# Patient Record
Sex: FEMALE | Race: WHITE | NOT HISPANIC OR LATINO | Employment: OTHER | URBAN - METROPOLITAN AREA
[De-identification: names, ages, dates, MRNs, and addresses within clinical notes are randomized per-mention and may not be internally consistent; named-entity substitution may affect disease eponyms.]

---

## 2017-12-07 ENCOUNTER — OFFICE VISIT (OUTPATIENT)
Dept: INTERNAL MEDICINE | Facility: CLINIC | Age: 72
End: 2017-12-07
Payer: COMMERCIAL

## 2017-12-07 VITALS
BODY MASS INDEX: 30.56 KG/M2 | SYSTOLIC BLOOD PRESSURE: 150 MMHG | TEMPERATURE: 98 F | RESPIRATION RATE: 20 BRPM | HEART RATE: 64 BPM | OXYGEN SATURATION: 98 % | DIASTOLIC BLOOD PRESSURE: 78 MMHG | HEIGHT: 64 IN | WEIGHT: 179 LBS

## 2017-12-07 DIAGNOSIS — E11.9 TYPE 2 DIABETES MELLITUS WITHOUT COMPLICATION, WITHOUT LONG-TERM CURRENT USE OF INSULIN: ICD-10-CM

## 2017-12-07 DIAGNOSIS — Z12.11 SCREENING FOR COLON CANCER: ICD-10-CM

## 2017-12-07 DIAGNOSIS — K85.00 IDIOPATHIC ACUTE PANCREATITIS WITHOUT INFECTION OR NECROSIS: ICD-10-CM

## 2017-12-07 DIAGNOSIS — I10 HYPERTENSION, UNSPECIFIED TYPE: ICD-10-CM

## 2017-12-07 DIAGNOSIS — D50.8 OTHER IRON DEFICIENCY ANEMIA: ICD-10-CM

## 2017-12-07 DIAGNOSIS — Z12.39 SCREENING FOR BREAST CANCER: ICD-10-CM

## 2017-12-07 DIAGNOSIS — L03.031 PARONYCHIA OF SECOND TOE OF RIGHT FOOT: Primary | ICD-10-CM

## 2017-12-07 DIAGNOSIS — Z13.820 OSTEOPOROSIS SCREENING: ICD-10-CM

## 2017-12-07 DIAGNOSIS — E83.42 HYPOMAGNESEMIA: ICD-10-CM

## 2017-12-07 PROCEDURE — 10060 I&D ABSCESS SIMPLE/SINGLE: CPT | Mod: ,,, | Performed by: INTERNAL MEDICINE

## 2017-12-07 PROCEDURE — 99205 OFFICE O/P NEW HI 60 MIN: CPT | Mod: 25,,, | Performed by: INTERNAL MEDICINE

## 2017-12-07 RX ORDER — DOXYCYCLINE 100 MG/1
100 CAPSULE ORAL EVERY 12 HOURS
Qty: 10 CAPSULE | Refills: 1 | Status: SHIPPED | OUTPATIENT
Start: 2017-12-07 | End: 2018-01-22

## 2017-12-07 RX ORDER — METFORMIN HYDROCHLORIDE 500 MG/1
500 TABLET ORAL
Qty: 90 TABLET | Refills: 0 | Status: SHIPPED | OUTPATIENT
Start: 2017-12-07 | End: 2017-12-11 | Stop reason: SDUPTHER

## 2017-12-07 RX ORDER — METFORMIN HYDROCHLORIDE 500 MG/1
500 TABLET ORAL 2 TIMES DAILY WITH MEALS
Qty: 90 TABLET | Refills: 0 | Status: SHIPPED | OUTPATIENT
Start: 2017-12-07 | End: 2017-12-07 | Stop reason: SDUPTHER

## 2017-12-07 NOTE — PROGRESS NOTES
Subjective:       Patient ID: Cynthia N Cushing is a 72 y.o. female.    Chief Complaint: Diabetes; Medication Refill; and Wound Infection (infected toe)    STUBBED THE RIGHT SECOND TOE LAST MONTH AND WENT TO URGENT CARE WHO FOUND A SPUR AND WAS TREATED BUT NOW IT SEEMS TO BE INFECTED AGAIN    RECENT ELEVATION OF HER BLOOD SUGAR AND SHE IS JUST NOW SEEING ME FOR TREAMENT    STATES SHE JUST GOT OUT OF THE HOSPITAL FOR ELEVATED PANCREATIC AND LIVER AND RENAL PROBLEMS-NO PRIOR PANCREATITIS-SHE HAD BEEN SICK WITH THE FLU AND SHE HAD HAD THE ANTIBIOTIC FOR THE TOE-NO HX RENAL DISEASE.3 YEARS AGO PATIENT HAD SHINGLES AND SHE HEARD HER LFS WERE ELEVATED-NO HX LIVER DISEASE-NO HX HEPATITIS-NO ALCOHOL-TAKES BC FOR HER KNEE      Diabetes   She presents for her initial diabetic visit. She has type 2 diabetes mellitus. Her disease course has been worsening. There are no hypoglycemic associated symptoms. Pertinent negatives for hypoglycemia include no confusion, dizziness or headaches. Associated symptoms include fatigue. Pertinent negatives for diabetes include no blurred vision, no chest pain, no foot ulcerations, no polydipsia, no polyuria, no visual change and no weakness. Symptoms are worsening. Risk factors for coronary artery disease include diabetes mellitus, dyslipidemia, hypertension, obesity, post-menopausal and sedentary lifestyle. Current diabetic treatments: WHEN FIRST SUGAR ELEVATED THE MD PUT HER ON METFORMIN AND SHE RAN OUT OF IT AND SHE WAS TOLD TO FIND AN MD TO TREAT IT-READY MED PUT HER BACK ON THE METFORMIN. She has not had a previous visit with a dietitian. She rarely participates in exercise. Home blood sugar record trend: DOESNT CHECK. An ACE inhibitor/angiotensin II receptor blocker is not being taken. She does not see a podiatrist.Eye exam is not current.   Medication Refill   Associated symptoms include abdominal pain and fatigue. Pertinent negatives include no arthralgias, chest pain, chills,  congestion, coughing, diaphoresis, fever, headaches, myalgias, nausea, rash, sore throat, visual change, vomiting or weakness.   Nail Problem   This is a chronic problem. The current episode started more than 1 month ago. Associated symptoms include abdominal pain and fatigue. Pertinent negatives include no arthralgias, chest pain, chills, congestion, coughing, diaphoresis, fever, headaches, myalgias, nausea, rash, sore throat, visual change, vomiting or weakness.       Past Medical History:   Diagnosis Date    Allergy     Cataract     Hernia, hiatal     Hypertension     Kidney stone     Seasonal allergies      Social History     Social History    Marital status:      Spouse name: N/A    Number of children: N/A    Years of education: N/A     Occupational History    Not on file.     Social History Main Topics    Smoking status: Never Smoker    Smokeless tobacco: Never Used    Alcohol use No    Drug use: No    Sexual activity: No     Other Topics Concern    Not on file     Social History Narrative    No narrative on file     Past Surgical History:   Procedure Laterality Date    BREAST SURGERY      CERVICAL DISC SURGERY      EYE SURGERY      HYSTERECTOMY       Family History   Problem Relation Age of Onset    Stroke Mother     Cancer Mother     Cancer Father        Review of Systems   Constitutional: Positive for fatigue. Negative for appetite change, chills, diaphoresis and fever.        CHRONIC FATIGUE WITH SEVERE ANEMIA WHEN YOUNGER   HENT: Negative.  Negative for congestion, ear pain, hearing loss, sore throat and trouble swallowing.    Eyes: Negative for blurred vision, photophobia, pain and visual disturbance.   Respiratory: Negative.  Negative for cough, chest tightness and shortness of breath.    Cardiovascular: Positive for leg swelling. Negative for chest pain and palpitations.        HER FEET SWELL AND SHE IS ON DIURETIC WHICH IS NOT WORKING   Gastrointestinal: Positive for  abdominal pain. Negative for blood in stool, constipation, diarrhea, nausea and vomiting.        HAD GENERALIZED ABDOMINAL PAIN WITH NAUSEA WHEN SHE WENT TO THE HOSPITAL-SHE WAS CONFUSED-AND WAS FOUND TO BE DEHYDRATED   Endocrine: Negative for cold intolerance, heat intolerance, polydipsia and polyuria.   Genitourinary: Negative.  Negative for difficulty urinating, flank pain, pelvic pain and vaginal pain.   Musculoskeletal: Negative.  Negative for arthralgias and myalgias.   Skin: Negative.  Negative for rash.   Allergic/Immunologic: Negative.  Negative for immunocompromised state.   Neurological: Positive for light-headedness. Negative for dizziness, weakness and headaches.   Hematological: Negative for adenopathy. Does not bruise/bleed easily.        HISTORY OF ANEMIA IN THE PAST BUT NONE NOW   Psychiatric/Behavioral: Negative.  Negative for confusion, self-injury and suicidal ideas.       Objective:      Physical Exam   Constitutional: She is oriented to person, place, and time. She appears well-developed and well-nourished. She is cooperative. No distress.   1/6 SYSTOLIC BASILAR MURMUR   HENT:   Head: Normocephalic and atraumatic.   Right Ear: Tympanic membrane normal.   Left Ear: Tympanic membrane normal.   Nose: Nose normal.   Mouth/Throat: Uvula is midline, oropharynx is clear and moist and mucous membranes are normal.   Eyes: Conjunctivae, EOM and lids are normal. Pupils are equal, round, and reactive to light. Right pupil is round and reactive. Left pupil is round and reactive.   Neck: Trachea normal and normal range of motion. Neck supple. No JVD present. No thyromegaly present.   MIDLINE SCAR TO RIGHT AND LEFT FROM CERVICAL SURGERY   Cardiovascular: Normal rate, regular rhythm and intact distal pulses.  Exam reveals no gallop and no friction rub.    Murmur heard.  Pulmonary/Chest: Effort normal and breath sounds normal. No tachypnea. No respiratory distress. She has no wheezes. She has no rales. She  exhibits no tenderness.   Abdominal: Soft. Bowel sounds are normal. She exhibits no distension and no mass. There is no tenderness. There is no rebound and no guarding.   Genitourinary: Rectal exam shows guaiac negative stool.   Musculoskeletal: Normal range of motion. She exhibits edema and tenderness.   1+ EDEMA OF LOWER LEGS AND FEET RIGHT GREATER THAN LEFT-PARONYCHIA WITH ERYTHEMA OF THE RIGHT SECOND TOENAIL AND DISTAL DIGIT   Lymphadenopathy:     She has no cervical adenopathy.   Neurological: She is alert and oriented to person, place, and time. She has normal strength.   Skin: Skin is warm and dry. No rash noted. No pallor.        Psychiatric: She has a normal mood and affect. Her speech is normal.   Nursing note and vitals reviewed.      Assessment:       1. Paronychia of second toe of right foot    2. Type 2 diabetes mellitus without complication, without long-term current use of insulin    3. Hypertension, unspecified type    4. Screening for breast cancer    5. Osteoporosis screening         Plan:         LAST H/H WAS 9.3/28.6-FBS  -GFR WAS 68CC//MAGNESIUM WAS LOW//PFS WERE SLIGHTLY ELEVATED  Paronychia of second toe of right foot  -     INCISION AND DRAINAGE  -     doxycycline (VIBRAMYCIN) 100 MG Cap; Take 1 capsule (100 mg total) by mouth every 12 (twelve) hours.  Dispense: 10 capsule; Refill: 1    Type 2 diabetes mellitus without complication, without long-term current use of insulin    Hypertension, unspecified type    Screening for breast cancer  -     Mammo Digital Diagnostic Right without C    Osteoporosis screening  -     DXA Bone Density Spine And Hip

## 2017-12-07 NOTE — PATIENT INSTRUCTIONS
Iron-Deficiency Anemia (Adult)  Red blood cells carry oxygen to the tissues of your body. Anemia is a condition in which you have too few red blood cells. You need iron to make red cells. Anemia makes you feel tired and run down. When anemia becomes severe, your skin becomes pale. You may feel short of breath after physical activity. Other symptoms include:  · Headaches  · Dizziness  · Leg cramps with physical activity  · Drowsiness  · Restless legs  Your anemia is caused by not having enough iron in your body. This may be because of:  · Loss of blood. This can be caused by heavy menstrual periods. It can also be caused by bleeding from the stomach or intestines.  · Poor diet. You may not be eating enough foods that contain iron.  · Inability to absorb iron from the foods you eat  · Pregnancy  If your blood count is low enough, your healthcare provider may prescribe an iron supplement. It usually takes about 2 to 3 months of treatment with iron supplements to correct anemia. Severe cases of anemia need a blood transfusion to quickly ease symptoms and deliver more oxygen to the cells.  Home care  Follow these guidelines when caring for yourself at home:  · Eat foods high in iron. This will boost the amount of iron stored in your body. It is a natural way to build up the number of blood cells. Good sources of iron include beef, liver, spinach and other dark green leafy vegetables, whole grains, beans, and nuts.  · Do not overexert yourself.  · Talk with your healthcare provider before traveling by air or traveling to high altitudes.  Follow-up care  Follow up with your healthcare provider in 2 months, or as advised. This is to have another red blood cell count to be sure your anemia has been fixed.  When to seek medical advice  Call your healthcare provider right away if any of these occur:  · Shortness of breath or chest pain  · Dizziness or fainting  · Vomiting blood or passing red or black-colored stool   Date  Last Reviewed: 2/25/2016  © 1457-8200 [employer]. [address] [place] [address], [place] [address]. All rights reserved. This information is not intended as a substitute for professional medical care. Always follow your healthcare professional's instructions.        Understanding Carbohydrates, Fats, and Protein  Food is a source of fuel and nourishment for your body. Its also a source of pleasure. Having diabetes doesnt mean you have to eat special foods or give up desserts. Instead, your dietitian can show you how to plan meals to suit your body. To start, learn how different foods affect blood sugar.  Carbohydrates  Carbohydrates are the main source of fuel for the body. Carbohydrates raise blood sugar. Many people think carbohydrates are only found in pasta or bread. But carbohydrates are actually in many kinds of foods:  · Sugars occur naturally in foods such as fruit, milk, honey, and molasses. Sugars can also be added to many foods, from cereals and yogurt to candy and desserts. Sugars raise blood sugar.  · Starches are found in bread, cereals, pasta, and dried beans. Theyre also found in corn, peas, potatoes, yam, acorn squash, and butternut squash. Starches also raise blood sugar.   · Fiber is found in foods such as vegetables, fruits, beans, and whole grains. Unlike other carbs, fiber isnt digested or absorbed. So it doesnt raise blood sugar. In fact, fiber can help keep blood sugar from rising too fast. It also helps keep blood cholesterol at a healthy level.  Did you know?  Even though carbohydrates raise blood sugar, its best to have some in every meal. They are an important part of a healthy diet.   Fat  Fat is an energy source that can be stored until needed. Fat does not raise blood sugar. However, it can raise blood cholesterol, increasing the risk of heart disease. Fat is also high in calories, which can cause weight gain. Not all types of fat are the same.  More  Healthy:  · Monounsaturated fats are mostly found in vegetable oils, such as olive, canola, and peanut oils. They are also found in avocados and some nuts. Monounsaturated fats are healthy for your heart. Thats because they lower LDL (unhealthy) cholesterol.  · Polyunsaturated fats are mostly found in vegetable oils, such as corn, safflower, and soybean oils. They are also found in some seeds, nuts, and fish. Polyunsaturated fats lower LDL (unhealthy) cholesterol. So, choosing them instead of saturated fats is healthy for your heart. Certain unsaturated fats can help lower triglycerides.   Less Healthy:  · Saturated fats are found in animal products, such as meat, poultry, whole milk, lard, and butter. Saturated fats raise LDL cholesterol and are not healthy for your heart.  · Hydrogenated oils and trans fats are formed when vegetable oils are processed into solid fats. They are found in many processed foods. Hydrogenated oils and trans fats raise LDL cholesterol and lower HDL (healthy) cholesterol. They are not healthy for your heart.  Protein  Protein helps the body build and repair muscle and other tissue. Protein has little or no effect on blood sugar. However, many foods that contain protein also contain saturated fat. By choosing low-fat protein sources, you can get the benefits of protein without the extra fat:  · Plant protein is found in dry beans and peas, nuts, and soy products, such as tofu and soymilk. These sources tend to be cholesterol-free and low in saturated fat.  · Animal protein is found in fish, poultry, meat, cheese, milk, and eggs. These contain cholesterol and can be high in saturated fat. Aim for lean, lower-fat choices.  Date Last Reviewed: 3/1/2016  © 8937-0845 [employer]. [address] [place] [id number]. All rights reserved. This information is not intended as a substitute for professional medical care. Always follow your healthcare professional's  instructions.        Diet: Diabetes  Food is an important tool that you can use to control diabetes and stay healthy. Eating well-balanced meals in the correct amounts will help you control your blood glucose levels and prevent low blood sugar reactions. It will also help you reduce the health risks of diabetes. There is no one specific diet that is right for everyone with diabetes. But there are general guidelines to follow. A registered dietitian (RD) will create a tailored diet approach thats just right for you. He or she will also help you plan healthy meals and snacks. If you have any questions, call your dietitian for advice.     Guidelines for success  Talk with your healthcare provider before starting a diabetes diet or weight loss program. If you haven't talked with a dietitian yet, ask your provider for a referral. The following guidelines can help you succeed:  · Select foods from the 6 food groups below. Your dietitian will help you find food choices within each group. He or she will also show you serving sizes and how many servings you can have at each meal.  ¨ Grains, beans, and starchy vegetables  ¨ Vegetables  ¨ Fruit  ¨ Milk or yogurt  ¨ Meat, poultry, fish, or tofu  ¨ Healthy fats  · Check your blood sugar levels as directed by your provider. Take any medicine as prescribed by your provider.  · Learn to read food labels and pick the right portion sizes.  · Eat only the amount of food in your meal plan. Eat about the same amount of food at regular times each day. Dont skip meals. Eat meals 4 to 5 hours apart, with snacks in between.  · Limit alcohol. It raises blood sugar levels. Drink water or calorie-free diet drinks that use safe sweeteners.  · Eat less fat to help lower your risk of heart disease. Use nonfat or low-fat dairy products and lean meats. Avoid fried foods. Use cooking oils that are unsaturated, such as olive, canola, or peanut oil.  · Talk with your dietitian about safe sugar  substitutes.  · Avoid added salt. It can contribute to high blood pressure, which can cause heart disease. People with diabetes already have a risk of high blood pressure and heart disease.  · Stay at a healthy weight. If you need to lose weight, cut down on your portion sizes. But dont skip meals. Exercise is an important part of any weight management program. Talk with your provider about an exercise program thats right for you.  · For more information about the best diet plan for you, talk with a registered dietitian (RD). To find an RD in your area, contact:  ¨ Academy of Nutrition and Dietetics www.eatright.org  ¨ The American Diabetes Association [phone] www.diabetes.org  Date Last Reviewed: 8/1/2016 © 2000-2017 [employer]. [address] [place] [address], [place] [address]. All rights reserved. This information is not intended as a substitute for professional medical care. Always follow your healthcare professional's instructions.        Your Diabetes Foot Care Program    Every day you depend on your feet to keep you moving. But when you have diabetes, your feet need special care. Even a small foot problem can become very serious. So dont take your feet for granted. By working with your diabetes healthcare team, you can learn how to protect your feet and keep them healthy.  Evaluating your feet  An evaluation helps your healthcare provider check the condition of your feet. The evaluation includes a review of your diabetes history and overall health. It may also include a foot exam, X-rays, or other tests. These can help show problems beneath the skin that you cant see or feel.  Medical history  You will be asked about your overall health and any history of foot problems. Youll also discuss your diabetes history, such as whether your blood sugar level has changed over time. It also includes questions about sensations of pain, tingling, pins and needles, or numbness. Your healthcare provider  will also want to know if you have high blood pressure and heart disease, or if you smoke. Be sure to mention any medicines (including over-the-counter), supplements, or herbal remedies you take.  Foot exam  A foot exam checks the condition of different parts of your foot. First, your skin and nails are examined for any signs of infection. Blood flow is checked by feeling for the pulses in each foot. You may also have tests to study the nerves in the foot. These include using a small filament (wire) to see how sensitive your feet are. In certain cases, you will be asked to walk a short distance to check for bone, joint, and muscle problems.  Diagnostic tests  If needed, your healthcare provider will suggest certain tests to learn more about your feet. These include:  · Doppler tests to measure blood flow in the feet and lower leg.  · X-rays, which can show bone or joint problems.  · Other imaging tests, such as an MRI (magnetic resonance imaging), bone scan, and CT (computed tomography) scan. These can help show bone infections.  · Other tests, such as vascular tests, which study the blood flow in your feet and legs. You may also have nerve studies to learn how sensitive your feet are.  Creating a foot care program  Based on the evaluation, your healthcare provider will create a foot care program for you. Your program may be as simple as starting a daily self-care routine and changing the types of shoes your wear. It may also involve treating minor foot problems, such as a corn or blister. In some cases, surgery will be needed to treat an infection or mechanical problems, such as hammer toes.  Preventing problems  When you have diabetes, its easier to prevent problems than to treat them later on. So see your healthcare team for regular checkups and foot care. Your healthcare team can also help you learn more about caring for your feet at home. For example, you may be told to avoid walking barefoot. Or you may be  told that special footwear is needed to protect your feet.  Have regular checkups  Foot problems can develop quickly. So be sure to follow your healthcare teams schedule for regular checkups. During office visits, take off your shoes and socks as soon as you get in the exam room. Ask your healthcare provider to examine your feet for problems. This will make it easier to find and treat small skin irritations before they get worse. Regular checkups can also help keep track of the blood flow and feeling in your feet. If you have neuropathy (lack of feeling in your feet), you will need to have checkups more often.  Learn about self-care  The more you know about diabetes and your feet, the easier it will be to prevent problems. Members of your healthcare team can teach you how to inspect your feet and teach you to look for warning signs. They can also give you other foot care tips. During office visits, be sure to ask any questions you have.  Date Last Reviewed: 7/1/2016 © 2000-2017 "[employer]. [address] [place] [address]. All rights reserved. This information is not intended as a substitute for professional medical care. Always follow your healthcare professional's instructions.        Diabetes (General Information)  Diabetes is a long-term health problem. It means your body does not make enough insulin. Or it may mean that your body cannot use the insulin it makes. Insulin is a hormone in your body. It lets blood sugar (glucose) reach the cells in your body. All of your cells need glucose for fuel.  When you have diabetes, the glucose in your blood builds up because it cannot get into the cells. This buildup is called high blood sugar (hyperglycemia).  Your blood sugar level depends on several things. It depends on what kind of food you eat and how much of it you eat. It also depends on how much exercise you get, and how much insulin you have in your body. Eating too much of the wrong  kinds of food or not taking diabetes medicine on time can cause high blood sugar. Infections can cause high blood sugar even if you are taking medicines correctly.  These things can also cause low blood sugar:  · Missing meals  · Not eating enough food  · Taking too much diabetes medicine  Diabetes can cause serious problems over time if you do not get treated. These problems include heart disease, stroke, kidney failure, and blindness. They also include nerve pain or loss of feeling in your legs and feet, and gangrene of the feet. By keeping your blood sugar under control you can prevent or delay these problems.  Normal blood sugar levels are 80 to 100 before a meal and less than 180 in the 1 to 2 hours after a meal.  Home care  Follow these guidelines when caring for yourself at home:  · Follow the diet your healthcare provider gives you. Take insulin or other diabetes medicine exactly as told to.  · Watch your blood sugar as you are told to. Keep a log of your results. This will help your provider change your medicines to keep your blood sugar under control.  · Try to reach your ideal weight. You may be able to cut back on or not have to take diabetes medicine if you eat the right foods and get exercise.  · Do not smoke. Smoking worsens the effects of diabetes on your circulation. You are much more likely to have a heart attack if you have diabetes and you smoke.  · Take good care of your feet. If you have lost feeling in your feet, you may not see an injury or infection. Check your feet and between your toes at least once a week.  · Wear a medical alert bracelet or necklace, or carry a card in your wallet that says you have diabetes. This will help healthcare providers give you the right care if you get very ill and cannot tell them that you have diabetes.  Sick day plan  If you get a cold, the flu, or a bacterial or viral infection, take these steps:  · Look at your diabetes sick plan and call your healthcare  provider as you were told to. You may need to call your provider right away if:  ¨ Your blood sugar is above 240 while taking your diabetes medicine  ¨ Your urine ketone levels are above normal or high  ¨ You have been vomiting more than 6 hours  ¨ You have trouble breathing or your breath ha s a fruity smell  ¨ You have a high fever  ¨ You have a fever for several days and you are not getting better  ¨ You get light-headed and are sleepier than usual  · Keep taking your diabetes pills (oral medicine) even if you have been vomiting and are feeling sick. Call your provider right away because you may need insulin to lower your blood sugar until you recover from your illness.  · Keep taking your insulin even if you have been vomiting and are feeling sick. Call your provider right away to ask if you need to change your insulin dose. This will depend on your blood sugar results.  · Check your blood sugar every 2 to 4 hours, or at least 4 times a day.  · Check your ketones often. If you are vomiting and having diarrhea, watch them more often.  · Do not skip meals. Try to eat small meals on a regular schedule. Do this even if you do not feel like eating.  · Drink water or other liquids that do not have caffeine or calories. This will keep you from getting dehydrated. If you are nauseated or vomiting, takes small sips every 5 minutes. To prevent dehydration try to drink a cup (8 ounces) of fluids every hour while you are awake.  General care  Always bring a source of fast-acting sugar with you in case you have symptoms of low blood sugar (below 70). At the first sign of low blood sugar, eat or drink 15 to 20 grams of fast-acting sugar to raise your blood sugar. Examples are:  · 3 to 4 glucose tablets. You can buy these at most drugstores.  · 4 ounces (1/2 cup) of regular (not diet) soft drinks  · 4 ounces (1/2 cup) of any fruit juice  · 8 ounces (1 cup) of milk  · 5 to 6 pieces of hard candy  · 1 tablespoon of honey  Check  your blood sugar 15 minutes after treating yourself. If it is still below 70, take 15 to 20 more grams of fast-acting sugar. Test again in 15 minutes. If it returns to normal (70 or above), eat a snack or meal to keep your blood sugar in a safe range. If it stays low, call your doctor or go to an emergency room.  Follow-up care  Follow-up with your healthcare provider, or as advised. For more information about diabetes, visit the American Diabetes Association website at www.diabetes.org or call [phone].  When to seek medical advice  Call your healthcare provider right away if you have any of these symptoms of high blood sugar:  · Frequent urination  · Dizziness  · Drowsiness  · Thirst  · Headache  · Nausea or vomiting  · Abdominal pain  · Eyesight changes  · Fast breathing  · Confusion or loss of consciousness  Also call your provider right away if you have any of these signs of low blood sugar:  · Fatigue  · Headache  · Shakes  · Excess sweating  · Hunger  · Feeling anxious or restless  · Eyesight changes  · Drowsiness  · Weakness  · Confusion or loss of consciousness  Call 911  Call for emergency help right away if any of these occur:  · Chest pain or shortness of breath  · Dizziness or fainting  · Weakness of an arm or leg or one side of the face  · Trouble speaking or seeing   Date Last Reviewed: 6/1/2016  © 6522-3744 [employer]. [address] [place] [address], [place] [address]. All rights reserved. This information is not intended as a substitute for professional medical care. Always follow your healthcare professional's instructions.

## 2017-12-07 NOTE — PROCEDURES
Incision & Drainage  Date/Time: 12/7/2017 3:30 PM  Performed by: TIM GEORGE  Authorized by: TIM GEORGE     Consent Done?:  Yes (Verbal)    Type:  Abscess  Body area:  Lower extremity  Location details:  Right second toe  Scalpel size:  10  Incision type:  Single straight  Complexity:  Simple  Drainage:  Pus  Drainage amount:  Scant  Wound treatment:  Incision  Packing material:  None  Patient tolerance:  Patient tolerated the procedure well with no immediate complications

## 2017-12-08 ENCOUNTER — TELEPHONE (OUTPATIENT)
Dept: INTERNAL MEDICINE | Facility: CLINIC | Age: 72
End: 2017-12-08

## 2017-12-08 DIAGNOSIS — J01.40 SUBACUTE PANSINUSITIS: ICD-10-CM

## 2017-12-08 NOTE — TELEPHONE ENCOUNTER
----- Message from Annamarie Mcgill sent at 12/7/2017  4:41 PM CST -----  Contact: self  Doxycycline gives the patient an upset stomach.  Can clarithromycin 500mg be sent instead?

## 2017-12-11 ENCOUNTER — TELEPHONE (OUTPATIENT)
Dept: INTERNAL MEDICINE | Facility: CLINIC | Age: 72
End: 2017-12-11

## 2017-12-11 PROBLEM — J01.40 SUBACUTE PANSINUSITIS: Status: ACTIVE | Noted: 2017-12-11

## 2017-12-11 RX ORDER — AZITHROMYCIN 500 MG/1
500 TABLET, FILM COATED ORAL DAILY
Qty: 10 TABLET | Refills: 0 | Status: SHIPPED | OUTPATIENT
Start: 2017-12-11 | End: 2017-12-21

## 2017-12-11 RX ORDER — AZITHROMYCIN 500 MG/1
500 TABLET, FILM COATED ORAL DAILY
Qty: 10 TABLET | Refills: 0 | Status: SHIPPED | OUTPATIENT
Start: 2017-12-11 | End: 2017-12-11 | Stop reason: SDUPTHER

## 2017-12-11 RX ORDER — METFORMIN HYDROCHLORIDE 500 MG/1
500 TABLET ORAL
Qty: 90 TABLET | Refills: 0 | Status: SHIPPED | OUTPATIENT
Start: 2017-12-11 | End: 2018-01-22 | Stop reason: SDUPTHER

## 2018-01-22 ENCOUNTER — OFFICE VISIT (OUTPATIENT)
Dept: INTERNAL MEDICINE | Facility: CLINIC | Age: 73
End: 2018-01-22
Payer: COMMERCIAL

## 2018-01-22 VITALS
WEIGHT: 177 LBS | BODY MASS INDEX: 30.22 KG/M2 | HEIGHT: 64 IN | SYSTOLIC BLOOD PRESSURE: 130 MMHG | HEART RATE: 68 BPM | TEMPERATURE: 97 F | OXYGEN SATURATION: 98 % | RESPIRATION RATE: 20 BRPM | DIASTOLIC BLOOD PRESSURE: 72 MMHG

## 2018-01-22 DIAGNOSIS — M89.9 DISEASE OF BONE: Primary | ICD-10-CM

## 2018-01-22 DIAGNOSIS — E11.9 TYPE 2 DIABETES MELLITUS WITHOUT COMPLICATION, WITHOUT LONG-TERM CURRENT USE OF INSULIN: ICD-10-CM

## 2018-01-22 DIAGNOSIS — E11.9 TYPE 2 DIABETES MELLITUS WITHOUT COMPLICATION, WITHOUT LONG-TERM CURRENT USE OF INSULIN: Primary | ICD-10-CM

## 2018-01-22 DIAGNOSIS — L03.031 PARONYCHIA OF SECOND TOE OF RIGHT FOOT: ICD-10-CM

## 2018-01-22 PROCEDURE — 99214 OFFICE O/P EST MOD 30 MIN: CPT | Mod: ,,, | Performed by: INTERNAL MEDICINE

## 2018-01-22 RX ORDER — METFORMIN HYDROCHLORIDE 500 MG/1
500 TABLET ORAL
Qty: 90 TABLET | Refills: 0 | Status: SHIPPED | OUTPATIENT
Start: 2018-01-22 | End: 2018-07-19 | Stop reason: SDUPTHER

## 2018-01-22 RX ORDER — LANCETS
1 EACH MISCELLANEOUS
Qty: 60 EACH | Refills: 2 | Status: SHIPPED | OUTPATIENT
Start: 2018-01-22 | End: 2019-06-04

## 2018-01-22 RX ORDER — DEXTROSE 4 G
TABLET,CHEWABLE ORAL
Qty: 1 EACH | Refills: 0 | Status: SHIPPED | OUTPATIENT
Start: 2018-01-22 | End: 2019-08-12 | Stop reason: SDUPTHER

## 2018-01-22 NOTE — PATIENT INSTRUCTIONS
Demonstration how to use the glucose monitoring device-check fasting before breakfast and before supper    Share acucheck diary with me weekly so I can adjust rx    Drop your insurance accepted diabetes meds by

## 2018-01-22 NOTE — PROGRESS NOTES
SUBJECTIVE:    Patient ID: Cynthia N Cushing is a 72 y.o. female.    Chief Complaint: Follow-up (4 weeks,patient recently was diagnosed with diabetes)    HPI     IN FOR FOLLOW-UP--SHE IS NOT CHECKING HER GLUCOSES AS OF YET-SHE IS JUST RECENTLY ON METFORMIN THAT WAS GIVEN PRIOR TO LAST VISIT AND OF WHICH SHE RAN OUT AND WAS TO GET MORE FROM HER PCP, WHICH WAS PRESCRIBED AT HER FIRST OV HERE...    FOLLOW-UP ELEVATED PANCREATIC AND LIVER AND RENAL STUDIES LAST HOSPITALIZATION--NO MORE PAIN-ONLY GAS-NO BOWEL ISSUES-SAYS SHE DOES NOT REMEMBER THE BEGINNING OF ALL HER SX-SAYS HER DAUGHTER MUST HAVE BEEN PICKING UP ON SOMETHING...    PARONYCHIA-HAS RESOLVED-    NO SX AT ALL NOW RE THE HOSPITAL STAY-BACK AT SCHOOL WORKING     Past Medical History:   Diagnosis Date    Allergy     Cataract     Hernia, hiatal     Hypertension     Kidney stone     Seasonal allergies      Social History     Social History    Marital status:      Spouse name: N/A    Number of children: N/A    Years of education: N/A     Occupational History    Not on file.     Social History Main Topics    Smoking status: Never Smoker    Smokeless tobacco: Never Used    Alcohol use No    Drug use: No    Sexual activity: No     Other Topics Concern    Not on file     Social History Narrative    No narrative on file     Past Surgical History:   Procedure Laterality Date    BREAST SURGERY      CERVICAL DISC SURGERY      EYE SURGERY      HYSTERECTOMY       Family History   Problem Relation Age of Onset    Stroke Mother     Cancer Mother     Cancer Father        Review of Systems   Constitutional: Negative for appetite change, chills, diaphoresis, fatigue, fever and unexpected weight change.        10 POUNDS LIGHTER THAN PRIOR TO HOSPITALIZATION   HENT: Positive for postnasal drip (CHRONIC). Negative for congestion, ear pain, hearing loss, nosebleeds, sinus pain, sinus pressure, sneezing, sore throat, tinnitus, trouble swallowing and  voice change.    Eyes: Negative for photophobia, pain, itching and visual disturbance.        POST CATARACT SURGERY-LAST CHECK TWO MONTHS AGO BY DR JIMENEZ   Respiratory: Negative for apnea, cough, chest tightness, shortness of breath, wheezing and stridor.    Cardiovascular: Negative for chest pain, palpitations and leg swelling.   Gastrointestinal: Negative for abdominal distention, abdominal pain, blood in stool, constipation, diarrhea, nausea and vomiting.        GAS FROM TIME TO TIME   Endocrine: Negative for cold intolerance, heat intolerance, polydipsia and polyuria.   Genitourinary: Negative for difficulty urinating, dyspareunia, dysuria, flank pain, frequency, hematuria, menstrual problem, pelvic pain, urgency, vaginal discharge and vaginal pain.   Musculoskeletal: Negative for arthralgias, back pain, joint swelling, myalgias, neck pain and neck stiffness.        RIGHT KNEE OA AND SHE SEES DR PAULINO FOR SAME   Skin: Negative for pallor.   Allergic/Immunologic: Negative for environmental allergies and food allergies.   Neurological: Negative for dizziness, tremors, speech difficulty, weakness, light-headedness and numbness.   Hematological: Does not bruise/bleed easily.   Psychiatric/Behavioral: Negative for agitation, confusion, decreased concentration, sleep disturbance and suicidal ideas. The patient is not nervous/anxious.           Objective:      Physical Exam   Constitutional: She is oriented to person, place, and time. She appears well-developed and well-nourished. She is cooperative. No distress.   HENT:   Head: Normocephalic and atraumatic.   Right Ear: Tympanic membrane normal.   Left Ear: Tympanic membrane normal.   Nose: Nose normal.   Mouth/Throat: Uvula is midline, oropharynx is clear and moist and mucous membranes are normal.   Eyes: Conjunctivae, EOM and lids are normal. Pupils are equal, round, and reactive to light. Right pupil is round and reactive. Left pupil is round and reactive.    Neck: Trachea normal and normal range of motion. Neck supple. No JVD present. No thyromegaly present.   Cardiovascular: Normal rate, regular rhythm and intact distal pulses.    Murmur (1/6 AORTIC MURMUR-EVAOLUATED BY DR RIVERA) heard.  Pulmonary/Chest: Effort normal and breath sounds normal. No tachypnea. No respiratory distress.   Abdominal: Soft. Bowel sounds are normal. There is no tenderness.   Musculoskeletal: Normal range of motion.   Lymphadenopathy:     She has no cervical adenopathy.   Neurological: She is alert and oriented to person, place, and time. She has normal strength.   Skin: Skin is warm and dry. No rash noted.   Psychiatric: She has a normal mood and affect. Her speech is normal.   Nursing note and vitals reviewed.      Protective Sensation (aw/ 10 gram monofilament):  Right: Intact  Left: Intact    Visual Inspection:  Normal -  Bilateral    Pedal Pulses:   Right: Present  Left: Present    Posterior tibialis:   Right:Present  Left: Present      Assessment:       1. Disease of bone    2. Paronychia of second toe of right foot    3. Type 2 diabetes mellitus without complication, without long-term current use of insulin         Plan:           Disease of bone    Paronychia of second toe of right foot    Type 2 diabetes mellitus without complication, without long-term current use of insulin  -     metFORMIN (GLUCOPHAGE) 500 MG tablet; Take 1 tablet (500 mg total) by mouth daily 2 hours after breakfast.  Dispense: 90 tablet; Refill: 0  -     BLOOD GLUCOSE MONITOR FOR HOME USE  -     Hemoglobin A1c; Future; Expected date: 04/22/2018  -     Basic metabolic panel; Future; Expected date: 01/22/2018  -     Microalbumin/creatinine urine ratio; Future; Expected date: 04/22/2018    Other orders  -     blood sugar diagnostic Strp; 1 strip by Misc.(Non-Drug; Combo Route) route 2 (two) times daily before meals.  Dispense: 60 strip; Refill: 5  -     lancets (ACCU-CHEK SOFTCLIX LANCETS) Misc; 1 lancet by  Misc.(Non-Drug; Combo Route) route 2 (two) times daily before meals.  Dispense: 60 each; Refill: 2

## 2018-03-12 PROBLEM — J01.40 SUBACUTE PANSINUSITIS: Status: RESOLVED | Noted: 2017-12-11 | Resolved: 2018-03-12

## 2018-07-19 ENCOUNTER — OFFICE VISIT (OUTPATIENT)
Dept: INTERNAL MEDICINE | Facility: CLINIC | Age: 73
End: 2018-07-19
Payer: COMMERCIAL

## 2018-07-19 VITALS
SYSTOLIC BLOOD PRESSURE: 140 MMHG | DIASTOLIC BLOOD PRESSURE: 76 MMHG | HEART RATE: 52 BPM | RESPIRATION RATE: 16 BRPM | WEIGHT: 185 LBS | HEIGHT: 64 IN | BODY MASS INDEX: 31.58 KG/M2 | TEMPERATURE: 98 F | OXYGEN SATURATION: 98 %

## 2018-07-19 DIAGNOSIS — Z23 NEED FOR VACCINATION WITH 13-POLYVALENT PNEUMOCOCCAL CONJUGATE VACCINE: ICD-10-CM

## 2018-07-19 DIAGNOSIS — F32.9 REACTIVE DEPRESSION: ICD-10-CM

## 2018-07-19 DIAGNOSIS — E11.9 TYPE 2 DIABETES MELLITUS WITHOUT COMPLICATION, WITHOUT LONG-TERM CURRENT USE OF INSULIN: Primary | ICD-10-CM

## 2018-07-19 DIAGNOSIS — Z12.72 SCREENING FOR VAGINAL CANCER: ICD-10-CM

## 2018-07-19 DIAGNOSIS — Z12.39 BREAST CANCER SCREENING: ICD-10-CM

## 2018-07-19 LAB — HBA1C MFR BLD: 7.3 %

## 2018-07-19 PROCEDURE — 3077F SYST BP >= 140 MM HG: CPT | Mod: ,,, | Performed by: INTERNAL MEDICINE

## 2018-07-19 PROCEDURE — 99213 OFFICE O/P EST LOW 20 MIN: CPT | Mod: ,,, | Performed by: INTERNAL MEDICINE

## 2018-07-19 PROCEDURE — 3078F DIAST BP <80 MM HG: CPT | Mod: ,,, | Performed by: INTERNAL MEDICINE

## 2018-07-19 PROCEDURE — 83036 HEMOGLOBIN GLYCOSYLATED A1C: CPT | Mod: QW,,, | Performed by: INTERNAL MEDICINE

## 2018-07-19 RX ORDER — ESCITALOPRAM OXALATE 10 MG/1
10 TABLET ORAL DAILY
COMMUNITY
End: 2019-06-04 | Stop reason: CLARIF

## 2018-07-19 RX ORDER — METFORMIN HYDROCHLORIDE 500 MG/1
500 TABLET ORAL
Qty: 60 TABLET | Refills: 5 | Status: SHIPPED | OUTPATIENT
Start: 2018-07-19 | End: 2018-10-17

## 2018-07-19 RX ORDER — METFORMIN HYDROCHLORIDE 500 MG/1
500 TABLET ORAL
Qty: 30 TABLET | Refills: 5 | Status: SHIPPED | OUTPATIENT
Start: 2018-07-19 | End: 2018-07-19 | Stop reason: SDUPTHER

## 2018-07-19 NOTE — PROGRESS NOTES
"  SUBJECTIVE:    Patient ID: Cynthia N Cushing is a 73 y.o. female.    Chief Complaint: Diabetes and Follow-up    HPI    In for recheck-Lost her  earlier this year from COPD and pneumonia-and dealing with that situational depression    Diabetes-couches at home have been around [id number] get over 200-she ran out of metformin a while ago-AC is 7.1(depression a factor in not refilling Rx...)Some memory issues but she and her daughters have same-she feels depression intermittently and Cardiology gave her something to take as needed for same.    BP-no recent checks-saw Cardiology two days and he recommended frequent checks--she has hx of cardiac irregularity-    Diet-discussed with her re low carb's    Some memory issues but she and her daughters have same-she feels depression intermittently and Cardiology gave her something to take as needed for same.    Past Medical History:   Diagnosis Date    Allergy     Cataract     Diabetes mellitus, type 2     Hernia, hiatal     Hypertension     Kidney stone     Seasonal allergies      Social History     Social History    Marital status:      Spouse name: N/A    Number of children: N/A    Years of education: N/A     Occupational History    Not on file.     Social History Main Topics    Smoking status: Never Smoker    Smokeless tobacco: Never Used    Alcohol use No    Drug use: No    Sexual activity: No     Other Topics Concern    Not on file     Social History Narrative    No narrative on file     Past Surgical History:   Procedure Laterality Date    BREAST SURGERY      CERVICAL DISC SURGERY      EYE SURGERY      HYSTERECTOMY       Family History   Problem Relation Age of Onset    Stroke Mother     Cancer Mother     Cancer Father      Vitals:    07/19/18 0923   BP: (!) 140/76   Pulse: (!) 52   Resp: 16   Temp: 98.1 °F (36.7 °C)   SpO2: 98%   Weight: 83.9 kg (185 lb)   Height: 5' 4" (1.626 m)       Review of Systems   Constitutional: " "Negative for appetite change, chills, diaphoresis, fatigue, fever and unexpected weight change.   HENT: Negative for congestion, ear pain, hearing loss, nosebleeds, postnasal drip, sinus pain, sinus pressure, sneezing, sore throat, tinnitus, trouble swallowing and voice change.    Eyes: Negative for photophobia, pain, itching and visual disturbance.        Last eye exam December-Dr Woods   Respiratory: Negative for apnea, cough, chest tightness, shortness of breath, wheezing and stridor.    Cardiovascular: Negative for chest pain, palpitations and leg swelling.   Gastrointestinal: Negative for abdominal distention, abdominal pain, blood in stool, constipation, diarrhea, nausea and vomiting.        December -pancreatitis-presented with odd behavior and in ER diagnosed pancreatitis   Endocrine: Negative for cold intolerance, heat intolerance, polydipsia and polyuria.   Genitourinary: Negative for difficulty urinating, dyspareunia, dysuria, flank pain, frequency, hematuria, menstrual problem, pelvic pain, urgency, vaginal discharge and vaginal pain.   Musculoskeletal: Positive for arthralgias (knee pain (right)-receiving injections-"bone on bone"). Negative for back pain, joint swelling, myalgias, neck pain and neck stiffness.   Skin: Negative for pallor.   Allergic/Immunologic: Negative for environmental allergies and food allergies.   Neurological: Negative for dizziness, tremors, speech difficulty, weakness, light-headedness and numbness.   Hematological: Does not bruise/bleed easily.   Psychiatric/Behavioral: Negative for agitation, confusion, decreased concentration, sleep disturbance and suicidal ideas. The patient is not nervous/anxious.         HPI          Objective:      Physical Exam   Constitutional: She is oriented to person, place, and time. She appears well-developed and well-nourished. She is cooperative. No distress.   overweight   HENT:   Head: Normocephalic and atraumatic.   Right Ear: Tympanic " membrane normal.   Left Ear: Tympanic membrane normal.   Mouth/Throat: Uvula is midline and mucous membranes are normal.   Eyes: Conjunctivae, EOM and lids are normal. Pupils are equal, round, and reactive to light. Right pupil is round and reactive. Left pupil is round and reactive.   Neck: Trachea normal and normal range of motion. Neck supple. No JVD present. No thyromegaly present.   Cardiovascular: Normal rate, regular rhythm, normal heart sounds and intact distal pulses.    Pulmonary/Chest: Effort normal and breath sounds normal. No tachypnea. No respiratory distress.   Abdominal: Soft. Bowel sounds are normal. There is no tenderness.   Musculoskeletal: Normal range of motion. She exhibits edema (trace edema of lower legs).   Some arthritic change in finger of let hand   Lymphadenopathy:     She has no cervical adenopathy.   Neurological: She is alert and oriented to person, place, and time. She has normal strength.   Skin: Skin is warm and dry. No rash noted.   Psychiatric: She has a normal mood and affect. Her speech is normal.   Nursing note and vitals reviewed.        Protective Sensation (aw/ 10 gram monofilament):  Right: Intact  Left: Intact    Visual Inspection:  Normal -  Bilateral    Pedal Pulses:   Right: Present  Left: Present    Posterior tibialis:   Right:Present  Left: Present    Assessment:       1. Type 2 diabetes mellitus without complication, without long-term current use of insulin    2. BMI 31.0-31.9,adult    3. Reactive depression    4. Need for vaccination with 13-polyvalent pneumococcal conjugate vaccine    5. Screening for vaginal cancer    6. Breast cancer screening         Plan:           Type 2 diabetes mellitus without complication, without long-term current use of insulin  -     metformin (GLUCOPHAGE) 500 MG tablet; Take 1 tablet (500 mg total) by mouth daily 2 hours after breakfast.  Dispense: 30 tablet; Refill: 5  -     Better dietary precautions    BMI 31.0-31.9,adult        -      Dietary recommendations made    Reactive depression        -     Continue  cardiology recommendations    Other orders  -     Hemoglobin AC, PO CT  -     Mammo Digital Screening Bi lat with To mos; Future; Expected date: 07/19/2018  -     pneum oc 13-juventino conj-dip cr,PF, 0.5 mL Syr; Inject 0.5 mL's into the muscle once. for 1 dose  Dispense: 0.5 mL; Refill: 0  -     Ambulatory referral to Obstetrics / Gynecology

## 2018-09-24 ENCOUNTER — TELEPHONE (OUTPATIENT)
Dept: FAMILY MEDICINE | Facility: CLINIC | Age: 73
End: 2018-09-24

## 2018-09-24 NOTE — TELEPHONE ENCOUNTER
The following medication needs a prior authorization:     Medication Name: one touch verio test strips    Dosage: 100's    Frequency: twice a day    Directions for use: one strip twice a day before meals    Diagnosis: DM    Is the request for a reauthorization? no    Is the patient currently stable on therapy? no    Please list all therapeutic alternatives previously used with start/end dates and outcome:    Machine only takes these strips

## 2019-02-20 DIAGNOSIS — E11.9 TYPE 2 DIABETES MELLITUS WITHOUT COMPLICATION, WITHOUT LONG-TERM CURRENT USE OF INSULIN: ICD-10-CM

## 2019-02-20 RX ORDER — METFORMIN HYDROCHLORIDE 500 MG/1
TABLET ORAL
Qty: 90 TABLET | Refills: 3 | Status: SHIPPED | OUTPATIENT
Start: 2019-02-20 | End: 2019-10-22

## 2019-02-27 ENCOUNTER — TELEPHONE (OUTPATIENT)
Dept: FAMILY MEDICINE | Facility: CLINIC | Age: 74
End: 2019-02-27

## 2019-06-04 ENCOUNTER — OFFICE VISIT (OUTPATIENT)
Dept: FAMILY MEDICINE | Facility: CLINIC | Age: 74
End: 2019-06-04
Payer: MEDICARE

## 2019-06-04 VITALS
RESPIRATION RATE: 12 BRPM | OXYGEN SATURATION: 98 % | DIASTOLIC BLOOD PRESSURE: 74 MMHG | TEMPERATURE: 98 F | HEART RATE: 42 BPM | WEIGHT: 166 LBS | HEIGHT: 64 IN | BODY MASS INDEX: 28.34 KG/M2 | SYSTOLIC BLOOD PRESSURE: 136 MMHG

## 2019-06-04 DIAGNOSIS — Z12.39 BREAST SCREENING: ICD-10-CM

## 2019-06-04 DIAGNOSIS — Z00.00 GENERAL MEDICAL EXAM: ICD-10-CM

## 2019-06-04 DIAGNOSIS — E83.52 HYPERCALCEMIA: ICD-10-CM

## 2019-06-04 DIAGNOSIS — B37.31 YEAST VAGINITIS: ICD-10-CM

## 2019-06-04 DIAGNOSIS — E78.2 MIXED HYPERLIPIDEMIA: ICD-10-CM

## 2019-06-04 DIAGNOSIS — Z23 NEED FOR SHINGLES VACCINE: ICD-10-CM

## 2019-06-04 DIAGNOSIS — Z23 NEED FOR HEPATITIS VACCINATION: ICD-10-CM

## 2019-06-04 LAB — HBA1C MFR BLD: 14.1 %

## 2019-06-04 PROCEDURE — 3046F HEMOGLOBIN A1C LEVEL >9.0%: CPT | Mod: ,,, | Performed by: INTERNAL MEDICINE

## 2019-06-04 PROCEDURE — 1101F PT FALLS ASSESS-DOCD LE1/YR: CPT | Mod: ,,, | Performed by: INTERNAL MEDICINE

## 2019-06-04 PROCEDURE — 3046F PR MOST RECENT HEMOGLOBIN A1C LEVEL > 9.0%: ICD-10-PCS | Mod: ,,, | Performed by: INTERNAL MEDICINE

## 2019-06-04 PROCEDURE — 3075F PR MOST RECENT SYSTOLIC BLOOD PRESS GE 130-139MM HG: ICD-10-PCS | Mod: ,,, | Performed by: INTERNAL MEDICINE

## 2019-06-04 PROCEDURE — 1101F PR PT FALLS ASSESS DOC 0-1 FALLS W/OUT INJ PAST YR: ICD-10-PCS | Mod: ,,, | Performed by: INTERNAL MEDICINE

## 2019-06-04 PROCEDURE — 99215 PR OFFICE/OUTPT VISIT, EST, LEVL V, 40-54 MIN: ICD-10-PCS | Mod: ,,, | Performed by: INTERNAL MEDICINE

## 2019-06-04 PROCEDURE — 3078F DIAST BP <80 MM HG: CPT | Mod: ,,, | Performed by: INTERNAL MEDICINE

## 2019-06-04 PROCEDURE — 83036 HEMOGLOBIN GLYCOSYLATED A1C: CPT | Mod: QW,,, | Performed by: INTERNAL MEDICINE

## 2019-06-04 PROCEDURE — 99215 OFFICE O/P EST HI 40 MIN: CPT | Mod: ,,, | Performed by: INTERNAL MEDICINE

## 2019-06-04 PROCEDURE — 3075F SYST BP GE 130 - 139MM HG: CPT | Mod: ,,, | Performed by: INTERNAL MEDICINE

## 2019-06-04 PROCEDURE — 3078F PR MOST RECENT DIASTOLIC BLOOD PRESSURE < 80 MM HG: ICD-10-PCS | Mod: ,,, | Performed by: INTERNAL MEDICINE

## 2019-06-04 PROCEDURE — 83036 POCT HEMOGLOBIN A1C: ICD-10-PCS | Mod: QW,,, | Performed by: INTERNAL MEDICINE

## 2019-06-04 RX ORDER — BLOOD SUGAR DIAGNOSTIC
1 STRIP MISCELLANEOUS DAILY
Qty: 100 EACH | Refills: 3 | Status: SHIPPED | OUTPATIENT
Start: 2019-06-04 | End: 2019-10-21

## 2019-06-04 RX ORDER — ICOSAPENT ETHYL 1000 MG/1
2 CAPSULE ORAL 2 TIMES DAILY
Qty: 360 CAPSULE | Refills: 1 | Status: SHIPPED | OUTPATIENT
Start: 2019-06-04 | End: 2020-02-13

## 2019-06-04 RX ORDER — INSULIN PUMP SYRINGE, 3 ML
EACH MISCELLANEOUS
Qty: 360 EACH | Refills: 1 | Status: SHIPPED | OUTPATIENT
Start: 2019-06-04 | End: 2019-10-21

## 2019-06-04 RX ORDER — INSULIN GLARGINE 100 [IU]/ML
25 INJECTION, SOLUTION SUBCUTANEOUS NIGHTLY
Qty: 1 BOX | Refills: 0 | Status: SHIPPED | OUTPATIENT
Start: 2019-06-04 | End: 2019-08-02 | Stop reason: SDUPTHER

## 2019-06-04 RX ORDER — LANCETS
EACH MISCELLANEOUS
Qty: 360 EACH | Refills: 1 | Status: SHIPPED | OUTPATIENT
Start: 2019-06-04 | End: 2019-10-21

## 2019-06-04 RX ORDER — LANCETS 33 GAUGE
EACH MISCELLANEOUS
Qty: 200 EACH | Refills: 3 | Status: SHIPPED | OUTPATIENT
Start: 2019-06-04 | End: 2019-10-21

## 2019-06-04 RX ORDER — IBUPROFEN 800 MG/1
1 TABLET ORAL 3 TIMES DAILY
Refills: 5 | COMMUNITY
Start: 2019-04-18 | End: 2019-06-04

## 2019-06-04 RX ORDER — CLOTRIMAZOLE 1 %
CREAM (GRAM) TOPICAL 2 TIMES DAILY
Qty: 60 G | Refills: 0 | Status: SHIPPED | OUTPATIENT
Start: 2019-06-04 | End: 2019-10-22

## 2019-06-04 NOTE — PATIENT INSTRUCTIONS
Diabetes: Meal Planning    You can help keep your blood sugar level in your target range by eating healthy foods. Your healthcare team can help you create a low-fat, nutritious meal plan. Take an active role in your diabetes management by following your meal plan and working with your healthcare team.  Make your meal plan  A meal plan gives guidelines for the types and amounts of food you should eat. The goal is to balance food and insulin (or other diabetes medications) so your blood sugars will be in your target range. Your dietitian will help you make a flexible meal plan that includes many foods that you like.  Watch serving sizes  Your meal plan will group foods by servings. To learn how much a serving is, start by measuring food portions at each meal. Soon youll know what a serving looks like on your plate. Ask your healthcare provider about how to balance servings of different foods.  Eat from all the food groups  The basis of a healthy meal plan is variety (eating lots of different foods). Choose lean meats, fresh fruits and vegetables, whole grains, and low-fat or nonfat dairy products. Eating a wide variety of foods provides the nutrients your body needs. It can also keep you from getting bored with your meal plan.  Learn about carbohydrates, fats, and protein  · Carbohydrates are starches, sugars, and fiber. They are found in many foods, including fruit, bread, pasta, milk, and sweets. Of all the foods you eat, carbohydrates have the most effect on your blood sugar. Your dietitian may teach you about carb counting, a way to figure out the number of carbohydrates in a meal.  · Fats have the most calories. They also have the most effect on your weight and your risk of heart disease. When you have diabetes, its important to control your weight and protect your heart. Foods that are high in fat include whole milk, cheese, snack foods, and desserts.  · Protein is important for building and repairing  muscles and bones. Choose low-fat protein sources, such as fish, egg whites, and skinless chicken.  Reduce liquid sugars  Extra calories from sodas, sports drinks, and fruit drinks make it hard to keep blood sugar in range. Cut as many liquid sugars from your meal plan as you can.  This includes most fruit juices, which are often high in natural or added sugar. Instead, drink plenty of water and other sugar-free beverages.  Eat less fat  If you need to lose weight, try to reduce the amount of fat in your diet. This can also help lower your cholesterol level to keep blood vessels healthier. Cut fat by using only small amounts of liquid oil for cooking. Read food labels carefully to avoid foods with unhealthy trans fats.  Timing your meals  When it comes to blood sugar control, when you eat is as important as what you eat. You may need to eat several small meals spaced evenly throughout the day to stay in your target range. So dont skip breakfast or wait until late in the day to get most of your calories. Doing so can cause your blood sugar to rise too high or fall too low.   Date Last Reviewed: 3/1/2016  © 5044-8889 [employer]. [address] [place] [address]. All rights reserved. This information is not intended as a substitute for professional medical care. Always follow your healthcare professional's instructions.        Understanding Carbohydrates, Fats, and Protein  Food is a source of fuel and nourishment for your body. Its also a source of pleasure. Having diabetes doesnt mean you have to eat special foods or give up desserts. Instead, your dietitian can show you how to plan meals to suit your body. To start, learn how different foods affect blood sugar.  Carbohydrates  Carbohydrates are the main source of fuel for the body. Carbohydrates raise blood sugar. Many people think carbohydrates are only found in pasta or bread. But carbohydrates are actually in many kinds of  foods:  · Sugars occur naturally in foods such as fruit, milk, honey, and molasses. Sugars can also be added to many foods, from cereals and yogurt to candy and desserts. Sugars raise blood sugar.  · Starches are found in bread, cereals, pasta, and dried beans. Theyre also found in corn, peas, potatoes, yam, acorn squash, and butternut squash. Starches also raise blood sugar.   · Fiber is found in foods such as vegetables, fruits, beans, and whole grains. Unlike other carbs, fiber isnt digested or absorbed. So it doesnt raise blood sugar. In fact, fiber can help keep blood sugar from rising too fast. It also helps keep blood cholesterol at a healthy level.  Did you know?  Even though carbohydrates raise blood sugar, its best to have some in every meal. They are an important part of a healthy diet.   Fat  Fat is an energy source that can be stored until needed. Fat does not raise blood sugar. However, it can raise blood cholesterol, increasing the risk of heart disease. Fat is also high in calories, which can cause weight gain. Not all types of fat are the same.  More Healthy:  · Monounsaturated fats are mostly found in vegetable oils, such as olive, canola, and peanut oils. They are also found in avocados and some nuts. Monounsaturated fats are healthy for your heart. Thats because they lower LDL (unhealthy) cholesterol.  · Polyunsaturated fats are mostly found in vegetable oils, such as corn, safflower, and soybean oils. They are also found in some seeds, nuts, and fish. Polyunsaturated fats lower LDL (unhealthy) cholesterol. So, choosing them instead of saturated fats is healthy for your heart. Certain unsaturated fats can help lower triglycerides.   Less Healthy:  · Saturated fats are found in animal products, such as meat, poultry, whole milk, lard, and butter. Saturated fats raise LDL cholesterol and are not healthy for your heart.  · Hydrogenated oils and trans fats are formed when vegetable oils are  processed into solid fats. They are found in many processed foods. Hydrogenated oils and trans fats raise LDL cholesterol and lower HDL (healthy) cholesterol. They are not healthy for your heart.  Protein  Protein helps the body build and repair muscle and other tissue. Protein has little or no effect on blood sugar. However, many foods that contain protein also contain saturated fat. By choosing low-fat protein sources, you can get the benefits of protein without the extra fat:  · Plant protein is found in dry beans and peas, nuts, and soy products, such as tofu and soymilk. These sources tend to be cholesterol-free and low in saturated fat.  · Animal protein is found in fish, poultry, meat, cheese, milk, and eggs. These contain cholesterol and can be high in saturated fat. Aim for lean, lower-fat choices.  Date Last Reviewed: 3/1/2016  © 7371-7706 [employer]. [address] [place] [address]. All rights reserved. This information is not intended as a substitute for professional medical care. Always follow your healthcare professional's instructions.        Diet: Diabetes  Food is an important tool that you can use to control diabetes and stay healthy. Eating well-balanced meals in the correct amounts will help you control your blood glucose levels and prevent low blood sugar reactions. It will also help you reduce the health risks of diabetes. There is no one specific diet that is right for everyone with diabetes. But there are general guidelines to follow. A registered dietitian (RD) will create a tailored diet approach thats just right for you. He or she will also help you plan healthy meals and snacks. If you have any questions, call your dietitian for advice.     Guidelines for success  Talk with your healthcare provider before starting a diabetes diet or weight loss program. If you haven't talked with a dietitian yet, ask your provider for a referral. The following guidelines can help  you succeed:  · Select foods from the 6 food groups below. Your dietitian will help you find food choices within each group. He or she will also show you serving sizes and how many servings you can have at each meal.  ¨ Grains, beans, and starchy vegetables  ¨ Vegetables  ¨ Fruit  ¨ Milk or yogurt  ¨ Meat, poultry, fish, or tofu  ¨ Healthy fats  · Check your blood sugar levels as directed by your provider. Take any medicine as prescribed by your provider.  · Learn to read food labels and pick the right portion sizes.  · Eat only the amount of food in your meal plan. Eat about the same amount of food at regular times each day. Dont skip meals. Eat meals 4 to 5 hours apart, with snacks in between.  · Limit alcohol. It raises blood sugar levels. Drink water or calorie-free diet drinks that use safe sweeteners.  · Eat less fat to help lower your risk of heart disease. Use nonfat or low-fat dairy products and lean meats. Avoid fried foods. Use cooking oils that are unsaturated, such as olive, canola, or peanut oil.  · Talk with your dietitian about safe sugar substitutes.  · Avoid added salt. It can contribute to high blood pressure, which can cause heart disease. People with diabetes already have a risk of high blood pressure and heart disease.  · Stay at a healthy weight. If you need to lose weight, cut down on your portion sizes. But dont skip meals. Exercise is an important part of any weight management program. Talk with your provider about an exercise program thats right for you.  · For more information about the best diet plan for you, talk with a registered dietitian (RD). To find an RD in your area, contact:  ¨ Academy of Nutrition and Dietetics www.eatright.org  ¨ The American Diabetes Association [phone] www.diabetes.org  Date Last Reviewed: 8/1/2016  © 9990-0968 [employer]. [address] [place] [address], [place] [address]. All rights reserved. This information is not intended as a substitute  for professional medical care. Always follow your healthcare professional's instructions.        Understanding Carbohydrates    A car needs the right type of fuel to run. And you need the right kind of food to function. To keep your energy level up, your body needs food that has carbohydrates. But carbohydrates raise blood sugar levels higher and faster than other kinds of food. Your dietitian will work with you to figure out the amount of carbohydrates you need.  Starches  Starches are found in grains, some vegetables, and beans. Grain products include bread, pasta, cereal, and tortillas. Starchy vegetables include potatoes, peas, corn, lima beans, yams, and squash. Kidney beans, gray beans, black beans, garbanzo beans, and lentils also contain starches.  Sugars  Sugars are found naturally in many foods. Or sugar can be added. Foods that contain natural sugar include fruits and fruit juices, dairy products, honey, and molasses. Added sugars are found in most desserts, processed foods, candy, regular soda, and fruit drinks. These are very helpful for treating low blood sugar, or hypoglycemia. They provide sugar quickly. Try to keep at least 15 to 20 grams of these simple sugars with you at all times. Eat this if you begin to have low blood sugar symptoms.  Fiber  Fiber comes from plant foods. Most fiber isnt digested by the body. Instead of raising blood sugar levels like other carbohydrates, it actually stops blood sugar from rising too fast. Fiber is found in fruits, vegetables, whole grains, beans, peas, and many nuts.  Carb counting  Keep track of the amount of carbohydrates you eat. This can help you keep the right balance of physical activity and medicine. The amount of carbohydrates needed will vary for each person. It depends on many things such as your health, the medicines you take, and how active you are. Your healthcare team will help you figure out the right amount of carbohydrates for you. You may  start with around 45 to 60 grams of carbohydrate per meal, depending on your situation. Carb counting is a system that helps you keep track of the carbohydrates you eat at each meal.  Carbohydrates come from a variety of foods. These include grains, starchy vegetables, fruit, milk, beans, and snack foods. You can either count carbohydrate grams or carbohydrate servings. When you count carbohydrate servings, 1 carbohydrate serving = 15 grams of carbohydrates.  Here are some examples of foods containing about 15 grams of carbohydrates (1 serving of carbohydrates):  · 1/2 cup of canned or frozen fruit  · A small piece of fresh fruit (4 ounces)  · 1 slice of bread  · 1/2 cup of oatmeal  · 1/3 cup of rice  · 4 to 6 crackers  · 1/2 English muffin  · 1/2 cup of black beans  · 1/4 of a large baked potato (3 ounces)  · 2/3 cup of plain fat-free yogurt  · 1 cup of soup  · 1/2 cup of casserole  · 6 chicken nuggets  · 2-inch-square brownie or cake without frosting  · 2 small cookies  · 1/2 cup of ice cream or sherbet  Carb counting is easier when food labels are available. Look at the label to see how many grams of total carbohydrates the food contains. Then you can figure out how much you should eat.  Two very important lines to look at on the label are the serving size and the total carbohydrate amount. Here are some tips for using food labels to count your carbohydrate intake:  · Check the serving size. The information on the label is based on that serving size. If you eat more than the listed serving size, you may have to double or triple the other information on the label.   · Check the total grams of carbohydrates. Total carbohydrate from the label includes sugar, starch, and fiber. Be sure to use the total carbohydrate number and not sugar alone.  · Know how many grams of carbohydrates you can have.  Be familiar with the matching portion sizes.  · Compare labels. Compare the labels of different products, looking at  serving sizes and total carbohydrates to find the products that work best for you.   · Don't forget protein and fat. With all the focus on carb counting, it might be easy to forget protein and fat in your meals. Don't forget to include sources of protein and healthy fat to balance your meals.  Its also important to be consistent with the amount and time you eat when taking a fixed dose of diabetes medicine. Work with your healthcare provider or dietitian if you need additional help. He or she can help you keep track of your carbohydrate intake. He or she can also help you figure out how many grams of carbohydrates you should have.  Date Last Reviewed: 7/1/2016 © 2000-2017 [employer]. [address] [place] [address], [place] [address]. All rights reserved. This information is not intended as a substitute for professional medical care. Always follow your healthcare professional's instructions.        Hypoglycemia (Low Blood Sugar)     Fast-acting sugar includes a cup of nonfat milk.     Too little sugar (glucose) in your blood is called hypoglycemia or low blood sugar. Low blood sugar usually means anything lower than 70 mg/dL. Talk with your healthcare provider about your target range and what level is too low for you. Diabetes itself doesnt cause low blood sugar. But some of the treatments for diabetes, such as pills or insulin, may raise your risk for it. Low blood sugar may cause you to pass out or have a seizure. So always treat low blood sugar right away, but don't overeat.  Special note: Always carry a source of fast-acting sugar and a snack in case of hypoglycemia.   What you may notice  If you have low blood sugar, you may have one or more of these symptoms:  · Shakiness or dizziness  · Cold, clammy skin or sweating  · Feelings of hunger  · Headache  · Nervousness  · A hard, fast heartbeat  · Weakness  · Confusion or irritability  · Blurred vision  · Having nightmares or waking up confused or  sweating  · Numbness or tingling in the lips or tongue  What you should do  Here are tips to follow if you have hypoglycemia:   · First check your blood sugar. If it is too low (out of your target range), eat or drink 15 to 20 grams of fast-acting sugar. This may be 3 to 4 glucose tablets, 4 ounces (half a cup) of fruit juice or regular (nondiet) soda, 8 ounces (1 cup) of fat-free milk, or 1 tablespoon of honey. Dont take more than this, or your blood sugar may go too high.  · Wait 15 minutes. Then recheck your blood sugar if you can.  · If your blood sugar is still too low, repeat the steps above and check your blood sugar again. If your blood sugar still has not returned to your target range, contact your healthcare provider or seek emergency care.  · Once your blood sugar returns to target range, eat a snack or meal.  Preventing low blood sugar  Things you can do include the following:   · If your condition needs a strict treatment plan, eat your meals and snacks at the same times each day. Dont skip meals!  · If your treatment plan lets you change when you eat and what you eat, learn how to change the time and dose of your rapid-acting insulin to match this.   · Ask your healthcare provider if it is safe for you to drink alcohol. Never drink on an empty stomach.  · Take your medicine at the prescribed times.  · Always carry a source of fast-acting sugar and a snack when youre away from home.  Other things to do  Additional tips include the following:  · Carry a medical ID card, a compact USB drive, or wear a medical alert bracelet or necklace. It should say that you have diabetes. It should also say what to do if you pass out or have a seizure.  · Make sure your family, friends, and coworkers know the signs of low blood sugar. Tell them what to do if your blood sugar falls very low and you cant treat yourself.  · Keep a glucagon emergency kit handy. Be sure your family, friends, and coworkers know how and  when to use it. Check it regularly and replace the glucagon before it expires.  · Talk with your health care team about other things you can do to prevent low blood sugar.     If you have unexplained hypoglycemia or hypoglycemia several times, call your healthcare provider.   Date Last Reviewed: 5/1/2016  © 0724-7356 [employer]. [address] [place] [address], [place] [address]. All rights reserved. This information is not intended as a substitute for professional medical care. Always follow your healthcare professional's instructions.        Hypoglycemia (Low Blood Sugar)     Fast-acting sugar includes a cup of nonfat milk.     Too little sugar (glucose) in your blood is called hypoglycemia or low blood sugar. Low blood sugar usually means anything lower than 70 mg/dL. Talk with your healthcare provider about your target range and what level is too low for you. Diabetes itself doesnt cause low blood sugar. But some of the treatments for diabetes, such as pills or insulin, may raise your risk for it. Low blood sugar may cause you to pass out or have a seizure. So always treat low blood sugar right away, but don't overeat.  Special note: Always carry a source of fast-acting sugar and a snack in case of hypoglycemia.   What you may notice  If you have low blood sugar, you may have one or more of these symptoms:  · Shakiness or dizziness  · Cold, clammy skin or sweating  · Feelings of hunger  · Headache  · Nervousness  · A hard, fast heartbeat  · Weakness  · Confusion or irritability  · Blurred vision  · Having nightmares or waking up confused or sweating  · Numbness or tingling in the lips or tongue  What you should do  Here are tips to follow if you have hypoglycemia:   · First check your blood sugar. If it is too low (out of your target range), eat or drink 15 to 20 grams of fast-acting sugar. This may be 3 to 4 glucose tablets, 4 ounces (half a cup) of fruit juice or regular (nondiet) soda, 8 ounces (1 cup)  of fat-free milk, or 1 tablespoon of honey. Dont take more than this, or your blood sugar may go too high.  · Wait 15 minutes. Then recheck your blood sugar if you can.  · If your blood sugar is still too low, repeat the steps above and check your blood sugar again. If your blood sugar still has not returned to your target range, contact your healthcare provider or seek emergency care.  · Once your blood sugar returns to target range, eat a snack or meal.  Preventing low blood sugar  Things you can do include the following:   · If your condition needs a strict treatment plan, eat your meals and snacks at the same times each day. Dont skip meals!  · If your treatment plan lets you change when you eat and what you eat, learn how to change the time and dose of your rapid-acting insulin to match this.   · Ask your healthcare provider if it is safe for you to drink alcohol. Never drink on an empty stomach.  · Take your medicine at the prescribed times.  · Always carry a source of fast-acting sugar and a snack when youre away from home.  Other things to do  Additional tips include the following:  · Carry a medical ID card, a compact USB drive, or wear a medical alert bracelet or necklace. It should say that you have diabetes. It should also say what to do if you pass out or have a seizure.  · Make sure your family, friends, and coworkers know the signs of low blood sugar. Tell them what to do if your blood sugar falls very low and you cant treat yourself.  · Keep a glucagon emergency kit handy. Be sure your family, friends, and coworkers know how and when to use it. Check it regularly and replace the glucagon before it expires.  · Talk with your health care team about other things you can do to prevent low blood sugar.     If you have unexplained hypoglycemia or hypoglycemia several times, call your healthcare provider.   Date Last Reviewed: 5/1/2016  © 7412-9947 [employer]. [address] [facility],  [place] [address]. All rights reserved. This information is not intended as a substitute for professional medical care. Always follow your healthcare professional's instructions.        Diabetic Ketoacidosis  Diabetic ketoacidosis (DKA) is a serious problem that can happen in people with diabetes. DKA should be treated as a medical emergency. This is because it can lead to coma or death. If you have the symptoms of DKA, get medical help right away.  DKA happens more often in people with type 1 diabetes. But it can happen in people with type 2 diabetes. It can also happen in women with diabetes during pregnancy. This is often known as gestational diabetes.  DKA happens when insulin levels are too low. Without enough insulin, sugar (glucose) cant get to the cells of your body. The glucose stays in the blood. This causes high blood glucose (hyperglycemia). Without glucose, your body breaks down stored fat for energy. When this happens, acids called ketones are released into the blood. This is known as ketosis. High levels of ketones (ketoacidosis) can be harmful to you. Hyperglycemia and ketoacidosis can also cause serious problems in the blood and your body, such as:  · Low levels of potassium (hypokalemia)  · Swelling inside the brain (cerebral edema)  · Fluid in the lungs (pulmonary edema)  · Damage to kidneys or other organs  What causes diabetic ketoacidosis?  In people with diabetes, DKA is most often caused by too little insulin in the body. It is also caused by:  · Poor management of diabetes  · Infections like a urinary tract infection or pneumonia  · Serious health problems, such as a heart attack  · Reactions to certain prescribed medicines and illicit drugs  Symptoms of diabetic ketoacidosis  DKA most often happens slowly over time. But it can worsen in a few hours if you are vomiting. The first symptoms are:  · Thirst and dry mouth  · Urinating a lot  · Belly pain  · Nausea or vomiting  · Breath that  smells fruity (from the ketones)  Over time, these symptoms may happen:  · Dry or flushed skin  · Nausea and vomiting  · Loss of appetite  · Weight loss  · Belly pain  · Trouble breathing  · Trouble thinking or confusion  · Feeling very tired or weak. This can lead to coma.  How is diabetic ketoacidosis diagnosed?  Your healthcare provider will ask about your medical history. He or she will give you a physical exam. You may also have these tests:  · Blood tests to check your glucose levels  · Blood tests to check your electrolytes, such as potassium and sodium  · Urine test to check for ketones  These tests are done to check for DKA, and monitor it over time.  How is diabetic ketoacidosis treated?  DKA needs treatment right away in the hospital. Treatment includes:  · Insulin. This is the main type of treatment. Insulin allows the cells to use the glucose in the blood. This lowers the levels of both blood glucose and ketones.  · Fluids and electrolytes. These are given through a vein (IV). Fluids are replaced and abnormal electrolyte levels are corrected.  · Other medicines. These may be given to treat an illness that caused DKA. For example, antibiotics may be given to treat a urinary tract infection that caused DKA.  Preventing diabetic ketoacidosis  To help prevent DKA, make sure you:  · Take all of your medicines for diabetes exactly as prescribed. This includes insulin.  · Check your blood glucose levels exactly as instructed.  · Be especially careful when you are sick with an illness or an infection. Take extra care to follow diabetes care instructions. Check your blood glucose more often.  · Do not exercise when your blood sugar is high and you have ketones in your urine.   · Check your urine ketone levels if told to do so. This is done with a urine test strip. Ask your healthcare provider how often to check your urine.     When to call your healthcare provider  Call your healthcare provider right away if  you:  · Have symptoms of DKA  · Have very high blood glucose levels  · Are getting sick with another illness   Date Last Reviewed: 7/1/2016 © 2000-2017 [employer]. [address] [place] [address], [place] [address]. All rights reserved. This information is not intended as a substitute for professional medical care. Always follow your healthcare professional's instructions.        Understanding Type 2 Diabetes  When your body is working normally, the food you eat is digested and used as fuel. This fuel supplies energy to the bodys cells. When you have diabetes, the fuel cant enter the cells. Without treatment, diabetes can cause serious long-term health problems.     Your body breaks down the food you eat into glucose.   How the body gets energy  The digestive system breaks down food, resulting in a sugar called glucose. Some of this glucose is stored in the liver. But most of it enters the bloodstream and travels to the cells to be used as fuel. Glucose needs the help of a hormone called insulin to enter the cells. Insulin is made in the pancreas. It is released into the bloodstream in response to the presence of glucose in the blood. Think of insulin as a key. When insulin reaches a cell, it attaches to the cell wall. This signals the cell to create an opening that allows glucose to enter the cell.      When you have type 2 diabetes  Early in type 2 diabetes, your cells dont respond properly to insulin. Because of this, less glucose than normal moves into cells. This is called insulin resistance. In response, the pancreas makes more insulin. But eventually, the pancreas cant produce enough insulin to overcome insulin resistance. As less and less glucose enters cells, it builds up to a harmful level in the bloodstream. This is known as high blood sugar or hyperglycemia. The result is type 2 diabetes. The cells become starved for energy, which can leave you feeling tired and rundown.  Why high blood sugar  "is a problem  If high blood sugar is not controlled, blood vessels throughout the body become damaged. Prolonged high blood sugar affects organs, blood vessels, and nerves. As a result, the risks of damage to the heart, kidneys, eyes, and limbs increase. Diabetes also makes other problems, such as high blood pressure and high cholesterol, more dangerous. Over time, people with uncontrolled high blood sugar have an increase in risk of dying of, or being disabled by, heart attack or stroke.  Date Last Reviewed: 7/1/2016 © 2000-2017 [employer]. [address] [place] [address]. All rights reserved. This information is not intended as a substitute for professional medical care. Always follow your healthcare professional's instructions.        Diabetes with High Blood Sugar  You have been treated for high blood sugar (hyperglycemia). This may be because of an infection or other illness, eating too many sweets or starches, or not taking enough insulin.  Home care  Monitor and write down your blood sugar level at least twice a day. Do this before breakfast and before dinner. If you take insulin, record your routine insulin dose as well. Also record any additional doses required based on your sliding scale. Do this for the next 3 to 5 days.  High blood sugar may cause symptoms that you can learn to recognize, such as:  · Frequent urination  · Thirst  · Dizziness  · Headache  · Shortness of breath  · Breath that smells fruity  · Nausea or vomiting  · Abdominal pain  · Drowsiness or loss of consciousness  If you have high-blood-sugar symptoms, use a blood or urine test to find out what your blood sugar level is. If it is above your usual range, use the "sliding scale" regular insulin dose prescribed by your healthcare provider. If you were not given a range for your insulin dose, contact your healthcare provider for more advice.  Follow-up care  Follow up with your healthcare provider, or as advised. " "You may need to meet with your healthcare provider in the next week. You will likely review your blood sugar records together. You may also talk to your provider about adjusting your dose of insulin or other medicine for blood sugar.  When to seek medical advice  Call your healthcare provider right away if these occur:  · High blood sugar symptoms (Symptoms are described above.)  · Blood sugar over 300 mg/dl If you cant reach your healthcare provider, go to a hospital emergency room or urgent care center  Date Last Reviewed: 6/1/2016  © 9604-0367 [email or web address]. [address] [place] [address]. All rights reserved. This information is not intended as a substitute for professional medical care. Always follow your healthcare professional's instructions.        Diabetes with High Blood Sugar  You have been treated for high blood sugar (hyperglycemia). This may be because of an infection or other illness, eating too many sweets or starches, or not taking enough insulin.  Home care  Monitor and write down your blood sugar level at least twice a day. Do this before breakfast and before dinner. If you take insulin, record your routine insulin dose as well. Also record any additional doses required based on your sliding scale. Do this for the next 3 to 5 days.  High blood sugar may cause symptoms that you can learn to recognize, such as:  · Frequent urination  · Thirst  · Dizziness  · Headache  · Shortness of breath  · Breath that smells fruity  · Nausea or vomiting  · Abdominal pain  · Drowsiness or loss of consciousness  If you have high-blood-sugar symptoms, use a blood or urine test to find out what your blood sugar level is. If it is above your usual range, use the "sliding scale" regular insulin dose prescribed by your healthcare provider. If you were not given a range for your insulin dose, contact your healthcare provider for more advice.  Follow-up care  Follow up with your healthcare " provider, or as advised. You may need to meet with your healthcare provider in the next week. You will likely review your blood sugar records together. You may also talk to your provider about adjusting your dose of insulin or other medicine for blood sugar.  When to seek medical advice  Call your healthcare provider right away if these occur:  · High blood sugar symptoms (Symptoms are described above.)  · Blood sugar over 300 mg/dl If you cant reach your healthcare provider, go to a hospital emergency room or urgent care center  Date Last Reviewed: 6/1/2016  © 3520-5048 [employer]. [address] [place] [address]. All rights reserved. This information is not intended as a substitute for professional medical care. Always follow your healthcare professional's instructions.

## 2019-06-04 NOTE — PROGRESS NOTES
"  SUBJECTIVE:    Patient ID: Cynthia N Cushing is a 74 y.o. female.    Chief Complaint: Diabetes and Follow-up    HPI     Patient comes in for recheck (last visit in 2018) and brings with her some labs (f from cardiology) revealing a blood sugar of 433 ()-The patient says the NP rechecked the sugar which was still elevated) (I never received the labs)-H/H was 10.7/33.1--BUN 23, Creat 1.22 GFR 43-Calcium 10.8 Cholesterol 180 HDL 35 LDL could not be determined because the triglycerides were 562-TSH 3.68-pt states she has been feeling "normal"-she denies problems with her vision-she has had frequency of urination-she had a recent yeast infection-she also has a toenail that wont heal-paronychia-no delayed healing-she is not having any neuropathic signs-thirsty-not craving sweets-A1C 14.1-50 minutes was spent with this patient discussing the importance of checking acucheck-the patient had not contacted us that she had stopped her Accu-Cheks because her insurance company would no longer pay for the test strips!!! She had not been seen since 2018-her   in February and she just did not follow through..she has a daughter who is a nurse who accompanies her to this visit-discussion re Diabetes, which is not a new diagnosis for her, is accomplished, as well as referral to nutritionist, plus more Diabees education material presented to her-I believe I am going to have to treat her as a new diabeticbecause of her lack of desire to have followed herself closely...    Pts   last year after she saw me-insurance company wont let her check her sugars like she should-now three times daily    States her eye exam was last December and again this year in December-    No visits with results within 6 Month(s) from this visit.   Latest known visit with results is:   Office Visit on 2018   Component Date Value Ref Range Status    Hemoglobin A1C 2018 7.3   Final       Past Medical History: "   Diagnosis Date    Allergy     Cataract     Diabetes mellitus, type 2     Hernia, hiatal     Hypertension     Kidney stone     Seasonal allergies      Past Surgical History:   Procedure Laterality Date    BREAST SURGERY      CERVICAL DISC SURGERY      EYE SURGERY      cataracts bilateral    HYSTERECTOMY       Family History   Problem Relation Age of Onset    Stroke Mother     Cancer Mother     Cancer Father        Marital Status:   Alcohol History:  reports that she does not drink alcohol.  Tobacco History:  reports that she has never smoked. She has never used smokeless tobacco.  Drug History:  reports that she does not use drugs.    Review of patient's allergies indicates:   Allergen Reactions    Biaxin [clarithromycin]     Ciprofloxacin Nausea And Vomiting    Naldecon [chlorphen-phenyltolox-pe-ppa]     Omnicef [cefdinir]     Quinine Nausea And Vomiting       Current Outpatient Medications:     bisoprolol (ZEBETA) 5 MG tablet, Take 5 mg by mouth once daily., Disp: , Rfl:     blood sugar diagnostic Strp, 1 strip by Misc.(Non-Drug; Combo Route) route 2 (two) times daily before meals. (Patient taking differently: 1 strip by Misc.(Non-Drug; Combo Route) route 2 (two) times daily before meals. onetouch verio strips), Disp: 60 strip, Rfl: 5    blood-glucose meter (ONETOUCH VERIO FLEX) Misc, To check fbs bid, Disp: 1 each, Rfl: 0    cetirizine (ZYRTEC) 10 MG tablet, Take 10 mg by mouth once daily., Disp: , Rfl:     famotidine (PEPCID) 20 MG tablet, Take 20 mg by mouth 2 (two) times daily., Disp: , Rfl:     guanfacine (TENEX) 1 MG Tab, Take 1 mg by mouth every evening., Disp: , Rfl:     metFORMIN (GLUCOPHAGE) 500 MG tablet, TAKE 1 TABLET(500 MG) BY MOUTH DAILY 2 HOURS AFTER BREAKFAST, Disp: 90 tablet, Rfl: 3    olmesartan (BENICAR) 40 MG tablet, Take 40 mg by mouth once daily., Disp: , Rfl:     ONETOUCH DELICA LANCETS 33 gauge Misc, USE TWICE DAILY AS DIRECTED, Disp: 100 each, Rfl: 0     PREMARIN 0.45 mg tablet, Take 0.45 mg by mouth once daily. , Disp: , Rfl:     rosuvastatin (CRESTOR) 10 MG tablet, Take 10 mg by mouth once daily., Disp: , Rfl:     spironolactone (ALDACTONE) 50 MG tablet, Take 50 mg by mouth 2 (two) times daily., Disp: , Rfl:     Review of Systems   Constitutional: Negative for appetite change, chills, diaphoresis, fatigue, fever and unexpected weight change.   HENT: Negative for congestion, ear pain, hearing loss, nosebleeds, postnasal drip, sinus pressure, sinus pain, sneezing, sore throat, tinnitus, trouble swallowing and voice change.    Eyes: Negative for photophobia, pain, itching and visual disturbance.   Respiratory: Negative for apnea, cough, chest tightness, shortness of breath, wheezing and stridor.    Cardiovascular: Negative for chest pain, palpitations and leg swelling.   Gastrointestinal: Negative for abdominal distention, abdominal pain, blood in stool, constipation, diarrhea, nausea and vomiting.   Endocrine: Negative for cold intolerance, heat intolerance, polydipsia and polyuria.   Genitourinary: Negative for difficulty urinating, dyspareunia, dysuria, flank pain, frequency, hematuria, menstrual problem, pelvic pain, urgency, vaginal discharge (yeast-) and vaginal pain.   Musculoskeletal: Negative for arthralgias, back pain, joint swelling, myalgias, neck pain and neck stiffness.   Skin: Negative for pallor.   Allergic/Immunologic: Negative for environmental allergies and food allergies.   Neurological: Negative for dizziness, tremors, speech difficulty, weakness, light-headedness and numbness.   Hematological: Does not bruise/bleed easily.   Psychiatric/Behavioral: Positive for dysphoric mood (situational depression surrounding her husbands a=death ast year). Negative for agitation, confusion, decreased concentration, sleep disturbance and suicidal ideas. The patient is not nervous/anxious.           Objective:      Vitals:    06/04/19 1743   BP: 136/74  "  Pulse: (!) 42   Resp: 12   Temp: 98.1 °F (36.7 °C)   SpO2: 98%   Weight: 75.3 kg (166 lb)   Height: 5' 4" (1.626 m)     Physical Exam   Constitutional: She is oriented to person, place, and time. She appears well-developed and well-nourished. She is cooperative. No distress.   HENT:   Head: Normocephalic and atraumatic.   Right Ear: Tympanic membrane normal.   Left Ear: Tympanic membrane normal.   Mouth/Throat: Uvula is midline and mucous membranes are normal.   Eyes: Pupils are equal, round, and reactive to light. Conjunctivae and EOM are normal. Right eye exhibits no discharge. Left eye exhibits no discharge. No scleral icterus. Right pupil is round and reactive. Left pupil is round and reactive.   Neck: Trachea normal and normal range of motion. Neck supple. No JVD present. Carotid bruit is not present. No thyromegaly present.   Cardiovascular: Normal rate, regular rhythm and intact distal pulses. Exam reveals no gallop and no friction rub.   No murmur heard.  Pulmonary/Chest: Effort normal and breath sounds normal. No respiratory distress. She has no wheezes. She has no rales.   Abdominal: Soft. Bowel sounds are normal. She exhibits no distension and no mass. There is no tenderness. There is no guarding. No hernia.   Musculoskeletal: Normal range of motion. She exhibits no edema.   Neurological: She is alert and oriented to person, place, and time. She has normal strength.   Skin: Skin is warm and dry. Capillary refill takes less than 2 seconds. No lesion and no rash noted. No cyanosis. Nails show no clubbing.   Psychiatric: Her speech is normal and behavior is normal.   Easily tearful when discussing 's death last year-has several excuses why she has not been checking glucoses but overall she has not done it and did not go out of her way to see she had what she needed to do so-just has not emphasized her diabetes   Nursing note and vitals reviewed.    Protective Sensation (w/ 10 gram " monofilament):  Right: Intact  Left: Intact    Visual Inspection:  Normal -  Bilateral    Pedal Pulses:   Right: Present  Left: Present    Posterior tibialis:   Right:Present  Left: Present      Assessment:       1. Uncontrolled type 2 diabetes mellitus without complication, without long-term current use of insulin    2. Breast screening    3. General medical exam         Plan:       Uncontrolled type 2 diabetes mellitus without complication, without long-term current use of insulin        -     Start lantus insulin 25 units every hs-she is to call with her sugars so I can adjust insulin crissy    Breast screening        -     mammogram    General medical exam      No follow-ups on file.        6/4/2019 Teri Adams M.D.

## 2019-06-06 RX ORDER — LANCETS
EACH MISCELLANEOUS
Qty: 200 EACH | Refills: 3 | Status: SHIPPED | OUTPATIENT
Start: 2019-06-06 | End: 2019-10-21

## 2019-06-06 RX ORDER — INSULIN PUMP SYRINGE, 3 ML
EACH MISCELLANEOUS
Qty: 1 EACH | Refills: 0 | Status: SHIPPED | OUTPATIENT
Start: 2019-06-06 | End: 2019-10-21

## 2019-06-06 NOTE — TELEPHONE ENCOUNTER
Spoke with Pt the One touch Verio test strips are not covered from her insurance. Tian requested a PA. Pt would like the Verio Test strips but is willing to get Insurance Preferred with [employer] Mail order to avoid needed to do multiple PA's in the future. Unable to locate the Verio in database to order just the strips so went with insurance preferred.

## 2019-06-11 ENCOUNTER — TELEPHONE (OUTPATIENT)
Dept: FAMILY MEDICINE | Facility: CLINIC | Age: 74
End: 2019-06-11

## 2019-06-12 ENCOUNTER — TELEPHONE (OUTPATIENT)
Dept: FAMILY MEDICINE | Facility: CLINIC | Age: 74
End: 2019-06-12

## 2019-06-12 NOTE — TELEPHONE ENCOUNTER
Jewel, this pts Blood sugar was at a very high level last week. Dr joyce wants to make sure the insurance is going to cover this asap. thanks    The following medication needs a prior authorization:     Medication Name:  blood sugar diagnostic Strp: To check BG 4 times daily, to use with insurance preferred meter    Dosage: To check BG 4 times daily, to use with insurance preferred meter    Frequency: 4 times daily    Directions for use: blood sugar diagnostic Strp    Diagnosis: Uncontrolled type 2 diabetes mellitus without complication, without long-term current use of insulin  - Primary     Is the request for a reauthorization? PA    Is the patient currently stable on therapy? New order    Please list all therapeutic alternatives previously used with start/end dates and outcome:

## 2019-06-13 ENCOUNTER — TELEPHONE (OUTPATIENT)
Dept: FAMILY MEDICINE | Facility: CLINIC | Age: 74
End: 2019-06-13

## 2019-06-13 NOTE — TELEPHONE ENCOUNTER
I want to know what her glucoses are every Monday and Thursday-the averages of the last three days-get that for me today I will see her in a month

## 2019-06-13 NOTE — TELEPHONE ENCOUNTER
Pt notified.    Blood sugars last 3 days were:    Today - am 139              Noon 141              Dinner  ?    Wed - am 152          Noon 173         Dinner  ?    Tues - am 273           Noon  191        Dinner  311    Pt instructed to call in with BS #'s Mon & Thurs's    Will do an avgerage of what she calls in.

## 2019-06-13 NOTE — TELEPHONE ENCOUNTER
Pt notified. She wanted to know did you want her to come back in for a visit. I told her to keep a blood sugar log and drop it off or call it into us weekly.  If you want her to come in for ov I can schedule.

## 2019-06-27 ENCOUNTER — TELEPHONE (OUTPATIENT)
Dept: FAMILY MEDICINE | Facility: CLINIC | Age: 74
End: 2019-06-27

## 2019-06-27 NOTE — TELEPHONE ENCOUNTER
Attempted to notify pt: based on her blood sugar log 6/4-6/26/19 she needs to increase insulin to 28 units, no answer, LVM to call back.

## 2019-07-02 ENCOUNTER — TELEPHONE (OUTPATIENT)
Dept: FAMILY MEDICINE | Facility: CLINIC | Age: 74
End: 2019-07-02

## 2019-07-02 NOTE — TELEPHONE ENCOUNTER
Pt called back to report she was called last week to increase insulin to 30 units, not 28 units so that is what she has been doing.     6/29 Sat 135 am, noon 121, pm 164  6/29 Sun 144 am, noon 127, pm 127  7/01 Mon 159 am, noon 177, pm 127  Today 122    So pt wanted you aware what sugars have been on 30 units so far, she will send in or call in BS log next Mon 7/8 to include BS from Mon 7/01- Sun 7/7.

## 2019-07-19 ENCOUNTER — TELEPHONE (OUTPATIENT)
Dept: FAMILY MEDICINE | Facility: CLINIC | Age: 74
End: 2019-07-19

## 2019-07-23 NOTE — TELEPHONE ENCOUNTER
Attempted to notify pt. No answer, LVM to call back.   Past History


Past Medical History:  High Cholesterol, Hypertension


Past Surgical History:  No Surgical History


Smoking:  Non-smoker


Alcohol Use:  Occasionally


Drug Use:  None





Adult General


Chief Complaint


Chief Complaint


fall, face pain





HPI


HPI





Patient is a 57 year old male who presents with injuries from a fall.  Pt had a 

mechanical trip and fall while at work.  Occurred just prior to arrival, no LOC

, takes no blood thinners.  Starting to develop occipital headache, denies neck 

or back pain.  abrasion to the left hand, laceration left face just distal to 

the nose.





Review of Systems


Review of Systems





Constitutional: Denies fever or chills []


Eyes: Denies change in visual acuity, redness, or eye pain []


HENT: Denies nasal congestion or sore throat []


Respiratory: Denies cough or shortness of breath []


Cardiovascular: denies chest pain


GI: Denies abdominal pain, nausea, vomiting, bloody stools or diarrhea []


: Denies dysuria or hematuria []


Musculoskeletal: Denies back pain or joint pain []


Integument: Denies rash


Neurologic: reports headache, denies focal weakness or sensory changes []





Current Medications


Current Medications








 Current Medications








 Medications


  (Trade)  Dose


 Ordered  Sig/Ricky  Start Time


 Stop Time Status Last Admin


Dose Admin


 


 Acetaminophen


  (Tylenol)  1,000 mg  1X  ONCE  4/7/17 11:25


 4/7/17 11:26 DC 4/7/17 11:09


1,000 MG


 


 Diphtheria/


 Tetanus/Acell


 Pertussis


  (Boostrix)  0.5 ml  ONCE ONCE  4/7/17 11:25


 4/7/17 11:26 DC 4/7/17 11:09


0.5 ML


 


 Lidocaine/


 Epinephrine


  (Xylocaine


 1%-Epi 1:100,000)  20 ml  1X  ONCE  4/7/17 11:25


 4/7/17 11:26 DC  


 














Allergies


Allergies





 Allergies








Coded Allergies Type Severity Reaction Last Updated Verified


 


  No Known Drug Allergies    12/19/13 No











Physical Exam


Physical Exam





Constitutional: Well developed, well nourished, no acute distress, non-toxic 

appearance. []


HENT: Normocephalic, 2 cm laceration to just above the lip/distal to the nose, 

moderate hemostasis, no mastoid tenderness, chin abrasion, no trismus, 

bilateral external ears normal, oropharynx moist, no oral exudates, nose 

normal. []


Eyes: PERRLA, EOMI, conjunctiva normal, no discharge. [] 


Neck: Normal range of motion, no tenderness, supple, no stridor. [] 


Cardiovascular:Heart rate regular rhythm, systolic heart murmur []


Lungs & Thorax:  Bilateral breath sounds clear to auscultation , no wheeze or 

crackles


Abdomen: soft, no tenderness, no masses


Skin: Warm, dry, no erythema, no rash. [] 


Back: No midline stepoff or tenderness, no CVA tenderness. [] 


Extremities: No tenderness, no cyanosis, no clubbing, ROM intact, no edema. [] 


Neurologic: Alert and oriented X 3, normal motor function, normal sensory 

function, no focal deficits noted. []


Psychologic: Affect normal, judgement normal, mood normal. []





Current Patient Data


Vital Signs








 Vital Signs








  Date Time  Temp Pulse Resp B/P Pulse Ox O2 Delivery O2 Flow Rate FiO2


 


4/7/17 10:25 98.1 108 18  100 Room Air  














EKG


EKG


[]





Radiology/Procedures


Radiology/Procedures


CT head and face:





CT of the head without contrast, 4/7/2017:





History: Fall, head injury the ventricles are within normal limits in size.


There is no shift of the midline structures. There is no evidence of acute


intracranial hemorrhage or mass effect. The bone windows show no evidence of a


fracture.





IMPRESSION: No acute intracranial abnormality is detected.











CT of the facial bones without contrast, 4/7/2012:





Noncontrast scans were obtained with multiplanar reconstructions produced.





No acute fracture is identified. There is mucosal thickening in the


maxillary sinuses, more so on the right, presumably on an inflammatory basis.


The orbital contents are unremarkable. The patient is edentulous.





IMPRESSION: No acute bony abnormality is detected.











Indication: face laceration





Procedure: The patient was placed in the appropriate position and anesthesia 

around the lidocaine with 1% epi . The area was then [facility]. The 

laceration was 3 simple interrrupted sutures using 6-0 prolene. 


Total repaired wound length: 2. cm. 





Other Items: none





The patient tolerated the procedure well.





Complications: [COMPLICATIONS].





Course & Med Decision Making


Course & Med Decision Making


Pertinent Labs and Imaging studies reviewed. (See chart for details)





tylenol po and tdap given.  CT head and face ordered





Final Impression


Final Impression


facial laceration


closed head injury[]


Problems:  





Dragon Disclaimer


Dragon Disclaimer


This electronic medical record was generated, in whole or in part, using a 

voice recognition dictation system.








SCOT GARCIA MD Apr 7, 2017 11:03

## 2019-07-25 RX ORDER — REPAGLINIDE 2 MG/1
2 TABLET ORAL ONCE
Qty: 90 TABLET | Refills: 1 | Status: SHIPPED | OUTPATIENT
Start: 2019-07-25 | End: 2019-08-08 | Stop reason: SDUPTHER

## 2019-07-25 NOTE — TELEPHONE ENCOUNTER
Increase am insulin 2 units and start prandin 2 mg before supper-rs will be sent to the pharmacy-this will help to reduce the sugar after that dinner meal

## 2019-07-30 ENCOUNTER — TELEPHONE (OUTPATIENT)
Dept: FAMILY MEDICINE | Facility: CLINIC | Age: 74
End: 2019-07-30

## 2019-07-30 NOTE — TELEPHONE ENCOUNTER
Spoke with patient and [facility] have blood drawn a few days before her appointment on 08/22/19 with Dr. Adams.  Patient gave verbal understanding of orders

## 2019-07-30 NOTE — TELEPHONE ENCOUNTER
Pt. Is scheduled for DM f/up.  Please see if she needs updated labs and let her know either way.  She has her labs done at [facility].

## 2019-08-02 DIAGNOSIS — Z12.39 BREAST SCREENING: ICD-10-CM

## 2019-08-02 RX ORDER — INSULIN GLARGINE 100 [IU]/ML
25 INJECTION, SOLUTION SUBCUTANEOUS NIGHTLY
Qty: 1 BOX | Refills: 0 | Status: SHIPPED | OUTPATIENT
Start: 2019-08-02 | End: 2019-08-04

## 2019-08-04 ENCOUNTER — TELEPHONE (OUTPATIENT)
Dept: FAMILY MEDICINE | Facility: CLINIC | Age: 74
End: 2019-08-04

## 2019-08-04 RX ORDER — INSULIN GLARGINE 100 [IU]/ML
28 INJECTION, SOLUTION SUBCUTANEOUS DAILY
Qty: 1 BOX | Refills: 5 | Status: SHIPPED | OUTPATIENT
Start: 2019-08-04 | End: 2019-08-04 | Stop reason: SDUPTHER

## 2019-08-04 RX ORDER — INSULIN GLARGINE 100 [IU]/ML
28 INJECTION, SOLUTION SUBCUTANEOUS DAILY
Qty: 1 BOX | Refills: 5 | Status: SHIPPED | OUTPATIENT
Start: 2019-08-04 | End: 2019-09-19 | Stop reason: SDUPTHER

## 2019-08-04 NOTE — TELEPHONE ENCOUNTER
Adela-I do not know if your message to the pt got relayed because of your entry that there was no answer when the instructiosu were to increase the inuslin to 28 u-thus, the new rx is fr 28 u but in no way do I want her to reduce her insulin if she has worked up to more-get the dose of insulin we have tapered up to

## 2019-08-05 NOTE — TELEPHONE ENCOUNTER
Spoke with pt. She states she was at 30 units when she was instructed to increase insulin by 2 units on 7/23. She has been doing 32 units since. Pt states she will be sending BS log to us tomorrow or Wed.

## 2019-08-08 RX ORDER — REPAGLINIDE 2 MG/1
2 TABLET ORAL
Qty: 90 TABLET | Refills: 1 | Status: SHIPPED | OUTPATIENT
Start: 2019-08-08 | End: 2019-10-22

## 2019-08-10 ENCOUNTER — TELEPHONE (OUTPATIENT)
Dept: FAMILY MEDICINE | Facility: CLINIC | Age: 74
End: 2019-08-10

## 2019-08-12 DIAGNOSIS — E11.9 TYPE 2 DIABETES MELLITUS WITHOUT COMPLICATION, WITHOUT LONG-TERM CURRENT USE OF INSULIN: ICD-10-CM

## 2019-08-27 ENCOUNTER — TELEPHONE (OUTPATIENT)
Dept: FAMILY MEDICINE | Facility: CLINIC | Age: 74
End: 2019-08-27

## 2019-08-27 NOTE — TELEPHONE ENCOUNTER
Pt notified to increase her insulin 2 more units.    Pt states that will have her go up from 32 units to 34 units.  Pt has questions about the Prandin but will bring paperwork from pharmacy and her medications to ov next Thurs 9/05 and discuss with Dr becker

## 2019-09-04 ENCOUNTER — TELEPHONE (OUTPATIENT)
Dept: FAMILY MEDICINE | Facility: CLINIC | Age: 74
End: 2019-09-04

## 2019-09-04 NOTE — TELEPHONE ENCOUNTER
NO changes-continue as is-may check acuchecks now three times a week-get fasting, and two hours after b'fast on mon, the wed get fasting and two hours after lunch then fri get fasting and two hours after dinner and send to me every two weeks on a monday

## 2019-09-13 ENCOUNTER — LAB VISIT (OUTPATIENT)
Dept: LAB | Facility: HOSPITAL | Age: 74
End: 2019-09-13
Attending: INTERNAL MEDICINE
Payer: MEDICARE

## 2019-09-13 DIAGNOSIS — Z00.00 ROUTINE GENERAL MEDICAL EXAMINATION AT A HEALTH CARE FACILITY: ICD-10-CM

## 2019-09-13 LAB
ALBUMIN/CREAT UR: 187 UG/MG (ref 0–30)
BACTERIA #/AREA URNS HPF: NEGATIVE /HPF
BASOPHILS # BLD AUTO: 0.08 K/UL (ref 0–0.2)
BASOPHILS NFR BLD: 0.9 % (ref 0–1.9)
BILIRUB UR QL STRIP: NEGATIVE
CAOX CRY URNS QL MICRO: ABNORMAL
CHOLEST SERPL-MCNC: 128 MG/DL (ref 120–199)
CHOLEST/HDLC SERPL: 4.3 {RATIO} (ref 2–5)
CLARITY UR: CLEAR
COLOR UR: YELLOW
CREAT UR-MCNC: 253 MG/DL (ref 15–325)
DIFFERENTIAL METHOD: ABNORMAL
EOSINOPHIL # BLD AUTO: 0.4 K/UL (ref 0–0.5)
EOSINOPHIL NFR BLD: 3.8 % (ref 0–8)
ERYTHROCYTE [DISTWIDTH] IN BLOOD BY AUTOMATED COUNT: 13.7 % (ref 11.5–14.5)
GLUCOSE UR QL STRIP: NEGATIVE
HCT VFR BLD AUTO: 32.7 % (ref 37–48.5)
HDLC SERPL-MCNC: 30 MG/DL (ref 40–75)
HDLC SERPL: 23.4 % (ref 20–50)
HGB BLD-MCNC: 10.2 G/DL (ref 12–16)
HGB UR QL STRIP: NEGATIVE
HYALINE CASTS #/AREA URNS LPF: 20 /LPF
IMM GRANULOCYTES # BLD AUTO: 0.07 K/UL (ref 0–0.04)
IMM GRANULOCYTES NFR BLD AUTO: 0.7 % (ref 0–0.5)
KETONES UR QL STRIP: ABNORMAL
LDLC SERPL CALC-MCNC: 61.2 MG/DL (ref 63–159)
LEUKOCYTE ESTERASE UR QL STRIP: ABNORMAL
LYMPHOCYTES # BLD AUTO: 2.3 K/UL (ref 1–4.8)
LYMPHOCYTES NFR BLD: 24.8 % (ref 18–48)
MCH RBC QN AUTO: 25.7 PG (ref 27–31)
MCHC RBC AUTO-ENTMCNC: 31.2 G/DL (ref 32–36)
MCV RBC AUTO: 82 FL (ref 82–98)
MICROALBUMIN UR DL<=1MG/L-MCNC: 473.1 UG/ML
MICROSCOPIC COMMENT: ABNORMAL
MONOCYTES # BLD AUTO: 0.9 K/UL (ref 0.3–1)
MONOCYTES NFR BLD: 9.6 % (ref 4–15)
NEUTROPHILS # BLD AUTO: 5.7 K/UL (ref 1.8–7.7)
NEUTROPHILS NFR BLD: 60.2 % (ref 38–73)
NITRITE UR QL STRIP: NEGATIVE
NONHDLC SERPL-MCNC: 98 MG/DL
NRBC BLD-RTO: 0 /100 WBC
PH UR STRIP: 6 [PH] (ref 5–8)
PLATELET # BLD AUTO: 264 K/UL (ref 150–350)
PMV BLD AUTO: 12.1 FL (ref 9.2–12.9)
PROT UR QL STRIP: ABNORMAL
RBC # BLD AUTO: 3.97 M/UL (ref 4–5.4)
RBC #/AREA URNS HPF: 2 /HPF (ref 0–4)
SP GR UR STRIP: 1.02 (ref 1–1.03)
SQUAMOUS #/AREA URNS HPF: 7 /HPF
TRIGL SERPL-MCNC: 184 MG/DL (ref 30–150)
URN SPEC COLLECT METH UR: ABNORMAL
UROBILINOGEN UR STRIP-ACNC: NEGATIVE EU/DL
WBC # BLD AUTO: 9.39 K/UL (ref 3.9–12.7)
WBC #/AREA URNS HPF: 26 /HPF (ref 0–5)

## 2019-09-13 PROCEDURE — 36415 COLL VENOUS BLD VENIPUNCTURE: CPT

## 2019-09-13 PROCEDURE — 82043 UR ALBUMIN QUANTITATIVE: CPT

## 2019-09-13 PROCEDURE — 85025 COMPLETE CBC W/AUTO DIFF WBC: CPT

## 2019-09-13 PROCEDURE — 80061 LIPID PANEL: CPT

## 2019-09-13 PROCEDURE — 81001 URINALYSIS AUTO W/SCOPE: CPT

## 2019-09-17 ENCOUNTER — TELEPHONE (OUTPATIENT)
Dept: FAMILY MEDICINE | Facility: CLINIC | Age: 74
End: 2019-09-17

## 2019-09-17 DIAGNOSIS — E78.1 HIGH BLOOD TRIGLYCERIDES: Primary | ICD-10-CM

## 2019-09-17 DIAGNOSIS — R82.998 URINE WBC INCREASED: ICD-10-CM

## 2019-09-17 DIAGNOSIS — D64.9 ANEMIA, UNSPECIFIED TYPE: ICD-10-CM

## 2019-09-17 DIAGNOSIS — Z12.39 BREAST SCREENING: ICD-10-CM

## 2019-09-17 RX ORDER — ROSUVASTATIN CALCIUM 20 MG/1
20 TABLET, COATED ORAL DAILY
Qty: 90 TABLET | Refills: 3 | Status: SHIPPED | OUTPATIENT
Start: 2019-09-17 | End: 2020-06-25

## 2019-09-17 NOTE — TELEPHONE ENCOUNTER
Urine culture pended for NOW  Cbc, lipid panel & LFS pended below for 3 months at [facility] per your request.    May need to open encounter to see order.

## 2019-09-17 NOTE — TELEPHONE ENCOUNTER
----- Message from Teri Adams MD sent at 9/14/2019  5:07 PM CDT -----  Please call the patient regarding her abnormal result.SHE IS SPILING PROTEIN IN THE URINE WHICH IS EXPECTED IN dIABETES SO ON THE NEXT CLINIC CHECK WHICH IS NEEDED WE WILL ADJUST RX-also the urine has wbc indicative of possible UTI so she needs a culture-the lipids are elevated so we can double crestor to 20 mg and refill her rx with the 20 mg-also her CBC shows mild anemia so we need to check CBC in three months-check status of colon check

## 2019-09-21 RX ORDER — INSULIN GLARGINE 100 [IU]/ML
INJECTION, SOLUTION SUBCUTANEOUS
Qty: 1 BOX | Refills: 5 | Status: SHIPPED | OUTPATIENT
Start: 2019-09-21 | End: 2020-05-30

## 2019-09-30 RX ORDER — PEN NEEDLE, DIABETIC 31 GX5/16"
NEEDLE, DISPOSABLE MISCELLANEOUS
Refills: 3 | COMMUNITY
Start: 2019-09-02 | End: 2020-06-03

## 2019-10-05 RX ORDER — INSULIN GLARGINE 100 [IU]/ML
INJECTION, SOLUTION SUBCUTANEOUS
Qty: 15 ML | Refills: 0 | Status: SHIPPED | OUTPATIENT
Start: 2019-10-05 | End: 2019-10-22 | Stop reason: SDUPTHER

## 2019-10-14 ENCOUNTER — LAB VISIT (OUTPATIENT)
Dept: LAB | Facility: HOSPITAL | Age: 74
End: 2019-10-14
Attending: INTERNAL MEDICINE
Payer: MEDICARE

## 2019-10-14 LAB
ESTIMATED AVG GLUCOSE: 160 MG/DL (ref 68–131)
HBA1C MFR BLD HPLC: 7.2 % (ref 4.5–6.2)

## 2019-10-14 PROCEDURE — 36415 COLL VENOUS BLD VENIPUNCTURE: CPT

## 2019-10-14 PROCEDURE — 83036 HEMOGLOBIN GLYCOSYLATED A1C: CPT

## 2019-10-15 DIAGNOSIS — E11.9 TYPE 2 DIABETES MELLITUS WITHOUT COMPLICATION, WITHOUT LONG-TERM CURRENT USE OF INSULIN: ICD-10-CM

## 2019-10-17 DIAGNOSIS — E11.9 TYPE 2 DIABETES MELLITUS WITHOUT COMPLICATION, WITHOUT LONG-TERM CURRENT USE OF INSULIN: ICD-10-CM

## 2019-10-17 NOTE — TELEPHONE ENCOUNTER
----- Message from Teri Adams MD sent at 10/15/2019  4:41 PM CDT -----  Please call the patient regarding her abnormal result.A1C is much improved and we will continue to work on it as we are

## 2019-10-20 PROBLEM — D64.9 ANEMIA, UNSPECIFIED: Status: ACTIVE | Noted: 2019-10-20

## 2019-10-20 RX ORDER — LANCETS
EACH MISCELLANEOUS
Qty: 400 EACH | Refills: 3 | Status: SHIPPED | OUTPATIENT
Start: 2019-10-20 | End: 2020-08-24

## 2019-10-21 RX ORDER — PEN NEEDLE, DIABETIC 31 GX5/16"
NEEDLE, DISPOSABLE MISCELLANEOUS
Refills: 0 | COMMUNITY
Start: 2019-09-19 | End: 2019-10-22

## 2019-10-22 ENCOUNTER — OFFICE VISIT (OUTPATIENT)
Dept: FAMILY MEDICINE | Facility: CLINIC | Age: 74
End: 2019-10-22
Payer: MEDICARE

## 2019-10-22 VITALS
TEMPERATURE: 98 F | WEIGHT: 166 LBS | HEART RATE: 46 BPM | RESPIRATION RATE: 16 BRPM | SYSTOLIC BLOOD PRESSURE: 170 MMHG | DIASTOLIC BLOOD PRESSURE: 80 MMHG | HEIGHT: 64 IN | BODY MASS INDEX: 28.34 KG/M2 | OXYGEN SATURATION: 98 %

## 2019-10-22 DIAGNOSIS — Z23 NEED FOR SHINGLES VACCINE: ICD-10-CM

## 2019-10-22 DIAGNOSIS — E11.9 TYPE 2 DIABETES MELLITUS WITHOUT COMPLICATION, WITHOUT LONG-TERM CURRENT USE OF INSULIN: ICD-10-CM

## 2019-10-22 DIAGNOSIS — D50.8 OTHER IRON DEFICIENCY ANEMIA: ICD-10-CM

## 2019-10-22 DIAGNOSIS — I10 ESSENTIAL HYPERTENSION, BENIGN: ICD-10-CM

## 2019-10-22 DIAGNOSIS — Z23 FLU VACCINE NEED: ICD-10-CM

## 2019-10-22 PROCEDURE — 1101F PT FALLS ASSESS-DOCD LE1/YR: CPT | Mod: S$GLB,,, | Performed by: INTERNAL MEDICINE

## 2019-10-22 PROCEDURE — 99214 OFFICE O/P EST MOD 30 MIN: CPT | Mod: S$GLB,,, | Performed by: INTERNAL MEDICINE

## 2019-10-22 PROCEDURE — 3077F SYST BP >= 140 MM HG: CPT | Mod: S$GLB,,, | Performed by: INTERNAL MEDICINE

## 2019-10-22 PROCEDURE — 3079F PR MOST RECENT DIASTOLIC BLOOD PRESSURE 80-89 MM HG: ICD-10-PCS | Mod: S$GLB,,, | Performed by: INTERNAL MEDICINE

## 2019-10-22 PROCEDURE — 3077F PR MOST RECENT SYSTOLIC BLOOD PRESSURE >= 140 MM HG: ICD-10-PCS | Mod: S$GLB,,, | Performed by: INTERNAL MEDICINE

## 2019-10-22 PROCEDURE — 3079F DIAST BP 80-89 MM HG: CPT | Mod: S$GLB,,, | Performed by: INTERNAL MEDICINE

## 2019-10-22 PROCEDURE — 99214 PR OFFICE/OUTPT VISIT, EST, LEVL IV, 30-39 MIN: ICD-10-PCS | Mod: S$GLB,,, | Performed by: INTERNAL MEDICINE

## 2019-10-22 PROCEDURE — 1101F PR PT FALLS ASSESS DOC 0-1 FALLS W/OUT INJ PAST YR: ICD-10-PCS | Mod: S$GLB,,, | Performed by: INTERNAL MEDICINE

## 2019-10-22 RX ORDER — METFORMIN HYDROCHLORIDE 500 MG/1
500 TABLET ORAL
COMMUNITY
End: 2020-02-03

## 2019-10-22 RX ORDER — GUANFACINE 2 MG/1
2 TABLET ORAL NIGHTLY
Qty: 90 TABLET | Refills: 1 | Status: SHIPPED | OUTPATIENT
Start: 2019-10-22 | End: 2020-02-13

## 2019-10-22 NOTE — PROGRESS NOTES
SUBJECTIVE:    Patient ID: Cynthia Cushing is a 74 y.o. female.    Chief Complaint: Diabetes; Results; and Follow-up    HPI     Patient comes in for follow-up of diabetes mellitus.  She has done very well bringing her A1c down but it still remains at 7.2 with an average glucose of 160.  She is minimally anemic with a hematocrit of 33.  Her cholesterol is 128 the triglycerides are 184.  Urine is positive for microalbumin.  Patient is on ARB and statin, and Vascepa.    She is doing well-she feels very much better than prior visit with uncontrolled BP-she is more energetic, clearer of thought, notices less urination per trip to bathroom but not decrease in frequency. Vision is improved.    Lab Visit on 10/14/2019   Component Date Value Ref Range Status    Hemoglobin A1C 10/14/2019 7.2* 4.5 - 6.2 % Final    Estimated Avg Glucose 10/14/2019 160* 68 - 131 mg/dL Final   Lab Visit on 09/13/2019   Component Date Value Ref Range Status    WBC 09/13/2019 9.39  3.90 - 12.70 K/uL Final    RBC 09/13/2019 3.97* 4.00 - 5.40 M/uL Final    Hemoglobin 09/13/2019 10.2* 12.0 - 16.0 g/dL Final    Hematocrit 09/13/2019 32.7* 37.0 - 48.5 % Final    Mean Corpuscular Volume 09/13/2019 82  82 - 98 fL Final    Mean Corpuscular Hemoglobin 09/13/2019 25.7* 27.0 - 31.0 pg Final    Mean Corpuscular Hemoglobin Conc 09/13/2019 31.2* 32.0 - 36.0 g/dL Final    RDW 09/13/2019 13.7  11.5 - 14.5 % Final    Platelets 09/13/2019 264  150 - 350 K/uL Final    MPV 09/13/2019 12.1  9.2 - 12.9 fL Final    Immature Granulocytes 09/13/2019 0.7* 0.0 - 0.5 % Final    Gran # (ANC) 09/13/2019 5.7  1.8 - 7.7 K/uL Final    Immature Grans (Abs) 09/13/2019 0.07* 0.00 - 0.04 K/uL Final    Lymph # 09/13/2019 2.3  1.0 - 4.8 K/uL Final    Mono # 09/13/2019 0.9  0.3 - 1.0 K/uL Final    Eos # 09/13/2019 0.4  0.0 - 0.5 K/uL Final    Baso # 09/13/2019 0.08  0.00 - 0.20 K/uL Final    nRBC 09/13/2019 0  0 /100 WBC Final    Gran% 09/13/2019 60.2  38.0 - 73.0  % Final    Lymph% 09/13/2019 24.8  18.0 - 48.0 % Final    Mono% 09/13/2019 9.6  4.0 - 15.0 % Final    Eosinophil% 09/13/2019 3.8  0.0 - 8.0 % Final    Basophil% 09/13/2019 0.9  0.0 - 1.9 % Final    Differential Method 09/13/2019 Automated   Final    Cholesterol 09/13/2019 128  120 - 199 mg/dL Final    Triglycerides 09/13/2019 184* 30 - 150 mg/dL Final    HDL 09/13/2019 30* 40 - 75 mg/dL Final    LDL Cholesterol 09/13/2019 61.2* 63.0 - 159.0 mg/dL Final    Hdl/Cholesterol Ratio 09/13/2019 23.4  20.0 - 50.0 % Final    Total Cholesterol/HDL Ratio 09/13/2019 4.3  2.0 - 5.0 Final    Non-HDL Cholesterol 09/13/2019 98  mg/dL Final    Microalbum.,U,Random 09/13/2019 473.1  ug/mL Final    Creatinine, Random Ur 09/13/2019 253.0  15.0 - 325.0 mg/dL Final    Microalb Creat Ratio 09/13/2019 187.0* 0.0 - 30.0 ug/mg Final    Specimen UA 09/13/2019 Urine, Clean Catch   Final    Color, UA 09/13/2019 Yellow  Yellow, Straw, Kezia Final    Appearance, UA 09/13/2019 Clear  Clear Final    pH, UA 09/13/2019 6.0  5.0 - 8.0 Final    Specific Gravity, UA 09/13/2019 1.020  1.005 - 1.030 Final    Protein, UA 09/13/2019 1+* Negative Final    Glucose, UA 09/13/2019 Negative  Negative Final    Ketones, UA 09/13/2019 Trace* Negative Final    Bilirubin (UA) 09/13/2019 Negative  Negative Final    Occult Blood UA 09/13/2019 Negative  Negative Final    Nitrite, UA 09/13/2019 Negative  Negative Final    Urobilinogen, UA 09/13/2019 Negative  Negative EU/dL Final    Leukocytes, UA 09/13/2019 2+* Negative Final    RBC, UA 09/13/2019 2  0 - 4 /hpf Final    WBC, UA 09/13/2019 26* 0 - 5 /hpf Final    Bacteria 09/13/2019 Negative  None-Occ /hpf Final    Squam Epithel, UA 09/13/2019 7  /hpf Final    Hyaline Casts,  09/13/2019 20* 0-1/lpf /lpf Final    Ca Oxalate Verna,  09/13/2019 Moderate  None-Moderate Final    Microscopic Comment 09/13/2019 SEE COMMENT   Final   Office Visit on 06/04/2019   Component Date Value Ref  "Range Status    Hemoglobin A1C 06/04/2019 14.1  % Final       Past Medical History:   Diagnosis Date    Allergy     Cataract     Diabetes mellitus, type 2     Hernia, hiatal     Hypertension     Kidney stone     Seasonal allergies      Past Surgical History:   Procedure Laterality Date    BREAST SURGERY      CERVICAL DISC SURGERY      EYE SURGERY      cataracts bilateral    HYSTERECTOMY       Family History   Problem Relation Age of Onset    Stroke Mother     Cancer Mother     Cancer Father        Marital Status:   Alcohol History:  reports that she does not drink alcohol.  Tobacco History:  reports that she has never smoked. She has never used smokeless tobacco.  Drug History:  reports that she does not use drugs.    Review of patient's allergies indicates:   Allergen Reactions    Biaxin [clarithromycin]     Ciprofloxacin Nausea And Vomiting    Naldecon [chlorphen-phenyltolox-pe-ppa]     Omnicef [cefdinir]     Quinine Nausea And Vomiting       Current Outpatient Medications:     BD ULTRA-FINE ANNA PEN NEEDLE 32 gauge x 5/32" Ángela U UTD D, Disp: , Rfl: 3    bisoprolol (ZEBETA) 5 MG tablet, Take 5 mg by mouth once daily., Disp: , Rfl:     blood sugar diagnostic Strp, To check BG 4 times daily, to use with accuchek seven plus. E11.65, Disp: 360 strip, Rfl: 3    cetirizine (ZYRTEC) 10 MG tablet, Take 10 mg by mouth once daily., Disp: , Rfl:     famotidine (PEPCID) 20 MG tablet, Take 20 mg by mouth 2 (two) times daily., Disp: , Rfl:     guanFACINE (TENEX) 2 MG tablet, Take 1 tablet (2 mg total) by mouth every evening., Disp: 90 tablet, Rfl: 1    icosapent ethyl (VASCEPA) 1 gram Cap, Take 2 g by mouth 2 (two) times daily., Disp: 360 capsule, Rfl: 1    insulin (LANTUS SOLOSTAR U-100 INSULIN) glargine 100 units/mL (3mL) SubQ pen, Inject 34 units once daily., Disp: 1 Box, Rfl: 5    lancets (ACCU-CHEK SOFTCLIX LANCETS) Misc, Use to check bs 4 times daily. Quantity of 363-400, whatever ins " will cover. E11.65, Disp: 400 each, Rfl: 3    metFORMIN (GLUCOPHAGE) 500 MG tablet, Take 500 mg by mouth daily 2 hours after breakfast., Disp: , Rfl:     olmesartan (BENICAR) 40 MG tablet, Take 40 mg by mouth once daily., Disp: , Rfl:     rosuvastatin (CRESTOR) 20 MG tablet, Take 1 tablet (20 mg total) by mouth once daily., Disp: 90 tablet, Rfl: 3    spironolactone (ALDACTONE) 50 MG tablet, Take 50 mg by mouth 2 (two) times daily., Disp: , Rfl:     adjuvant AS01B, PF,vial 1 of 2 (SHINGRIX ADJUVANT COMPONENT-PF) Susp, Inject 0.5 mLs into the muscle once. for 1 dose, Disp: 0.5 mL, Rfl: 0    Review of Systems   Constitutional: Negative for chills, fatigue, fever and unexpected weight change.   HENT: Negative for congestion, ear pain, hearing loss, sinus pain and sore throat.    Eyes: Negative for pain and visual disturbance.   Respiratory: Negative for cough, shortness of breath and wheezing.    Cardiovascular: Negative for chest pain, palpitations and leg swelling.   Gastrointestinal: Negative for abdominal pain, blood in stool, constipation (recently-using OTC agent that works welll), diarrhea, nausea and vomiting.   Endocrine: Negative for cold intolerance and heat intolerance.   Genitourinary: Negative for difficulty urinating, dysuria, frequency (lessamount of urination when she urinates), pelvic pain and urgency.   Musculoskeletal: Positive for arthralgias (knee--sees Ortho). Negative for back pain, joint swelling and neck pain.   Skin: Negative for pallor and rash.   Neurological: Negative for dizziness (she thinks related to ears-prior episode with otitis), tremors, weakness, numbness and headaches.   Hematological: Does not bruise/bleed easily.   Psychiatric/Behavioral: Negative for agitation, sleep disturbance and suicidal ideas.          Objective:      Vitals:    10/22/19 1616 10/22/19 1648   BP: (!) 166/80 (!) 170/80   Pulse: (!) 46    Resp: 16    Temp: 98 °F (36.7 °C)    SpO2: 98%    Weight: 75.3 kg  "(166 lb)    Height: 5' 4" (1.626 m)      Physical Exam   Constitutional: She is oriented to person, place, and time. She appears well-developed and well-nourished. She is cooperative.   HENT:   Right Ear: Tympanic membrane normal.   Left Ear: Tympanic membrane normal.   Eyes: Conjunctivae, EOM and lids are normal. Right pupil is round and reactive. Left pupil is round and reactive.   Neck: Trachea normal and normal range of motion. Neck supple. No JVD present. Carotid bruit is not present. No thyromegaly present.   Cardiovascular: Normal rate, regular rhythm, S1 normal, S2 normal, normal heart sounds and intact distal pulses. Exam reveals no gallop and no friction rub.   No murmur heard.  Pulmonary/Chest: Breath sounds normal. No respiratory distress. She has no wheezes. She has no rales.   Abdominal: Soft. Bowel sounds are normal. She exhibits no mass. There is no tenderness. There is no rigidity and no guarding.   Musculoskeletal: She exhibits no edema.   Neurological: She is alert and oriented to person, place, and time.   Skin: Skin is warm and dry. Capillary refill takes less than 2 seconds. No lesion and no rash noted. Nails show no clubbing.   Psychiatric: She has a normal mood and affect. Her behavior is normal. Judgment and thought content normal.   Nursing note and vitals reviewed.        Assessment:       1. Uncontrolled type 2 diabetes mellitus without complication, without long-term current use of insulin    2. Type 2 diabetes mellitus without complication, without long-term current use of insulin    3. Essential hypertension, benign    4. Other iron deficiency anemia    5. Need for shingles vaccine         Plan:       Uncontrolled type 2 diabetes mellitus without complication, without long-term current use of insulin  -     Hemoglobin A1c; Future; Expected date: 01/20/2020  -     Ambulatory referral to Ophthalmology    Type 2 diabetes mellitus without complication, without long-term current use of " insulin  -     Hemoglobin A1c; Future; Expected date: 01/20/2020  -     Ambulatory referral to Ophthalmology    Essential hypertension, benign  -     Ambulatory referral to Ophthalmology  -     CBC auto differential; Future; Expected date: 01/22/2020  -     guanFACINE (TENEX) 2 MG tablet; Take 1 tablet (2 mg total) by mouth every evening.  Dispense: 90 tablet; Refill: 1    Other iron deficiency anemia  -     Ferritin; Future; Expected date: 10/22/2019  -     Iron and TIBC; Future; Expected date: 10/22/2019    Need for shingles vaccine  -     adjuvant AS01B, PF,vial 1 of 2 (SHINGRIX ADJUVANT COMPONENT-PF) Susp; Inject 0.5 mLs into the muscle once. for 1 dose  Dispense: 0.5 mL; Refill: 0        Follow up in about 3 months (around 1/22/2020) for DM-BP.        10/22/2019 Teri Adams M.D.

## 2019-11-06 ENCOUNTER — LAB VISIT (OUTPATIENT)
Dept: LAB | Facility: HOSPITAL | Age: 74
End: 2019-11-06
Attending: INTERNAL MEDICINE
Payer: MEDICARE

## 2019-11-06 ENCOUNTER — TELEPHONE (OUTPATIENT)
Dept: FAMILY MEDICINE | Facility: CLINIC | Age: 74
End: 2019-11-06

## 2019-11-06 DIAGNOSIS — E78.1 HIGH BLOOD TRIGLYCERIDES: ICD-10-CM

## 2019-11-06 DIAGNOSIS — D50.8 OTHER IRON DEFICIENCY ANEMIA: ICD-10-CM

## 2019-11-06 LAB
ALBUMIN SERPL BCP-MCNC: 4.3 G/DL (ref 3.5–5.2)
ALP SERPL-CCNC: 49 U/L (ref 55–135)
ALT SERPL W/O P-5'-P-CCNC: 39 U/L (ref 10–44)
AST SERPL-CCNC: 30 U/L (ref 10–40)
BILIRUB DIRECT SERPL-MCNC: <0.1 MG/DL (ref 0.1–0.3)
BILIRUB SERPL-MCNC: 0.6 MG/DL (ref 0.1–1)
CHOLEST SERPL-MCNC: 116 MG/DL (ref 120–199)
CHOLEST/HDLC SERPL: 3.3 {RATIO} (ref 2–5)
FERRITIN SERPL-MCNC: 33 NG/ML (ref 20–300)
HDLC SERPL-MCNC: 35 MG/DL (ref 40–75)
HDLC SERPL: 30.2 % (ref 20–50)
IRON SERPL-MCNC: 45 UG/DL (ref 30–160)
LDLC SERPL CALC-MCNC: 54.6 MG/DL (ref 63–159)
NONHDLC SERPL-MCNC: 81 MG/DL
PROT SERPL-MCNC: 7 G/DL (ref 6–8.4)
SATURATED IRON: 11 % (ref 20–50)
TOTAL IRON BINDING CAPACITY: 426 UG/DL (ref 250–450)
TRANSFERRIN SERPL-MCNC: 304 MG/DL (ref 200–375)
TRIGL SERPL-MCNC: 132 MG/DL (ref 30–150)

## 2019-11-06 PROCEDURE — 36415 COLL VENOUS BLD VENIPUNCTURE: CPT

## 2019-11-06 PROCEDURE — 83540 ASSAY OF IRON: CPT

## 2019-11-06 PROCEDURE — 80061 LIPID PANEL: CPT

## 2019-11-06 PROCEDURE — 80076 HEPATIC FUNCTION PANEL: CPT

## 2019-11-06 PROCEDURE — 82728 ASSAY OF FERRITIN: CPT

## 2019-11-06 NOTE — TELEPHONE ENCOUNTER
----- Message from Teri Adams MD sent at 11/6/2019  3:11 PM CST -----  Please call the patient regarding her abnormal result.iron stores are good-lipids are good

## 2019-12-27 ENCOUNTER — LAB VISIT (OUTPATIENT)
Dept: LAB | Facility: HOSPITAL | Age: 74
End: 2019-12-27
Attending: INTERNAL MEDICINE
Payer: MEDICARE

## 2019-12-27 DIAGNOSIS — E11.9 TYPE 2 DIABETES MELLITUS WITHOUT COMPLICATION, WITHOUT LONG-TERM CURRENT USE OF INSULIN: ICD-10-CM

## 2019-12-27 DIAGNOSIS — I10 ESSENTIAL HYPERTENSION, BENIGN: ICD-10-CM

## 2019-12-27 LAB
BASOPHILS # BLD AUTO: 0.11 K/UL (ref 0–0.2)
BASOPHILS NFR BLD: 1.1 % (ref 0–1.9)
DIFFERENTIAL METHOD: ABNORMAL
EOSINOPHIL # BLD AUTO: 0.6 K/UL (ref 0–0.5)
EOSINOPHIL NFR BLD: 6.3 % (ref 0–8)
ERYTHROCYTE [DISTWIDTH] IN BLOOD BY AUTOMATED COUNT: 14.4 % (ref 11.5–14.5)
ESTIMATED AVG GLUCOSE: 154 MG/DL (ref 68–131)
HBA1C MFR BLD HPLC: 7 % (ref 4.5–6.2)
HCT VFR BLD AUTO: 34.7 % (ref 37–48.5)
HGB BLD-MCNC: 11 G/DL (ref 12–16)
IMM GRANULOCYTES # BLD AUTO: 0.04 K/UL (ref 0–0.04)
IMM GRANULOCYTES NFR BLD AUTO: 0.4 % (ref 0–0.5)
LYMPHOCYTES # BLD AUTO: 2.8 K/UL (ref 1–4.8)
LYMPHOCYTES NFR BLD: 27.9 % (ref 18–48)
MCH RBC QN AUTO: 26.2 PG (ref 27–31)
MCHC RBC AUTO-ENTMCNC: 31.7 G/DL (ref 32–36)
MCV RBC AUTO: 83 FL (ref 82–98)
MONOCYTES # BLD AUTO: 1 K/UL (ref 0.3–1)
MONOCYTES NFR BLD: 10.5 % (ref 4–15)
NEUTROPHILS # BLD AUTO: 5.4 K/UL (ref 1.8–7.7)
NEUTROPHILS NFR BLD: 53.8 % (ref 38–73)
NRBC BLD-RTO: 0 /100 WBC
PLATELET # BLD AUTO: 210 K/UL (ref 150–350)
PMV BLD AUTO: 11.5 FL (ref 9.2–12.9)
RBC # BLD AUTO: 4.2 M/UL (ref 4–5.4)
WBC # BLD AUTO: 9.95 K/UL (ref 3.9–12.7)

## 2019-12-27 PROCEDURE — 85025 COMPLETE CBC W/AUTO DIFF WBC: CPT

## 2019-12-27 PROCEDURE — 36415 COLL VENOUS BLD VENIPUNCTURE: CPT

## 2019-12-27 PROCEDURE — 83036 HEMOGLOBIN GLYCOSYLATED A1C: CPT

## 2019-12-31 RX ORDER — REPAGLINIDE 2 MG/1
TABLET ORAL
Qty: 90 TABLET | Refills: 1 | Status: SHIPPED | OUTPATIENT
Start: 2019-12-31 | End: 2020-02-13

## 2019-12-31 NOTE — TELEPHONE ENCOUNTER
----- Message from Teri Adams MD sent at 12/28/2019  1:54 PM CST -----  Please call the patient regarding her abnormal result.Improving glucose control butr still needs to improve-increase basal insulin 2 units-Blood count slightly improved

## 2020-02-03 DIAGNOSIS — E11.9 TYPE 2 DIABETES MELLITUS WITHOUT COMPLICATION, WITHOUT LONG-TERM CURRENT USE OF INSULIN: ICD-10-CM

## 2020-02-03 RX ORDER — METFORMIN HYDROCHLORIDE 500 MG/1
TABLET ORAL
Qty: 90 TABLET | Refills: 2 | Status: SHIPPED | OUTPATIENT
Start: 2020-02-03 | End: 2020-09-22

## 2020-02-13 ENCOUNTER — OFFICE VISIT (OUTPATIENT)
Dept: FAMILY MEDICINE | Facility: CLINIC | Age: 75
End: 2020-02-13
Payer: MEDICARE

## 2020-02-13 VITALS
SYSTOLIC BLOOD PRESSURE: 132 MMHG | HEART RATE: 44 BPM | TEMPERATURE: 98 F | RESPIRATION RATE: 16 BRPM | WEIGHT: 173 LBS | DIASTOLIC BLOOD PRESSURE: 68 MMHG | OXYGEN SATURATION: 98 % | HEIGHT: 64 IN | BODY MASS INDEX: 29.53 KG/M2

## 2020-02-13 DIAGNOSIS — E11.21 CONTROLLED TYPE 2 DIABETES MELLITUS WITH DIABETIC NEPHROPATHY, WITH LONG-TERM CURRENT USE OF INSULIN: Primary | ICD-10-CM

## 2020-02-13 DIAGNOSIS — Z79.4 CONTROLLED TYPE 2 DIABETES MELLITUS WITH DIABETIC NEPHROPATHY, WITH LONG-TERM CURRENT USE OF INSULIN: Primary | ICD-10-CM

## 2020-02-13 DIAGNOSIS — I10 ESSENTIAL HYPERTENSION, BENIGN: ICD-10-CM

## 2020-02-13 DIAGNOSIS — D50.9 IRON DEFICIENCY ANEMIA, UNSPECIFIED IRON DEFICIENCY ANEMIA TYPE: ICD-10-CM

## 2020-02-13 PROCEDURE — 1101F PT FALLS ASSESS-DOCD LE1/YR: CPT | Mod: S$GLB,,, | Performed by: INTERNAL MEDICINE

## 2020-02-13 PROCEDURE — 1126F AMNT PAIN NOTED NONE PRSNT: CPT | Mod: S$GLB,,, | Performed by: INTERNAL MEDICINE

## 2020-02-13 PROCEDURE — 1159F MED LIST DOCD IN RCRD: CPT | Mod: S$GLB,,, | Performed by: INTERNAL MEDICINE

## 2020-02-13 PROCEDURE — 3078F PR MOST RECENT DIASTOLIC BLOOD PRESSURE < 80 MM HG: ICD-10-PCS | Mod: S$GLB,,, | Performed by: INTERNAL MEDICINE

## 2020-02-13 PROCEDURE — 1159F PR MEDICATION LIST DOCUMENTED IN MEDICAL RECORD: ICD-10-PCS | Mod: S$GLB,,, | Performed by: INTERNAL MEDICINE

## 2020-02-13 PROCEDURE — 1126F PR PAIN SEVERITY QUANTIFIED, NO PAIN PRESENT: ICD-10-PCS | Mod: S$GLB,,, | Performed by: INTERNAL MEDICINE

## 2020-02-13 PROCEDURE — 99214 PR OFFICE/OUTPT VISIT, EST, LEVL IV, 30-39 MIN: ICD-10-PCS | Mod: S$GLB,,, | Performed by: INTERNAL MEDICINE

## 2020-02-13 PROCEDURE — 3051F HG A1C>EQUAL 7.0%<8.0%: CPT | Mod: S$GLB,,, | Performed by: INTERNAL MEDICINE

## 2020-02-13 PROCEDURE — 99214 OFFICE O/P EST MOD 30 MIN: CPT | Mod: S$GLB,,, | Performed by: INTERNAL MEDICINE

## 2020-02-13 PROCEDURE — 1101F PR PT FALLS ASSESS DOC 0-1 FALLS W/OUT INJ PAST YR: ICD-10-PCS | Mod: S$GLB,,, | Performed by: INTERNAL MEDICINE

## 2020-02-13 PROCEDURE — 3051F PR MOST RECENT HEMOGLOBIN A1C LEVEL 7.0 - < 8.0%: ICD-10-PCS | Mod: S$GLB,,, | Performed by: INTERNAL MEDICINE

## 2020-02-13 PROCEDURE — 3078F DIAST BP <80 MM HG: CPT | Mod: S$GLB,,, | Performed by: INTERNAL MEDICINE

## 2020-02-13 PROCEDURE — 3075F SYST BP GE 130 - 139MM HG: CPT | Mod: S$GLB,,, | Performed by: INTERNAL MEDICINE

## 2020-02-13 PROCEDURE — 3075F PR MOST RECENT SYSTOLIC BLOOD PRESS GE 130-139MM HG: ICD-10-PCS | Mod: S$GLB,,, | Performed by: INTERNAL MEDICINE

## 2020-02-13 RX ORDER — GUANFACINE 2 MG/1
2 TABLET ORAL DAILY
COMMUNITY
Start: 2019-12-17 | End: 2021-04-21

## 2020-02-13 NOTE — PROGRESS NOTES
SUBJECTIVE:    Patient ID: Cynthia Cushing is a 74 y.o. female.    Chief Complaint: Hypertension; Diabetes; and Follow-up    HPI     Patient comes in for follow-up of diabetes.  Her last A1c was 7.0  2 months ago.  Hemoglobin was 11.0 hematocrit 34.7 ( decreased iron) platelet count was [id number].  Last cholesterol 116-    Pt has some questions-blood counts, iron levels,DM type,..    Pt has done beautifully with her lifestyle changes, dietary adherence, and overall compliance-    Lab Visit on 12/27/2019   Component Date Value Ref Range Status    Hemoglobin A1C 12/27/2019 7.0* 4.5 - 6.2 % Final    Estimated Avg Glucose 12/27/2019 154* 68 - 131 mg/dL Final    WBC 12/27/2019 9.95  3.90 - 12.70 K/uL Final    RBC 12/27/2019 4.20  4.00 - 5.40 M/uL Final    Hemoglobin 12/27/2019 11.0* 12.0 - 16.0 g/dL Final    Hematocrit 12/27/2019 34.7* 37.0 - 48.5 % Final    Mean Corpuscular Volume 12/27/2019 83  82 - 98 fL Final    Mean Corpuscular Hemoglobin 12/27/2019 26.2* 27.0 - 31.0 pg Final    Mean Corpuscular Hemoglobin Conc 12/27/2019 31.7* 32.0 - 36.0 g/dL Final    RDW 12/27/2019 14.4  11.5 - 14.5 % Final    Platelets 12/27/2019 210  150 - 350 K/uL Final    MPV 12/27/2019 11.5  9.2 - 12.9 fL Final    Immature Granulocytes 12/27/2019 0.4  0.0 - 0.5 % Final    Gran # (ANC) 12/27/2019 5.4  1.8 - 7.7 K/uL Final    Immature Grans (Abs) 12/27/2019 0.04  0.00 - 0.04 K/uL Final    Lymph # 12/27/2019 2.8  1.0 - 4.8 K/uL Final    Mono # 12/27/2019 1.0  0.3 - 1.0 K/uL Final    Eos # 12/27/2019 0.6* 0.0 - 0.5 K/uL Final    Baso # 12/27/2019 0.11  0.00 - 0.20 K/uL Final    nRBC 12/27/2019 0  0 /100 WBC Final    Gran% 12/27/2019 53.8  38.0 - 73.0 % Final    Lymph% 12/27/2019 27.9  18.0 - 48.0 % Final    Mono% 12/27/2019 10.5  4.0 - 15.0 % Final    Eosinophil% 12/27/2019 6.3  0.0 - 8.0 % Final    Basophil% 12/27/2019 1.1  0.0 - 1.9 % Final    Differential Method 12/27/2019 Automated   Final   Lab Visit on 11/06/2019    Component Date Value Ref Range Status    Cholesterol 11/06/2019 116* 120 - 199 mg/dL Final    Triglycerides 11/06/2019 132  30 - 150 mg/dL Final    HDL 11/06/2019 35* 40 - 75 mg/dL Final    LDL Cholesterol 11/06/2019 54.6* 63.0 - 159.0 mg/dL Final    Hdl/Cholesterol Ratio 11/06/2019 30.2  20.0 - 50.0 % Final    Total Cholesterol/HDL Ratio 11/06/2019 3.3  2.0 - 5.0 Final    Non-HDL Cholesterol 11/06/2019 81  mg/dL Final    Total Protein 11/06/2019 7.0  6.0 - 8.4 g/dL Final    Albumin 11/06/2019 4.3  3.5 - 5.2 g/dL Final    Total Bilirubin 11/06/2019 0.6  0.1 - 1.0 mg/dL Final    Bilirubin, Direct 11/06/2019 <0.1* 0.1 - 0.3 mg/dL Final    AST 11/06/2019 30  10 - 40 U/L Final    ALT 11/06/2019 39  10 - 44 U/L Final    Alkaline Phosphatase 11/06/2019 49* 55 - 135 U/L Final    Ferritin 11/06/2019 33  20.0 - 300.0 ng/mL Final    Iron 11/06/2019 45  30 - 160 ug/dL Final    Transferrin 11/06/2019 304  200 - 375 mg/dL Final    TIBC 11/06/2019 426  250 - 450 ug/dL Final    Saturated Iron 11/06/2019 11* 20 - 50 % Final   Lab Visit on 10/14/2019   Component Date Value Ref Range Status    Hemoglobin A1C 10/14/2019 7.2* 4.5 - 6.2 % Final    Estimated Avg Glucose 10/14/2019 160* 68 - 131 mg/dL Final   Lab Visit on 09/13/2019   Component Date Value Ref Range Status    WBC 09/13/2019 9.39  3.90 - 12.70 K/uL Final    RBC 09/13/2019 3.97* 4.00 - 5.40 M/uL Final    Hemoglobin 09/13/2019 10.2* 12.0 - 16.0 g/dL Final    Hematocrit 09/13/2019 32.7* 37.0 - 48.5 % Final    Mean Corpuscular Volume 09/13/2019 82  82 - 98 fL Final    Mean Corpuscular Hemoglobin 09/13/2019 25.7* 27.0 - 31.0 pg Final    Mean Corpuscular Hemoglobin Conc 09/13/2019 31.2* 32.0 - 36.0 g/dL Final    RDW 09/13/2019 13.7  11.5 - 14.5 % Final    Platelets 09/13/2019 264  150 - 350 K/uL Final    MPV 09/13/2019 12.1  9.2 - 12.9 fL Final    Immature Granulocytes 09/13/2019 0.7* 0.0 - 0.5 % Final    Gran # (ANC) 09/13/2019 5.7  1.8 - 7.7  K/uL Final    Immature Grans (Abs) 09/13/2019 0.07* 0.00 - 0.04 K/uL Final    Lymph # 09/13/2019 2.3  1.0 - 4.8 K/uL Final    Mono # 09/13/2019 0.9  0.3 - 1.0 K/uL Final    Eos # 09/13/2019 0.4  0.0 - 0.5 K/uL Final    Baso # 09/13/2019 0.08  0.00 - 0.20 K/uL Final    nRBC 09/13/2019 0  0 /100 WBC Final    Gran% 09/13/2019 60.2  38.0 - 73.0 % Final    Lymph% 09/13/2019 24.8  18.0 - 48.0 % Final    Mono% 09/13/2019 9.6  4.0 - 15.0 % Final    Eosinophil% 09/13/2019 3.8  0.0 - 8.0 % Final    Basophil% 09/13/2019 0.9  0.0 - 1.9 % Final    Differential Method 09/13/2019 Automated   Final    Cholesterol 09/13/2019 128  120 - 199 mg/dL Final    Triglycerides 09/13/2019 184* 30 - 150 mg/dL Final    HDL 09/13/2019 30* 40 - 75 mg/dL Final    LDL Cholesterol 09/13/2019 61.2* 63.0 - 159.0 mg/dL Final    Hdl/Cholesterol Ratio 09/13/2019 23.4  20.0 - 50.0 % Final    Total Cholesterol/HDL Ratio 09/13/2019 4.3  2.0 - 5.0 Final    Non-HDL Cholesterol 09/13/2019 98  mg/dL Final    Microalbum.,U,Random 09/13/2019 473.1  ug/mL Final    Creatinine, Random Ur 09/13/2019 253.0  15.0 - 325.0 mg/dL Final    Microalb Creat Ratio 09/13/2019 187.0* 0.0 - 30.0 ug/mg Final    Specimen UA 09/13/2019 Urine, Clean Catch   Final    Color, UA 09/13/2019 Yellow  Yellow, Straw, Kezia Final    Appearance, UA 09/13/2019 Clear  Clear Final    pH, UA 09/13/2019 6.0  5.0 - 8.0 Final    Specific Gravity, UA 09/13/2019 1.020  1.005 - 1.030 Final    Protein, UA 09/13/2019 1+* Negative Final    Glucose, UA 09/13/2019 Negative  Negative Final    Ketones, UA 09/13/2019 Trace* Negative Final    Bilirubin (UA) 09/13/2019 Negative  Negative Final    Occult Blood UA 09/13/2019 Negative  Negative Final    Nitrite, UA 09/13/2019 Negative  Negative Final    Urobilinogen, UA 09/13/2019 Negative  Negative EU/dL Final    Leukocytes, UA 09/13/2019 2+* Negative Final    RBC, UA 09/13/2019 2  0 - 4 /hpf Final    WBC, UA 09/13/2019 26* 0 -  "5 /hpf Final    Bacteria 09/13/2019 Negative  None-Occ /hpf Final    Squam Epithel, UA 09/13/2019 7  /hpf Final    Hyaline Casts, UA 09/13/2019 20* 0-1/lpf /lpf Final    Ca Oxalate Verna,  09/13/2019 Moderate  None-Moderate Final    Microscopic Comment 09/13/2019 SEE COMMENT   Final       Past Medical History:   Diagnosis Date    Allergy     Cataract     Diabetes mellitus, type 2     Hernia, hiatal     Hypertension     Kidney stone     Seasonal allergies      Past Surgical History:   Procedure Laterality Date    BREAST SURGERY      CERVICAL DISC SURGERY      EYE SURGERY      cataracts bilateral    HYSTERECTOMY       Family History   Problem Relation Age of Onset    Stroke Mother     Cancer Mother     Cancer Father        Marital Status:   Alcohol History:  reports that she does not drink alcohol.  Tobacco History:  reports that she has never smoked. She has never used smokeless tobacco.  Drug History:  reports that she does not use drugs.    Review of patient's allergies indicates:   Allergen Reactions    Biaxin [clarithromycin]     Prandin [repaglinide] Nausea And Vomiting    Ciprofloxacin Nausea And Vomiting    Naldecon [chlorphen-phenyltolox-pe-ppa]     Omnicef [cefdinir]     Quinine Nausea And Vomiting       Current Outpatient Medications:     BD ULTRA-FINE ANNA PEN NEEDLE 32 gauge x 5/32" Ndalisia, U UTD D, Disp: , Rfl: 3    bisoprolol (ZEBETA) 5 MG tablet, Take 5 mg by mouth once daily., Disp: , Rfl:     blood sugar diagnostic Strp, To check BG 4 times daily, to use with accuchek seven plus. E11.65, Disp: 360 strip, Rfl: 3    cetirizine (ZYRTEC) 10 MG tablet, Take 10 mg by mouth once daily., Disp: , Rfl:     famotidine (PEPCID) 20 MG tablet, Take 20 mg by mouth 2 (two) times daily., Disp: , Rfl:     guanFACINE (TENEX) 2 MG tablet, Take 2 mg by mouth once daily. , Disp: , Rfl:     insulin (LANTUS SOLOSTAR U-100 INSULIN) glargine 100 units/mL (3mL) SubQ pen, Inject 34 units " "once daily. (Patient taking differently: Inject 36 units at night), Disp: 1 Box, Rfl: 5    lancets (ACCU-CHEK SOFTCLIX LANCETS) Misc, Use to check bs 4 times daily. Quantity of 363-400, whatever ins will cover. E11.65, Disp: 400 each, Rfl: 3    metFORMIN (GLUCOPHAGE) 500 MG tablet, TAKE 1 TABLET(500 MG) BY MOUTH DAILY 2 HOURS AFTER BREAKFAST, Disp: 90 tablet, Rfl: 2    olmesartan (BENICAR) 40 MG tablet, Take 40 mg by mouth once daily., Disp: , Rfl:     rosuvastatin (CRESTOR) 20 MG tablet, Take 1 tablet (20 mg total) by mouth once daily., Disp: 90 tablet, Rfl: 3    spironolactone (ALDACTONE) 50 MG tablet, Take 50 mg by mouth 2 (two) times daily., Disp: , Rfl:     Review of Systems   Constitutional: Negative for chills, fatigue, fever and unexpected weight change.   HENT: Negative for congestion, ear pain, hearing loss, sinus pain and sore throat.    Eyes: Negative for pain and visual disturbance.   Respiratory: Negative for cough, shortness of breath and wheezing.    Cardiovascular: Negative for chest pain, palpitations and leg swelling.   Gastrointestinal: Positive for constipation. Negative for abdominal pain, blood in stool, diarrhea, nausea and vomiting.   Endocrine: Negative for cold intolerance and heat intolerance.   Genitourinary: Negative for difficulty urinating, dysuria, frequency, pelvic pain and urgency.   Musculoskeletal: Positive for arthralgias (knees-sees ortho prn). Negative for back pain, joint swelling and neck pain.   Skin: Negative for pallor and rash.   Neurological: Negative for dizziness, tremors, weakness, numbness and headaches.   Hematological: Does not bruise/bleed easily.   Psychiatric/Behavioral: Positive for sleep disturbance. Negative for agitation and suicidal ideas.          Objective:      Vitals:    02/13/20 1737   BP: 132/68   Pulse: (!) 44   Resp: 16   Temp: 97.9 °F (36.6 °C)   SpO2: 98%   Weight: 78.5 kg (173 lb)   Height: 5' 4" (1.626 m)     Physical Exam "   Constitutional: She is oriented to person, place, and time. She appears well-developed and well-nourished. She is cooperative.   HENT:   Right Ear: Tympanic membrane normal.   Left Ear: Tympanic membrane normal.   Eyes: Conjunctivae, EOM and lids are normal. Right pupil is round and reactive. Left pupil is round and reactive.   Neck: Trachea normal and normal range of motion. Neck supple. No JVD present. Carotid bruit is not present. No thyromegaly present.   Cardiovascular: Normal rate, regular rhythm, S1 normal, S2 normal, normal heart sounds and intact distal pulses. Exam reveals no gallop and no friction rub.   No murmur (1/6 systolic murmur loudest at base radiates to right clavicle) heard.  Pulmonary/Chest: Breath sounds normal. No respiratory distress. She has no wheezes. She has no rales.   Abdominal: Soft. Bowel sounds are normal. She exhibits no mass. There is no tenderness. There is no rigidity and no guarding.   Musculoskeletal: She exhibits no edema.   Neurological: She is alert and oriented to person, place, and time.   Skin: Skin is warm and dry. Capillary refill takes less than 2 seconds. No lesion and no rash noted. Nails show no clubbing.   Psychiatric: She has a normal mood and affect. Her behavior is normal. Judgment and thought content normal.   Nursing note and vitals reviewed.      Protective Sensation (w/ 10 gram monofilament):  Right: Intact  Left: Intact    Visual Inspection:  Normal -  Bilateral    Pedal Pulses:   Right: Present  Left: Present    Posterior tibialis:   Right:Present  Left: Present    Assessment:       1. Controlled type 2 diabetes mellitus with diabetic nephropathy, with long-term current use of insulin    2. Essential hypertension, benign    3. Iron deficiency anemia, unspecified iron deficiency anemia type         Plan:       Controlled type 2 diabetes mellitus with diabetic nephropathy, with long-term current use of insulin  -     Hemoglobin A1c; Future; Expected date:  06/27/2020  -     Microalbumin/creatinine urine ratio; Future; Expected date: 06/27/2020    Essential hypertension, benign  -     CBC auto differential; Future; Expected date: 06/27/2020    Iron deficiency anemia, unspecified iron deficiency anemia type      No follow-ups on file.        2/15/2020 Teri Adams M.D.

## 2020-02-15 PROBLEM — D50.9 IRON DEFICIENCY ANEMIA: Status: ACTIVE | Noted: 2019-10-20

## 2020-02-15 PROBLEM — E11.21 CONTROLLED TYPE 2 DIABETES MELLITUS WITH DIABETIC NEPHROPATHY, WITH LONG-TERM CURRENT USE OF INSULIN: Status: ACTIVE | Noted: 2020-02-15

## 2020-02-15 PROBLEM — Z79.4 CONTROLLED TYPE 2 DIABETES MELLITUS WITH DIABETIC NEPHROPATHY, WITH LONG-TERM CURRENT USE OF INSULIN: Status: ACTIVE | Noted: 2020-02-15

## 2020-02-15 PROBLEM — I10 ESSENTIAL HYPERTENSION, BENIGN: Status: ACTIVE | Noted: 2020-02-15

## 2020-05-30 RX ORDER — INSULIN GLARGINE 100 [IU]/ML
INJECTION, SOLUTION SUBCUTANEOUS
Qty: 15 ML | Refills: 5 | Status: SHIPPED | OUTPATIENT
Start: 2020-05-30 | End: 2020-09-22

## 2020-06-14 NOTE — PROGRESS NOTES
SUBJECTIVE:    Patient ID: Cynthia Cushing is a 75 y.o. female.    Chief Complaint: Diabetes and Follow-up    HPI      Cynthia Cushing is an 75 y.o. female who presents for follow up of diabetes and hypertension    Diabetes. Current symptoms include: none. Patient denies foot ulcerations, hyperglycemia, hypoglycemia , increased appetite, nausea, paresthesia of the feet, polydipsia, polyuria, visual disturbances, vomiting and weight loss. Evaluation to date has included: fasting blood sugar, fasting lipid panel, hemoglobin A1C and microalbuminuria. Home sugars: BGs range between 140 and 200. Current treatments: Continued insulin which has been effective, Continued metformin which has been effective, Continued statin which has been effective and Continued ACE inhibitor/ARB which has been effective. Last dilated eye exam due and scheduled.    Hypertension. Home blood pressure readings: about 130/80  . Salt intake and diet: salt not added to cooking and salt shaker not on table. Usual weight: 173. Associated signs and symptoms: none. Patient denies: blurred vision, chest pain, dyspnea, headache, neck aches, orthopnea, palpitations, paroxysmal nocturnal dyspnea, peripheral edema, pulsating in the ears and tiredness/fatigue. Use of agents associated with hypertension: none. Medication compliance: taking as prescribed.    Labs-see below- A1C 8.0  H/H slightly low 33.8 and she has proteinuria    Lab Visit on 06/15/2020   Component Date Value Ref Range Status    Hemoglobin A1C 06/15/2020 8.0* 4.5 - 6.2 % Final    Estimated Avg Glucose 06/15/2020 183* 68 - 131 mg/dL Final    WBC 06/15/2020 9.04  3.90 - 12.70 K/uL Final    RBC 06/15/2020 4.07  4.00 - 5.40 M/uL Final    Hemoglobin 06/15/2020 10.8* 12.0 - 16.0 g/dL Final    Hematocrit 06/15/2020 33.9* 37.0 - 48.5 % Final    Mean Corpuscular Volume 06/15/2020 83  82 - 98 fL Final    Mean Corpuscular Hemoglobin 06/15/2020 26.5* 27.0 - 31.0 pg Final    Mean Corpuscular  Hemoglobin Conc 06/15/2020 31.9* 32.0 - 36.0 g/dL Final    RDW 06/15/2020 13.7  11.5 - 14.5 % Final    Platelets 06/15/2020 211  150 - 350 K/uL Final    MPV 06/15/2020 12.2  9.2 - 12.9 fL Final    Immature Granulocytes 06/15/2020 0.4  0.0 - 0.5 % Final    Gran # (ANC) 06/15/2020 4.9  1.8 - 7.7 K/uL Final    Immature Grans (Abs) 06/15/2020 0.04  0.00 - 0.04 K/uL Final    Lymph # 06/15/2020 2.6  1.0 - 4.8 K/uL Final    Mono # 06/15/2020 1.0  0.3 - 1.0 K/uL Final    Eos # 06/15/2020 0.4  0.0 - 0.5 K/uL Final    Baso # 06/15/2020 0.10  0.00 - 0.20 K/uL Final    nRBC 06/15/2020 0  0 /100 WBC Final    Gran% 06/15/2020 54.2  38.0 - 73.0 % Final    Lymph% 06/15/2020 28.3  18.0 - 48.0 % Final    Mono% 06/15/2020 11.2  4.0 - 15.0 % Final    Eosinophil% 06/15/2020 4.8  0.0 - 8.0 % Final    Basophil% 06/15/2020 1.1  0.0 - 1.9 % Final    Differential Method 06/15/2020 Automated   Final    Microalbum.,U,Random 06/15/2020 852.5  ug/mL Final    Creatinine, Random Ur 06/15/2020 117.0  15.0 - 325.0 mg/dL Final    Microalb Creat Ratio 06/15/2020 728.6* 0.0 - 30.0 ug/mg Final   Lab Visit on 12/27/2019   Component Date Value Ref Range Status    Hemoglobin A1C 12/27/2019 7.0* 4.5 - 6.2 % Final    Estimated Avg Glucose 12/27/2019 154* 68 - 131 mg/dL Final    WBC 12/27/2019 9.95  3.90 - 12.70 K/uL Final    RBC 12/27/2019 4.20  4.00 - 5.40 M/uL Final    Hemoglobin 12/27/2019 11.0* 12.0 - 16.0 g/dL Final    Hematocrit 12/27/2019 34.7* 37.0 - 48.5 % Final    Mean Corpuscular Volume 12/27/2019 83  82 - 98 fL Final    Mean Corpuscular Hemoglobin 12/27/2019 26.2* 27.0 - 31.0 pg Final    Mean Corpuscular Hemoglobin Conc 12/27/2019 31.7* 32.0 - 36.0 g/dL Final    RDW 12/27/2019 14.4  11.5 - 14.5 % Final    Platelets 12/27/2019 210  150 - 350 K/uL Final    MPV 12/27/2019 11.5  9.2 - 12.9 fL Final    Immature Granulocytes 12/27/2019 0.4  0.0 - 0.5 % Final    Gran # (ANC) 12/27/2019 5.4  1.8 - 7.7 K/uL Final     "Immature Grans (Abs) 12/27/2019 0.04  0.00 - 0.04 K/uL Final    Lymph # 12/27/2019 2.8  1.0 - 4.8 K/uL Final    Mono # 12/27/2019 1.0  0.3 - 1.0 K/uL Final    Eos # 12/27/2019 0.6* 0.0 - 0.5 K/uL Final    Baso # 12/27/2019 0.11  0.00 - 0.20 K/uL Final    nRBC 12/27/2019 0  0 /100 WBC Final    Gran% 12/27/2019 53.8  38.0 - 73.0 % Final    Lymph% 12/27/2019 27.9  18.0 - 48.0 % Final    Mono% 12/27/2019 10.5  4.0 - 15.0 % Final    Eosinophil% 12/27/2019 6.3  0.0 - 8.0 % Final    Basophil% 12/27/2019 1.1  0.0 - 1.9 % Final    Differential Method 12/27/2019 Automated   Final       Past Medical History:   Diagnosis Date    Allergy     Cataract     Diabetes mellitus, type 2     Hernia, hiatal     Hypertension     Kidney stone     Seasonal allergies      Past Surgical History:   Procedure Laterality Date    BREAST SURGERY      CERVICAL DISC SURGERY      EYE SURGERY      cataracts bilateral    HYSTERECTOMY       Family History   Problem Relation Age of Onset    Stroke Mother     Cancer Mother     Cancer Father        Marital Status:   Alcohol History:  reports no history of alcohol use.  Tobacco History:  reports that she has never smoked. She has never used smokeless tobacco.  Drug History:  reports no history of drug use.    Review of patient's allergies indicates:   Allergen Reactions    Biaxin [clarithromycin]     Prandin [repaglinide] Nausea And Vomiting    Ciprofloxacin Nausea And Vomiting    Naldecon [chlorphen-phenyltolox-pe-ppa]     Omnicef [cefdinir]     Quinine Nausea And Vomiting       Current Outpatient Medications:     BD ULTRA-FINE ANNA PEN NEEDLE 32 gauge x 5/32" Ndle, USE AS DIRECTED DAILY, Disp: 100 each, Rfl: 5    bisoprolol (ZEBETA) 5 MG tablet, Take 5 mg by mouth once daily., Disp: , Rfl:     blood sugar diagnostic Strp, To check BG 4 times daily, to use with accuchek seven plus. E11.65, Disp: 360 strip, Rfl: 3    cetirizine (ZYRTEC) 10 MG tablet, Take 10 mg by " "mouth once daily., Disp: , Rfl:     famotidine (PEPCID) 20 MG tablet, Take 20 mg by mouth 2 (two) times daily., Disp: , Rfl:     guanFACINE (TENEX) 2 MG tablet, Take 2 mg by mouth once daily. , Disp: , Rfl:     insulin (LANTUS SOLOSTAR U-100 INSULIN) glargine 100 units/mL (3mL) SubQ pen, Inject 36 units at night, Disp: 15 mL, Rfl: 5    lancets (ACCU-CHEK SOFTCLIX LANCETS) Misc, Use to check bs 4 times daily. Quantity of 363-400, whatever ins will cover. E11.65, Disp: 400 each, Rfl: 3    metFORMIN (GLUCOPHAGE) 500 MG tablet, TAKE 1 TABLET(500 MG) BY MOUTH DAILY 2 HOURS AFTER BREAKFAST, Disp: 90 tablet, Rfl: 2    olmesartan (BENICAR) 40 MG tablet, Take 40 mg by mouth once daily., Disp: , Rfl:     rosuvastatin (CRESTOR) 20 MG tablet, Take 1 tablet (20 mg total) by mouth once daily., Disp: 90 tablet, Rfl: 3    spironolactone (ALDACTONE) 50 MG tablet, Take 50 mg by mouth 2 (two) times daily., Disp: , Rfl:     Review of Systems   Constitutional: Negative for chills, fatigue, fever and unexpected weight change.   HENT: Negative for congestion, ear pain, hearing loss, sinus pain and sore throat.    Eyes: Negative for pain and visual disturbance.   Respiratory: Negative for cough, shortness of breath and wheezing.    Cardiovascular: Negative for chest pain, palpitations and leg swelling.   Gastrointestinal: Negative for abdominal pain, blood in stool, constipation, diarrhea, nausea and vomiting.   Endocrine: Negative for cold intolerance and heat intolerance.   Genitourinary: Negative for difficulty urinating, dysuria, frequency, pelvic pain and urgency.        Leaky bladder   Musculoskeletal: Positive for arthralgias (right knee-"bone on bone"). Negative for back pain, joint swelling and neck pain.   Skin: Negative for pallor and rash.   Neurological: Negative for dizziness, tremors, weakness, numbness and headaches.   Hematological: Does not bruise/bleed easily.   Psychiatric/Behavioral: Negative for agitation, " "sleep disturbance and suicidal ideas.          Objective:      Vitals:    06/15/20 1500   BP: 134/80   Pulse: (!) 46   Resp: 16   Temp: 98 °F (36.7 °C)   SpO2: 98%   Weight: 78.9 kg (173 lb 14.4 oz)   Height: 5' 3" (1.6 m)     Physical Exam  Vitals signs and nursing note reviewed.   Constitutional:       Appearance: She is well-developed.   Eyes:      General: Lids are normal.      Conjunctiva/sclera: Conjunctivae normal.      Pupils:      Right eye: Pupil is round and reactive.      Left eye: Pupil is round and reactive.   Neck:      Musculoskeletal: Normal range of motion and neck supple.      Thyroid: No thyromegaly.      Vascular: No carotid bruit or JVD.      Trachea: Trachea normal.   Cardiovascular:      Rate and Rhythm: Normal rate and regular rhythm.      Heart sounds: Normal heart sounds, S1 normal and S2 normal. Murmur: 1/2 of 6 basilar murmur. No friction rub. No gallop.    Pulmonary:      Effort: No respiratory distress.      Breath sounds: Normal breath sounds. No wheezing or rales.   Abdominal:      General: Bowel sounds are normal.      Palpations: Abdomen is soft. Abdomen is not rigid. There is no mass.      Tenderness: There is no abdominal tenderness. There is no guarding.   Skin:     General: Skin is warm and dry.      Capillary Refill: Capillary refill takes less than 2 seconds.      Findings: No lesion or rash.      Nails: There is no clubbing.     Neurological:      Mental Status: She is alert and oriented to person, place, and time.   Psychiatric:         Behavior: Behavior normal. Behavior is cooperative.         Thought Content: Thought content normal.         Judgment: Judgment normal.       Protective Sensation (w/ 10 gram monofilament):  Right: Intact  Left: Intact    Visual Inspection:  Normal -  Bilateral    Pedal Pulses:   Right: Present  Left: Present    Posterior tibialis:   Right:Present  Left: Present      Assessment:       1. Essential hypertension, benign    2. Controlled type 2 " diabetes mellitus with diabetic nephropathy, with long-term current use of insulin    3. BMI 30.0-30.9,adult         Plan:       Essential hypertension, benign  -     Basic metabolic panel; Future; Expected date: 09/15/2020    Controlled type 2 diabetes mellitus with diabetic nephropathy, with long-term current use of insulin  -     Hemoglobin A1C; Future; Expected date: 09/15/2020  -     Basic metabolic panel; Future; Expected date: 09/15/2020        -     Increase insulin to 39 units FROM 36 UNITS-she should call diary of glucoses to me so that I may adjust insulin as we go     BMI 30.0-30.9,adult      No follow-ups on file.        6/15/2020 Teri Adams M.D.

## 2020-06-15 ENCOUNTER — LAB VISIT (OUTPATIENT)
Dept: LAB | Facility: HOSPITAL | Age: 75
End: 2020-06-15
Attending: INTERNAL MEDICINE
Payer: MEDICARE

## 2020-06-15 ENCOUNTER — OFFICE VISIT (OUTPATIENT)
Dept: FAMILY MEDICINE | Facility: CLINIC | Age: 75
End: 2020-06-15
Payer: MEDICARE

## 2020-06-15 VITALS
TEMPERATURE: 98 F | WEIGHT: 173.88 LBS | DIASTOLIC BLOOD PRESSURE: 80 MMHG | HEIGHT: 63 IN | BODY MASS INDEX: 30.81 KG/M2 | OXYGEN SATURATION: 98 % | SYSTOLIC BLOOD PRESSURE: 134 MMHG | RESPIRATION RATE: 16 BRPM | HEART RATE: 46 BPM

## 2020-06-15 DIAGNOSIS — Z79.4 CONTROLLED TYPE 2 DIABETES MELLITUS WITH DIABETIC NEPHROPATHY, WITH LONG-TERM CURRENT USE OF INSULIN: ICD-10-CM

## 2020-06-15 DIAGNOSIS — I10 ESSENTIAL HYPERTENSION, BENIGN: ICD-10-CM

## 2020-06-15 DIAGNOSIS — I10 ESSENTIAL HYPERTENSION, BENIGN: Primary | ICD-10-CM

## 2020-06-15 DIAGNOSIS — E11.21 CONTROLLED TYPE 2 DIABETES MELLITUS WITH DIABETIC NEPHROPATHY, WITH LONG-TERM CURRENT USE OF INSULIN: ICD-10-CM

## 2020-06-15 LAB
ALBUMIN/CREAT UR: 728.6 UG/MG (ref 0–30)
BASOPHILS # BLD AUTO: 0.1 K/UL (ref 0–0.2)
BASOPHILS NFR BLD: 1.1 % (ref 0–1.9)
CREAT UR-MCNC: 117 MG/DL (ref 15–325)
DIFFERENTIAL METHOD: ABNORMAL
EOSINOPHIL # BLD AUTO: 0.4 K/UL (ref 0–0.5)
EOSINOPHIL NFR BLD: 4.8 % (ref 0–8)
ERYTHROCYTE [DISTWIDTH] IN BLOOD BY AUTOMATED COUNT: 13.7 % (ref 11.5–14.5)
ESTIMATED AVG GLUCOSE: 183 MG/DL (ref 68–131)
HBA1C MFR BLD HPLC: 8 % (ref 4.5–6.2)
HCT VFR BLD AUTO: 33.9 % (ref 37–48.5)
HGB BLD-MCNC: 10.8 G/DL (ref 12–16)
IMM GRANULOCYTES # BLD AUTO: 0.04 K/UL (ref 0–0.04)
IMM GRANULOCYTES NFR BLD AUTO: 0.4 % (ref 0–0.5)
LYMPHOCYTES # BLD AUTO: 2.6 K/UL (ref 1–4.8)
LYMPHOCYTES NFR BLD: 28.3 % (ref 18–48)
MCH RBC QN AUTO: 26.5 PG (ref 27–31)
MCHC RBC AUTO-ENTMCNC: 31.9 G/DL (ref 32–36)
MCV RBC AUTO: 83 FL (ref 82–98)
MICROALBUMIN UR DL<=1MG/L-MCNC: 852.5 UG/ML
MONOCYTES # BLD AUTO: 1 K/UL (ref 0.3–1)
MONOCYTES NFR BLD: 11.2 % (ref 4–15)
NEUTROPHILS # BLD AUTO: 4.9 K/UL (ref 1.8–7.7)
NEUTROPHILS NFR BLD: 54.2 % (ref 38–73)
NRBC BLD-RTO: 0 /100 WBC
PLATELET # BLD AUTO: 211 K/UL (ref 150–350)
PMV BLD AUTO: 12.2 FL (ref 9.2–12.9)
RBC # BLD AUTO: 4.07 M/UL (ref 4–5.4)
WBC # BLD AUTO: 9.04 K/UL (ref 3.9–12.7)

## 2020-06-15 PROCEDURE — 1159F MED LIST DOCD IN RCRD: CPT | Mod: S$GLB,,, | Performed by: INTERNAL MEDICINE

## 2020-06-15 PROCEDURE — 3052F HG A1C>EQUAL 8.0%<EQUAL 9.0%: CPT | Mod: S$GLB,,, | Performed by: INTERNAL MEDICINE

## 2020-06-15 PROCEDURE — 3075F PR MOST RECENT SYSTOLIC BLOOD PRESS GE 130-139MM HG: ICD-10-PCS | Mod: S$GLB,,, | Performed by: INTERNAL MEDICINE

## 2020-06-15 PROCEDURE — 1126F AMNT PAIN NOTED NONE PRSNT: CPT | Mod: S$GLB,,, | Performed by: INTERNAL MEDICINE

## 2020-06-15 PROCEDURE — 99214 OFFICE O/P EST MOD 30 MIN: CPT | Mod: S$GLB,,, | Performed by: INTERNAL MEDICINE

## 2020-06-15 PROCEDURE — 3075F SYST BP GE 130 - 139MM HG: CPT | Mod: S$GLB,,, | Performed by: INTERNAL MEDICINE

## 2020-06-15 PROCEDURE — 1159F PR MEDICATION LIST DOCUMENTED IN MEDICAL RECORD: ICD-10-PCS | Mod: S$GLB,,, | Performed by: INTERNAL MEDICINE

## 2020-06-15 PROCEDURE — 1101F PR PT FALLS ASSESS DOC 0-1 FALLS W/OUT INJ PAST YR: ICD-10-PCS | Mod: S$GLB,,, | Performed by: INTERNAL MEDICINE

## 2020-06-15 PROCEDURE — 36415 COLL VENOUS BLD VENIPUNCTURE: CPT

## 2020-06-15 PROCEDURE — 85025 COMPLETE CBC W/AUTO DIFF WBC: CPT

## 2020-06-15 PROCEDURE — 3079F DIAST BP 80-89 MM HG: CPT | Mod: S$GLB,,, | Performed by: INTERNAL MEDICINE

## 2020-06-15 PROCEDURE — 1101F PT FALLS ASSESS-DOCD LE1/YR: CPT | Mod: S$GLB,,, | Performed by: INTERNAL MEDICINE

## 2020-06-15 PROCEDURE — 99214 PR OFFICE/OUTPT VISIT, EST, LEVL IV, 30-39 MIN: ICD-10-PCS | Mod: S$GLB,,, | Performed by: INTERNAL MEDICINE

## 2020-06-15 PROCEDURE — 1126F PR PAIN SEVERITY QUANTIFIED, NO PAIN PRESENT: ICD-10-PCS | Mod: S$GLB,,, | Performed by: INTERNAL MEDICINE

## 2020-06-15 PROCEDURE — 3079F PR MOST RECENT DIASTOLIC BLOOD PRESSURE 80-89 MM HG: ICD-10-PCS | Mod: S$GLB,,, | Performed by: INTERNAL MEDICINE

## 2020-06-15 PROCEDURE — 82043 UR ALBUMIN QUANTITATIVE: CPT

## 2020-06-15 PROCEDURE — 83036 HEMOGLOBIN GLYCOSYLATED A1C: CPT

## 2020-06-15 PROCEDURE — 3052F PR MOST RECENT HEMOGLOBIN A1C LEVEL 8.0 - < 9.0%: ICD-10-PCS | Mod: S$GLB,,, | Performed by: INTERNAL MEDICINE

## 2020-06-17 DIAGNOSIS — Z12.31 ENCOUNTER FOR SCREENING MAMMOGRAM FOR MALIGNANT NEOPLASM OF BREAST: Primary | ICD-10-CM

## 2020-07-01 ENCOUNTER — HOSPITAL ENCOUNTER (OUTPATIENT)
Dept: RADIOLOGY | Facility: HOSPITAL | Age: 75
Discharge: HOME | End: 2020-07-01
Attending: OBSTETRICS & GYNECOLOGY
Payer: MEDICARE

## 2020-07-01 VITALS — BODY MASS INDEX: 30.82 KG/M2 | HEIGHT: 63 IN | WEIGHT: 173.94 LBS

## 2020-07-01 DIAGNOSIS — Z12.31 ENCOUNTER FOR SCREENING MAMMOGRAM FOR MALIGNANT NEOPLASM OF BREAST: ICD-10-CM

## 2020-07-01 PROCEDURE — 77067 SCR MAMMO BI INCL CAD: CPT | Mod: TC,PO

## 2020-09-20 NOTE — PROGRESS NOTES
SUBJECTIVE:    Patient ID: Cynthia N Cushing is a 75 y.o. female.    Chief Complaint: No chief complaint on file.    HPI     Follow-p Diabetes-  Subjective:Patient denies {dm sx:12884:::0}. Evaluation to date has included: {dm labs:4072468871:::0}. Home sugars: {dm home sugars:27513:::0}. Current treatments: {dm interventions:66513:::0}. Last dilated eye exam ***.           Cynthia N Cushing is an 75 y.o. female who presents for follow up of diabetes. Current symptoms include: {dm sx:36001}. Patient denies {dm sx:35248}. Evaluation to date has included: {dm labs:2700663250}. Home sugars: {dm home sugars:94434}. Current treatments: {dm interventions:33032}. Last dilated eye exam ***.    Review of Systems  {ros; complete:51655}      Objective:      {exam; complete:87827}    Laboratory:  No results found for: A1C      Assessment:      {dm dx list:25549}      Plan:      {dm tx plan:23648}     Lab Visit on 06/15/2020   Component Date Value Ref Range Status    Hemoglobin A1C 06/15/2020 8.0* 4.5 - 6.2 % Final    Estimated Avg Glucose 06/15/2020 183* 68 - 131 mg/dL Final    WBC 06/15/2020 9.04  3.90 - 12.70 K/uL Final    RBC 06/15/2020 4.07  4.00 - 5.40 M/uL Final    Hemoglobin 06/15/2020 10.8* 12.0 - 16.0 g/dL Final    Hematocrit 06/15/2020 33.9* 37.0 - 48.5 % Final    Mean Corpuscular Volume 06/15/2020 83  82 - 98 fL Final    Mean Corpuscular Hemoglobin 06/15/2020 26.5* 27.0 - 31.0 pg Final    Mean Corpuscular Hemoglobin Conc 06/15/2020 31.9* 32.0 - 36.0 g/dL Final    RDW 06/15/2020 13.7  11.5 - 14.5 % Final    Platelets 06/15/2020 211  150 - 350 K/uL Final    MPV 06/15/2020 12.2  9.2 - 12.9 fL Final    Immature Granulocytes 06/15/2020 0.4  0.0 - 0.5 % Final    Gran # (ANC) 06/15/2020 4.9  1.8 - 7.7 K/uL Final    Immature Grans (Abs) 06/15/2020 0.04  0.00 - 0.04 K/uL Final    Lymph # 06/15/2020 2.6  1.0 - 4.8 K/uL Final    Mono # 06/15/2020 1.0  0.3 - 1.0 K/uL Final    Eos # 06/15/2020 0.4  0.0 - 0.5  "K/uL Final    Baso # 06/15/2020 0.10  0.00 - 0.20 K/uL Final    nRBC 06/15/2020 0  0 /100 WBC Final    Gran% 06/15/2020 54.2  38.0 - 73.0 % Final    Lymph% 06/15/2020 28.3  18.0 - 48.0 % Final    Mono% 06/15/2020 11.2  4.0 - 15.0 % Final    Eosinophil% 06/15/2020 4.8  0.0 - 8.0 % Final    Basophil% 06/15/2020 1.1  0.0 - 1.9 % Final    Differential Method 06/15/2020 Automated   Final    Microalbum.,U,Random 06/15/2020 852.5  ug/mL Final    Creatinine, Random Ur 06/15/2020 117.0  15.0 - 325.0 mg/dL Final    Microalb Creat Ratio 06/15/2020 728.6* 0.0 - 30.0 ug/mg Final       Past Medical History:   Diagnosis Date    Allergy     Cataract     Diabetes mellitus, type 2     Hernia, hiatal     Hypertension     Kidney stone     Seasonal allergies      Past Surgical History:   Procedure Laterality Date    BREAST BIOPSY      BREAST CYST ASPIRATION      BREAST SURGERY      CERVICAL DISC SURGERY      EYE SURGERY      cataracts bilateral    HYSTERECTOMY       Family History   Problem Relation Age of Onset    Stroke Mother     Cancer Mother     Breast cancer Mother     Cancer Father     Breast cancer Maternal Aunt        Marital Status:   Alcohol History:  reports no history of alcohol use.  Tobacco History:  reports that she has never smoked. She has never used smokeless tobacco.  Drug History:  reports no history of drug use.    Review of patient's allergies indicates:   Allergen Reactions    Biaxin [clarithromycin]     Prandin [repaglinide] Nausea And Vomiting    Ciprofloxacin Nausea And Vomiting    Naldecon [chlorphen-phenyltolox-pe-ppa]     Omnicef [cefdinir]     Quinine Nausea And Vomiting       Current Outpatient Medications:     BD ULTRA-FINE ANNA PEN NEEDLE 32 gauge x 5/32" Ndle, USE AS DIRECTED DAILY, Disp: 100 each, Rfl: 5    bisoprolol (ZEBETA) 5 MG tablet, Take 5 mg by mouth once daily., Disp: , Rfl:     blood sugar diagnostic Strp, To check BG 4 times daily, to use with " accuchek seven plus. E11.65, Disp: 360 strip, Rfl: 3    cetirizine (ZYRTEC) 10 MG tablet, Take 10 mg by mouth once daily., Disp: , Rfl:     famotidine (PEPCID) 20 MG tablet, Take 20 mg by mouth 2 (two) times daily., Disp: , Rfl:     guanFACINE (TENEX) 2 MG tablet, Take 2 mg by mouth once daily. , Disp: , Rfl:     insulin (LANTUS SOLOSTAR U-100 INSULIN) glargine 100 units/mL (3mL) SubQ pen, Inject 36 units at night, Disp: 15 mL, Rfl: 5    lancets (ACCU-CHEK SOFTCLIX LANCETS) Misc, TEST BLOOD SUGAR FOUR TIMES DAILY, Disp: 400 each, Rfl: 3    metFORMIN (GLUCOPHAGE) 500 MG tablet, TAKE 1 TABLET(500 MG) BY MOUTH DAILY 2 HOURS AFTER BREAKFAST, Disp: 90 tablet, Rfl: 2    olmesartan (BENICAR) 40 MG tablet, Take 40 mg by mouth once daily., Disp: , Rfl:     rosuvastatin (CRESTOR) 20 MG tablet, TAKE 1 TABLET EVERY DAY, Disp: 90 tablet, Rfl: 3    spironolactone (ALDACTONE) 50 MG tablet, Take 50 mg by mouth 2 (two) times daily., Disp: , Rfl:     Review of Systems       Objective:      There were no vitals filed for this visit.  Physical Exam      Assessment:       No diagnosis found.     Plan:       There are no diagnoses linked to this encounter.  No follow-ups on file.        9/20/2020 Teri Adams M.D.

## 2020-09-20 NOTE — PROGRESS NOTES
"  SUBJECTIVE:    Patient ID: Cynthia N Cushing is a 75 y.o. female.    Chief Complaint: Diabetes and Follow-up    HPI     Diabetes-Patient denies hyperglycemia, vomiting and weight loss. Evaluation to date has included: fasting blood sugar, fasting lipid panel, hemoglobin A1C and microalbuminuria. Home sugarshihgest 300 after sugar but mostly good Current treatments: Increased dose of insulin which has been effective, Continued metformin which has been effective, Continued statin which has been effective and Continued ACE inhibitor/ARB which has been effective. Last dilated eye exam after Christmas and she is going again in December.    Labs- A1cd 8.0 (up from 7.0)  Hct 34  + microalbuminuria    BP usually 130 systolic-here it is higher-she does not use salt-she is not a drinker-repeat BP more acceptable and she claims it is controlled at home    Last 3 sets of Vitals    Vitals - 1 value per visit 6/15/2020 7/1/2020 9/22/2020   SYSTOLIC 134 - 152   DIASTOLIC 80 - 66   PULSE 46 - 52   TEMPERATURE 98 - 97.3   RESPIRATIONS 16 - 16   SPO2 98 - 98   Weight (lb) 173.9 173.94 174   Weight (kg) 78.881 78.9 78.926   HEIGHT 5' 3" 5' 3" 5' 3"   BODY MASS INDEX 30.81 30.81 30.82   VISIT REPORT - - -   Pain Score  0 - 0       Lab Visit on 09/22/2020   Component Date Value Ref Range Status    Hemoglobin A1C 09/22/2020 8.0* 4.5 - 6.2 % Final    Estimated Avg Glucose 09/22/2020 183* 68 - 131 mg/dL Final    Sodium 09/22/2020 140  136 - 145 mmol/L Final    Potassium 09/22/2020 3.9  3.5 - 5.1 mmol/L Final    Chloride 09/22/2020 105  95 - 110 mmol/L Final    CO2 09/22/2020 24  23 - 29 mmol/L Final    Glucose 09/22/2020 193* 70 - 110 mg/dL Final    BUN, Bld 09/22/2020 26* 8 - 23 mg/dL Final    Creatinine 09/22/2020 1.2  0.5 - 1.4 mg/dL Final    Calcium 09/22/2020 10.1  8.7 - 10.5 mg/dL Final    Anion Gap 09/22/2020 11  8 - 16 mmol/L Final    eGFR if African American 09/22/2020 51.1* >60 mL/min/1.73 m^2 Final    eGFR if non "  09/22/2020 44.3* >60 mL/min/1.73 m^2 Final   Lab Visit on 06/15/2020   Component Date Value Ref Range Status    Hemoglobin A1C 06/15/2020 8.0* 4.5 - 6.2 % Final    Estimated Avg Glucose 06/15/2020 183* 68 - 131 mg/dL Final    WBC 06/15/2020 9.04  3.90 - 12.70 K/uL Final    RBC 06/15/2020 4.07  4.00 - 5.40 M/uL Final    Hemoglobin 06/15/2020 10.8* 12.0 - 16.0 g/dL Final    Hematocrit 06/15/2020 33.9* 37.0 - 48.5 % Final    Mean Corpuscular Volume 06/15/2020 83  82 - 98 fL Final    Mean Corpuscular Hemoglobin 06/15/2020 26.5* 27.0 - 31.0 pg Final    Mean Corpuscular Hemoglobin Conc 06/15/2020 31.9* 32.0 - 36.0 g/dL Final    RDW 06/15/2020 13.7  11.5 - 14.5 % Final    Platelets 06/15/2020 211  150 - 350 K/uL Final    MPV 06/15/2020 12.2  9.2 - 12.9 fL Final    Immature Granulocytes 06/15/2020 0.4  0.0 - 0.5 % Final    Gran # (ANC) 06/15/2020 4.9  1.8 - 7.7 K/uL Final    Immature Grans (Abs) 06/15/2020 0.04  0.00 - 0.04 K/uL Final    Lymph # 06/15/2020 2.6  1.0 - 4.8 K/uL Final    Mono # 06/15/2020 1.0  0.3 - 1.0 K/uL Final    Eos # 06/15/2020 0.4  0.0 - 0.5 K/uL Final    Baso # 06/15/2020 0.10  0.00 - 0.20 K/uL Final    nRBC 06/15/2020 0  0 /100 WBC Final    Gran% 06/15/2020 54.2  38.0 - 73.0 % Final    Lymph% 06/15/2020 28.3  18.0 - 48.0 % Final    Mono% 06/15/2020 11.2  4.0 - 15.0 % Final    Eosinophil% 06/15/2020 4.8  0.0 - 8.0 % Final    Basophil% 06/15/2020 1.1  0.0 - 1.9 % Final    Differential Method 06/15/2020 Automated   Final    Microalbum.,U,Random 06/15/2020 852.5  ug/mL Final    Creatinine, Random Ur 06/15/2020 117.0  15.0 - 325.0 mg/dL Final    Microalb Creat Ratio 06/15/2020 728.6* 0.0 - 30.0 ug/mg Final       Past Medical History:   Diagnosis Date    Allergy     Cataract     Hernia, hiatal     Hypertension     Kidney stone     Seasonal allergies      Past Surgical History:   Procedure Laterality Date    BREAST BIOPSY      BREAST CYST ASPIRATION       "BREAST SURGERY      CERVICAL DISC SURGERY      EYE SURGERY      cataracts bilateral    HYSTERECTOMY       Family History   Problem Relation Age of Onset    Stroke Mother     Cancer Mother     Breast cancer Mother     Cancer Father     Breast cancer Maternal Aunt        Marital Status:   Alcohol History:  reports no history of alcohol use.  Tobacco History:  reports that she has never smoked. She has never used smokeless tobacco.  Drug History:  reports no history of drug use.    Review of patient's allergies indicates:   Allergen Reactions    Biaxin [clarithromycin]     Prandin [repaglinide] Nausea And Vomiting    Amlodipine     Chlorphen-phenyltolox-pe-ppa     Ciprofloxacin Nausea And Vomiting    Omnicef [cefdinir]     Propoxyphene     Quinine Nausea And Vomiting       Current Outpatient Medications:     aspirin 81 MG Chew, Take 81 mg by mouth once daily., Disp: , Rfl:     BD ULTRA-FINE ANNA PEN NEEDLE 32 gauge x 5/32" Ndle, USE AS DIRECTED DAILY, Disp: 100 each, Rfl: 5    bisoprolol (ZEBETA) 5 MG tablet, Take 5 mg by mouth once daily., Disp: , Rfl:     blood sugar diagnostic Strp, To check BG 4 times daily, to use with accuchek seven plus. E11.65, Disp: 360 strip, Rfl: 3    cetirizine (ZYRTEC) 10 MG tablet, Take 10 mg by mouth once daily., Disp: , Rfl:     famotidine (PEPCID) 20 MG tablet, Take 20 mg by mouth 2 (two) times daily., Disp: , Rfl:     guanFACINE (TENEX) 2 MG tablet, Take 2 mg by mouth once daily. , Disp: , Rfl:     insulin (LANTUS SOLOSTAR U-100 INSULIN) glargine 100 units/mL (3mL) SubQ pen, Inject 42 units at night, Disp: 15 mL, Rfl: 5    lancets (ACCU-CHEK SOFTCLIX LANCETS) Misc, TEST BLOOD SUGAR FOUR TIMES DAILY, Disp: 400 each, Rfl: 3    metFORMIN (GLUCOPHAGE) 500 MG tablet, Take 1 tablet (500 mg total) by mouth 2 (two) times daily with meals., Disp: 180 tablet, Rfl: 1    olmesartan (BENICAR) 40 MG tablet, Take 40 mg by mouth once daily., Disp: , Rfl:     " rosuvastatin (CRESTOR) 20 MG tablet, TAKE 1 TABLET EVERY DAY, Disp: 90 tablet, Rfl: 3    spironolactone (ALDACTONE) 50 MG tablet, Take 50 mg by mouth 2 (two) times daily., Disp: , Rfl:     lubiprostone (AMITIZA) 24 MCG Cap, Take 1 capsule (24 mcg total) by mouth 2 (two) times daily with meals., Disp: 60 capsule, Rfl: 2    Review of Systems   Constitutional: Negative for appetite change, chills, diaphoresis, fatigue, fever and unexpected weight change.   HENT: Negative for congestion, ear pain, hearing loss, nosebleeds, postnasal drip, sinus pressure, sinus pain, sneezing, sore throat, tinnitus, trouble swallowing and voice change.    Eyes: Negative for photophobia, pain, itching and visual disturbance.   Respiratory: Negative for apnea, cough, chest tightness, shortness of breath, wheezing and stridor.    Cardiovascular: Negative for chest pain, palpitations and leg swelling.   Gastrointestinal: Positive for constipation (taking OTC but it is not effective). Negative for abdominal distention, abdominal pain, blood in stool, diarrhea, nausea and vomiting.   Endocrine: Negative for cold intolerance, heat intolerance, polydipsia and polyuria.   Genitourinary: Negative for difficulty urinating, dyspareunia, dysuria, flank pain, frequency, hematuria, menstrual problem, pelvic pain, urgency, vaginal discharge and vaginal pain.   Musculoskeletal: Positive for arthralgias (knees bother her-right more than left). Negative for back pain, joint swelling, myalgias, neck pain and neck stiffness.   Skin: Negative for pallor.   Allergic/Immunologic: Negative for environmental allergies and food allergies.   Neurological: Negative for dizziness, tremors, speech difficulty, weakness, light-headedness, numbness and headaches.   Hematological: Does not bruise/bleed easily.   Psychiatric/Behavioral: Positive for sleep disturbance (getting to slee.). Negative for agitation, confusion, decreased concentration and suicidal ideas. The  "patient is not nervous/anxious.           Objective:      Vitals:    09/22/20 1513 09/22/20 1545   BP: (!) 152/66 (!) (P) 140/68   Pulse: (!) 52    Resp: 16    Temp: 97.3 °F (36.3 °C)    SpO2: 98%    Weight: 78.9 kg (174 lb)    Height: 5' 3" (1.6 m)      Physical Exam  Constitutional:       Appearance: Normal appearance.      Comments: Increased BMI   HENT:      Head: Normocephalic and atraumatic.   Eyes:      General: No scleral icterus.        Right eye: No discharge.         Left eye: No discharge.      Extraocular Movements: Extraocular movements intact.      Conjunctiva/sclera: Conjunctivae normal.      Pupils: Pupils are equal, round, and reactive to light.   Neck:      Musculoskeletal: Normal range of motion and neck supple.   Cardiovascular:      Rate and Rhythm: Normal rate and regular rhythm.      Pulses: Normal pulses.      Heart sounds: Normal heart sounds.   Pulmonary:      Effort: Pulmonary effort is normal.      Breath sounds: Normal breath sounds.   Abdominal:      General: Bowel sounds are normal.      Palpations: Abdomen is soft.   Musculoskeletal: Normal range of motion.   Skin:     General: Skin is warm and dry.   Neurological:      General: No focal deficit present.      Mental Status: She is alert and oriented to person, place, and time.   Psychiatric:         Mood and Affect: Mood normal.         Behavior: Behavior normal.         Thought Content: Thought content normal.         Judgment: Judgment normal.         Protective Sensation (w/ 10 gram monofilament):  Right: Intact  Left: Intact    Visual Inspection:  Normal -  Bilateral    Pedal Pulses:   Right: Present  Left: Present    Posterior tibialis:   Right:Present  Left: Present    Assessment:       1. Uncontrolled type 2 diabetes mellitus with diabetic nephropathy, with long-term current use of insulin    2. Uncontrolled type 2 diabetes mellitus with hyperglycemia    3. Type 2 diabetes mellitus without complication, without long-term " current use of insulin    4. Slow transit constipation    5. BMI 30.0-30.9,adult         Plan:       Controlled type 2 diabetes mellitus with diabetic nephropathy, with long-term current use of insulin        -     Increase insulin to 39 units    Uncontrolled type 2 diabetes mellitus with hyperglycemia    Type 2 diabetes mellitus without complication, without long-term current use of insulin    Slow transit constipation        -     amitiza 24 bid    BMI 30.0-30.9,adult      No follow-ups on file.        10/6/2020 Teri Adams M.D.

## 2020-09-22 ENCOUNTER — LAB VISIT (OUTPATIENT)
Dept: LAB | Facility: HOSPITAL | Age: 75
End: 2020-09-22
Attending: INTERNAL MEDICINE
Payer: MEDICARE

## 2020-09-22 ENCOUNTER — OFFICE VISIT (OUTPATIENT)
Dept: FAMILY MEDICINE | Facility: CLINIC | Age: 75
End: 2020-09-22
Payer: MEDICARE

## 2020-09-22 VITALS
DIASTOLIC BLOOD PRESSURE: 66 MMHG | HEART RATE: 52 BPM | SYSTOLIC BLOOD PRESSURE: 152 MMHG | TEMPERATURE: 97 F | BODY MASS INDEX: 30.83 KG/M2 | RESPIRATION RATE: 16 BRPM | HEIGHT: 63 IN | OXYGEN SATURATION: 98 % | WEIGHT: 174 LBS

## 2020-09-22 DIAGNOSIS — E11.9 TYPE 2 DIABETES MELLITUS WITHOUT COMPLICATION, WITHOUT LONG-TERM CURRENT USE OF INSULIN: ICD-10-CM

## 2020-09-22 DIAGNOSIS — K59.01 SLOW TRANSIT CONSTIPATION: ICD-10-CM

## 2020-09-22 DIAGNOSIS — E11.65 UNCONTROLLED TYPE 2 DIABETES MELLITUS WITH HYPERGLYCEMIA: ICD-10-CM

## 2020-09-22 DIAGNOSIS — Z79.4 CONTROLLED TYPE 2 DIABETES MELLITUS WITH DIABETIC NEPHROPATHY, WITH LONG-TERM CURRENT USE OF INSULIN: ICD-10-CM

## 2020-09-22 DIAGNOSIS — I10 ESSENTIAL HYPERTENSION, BENIGN: ICD-10-CM

## 2020-09-22 DIAGNOSIS — E11.21 CONTROLLED TYPE 2 DIABETES MELLITUS WITH DIABETIC NEPHROPATHY, WITH LONG-TERM CURRENT USE OF INSULIN: ICD-10-CM

## 2020-09-22 LAB
ANION GAP SERPL CALC-SCNC: 11 MMOL/L (ref 8–16)
BUN SERPL-MCNC: 26 MG/DL (ref 8–23)
CALCIUM SERPL-MCNC: 10.1 MG/DL (ref 8.7–10.5)
CHLORIDE SERPL-SCNC: 105 MMOL/L (ref 95–110)
CO2 SERPL-SCNC: 24 MMOL/L (ref 23–29)
CREAT SERPL-MCNC: 1.2 MG/DL (ref 0.5–1.4)
EST. GFR  (AFRICAN AMERICAN): 51.1 ML/MIN/1.73 M^2
EST. GFR  (NON AFRICAN AMERICAN): 44.3 ML/MIN/1.73 M^2
ESTIMATED AVG GLUCOSE: 183 MG/DL (ref 68–131)
GLUCOSE SERPL-MCNC: 193 MG/DL (ref 70–110)
HBA1C MFR BLD HPLC: 8 % (ref 4.5–6.2)
POTASSIUM SERPL-SCNC: 3.9 MMOL/L (ref 3.5–5.1)
SODIUM SERPL-SCNC: 140 MMOL/L (ref 136–145)

## 2020-09-22 PROCEDURE — 3077F PR MOST RECENT SYSTOLIC BLOOD PRESSURE >= 140 MM HG: ICD-10-PCS | Mod: S$GLB,,, | Performed by: INTERNAL MEDICINE

## 2020-09-22 PROCEDURE — 3078F PR MOST RECENT DIASTOLIC BLOOD PRESSURE < 80 MM HG: ICD-10-PCS | Mod: S$GLB,,, | Performed by: INTERNAL MEDICINE

## 2020-09-22 PROCEDURE — 80048 BASIC METABOLIC PNL TOTAL CA: CPT

## 2020-09-22 PROCEDURE — 1101F PR PT FALLS ASSESS DOC 0-1 FALLS W/OUT INJ PAST YR: ICD-10-PCS | Mod: S$GLB,,, | Performed by: INTERNAL MEDICINE

## 2020-09-22 PROCEDURE — 36415 COLL VENOUS BLD VENIPUNCTURE: CPT

## 2020-09-22 PROCEDURE — 1126F PR PAIN SEVERITY QUANTIFIED, NO PAIN PRESENT: ICD-10-PCS | Mod: S$GLB,,, | Performed by: INTERNAL MEDICINE

## 2020-09-22 PROCEDURE — 1159F PR MEDICATION LIST DOCUMENTED IN MEDICAL RECORD: ICD-10-PCS | Mod: S$GLB,,, | Performed by: INTERNAL MEDICINE

## 2020-09-22 PROCEDURE — 99214 OFFICE O/P EST MOD 30 MIN: CPT | Mod: S$GLB,,, | Performed by: INTERNAL MEDICINE

## 2020-09-22 PROCEDURE — 1126F AMNT PAIN NOTED NONE PRSNT: CPT | Mod: S$GLB,,, | Performed by: INTERNAL MEDICINE

## 2020-09-22 PROCEDURE — 1159F MED LIST DOCD IN RCRD: CPT | Mod: S$GLB,,, | Performed by: INTERNAL MEDICINE

## 2020-09-22 PROCEDURE — 99214 PR OFFICE/OUTPT VISIT, EST, LEVL IV, 30-39 MIN: ICD-10-PCS | Mod: S$GLB,,, | Performed by: INTERNAL MEDICINE

## 2020-09-22 PROCEDURE — 83036 HEMOGLOBIN GLYCOSYLATED A1C: CPT

## 2020-09-22 PROCEDURE — 1101F PT FALLS ASSESS-DOCD LE1/YR: CPT | Mod: S$GLB,,, | Performed by: INTERNAL MEDICINE

## 2020-09-22 PROCEDURE — 3078F DIAST BP <80 MM HG: CPT | Mod: S$GLB,,, | Performed by: INTERNAL MEDICINE

## 2020-09-22 PROCEDURE — 3077F SYST BP >= 140 MM HG: CPT | Mod: S$GLB,,, | Performed by: INTERNAL MEDICINE

## 2020-09-22 PROCEDURE — 3052F HG A1C>EQUAL 8.0%<EQUAL 9.0%: CPT | Mod: S$GLB,,, | Performed by: INTERNAL MEDICINE

## 2020-09-22 PROCEDURE — 3052F PR MOST RECENT HEMOGLOBIN A1C LEVEL 8.0 - < 9.0%: ICD-10-PCS | Mod: S$GLB,,, | Performed by: INTERNAL MEDICINE

## 2020-09-22 RX ORDER — METFORMIN HYDROCHLORIDE 500 MG/1
500 TABLET ORAL 2 TIMES DAILY WITH MEALS
Qty: 180 TABLET | Refills: 1 | Status: SHIPPED | OUTPATIENT
Start: 2020-09-22 | End: 2021-05-13

## 2020-09-22 RX ORDER — NAPROXEN SODIUM 220 MG/1
81 TABLET, FILM COATED ORAL DAILY
COMMUNITY

## 2020-09-22 RX ORDER — INSULIN GLARGINE 100 [IU]/ML
INJECTION, SOLUTION SUBCUTANEOUS
Qty: 15 ML | Refills: 5 | Status: SHIPPED | OUTPATIENT
Start: 2020-09-22 | End: 2021-04-19

## 2020-09-22 RX ORDER — LUBIPROSTONE 24 UG/1
24 CAPSULE ORAL 2 TIMES DAILY WITH MEALS
Qty: 60 CAPSULE | Refills: 2 | Status: SHIPPED | OUTPATIENT
Start: 2020-09-22 | End: 2022-03-31

## 2020-09-25 ENCOUNTER — TELEPHONE (OUTPATIENT)
Dept: FAMILY MEDICINE | Facility: CLINIC | Age: 75
End: 2020-09-25

## 2020-09-25 DIAGNOSIS — K59.01 SLOW TRANSIT CONSTIPATION: ICD-10-CM

## 2020-09-25 NOTE — TELEPHONE ENCOUNTER
The following medication needs a prior authorization:     Medication Name: lubiprostone    Dosage: 24 mcg    Frequency: bid    Directions for use: take 1 capsule by mouth 2 times daily with meals    Diagnosis: Slow transit constipation [K59.01]    Is the request for a reauthorization? no    Is the patient currently stable on therapy? New therapy    Please list all therapeutic alternatives previously used with start/end dates and outcome:

## 2020-09-28 ENCOUNTER — TELEPHONE (OUTPATIENT)
Dept: FAMILY MEDICINE | Facility: CLINIC | Age: 75
End: 2020-09-28

## 2020-10-06 ENCOUNTER — OFFICE VISIT (OUTPATIENT)
Dept: FAMILY MEDICINE | Facility: CLINIC | Age: 75
End: 2020-10-06
Payer: MEDICARE

## 2020-10-06 VITALS
HEIGHT: 63 IN | TEMPERATURE: 98 F | RESPIRATION RATE: 16 BRPM | OXYGEN SATURATION: 98 % | BODY MASS INDEX: 31.18 KG/M2 | WEIGHT: 176 LBS | SYSTOLIC BLOOD PRESSURE: 138 MMHG | DIASTOLIC BLOOD PRESSURE: 84 MMHG | HEART RATE: 50 BPM

## 2020-10-06 DIAGNOSIS — Z23 FLU VACCINE NEED: ICD-10-CM

## 2020-10-06 DIAGNOSIS — I10 ESSENTIAL HYPERTENSION, BENIGN: ICD-10-CM

## 2020-10-06 DIAGNOSIS — E11.65 UNCONTROLLED TYPE 2 DIABETES MELLITUS WITH HYPERGLYCEMIA: Primary | ICD-10-CM

## 2020-10-06 DIAGNOSIS — R00.1 SINUS BRADYCARDIA: ICD-10-CM

## 2020-10-06 DIAGNOSIS — Z23 NEED FOR SHINGLES VACCINE: ICD-10-CM

## 2020-10-06 PROCEDURE — 90662 FLU VACCINE - QUADRIVALENT - HIGH DOSE (65+) PRESERVATIVE FREE IM: ICD-10-PCS | Mod: S$GLB,,, | Performed by: INTERNAL MEDICINE

## 2020-10-06 PROCEDURE — 1101F PR PT FALLS ASSESS DOC 0-1 FALLS W/OUT INJ PAST YR: ICD-10-PCS | Mod: S$GLB,,, | Performed by: INTERNAL MEDICINE

## 2020-10-06 PROCEDURE — 1159F PR MEDICATION LIST DOCUMENTED IN MEDICAL RECORD: ICD-10-PCS | Mod: S$GLB,,, | Performed by: INTERNAL MEDICINE

## 2020-10-06 PROCEDURE — 90662 IIV NO PRSV INCREASED AG IM: CPT | Mod: S$GLB,,, | Performed by: INTERNAL MEDICINE

## 2020-10-06 PROCEDURE — 99213 OFFICE O/P EST LOW 20 MIN: CPT | Mod: 25,S$GLB,, | Performed by: INTERNAL MEDICINE

## 2020-10-06 PROCEDURE — 3052F PR MOST RECENT HEMOGLOBIN A1C LEVEL 8.0 - < 9.0%: ICD-10-PCS | Mod: S$GLB,,, | Performed by: INTERNAL MEDICINE

## 2020-10-06 PROCEDURE — 1126F PR PAIN SEVERITY QUANTIFIED, NO PAIN PRESENT: ICD-10-PCS | Mod: S$GLB,,, | Performed by: INTERNAL MEDICINE

## 2020-10-06 PROCEDURE — 3079F DIAST BP 80-89 MM HG: CPT | Mod: S$GLB,,, | Performed by: INTERNAL MEDICINE

## 2020-10-06 PROCEDURE — G0008 FLU VACCINE - QUADRIVALENT - HIGH DOSE (65+) PRESERVATIVE FREE IM: ICD-10-PCS | Mod: S$GLB,,, | Performed by: INTERNAL MEDICINE

## 2020-10-06 PROCEDURE — 3075F PR MOST RECENT SYSTOLIC BLOOD PRESS GE 130-139MM HG: ICD-10-PCS | Mod: S$GLB,,, | Performed by: INTERNAL MEDICINE

## 2020-10-06 PROCEDURE — 99213 PR OFFICE/OUTPT VISIT, EST, LEVL III, 20-29 MIN: ICD-10-PCS | Mod: 25,S$GLB,, | Performed by: INTERNAL MEDICINE

## 2020-10-06 PROCEDURE — 3075F SYST BP GE 130 - 139MM HG: CPT | Mod: S$GLB,,, | Performed by: INTERNAL MEDICINE

## 2020-10-06 PROCEDURE — 3052F HG A1C>EQUAL 8.0%<EQUAL 9.0%: CPT | Mod: S$GLB,,, | Performed by: INTERNAL MEDICINE

## 2020-10-06 PROCEDURE — 1159F MED LIST DOCD IN RCRD: CPT | Mod: S$GLB,,, | Performed by: INTERNAL MEDICINE

## 2020-10-06 PROCEDURE — G0008 ADMIN INFLUENZA VIRUS VAC: HCPCS | Mod: S$GLB,,, | Performed by: INTERNAL MEDICINE

## 2020-10-06 PROCEDURE — 3079F PR MOST RECENT DIASTOLIC BLOOD PRESSURE 80-89 MM HG: ICD-10-PCS | Mod: S$GLB,,, | Performed by: INTERNAL MEDICINE

## 2020-10-06 PROCEDURE — 1101F PT FALLS ASSESS-DOCD LE1/YR: CPT | Mod: S$GLB,,, | Performed by: INTERNAL MEDICINE

## 2020-10-06 PROCEDURE — 1126F AMNT PAIN NOTED NONE PRSNT: CPT | Mod: S$GLB,,, | Performed by: INTERNAL MEDICINE

## 2020-10-06 RX ORDER — ADJUVANT AS01B/PF, VIAL 1 OF 2
1 VIAL (ML) INTRAMUSCULAR ONCE
Qty: 0.5 ML | Refills: 0 | Status: SHIPPED | OUTPATIENT
Start: 2020-10-06 | End: 2020-10-06

## 2020-10-06 NOTE — PROGRESS NOTES
"  SUBJECTIVE:    Patient ID: Cynthia N Cushing is a 75 y.o. female.    Chief Complaint: Hypertension, Diabetes, and Follow-up    HPI     In for follow-up on BP and DM    BP has been lower-highest 154/?    DM- A1c 8.0  BUN 26 Cr 1.2 44cc-she has proteinuria of significant degree-we have to increase insulin-increase it 3 units and send me average of am sugars for me to regulate insulin doses    Last 3 sets of Vitals    Vitals - 1 value per visit 7/1/2020 9/22/2020 10/6/2020   SYSTOLIC - 152 138   DIASTOLIC - 66 84   PULSE - 52 50   TEMPERATURE - 97.3 97.5   RESPIRATIONS - 16 16   SPO2 - 98 98   Weight (lb) 173.94 174 176   Weight (kg) 78.9 78.926 79.833   HEIGHT 5' 3" 5' 3" 5' 3"   BODY MASS INDEX 30.81 30.82 31.18   VISIT REPORT - - -   Pain Score  - 0 0       Lab Visit on 09/22/2020   Component Date Value Ref Range Status    Hemoglobin A1C 09/22/2020 8.0* 4.5 - 6.2 % Final    Estimated Avg Glucose 09/22/2020 183* 68 - 131 mg/dL Final    Sodium 09/22/2020 140  136 - 145 mmol/L Final    Potassium 09/22/2020 3.9  3.5 - 5.1 mmol/L Final    Chloride 09/22/2020 105  95 - 110 mmol/L Final    CO2 09/22/2020 24  23 - 29 mmol/L Final    Glucose 09/22/2020 193* 70 - 110 mg/dL Final    BUN, Bld 09/22/2020 26* 8 - 23 mg/dL Final    Creatinine 09/22/2020 1.2  0.5 - 1.4 mg/dL Final    Calcium 09/22/2020 10.1  8.7 - 10.5 mg/dL Final    Anion Gap 09/22/2020 11  8 - 16 mmol/L Final    eGFR if African American 09/22/2020 51.1* >60 mL/min/1.73 m^2 Final    eGFR if non African American 09/22/2020 44.3* >60 mL/min/1.73 m^2 Final   Lab Visit on 06/15/2020   Component Date Value Ref Range Status    Hemoglobin A1C 06/15/2020 8.0* 4.5 - 6.2 % Final    Estimated Avg Glucose 06/15/2020 183* 68 - 131 mg/dL Final    WBC 06/15/2020 9.04  3.90 - 12.70 K/uL Final    RBC 06/15/2020 4.07  4.00 - 5.40 M/uL Final    Hemoglobin 06/15/2020 10.8* 12.0 - 16.0 g/dL Final    Hematocrit 06/15/2020 33.9* 37.0 - 48.5 % Final    Mean " Corpuscular Volume 06/15/2020 83  82 - 98 fL Final    Mean Corpuscular Hemoglobin 06/15/2020 26.5* 27.0 - 31.0 pg Final    Mean Corpuscular Hemoglobin Conc 06/15/2020 31.9* 32.0 - 36.0 g/dL Final    RDW 06/15/2020 13.7  11.5 - 14.5 % Final    Platelets 06/15/2020 211  150 - 350 K/uL Final    MPV 06/15/2020 12.2  9.2 - 12.9 fL Final    Immature Granulocytes 06/15/2020 0.4  0.0 - 0.5 % Final    Gran # (ANC) 06/15/2020 4.9  1.8 - 7.7 K/uL Final    Immature Grans (Abs) 06/15/2020 0.04  0.00 - 0.04 K/uL Final    Lymph # 06/15/2020 2.6  1.0 - 4.8 K/uL Final    Mono # 06/15/2020 1.0  0.3 - 1.0 K/uL Final    Eos # 06/15/2020 0.4  0.0 - 0.5 K/uL Final    Baso # 06/15/2020 0.10  0.00 - 0.20 K/uL Final    nRBC 06/15/2020 0  0 /100 WBC Final    Gran% 06/15/2020 54.2  38.0 - 73.0 % Final    Lymph% 06/15/2020 28.3  18.0 - 48.0 % Final    Mono% 06/15/2020 11.2  4.0 - 15.0 % Final    Eosinophil% 06/15/2020 4.8  0.0 - 8.0 % Final    Basophil% 06/15/2020 1.1  0.0 - 1.9 % Final    Differential Method 06/15/2020 Automated   Final    Microalbum.,U,Random 06/15/2020 852.5  ug/mL Final    Creatinine, Random Ur 06/15/2020 117.0  15.0 - 325.0 mg/dL Final    Microalb Creat Ratio 06/15/2020 728.6* 0.0 - 30.0 ug/mg Final       Past Medical History:   Diagnosis Date    Allergy     Cataract     Hernia, hiatal     Hypertension     Kidney stone     Seasonal allergies      Past Surgical History:   Procedure Laterality Date    BREAST BIOPSY      BREAST CYST ASPIRATION      BREAST SURGERY      CERVICAL DISC SURGERY      EYE SURGERY      cataracts bilateral    HYSTERECTOMY       Family History   Problem Relation Age of Onset    Stroke Mother     Cancer Mother     Breast cancer Mother     Cancer Father     Breast cancer Maternal Aunt        Marital Status:   Alcohol History:  reports no history of alcohol use.  Tobacco History:  reports that she has never smoked. She has never used smokeless tobacco.  Drug  "History:  reports no history of drug use.    Review of patient's allergies indicates:   Allergen Reactions    Biaxin [clarithromycin]     Prandin [repaglinide] Nausea And Vomiting    Amlodipine     Chlorphen-phenyltolox-pe-ppa     Ciprofloxacin Nausea And Vomiting    Omnicef [cefdinir]     Propoxyphene     Quinine Nausea And Vomiting       Current Outpatient Medications:     aspirin 81 MG Chew, Take 81 mg by mouth once daily., Disp: , Rfl:     BD ULTRA-FINE ANNA PEN NEEDLE 32 gauge x 5/32" Ndle, USE AS DIRECTED DAILY, Disp: 100 each, Rfl: 5    bisoprolol (ZEBETA) 5 MG tablet, Take 5 mg by mouth once daily., Disp: , Rfl:     blood sugar diagnostic Strp, To check BG 4 times daily, to use with accuchek seven plus. E11.65, Disp: 360 strip, Rfl: 3    cetirizine (ZYRTEC) 10 MG tablet, Take 10 mg by mouth once daily., Disp: , Rfl:     famotidine (PEPCID) 20 MG tablet, Take 20 mg by mouth 2 (two) times daily., Disp: , Rfl:     guanFACINE (TENEX) 2 MG tablet, Take 2 mg by mouth once daily. , Disp: , Rfl:     insulin (LANTUS SOLOSTAR U-100 INSULIN) glargine 100 units/mL (3mL) SubQ pen, Inject 42 units at night, Disp: 15 mL, Rfl: 5    lancets (ACCU-CHEK SOFTCLIX LANCETS) Misc, TEST BLOOD SUGAR FOUR TIMES DAILY, Disp: 400 each, Rfl: 3    lubiprostone (AMITIZA) 24 MCG Cap, Take 1 capsule (24 mcg total) by mouth 2 (two) times daily with meals., Disp: 60 capsule, Rfl: 2    metFORMIN (GLUCOPHAGE) 500 MG tablet, Take 1 tablet (500 mg total) by mouth 2 (two) times daily with meals., Disp: 180 tablet, Rfl: 1    olmesartan (BENICAR) 40 MG tablet, Take 40 mg by mouth once daily., Disp: , Rfl:     rosuvastatin (CRESTOR) 20 MG tablet, TAKE 1 TABLET EVERY DAY, Disp: 90 tablet, Rfl: 3    spironolactone (ALDACTONE) 50 MG tablet, Take 50 mg by mouth 2 (two) times daily., Disp: , Rfl:     adjuvant AS01B, PF,vial 1 of 2 (SHINGRIX ADJUVANT COMPONENT-PF) Susp, Inject 0.5 mLs into the muscle once. for 1 dose, Disp: 0.5 mL, " "Rfl: 0    Review of Systems       Constitutional: Negative for appetite change, chills, diaphoresis, fatigue, fever and unexpected weight change.   HENT: Negative for congestion, ear pain, hearing loss, nosebleeds, postnasal drip, sinus pressure, sinus pain, sneezing, sore throat, tinnitus, trouble swallowing and voice change.    Eyes: Negative for photophobia, pain, itching and visual disturbance.   Respiratory: Negative for apnea, cough, chest tightness, shortness of breath, wheezing and stridor.    Cardiovascular: Negative for chest pain, palpitations and leg swelling.   Gastrointestinal: Positive for constipation (taking OTC but it is not effective). Negative for abdominal distention, abdominal pain, blood in stool, diarrhea, nausea and vomiting.   Endocrine: Negative for cold intolerance, heat intolerance, polydipsia and polyuria.   Genitourinary: Negative for difficulty urinating, dyspareunia, dysuria, flank pain, frequency, hematuria, menstrual problem, pelvic pain, urgency, vaginal discharge and vaginal pain.   Musculoskeletal: Positive for arthralgias (knees bother her-right more than left). Negative for back pain, joint swelling, myalgias, neck pain and neck stiffness.   Skin: Negative for pallor.   Allergic/Immunologic: Negative for environmental allergies and food allergies.   Neurological: Negative for dizziness, tremors, speech difficulty, weakness, light-headedness, numbness and headaches.   Hematological: Does not bruise/bleed easily.   Psychiatric/Behavioral: Positive for sleep disturbance (getting to slee.). Negative for agitation, confusion, decreased concentration and suicidal ideas. The patient is not nervous/anxious.      Objective:      Vitals:    10/06/20 1305   BP: 138/84   Pulse: (!) 50   Resp: 16   Temp: 97.5 °F (36.4 °C)   SpO2: 98%   Weight: 79.8 kg (176 lb)   Height: 5' 3" (1.6 m)     Physical Exam  Vitals signs and nursing note reviewed.   Constitutional:       Comments: Increased BMI "   HENT:      Head: Normocephalic and atraumatic.   Eyes:      General:         Right eye: No discharge.         Left eye: No discharge.      Extraocular Movements: Extraocular movements intact.      Conjunctiva/sclera: Conjunctivae normal.      Pupils: Pupils are equal, round, and reactive to light.   Neck:      Musculoskeletal: Normal range of motion and neck supple. No neck rigidity or muscular tenderness.      Vascular: No carotid bruit.   Cardiovascular:      Rate and Rhythm: Bradycardia present.      Pulses: Normal pulses.      Heart sounds: Normal heart sounds.   Pulmonary:      Effort: Pulmonary effort is normal.      Breath sounds: Normal breath sounds.   Abdominal:      General: Bowel sounds are normal.      Palpations: Abdomen is soft.      Tenderness: There is no abdominal tenderness.   Lymphadenopathy:      Cervical: No cervical adenopathy.   Skin:     General: Skin is warm and dry.      Capillary Refill: Capillary refill takes less than 2 seconds.   Neurological:      General: No focal deficit present.      Mental Status: She is alert and oriented to person, place, and time.   Psychiatric:         Mood and Affect: Mood normal.         Behavior: Behavior normal.         Thought Content: Thought content normal.         Judgment: Judgment normal.           Assessment:       1. Uncontrolled type 2 diabetes mellitus with hyperglycemia    2. Uncontrolled type 2 diabetes mellitus with diabetic nephropathy, with long-term current use of insulin    3. Essential hypertension, benign    4. Sinus bradycardia    5. Flu vaccine need    6. BMI 31.0-31.9,adult    7. Need for shingles vaccine         Plan:       Uncontrolled type 2 diabetes mellitus with hyperglycemia        -     Increase insulin to 45 units    Uncontrolled type 2 diabetes mellitus with diabetic nephropathy, with long-term current use of insulin    Essential hypertension, benign    Flu vaccine need  -     Influenza - Quadrivalent - High Dose (65+) (PF)  (IM)    BMI 31.0-31.9,adult      No follow-ups on file.        10/6/2020 Teri Adams M.D.

## 2020-10-06 NOTE — PROGRESS NOTES
Established Patient - Audio Only Telehealth Visit     The patient location is: ***  The chief complaint leading to consultation is: ***  Visit type: Virtual visit with audio only (telephone)  Total time spent with patient: ***       The reason for the audio only service rather than synchronous audio and video virtual visit was related to technical difficulties or patient preference/necessity.     Each patient to whom I provide medical services by telemedicine is:  (1) informed of the relationship between the physician and patient and the respective role of any other health care provider with respect to management of the patient; and (2) notified that they may decline to receive medical services by telemedicine and may withdraw from such care at any time. Patient verbally consented to receive this service via voice-only telephone call.       HPI: Follow-up BP and Diabetes    HTN    DM-insulin increased to 39 units last office visit    Constipation-Amitiza added last office visit    SUBJECTIVE:    Patient ID: Cynthia N Cushing is a 75 y.o. female.    Chief Complaint: No chief complaint on file.    HPI    Lab Visit on 09/22/2020   Component Date Value Ref Range Status    Hemoglobin A1C 09/22/2020 8.0* 4.5 - 6.2 % Final    Estimated Avg Glucose 09/22/2020 183* 68 - 131 mg/dL Final    Sodium 09/22/2020 140  136 - 145 mmol/L Final    Potassium 09/22/2020 3.9  3.5 - 5.1 mmol/L Final    Chloride 09/22/2020 105  95 - 110 mmol/L Final    CO2 09/22/2020 24  23 - 29 mmol/L Final    Glucose 09/22/2020 193* 70 - 110 mg/dL Final    BUN, Bld 09/22/2020 26* 8 - 23 mg/dL Final    Creatinine 09/22/2020 1.2  0.5 - 1.4 mg/dL Final    Calcium 09/22/2020 10.1  8.7 - 10.5 mg/dL Final    Anion Gap 09/22/2020 11  8 - 16 mmol/L Final    eGFR if African American 09/22/2020 51.1* >60 mL/min/1.73 m^2 Final    eGFR if non African American 09/22/2020 44.3* >60 mL/min/1.73 m^2 Final   Lab Visit on 06/15/2020   Component Date Value Ref  Range Status    Hemoglobin A1C 06/15/2020 8.0* 4.5 - 6.2 % Final    Estimated Avg Glucose 06/15/2020 183* 68 - 131 mg/dL Final    WBC 06/15/2020 9.04  3.90 - 12.70 K/uL Final    RBC 06/15/2020 4.07  4.00 - 5.40 M/uL Final    Hemoglobin 06/15/2020 10.8* 12.0 - 16.0 g/dL Final    Hematocrit 06/15/2020 33.9* 37.0 - 48.5 % Final    Mean Corpuscular Volume 06/15/2020 83  82 - 98 fL Final    Mean Corpuscular Hemoglobin 06/15/2020 26.5* 27.0 - 31.0 pg Final    Mean Corpuscular Hemoglobin Conc 06/15/2020 31.9* 32.0 - 36.0 g/dL Final    RDW 06/15/2020 13.7  11.5 - 14.5 % Final    Platelets 06/15/2020 211  150 - 350 K/uL Final    MPV 06/15/2020 12.2  9.2 - 12.9 fL Final    Immature Granulocytes 06/15/2020 0.4  0.0 - 0.5 % Final    Gran # (ANC) 06/15/2020 4.9  1.8 - 7.7 K/uL Final    Immature Grans (Abs) 06/15/2020 0.04  0.00 - 0.04 K/uL Final    Lymph # 06/15/2020 2.6  1.0 - 4.8 K/uL Final    Mono # 06/15/2020 1.0  0.3 - 1.0 K/uL Final    Eos # 06/15/2020 0.4  0.0 - 0.5 K/uL Final    Baso # 06/15/2020 0.10  0.00 - 0.20 K/uL Final    nRBC 06/15/2020 0  0 /100 WBC Final    Gran% 06/15/2020 54.2  38.0 - 73.0 % Final    Lymph% 06/15/2020 28.3  18.0 - 48.0 % Final    Mono% 06/15/2020 11.2  4.0 - 15.0 % Final    Eosinophil% 06/15/2020 4.8  0.0 - 8.0 % Final    Basophil% 06/15/2020 1.1  0.0 - 1.9 % Final    Differential Method 06/15/2020 Automated   Final    Microalbum.,U,Random 06/15/2020 852.5  ug/mL Final    Creatinine, Random Ur 06/15/2020 117.0  15.0 - 325.0 mg/dL Final    Microalb Creat Ratio 06/15/2020 728.6* 0.0 - 30.0 ug/mg Final       Past Medical History:   Diagnosis Date    Allergy     Cataract     Hernia, hiatal     Hypertension     Kidney stone     Seasonal allergies      Past Surgical History:   Procedure Laterality Date    BREAST BIOPSY      BREAST CYST ASPIRATION      BREAST SURGERY      CERVICAL DISC SURGERY      EYE SURGERY      cataracts bilateral    HYSTERECTOMY    "    Family History   Problem Relation Age of Onset    Stroke Mother     Cancer Mother     Breast cancer Mother     Cancer Father     Breast cancer Maternal Aunt        Marital Status:   Alcohol History:  reports no history of alcohol use.  Tobacco History:  reports that she has never smoked. She has never used smokeless tobacco.  Drug History:  reports no history of drug use.    Review of patient's allergies indicates:   Allergen Reactions    Biaxin [clarithromycin]     Prandin [repaglinide] Nausea And Vomiting    Amlodipine     Chlorphen-phenyltolox-pe-ppa     Ciprofloxacin Nausea And Vomiting    Omnicef [cefdinir]     Propoxyphene     Quinine Nausea And Vomiting       Current Outpatient Medications:     aspirin 81 MG Chew, Take 81 mg by mouth once daily., Disp: , Rfl:     BD ULTRA-FINE ANNA PEN NEEDLE 32 gauge x 5/32" Ndle, USE AS DIRECTED DAILY, Disp: 100 each, Rfl: 5    bisoprolol (ZEBETA) 5 MG tablet, Take 5 mg by mouth once daily., Disp: , Rfl:     blood sugar diagnostic Strp, To check BG 4 times daily, to use with accuchek seven plus. E11.65, Disp: 360 strip, Rfl: 3    cetirizine (ZYRTEC) 10 MG tablet, Take 10 mg by mouth once daily., Disp: , Rfl:     famotidine (PEPCID) 20 MG tablet, Take 20 mg by mouth 2 (two) times daily., Disp: , Rfl:     guanFACINE (TENEX) 2 MG tablet, Take 2 mg by mouth once daily. , Disp: , Rfl:     insulin (LANTUS SOLOSTAR U-100 INSULIN) glargine 100 units/mL (3mL) SubQ pen, Inject 42 units at night, Disp: 15 mL, Rfl: 5    lancets (ACCU-CHEK SOFTCLIX LANCETS) Misc, TEST BLOOD SUGAR FOUR TIMES DAILY, Disp: 400 each, Rfl: 3    lubiprostone (AMITIZA) 24 MCG Cap, Take 1 capsule (24 mcg total) by mouth 2 (two) times daily with meals., Disp: 60 capsule, Rfl: 2    metFORMIN (GLUCOPHAGE) 500 MG tablet, Take 1 tablet (500 mg total) by mouth 2 (two) times daily with meals., Disp: 180 tablet, Rfl: 1    olmesartan (BENICAR) 40 MG tablet, Take 40 mg by mouth once " daily., Disp: , Rfl:     rosuvastatin (CRESTOR) 20 MG tablet, TAKE 1 TABLET EVERY DAY, Disp: 90 tablet, Rfl: 3    spironolactone (ALDACTONE) 50 MG tablet, Take 50 mg by mouth 2 (two) times daily., Disp: , Rfl:     Review of Systems       Objective:      There were no vitals filed for this visit.  Physical Exam      Assessment:       No diagnosis found.     Plan:       There are no diagnoses linked to this encounter.  No follow-ups on file.        10/6/2020 Teri Adams M.D.       Assessment and plan:  ***                        This service was not originating from a related E/M service provided within the previous 7 days nor will  to an E/M service or procedure within the next 24 hours or my soonest available appointment.  Prevailing standard of care was able to be met in this audio-only visit.

## 2020-10-13 ENCOUNTER — OFFICE VISIT (OUTPATIENT)
Dept: CARDIOLOGY | Facility: CLINIC | Age: 75
End: 2020-10-13
Payer: MEDICARE

## 2020-10-13 VITALS
DIASTOLIC BLOOD PRESSURE: 70 MMHG | SYSTOLIC BLOOD PRESSURE: 116 MMHG | HEIGHT: 63 IN | HEART RATE: 58 BPM | OXYGEN SATURATION: 98 % | BODY MASS INDEX: 31.54 KG/M2 | WEIGHT: 178 LBS

## 2020-10-13 DIAGNOSIS — Z79.4 CONTROLLED TYPE 2 DIABETES MELLITUS WITH DIABETIC NEPHROPATHY, WITH LONG-TERM CURRENT USE OF INSULIN: ICD-10-CM

## 2020-10-13 DIAGNOSIS — E11.21 CONTROLLED TYPE 2 DIABETES MELLITUS WITH DIABETIC NEPHROPATHY, WITH LONG-TERM CURRENT USE OF INSULIN: ICD-10-CM

## 2020-10-13 DIAGNOSIS — E78.00 PURE HYPERCHOLESTEROLEMIA: Chronic | ICD-10-CM

## 2020-10-13 DIAGNOSIS — E78.00 HYPERCHOLESTEROLEMIA: ICD-10-CM

## 2020-10-13 DIAGNOSIS — I49.5 SSS (SICK SINUS SYNDROME): Chronic | ICD-10-CM

## 2020-10-13 DIAGNOSIS — I10 ESSENTIAL HYPERTENSION, BENIGN: ICD-10-CM

## 2020-10-13 DIAGNOSIS — D50.0 IRON DEFICIENCY ANEMIA DUE TO CHRONIC BLOOD LOSS: Primary | ICD-10-CM

## 2020-10-13 PROCEDURE — 3078F PR MOST RECENT DIASTOLIC BLOOD PRESSURE < 80 MM HG: ICD-10-PCS | Mod: CPTII,S$GLB,, | Performed by: INTERNAL MEDICINE

## 2020-10-13 PROCEDURE — 3052F HG A1C>EQUAL 8.0%<EQUAL 9.0%: CPT | Mod: CPTII,S$GLB,, | Performed by: INTERNAL MEDICINE

## 2020-10-13 PROCEDURE — 99213 OFFICE O/P EST LOW 20 MIN: CPT | Mod: S$GLB,,, | Performed by: INTERNAL MEDICINE

## 2020-10-13 PROCEDURE — 1159F MED LIST DOCD IN RCRD: CPT | Mod: S$GLB,,, | Performed by: INTERNAL MEDICINE

## 2020-10-13 PROCEDURE — 3052F PR MOST RECENT HEMOGLOBIN A1C LEVEL 8.0 - < 9.0%: ICD-10-PCS | Mod: CPTII,S$GLB,, | Performed by: INTERNAL MEDICINE

## 2020-10-13 PROCEDURE — 3074F SYST BP LT 130 MM HG: CPT | Mod: CPTII,S$GLB,, | Performed by: INTERNAL MEDICINE

## 2020-10-13 PROCEDURE — 1101F PT FALLS ASSESS-DOCD LE1/YR: CPT | Mod: CPTII,S$GLB,, | Performed by: INTERNAL MEDICINE

## 2020-10-13 PROCEDURE — 1126F PR PAIN SEVERITY QUANTIFIED, NO PAIN PRESENT: ICD-10-PCS | Mod: S$GLB,,, | Performed by: INTERNAL MEDICINE

## 2020-10-13 PROCEDURE — 3074F PR MOST RECENT SYSTOLIC BLOOD PRESSURE < 130 MM HG: ICD-10-PCS | Mod: CPTII,S$GLB,, | Performed by: INTERNAL MEDICINE

## 2020-10-13 PROCEDURE — 1126F AMNT PAIN NOTED NONE PRSNT: CPT | Mod: S$GLB,,, | Performed by: INTERNAL MEDICINE

## 2020-10-13 PROCEDURE — 99213 PR OFFICE/OUTPT VISIT, EST, LEVL III, 20-29 MIN: ICD-10-PCS | Mod: S$GLB,,, | Performed by: INTERNAL MEDICINE

## 2020-10-13 PROCEDURE — 1101F PR PT FALLS ASSESS DOC 0-1 FALLS W/OUT INJ PAST YR: ICD-10-PCS | Mod: CPTII,S$GLB,, | Performed by: INTERNAL MEDICINE

## 2020-10-13 PROCEDURE — 1159F PR MEDICATION LIST DOCUMENTED IN MEDICAL RECORD: ICD-10-PCS | Mod: S$GLB,,, | Performed by: INTERNAL MEDICINE

## 2020-10-13 PROCEDURE — 3078F DIAST BP <80 MM HG: CPT | Mod: CPTII,S$GLB,, | Performed by: INTERNAL MEDICINE

## 2020-10-13 NOTE — PROGRESS NOTES
"     Patient ID:  Cynthia N Cushing is a 75 y.o. female who presents for follow-up of Hyperlipidemia and Hypertension      She denies any chest pains.  She has occasional dyspnea on exertion when she goes up the stairs.  She has not been too mobile because of significant right knee pain.      Past Medical History:   Diagnosis Date    Allergy     Breast mass     Cataract     Diabetes mellitus     GERD (gastroesophageal reflux disease)     Hernia, hiatal     Hyperlipidemia     Hypertension     Kidney stone     Osteoarthritis     Renal insufficiency     Seasonal allergies     Sleep apnea     SSS (sick sinus syndrome)     SVT (supraventricular tachycardia)         Past Surgical History:   Procedure Laterality Date    BREAST BIOPSY      BREAST CYST ASPIRATION      BREAST SURGERY      CERVICAL DISC SURGERY      EYE SURGERY      cataracts bilateral    HYSTERECTOMY            Current Outpatient Medications   Medication Instructions    aspirin 81 mg, Oral, Daily    BD ULTRA-FINE ANNA PEN NEEDLE 32 gauge x 5/32" Ndle USE AS DIRECTED DAILY    bisoprolol (ZEBETA) 5 mg, Oral, Daily    blood sugar diagnostic Strp To check BG 4 times daily, to use with accuchek seven plus. E11.65    cetirizine (ZYRTEC) 10 mg, Oral, Daily    famotidine (PEPCID) 20 mg, Oral, 2 times daily    guanFACINE (TENEX) 2 mg, Oral, Daily    insulin (LANTUS SOLOSTAR U-100 INSULIN) glargine 100 units/mL (3mL) SubQ pen Inject 42 units at night    lancets (ACCU-CHEK SOFTCLIX LANCETS) Misc TEST BLOOD SUGAR FOUR TIMES DAILY    lubiprostone (AMITIZA) 24 mcg, Oral, 2 times daily with meals    metFORMIN (GLUCOPHAGE) 500 mg, Oral, 2 times daily with meals    olmesartan (BENICAR) 40 mg, Oral, Daily    rosuvastatin (CRESTOR) 20 MG tablet TAKE 1 TABLET EVERY DAY    spironolactone (ALDACTONE) 50 mg, Oral, 2 times daily        Review of patient's allergies indicates:   Allergen Reactions    Biaxin [clarithromycin]     Prandin " "[repaglinide] Nausea And Vomiting    Amlodipine     Chlorphen-phenyltolox-pe-ppa     Ciprofloxacin Nausea And Vomiting    Omnicef [cefdinir]     Propoxyphene     Quinine Nausea And Vomiting        Review of Systems   Constitution: Negative for chills and fever.   HENT: Negative for congestion and sore throat.    Eyes: Negative for blurred vision and double vision.   Cardiovascular: Positive for dyspnea on exertion. Negative for chest pain and leg swelling.   Respiratory: Positive for shortness of breath. Negative for cough.    Hematologic/Lymphatic: Negative for bleeding problem. Does not bruise/bleed easily.   Skin: Negative for itching and rash.   Musculoskeletal: Positive for joint swelling. Negative for back pain.   Gastrointestinal: Positive for heartburn. Negative for abdominal pain.   Genitourinary: Negative for frequency and hesitancy.   Neurological: Negative.  Negative for dizziness and weakness.   Psychiatric/Behavioral: Negative.  Negative for altered mental status.        Objective:     Vitals:    10/13/20 1312   BP: 116/70   BP Location: Left arm   Patient Position: Sitting   BP Method: Medium (Manual)   Pulse: (!) 58   SpO2: 98%   Weight: 80.7 kg (178 lb)   Height: 5' 3" (1.6 m)       Physical Exam   Constitutional: She is oriented to person, place, and time.   Overweight female in not acute distress   HENT:   Head: Normocephalic and atraumatic.   Eyes: Conjunctivae and EOM are normal.   Cardiovascular: Normal rate and regular rhythm.   Murmur ( grade 2/6 systolic murmur at the base) heard.  Pulmonary/Chest: Effort normal and breath sounds normal.   Abdominal: Soft. Bowel sounds are normal.   Musculoskeletal: Normal range of motion.   Neurological: She is alert and oriented to person, place, and time.   Skin: Skin is warm and dry.   Psychiatric: She has a normal mood and affect. Her behavior is normal. Judgment and thought content normal.   Nursing note and vitals reviewed.    CMP  Sodium   Date " Value Ref Range Status   09/22/2020 140 136 - 145 mmol/L Final     Potassium   Date Value Ref Range Status   09/22/2020 3.9 3.5 - 5.1 mmol/L Final     Chloride   Date Value Ref Range Status   09/22/2020 105 95 - 110 mmol/L Final     CO2   Date Value Ref Range Status   09/22/2020 24 23 - 29 mmol/L Final     Glucose   Date Value Ref Range Status   09/22/2020 193 (H) 70 - 110 mg/dL Final     BUN, Bld   Date Value Ref Range Status   09/22/2020 26 (H) 8 - 23 mg/dL Final     Creatinine   Date Value Ref Range Status   09/22/2020 1.2 0.5 - 1.4 mg/dL Final     Calcium   Date Value Ref Range Status   09/22/2020 10.1 8.7 - 10.5 mg/dL Final     Total Protein   Date Value Ref Range Status   11/06/2019 7.0 6.0 - 8.4 g/dL Final     Albumin   Date Value Ref Range Status   11/06/2019 4.3 3.5 - 5.2 g/dL Final     Total Bilirubin   Date Value Ref Range Status   11/06/2019 0.6 0.1 - 1.0 mg/dL Final     Comment:     For infants and newborns, interpretation of results should be based  on gestational age, weight and in agreement with clinical  observations.  Premature Infant recommended reference ranges:  Up to 24 hours.............<8.0 mg/dL  Up to 48 hours............<12.0 mg/dL  3-5 days..................<15.0 mg/dL  6-29 days.................<15.0 mg/dL       Alkaline Phosphatase   Date Value Ref Range Status   11/06/2019 49 (L) 55 - 135 U/L Final     AST   Date Value Ref Range Status   11/06/2019 30 10 - 40 U/L Final     ALT   Date Value Ref Range Status   11/06/2019 39 10 - 44 U/L Final     Anion Gap   Date Value Ref Range Status   09/22/2020 11 8 - 16 mmol/L Final     eGFR if    Date Value Ref Range Status   09/22/2020 51.1 (A) >60 mL/min/1.73 m^2 Final     eGFR if non    Date Value Ref Range Status   09/22/2020 44.3 (A) >60 mL/min/1.73 m^2 Final     Comment:     Calculation used to obtain the estimated glomerular filtration  rate (eGFR) is the CKD-EPI equation.         BMP  Lab Results   Component  Value Date     09/22/2020    K 3.9 09/22/2020     09/22/2020    CO2 24 09/22/2020    BUN 26 (H) 09/22/2020    CREATININE 1.2 09/22/2020    CALCIUM 10.1 09/22/2020    ANIONGAP 11 09/22/2020    ESTGFRAFRICA 51.1 (A) 09/22/2020    EGFRNONAA 44.3 (A) 09/22/2020      BNP  @LABRCNTIP(BNP,BNPTRIAGEBLO)@   Lab Results   Component Value Date    CHOL 116 (L) 11/06/2019    CHOL 128 09/13/2019     Lab Results   Component Value Date    HDL 35 (L) 11/06/2019    HDL 30 (L) 09/13/2019     Lab Results   Component Value Date    LDLCALC 54.6 (L) 11/06/2019    LDLCALC 61.2 (L) 09/13/2019     Lab Results   Component Value Date    TRIG 132 11/06/2019    TRIG 184 (H) 09/13/2019     Lab Results   Component Value Date    CHOLHDL 30.2 11/06/2019    CHOLHDL 23.4 09/13/2019      No results found for: TSH, Q7ZRDBF, X3WLJVE, THYROIDAB, FREET4  Lab Results   Component Value Date    HGBA1C 8.0 (H) 09/22/2020     Lab Results   Component Value Date    WBC 9.04 06/15/2020    HGB 10.8 (L) 06/15/2020    HCT 33.9 (L) 06/15/2020    MCV 83 06/15/2020     06/15/2020         No results found for this or any previous visit.   No results found for this or any previous visit.       Assessment:       Essential hypertension, benign  Controlled on current medical therapy.    Hyperlipidemia  Her cholesterol is well controlled on current therapy.    SSS (sick sinus syndrome)  She has some degree of sick sinus syndrome although she is asymptomatic therefore continue the current therapy.       Plan:       Continue the current medical therapy.  Return to the office in 6 months with a lipid CMP CBC if her primary care physician Dr. Adams does not order sent

## 2020-10-14 NOTE — ASSESSMENT & PLAN NOTE
She has some degree of sick sinus syndrome although she is asymptomatic therefore continue the current therapy.

## 2020-11-17 DIAGNOSIS — K59.01 SLOW TRANSIT CONSTIPATION: Primary | ICD-10-CM

## 2020-11-17 NOTE — TELEPHONE ENCOUNTER
Amitza has been denied for this patient. Insurance will cover Linzess as an alternative. I pended Linzess in case you wanted to switch to that.

## 2021-01-11 ENCOUNTER — OFFICE VISIT (OUTPATIENT)
Dept: FAMILY MEDICINE | Facility: CLINIC | Age: 76
End: 2021-01-11
Payer: MEDICARE

## 2021-01-11 VITALS
HEIGHT: 63 IN | SYSTOLIC BLOOD PRESSURE: 136 MMHG | WEIGHT: 173 LBS | TEMPERATURE: 97 F | OXYGEN SATURATION: 99 % | RESPIRATION RATE: 16 BRPM | HEART RATE: 64 BPM | BODY MASS INDEX: 30.65 KG/M2 | DIASTOLIC BLOOD PRESSURE: 82 MMHG

## 2021-01-11 DIAGNOSIS — Z78.0 ENCOUNTER FOR OSTEOPOROSIS SCREENING IN ASYMPTOMATIC POSTMENOPAUSAL PATIENT: Primary | ICD-10-CM

## 2021-01-11 DIAGNOSIS — R05.9 COUGH: ICD-10-CM

## 2021-01-11 DIAGNOSIS — R50.9 FEVER: ICD-10-CM

## 2021-01-11 DIAGNOSIS — Z13.820 ENCOUNTER FOR OSTEOPOROSIS SCREENING IN ASYMPTOMATIC POSTMENOPAUSAL PATIENT: Primary | ICD-10-CM

## 2021-01-11 DIAGNOSIS — R10.9 FLANK PAIN: ICD-10-CM

## 2021-01-11 DIAGNOSIS — M79.10 MUSCLE PAIN: ICD-10-CM

## 2021-01-11 DIAGNOSIS — Z78.0 MENOPAUSE: ICD-10-CM

## 2021-01-11 LAB
BILIRUB UR QL STRIP: NEGATIVE
GLUCOSE UR QL STRIP: NEGATIVE
KETONES UR QL STRIP: NEGATIVE
LEUKOCYTE ESTERASE UR QL STRIP: NEGATIVE
PH, POC UA: 5.5 (ref 5–8.5)
POC BLOOD, URINE: NEGATIVE
POC NITRATES, URINE: NEGATIVE
PROT UR QL STRIP: POSITIVE
SP GR UR STRIP: 1.02 (ref 1–1.03)
UROBILINOGEN UR STRIP-ACNC: NORMAL (ref 0.2–8)

## 2021-01-11 PROCEDURE — 3288F PR FALLS RISK ASSESSMENT DOCUMENTED: ICD-10-PCS | Mod: S$GLB,,, | Performed by: INTERNAL MEDICINE

## 2021-01-11 PROCEDURE — 3079F PR MOST RECENT DIASTOLIC BLOOD PRESSURE 80-89 MM HG: ICD-10-PCS | Mod: S$GLB,,, | Performed by: INTERNAL MEDICINE

## 2021-01-11 PROCEDURE — 1159F PR MEDICATION LIST DOCUMENTED IN MEDICAL RECORD: ICD-10-PCS | Mod: S$GLB,,, | Performed by: INTERNAL MEDICINE

## 2021-01-11 PROCEDURE — 3079F DIAST BP 80-89 MM HG: CPT | Mod: S$GLB,,, | Performed by: INTERNAL MEDICINE

## 2021-01-11 PROCEDURE — 1101F PR PT FALLS ASSESS DOC 0-1 FALLS W/OUT INJ PAST YR: ICD-10-PCS | Mod: S$GLB,,, | Performed by: INTERNAL MEDICINE

## 2021-01-11 PROCEDURE — 1126F AMNT PAIN NOTED NONE PRSNT: CPT | Mod: S$GLB,,, | Performed by: INTERNAL MEDICINE

## 2021-01-11 PROCEDURE — 1126F PR PAIN SEVERITY QUANTIFIED, NO PAIN PRESENT: ICD-10-PCS | Mod: S$GLB,,, | Performed by: INTERNAL MEDICINE

## 2021-01-11 PROCEDURE — 81003 URINALYSIS AUTO W/O SCOPE: CPT | Mod: QW,S$GLB,, | Performed by: INTERNAL MEDICINE

## 2021-01-11 PROCEDURE — 3075F PR MOST RECENT SYSTOLIC BLOOD PRESS GE 130-139MM HG: ICD-10-PCS | Mod: S$GLB,,, | Performed by: INTERNAL MEDICINE

## 2021-01-11 PROCEDURE — 1159F MED LIST DOCD IN RCRD: CPT | Mod: S$GLB,,, | Performed by: INTERNAL MEDICINE

## 2021-01-11 PROCEDURE — 3288F FALL RISK ASSESSMENT DOCD: CPT | Mod: S$GLB,,, | Performed by: INTERNAL MEDICINE

## 2021-01-11 PROCEDURE — 1101F PT FALLS ASSESS-DOCD LE1/YR: CPT | Mod: S$GLB,,, | Performed by: INTERNAL MEDICINE

## 2021-01-11 PROCEDURE — 3075F SYST BP GE 130 - 139MM HG: CPT | Mod: S$GLB,,, | Performed by: INTERNAL MEDICINE

## 2021-01-11 PROCEDURE — 99214 PR OFFICE/OUTPT VISIT, EST, LEVL IV, 30-39 MIN: ICD-10-PCS | Mod: 25,S$GLB,, | Performed by: INTERNAL MEDICINE

## 2021-01-11 PROCEDURE — 99214 OFFICE O/P EST MOD 30 MIN: CPT | Mod: 25,S$GLB,, | Performed by: INTERNAL MEDICINE

## 2021-01-11 PROCEDURE — 81003 POCT URINALYSIS, DIPSTICK, AUTOMATED, W/O SCOPE: ICD-10-PCS | Mod: QW,S$GLB,, | Performed by: INTERNAL MEDICINE

## 2021-01-11 RX ORDER — DOXYCYCLINE HYCLATE 100 MG
100 TABLET ORAL 2 TIMES DAILY
Qty: 20 TABLET | Refills: 0 | Status: SHIPPED | OUTPATIENT
Start: 2021-01-11 | End: 2021-01-21

## 2021-01-11 RX ORDER — ONDANSETRON HYDROCHLORIDE 8 MG/1
8 TABLET, FILM COATED ORAL EVERY 8 HOURS PRN
Qty: 15 TABLET | Refills: 0 | Status: SHIPPED | OUTPATIENT
Start: 2021-01-11 | End: 2021-04-21

## 2021-03-30 ENCOUNTER — IMMUNIZATION (OUTPATIENT)
Dept: PRIMARY CARE CLINIC | Facility: CLINIC | Age: 76
End: 2021-03-30
Payer: MEDICARE

## 2021-03-30 DIAGNOSIS — Z23 NEED FOR VACCINATION: Primary | ICD-10-CM

## 2021-03-30 PROCEDURE — 91300 COVID-19, MRNA, LNP-S, PF, 30 MCG/0.3 ML DOSE VACCINE: ICD-10-PCS | Mod: S$GLB,,, | Performed by: FAMILY MEDICINE

## 2021-03-30 PROCEDURE — 0001A COVID-19, MRNA, LNP-S, PF, 30 MCG/0.3 ML DOSE VACCINE: CPT | Mod: CV19,S$GLB,, | Performed by: FAMILY MEDICINE

## 2021-03-30 PROCEDURE — 0001A COVID-19, MRNA, LNP-S, PF, 30 MCG/0.3 ML DOSE VACCINE: ICD-10-PCS | Mod: CV19,S$GLB,, | Performed by: FAMILY MEDICINE

## 2021-03-30 PROCEDURE — 91300 COVID-19, MRNA, LNP-S, PF, 30 MCG/0.3 ML DOSE VACCINE: CPT | Mod: S$GLB,,, | Performed by: FAMILY MEDICINE

## 2021-04-19 ENCOUNTER — LAB VISIT (OUTPATIENT)
Dept: LAB | Facility: HOSPITAL | Age: 76
End: 2021-04-19
Attending: INTERNAL MEDICINE
Payer: MEDICARE

## 2021-04-19 DIAGNOSIS — Z79.4 CONTROLLED TYPE 2 DIABETES MELLITUS WITH DIABETIC NEPHROPATHY, WITH LONG-TERM CURRENT USE OF INSULIN: ICD-10-CM

## 2021-04-19 DIAGNOSIS — I10 ESSENTIAL HYPERTENSION, BENIGN: ICD-10-CM

## 2021-04-19 DIAGNOSIS — E11.21 CONTROLLED TYPE 2 DIABETES MELLITUS WITH DIABETIC NEPHROPATHY, WITH LONG-TERM CURRENT USE OF INSULIN: ICD-10-CM

## 2021-04-19 DIAGNOSIS — E78.00 HYPERCHOLESTEROLEMIA: ICD-10-CM

## 2021-04-19 DIAGNOSIS — D50.0 IRON DEFICIENCY ANEMIA DUE TO CHRONIC BLOOD LOSS: ICD-10-CM

## 2021-04-19 LAB
ALBUMIN SERPL BCP-MCNC: 3.8 G/DL (ref 3.5–5.2)
ALBUMIN/CREAT UR: 983.5 UG/MG (ref 0–30)
ALP SERPL-CCNC: 55 U/L (ref 55–135)
ALT SERPL W/O P-5'-P-CCNC: 36 U/L (ref 10–44)
ANION GAP SERPL CALC-SCNC: 12 MMOL/L (ref 8–16)
AST SERPL-CCNC: 29 U/L (ref 10–40)
BILIRUB SERPL-MCNC: 0.7 MG/DL (ref 0.1–1)
BUN SERPL-MCNC: 24 MG/DL (ref 8–23)
CALCIUM SERPL-MCNC: 11 MG/DL (ref 8.7–10.5)
CHLORIDE SERPL-SCNC: 104 MMOL/L (ref 95–110)
CHOLEST SERPL-MCNC: 152 MG/DL (ref 120–199)
CHOLEST/HDLC SERPL: 4.2 {RATIO} (ref 2–5)
CO2 SERPL-SCNC: 25 MMOL/L (ref 23–29)
CREAT SERPL-MCNC: 1.3 MG/DL (ref 0.5–1.4)
CREAT UR-MCNC: 255 MG/DL (ref 15–325)
ERYTHROCYTE [DISTWIDTH] IN BLOOD BY AUTOMATED COUNT: 14.5 % (ref 11.5–14.5)
EST. GFR  (AFRICAN AMERICAN): 46 ML/MIN/1.73 M^2
EST. GFR  (NON AFRICAN AMERICAN): 39.9 ML/MIN/1.73 M^2
ESTIMATED AVG GLUCOSE: 209 MG/DL (ref 68–131)
GLUCOSE SERPL-MCNC: 133 MG/DL (ref 70–110)
HBA1C MFR BLD: 8.9 % (ref 4.5–6.2)
HCT VFR BLD AUTO: 34.2 % (ref 37–48.5)
HDLC SERPL-MCNC: 36 MG/DL (ref 40–75)
HDLC SERPL: 23.7 % (ref 20–50)
HGB BLD-MCNC: 10.8 G/DL (ref 12–16)
LDLC SERPL CALC-MCNC: 41.6 MG/DL (ref 63–159)
MCH RBC QN AUTO: 26.6 PG (ref 27–31)
MCHC RBC AUTO-ENTMCNC: 31.6 G/DL (ref 32–36)
MCV RBC AUTO: 84 FL (ref 82–98)
MICROALBUMIN UR DL<=1MG/L-MCNC: 2508 UG/ML
NONHDLC SERPL-MCNC: 116 MG/DL
PLATELET # BLD AUTO: 231 K/UL (ref 150–450)
PMV BLD AUTO: 12.5 FL (ref 9.2–12.9)
POTASSIUM SERPL-SCNC: 3.7 MMOL/L (ref 3.5–5.1)
PROT SERPL-MCNC: 6.8 G/DL (ref 6–8.4)
RBC # BLD AUTO: 4.06 M/UL (ref 4–5.4)
SODIUM SERPL-SCNC: 141 MMOL/L (ref 136–145)
TRIGL SERPL-MCNC: 372 MG/DL (ref 30–150)
TSH SERPL DL<=0.005 MIU/L-ACNC: 3.45 UIU/ML (ref 0.34–5.6)
WBC # BLD AUTO: 10.34 K/UL (ref 3.9–12.7)

## 2021-04-19 PROCEDURE — 85027 COMPLETE CBC AUTOMATED: CPT | Performed by: INTERNAL MEDICINE

## 2021-04-19 PROCEDURE — 80053 COMPREHEN METABOLIC PANEL: CPT | Performed by: INTERNAL MEDICINE

## 2021-04-19 PROCEDURE — 84443 ASSAY THYROID STIM HORMONE: CPT | Performed by: INTERNAL MEDICINE

## 2021-04-19 PROCEDURE — 82043 UR ALBUMIN QUANTITATIVE: CPT | Performed by: INTERNAL MEDICINE

## 2021-04-19 PROCEDURE — 80061 LIPID PANEL: CPT | Performed by: INTERNAL MEDICINE

## 2021-04-19 PROCEDURE — 83036 HEMOGLOBIN GLYCOSYLATED A1C: CPT | Performed by: INTERNAL MEDICINE

## 2021-04-19 PROCEDURE — 82570 ASSAY OF URINE CREATININE: CPT | Performed by: INTERNAL MEDICINE

## 2021-04-19 PROCEDURE — 36415 COLL VENOUS BLD VENIPUNCTURE: CPT | Performed by: INTERNAL MEDICINE

## 2021-04-20 ENCOUNTER — IMMUNIZATION (OUTPATIENT)
Dept: PRIMARY CARE CLINIC | Facility: CLINIC | Age: 76
End: 2021-04-20
Payer: MEDICARE

## 2021-04-20 DIAGNOSIS — Z23 NEED FOR VACCINATION: Primary | ICD-10-CM

## 2021-04-20 PROCEDURE — 91300 COVID-19, MRNA, LNP-S, PF, 30 MCG/0.3 ML DOSE VACCINE: CPT | Mod: S$GLB,,, | Performed by: NURSE PRACTITIONER

## 2021-04-20 PROCEDURE — 0002A COVID-19, MRNA, LNP-S, PF, 30 MCG/0.3 ML DOSE VACCINE: ICD-10-PCS | Mod: CV19,S$GLB,, | Performed by: NURSE PRACTITIONER

## 2021-04-20 PROCEDURE — 91300 COVID-19, MRNA, LNP-S, PF, 30 MCG/0.3 ML DOSE VACCINE: ICD-10-PCS | Mod: S$GLB,,, | Performed by: NURSE PRACTITIONER

## 2021-04-20 PROCEDURE — 0002A COVID-19, MRNA, LNP-S, PF, 30 MCG/0.3 ML DOSE VACCINE: CPT | Mod: CV19,S$GLB,, | Performed by: NURSE PRACTITIONER

## 2021-04-21 ENCOUNTER — CLINICAL SUPPORT (OUTPATIENT)
Dept: FAMILY MEDICINE | Facility: CLINIC | Age: 76
End: 2021-04-21
Attending: INTERNAL MEDICINE
Payer: MEDICARE

## 2021-04-21 ENCOUNTER — TELEPHONE (OUTPATIENT)
Dept: FAMILY MEDICINE | Facility: CLINIC | Age: 76
End: 2021-04-21

## 2021-04-21 ENCOUNTER — OFFICE VISIT (OUTPATIENT)
Dept: FAMILY MEDICINE | Facility: CLINIC | Age: 76
End: 2021-04-21
Payer: MEDICARE

## 2021-04-21 VITALS
WEIGHT: 179 LBS | RESPIRATION RATE: 16 BRPM | HEART RATE: 48 BPM | DIASTOLIC BLOOD PRESSURE: 72 MMHG | SYSTOLIC BLOOD PRESSURE: 188 MMHG | TEMPERATURE: 98 F | HEIGHT: 63 IN | BODY MASS INDEX: 31.71 KG/M2 | OXYGEN SATURATION: 98 %

## 2021-04-21 DIAGNOSIS — E78.1 PURE HYPERTRIGLYCERIDEMIA: ICD-10-CM

## 2021-04-21 DIAGNOSIS — I10 UNCONTROLLED HYPERTENSION: ICD-10-CM

## 2021-04-21 DIAGNOSIS — E11.65 UNCONTROLLED TYPE 2 DIABETES MELLITUS WITH HYPERGLYCEMIA: ICD-10-CM

## 2021-04-21 DIAGNOSIS — E83.52 HYPERCALCEMIA: ICD-10-CM

## 2021-04-21 PROCEDURE — 3078F PR MOST RECENT DIASTOLIC BLOOD PRESSURE < 80 MM HG: ICD-10-PCS | Mod: S$GLB,,, | Performed by: INTERNAL MEDICINE

## 2021-04-21 PROCEDURE — 1101F PT FALLS ASSESS-DOCD LE1/YR: CPT | Mod: S$GLB,,, | Performed by: INTERNAL MEDICINE

## 2021-04-21 PROCEDURE — 1101F PR PT FALLS ASSESS DOC 0-1 FALLS W/OUT INJ PAST YR: ICD-10-PCS | Mod: S$GLB,,, | Performed by: INTERNAL MEDICINE

## 2021-04-21 PROCEDURE — 1159F MED LIST DOCD IN RCRD: CPT | Mod: S$GLB,,, | Performed by: INTERNAL MEDICINE

## 2021-04-21 PROCEDURE — 3077F SYST BP >= 140 MM HG: CPT | Mod: S$GLB,,, | Performed by: INTERNAL MEDICINE

## 2021-04-21 PROCEDURE — 3288F FALL RISK ASSESSMENT DOCD: CPT | Mod: S$GLB,,, | Performed by: INTERNAL MEDICINE

## 2021-04-21 PROCEDURE — 1126F PR PAIN SEVERITY QUANTIFIED, NO PAIN PRESENT: ICD-10-PCS | Mod: S$GLB,,, | Performed by: INTERNAL MEDICINE

## 2021-04-21 PROCEDURE — 99214 OFFICE O/P EST MOD 30 MIN: CPT | Mod: 25,S$GLB,, | Performed by: INTERNAL MEDICINE

## 2021-04-21 PROCEDURE — 92228 IMG RTA DETC/MNTR DS PHY/QHP: CPT | Mod: 26,,, | Performed by: OPHTHALMOLOGY

## 2021-04-21 PROCEDURE — 92228 DIABETIC EYE SCREENING PHOTO: ICD-10-PCS | Mod: 26,,, | Performed by: OPHTHALMOLOGY

## 2021-04-21 PROCEDURE — 3077F PR MOST RECENT SYSTOLIC BLOOD PRESSURE >= 140 MM HG: ICD-10-PCS | Mod: S$GLB,,, | Performed by: INTERNAL MEDICINE

## 2021-04-21 PROCEDURE — 99214 PR OFFICE/OUTPT VISIT, EST, LEVL IV, 30-39 MIN: ICD-10-PCS | Mod: 25,S$GLB,, | Performed by: INTERNAL MEDICINE

## 2021-04-21 PROCEDURE — 1159F PR MEDICATION LIST DOCUMENTED IN MEDICAL RECORD: ICD-10-PCS | Mod: S$GLB,,, | Performed by: INTERNAL MEDICINE

## 2021-04-21 PROCEDURE — 92228 IMG RTA DETC/MNTR DS PHY/QHP: CPT | Mod: TC,59,S$GLB, | Performed by: INTERNAL MEDICINE

## 2021-04-21 PROCEDURE — 3052F HG A1C>EQUAL 8.0%<EQUAL 9.0%: CPT | Mod: S$GLB,,, | Performed by: INTERNAL MEDICINE

## 2021-04-21 PROCEDURE — 3052F PR MOST RECENT HEMOGLOBIN A1C LEVEL 8.0 - < 9.0%: ICD-10-PCS | Mod: S$GLB,,, | Performed by: INTERNAL MEDICINE

## 2021-04-21 PROCEDURE — 1126F AMNT PAIN NOTED NONE PRSNT: CPT | Mod: S$GLB,,, | Performed by: INTERNAL MEDICINE

## 2021-04-21 PROCEDURE — 92228 PR REMOTE IMAGE RETINA, MONITOR/MANAGE ACTIVE DISEASE: ICD-10-PCS | Mod: TC,59,S$GLB, | Performed by: INTERNAL MEDICINE

## 2021-04-21 PROCEDURE — 3288F PR FALLS RISK ASSESSMENT DOCUMENTED: ICD-10-PCS | Mod: S$GLB,,, | Performed by: INTERNAL MEDICINE

## 2021-04-21 PROCEDURE — 3078F DIAST BP <80 MM HG: CPT | Mod: S$GLB,,, | Performed by: INTERNAL MEDICINE

## 2021-04-21 RX ORDER — AMLODIPINE BESYLATE 10 MG/1
10 TABLET ORAL DAILY
Qty: 30 TABLET | Refills: 11 | Status: ON HOLD | OUTPATIENT
Start: 2021-04-21 | End: 2024-03-12 | Stop reason: HOSPADM

## 2021-04-21 RX ORDER — CLOTRIMAZOLE 1 %
1 CREAM (GRAM) TOPICAL 2 TIMES DAILY
Qty: 60 G | Refills: 1 | Status: SHIPPED | OUTPATIENT
Start: 2021-04-21 | End: 2022-05-13

## 2021-04-21 RX ORDER — CLOTRIMAZOLE 1 %
1 CREAM (GRAM) TOPICAL 2 TIMES DAILY
COMMUNITY
End: 2021-04-21 | Stop reason: SDUPTHER

## 2021-04-21 RX ORDER — ICOSAPENT ETHYL 1000 MG/1
2 CAPSULE ORAL 2 TIMES DAILY
Qty: 360 CAPSULE | Refills: 1 | Status: SHIPPED | OUTPATIENT
Start: 2021-04-21 | End: 2023-10-10

## 2021-04-21 RX ORDER — OLMESARTAN MEDOXOMIL 40 MG/1
40 TABLET ORAL DAILY
COMMUNITY
End: 2022-03-14

## 2021-04-28 ENCOUNTER — OFFICE VISIT (OUTPATIENT)
Dept: FAMILY MEDICINE | Facility: CLINIC | Age: 76
End: 2021-04-28
Payer: MEDICARE

## 2021-04-28 VITALS
HEART RATE: 56 BPM | RESPIRATION RATE: 16 BRPM | DIASTOLIC BLOOD PRESSURE: 66 MMHG | WEIGHT: 179 LBS | OXYGEN SATURATION: 99 % | TEMPERATURE: 98 F | HEIGHT: 63 IN | BODY MASS INDEX: 31.71 KG/M2 | SYSTOLIC BLOOD PRESSURE: 162 MMHG

## 2021-04-28 DIAGNOSIS — E83.52 HYPERCALCEMIA: ICD-10-CM

## 2021-04-28 DIAGNOSIS — I10 POORLY-CONTROLLED HYPERTENSION: ICD-10-CM

## 2021-04-28 PROBLEM — Z79.4 CONTROLLED TYPE 2 DIABETES MELLITUS WITH DIABETIC NEPHROPATHY, WITH LONG-TERM CURRENT USE OF INSULIN: Status: RESOLVED | Noted: 2020-02-15 | Resolved: 2021-04-28

## 2021-04-28 PROBLEM — E11.21 CONTROLLED TYPE 2 DIABETES MELLITUS WITH DIABETIC NEPHROPATHY, WITH LONG-TERM CURRENT USE OF INSULIN: Status: RESOLVED | Noted: 2020-02-15 | Resolved: 2021-04-28

## 2021-04-28 PROCEDURE — 3052F HG A1C>EQUAL 8.0%<EQUAL 9.0%: CPT | Mod: S$GLB,,, | Performed by: INTERNAL MEDICINE

## 2021-04-28 PROCEDURE — 1159F MED LIST DOCD IN RCRD: CPT | Mod: S$GLB,,, | Performed by: INTERNAL MEDICINE

## 2021-04-28 PROCEDURE — 3078F DIAST BP <80 MM HG: CPT | Mod: S$GLB,,, | Performed by: INTERNAL MEDICINE

## 2021-04-28 PROCEDURE — 3052F PR MOST RECENT HEMOGLOBIN A1C LEVEL 8.0 - < 9.0%: ICD-10-PCS | Mod: S$GLB,,, | Performed by: INTERNAL MEDICINE

## 2021-04-28 PROCEDURE — 1126F AMNT PAIN NOTED NONE PRSNT: CPT | Mod: S$GLB,,, | Performed by: INTERNAL MEDICINE

## 2021-04-28 PROCEDURE — 1159F PR MEDICATION LIST DOCUMENTED IN MEDICAL RECORD: ICD-10-PCS | Mod: S$GLB,,, | Performed by: INTERNAL MEDICINE

## 2021-04-28 PROCEDURE — 99214 OFFICE O/P EST MOD 30 MIN: CPT | Mod: S$GLB,,, | Performed by: INTERNAL MEDICINE

## 2021-04-28 PROCEDURE — 3077F PR MOST RECENT SYSTOLIC BLOOD PRESSURE >= 140 MM HG: ICD-10-PCS | Mod: S$GLB,,, | Performed by: INTERNAL MEDICINE

## 2021-04-28 PROCEDURE — 3077F SYST BP >= 140 MM HG: CPT | Mod: S$GLB,,, | Performed by: INTERNAL MEDICINE

## 2021-04-28 PROCEDURE — 1126F PR PAIN SEVERITY QUANTIFIED, NO PAIN PRESENT: ICD-10-PCS | Mod: S$GLB,,, | Performed by: INTERNAL MEDICINE

## 2021-04-28 PROCEDURE — 99214 PR OFFICE/OUTPT VISIT, EST, LEVL IV, 30-39 MIN: ICD-10-PCS | Mod: S$GLB,,, | Performed by: INTERNAL MEDICINE

## 2021-04-28 PROCEDURE — 3078F PR MOST RECENT DIASTOLIC BLOOD PRESSURE < 80 MM HG: ICD-10-PCS | Mod: S$GLB,,, | Performed by: INTERNAL MEDICINE

## 2021-04-28 RX ORDER — GUANFACINE 2 MG/1
2 TABLET ORAL NIGHTLY
Qty: 30 TABLET | Refills: 2 | Status: SHIPPED | OUTPATIENT
Start: 2021-04-28 | End: 2021-06-27

## 2021-05-04 ENCOUNTER — TELEPHONE (OUTPATIENT)
Dept: OPTOMETRY | Facility: CLINIC | Age: 76
End: 2021-05-04

## 2021-05-13 ENCOUNTER — OFFICE VISIT (OUTPATIENT)
Dept: FAMILY MEDICINE | Facility: CLINIC | Age: 76
End: 2021-05-13
Payer: MEDICARE

## 2021-05-13 VITALS
WEIGHT: 179 LBS | TEMPERATURE: 98 F | RESPIRATION RATE: 16 BRPM | OXYGEN SATURATION: 99 % | DIASTOLIC BLOOD PRESSURE: 72 MMHG | SYSTOLIC BLOOD PRESSURE: 180 MMHG | HEART RATE: 56 BPM | HEIGHT: 63 IN | BODY MASS INDEX: 31.71 KG/M2

## 2021-05-13 DIAGNOSIS — E11.65 UNCONTROLLED TYPE 2 DIABETES MELLITUS WITH HYPERGLYCEMIA: ICD-10-CM

## 2021-05-13 DIAGNOSIS — E83.52 HYPERCALCEMIA: Primary | ICD-10-CM

## 2021-05-13 DIAGNOSIS — I10 UNCONTROLLED HYPERTENSION: ICD-10-CM

## 2021-05-13 PROCEDURE — 3052F PR MOST RECENT HEMOGLOBIN A1C LEVEL 8.0 - < 9.0%: ICD-10-PCS | Mod: S$GLB,,, | Performed by: INTERNAL MEDICINE

## 2021-05-13 PROCEDURE — 1126F AMNT PAIN NOTED NONE PRSNT: CPT | Mod: S$GLB,,, | Performed by: INTERNAL MEDICINE

## 2021-05-13 PROCEDURE — 1159F MED LIST DOCD IN RCRD: CPT | Mod: S$GLB,,, | Performed by: INTERNAL MEDICINE

## 2021-05-13 PROCEDURE — 99214 PR OFFICE/OUTPT VISIT, EST, LEVL IV, 30-39 MIN: ICD-10-PCS | Mod: S$GLB,,, | Performed by: INTERNAL MEDICINE

## 2021-05-13 PROCEDURE — 99214 OFFICE O/P EST MOD 30 MIN: CPT | Mod: S$GLB,,, | Performed by: INTERNAL MEDICINE

## 2021-05-13 PROCEDURE — 1126F PR PAIN SEVERITY QUANTIFIED, NO PAIN PRESENT: ICD-10-PCS | Mod: S$GLB,,, | Performed by: INTERNAL MEDICINE

## 2021-05-13 PROCEDURE — 1159F PR MEDICATION LIST DOCUMENTED IN MEDICAL RECORD: ICD-10-PCS | Mod: S$GLB,,, | Performed by: INTERNAL MEDICINE

## 2021-05-13 PROCEDURE — 3052F HG A1C>EQUAL 8.0%<EQUAL 9.0%: CPT | Mod: S$GLB,,, | Performed by: INTERNAL MEDICINE

## 2021-05-13 RX ORDER — METFORMIN HYDROCHLORIDE 500 MG/1
TABLET ORAL
Qty: 270 TABLET | Refills: 1 | Status: SHIPPED | OUTPATIENT
Start: 2021-05-13 | End: 2021-07-08

## 2021-06-15 ENCOUNTER — TELEPHONE (OUTPATIENT)
Dept: CARDIOLOGY | Facility: CLINIC | Age: 76
End: 2021-06-15

## 2021-06-21 RX ORDER — PEN NEEDLE, DIABETIC 30 GX3/16"
1 NEEDLE, DISPOSABLE MISCELLANEOUS NIGHTLY
Qty: 100 EACH | Refills: 5 | Status: SHIPPED | OUTPATIENT
Start: 2021-06-21 | End: 2022-08-17

## 2021-07-12 ENCOUNTER — LAB VISIT (OUTPATIENT)
Dept: LAB | Facility: HOSPITAL | Age: 76
End: 2021-07-12
Attending: INTERNAL MEDICINE
Payer: MEDICARE

## 2021-07-12 DIAGNOSIS — E83.52 HYPERCALCEMIA: ICD-10-CM

## 2021-07-12 LAB
CALCIUM SERPL-MCNC: 10.1 MG/DL (ref 8.7–10.5)
PHOSPHATE SERPL-MCNC: 3.7 MG/DL (ref 2.7–4.5)
PTH-INTACT SERPL-MCNC: 24.4 PG/ML (ref 9–77)

## 2021-07-12 PROCEDURE — 83970 ASSAY OF PARATHORMONE: CPT | Performed by: INTERNAL MEDICINE

## 2021-07-12 PROCEDURE — 84100 ASSAY OF PHOSPHORUS: CPT | Performed by: INTERNAL MEDICINE

## 2021-07-12 PROCEDURE — 82310 ASSAY OF CALCIUM: CPT | Performed by: INTERNAL MEDICINE

## 2021-07-12 PROCEDURE — 36415 COLL VENOUS BLD VENIPUNCTURE: CPT | Performed by: INTERNAL MEDICINE

## 2021-07-13 ENCOUNTER — TELEPHONE (OUTPATIENT)
Dept: CARDIOLOGY | Facility: CLINIC | Age: 76
End: 2021-07-13

## 2021-07-13 ENCOUNTER — OFFICE VISIT (OUTPATIENT)
Dept: CARDIOLOGY | Facility: CLINIC | Age: 76
End: 2021-07-13
Payer: MEDICARE

## 2021-07-13 ENCOUNTER — PATIENT MESSAGE (OUTPATIENT)
Dept: CARDIOLOGY | Facility: CLINIC | Age: 76
End: 2021-07-13

## 2021-07-13 VITALS
SYSTOLIC BLOOD PRESSURE: 126 MMHG | HEART RATE: 56 BPM | BODY MASS INDEX: 31.18 KG/M2 | WEIGHT: 176 LBS | DIASTOLIC BLOOD PRESSURE: 64 MMHG | OXYGEN SATURATION: 98 % | RESPIRATION RATE: 16 BRPM | HEIGHT: 63 IN

## 2021-07-13 DIAGNOSIS — I10 ESSENTIAL HYPERTENSION, BENIGN: ICD-10-CM

## 2021-07-13 DIAGNOSIS — E78.2 MIXED HYPERLIPIDEMIA: Chronic | ICD-10-CM

## 2021-07-13 DIAGNOSIS — I49.5 SSS (SICK SINUS SYNDROME): Chronic | ICD-10-CM

## 2021-07-13 PROCEDURE — 3074F SYST BP LT 130 MM HG: CPT | Mod: CPTII,S$GLB,, | Performed by: INTERNAL MEDICINE

## 2021-07-13 PROCEDURE — 3074F PR MOST RECENT SYSTOLIC BLOOD PRESSURE < 130 MM HG: ICD-10-PCS | Mod: CPTII,S$GLB,, | Performed by: INTERNAL MEDICINE

## 2021-07-13 PROCEDURE — 3078F DIAST BP <80 MM HG: CPT | Mod: CPTII,S$GLB,, | Performed by: INTERNAL MEDICINE

## 2021-07-13 PROCEDURE — 3078F PR MOST RECENT DIASTOLIC BLOOD PRESSURE < 80 MM HG: ICD-10-PCS | Mod: CPTII,S$GLB,, | Performed by: INTERNAL MEDICINE

## 2021-07-13 PROCEDURE — 1126F PR PAIN SEVERITY QUANTIFIED, NO PAIN PRESENT: ICD-10-PCS | Mod: S$GLB,,, | Performed by: INTERNAL MEDICINE

## 2021-07-13 PROCEDURE — 1159F PR MEDICATION LIST DOCUMENTED IN MEDICAL RECORD: ICD-10-PCS | Mod: S$GLB,,, | Performed by: INTERNAL MEDICINE

## 2021-07-13 PROCEDURE — 1159F MED LIST DOCD IN RCRD: CPT | Mod: S$GLB,,, | Performed by: INTERNAL MEDICINE

## 2021-07-13 PROCEDURE — 3288F PR FALLS RISK ASSESSMENT DOCUMENTED: ICD-10-PCS | Mod: CPTII,S$GLB,, | Performed by: INTERNAL MEDICINE

## 2021-07-13 PROCEDURE — 1126F AMNT PAIN NOTED NONE PRSNT: CPT | Mod: S$GLB,,, | Performed by: INTERNAL MEDICINE

## 2021-07-13 PROCEDURE — 1101F PR PT FALLS ASSESS DOC 0-1 FALLS W/OUT INJ PAST YR: ICD-10-PCS | Mod: CPTII,S$GLB,, | Performed by: INTERNAL MEDICINE

## 2021-07-13 PROCEDURE — 99213 OFFICE O/P EST LOW 20 MIN: CPT | Mod: S$GLB,,, | Performed by: INTERNAL MEDICINE

## 2021-07-13 PROCEDURE — 3288F FALL RISK ASSESSMENT DOCD: CPT | Mod: CPTII,S$GLB,, | Performed by: INTERNAL MEDICINE

## 2021-07-13 PROCEDURE — 99213 PR OFFICE/OUTPT VISIT, EST, LEVL III, 20-29 MIN: ICD-10-PCS | Mod: S$GLB,,, | Performed by: INTERNAL MEDICINE

## 2021-07-13 PROCEDURE — 1101F PT FALLS ASSESS-DOCD LE1/YR: CPT | Mod: CPTII,S$GLB,, | Performed by: INTERNAL MEDICINE

## 2021-07-13 RX ORDER — HYDRALAZINE HYDROCHLORIDE 10 MG/1
10 TABLET, FILM COATED ORAL 3 TIMES DAILY
Qty: 90 TABLET | Refills: 11 | Status: SHIPPED | OUTPATIENT
Start: 2021-07-13 | End: 2022-07-18 | Stop reason: SDUPTHER

## 2021-07-29 ENCOUNTER — HOSPITAL ENCOUNTER (OUTPATIENT)
Dept: RADIOLOGY | Facility: HOSPITAL | Age: 76
Discharge: HOME | End: 2021-07-29
Attending: OBSTETRICS & GYNECOLOGY
Payer: MEDICARE

## 2021-07-29 DIAGNOSIS — Z12.31 VISIT FOR SCREENING MAMMOGRAM: ICD-10-CM

## 2021-07-29 PROCEDURE — 77067 SCR MAMMO BI INCL CAD: CPT | Mod: TC,PO

## 2021-08-02 PROBLEM — E78.6 LOW HDL (UNDER 40): Status: ACTIVE | Noted: 2021-08-02

## 2021-08-02 PROBLEM — E11.65 UNCONTROLLED TYPE 2 DIABETES MELLITUS WITH HYPERGLYCEMIA: Status: ACTIVE | Noted: 2021-08-02

## 2021-09-14 ENCOUNTER — OFFICE VISIT (OUTPATIENT)
Dept: CARDIOLOGY | Facility: CLINIC | Age: 76
End: 2021-09-14
Payer: MEDICARE

## 2021-09-14 VITALS
SYSTOLIC BLOOD PRESSURE: 130 MMHG | WEIGHT: 180 LBS | HEIGHT: 63 IN | OXYGEN SATURATION: 97 % | DIASTOLIC BLOOD PRESSURE: 78 MMHG | HEART RATE: 52 BPM | BODY MASS INDEX: 31.89 KG/M2

## 2021-09-14 DIAGNOSIS — I10 ESSENTIAL HYPERTENSION, BENIGN: ICD-10-CM

## 2021-09-14 DIAGNOSIS — I47.10 SVT (SUPRAVENTRICULAR TACHYCARDIA): ICD-10-CM

## 2021-09-14 DIAGNOSIS — E78.2 MIXED HYPERLIPIDEMIA: ICD-10-CM

## 2021-09-14 PROCEDURE — 1101F PR PT FALLS ASSESS DOC 0-1 FALLS W/OUT INJ PAST YR: ICD-10-PCS | Mod: CPTII,S$GLB,, | Performed by: INTERNAL MEDICINE

## 2021-09-14 PROCEDURE — 1160F PR REVIEW ALL MEDS BY PRESCRIBER/CLIN PHARMACIST DOCUMENTED: ICD-10-PCS | Mod: CPTII,S$GLB,, | Performed by: INTERNAL MEDICINE

## 2021-09-14 PROCEDURE — 3075F PR MOST RECENT SYSTOLIC BLOOD PRESS GE 130-139MM HG: ICD-10-PCS | Mod: CPTII,S$GLB,, | Performed by: INTERNAL MEDICINE

## 2021-09-14 PROCEDURE — 3288F FALL RISK ASSESSMENT DOCD: CPT | Mod: CPTII,S$GLB,, | Performed by: INTERNAL MEDICINE

## 2021-09-14 PROCEDURE — 1101F PT FALLS ASSESS-DOCD LE1/YR: CPT | Mod: CPTII,S$GLB,, | Performed by: INTERNAL MEDICINE

## 2021-09-14 PROCEDURE — 99214 PR OFFICE/OUTPT VISIT, EST, LEVL IV, 30-39 MIN: ICD-10-PCS | Mod: S$GLB,,, | Performed by: INTERNAL MEDICINE

## 2021-09-14 PROCEDURE — 3078F PR MOST RECENT DIASTOLIC BLOOD PRESSURE < 80 MM HG: ICD-10-PCS | Mod: CPTII,S$GLB,, | Performed by: INTERNAL MEDICINE

## 2021-09-14 PROCEDURE — 3075F SYST BP GE 130 - 139MM HG: CPT | Mod: CPTII,S$GLB,, | Performed by: INTERNAL MEDICINE

## 2021-09-14 PROCEDURE — 1159F PR MEDICATION LIST DOCUMENTED IN MEDICAL RECORD: ICD-10-PCS | Mod: CPTII,S$GLB,, | Performed by: INTERNAL MEDICINE

## 2021-09-14 PROCEDURE — 3288F PR FALLS RISK ASSESSMENT DOCUMENTED: ICD-10-PCS | Mod: CPTII,S$GLB,, | Performed by: INTERNAL MEDICINE

## 2021-09-14 PROCEDURE — 1159F MED LIST DOCD IN RCRD: CPT | Mod: CPTII,S$GLB,, | Performed by: INTERNAL MEDICINE

## 2021-09-14 PROCEDURE — 99214 OFFICE O/P EST MOD 30 MIN: CPT | Mod: S$GLB,,, | Performed by: INTERNAL MEDICINE

## 2021-09-14 PROCEDURE — 3078F DIAST BP <80 MM HG: CPT | Mod: CPTII,S$GLB,, | Performed by: INTERNAL MEDICINE

## 2021-09-14 PROCEDURE — 1160F RVW MEDS BY RX/DR IN RCRD: CPT | Mod: CPTII,S$GLB,, | Performed by: INTERNAL MEDICINE

## 2021-10-19 ENCOUNTER — TELEPHONE (OUTPATIENT)
Dept: FAMILY MEDICINE | Facility: CLINIC | Age: 76
End: 2021-10-19
Payer: MEDICARE

## 2021-10-20 ENCOUNTER — PATIENT MESSAGE (OUTPATIENT)
Dept: FAMILY MEDICINE | Facility: CLINIC | Age: 76
End: 2021-10-20

## 2021-10-20 ENCOUNTER — OFFICE VISIT (OUTPATIENT)
Dept: FAMILY MEDICINE | Facility: CLINIC | Age: 76
End: 2021-10-20
Payer: MEDICARE

## 2021-10-20 VITALS
WEIGHT: 176 LBS | HEIGHT: 63 IN | BODY MASS INDEX: 31.18 KG/M2 | RESPIRATION RATE: 14 BRPM | TEMPERATURE: 98 F | OXYGEN SATURATION: 98 % | SYSTOLIC BLOOD PRESSURE: 146 MMHG | DIASTOLIC BLOOD PRESSURE: 64 MMHG | HEART RATE: 52 BPM

## 2021-10-20 DIAGNOSIS — E78.1 HIGH BLOOD TRIGLYCERIDES: ICD-10-CM

## 2021-10-20 DIAGNOSIS — E11.65 UNCONTROLLED TYPE 2 DIABETES MELLITUS WITH HYPERGLYCEMIA: Primary | ICD-10-CM

## 2021-10-20 DIAGNOSIS — I10 ESSENTIAL HYPERTENSION, BENIGN: ICD-10-CM

## 2021-10-20 DIAGNOSIS — Z23 NEEDS FLU SHOT: ICD-10-CM

## 2021-10-20 DIAGNOSIS — I10 POORLY-CONTROLLED HYPERTENSION: ICD-10-CM

## 2021-10-20 LAB
ALBUMIN CREATININE RATIO: 300 MG/G
CLARITY UR: CLEAR
COLOR: ABNORMAL
CREATININE, RANDOM URINE: 200
HBA1C MFR BLD: 6.9 %
POC MICROALBUMIN URINE: 150

## 2021-10-20 PROCEDURE — 99214 OFFICE O/P EST MOD 30 MIN: CPT | Mod: 25,S$GLB,, | Performed by: INTERNAL MEDICINE

## 2021-10-20 PROCEDURE — 90662 FLU VACCINE - QUADRIVALENT - HIGH DOSE (65+) PRESERVATIVE FREE IM: ICD-10-PCS | Mod: S$GLB,,, | Performed by: INTERNAL MEDICINE

## 2021-10-20 PROCEDURE — 1126F AMNT PAIN NOTED NONE PRSNT: CPT | Mod: S$GLB,,, | Performed by: INTERNAL MEDICINE

## 2021-10-20 PROCEDURE — 82044 UR ALBUMIN SEMIQUANTITATIVE: CPT | Mod: QW,S$GLB,, | Performed by: INTERNAL MEDICINE

## 2021-10-20 PROCEDURE — 1160F PR REVIEW ALL MEDS BY PRESCRIBER/CLIN PHARMACIST DOCUMENTED: ICD-10-PCS | Mod: S$GLB,,, | Performed by: INTERNAL MEDICINE

## 2021-10-20 PROCEDURE — 3077F PR MOST RECENT SYSTOLIC BLOOD PRESSURE >= 140 MM HG: ICD-10-PCS | Mod: S$GLB,,, | Performed by: INTERNAL MEDICINE

## 2021-10-20 PROCEDURE — 1126F PR PAIN SEVERITY QUANTIFIED, NO PAIN PRESENT: ICD-10-PCS | Mod: S$GLB,,, | Performed by: INTERNAL MEDICINE

## 2021-10-20 PROCEDURE — 1159F PR MEDICATION LIST DOCUMENTED IN MEDICAL RECORD: ICD-10-PCS | Mod: S$GLB,,, | Performed by: INTERNAL MEDICINE

## 2021-10-20 PROCEDURE — 3077F SYST BP >= 140 MM HG: CPT | Mod: S$GLB,,, | Performed by: INTERNAL MEDICINE

## 2021-10-20 PROCEDURE — 1159F MED LIST DOCD IN RCRD: CPT | Mod: S$GLB,,, | Performed by: INTERNAL MEDICINE

## 2021-10-20 PROCEDURE — 99214 PR OFFICE/OUTPT VISIT, EST, LEVL IV, 30-39 MIN: ICD-10-PCS | Mod: 25,S$GLB,, | Performed by: INTERNAL MEDICINE

## 2021-10-20 PROCEDURE — G0008 ADMIN INFLUENZA VIRUS VAC: HCPCS | Mod: S$GLB,,, | Performed by: INTERNAL MEDICINE

## 2021-10-20 PROCEDURE — 90662 IIV NO PRSV INCREASED AG IM: CPT | Mod: S$GLB,,, | Performed by: INTERNAL MEDICINE

## 2021-10-20 PROCEDURE — 83036 HEMOGLOBIN GLYCOSYLATED A1C: CPT | Mod: QW,S$GLB,, | Performed by: INTERNAL MEDICINE

## 2021-10-20 PROCEDURE — 1160F RVW MEDS BY RX/DR IN RCRD: CPT | Mod: S$GLB,,, | Performed by: INTERNAL MEDICINE

## 2021-10-20 PROCEDURE — 3078F PR MOST RECENT DIASTOLIC BLOOD PRESSURE < 80 MM HG: ICD-10-PCS | Mod: S$GLB,,, | Performed by: INTERNAL MEDICINE

## 2021-10-20 PROCEDURE — G0008 FLU VACCINE - QUADRIVALENT - HIGH DOSE (65+) PRESERVATIVE FREE IM: ICD-10-PCS | Mod: S$GLB,,, | Performed by: INTERNAL MEDICINE

## 2021-10-20 PROCEDURE — 3078F DIAST BP <80 MM HG: CPT | Mod: S$GLB,,, | Performed by: INTERNAL MEDICINE

## 2021-10-20 PROCEDURE — 82044 POCT MICROALBUMIN: ICD-10-PCS | Mod: QW,S$GLB,, | Performed by: INTERNAL MEDICINE

## 2021-10-20 PROCEDURE — 83036 POCT HEMOGLOBIN A1C: ICD-10-PCS | Mod: QW,S$GLB,, | Performed by: INTERNAL MEDICINE

## 2021-10-20 RX ORDER — GUANFACINE 2 MG/1
2 TABLET ORAL NIGHTLY
Qty: 90 TABLET | Refills: 0 | Status: SHIPPED | OUTPATIENT
Start: 2021-10-20 | End: 2022-02-06

## 2021-10-20 RX ORDER — ROSUVASTATIN CALCIUM 20 MG/1
20 TABLET, COATED ORAL DAILY
Qty: 90 TABLET | Refills: 3 | Status: SHIPPED | OUTPATIENT
Start: 2021-10-20 | End: 2022-06-30 | Stop reason: SDUPTHER

## 2021-10-20 RX ORDER — EMPAGLIFLOZIN 25 MG/1
25 TABLET, FILM COATED ORAL DAILY
Qty: 90 TABLET | Refills: 1 | Status: SHIPPED | OUTPATIENT
Start: 2021-10-20 | End: 2022-04-20

## 2021-10-25 ENCOUNTER — IMMUNIZATION (OUTPATIENT)
Dept: PRIMARY CARE CLINIC | Facility: CLINIC | Age: 76
End: 2021-10-25
Payer: MEDICARE

## 2021-10-25 DIAGNOSIS — Z23 NEED FOR VACCINATION: Primary | ICD-10-CM

## 2021-10-25 PROCEDURE — 91300 COVID-19, MRNA, LNP-S, PF, 30 MCG/0.3 ML DOSE VACCINE: CPT | Mod: S$GLB,,, | Performed by: FAMILY MEDICINE

## 2021-10-25 PROCEDURE — 91300 COVID-19, MRNA, LNP-S, PF, 30 MCG/0.3 ML DOSE VACCINE: ICD-10-PCS | Mod: S$GLB,,, | Performed by: FAMILY MEDICINE

## 2021-10-25 PROCEDURE — 0003A COVID-19, MRNA, LNP-S, PF, 30 MCG/0.3 ML DOSE VACCINE: ICD-10-PCS | Mod: S$GLB,,, | Performed by: FAMILY MEDICINE

## 2021-10-25 PROCEDURE — 0003A COVID-19, MRNA, LNP-S, PF, 30 MCG/0.3 ML DOSE VACCINE: CPT | Mod: S$GLB,,, | Performed by: FAMILY MEDICINE

## 2021-11-15 DIAGNOSIS — E11.65 UNCONTROLLED TYPE 2 DIABETES MELLITUS WITH HYPERGLYCEMIA: ICD-10-CM

## 2022-02-07 ENCOUNTER — LAB VISIT (OUTPATIENT)
Dept: LAB | Facility: HOSPITAL | Age: 77
End: 2022-02-07
Attending: INTERNAL MEDICINE
Payer: MEDICARE

## 2022-02-07 DIAGNOSIS — E11.65 UNCONTROLLED TYPE 2 DIABETES MELLITUS WITH HYPERGLYCEMIA: ICD-10-CM

## 2022-02-07 DIAGNOSIS — E83.52 HYPERCALCEMIA: ICD-10-CM

## 2022-02-07 DIAGNOSIS — I47.10 SVT (SUPRAVENTRICULAR TACHYCARDIA): ICD-10-CM

## 2022-02-07 DIAGNOSIS — E78.2 MIXED HYPERLIPIDEMIA: ICD-10-CM

## 2022-02-07 DIAGNOSIS — I10 ESSENTIAL HYPERTENSION, BENIGN: ICD-10-CM

## 2022-02-07 LAB
ALBUMIN SERPL BCP-MCNC: 3.9 G/DL (ref 3.5–5.2)
ALBUMIN/CREAT UR: 683.8 UG/MG (ref 0–30)
ALP SERPL-CCNC: 64 U/L (ref 55–135)
ALT SERPL W/O P-5'-P-CCNC: 35 U/L (ref 10–44)
ANION GAP SERPL CALC-SCNC: 12 MMOL/L (ref 8–16)
AST SERPL-CCNC: 29 U/L (ref 10–40)
BILIRUB SERPL-MCNC: 0.7 MG/DL (ref 0.1–1)
BUN SERPL-MCNC: 20 MG/DL (ref 8–23)
CALCIUM SERPL-MCNC: 9.8 MG/DL (ref 8.7–10.5)
CALCIUM SERPL-MCNC: 9.8 MG/DL (ref 8.7–10.5)
CHLORIDE SERPL-SCNC: 105 MMOL/L (ref 95–110)
CHOLEST SERPL-MCNC: 114 MG/DL (ref 120–199)
CHOLEST/HDLC SERPL: 3.4 {RATIO} (ref 2–5)
CO2 SERPL-SCNC: 24 MMOL/L (ref 23–29)
CREAT SERPL-MCNC: 1.7 MG/DL (ref 0.5–1.4)
CREAT UR-MCNC: 132 MG/DL (ref 15–325)
ERYTHROCYTE [DISTWIDTH] IN BLOOD BY AUTOMATED COUNT: 15.1 % (ref 11.5–14.5)
EST. GFR  (AFRICAN AMERICAN): 33.3 ML/MIN/1.73 M^2
EST. GFR  (NON AFRICAN AMERICAN): 28.9 ML/MIN/1.73 M^2
ESTIMATED AVG GLUCOSE: 163 MG/DL (ref 68–131)
ESTIMATED AVG GLUCOSE: 163 MG/DL (ref 68–131)
GLUCOSE SERPL-MCNC: 82 MG/DL (ref 70–110)
HBA1C MFR BLD: 7.3 % (ref 4.5–6.2)
HBA1C MFR BLD: 7.3 % (ref 4.5–6.2)
HCT VFR BLD AUTO: 34.3 % (ref 37–48.5)
HDLC SERPL-MCNC: 34 MG/DL (ref 40–75)
HDLC SERPL: 29.8 % (ref 20–50)
HGB BLD-MCNC: 10.4 G/DL (ref 12–16)
LDLC SERPL CALC-MCNC: 39.2 MG/DL (ref 63–159)
MCH RBC QN AUTO: 25.8 PG (ref 27–31)
MCHC RBC AUTO-ENTMCNC: 30.3 G/DL (ref 32–36)
MCV RBC AUTO: 85 FL (ref 82–98)
MICROALBUMIN UR DL<=1MG/L-MCNC: 902.6 UG/ML
NONHDLC SERPL-MCNC: 80 MG/DL
PLATELET # BLD AUTO: 239 K/UL (ref 150–450)
PMV BLD AUTO: 12.1 FL (ref 9.2–12.9)
POTASSIUM SERPL-SCNC: 3.7 MMOL/L (ref 3.5–5.1)
PROT SERPL-MCNC: 6.9 G/DL (ref 6–8.4)
PTH-INTACT SERPL-MCNC: 24 PG/ML (ref 9–77)
RBC # BLD AUTO: 4.03 M/UL (ref 4–5.4)
SODIUM SERPL-SCNC: 141 MMOL/L (ref 136–145)
TRIGL SERPL-MCNC: 204 MG/DL (ref 30–150)
TSH SERPL DL<=0.005 MIU/L-ACNC: 3.24 UIU/ML (ref 0.34–5.6)
WBC # BLD AUTO: 8.5 K/UL (ref 3.9–12.7)

## 2022-02-07 PROCEDURE — 80061 LIPID PANEL: CPT | Performed by: INTERNAL MEDICINE

## 2022-02-07 PROCEDURE — 82570 ASSAY OF URINE CREATININE: CPT | Performed by: INTERNAL MEDICINE

## 2022-02-07 PROCEDURE — 80053 COMPREHEN METABOLIC PANEL: CPT | Performed by: INTERNAL MEDICINE

## 2022-02-07 PROCEDURE — 84443 ASSAY THYROID STIM HORMONE: CPT | Performed by: INTERNAL MEDICINE

## 2022-02-07 PROCEDURE — 36415 COLL VENOUS BLD VENIPUNCTURE: CPT | Performed by: INTERNAL MEDICINE

## 2022-02-07 PROCEDURE — 85027 COMPLETE CBC AUTOMATED: CPT | Performed by: INTERNAL MEDICINE

## 2022-02-07 PROCEDURE — 83970 ASSAY OF PARATHORMONE: CPT | Performed by: INTERNAL MEDICINE

## 2022-02-07 PROCEDURE — 83036 HEMOGLOBIN GLYCOSYLATED A1C: CPT | Performed by: INTERNAL MEDICINE

## 2022-03-15 ENCOUNTER — OFFICE VISIT (OUTPATIENT)
Dept: CARDIOLOGY | Facility: CLINIC | Age: 77
End: 2022-03-15
Payer: MEDICARE

## 2022-03-15 VITALS
DIASTOLIC BLOOD PRESSURE: 80 MMHG | HEIGHT: 63 IN | SYSTOLIC BLOOD PRESSURE: 130 MMHG | OXYGEN SATURATION: 95 % | WEIGHT: 174 LBS | BODY MASS INDEX: 30.83 KG/M2 | HEART RATE: 49 BPM

## 2022-03-15 DIAGNOSIS — E78.1 PURE HYPERTRIGLYCERIDEMIA: ICD-10-CM

## 2022-03-15 DIAGNOSIS — I47.10 SVT (SUPRAVENTRICULAR TACHYCARDIA): ICD-10-CM

## 2022-03-15 DIAGNOSIS — I49.5 SSS (SICK SINUS SYNDROME): Chronic | ICD-10-CM

## 2022-03-15 DIAGNOSIS — I10 ESSENTIAL HYPERTENSION, BENIGN: ICD-10-CM

## 2022-03-15 DIAGNOSIS — R79.89 ELEVATED SERUM CREATININE: Primary | ICD-10-CM

## 2022-03-15 PROCEDURE — 1159F PR MEDICATION LIST DOCUMENTED IN MEDICAL RECORD: ICD-10-PCS | Mod: CPTII,S$GLB,, | Performed by: INTERNAL MEDICINE

## 2022-03-15 PROCEDURE — 99214 PR OFFICE/OUTPT VISIT, EST, LEVL IV, 30-39 MIN: ICD-10-PCS | Mod: S$GLB,,, | Performed by: INTERNAL MEDICINE

## 2022-03-15 PROCEDURE — 1101F PT FALLS ASSESS-DOCD LE1/YR: CPT | Mod: CPTII,S$GLB,, | Performed by: INTERNAL MEDICINE

## 2022-03-15 PROCEDURE — 3079F PR MOST RECENT DIASTOLIC BLOOD PRESSURE 80-89 MM HG: ICD-10-PCS | Mod: CPTII,S$GLB,, | Performed by: INTERNAL MEDICINE

## 2022-03-15 PROCEDURE — 1101F PR PT FALLS ASSESS DOC 0-1 FALLS W/OUT INJ PAST YR: ICD-10-PCS | Mod: CPTII,S$GLB,, | Performed by: INTERNAL MEDICINE

## 2022-03-15 PROCEDURE — 3075F PR MOST RECENT SYSTOLIC BLOOD PRESS GE 130-139MM HG: ICD-10-PCS | Mod: CPTII,S$GLB,, | Performed by: INTERNAL MEDICINE

## 2022-03-15 PROCEDURE — 1159F MED LIST DOCD IN RCRD: CPT | Mod: CPTII,S$GLB,, | Performed by: INTERNAL MEDICINE

## 2022-03-15 PROCEDURE — 3075F SYST BP GE 130 - 139MM HG: CPT | Mod: CPTII,S$GLB,, | Performed by: INTERNAL MEDICINE

## 2022-03-15 PROCEDURE — 1126F AMNT PAIN NOTED NONE PRSNT: CPT | Mod: CPTII,S$GLB,, | Performed by: INTERNAL MEDICINE

## 2022-03-15 PROCEDURE — 3288F FALL RISK ASSESSMENT DOCD: CPT | Mod: CPTII,S$GLB,, | Performed by: INTERNAL MEDICINE

## 2022-03-15 PROCEDURE — 99214 OFFICE O/P EST MOD 30 MIN: CPT | Mod: S$GLB,,, | Performed by: INTERNAL MEDICINE

## 2022-03-15 PROCEDURE — 1126F PR PAIN SEVERITY QUANTIFIED, NO PAIN PRESENT: ICD-10-PCS | Mod: CPTII,S$GLB,, | Performed by: INTERNAL MEDICINE

## 2022-03-15 PROCEDURE — 3079F DIAST BP 80-89 MM HG: CPT | Mod: CPTII,S$GLB,, | Performed by: INTERNAL MEDICINE

## 2022-03-15 PROCEDURE — 3288F PR FALLS RISK ASSESSMENT DOCUMENTED: ICD-10-PCS | Mod: CPTII,S$GLB,, | Performed by: INTERNAL MEDICINE

## 2022-03-15 PROCEDURE — 1160F RVW MEDS BY RX/DR IN RCRD: CPT | Mod: CPTII,S$GLB,, | Performed by: INTERNAL MEDICINE

## 2022-03-15 PROCEDURE — 1160F PR REVIEW ALL MEDS BY PRESCRIBER/CLIN PHARMACIST DOCUMENTED: ICD-10-PCS | Mod: CPTII,S$GLB,, | Performed by: INTERNAL MEDICINE

## 2022-03-15 NOTE — PROGRESS NOTES
Subjective:    Patient ID:  Cynthia N Cushing is a 76 y.o. female patient here for evaluation Hypertension and SSS      History of Present Illness:   Patient is here today for a follow up. She is has been doing ok. No chest pain, palpitations, SOB, syncope, falls, neck/arm/jaw pain. She is diabetic and when her sugar starts to drop from not eating she does feel dizzy.       Review of patient's allergies indicates:   Allergen Reactions    Biaxin [clarithromycin]     Prandin [repaglinide] Nausea And Vomiting    Amlodipine     Chlorphen-phenyltolox-pe-ppa     Ciprofloxacin Nausea And Vomiting    Omnicef [cefdinir]     Propoxyphene     Quinine Nausea And Vomiting       Past Medical History:   Diagnosis Date    Allergy     Breast mass     Cataract     Diabetes mellitus     GERD (gastroesophageal reflux disease)     Hernia, hiatal     Hyperlipidemia     Hypertension     Kidney stone     Osteoarthritis     Renal insufficiency     Seasonal allergies     Sleep apnea     SSS (sick sinus syndrome)     SVT (supraventricular tachycardia)      Past Surgical History:   Procedure Laterality Date    BREAST BIOPSY      BREAST CYST ASPIRATION      BREAST SURGERY      CERVICAL DISC SURGERY      EYE SURGERY      cataracts bilateral    HYSTERECTOMY       Social History     Tobacco Use    Smoking status: Never Smoker    Smokeless tobacco: Never Used   Substance Use Topics    Alcohol use: No    Drug use: No        Review of Systems:    As noted in HPI            Objective        Vitals:    03/15/22 1228   BP: 130/80   Pulse: (!) 49       LIPIDS - LAST 2   Lab Results   Component Value Date    CHOL 114 (L) 02/07/2022    CHOL 152 04/19/2021    HDL 34 (L) 02/07/2022    HDL 36 (L) 04/19/2021    LDLCALC 39.2 (L) 02/07/2022    LDLCALC 41.6 (L) 04/19/2021    TRIG 204 (H) 02/07/2022    TRIG 372 (H) 04/19/2021    CHOLHDL 29.8 02/07/2022    CHOLHDL 23.7 04/19/2021       CBC - LAST 2  Lab Results   Component Value  Date    WBC 8.50 02/07/2022    WBC 10.34 04/19/2021    RBC 4.03 02/07/2022    RBC 4.06 04/19/2021    HGB 10.4 (L) 02/07/2022    HGB 10.8 (L) 04/19/2021    HCT 34.3 (L) 02/07/2022    HCT 34.2 (L) 04/19/2021    MCV 85 02/07/2022    MCV 84 04/19/2021    MCH 25.8 (L) 02/07/2022    MCH 26.6 (L) 04/19/2021    MCHC 30.3 (L) 02/07/2022    MCHC 31.6 (L) 04/19/2021    RDW 15.1 (H) 02/07/2022    RDW 14.5 04/19/2021     02/07/2022     04/19/2021    MPV 12.1 02/07/2022    MPV 12.5 04/19/2021    GRAN 4.9 06/15/2020    GRAN 54.2 06/15/2020    LYMPH 2.6 06/15/2020    LYMPH 28.3 06/15/2020    MONO 1.0 06/15/2020    MONO 11.2 06/15/2020    BASO 0.10 06/15/2020    BASO 0.11 12/27/2019    NRBC 0 06/15/2020    NRBC 0 12/27/2019       CHEMISTRY & LIVER FUNCTION - LAST 2  Lab Results   Component Value Date     02/07/2022     04/19/2021    K 3.7 02/07/2022    K 3.7 04/19/2021     02/07/2022     04/19/2021    CO2 24 02/07/2022    CO2 25 04/19/2021    ANIONGAP 12 02/07/2022    ANIONGAP 12 04/19/2021    BUN 20 02/07/2022    BUN 24 (H) 04/19/2021    CREATININE 1.7 (H) 02/07/2022    CREATININE 1.3 04/19/2021    GLU 82 02/07/2022     (H) 04/19/2021    CALCIUM 9.8 02/07/2022    CALCIUM 9.8 02/07/2022    ALBUMIN 3.9 02/07/2022    ALBUMIN 3.8 04/19/2021    PROT 6.9 02/07/2022    PROT 6.8 04/19/2021    ALKPHOS 64 02/07/2022    ALKPHOS 55 04/19/2021    ALT 35 02/07/2022    ALT 36 04/19/2021    AST 29 02/07/2022    AST 29 04/19/2021    BILITOT 0.7 02/07/2022    BILITOT 0.7 04/19/2021        CARDIAC PROFILE - LAST 2  No results found for: BNP, CPK, CPKMB, LDH, TROPONINI     COAGULATION - LAST 2  No results found for: LABPT, INR, APTT    ENDOCRINE & PSA - LAST 2  Lab Results   Component Value Date    HGBA1C 7.3 (H) 02/07/2022    HGBA1C 7.3 (H) 02/07/2022    TSH 3.240 02/07/2022    TSH 3.450 04/19/2021        ECHOCARDIOGRAM RESULTS  No results found for this or any previous visit.      CURRENT/PREVIOUS VISIT  "EKG  No results found for this or any previous visit.      PHYSICAL EXAM  CONSTITUTIONAL: Well built, well nourished in no apparent distress  NECK: no carotid bruit, no JVD  LUNGS: CTA  CHEST WALL: no tenderness  HEART: regular rate and rhythm, S1, S2 normal, no murmur, click, rub or gallop   ABDOMEN: soft, non-tender; bowel sounds normal; no masses,  no organomegaly  EXTREMITIES: mild pitting edema BL   NEURO: AAO X 3    I HAVE REVIEWED :    The vital signs, nurses notes, and all the pertinent radiology and labs.        Current Outpatient Medications   Medication Instructions    amLODIPine (NORVASC) 10 mg, Oral, Daily    aspirin 81 mg, Oral, Daily    bisoprolol (ZEBETA) 5 MG tablet TAKE 1/2 TAB EVERY DAY    blood sugar diagnostic Strp To check BG 4 times daily, to use with accuchek seven plus. E11.65    cetirizine (ZYRTEC) 10 mg, Oral, Daily    clotrimazole (LOTRIMIN) 1 % cream 1 application, Topical (Top), 2 times daily    famotidine (PEPCID) 20 MG tablet TAKE 1 TABLET TWICE DAILY    guanFACINE (TENEX) 2 mg, Oral, Nightly    hydrALAZINE (APRESOLINE) 10 mg, Oral, 3 times daily    icosapent ethyL (VASCEPA) 2 g, Oral, 2 times daily    insulin (LANTUS SOLOSTAR U-100 INSULIN) glargine 100 units/mL (3mL) SubQ pen INJECT 51 UNITS UNDER THE SKIN AT NIGHT    JARDIANCE 25 mg, Oral, Daily    lancets (ACCU-CHEK SOFTCLIX LANCETS) Misc TEST BLOOD SUGAR FOUR TIMES DAILY    LINZESS 145 mcg Cap capsule TAKE 1 CAPSULE (145 MCG TOTAL) BY MOUTH BEFORE BREAKFAST.    lubiprostone (AMITIZA) 24 mcg, Oral, 2 times daily with meals    metFORMIN (GLUCOPHAGE) 500 MG tablet TAKE 2 TABLETS IN THE MORNING  AND TAKE 1 TABLET AT NIGHT    olmesartan (BENICAR) 40 MG tablet TAKE 1 TABLET EVERY DAY    pen needle, diabetic (BD ULTRA-FINE ANNA PEN NEEDLE) 32 gauge x 5/32" Ndle 1 each, Subcutaneous, Nightly    rosuvastatin (CRESTOR) 20 mg, Oral, Daily    spironolactone (ALDACTONE) 50 MG tablet TAKE 1 TABLET TWICE DAILY    "       Assessment & Plan     SSS (sick sinus syndrome)  Asymptomatic     Essential hypertension, benign  BP today 130/80   Cont amlodipine, hydralazine, olmesartan  Low Na diet     Hold aldactone for 7 days and recheck kidney function   May warrant referral to nephrology     Pure hypertriglyceridemia  LDL 39   Cont crestor 20 mg   Continue the same     SVT (supraventricular tachycardia)  Stable on bisoprolol          Follow up in about 6 months (around 9/15/2022).

## 2022-03-15 NOTE — ASSESSMENT & PLAN NOTE
BP today 130/80   Cont amlodipine, hydralazine, olmesartan  Low Na diet     Hold aldactone for 7 days and recheck kidney function   May warrant referral to nephrology

## 2022-03-22 ENCOUNTER — LAB VISIT (OUTPATIENT)
Dept: LAB | Facility: HOSPITAL | Age: 77
End: 2022-03-22
Attending: INTERNAL MEDICINE
Payer: MEDICARE

## 2022-03-22 DIAGNOSIS — R79.89 ELEVATED SERUM CREATININE: ICD-10-CM

## 2022-03-22 LAB
ALBUMIN SERPL BCP-MCNC: 4 G/DL (ref 3.5–5.2)
ALP SERPL-CCNC: 65 U/L (ref 55–135)
ALT SERPL W/O P-5'-P-CCNC: 36 U/L (ref 10–44)
ANION GAP SERPL CALC-SCNC: 6 MMOL/L (ref 8–16)
AST SERPL-CCNC: 28 U/L (ref 10–40)
BILIRUB SERPL-MCNC: 0.7 MG/DL (ref 0.1–1)
BUN SERPL-MCNC: 23 MG/DL (ref 8–23)
CALCIUM SERPL-MCNC: 9.4 MG/DL (ref 8.7–10.5)
CHLORIDE SERPL-SCNC: 108 MMOL/L (ref 95–110)
CO2 SERPL-SCNC: 24 MMOL/L (ref 23–29)
CREAT SERPL-MCNC: 1.6 MG/DL (ref 0.5–1.4)
EST. GFR  (AFRICAN AMERICAN): 35.8 ML/MIN/1.73 M^2
EST. GFR  (NON AFRICAN AMERICAN): 31.1 ML/MIN/1.73 M^2
GLUCOSE SERPL-MCNC: 210 MG/DL (ref 70–110)
POTASSIUM SERPL-SCNC: 4 MMOL/L (ref 3.5–5.1)
PROT SERPL-MCNC: 7.2 G/DL (ref 6–8.4)
SODIUM SERPL-SCNC: 138 MMOL/L (ref 136–145)

## 2022-03-22 PROCEDURE — 80053 COMPREHEN METABOLIC PANEL: CPT

## 2022-03-22 PROCEDURE — 36415 COLL VENOUS BLD VENIPUNCTURE: CPT

## 2022-03-31 ENCOUNTER — OFFICE VISIT (OUTPATIENT)
Dept: FAMILY MEDICINE | Facility: CLINIC | Age: 77
End: 2022-03-31
Payer: MEDICARE

## 2022-03-31 VITALS
DIASTOLIC BLOOD PRESSURE: 70 MMHG | WEIGHT: 173 LBS | RESPIRATION RATE: 14 BRPM | HEIGHT: 63 IN | TEMPERATURE: 98 F | BODY MASS INDEX: 30.65 KG/M2 | HEART RATE: 84 BPM | OXYGEN SATURATION: 97 % | SYSTOLIC BLOOD PRESSURE: 138 MMHG

## 2022-03-31 DIAGNOSIS — I10 ESSENTIAL HYPERTENSION, BENIGN: ICD-10-CM

## 2022-03-31 DIAGNOSIS — E11.65 UNCONTROLLED TYPE 2 DIABETES MELLITUS WITH HYPERGLYCEMIA: Primary | ICD-10-CM

## 2022-03-31 DIAGNOSIS — N18.32 STAGE 3B CHRONIC KIDNEY DISEASE: ICD-10-CM

## 2022-03-31 PROCEDURE — 99214 OFFICE O/P EST MOD 30 MIN: CPT | Mod: S$GLB,,, | Performed by: INTERNAL MEDICINE

## 2022-03-31 PROCEDURE — 99214 PR OFFICE/OUTPT VISIT, EST, LEVL IV, 30-39 MIN: ICD-10-PCS | Mod: S$GLB,,, | Performed by: INTERNAL MEDICINE

## 2022-03-31 PROCEDURE — 1160F RVW MEDS BY RX/DR IN RCRD: CPT | Mod: S$GLB,,, | Performed by: INTERNAL MEDICINE

## 2022-03-31 PROCEDURE — 3051F HG A1C>EQUAL 7.0%<8.0%: CPT | Mod: S$GLB,,, | Performed by: INTERNAL MEDICINE

## 2022-03-31 PROCEDURE — 3075F SYST BP GE 130 - 139MM HG: CPT | Mod: S$GLB,,, | Performed by: INTERNAL MEDICINE

## 2022-03-31 PROCEDURE — 1159F MED LIST DOCD IN RCRD: CPT | Mod: S$GLB,,, | Performed by: INTERNAL MEDICINE

## 2022-03-31 PROCEDURE — 3078F DIAST BP <80 MM HG: CPT | Mod: S$GLB,,, | Performed by: INTERNAL MEDICINE

## 2022-03-31 PROCEDURE — 3051F PR MOST RECENT HEMOGLOBIN A1C LEVEL 7.0 - < 8.0%: ICD-10-PCS | Mod: S$GLB,,, | Performed by: INTERNAL MEDICINE

## 2022-03-31 PROCEDURE — 1126F AMNT PAIN NOTED NONE PRSNT: CPT | Mod: S$GLB,,, | Performed by: INTERNAL MEDICINE

## 2022-03-31 PROCEDURE — 3078F PR MOST RECENT DIASTOLIC BLOOD PRESSURE < 80 MM HG: ICD-10-PCS | Mod: S$GLB,,, | Performed by: INTERNAL MEDICINE

## 2022-03-31 PROCEDURE — 1126F PR PAIN SEVERITY QUANTIFIED, NO PAIN PRESENT: ICD-10-PCS | Mod: S$GLB,,, | Performed by: INTERNAL MEDICINE

## 2022-03-31 PROCEDURE — 3075F PR MOST RECENT SYSTOLIC BLOOD PRESS GE 130-139MM HG: ICD-10-PCS | Mod: S$GLB,,, | Performed by: INTERNAL MEDICINE

## 2022-03-31 PROCEDURE — 1160F PR REVIEW ALL MEDS BY PRESCRIBER/CLIN PHARMACIST DOCUMENTED: ICD-10-PCS | Mod: S$GLB,,, | Performed by: INTERNAL MEDICINE

## 2022-03-31 PROCEDURE — 1159F PR MEDICATION LIST DOCUMENTED IN MEDICAL RECORD: ICD-10-PCS | Mod: S$GLB,,, | Performed by: INTERNAL MEDICINE

## 2022-03-31 NOTE — PROGRESS NOTES
Protective Sensation (w/ 10 gram monofilament):  Right: Intact  Left: Intact    Visual Inspection:  Normal -  Bilateral    Pedal Pulses:   Right: Present  Left: Present    Posterior tibialis:   Right:Present  Left: Present    SUBJECTIVE:    Patient ID: Cynthia N Cushing is a 77 y.o. female.    Chief Complaint: Diabetes and Follow-up    HPI       Cynthia N Cushing is an 77 y.o. female who presents for follow up of diabetes. Current symptoms include: none. Patient denies foot ulcerations, hyperglycemia, hypoglycemia , increased appetite, nausea, paresthesia of the feet, polydipsia, polyuria, visual disturbances, vomiting and weight loss. Evaluation to date has included: fasting blood sugar, fasting lipid panel, hemoglobin A1C and microalbuminuria. Home sugars: BGs range about 145-150 and higher at bedtime. Current treatments: Continued insulin which has been somewhat effective, Continued metformin which has been somewhat effective, Continued statin which has been effective, Continued ACE inhibitor/ARB which has been effective and Continued jardiance which has been somewhat effective.     Laboratory studies include sodium 138 potassium 4.0 glucose 210 BUN 23 creatinine 1.6 GFR 31 cc A1c 7.3-pt is afraid of BS drops during the night and keeps her sugar higher than acceptable-at least this is her excuse-in the am her glucose is 70-80 and at such times she cant even hold a glass of water    Patient saw cardiology recently and goes back in 6 months-    Lab Visit on 03/22/2022   Component Date Value Ref Range Status    Sodium 03/22/2022 138  136 - 145 mmol/L Final    Potassium 03/22/2022 4.0  3.5 - 5.1 mmol/L Final    Chloride 03/22/2022 108  95 - 110 mmol/L Final    CO2 03/22/2022 24  23 - 29 mmol/L Final    Glucose 03/22/2022 210 (A) 70 - 110 mg/dL Final    BUN 03/22/2022 23  8 - 23 mg/dL Final    Creatinine 03/22/2022 1.6 (A) 0.5 - 1.4 mg/dL Final    Calcium 03/22/2022 9.4  8.7 - 10.5 mg/dL Final    Total  Protein 03/22/2022 7.2  6.0 - 8.4 g/dL Final    Albumin 03/22/2022 4.0  3.5 - 5.2 g/dL Final    Total Bilirubin 03/22/2022 0.7  0.1 - 1.0 mg/dL Final    Alkaline Phosphatase 03/22/2022 65  55 - 135 U/L Final    AST 03/22/2022 28  10 - 40 U/L Final    ALT 03/22/2022 36  10 - 44 U/L Final    Anion Gap 03/22/2022 6 (A) 8 - 16 mmol/L Final    eGFR if  03/22/2022 35.8 (A) >60 mL/min/1.73 m^2 Final    eGFR if non  03/22/2022 31.1 (A) >60 mL/min/1.73 m^2 Final   Lab Visit on 02/07/2022   Component Date Value Ref Range Status    Hemoglobin A1C 02/07/2022 7.3 (A) 4.5 - 6.2 % Final    Estimated Avg Glucose 02/07/2022 163 (A) 68 - 131 mg/dL Final    Calcium 02/07/2022 9.8  8.7 - 10.5 mg/dL Final    PTH, Intact 02/07/2022 24.0  9.0 - 77.0 pg/mL Final    WBC 02/07/2022 8.50  3.90 - 12.70 K/uL Final    RBC 02/07/2022 4.03  4.00 - 5.40 M/uL Final    Hemoglobin 02/07/2022 10.4 (A) 12.0 - 16.0 g/dL Final    Hematocrit 02/07/2022 34.3 (A) 37.0 - 48.5 % Final    MCV 02/07/2022 85  82 - 98 fL Final    MCH 02/07/2022 25.8 (A) 27.0 - 31.0 pg Final    MCHC 02/07/2022 30.3 (A) 32.0 - 36.0 g/dL Final    RDW 02/07/2022 15.1 (A) 11.5 - 14.5 % Final    Platelets 02/07/2022 239  150 - 450 K/uL Final    MPV 02/07/2022 12.1  9.2 - 12.9 fL Final    Sodium 02/07/2022 141  136 - 145 mmol/L Final    Potassium 02/07/2022 3.7  3.5 - 5.1 mmol/L Final    Chloride 02/07/2022 105  95 - 110 mmol/L Final    CO2 02/07/2022 24  23 - 29 mmol/L Final    Glucose 02/07/2022 82  70 - 110 mg/dL Final    BUN 02/07/2022 20  8 - 23 mg/dL Final    Creatinine 02/07/2022 1.7 (A) 0.5 - 1.4 mg/dL Final    Calcium 02/07/2022 9.8  8.7 - 10.5 mg/dL Final    Total Protein 02/07/2022 6.9  6.0 - 8.4 g/dL Final    Albumin 02/07/2022 3.9  3.5 - 5.2 g/dL Final    Total Bilirubin 02/07/2022 0.7  0.1 - 1.0 mg/dL Final    Alkaline Phosphatase 02/07/2022 64  55 - 135 U/L Final    AST 02/07/2022 29  10 - 40 U/L Final     ALT 02/07/2022 35  10 - 44 U/L Final    Anion Gap 02/07/2022 12  8 - 16 mmol/L Final    eGFR if  02/07/2022 33.3 (A) >60 mL/min/1.73 m^2 Final    eGFR if non African American 02/07/2022 28.9 (A) >60 mL/min/1.73 m^2 Final    Cholesterol 02/07/2022 114 (A) 120 - 199 mg/dL Final    Triglycerides 02/07/2022 204 (A) 30 - 150 mg/dL Final    HDL 02/07/2022 34 (A) 40 - 75 mg/dL Final    LDL Cholesterol 02/07/2022 39.2 (A) 63.0 - 159.0 mg/dL Final    HDL/Cholesterol Ratio 02/07/2022 29.8  20.0 - 50.0 % Final    Total Cholesterol/HDL Ratio 02/07/2022 3.4  2.0 - 5.0 Final    Non-HDL Cholesterol 02/07/2022 80  mg/dL Final    TSH 02/07/2022 3.240  0.340 - 5.600 uIU/mL Final    Hemoglobin A1C 02/07/2022 7.3 (A) 4.5 - 6.2 % Final    Estimated Avg Glucose 02/07/2022 163 (A) 68 - 131 mg/dL Final    Microalbumin, Urine 02/07/2022 902.6 (A) <19.9 ug/mL Final    Creatinine, Urine 02/07/2022 132.0  15.0 - 325.0 mg/dL Final    Microalb/Creat Ratio 02/07/2022 683.8 (A) 0.0 - 30.0 ug/mg Final   Office Visit on 10/20/2021   Component Date Value Ref Range Status    Hemoglobin A1C 10/20/2021 6.9  % Final    Microalbumin, POC 10/20/2021 150   Final    Creatinine, Random Urine 10/20/2021 200   Final    Albumin Creatinine Ratio 10/20/2021 300  MG/G Final    Color 10/20/2021 light yellow   Final    Clarity, UA 10/20/2021 Clear  Clear Final   Lab Visit on 07/12/2021   Component Date Value Ref Range Status    Calcium 07/12/2021 10.1  8.7 - 10.5 mg/dL Final    Phosphorus 07/12/2021 3.7  2.7 - 4.5 mg/dL Final    PTH, Intact 07/12/2021 24.4  9.0 - 77.0 pg/mL Final   Lab Visit on 04/19/2021   Component Date Value Ref Range Status    Hemoglobin A1C 04/19/2021 8.9 (A) 4.5 - 6.2 % Final    Estimated Avg Glucose 04/19/2021 209 (A) 68 - 131 mg/dL Final    Microalbumin, Urine 04/19/2021 2508.0 (A) <19.9 ug/mL Final    Creatinine, Urine 04/19/2021 255.0  15.0 - 325.0 mg/dL Final    Microalb/Creat Ratio 04/19/2021  983.5 (A) 0.0 - 30.0 ug/mg Final    Sodium 04/19/2021 141  136 - 145 mmol/L Final    Potassium 04/19/2021 3.7  3.5 - 5.1 mmol/L Final    Chloride 04/19/2021 104  95 - 110 mmol/L Final    CO2 04/19/2021 25  23 - 29 mmol/L Final    Glucose 04/19/2021 133 (A) 70 - 110 mg/dL Final    BUN 04/19/2021 24 (A) 8 - 23 mg/dL Final    Creatinine 04/19/2021 1.3  0.5 - 1.4 mg/dL Final    Calcium 04/19/2021 11.0 (A) 8.7 - 10.5 mg/dL Final    Total Protein 04/19/2021 6.8  6.0 - 8.4 g/dL Final    Albumin 04/19/2021 3.8  3.5 - 5.2 g/dL Final    Total Bilirubin 04/19/2021 0.7  0.1 - 1.0 mg/dL Final    Alkaline Phosphatase 04/19/2021 55  55 - 135 U/L Final    AST 04/19/2021 29  10 - 40 U/L Final    ALT 04/19/2021 36  10 - 44 U/L Final    Anion Gap 04/19/2021 12  8 - 16 mmol/L Final    eGFR if  04/19/2021 46.0 (A) >60 mL/min/1.73 m^2 Final    eGFR if non African American 04/19/2021 39.9 (A) >60 mL/min/1.73 m^2 Final    Cholesterol 04/19/2021 152  120 - 199 mg/dL Final    Triglycerides 04/19/2021 372 (A) 30 - 150 mg/dL Final    HDL 04/19/2021 36 (A) 40 - 75 mg/dL Final    LDL Cholesterol 04/19/2021 41.6 (A) 63.0 - 159.0 mg/dL Final    HDL/Cholesterol Ratio 04/19/2021 23.7  20.0 - 50.0 % Final    Total Cholesterol/HDL Ratio 04/19/2021 4.2  2.0 - 5.0 Final    Non-HDL Cholesterol 04/19/2021 116  mg/dL Final    WBC 04/19/2021 10.34  3.90 - 12.70 K/uL Final    RBC 04/19/2021 4.06  4.00 - 5.40 M/uL Final    Hemoglobin 04/19/2021 10.8 (A) 12.0 - 16.0 g/dL Final    Hematocrit 04/19/2021 34.2 (A) 37.0 - 48.5 % Final    MCV 04/19/2021 84  82 - 98 fL Final    MCH 04/19/2021 26.6 (A) 27.0 - 31.0 pg Final    MCHC 04/19/2021 31.6 (A) 32.0 - 36.0 g/dL Final    RDW 04/19/2021 14.5  11.5 - 14.5 % Final    Platelets 04/19/2021 231  150 - 450 K/uL Final    MPV 04/19/2021 12.5  9.2 - 12.9 fL Final    TSH 04/19/2021 3.450  0.340 - 5.600 uIU/mL Final       Past Medical History:   Diagnosis Date    Allergy      Breast mass     Cataract     Diabetes mellitus     GERD (gastroesophageal reflux disease)     Hernia, hiatal     Hyperlipidemia     Hypertension     Kidney stone     Osteoarthritis     Renal insufficiency     Seasonal allergies     Sleep apnea     SSS (sick sinus syndrome)     SVT (supraventricular tachycardia)      Past Surgical History:   Procedure Laterality Date    BREAST BIOPSY      BREAST CYST ASPIRATION      BREAST SURGERY      CERVICAL DISC SURGERY      EYE SURGERY      cataracts bilateral    HYSTERECTOMY       Family History   Problem Relation Age of Onset    Stroke Mother     Cancer Mother     Breast cancer Mother     Cancer Father     Breast cancer Maternal Aunt     Breast cancer Maternal Grandmother        Marital Status:   Alcohol History:  reports no history of alcohol use.  Tobacco History:  reports that she has never smoked. She has never used smokeless tobacco.  Drug History:  reports no history of drug use.    Review of patient's allergies indicates:   Allergen Reactions    Biaxin [clarithromycin]     Prandin [repaglinide] Nausea And Vomiting    Amlodipine     Chlorphen-phenyltolox-pe-ppa     Ciprofloxacin Nausea And Vomiting    Omnicef [cefdinir]     Propoxyphene     Quinine Nausea And Vomiting       Current Outpatient Medications:     amLODIPine (NORVASC) 10 MG tablet, Take 1 tablet (10 mg total) by mouth once daily., Disp: 30 tablet, Rfl: 11    aspirin 81 MG Chew, Take 81 mg by mouth once daily., Disp: , Rfl:     bisoprolol (ZEBETA) 5 MG tablet, TAKE 1/2 TAB EVERY DAY, Disp: 45 tablet, Rfl: 2    blood sugar diagnostic Strp, To check BG 4 times daily, to use with accuchek seven plus. E11.65, Disp: 360 strip, Rfl: 3    cetirizine (ZYRTEC) 10 MG tablet, Take 10 mg by mouth once daily., Disp: , Rfl:     clotrimazole (LOTRIMIN) 1 % cream, Apply 1 application topically 2 (two) times daily., Disp: 60 g, Rfl: 1    empagliflozin (JARDIANCE) 25 mg tablet,  "Take 1 tablet (25 mg total) by mouth once daily., Disp: 90 tablet, Rfl: 1    famotidine (PEPCID) 20 MG tablet, TAKE 1 TABLET TWICE DAILY, Disp: 180 tablet, Rfl: 2    guanFACINE (TENEX) 2 MG tablet, TAKE 1 TABLET (2 MG TOTAL) BY MOUTH EVERY EVENING., Disp: 90 tablet, Rfl: 0    hydrALAZINE (APRESOLINE) 10 MG tablet, Take 1 tablet (10 mg total) by mouth 3 (three) times daily., Disp: 90 tablet, Rfl: 11    icosapent ethyL (VASCEPA) 1 gram Cap, Take 2 capsules (2 g total) by mouth 2 (two) times daily., Disp: 360 capsule, Rfl: 1    insulin (LANTUS SOLOSTAR U-100 INSULIN) glargine 100 units/mL (3mL) SubQ pen, INJECT 51 UNITS UNDER THE SKIN AT NIGHT, Disp: 15 mL, Rfl: 5    lancets (ACCU-CHEK SOFTCLIX LANCETS) Misc, TEST BLOOD SUGAR FOUR TIMES DAILY, Disp: 400 each, Rfl: 3    metFORMIN (GLUCOPHAGE) 500 MG tablet, TAKE 2 TABLETS IN THE MORNING  AND TAKE 1 TABLET AT NIGHT, Disp: 270 tablet, Rfl: 0    olmesartan (BENICAR) 40 MG tablet, TAKE 1 TABLET EVERY DAY, Disp: 90 tablet, Rfl: 3    pen needle, diabetic (BD ULTRA-FINE ANNA PEN NEEDLE) 32 gauge x 5/32" Ndle, Inject 1 each into the skin every evening., Disp: 100 each, Rfl: 5    rosuvastatin (CRESTOR) 20 MG tablet, Take 1 tablet (20 mg total) by mouth once daily., Disp: 90 tablet, Rfl: 3    spironolactone (ALDACTONE) 50 MG tablet, TAKE 1 TABLET TWICE DAILY (Patient not taking: Reported on 3/31/2022), Disp: 180 tablet, Rfl: 2    Review of Systems   Constitutional: Negative for appetite change, chills, diaphoresis, fatigue, fever and unexpected weight change.   HENT: Negative for congestion, ear pain, hearing loss, nosebleeds, postnasal drip, sinus pressure, sinus pain, sneezing, sore throat, tinnitus, trouble swallowing and voice change.    Eyes: Negative for photophobia, pain, itching and visual disturbance.   Respiratory: Negative for apnea, cough, chest tightness, shortness of breath, wheezing and stridor.    Cardiovascular: Negative for chest pain, palpitations " "and leg swelling.   Gastrointestinal: Negative for abdominal distention, abdominal pain, blood in stool, constipation, diarrhea, nausea and vomiting.   Endocrine: Negative for cold intolerance, heat intolerance, polydipsia and polyuria.        HPI   Genitourinary: Negative for difficulty urinating, dyspareunia, dysuria, flank pain, frequency, hematuria, menstrual problem, pelvic pain, urgency, vaginal discharge and vaginal pain.   Musculoskeletal: Negative for arthralgias, back pain, joint swelling, myalgias, neck pain and neck stiffness.   Skin: Negative for pallor.   Allergic/Immunologic: Negative for environmental allergies and food allergies.   Neurological: Negative for dizziness, tremors, speech difficulty, weakness, light-headedness and numbness (feet).   Hematological: Does not bruise/bleed easily.   Psychiatric/Behavioral: Negative for agitation, confusion, decreased concentration, sleep disturbance and suicidal ideas. The patient is not nervous/anxious.           Objective:      Vitals:    03/31/22 1514   BP: 138/70   Pulse: 84   Resp: 14   Temp: 98.2 °F (36.8 °C)   SpO2: 97%   Weight: 78.5 kg (173 lb)   Height: 5' 3" (1.6 m)     Physical Exam  Constitutional:       Appearance: Normal appearance. She is obese. She is not ill-appearing.   HENT:      Head: Normocephalic and atraumatic.   Eyes:      Extraocular Movements: Extraocular movements intact.      Pupils: Pupils are equal, round, and reactive to light.   Neck:      Vascular: No carotid bruit.   Cardiovascular:      Rate and Rhythm: Normal rate.      Heart sounds: Murmur (aortic basilar murmur) heard.     No friction rub. No gallop.   Pulmonary:      Effort: Pulmonary effort is normal.      Breath sounds: Normal breath sounds.   Abdominal:      General: Bowel sounds are normal.      Palpations: Abdomen is soft.   Musculoskeletal:      Right lower leg: No edema.      Left lower leg: No edema.   Lymphadenopathy:      Cervical: No cervical adenopathy. "   Skin:     Findings: No lesion or rash.   Neurological:      General: No focal deficit present.      Mental Status: She is alert and oriented to person, place, and time.   Psychiatric:         Mood and Affect: Mood normal.         Behavior: Behavior normal.         Thought Content: Thought content normal.         Judgment: Judgment normal.           Assessment:       1. Uncontrolled type 2 diabetes mellitus with hyperglycemia    2. Uncontrolled type 2 diabetes mellitus with diabetic nephropathy, with long-term current use of insulin    3. Stage 3b chronic kidney disease    4. Essential hypertension, benign         Plan:       Uncontrolled type 2 diabetes mellitus with hyperglycemia  -     Hemoglobin A1C; Future; Expected date: 05/07/2022  -     Microalbumin/Creatinine Ratio, Urine; Future; Expected date: 08/07/2022  -     Ambulatory referral/consult to Nutrition Services; Future; Expected date: 04/07/2022    Uncontrolled type 2 diabetes mellitus with diabetic nephropathy, with long-term current use of insulin    Stage 3b chronic kidney disease        -     stable    Essential hypertension, benign        -     controlled      No follow-ups on file.        3/31/2022 Teri Adams M.D.

## 2022-04-04 ENCOUNTER — TELEPHONE (OUTPATIENT)
Dept: CARDIOLOGY | Facility: CLINIC | Age: 77
End: 2022-04-04
Payer: MEDICARE

## 2022-04-04 NOTE — TELEPHONE ENCOUNTER
----- Message from Teri Adams MD sent at 3/31/2022  3:50 PM CDT -----  Regarding: heart murmur  I understand one of your staff saw my patient on her last office visit-I am concerned that there is no mention of a heart murmur in her notes, and I do not see where a recent ECHO was ordered-could you please review her chart and murmur and advise as to whether we should check an updated ECHO.    Thank you,    HUSEYIN Adams

## 2022-04-05 ENCOUNTER — TELEPHONE (OUTPATIENT)
Dept: FAMILY MEDICINE | Facility: CLINIC | Age: 77
End: 2022-04-05
Payer: MEDICARE

## 2022-04-05 DIAGNOSIS — I35.1 AORTIC EJECTION MURMUR: Primary | ICD-10-CM

## 2022-04-05 NOTE — TELEPHONE ENCOUNTER
----- Message from Teri Adams MD sent at 4/5/2022  7:07 AM CDT -----  Inform pt I am going to proceed with getting an echo-we discussed it in the office visit and I heard from Dr RICHARDS's office

## 2022-04-07 ENCOUNTER — IMMUNIZATION (OUTPATIENT)
Dept: PRIMARY CARE CLINIC | Facility: CLINIC | Age: 77
End: 2022-04-07
Payer: MEDICARE

## 2022-04-07 DIAGNOSIS — Z23 NEED FOR VACCINATION: Primary | ICD-10-CM

## 2022-04-07 PROCEDURE — 91305 COVID-19, MRNA, LNP-S, PF, 30 MCG/0.3 ML DOSE VACCINE (PFIZER): ICD-10-PCS | Mod: S$GLB,,, | Performed by: FAMILY MEDICINE

## 2022-04-07 PROCEDURE — 0054A COVID-19, MRNA, LNP-S, PF, 30 MCG/0.3 ML DOSE VACCINE (PFIZER): CPT | Mod: S$GLB,,, | Performed by: FAMILY MEDICINE

## 2022-04-07 PROCEDURE — 0054A COVID-19, MRNA, LNP-S, PF, 30 MCG/0.3 ML DOSE VACCINE (PFIZER): ICD-10-PCS | Mod: S$GLB,,, | Performed by: FAMILY MEDICINE

## 2022-04-07 PROCEDURE — 91305 COVID-19, MRNA, LNP-S, PF, 30 MCG/0.3 ML DOSE VACCINE (PFIZER): CPT | Mod: S$GLB,,, | Performed by: FAMILY MEDICINE

## 2022-05-13 ENCOUNTER — OFFICE VISIT (OUTPATIENT)
Dept: FAMILY MEDICINE | Facility: CLINIC | Age: 77
End: 2022-05-13
Payer: MEDICARE

## 2022-05-13 ENCOUNTER — HOSPITAL ENCOUNTER (OUTPATIENT)
Dept: RADIOLOGY | Facility: HOSPITAL | Age: 77
Discharge: HOME | End: 2022-05-13
Attending: NURSE PRACTITIONER
Payer: MEDICARE

## 2022-05-13 ENCOUNTER — TELEPHONE (OUTPATIENT)
Dept: FAMILY MEDICINE | Facility: CLINIC | Age: 77
End: 2022-05-13

## 2022-05-13 VITALS
HEART RATE: 56 BPM | OXYGEN SATURATION: 99 % | SYSTOLIC BLOOD PRESSURE: 136 MMHG | TEMPERATURE: 98 F | WEIGHT: 175.63 LBS | RESPIRATION RATE: 18 BRPM | HEIGHT: 63 IN | DIASTOLIC BLOOD PRESSURE: 80 MMHG | BODY MASS INDEX: 31.12 KG/M2

## 2022-05-13 DIAGNOSIS — J01.20 ACUTE NON-RECURRENT ETHMOIDAL SINUSITIS: Primary | ICD-10-CM

## 2022-05-13 DIAGNOSIS — J40 BRONCHITIS: ICD-10-CM

## 2022-05-13 DIAGNOSIS — J18.9 PNEUMONIA DUE TO INFECTIOUS ORGANISM, UNSPECIFIED LATERALITY, UNSPECIFIED PART OF LUNG: Primary | ICD-10-CM

## 2022-05-13 DIAGNOSIS — R09.89 SYMPTOMS OF UPPER RESPIRATORY INFECTION (URI): ICD-10-CM

## 2022-05-13 LAB
CTP QC/QA: YES
CTP QC/QA: YES
POC MOLECULAR INFLUENZA A AGN: NEGATIVE
POC MOLECULAR INFLUENZA B AGN: NEGATIVE
SARS-COV-2 RDRP RESP QL NAA+PROBE: NEGATIVE

## 2022-05-13 PROCEDURE — 1159F PR MEDICATION LIST DOCUMENTED IN MEDICAL RECORD: ICD-10-PCS | Mod: CPTII,S$GLB,, | Performed by: NURSE PRACTITIONER

## 2022-05-13 PROCEDURE — 87502 INFLUENZA DNA AMP PROBE: CPT | Mod: QW,S$GLB,, | Performed by: NURSE PRACTITIONER

## 2022-05-13 PROCEDURE — 1101F PT FALLS ASSESS-DOCD LE1/YR: CPT | Mod: CPTII,S$GLB,, | Performed by: NURSE PRACTITIONER

## 2022-05-13 PROCEDURE — 99213 PR OFFICE/OUTPT VISIT, EST, LEVL III, 20-29 MIN: ICD-10-PCS | Mod: S$GLB,,, | Performed by: NURSE PRACTITIONER

## 2022-05-13 PROCEDURE — U0002 COVID-19 LAB TEST NON-CDC: HCPCS | Mod: QW,S$GLB,, | Performed by: NURSE PRACTITIONER

## 2022-05-13 PROCEDURE — 87502 POCT INFLUENZA A/B MOLECULAR: ICD-10-PCS | Mod: QW,S$GLB,, | Performed by: NURSE PRACTITIONER

## 2022-05-13 PROCEDURE — 1101F PR PT FALLS ASSESS DOC 0-1 FALLS W/OUT INJ PAST YR: ICD-10-PCS | Mod: CPTII,S$GLB,, | Performed by: NURSE PRACTITIONER

## 2022-05-13 PROCEDURE — 1159F MED LIST DOCD IN RCRD: CPT | Mod: CPTII,S$GLB,, | Performed by: NURSE PRACTITIONER

## 2022-05-13 PROCEDURE — 1160F RVW MEDS BY RX/DR IN RCRD: CPT | Mod: CPTII,S$GLB,, | Performed by: NURSE PRACTITIONER

## 2022-05-13 PROCEDURE — 1160F PR REVIEW ALL MEDS BY PRESCRIBER/CLIN PHARMACIST DOCUMENTED: ICD-10-PCS | Mod: CPTII,S$GLB,, | Performed by: NURSE PRACTITIONER

## 2022-05-13 PROCEDURE — 3288F FALL RISK ASSESSMENT DOCD: CPT | Mod: CPTII,S$GLB,, | Performed by: NURSE PRACTITIONER

## 2022-05-13 PROCEDURE — U0002: ICD-10-PCS | Mod: QW,S$GLB,, | Performed by: NURSE PRACTITIONER

## 2022-05-13 PROCEDURE — 3288F PR FALLS RISK ASSESSMENT DOCUMENTED: ICD-10-PCS | Mod: CPTII,S$GLB,, | Performed by: NURSE PRACTITIONER

## 2022-05-13 PROCEDURE — 99213 OFFICE O/P EST LOW 20 MIN: CPT | Mod: S$GLB,,, | Performed by: NURSE PRACTITIONER

## 2022-05-13 PROCEDURE — 71046 X-RAY EXAM CHEST 2 VIEWS: CPT | Mod: TC,PO

## 2022-05-13 RX ORDER — ALBUTEROL SULFATE 90 UG/1
1-2 AEROSOL, METERED RESPIRATORY (INHALATION) EVERY 6 HOURS PRN
Qty: 18 G | Refills: 0 | Status: SHIPPED | OUTPATIENT
Start: 2022-05-13 | End: 2022-06-30 | Stop reason: ALTCHOICE

## 2022-05-13 RX ORDER — BENZONATATE 100 MG/1
100 CAPSULE ORAL 3 TIMES DAILY PRN
Qty: 30 CAPSULE | Refills: 0 | Status: SHIPPED | OUTPATIENT
Start: 2022-05-13 | End: 2022-05-23

## 2022-05-13 RX ORDER — GUAIFENESIN AND DEXTROMETHORPHAN HYDROBROMIDE 1200; 60 MG/1; MG/1
1 TABLET, EXTENDED RELEASE ORAL 2 TIMES DAILY PRN
Qty: 20 TABLET | Refills: 0 | Status: SHIPPED | OUTPATIENT
Start: 2022-05-13 | End: 2022-05-23

## 2022-05-13 RX ORDER — FLUTICASONE PROPIONATE 50 MCG
2 SPRAY, SUSPENSION (ML) NASAL DAILY
Qty: 18.2 ML | Refills: 0 | Status: SHIPPED | OUTPATIENT
Start: 2022-05-13 | End: 2022-06-30 | Stop reason: ALTCHOICE

## 2022-05-13 RX ORDER — LEVOFLOXACIN 750 MG/1
750 TABLET ORAL DAILY
Qty: 5 TABLET | Refills: 0 | Status: SHIPPED | OUTPATIENT
Start: 2022-05-13 | End: 2022-05-18

## 2022-05-13 NOTE — TELEPHONE ENCOUNTER
LVM requesting call back    ----- Message from ABY Shah sent at 5/13/2022 11:15 AM CDT -----  Please call patient. CXR showing possible very mild pneumonia and/or bronchitis. Will send in 5-day course of Levaquin to treat and continue with medications as discussed during visit.

## 2022-05-13 NOTE — PROGRESS NOTES
SUBJECTIVE:      Patient ID: Cynthia N Cushing is a 77 y.o. female.    Chief Complaint: Cough (X 2 days), Sore Throat, and Wheezing    Pt of Dr. Adams presents for URI symptoms x 2 days. Associated symptoms of chest tightness, wheezing, dry cough, congestion, postnasal drip, sneezing, sore throat, and sinus pressure. Denies fevers, myalgias, or SOB. She has been using Nyquil without relief. Denies CP, SOB, wheezing, fevers, nausea, vomiting, diarrhea, constipation, numbness, weakness, dizziness, palpitations, or any other concerns at this time.    URI   This is a new problem. The current episode started in the past 7 days (2 days ago). The problem has been gradually worsening. There has been no fever. Associated symptoms include congestion, coughing (dry), headaches, sinus pain, sneezing, a sore throat and wheezing. Pertinent negatives include no abdominal pain, chest pain, diarrhea, dysuria, ear pain, joint pain, joint swelling, nausea, neck pain, plugged ear sensation, rash, rhinorrhea, swollen glands or vomiting. She has tried decongestant (nyQuil) for the symptoms. The treatment provided no relief.       Past Surgical History:   Procedure Laterality Date    BREAST BIOPSY      BREAST CYST ASPIRATION      BREAST SURGERY      CERVICAL DISC SURGERY      EYE SURGERY      cataracts bilateral    HYSTERECTOMY       Family History   Problem Relation Age of Onset    Stroke Mother     Cancer Mother     Breast cancer Mother     Cancer Father     Breast cancer Maternal Aunt     Breast cancer Maternal Grandmother       Social History     Socioeconomic History    Marital status:    Tobacco Use    Smoking status: Never Smoker    Smokeless tobacco: Never Used   Substance and Sexual Activity    Alcohol use: No    Drug use: No    Sexual activity: Never     Current Outpatient Medications   Medication Sig Dispense Refill    amLODIPine (NORVASC) 10 MG tablet Take 1 tablet (10 mg total) by mouth once  "daily. 30 tablet 11    bisoprolol (ZEBETA) 5 MG tablet TAKE 1/2 TAB EVERY DAY 45 tablet 2    blood sugar diagnostic Strp To check BG 4 times daily, to use with accuchek seven plus. E11.65 360 strip 3    cetirizine (ZYRTEC) 10 MG tablet Take 10 mg by mouth once daily.      famotidine (PEPCID) 20 MG tablet TAKE 1 TABLET TWICE DAILY 180 tablet 2    guanFACINE (TENEX) 2 MG tablet TAKE 1 TABLET EVERY EVENING 90 tablet 0    hydrALAZINE (APRESOLINE) 10 MG tablet Take 1 tablet (10 mg total) by mouth 3 (three) times daily. 90 tablet 11    icosapent ethyL (VASCEPA) 1 gram Cap Take 2 capsules (2 g total) by mouth 2 (two) times daily. 360 capsule 1    insulin (LANTUS SOLOSTAR U-100 INSULIN) glargine 100 units/mL (3mL) SubQ pen INJECT 51 UNITS UNDER THE SKIN AT NIGHT 15 mL 5    lancets (ACCU-CHEK SOFTCLIX LANCETS) Misc TEST BLOOD SUGAR FOUR TIMES DAILY 400 each 3    metFORMIN (GLUCOPHAGE) 500 MG tablet TAKE 2 TABLETS IN THE MORNING  AND TAKE 1 TABLET AT NIGHT 270 tablet 0    olmesartan (BENICAR) 40 MG tablet TAKE 1 TABLET EVERY DAY 90 tablet 3    pen needle, diabetic (BD ULTRA-FINE ANNA PEN NEEDLE) 32 gauge x 5/32" Ndle Inject 1 each into the skin every evening. 100 each 5    rosuvastatin (CRESTOR) 20 MG tablet Take 1 tablet (20 mg total) by mouth once daily. 90 tablet 3    spironolactone (ALDACTONE) 50 MG tablet TAKE 1 TABLET TWICE DAILY 180 tablet 2    albuterol (PROVENTIL HFA) 90 mcg/actuation inhaler Inhale 1-2 puffs into the lungs every 6 (six) hours as needed for Wheezing or Shortness of Breath. Rescue 18 g 0    aspirin 81 MG Chew Take 81 mg by mouth once daily.      benzonatate (TESSALON) 100 MG capsule Take 1 capsule (100 mg total) by mouth 3 (three) times daily as needed for Cough. 30 capsule 0    dextromethorphan-guaiFENesin (MUCINEX DM) 60-1,200 mg per 12 hr tablet Take 1 tablet by mouth 2 (two) times daily as needed (cough, congestion). 20 tablet 0    fluticasone propionate (FLONASE) 50 " mcg/actuation nasal spray 2 sprays (100 mcg total) by Each Nostril route once daily. 18.2 mL 0    JARDIANCE 25 mg tablet TAKE 1 TABLET (25 MG TOTAL) BY MOUTH ONCE DAILY. (Patient not taking: Reported on 5/13/2022) 90 tablet 1     No current facility-administered medications for this visit.     Review of patient's allergies indicates:   Allergen Reactions    Biaxin [clarithromycin]     Prandin [repaglinide] Nausea And Vomiting    Amlodipine     Chlorphen-phenyltolox-pe-ppa     Ciprofloxacin Nausea And Vomiting    Omnicef [cefdinir]     Propoxyphene     Quinine Nausea And Vomiting      Past Medical History:   Diagnosis Date    Allergy     Breast mass     Cataract     Diabetes mellitus     GERD (gastroesophageal reflux disease)     Hernia, hiatal     Hyperlipidemia     Hypertension     Kidney stone     Osteoarthritis     Renal insufficiency     Seasonal allergies     Sleep apnea     SSS (sick sinus syndrome)     SVT (supraventricular tachycardia)      Past Surgical History:   Procedure Laterality Date    BREAST BIOPSY      BREAST CYST ASPIRATION      BREAST SURGERY      CERVICAL DISC SURGERY      EYE SURGERY      cataracts bilateral    HYSTERECTOMY         Review of Systems   Constitutional: Positive for fatigue. Negative for activity change, appetite change, chills, fever and unexpected weight change.   HENT: Positive for congestion, postnasal drip, sinus pain, sneezing and sore throat. Negative for ear pain, rhinorrhea and sinus pressure.    Eyes: Negative for pain, discharge and visual disturbance.   Respiratory: Positive for cough (dry), chest tightness and wheezing. Negative for shortness of breath.    Cardiovascular: Negative for chest pain, palpitations and leg swelling.   Gastrointestinal: Negative for abdominal distention, abdominal pain, blood in stool, constipation, diarrhea, nausea and vomiting.   Genitourinary: Negative for difficulty urinating, dysuria, flank pain, frequency,  "hematuria, pelvic pain, urgency and vaginal discharge.   Musculoskeletal: Negative for back pain, joint pain, joint swelling, myalgias and neck pain.   Skin: Negative for color change, rash and wound.   Neurological: Positive for headaches. Negative for dizziness, seizures, syncope, weakness, light-headedness and numbness.   Hematological: Negative for adenopathy.   Psychiatric/Behavioral: Negative for agitation, confusion, dysphoric mood, hallucinations, sleep disturbance and suicidal ideas. The patient is not nervous/anxious.       OBJECTIVE:      Vitals:    05/13/22 0918   BP: 136/80   BP Location: Right arm   Patient Position: Sitting   BP Method: Large (Manual)   Pulse: (!) 56   Resp: 18   Temp: 98.3 °F (36.8 °C)   TempSrc: Oral   SpO2: 99%   Weight: 79.7 kg (175 lb 9.6 oz)   Height: 5' 3" (1.6 m)     Physical Exam  Vitals reviewed.   Constitutional:       General: She is not in acute distress.     Appearance: Normal appearance. She is well-developed. She is obese. She is not diaphoretic.   HENT:      Head: Normocephalic and atraumatic.      Right Ear: Hearing, ear canal and external ear normal. A middle ear effusion (clear fluid) is present. Tympanic membrane is not perforated, erythematous, retracted or bulging.      Left Ear: Hearing, ear canal and external ear normal. A middle ear effusion (clear fluid) is present. Tympanic membrane is not perforated, erythematous, retracted or bulging.      Nose: Mucosal edema and congestion present. No rhinorrhea.      Right Turbinates: Swollen.      Left Turbinates: Swollen.      Right Sinus: No maxillary sinus tenderness or frontal sinus tenderness.      Left Sinus: No maxillary sinus tenderness or frontal sinus tenderness.      Comments: Ethmoidal tenderness     Mouth/Throat:      Lips: Pink.      Mouth: Mucous membranes are moist.      Pharynx: Uvula midline. Oropharyngeal exudate present. No pharyngeal swelling or posterior oropharyngeal erythema.   Eyes:      " General: Lids are normal. No scleral icterus.        Right eye: No discharge.         Left eye: No discharge.      Extraocular Movements: Extraocular movements intact.      Conjunctiva/sclera: Conjunctivae normal.      Pupils: Pupils are equal, round, and reactive to light.   Neck:      Thyroid: No thyroid mass or thyromegaly.      Vascular: No carotid bruit.      Trachea: Trachea and phonation normal. No tracheal deviation.   Cardiovascular:      Rate and Rhythm: Normal rate and regular rhythm.      Pulses: Normal pulses.      Heart sounds: Normal heart sounds. No murmur heard.    No friction rub. No gallop.   Pulmonary:      Effort: Pulmonary effort is normal. No respiratory distress.      Breath sounds: No stridor. Examination of the left-middle field reveals wheezing. Examination of the left-lower field reveals decreased breath sounds. Decreased breath sounds and wheezing present. No rhonchi or rales.   Chest:   Breasts:      Right: No supraclavicular adenopathy.      Left: No supraclavicular adenopathy.       Abdominal:      General: Bowel sounds are normal.      Palpations: Abdomen is soft.      Tenderness: There is no abdominal tenderness.   Musculoskeletal:         General: Normal range of motion.      Cervical back: Full passive range of motion without pain, normal range of motion and neck supple.      Right lower leg: No edema.      Left lower leg: No edema.   Lymphadenopathy:      Cervical: No cervical adenopathy.      Upper Body:      Right upper body: No supraclavicular adenopathy.      Left upper body: No supraclavicular adenopathy.   Skin:     General: Skin is warm and dry.      Capillary Refill: Capillary refill takes less than 2 seconds.      Findings: No rash.   Neurological:      General: No focal deficit present.      Mental Status: She is alert and oriented to person, place, and time.      Coordination: Coordination is intact.      Gait: Gait is intact.   Psychiatric:         Attention and  Perception: Attention and perception normal.         Mood and Affect: Mood and affect normal.         Speech: Speech normal.         Behavior: Behavior normal. Behavior is cooperative.         Thought Content: Thought content normal. Thought content does not include suicidal plan.         Cognition and Memory: Cognition and memory normal.         Judgment: Judgment normal.        Assessment:       1. Acute non-recurrent ethmoidal sinusitis    2. Bronchitis    3. Symptoms of upper respiratory infection (URI)        Plan:       Acute non-recurrent ethmoidal sinusitis  Negative flu and COVID. Symptoms are most likely due to allergies or viral infection. Advised to take OTC medications such as tylenol and motrin for fever, Mucinex DM or similar for cough, antihistamine for nasal symptoms. Get plenty of rest and fluids to maintain hydration. Gargle with warm salt water if sore throat is present. Albuterol inhaler given for likely start of bronchitis. Xray ordered for mild wheeze and decreased breath sounds on the L side. If symptoms worsen or do not resolve within 7-10 days, follow up with clinic.  -     fluticasone propionate (FLONASE) 50 mcg/actuation nasal spray; 2 sprays (100 mcg total) by Each Nostril route once daily.  Dispense: 18.2 mL; Refill: 0  -     dextromethorphan-guaiFENesin (MUCINEX DM) 60-1,200 mg per 12 hr tablet; Take 1 tablet by mouth 2 (two) times daily as needed (cough, congestion).  Dispense: 20 tablet; Refill: 0  -     benzonatate (TESSALON) 100 MG capsule; Take 1 capsule (100 mg total) by mouth 3 (three) times daily as needed for Cough.  Dispense: 30 capsule; Refill: 0    Bronchitis  -     X-Ray Chest PA And Lateral; Future  -     albuterol (PROVENTIL HFA) 90 mcg/actuation inhaler; Inhale 1-2 puffs into the lungs every 6 (six) hours as needed for Wheezing or Shortness of Breath. Rescue  Dispense: 18 g; Refill: 0    Symptoms of upper respiratory infection (URI)  -     POCT Influenza A/B  Molecular  -     POCT COVID-19 Rapid Screening        Follow up if symptoms worsen or fail to improve.      5/13/2022 WALT East, FNP

## 2022-05-13 NOTE — TELEPHONE ENCOUNTER
Spoke with pt's daughter regarding results, daughter will notify pt of CXR results and prescription.

## 2022-05-13 NOTE — PROGRESS NOTES
Please call patient. CXR showing possible very mild pneumonia and/or bronchitis. Will send in 5-day course of Levaquin to treat and continue with medications as discussed during visit.

## 2022-05-16 ENCOUNTER — TELEPHONE (OUTPATIENT)
Dept: FAMILY MEDICINE | Facility: CLINIC | Age: 77
End: 2022-05-16

## 2022-05-16 RX ORDER — AMOXICILLIN 500 MG/1
500 TABLET, FILM COATED ORAL EVERY 8 HOURS
Qty: 30 TABLET | Refills: 0 | Status: SHIPPED | OUTPATIENT
Start: 2022-05-16 | End: 2022-05-26

## 2022-05-16 NOTE — TELEPHONE ENCOUNTER
Patient called.   Ms Gutierrez prescribed Levaquin last week.   Medication is causing nausea and upset stomach.   She is still not feeling any better.   Requesting a different antibiotic.

## 2022-05-27 ENCOUNTER — TELEPHONE (OUTPATIENT)
Dept: CARDIOLOGY | Facility: CLINIC | Age: 77
End: 2022-05-27
Payer: MEDICARE

## 2022-06-06 DIAGNOSIS — B37.9 ANTIBIOTIC-INDUCED YEAST INFECTION: Primary | ICD-10-CM

## 2022-06-06 DIAGNOSIS — T36.95XA ANTIBIOTIC-INDUCED YEAST INFECTION: Primary | ICD-10-CM

## 2022-06-06 RX ORDER — FLUCONAZOLE 150 MG/1
150 TABLET ORAL DAILY
Qty: 1 TABLET | Refills: 0 | Status: SHIPPED | OUTPATIENT
Start: 2022-06-06 | End: 2022-06-07

## 2022-06-30 ENCOUNTER — OFFICE VISIT (OUTPATIENT)
Dept: FAMILY MEDICINE | Facility: CLINIC | Age: 77
End: 2022-06-30
Payer: MEDICARE

## 2022-06-30 VITALS
HEIGHT: 63 IN | BODY MASS INDEX: 30.48 KG/M2 | RESPIRATION RATE: 14 BRPM | TEMPERATURE: 98 F | WEIGHT: 172 LBS | OXYGEN SATURATION: 98 % | DIASTOLIC BLOOD PRESSURE: 64 MMHG | SYSTOLIC BLOOD PRESSURE: 138 MMHG | HEART RATE: 56 BPM

## 2022-06-30 DIAGNOSIS — I10 WELL-CONTROLLED HYPERTENSION: ICD-10-CM

## 2022-06-30 DIAGNOSIS — Z78.0 MENOPAUSE: ICD-10-CM

## 2022-06-30 DIAGNOSIS — E11.65 UNCONTROLLED TYPE 2 DIABETES MELLITUS WITH HYPERGLYCEMIA: ICD-10-CM

## 2022-06-30 DIAGNOSIS — Z13.820 OSTEOPOROSIS SCREENING: ICD-10-CM

## 2022-06-30 DIAGNOSIS — E78.1 HIGH BLOOD TRIGLYCERIDES: ICD-10-CM

## 2022-06-30 DIAGNOSIS — Z86.010 HX OF COLONIC POLYPS: ICD-10-CM

## 2022-06-30 PROCEDURE — 1126F AMNT PAIN NOTED NONE PRSNT: CPT | Mod: CPTII,S$GLB,, | Performed by: INTERNAL MEDICINE

## 2022-06-30 PROCEDURE — 1126F PR PAIN SEVERITY QUANTIFIED, NO PAIN PRESENT: ICD-10-PCS | Mod: CPTII,S$GLB,, | Performed by: INTERNAL MEDICINE

## 2022-06-30 PROCEDURE — 3051F HG A1C>EQUAL 7.0%<8.0%: CPT | Mod: CPTII,S$GLB,, | Performed by: INTERNAL MEDICINE

## 2022-06-30 PROCEDURE — 1160F RVW MEDS BY RX/DR IN RCRD: CPT | Mod: CPTII,S$GLB,, | Performed by: INTERNAL MEDICINE

## 2022-06-30 PROCEDURE — 3078F PR MOST RECENT DIASTOLIC BLOOD PRESSURE < 80 MM HG: ICD-10-PCS | Mod: CPTII,S$GLB,, | Performed by: INTERNAL MEDICINE

## 2022-06-30 PROCEDURE — 3051F PR MOST RECENT HEMOGLOBIN A1C LEVEL 7.0 - < 8.0%: ICD-10-PCS | Mod: CPTII,S$GLB,, | Performed by: INTERNAL MEDICINE

## 2022-06-30 PROCEDURE — 1101F PT FALLS ASSESS-DOCD LE1/YR: CPT | Mod: CPTII,S$GLB,, | Performed by: INTERNAL MEDICINE

## 2022-06-30 PROCEDURE — 99214 PR OFFICE/OUTPT VISIT, EST, LEVL IV, 30-39 MIN: ICD-10-PCS | Mod: S$GLB,,, | Performed by: INTERNAL MEDICINE

## 2022-06-30 PROCEDURE — 1160F PR REVIEW ALL MEDS BY PRESCRIBER/CLIN PHARMACIST DOCUMENTED: ICD-10-PCS | Mod: CPTII,S$GLB,, | Performed by: INTERNAL MEDICINE

## 2022-06-30 PROCEDURE — 1159F MED LIST DOCD IN RCRD: CPT | Mod: CPTII,S$GLB,, | Performed by: INTERNAL MEDICINE

## 2022-06-30 PROCEDURE — 3075F PR MOST RECENT SYSTOLIC BLOOD PRESS GE 130-139MM HG: ICD-10-PCS | Mod: CPTII,S$GLB,, | Performed by: INTERNAL MEDICINE

## 2022-06-30 PROCEDURE — 3288F FALL RISK ASSESSMENT DOCD: CPT | Mod: CPTII,S$GLB,, | Performed by: INTERNAL MEDICINE

## 2022-06-30 PROCEDURE — 1101F PR PT FALLS ASSESS DOC 0-1 FALLS W/OUT INJ PAST YR: ICD-10-PCS | Mod: CPTII,S$GLB,, | Performed by: INTERNAL MEDICINE

## 2022-06-30 PROCEDURE — 3078F DIAST BP <80 MM HG: CPT | Mod: CPTII,S$GLB,, | Performed by: INTERNAL MEDICINE

## 2022-06-30 PROCEDURE — 3288F PR FALLS RISK ASSESSMENT DOCUMENTED: ICD-10-PCS | Mod: CPTII,S$GLB,, | Performed by: INTERNAL MEDICINE

## 2022-06-30 PROCEDURE — 3075F SYST BP GE 130 - 139MM HG: CPT | Mod: CPTII,S$GLB,, | Performed by: INTERNAL MEDICINE

## 2022-06-30 PROCEDURE — 1159F PR MEDICATION LIST DOCUMENTED IN MEDICAL RECORD: ICD-10-PCS | Mod: CPTII,S$GLB,, | Performed by: INTERNAL MEDICINE

## 2022-06-30 PROCEDURE — 99214 OFFICE O/P EST MOD 30 MIN: CPT | Mod: S$GLB,,, | Performed by: INTERNAL MEDICINE

## 2022-06-30 RX ORDER — INSULIN GLARGINE 100 [IU]/ML
INJECTION, SOLUTION SUBCUTANEOUS
Qty: 15 ML | Refills: 5 | Status: SHIPPED | OUTPATIENT
Start: 2022-06-30 | End: 2022-07-18 | Stop reason: SDUPTHER

## 2022-06-30 RX ORDER — GUANFACINE 2 MG/1
2 TABLET ORAL NIGHTLY
Qty: 90 TABLET | Refills: 0 | Status: SHIPPED | OUTPATIENT
Start: 2022-06-30 | End: 2022-09-13

## 2022-06-30 RX ORDER — ROSUVASTATIN CALCIUM 20 MG/1
20 TABLET, COATED ORAL DAILY
Qty: 90 TABLET | Refills: 3 | Status: SHIPPED | OUTPATIENT
Start: 2022-06-30 | End: 2023-07-18

## 2022-06-30 RX ORDER — METFORMIN HYDROCHLORIDE 500 MG/1
TABLET ORAL
Qty: 270 TABLET | Refills: 0 | Status: SHIPPED | OUTPATIENT
Start: 2022-06-30 | End: 2022-09-25

## 2022-06-30 NOTE — PROGRESS NOTES
SUBJECTIVE:    Patient ID: Cynthia N Cushing is a 77 y.o. female.    Chief Complaint: Medication Refill, Follow-up, and Pneumonia (may)    HPI     Patient returns for pneumonia check-no residua    Diabetes-glucoses at night running 145-150-she eats to get them to 200-She does not complain of any neurological sx in feet-the bones in her feet hurt if she walks a distance-it does not interfere with her activities-she definitely needs a disability parking placard-her diet is not in compliance with diabetes  Last 3 sets of Vitals    Vitals - 1 value per visit 5/13/2022 6/30/2022 6/30/2022   SYSTOLIC 136 - 138   DIASTOLIC 80 - 64   Pulse 56 - 56   Temp 98.3 - 98.3   Resp 18 - 14   SPO2 99 - 98   Weight (lb) 175.6 - 172   Weight (kg) 79.652 - 78.019   Height 63 - 63   BMI (Calculated) 31.1 - 30.5   VISIT REPORT - - -   Pain Score  - 0 -       Office Visit on 05/13/2022   Component Date Value Ref Range Status    POC Molecular Influenza A Ag 05/13/2022 Negative  Negative, Not Reported Final    POC Molecular Influenza B Ag 05/13/2022 Negative  Negative, Not Reported Final     Acceptable 05/13/2022 Yes   Final    POC Rapid COVID 05/13/2022 Negative  Negative Final     Acceptable 05/13/2022 Yes   Final   Hospital Outpatient Visit on 04/11/2022   Component Date Value Ref Range Status    AORTIC VALVE CUSP SEPERATION 04/11/2022 1.30  cm Final    AV mean gradient 04/11/2022 15  mmHg Final    Ao VTI 04/11/2022 71.20  cm Final    Ao peak abimael 04/11/2022 2.68  m/s Final    AV peak gradient 04/11/2022 29  mmHg Final    Ao root annulus 04/11/2022 2.80  cm Final    LA size 04/11/2022 5.10  cm Final    LVOT diameter 04/11/2022 2.00  cm Final    LVOT peak VTI 04/11/2022 33.90  cm Final    LVOT peak abimael 04/11/2022 1.18  m/s Final    BSA 04/11/2022 1.87  m2 Final    AV valve area 04/11/2022 1.50  cm2 Final    AV Velocity Ratio 04/11/2022 0.44   Final    AV index (prosthetic) 04/11/2022 0.48    Final    LVOT area 04/11/2022 3.1  cm2 Final    LVOT stroke volume 04/11/2022 106.45  cm3 Final    EF 04/11/2022 55  % Final   Lab Visit on 03/22/2022   Component Date Value Ref Range Status    Sodium 03/22/2022 138  136 - 145 mmol/L Final    Potassium 03/22/2022 4.0  3.5 - 5.1 mmol/L Final    Chloride 03/22/2022 108  95 - 110 mmol/L Final    CO2 03/22/2022 24  23 - 29 mmol/L Final    Glucose 03/22/2022 210 (A) 70 - 110 mg/dL Final    BUN 03/22/2022 23  8 - 23 mg/dL Final    Creatinine 03/22/2022 1.6 (A) 0.5 - 1.4 mg/dL Final    Calcium 03/22/2022 9.4  8.7 - 10.5 mg/dL Final    Total Protein 03/22/2022 7.2  6.0 - 8.4 g/dL Final    Albumin 03/22/2022 4.0  3.5 - 5.2 g/dL Final    Total Bilirubin 03/22/2022 0.7  0.1 - 1.0 mg/dL Final    Alkaline Phosphatase 03/22/2022 65  55 - 135 U/L Final    AST 03/22/2022 28  10 - 40 U/L Final    ALT 03/22/2022 36  10 - 44 U/L Final    Anion Gap 03/22/2022 6 (A) 8 - 16 mmol/L Final    eGFR if  03/22/2022 35.8 (A) >60 mL/min/1.73 m^2 Final    eGFR if non  03/22/2022 31.1 (A) >60 mL/min/1.73 m^2 Final   Lab Visit on 02/07/2022   Component Date Value Ref Range Status    Hemoglobin A1C 02/07/2022 7.3 (A) 4.5 - 6.2 % Final    Estimated Avg Glucose 02/07/2022 163 (A) 68 - 131 mg/dL Final    Calcium 02/07/2022 9.8  8.7 - 10.5 mg/dL Final    PTH, Intact 02/07/2022 24.0  9.0 - 77.0 pg/mL Final    WBC 02/07/2022 8.50  3.90 - 12.70 K/uL Final    RBC 02/07/2022 4.03  4.00 - 5.40 M/uL Final    Hemoglobin 02/07/2022 10.4 (A) 12.0 - 16.0 g/dL Final    Hematocrit 02/07/2022 34.3 (A) 37.0 - 48.5 % Final    MCV 02/07/2022 85  82 - 98 fL Final    MCH 02/07/2022 25.8 (A) 27.0 - 31.0 pg Final    MCHC 02/07/2022 30.3 (A) 32.0 - 36.0 g/dL Final    RDW 02/07/2022 15.1 (A) 11.5 - 14.5 % Final    Platelets 02/07/2022 239  150 - 450 K/uL Final    MPV 02/07/2022 12.1  9.2 - 12.9 fL Final    Sodium 02/07/2022 141  136 - 145 mmol/L Final     Potassium 02/07/2022 3.7  3.5 - 5.1 mmol/L Final    Chloride 02/07/2022 105  95 - 110 mmol/L Final    CO2 02/07/2022 24  23 - 29 mmol/L Final    Glucose 02/07/2022 82  70 - 110 mg/dL Final    BUN 02/07/2022 20  8 - 23 mg/dL Final    Creatinine 02/07/2022 1.7 (A) 0.5 - 1.4 mg/dL Final    Calcium 02/07/2022 9.8  8.7 - 10.5 mg/dL Final    Total Protein 02/07/2022 6.9  6.0 - 8.4 g/dL Final    Albumin 02/07/2022 3.9  3.5 - 5.2 g/dL Final    Total Bilirubin 02/07/2022 0.7  0.1 - 1.0 mg/dL Final    Alkaline Phosphatase 02/07/2022 64  55 - 135 U/L Final    AST 02/07/2022 29  10 - 40 U/L Final    ALT 02/07/2022 35  10 - 44 U/L Final    Anion Gap 02/07/2022 12  8 - 16 mmol/L Final    eGFR if  02/07/2022 33.3 (A) >60 mL/min/1.73 m^2 Final    eGFR if non African American 02/07/2022 28.9 (A) >60 mL/min/1.73 m^2 Final    Cholesterol 02/07/2022 114 (A) 120 - 199 mg/dL Final    Triglycerides 02/07/2022 204 (A) 30 - 150 mg/dL Final    HDL 02/07/2022 34 (A) 40 - 75 mg/dL Final    LDL Cholesterol 02/07/2022 39.2 (A) 63.0 - 159.0 mg/dL Final    HDL/Cholesterol Ratio 02/07/2022 29.8  20.0 - 50.0 % Final    Total Cholesterol/HDL Ratio 02/07/2022 3.4  2.0 - 5.0 Final    Non-HDL Cholesterol 02/07/2022 80  mg/dL Final    TSH 02/07/2022 3.240  0.340 - 5.600 uIU/mL Final    Hemoglobin A1C 02/07/2022 7.3 (A) 4.5 - 6.2 % Final    Estimated Avg Glucose 02/07/2022 163 (A) 68 - 131 mg/dL Final    Microalbumin, Urine 02/07/2022 902.6 (A) <19.9 ug/mL Final    Creatinine, Urine 02/07/2022 132.0  15.0 - 325.0 mg/dL Final    Microalb/Creat Ratio 02/07/2022 683.8 (A) 0.0 - 30.0 ug/mg Final   Office Visit on 10/20/2021   Component Date Value Ref Range Status    Hemoglobin A1C 10/20/2021 6.9  % Final    Microalbumin, POC 10/20/2021 150   Final    Creatinine, Random Urine 10/20/2021 200   Final    Albumin Creatinine Ratio 10/20/2021 300  MG/G Final    Color 10/20/2021 light yellow   Final    Clarity, UA  10/20/2021 Clear  Clear Final   Lab Visit on 07/12/2021   Component Date Value Ref Range Status    Calcium 07/12/2021 10.1  8.7 - 10.5 mg/dL Final    Phosphorus 07/12/2021 3.7  2.7 - 4.5 mg/dL Final    PTH, Intact 07/12/2021 24.4  9.0 - 77.0 pg/mL Final       Past Medical History:   Diagnosis Date    Allergy     Breast mass     Cataract     Diabetes mellitus     GERD (gastroesophageal reflux disease)     Hernia, hiatal     Hyperlipidemia     Hypertension     Kidney stone     Osteoarthritis     Renal insufficiency     Seasonal allergies     Sleep apnea     SSS (sick sinus syndrome)     SVT (supraventricular tachycardia)      Past Surgical History:   Procedure Laterality Date    BREAST BIOPSY      BREAST CYST ASPIRATION      BREAST SURGERY      CERVICAL DISC SURGERY      EYE SURGERY      cataracts bilateral    HYSTERECTOMY       Family History   Problem Relation Age of Onset    Stroke Mother     Cancer Mother     Breast cancer Mother     Cancer Father     Breast cancer Maternal Aunt     Breast cancer Maternal Grandmother        Marital Status:   Alcohol History:  reports no history of alcohol use.  Tobacco History:  reports that she has never smoked. She has never used smokeless tobacco.  Drug History:  reports no history of drug use.    Review of patient's allergies indicates:   Allergen Reactions    Biaxin [clarithromycin]     Prandin [repaglinide] Nausea And Vomiting    Amlodipine     Chlorphen-phenyltolox-pe-ppa     Ciprofloxacin Nausea And Vomiting    Omnicef [cefdinir]     Propoxyphene     Quinine Nausea And Vomiting       Current Outpatient Medications:     amLODIPine (NORVASC) 10 MG tablet, Take 1 tablet (10 mg total) by mouth once daily., Disp: 30 tablet, Rfl: 11    aspirin 81 MG Chew, Take 81 mg by mouth once daily., Disp: , Rfl:     bisoprolol (ZEBETA) 5 MG tablet, TAKE 1/2 TAB EVERY DAY, Disp: 45 tablet, Rfl: 2    blood sugar diagnostic Strp, To check BG 4  "times daily, to use with accuchek seven plus. E11.65, Disp: 360 strip, Rfl: 3    famotidine (PEPCID) 20 MG tablet, TAKE 1 TABLET TWICE DAILY, Disp: 180 tablet, Rfl: 2    hydrALAZINE (APRESOLINE) 10 MG tablet, Take 1 tablet (10 mg total) by mouth 3 (three) times daily., Disp: 90 tablet, Rfl: 11    icosapent ethyL (VASCEPA) 1 gram Cap, Take 2 capsules (2 g total) by mouth 2 (two) times daily., Disp: 360 capsule, Rfl: 1    lancets (ACCU-CHEK SOFTCLIX LANCETS) Misc, TEST BLOOD SUGAR FOUR TIMES DAILY, Disp: 400 each, Rfl: 3    olmesartan (BENICAR) 40 MG tablet, TAKE 1 TABLET EVERY DAY, Disp: 90 tablet, Rfl: 3    pen needle, diabetic (BD ULTRA-FINE ANNA PEN NEEDLE) 32 gauge x 5/32" Ndle, Inject 1 each into the skin every evening., Disp: 100 each, Rfl: 5    spironolactone (ALDACTONE) 50 MG tablet, TAKE 1 TABLET TWICE DAILY, Disp: 180 tablet, Rfl: 2    guanFACINE (TENEX) 2 MG tablet, Take 1 tablet (2 mg total) by mouth every evening., Disp: 90 tablet, Rfl: 0    insulin (LANTUS SOLOSTAR U-100 INSULIN) glargine 100 units/mL SubQ pen, INJECT 51 UNITS UNDER THE SKIN AT NIGHT, Disp: 15 mL, Rfl: 5    metFORMIN (GLUCOPHAGE) 500 MG tablet, 2 tablets in the am and 1 tablet at night, Disp: 270 tablet, Rfl: 0    rosuvastatin (CRESTOR) 20 MG tablet, Take 1 tablet (20 mg total) by mouth once daily., Disp: 90 tablet, Rfl: 3    Review of Systems   Constitutional: Negative for activity change, appetite change, chills, fatigue, fever and unexpected weight change.   HENT: Negative for congestion, ear pain, hearing loss, postnasal drip, sinus pain, sneezing, sore throat, tinnitus and trouble swallowing.    Eyes: Negative for pain, discharge and visual disturbance.   Respiratory: Positive for shortness of breath (only walking a distance). Negative for cough, choking and wheezing.    Cardiovascular: Negative for chest pain, palpitations and leg swelling.   Gastrointestinal: Negative for abdominal distention, abdominal pain, blood in " stool, constipation, diarrhea, nausea and vomiting.   Endocrine: Negative for cold intolerance and heat intolerance.   Genitourinary: Negative for difficulty urinating, dysuria, frequency, pelvic pain and urgency.        Intermittent leaking   Musculoskeletal: Positive for arthralgias (knee and feet). Negative for back pain, joint swelling and neck pain.   Skin: Negative for pallor and rash.   Allergic/Immunologic: Negative for environmental allergies and food allergies.   Neurological: Negative for dizziness, tremors, weakness, numbness and headaches.   Hematological: Does not bruise/bleed easily.   Psychiatric/Behavioral: Negative for agitation, confusion, dysphoric mood, sleep disturbance and suicidal ideas. The patient is not nervous/anxious.           Objective:      Last 3 sets of Vitals    Vitals - 1 value per visit 5/13/2022 6/30/2022 6/30/2022   SYSTOLIC 136 - 138   DIASTOLIC 80 - 64   Pulse 56 - 56   Temp 98.3 - 98.3   Resp 18 - 14   SPO2 99 - 98   Weight (lb) 175.6 - 172   Weight (kg) 79.652 - 78.019   Height 63 - 63   BMI (Calculated) 31.1 - 30.5   VISIT REPORT - - -   Pain Score  - 0 -       Physical Exam  Constitutional:       General: She is not in acute distress.     Appearance: She is obese. She is not ill-appearing.   HENT:      Head: Normocephalic and atraumatic.   Eyes:      Extraocular Movements: Extraocular movements intact.      Pupils: Pupils are equal, round, and reactive to light.   Neck:      Vascular: No carotid bruit.   Cardiovascular:      Rate and Rhythm: Bradycardia present.      Pulses: Normal pulses.      Heart sounds: Murmur (basilar murmur) heard.   Pulmonary:      Effort: Pulmonary effort is normal.      Breath sounds: Normal breath sounds.   Abdominal:      General: Bowel sounds are normal.      Palpations: Abdomen is soft.   Musculoskeletal:      Right lower leg: No edema.      Left lower leg: No edema.   Lymphadenopathy:      Cervical: No cervical adenopathy.   Skin:      General: Skin is warm and dry.      Capillary Refill: Capillary refill takes less than 2 seconds.   Neurological:      General: No focal deficit present.      Mental Status: She is alert and oriented to person, place, and time.   Psychiatric:         Mood and Affect: Mood normal.         Thought Content: Thought content normal.         Judgment: Judgment normal.           Assessment:       1. Uncontrolled type 2 diabetes mellitus with diabetic nephropathy, with long-term current use of insulin    2. Well-controlled hypertension    3. High blood triglycerides    4. Uncontrolled type 2 diabetes mellitus with hyperglycemia    5. Hx of colonic polyps    6. Menopause    7. Osteoporosis screening         Plan:       Uncontrolled type 2 diabetes mellitus with diabetic nephropathy, with long-term current use of insulin  -     insulin (LANTUS SOLOSTAR U-100 INSULIN) glargine 100 units/mL SubQ pen; INJECT 51 UNITS UNDER THE SKIN AT NIGHT  Dispense: 15 mL; Refill: 5  -     metFORMIN (GLUCOPHAGE) 500 MG tablet; 2 tablets in the am and 1 tablet at night  Dispense: 270 tablet; Refill: 0    Well-controlled hypertension  -     guanFACINE (TENEX) 2 MG tablet; Take 1 tablet (2 mg total) by mouth every evening.  Dispense: 90 tablet; Refill: 0    High blood triglycerides  -     rosuvastatin (CRESTOR) 20 MG tablet; Take 1 tablet (20 mg total) by mouth once daily.  Dispense: 90 tablet; Refill: 3    Uncontrolled type 2 diabetes mellitus with hyperglycemia  -     metFORMIN (GLUCOPHAGE) 500 MG tablet; 2 tablets in the am and 1 tablet at night  Dispense: 270 tablet; Refill: 0  -     Basic Metabolic Panel; Future; Expected date: 06/30/2022  -     Hemoglobin A1C; Future; Expected date: 06/30/2022    Hx of colonic polyps  -     Ambulatory referral/consult to Gastroenterology; Future; Expected date: 07/07/2022    Menopause  -     DXA Bone Density Spine And Hip; Future; Expected date: 06/30/2022    Osteoporosis screening  -     DXA Bone Density  Spine And Hip; Future; Expected date: 06/30/2022      No follow-ups on file.        6/30/2022 Teri Adams M.D.

## 2022-07-07 DIAGNOSIS — Z12.31 ENCOUNTER FOR SCREENING MAMMOGRAM FOR MALIGNANT NEOPLASM OF BREAST: Primary | ICD-10-CM

## 2022-07-15 ENCOUNTER — LAB VISIT (OUTPATIENT)
Dept: LAB | Facility: HOSPITAL | Age: 77
End: 2022-07-15
Attending: INTERNAL MEDICINE
Payer: MEDICARE

## 2022-07-15 DIAGNOSIS — E11.65 UNCONTROLLED TYPE 2 DIABETES MELLITUS WITH HYPERGLYCEMIA: ICD-10-CM

## 2022-07-15 LAB
ANION GAP SERPL CALC-SCNC: 9 MMOL/L (ref 8–16)
BUN SERPL-MCNC: 27 MG/DL (ref 8–23)
CALCIUM SERPL-MCNC: 9.9 MG/DL (ref 8.7–10.5)
CHLORIDE SERPL-SCNC: 107 MMOL/L (ref 95–110)
CO2 SERPL-SCNC: 23 MMOL/L (ref 23–29)
CREAT SERPL-MCNC: 1.5 MG/DL (ref 0.5–1.4)
EST. GFR  (AFRICAN AMERICAN): 38.5 ML/MIN/1.73 M^2
EST. GFR  (NON AFRICAN AMERICAN): 33.4 ML/MIN/1.73 M^2
ESTIMATED AVG GLUCOSE: 171 MG/DL (ref 68–131)
GLUCOSE SERPL-MCNC: 123 MG/DL (ref 70–110)
HBA1C MFR BLD: 7.6 % (ref 4.5–6.2)
POTASSIUM SERPL-SCNC: 4.4 MMOL/L (ref 3.5–5.1)
SODIUM SERPL-SCNC: 139 MMOL/L (ref 136–145)

## 2022-07-15 PROCEDURE — 36415 COLL VENOUS BLD VENIPUNCTURE: CPT | Performed by: INTERNAL MEDICINE

## 2022-07-15 PROCEDURE — 80048 BASIC METABOLIC PNL TOTAL CA: CPT | Performed by: INTERNAL MEDICINE

## 2022-07-15 PROCEDURE — 83036 HEMOGLOBIN GLYCOSYLATED A1C: CPT | Performed by: INTERNAL MEDICINE

## 2022-07-18 RX ORDER — INSULIN GLARGINE 100 [IU]/ML
INJECTION, SOLUTION SUBCUTANEOUS
Qty: 15 ML | Refills: 5 | Status: SHIPPED | OUTPATIENT
Start: 2022-07-18 | End: 2023-01-11

## 2022-07-18 NOTE — PROGRESS NOTES
Increase insulin 2 units-glucose is not controlled at all-needs to send fasting glucoses to us weekly

## 2022-07-27 ENCOUNTER — TELEPHONE (OUTPATIENT)
Dept: CARDIOLOGY | Facility: CLINIC | Age: 77
End: 2022-07-27
Payer: MEDICARE

## 2022-07-27 NOTE — TELEPHONE ENCOUNTER
----- Message from Dung Martinez sent at 7/27/2022 12:54 PM CDT -----  Regarding: appointment  Contact: DaughterRachel  Patient daughter want to speak with a nurse regarding scheduling appointment for patient, please call back at [phone] (home)

## 2022-09-20 ENCOUNTER — IMMUNIZATION (OUTPATIENT)
Dept: PRIMARY CARE CLINIC | Facility: CLINIC | Age: 77
End: 2022-09-20
Payer: MEDICARE

## 2022-09-20 DIAGNOSIS — Z23 NEED FOR VACCINATION: Primary | ICD-10-CM

## 2022-09-20 PROCEDURE — 0124A COVID-19, MRNA, LNP-S, BIVALENT BOOSTER, PF, 30 MCG/0.3 ML DOSE: CPT | Mod: S$GLB,,, | Performed by: FAMILY MEDICINE

## 2022-09-20 PROCEDURE — 0124A COVID-19, MRNA, LNP-S, BIVALENT BOOSTER, PF, 30 MCG/0.3 ML DOSE: ICD-10-PCS | Mod: S$GLB,,, | Performed by: FAMILY MEDICINE

## 2022-09-20 PROCEDURE — 91312 COVID-19, MRNA, LNP-S, BIVALENT BOOSTER, PF, 30 MCG/0.3 ML DOSE: CPT | Mod: S$GLB,,, | Performed by: FAMILY MEDICINE

## 2022-09-20 PROCEDURE — 91312 COVID-19, MRNA, LNP-S, BIVALENT BOOSTER, PF, 30 MCG/0.3 ML DOSE: ICD-10-PCS | Mod: S$GLB,,, | Performed by: FAMILY MEDICINE

## 2022-10-11 ENCOUNTER — OFFICE VISIT (OUTPATIENT)
Dept: CARDIOLOGY | Facility: CLINIC | Age: 77
End: 2022-10-11
Payer: MEDICARE

## 2022-10-11 VITALS
HEIGHT: 63 IN | OXYGEN SATURATION: 98 % | WEIGHT: 172 LBS | BODY MASS INDEX: 30.48 KG/M2 | DIASTOLIC BLOOD PRESSURE: 80 MMHG | SYSTOLIC BLOOD PRESSURE: 122 MMHG | HEART RATE: 50 BPM

## 2022-10-11 DIAGNOSIS — I49.5 SSS (SICK SINUS SYNDROME): Chronic | ICD-10-CM

## 2022-10-11 DIAGNOSIS — I10 ESSENTIAL HYPERTENSION, BENIGN: ICD-10-CM

## 2022-10-11 DIAGNOSIS — N18.32 STAGE 3B CHRONIC KIDNEY DISEASE: ICD-10-CM

## 2022-10-11 PROCEDURE — 1159F PR MEDICATION LIST DOCUMENTED IN MEDICAL RECORD: ICD-10-PCS | Mod: CPTII,S$GLB,, | Performed by: INTERNAL MEDICINE

## 2022-10-11 PROCEDURE — 1159F MED LIST DOCD IN RCRD: CPT | Mod: CPTII,S$GLB,, | Performed by: INTERNAL MEDICINE

## 2022-10-11 PROCEDURE — 1126F AMNT PAIN NOTED NONE PRSNT: CPT | Mod: CPTII,S$GLB,, | Performed by: INTERNAL MEDICINE

## 2022-10-11 PROCEDURE — 3074F PR MOST RECENT SYSTOLIC BLOOD PRESSURE < 130 MM HG: ICD-10-PCS | Mod: CPTII,S$GLB,, | Performed by: INTERNAL MEDICINE

## 2022-10-11 PROCEDURE — 99214 OFFICE O/P EST MOD 30 MIN: CPT | Mod: S$GLB,,, | Performed by: INTERNAL MEDICINE

## 2022-10-11 PROCEDURE — 3079F DIAST BP 80-89 MM HG: CPT | Mod: CPTII,S$GLB,, | Performed by: INTERNAL MEDICINE

## 2022-10-11 PROCEDURE — 1160F RVW MEDS BY RX/DR IN RCRD: CPT | Mod: CPTII,S$GLB,, | Performed by: INTERNAL MEDICINE

## 2022-10-11 PROCEDURE — 3288F FALL RISK ASSESSMENT DOCD: CPT | Mod: CPTII,S$GLB,, | Performed by: INTERNAL MEDICINE

## 2022-10-11 PROCEDURE — 1126F PR PAIN SEVERITY QUANTIFIED, NO PAIN PRESENT: ICD-10-PCS | Mod: CPTII,S$GLB,, | Performed by: INTERNAL MEDICINE

## 2022-10-11 PROCEDURE — 99214 PR OFFICE/OUTPT VISIT, EST, LEVL IV, 30-39 MIN: ICD-10-PCS | Mod: S$GLB,,, | Performed by: INTERNAL MEDICINE

## 2022-10-11 PROCEDURE — 3074F SYST BP LT 130 MM HG: CPT | Mod: CPTII,S$GLB,, | Performed by: INTERNAL MEDICINE

## 2022-10-11 PROCEDURE — 1160F PR REVIEW ALL MEDS BY PRESCRIBER/CLIN PHARMACIST DOCUMENTED: ICD-10-PCS | Mod: CPTII,S$GLB,, | Performed by: INTERNAL MEDICINE

## 2022-10-11 PROCEDURE — 3288F PR FALLS RISK ASSESSMENT DOCUMENTED: ICD-10-PCS | Mod: CPTII,S$GLB,, | Performed by: INTERNAL MEDICINE

## 2022-10-11 PROCEDURE — 1101F PT FALLS ASSESS-DOCD LE1/YR: CPT | Mod: CPTII,S$GLB,, | Performed by: INTERNAL MEDICINE

## 2022-10-11 PROCEDURE — 1101F PR PT FALLS ASSESS DOC 0-1 FALLS W/OUT INJ PAST YR: ICD-10-PCS | Mod: CPTII,S$GLB,, | Performed by: INTERNAL MEDICINE

## 2022-10-11 PROCEDURE — 3079F PR MOST RECENT DIASTOLIC BLOOD PRESSURE 80-89 MM HG: ICD-10-PCS | Mod: CPTII,S$GLB,, | Performed by: INTERNAL MEDICINE

## 2022-10-11 NOTE — PROGRESS NOTES
"     Patient ID:  Cynthia N Cushing is a 77 y.o. female who presents for follow-up of Hypertension, SSS, and Hyperlipidemia      She has been doing okay she denies any chest pains any shortness of breath.  She has been drinking a lot of a Sprite and orange juice at bedtime.  She does not consume much water.      Past Medical History:   Diagnosis Date    Allergy     Breast mass     Cataract     Diabetes mellitus     GERD (gastroesophageal reflux disease)     Hernia, hiatal     Hyperlipidemia     Hypertension     Kidney stone     Osteoarthritis     Renal insufficiency     Seasonal allergies     Sleep apnea     SSS (sick sinus syndrome)     SVT (supraventricular tachycardia)         Past Surgical History:   Procedure Laterality Date    BREAST BIOPSY      BREAST CYST ASPIRATION      BREAST SURGERY      CERVICAL DISC SURGERY      EYE SURGERY      cataracts bilateral    HYSTERECTOMY            Current Outpatient Medications   Medication Instructions    amLODIPine (NORVASC) 10 mg, Oral, Daily    aspirin 81 mg, Oral, Daily    BD ANNA 2ND GEN PEN NEEDLE 32 gauge x 5/32" Ndle INJECT 1 NEEDLE INTO THE SKIN EVERY EVENING    bisoprolol (ZEBETA) 5 MG tablet TAKE 1/2 TABLET EVERY DAY    blood sugar diagnostic (ACCU-CHEK AMADOR PLUS TEST STRP) Strp TEST BLOOD SUGAR FOUR TIMES DAILY    famotidine (PEPCID) 20 MG tablet TAKE 1 TABLET TWICE DAILY    guanFACINE (TENEX) 2 MG tablet TAKE 1 TABLET EVERY EVENING    hydrALAZINE (APRESOLINE) 10 MG tablet TAKE 1 TABLET(10 MG) BY MOUTH THREE TIMES DAILY    icosapent ethyL (VASCEPA) 2 g, Oral, 2 times daily    insulin (LANTUS SOLOSTAR U-100 INSULIN) glargine 100 units/mL SubQ pen INJECT 51 UNITS UNDER THE SKIN AT NIGHT    lancets (ACCU-CHEK SOFTCLIX LANCETS) Misc TEST BLOOD SUGAR FOUR TIMES DAILY    metFORMIN (GLUCOPHAGE) 500 MG tablet TAKE 2 TABLETS IN THE MORNING AND TAKE 1 TABLET AT NIGHT    olmesartan (BENICAR) 40 MG tablet TAKE 1 TABLET EVERY DAY    rosuvastatin (CRESTOR) 20 mg, Oral, Daily " "   spironolactone (ALDACTONE) 50 MG tablet TAKE 1 TABLET TWICE DAILY        Review of patient's allergies indicates:   Allergen Reactions    Biaxin [clarithromycin]     Prandin [repaglinide] Nausea And Vomiting    Amlodipine     Chlorphen-phenyltolox-pe-ppa     Ciprofloxacin Nausea And Vomiting    Omnicef [cefdinir]     Propoxyphene     Quinine Nausea And Vomiting        Review of Systems   Cardiovascular:  Negative for chest pain, dyspnea on exertion, leg swelling, near-syncope and palpitations.   Respiratory:  Negative for cough and shortness of breath.    Hematologic/Lymphatic: Negative for bleeding problem.   Neurological:  Negative for dizziness and weakness.      Objective:     Vitals:    10/11/22 1046   BP: 122/80   BP Location: Left arm   Patient Position: Sitting   BP Method: Medium (Manual)   Pulse: (!) 50   SpO2: 98%   Weight: 78 kg (172 lb)   Height: 5' 3" (1.6 m)       Physical Exam  Vitals and nursing note reviewed.   Constitutional:       Appearance: She is well-developed.   HENT:      Head: Normocephalic and atraumatic.   Eyes:      Conjunctiva/sclera: Conjunctivae normal.   Cardiovascular:      Rate and Rhythm: Normal rate and regular rhythm.      Heart sounds: Murmur (Grade 4/6 systolic murmur at the baseGrade 3/6 systolic murmur at the base) heard.   Pulmonary:      Effort: Pulmonary effort is normal.      Breath sounds: Normal breath sounds.   Abdominal:      General: Bowel sounds are normal.      Palpations: Abdomen is soft.   Musculoskeletal:         General: Normal range of motion.   Skin:     General: Skin is warm and dry.   Neurological:      Mental Status: She is alert and oriented to person, place, and time.   Psychiatric:         Behavior: Behavior normal.         Thought Content: Thought content normal.         Judgment: Judgment normal.     CMP  Sodium   Date Value Ref Range Status   07/15/2022 139 136 - 145 mmol/L Final     Potassium   Date Value Ref Range Status   07/15/2022 4.4 3.5 - " 5.1 mmol/L Final     Chloride   Date Value Ref Range Status   07/15/2022 107 95 - 110 mmol/L Final     CO2   Date Value Ref Range Status   07/15/2022 23 23 - 29 mmol/L Final     Glucose   Date Value Ref Range Status   07/15/2022 123 (H) 70 - 110 mg/dL Final     BUN   Date Value Ref Range Status   07/15/2022 27 (H) 8 - 23 mg/dL Final     Creatinine   Date Value Ref Range Status   07/15/2022 1.5 (H) 0.5 - 1.4 mg/dL Final     Calcium   Date Value Ref Range Status   07/15/2022 9.9 8.7 - 10.5 mg/dL Final     Total Protein   Date Value Ref Range Status   03/22/2022 7.2 6.0 - 8.4 g/dL Final     Albumin   Date Value Ref Range Status   03/22/2022 4.0 3.5 - 5.2 g/dL Final     Total Bilirubin   Date Value Ref Range Status   03/22/2022 0.7 0.1 - 1.0 mg/dL Final     Comment:     For infants and newborns, interpretation of results should be based  on gestational age, weight and in agreement with clinical  observations.    Premature Infant recommended reference ranges:  Up to 24 hours.............<8.0 mg/dL  Up to 48 hours............<12.0 mg/dL  3-5 days..................<15.0 mg/dL  6-29 days.................<15.0 mg/dL       Alkaline Phosphatase   Date Value Ref Range Status   03/22/2022 65 55 - 135 U/L Final     AST   Date Value Ref Range Status   03/22/2022 28 10 - 40 U/L Final     ALT   Date Value Ref Range Status   03/22/2022 36 10 - 44 U/L Final     Anion Gap   Date Value Ref Range Status   07/15/2022 9 8 - 16 mmol/L Final     eGFR if    Date Value Ref Range Status   07/15/2022 38.5 (A) >60 mL/min/1.73 m^2 Final     eGFR if non    Date Value Ref Range Status   07/15/2022 33.4 (A) >60 mL/min/1.73 m^2 Final     Comment:     Calculation used to obtain the estimated glomerular filtration  rate (eGFR) is the CKD-EPI equation.         BMP  Lab Results   Component Value Date     07/15/2022    K 4.4 07/15/2022     07/15/2022    CO2 23 07/15/2022    BUN 27 (H) 07/15/2022    CREATININE 1.5  (H) 07/15/2022    CALCIUM 9.9 07/15/2022    ANIONGAP 9 07/15/2022    ESTGFRAFRICA 38.5 (A) 07/15/2022    EGFRNONAA 33.4 (A) 07/15/2022      BNP  @LABRCNTIP(BNP,BNPTRIAGEBLO)@   Lab Results   Component Value Date    CHOL 114 (L) 02/07/2022    CHOL 152 04/19/2021    CHOL 116 (L) 11/06/2019     Lab Results   Component Value Date    HDL 34 (L) 02/07/2022    HDL 36 (L) 04/19/2021    HDL 35 (L) 11/06/2019     Lab Results   Component Value Date    LDLCALC 39.2 (L) 02/07/2022    LDLCALC 41.6 (L) 04/19/2021    LDLCALC 54.6 (L) 11/06/2019     Lab Results   Component Value Date    TRIG 204 (H) 02/07/2022    TRIG 372 (H) 04/19/2021    TRIG 132 11/06/2019     Lab Results   Component Value Date    CHOLHDL 29.8 02/07/2022    CHOLHDL 23.7 04/19/2021    CHOLHDL 30.2 11/06/2019      Lab Results   Component Value Date    TSH 3.240 02/07/2022     Lab Results   Component Value Date    HGBA1C 7.6 (H) 07/15/2022     Lab Results   Component Value Date    WBC 8.50 02/07/2022    HGB 10.4 (L) 02/07/2022    HCT 34.3 (L) 02/07/2022    MCV 85 02/07/2022     02/07/2022         Results for orders placed during the hospital encounter of 04/11/22    Echo Saline Bubble? No    Interpretation Summary  · The left ventricle is normal in size with concentric remodeling and normal systolic function.  · The estimated ejection fraction is 55%.  · Normal left ventricular diastolic function.  · Moderate left atrial enlargement.  · There is mild aortic valve stenosis.  · Aortic valve area is 1.50 cm2; peak velocity is 2.68 m/s; mean gradient is 15 mmHg.     No results found for this or any previous visit.         Assessment:       Essential hypertension, benign  Controlled on bisoprolol, guanfacine, hydralazine, olmesartan, amlodipine, and diuretics    SSS (sick sinus syndrome)  Asymptomatic    Hyperlipidemia  Controlled on rosuvastatin 20 mg daily.  Triglycerides improved    Stage 3b chronic kidney disease  Mild deterioration in the GFR.  She is to  consume more water    Uncontrolled type 2 diabetes mellitus with diabetic nephropathy, with long-term current use of insulin  Not well controlled diet has been encouraged       Plan:       Decrease the intake of Sprite an orange juice because of her uncontrolled diabetes.  She is to drink more water.  She will be seen in the office in approximately 6 months.

## 2022-10-26 ENCOUNTER — OFFICE VISIT (OUTPATIENT)
Dept: FAMILY MEDICINE | Facility: CLINIC | Age: 77
End: 2022-10-26
Payer: MEDICARE

## 2022-10-26 VITALS
OXYGEN SATURATION: 99 % | SYSTOLIC BLOOD PRESSURE: 120 MMHG | TEMPERATURE: 98 F | BODY MASS INDEX: 30.48 KG/M2 | RESPIRATION RATE: 14 BRPM | DIASTOLIC BLOOD PRESSURE: 68 MMHG | WEIGHT: 172 LBS | HEART RATE: 48 BPM | HEIGHT: 63 IN

## 2022-10-26 DIAGNOSIS — Z23 FLU VACCINE NEED: ICD-10-CM

## 2022-10-26 DIAGNOSIS — I10 WELL-CONTROLLED HYPERTENSION: ICD-10-CM

## 2022-10-26 DIAGNOSIS — I35.1 AORTIC EJECTION MURMUR: ICD-10-CM

## 2022-10-26 DIAGNOSIS — N18.32 STAGE 3B CHRONIC KIDNEY DISEASE: ICD-10-CM

## 2022-10-26 DIAGNOSIS — E11.65 UNCONTROLLED TYPE 2 DIABETES MELLITUS WITH HYPERGLYCEMIA: Primary | ICD-10-CM

## 2022-10-26 DIAGNOSIS — Z23 NEED FOR PNEUMOCOCCAL VACCINATION: ICD-10-CM

## 2022-10-26 DIAGNOSIS — Z12.11 SPECIAL SCREENING FOR MALIGNANT NEOPLASMS, COLON: ICD-10-CM

## 2022-10-26 PROCEDURE — 90677 PNEUMOCOCCAL CONJUGATE VACCINE 20-VALENT: ICD-10-PCS | Mod: S$GLB,,, | Performed by: INTERNAL MEDICINE

## 2022-10-26 PROCEDURE — 3288F FALL RISK ASSESSMENT DOCD: CPT | Mod: CPTII,S$GLB,, | Performed by: INTERNAL MEDICINE

## 2022-10-26 PROCEDURE — G0008 FLU VACCINE - QUADRIVALENT - HIGH DOSE (65+) PRESERVATIVE FREE IM: ICD-10-PCS | Mod: S$GLB,,, | Performed by: INTERNAL MEDICINE

## 2022-10-26 PROCEDURE — 90662 FLU VACCINE - QUADRIVALENT - HIGH DOSE (65+) PRESERVATIVE FREE IM: ICD-10-PCS | Mod: S$GLB,,, | Performed by: INTERNAL MEDICINE

## 2022-10-26 PROCEDURE — 99213 PR OFFICE/OUTPT VISIT, EST, LEVL III, 20-29 MIN: ICD-10-PCS | Mod: 25,S$GLB,, | Performed by: INTERNAL MEDICINE

## 2022-10-26 PROCEDURE — 1101F PT FALLS ASSESS-DOCD LE1/YR: CPT | Mod: CPTII,S$GLB,, | Performed by: INTERNAL MEDICINE

## 2022-10-26 PROCEDURE — 1126F AMNT PAIN NOTED NONE PRSNT: CPT | Mod: CPTII,S$GLB,, | Performed by: INTERNAL MEDICINE

## 2022-10-26 PROCEDURE — 1159F PR MEDICATION LIST DOCUMENTED IN MEDICAL RECORD: ICD-10-PCS | Mod: CPTII,S$GLB,, | Performed by: INTERNAL MEDICINE

## 2022-10-26 PROCEDURE — 99213 OFFICE O/P EST LOW 20 MIN: CPT | Mod: 25,S$GLB,, | Performed by: INTERNAL MEDICINE

## 2022-10-26 PROCEDURE — 1126F PR PAIN SEVERITY QUANTIFIED, NO PAIN PRESENT: ICD-10-PCS | Mod: CPTII,S$GLB,, | Performed by: INTERNAL MEDICINE

## 2022-10-26 PROCEDURE — 1159F MED LIST DOCD IN RCRD: CPT | Mod: CPTII,S$GLB,, | Performed by: INTERNAL MEDICINE

## 2022-10-26 PROCEDURE — 3074F PR MOST RECENT SYSTOLIC BLOOD PRESSURE < 130 MM HG: ICD-10-PCS | Mod: CPTII,S$GLB,, | Performed by: INTERNAL MEDICINE

## 2022-10-26 PROCEDURE — 1160F RVW MEDS BY RX/DR IN RCRD: CPT | Mod: CPTII,S$GLB,, | Performed by: INTERNAL MEDICINE

## 2022-10-26 PROCEDURE — 90677 PCV20 VACCINE IM: CPT | Mod: S$GLB,,, | Performed by: INTERNAL MEDICINE

## 2022-10-26 PROCEDURE — 3074F SYST BP LT 130 MM HG: CPT | Mod: CPTII,S$GLB,, | Performed by: INTERNAL MEDICINE

## 2022-10-26 PROCEDURE — G0009 PNEUMOCOCCAL CONJUGATE VACCINE 20-VALENT: ICD-10-PCS | Mod: S$GLB,,, | Performed by: INTERNAL MEDICINE

## 2022-10-26 PROCEDURE — 90662 IIV NO PRSV INCREASED AG IM: CPT | Mod: S$GLB,,, | Performed by: INTERNAL MEDICINE

## 2022-10-26 PROCEDURE — G0009 ADMIN PNEUMOCOCCAL VACCINE: HCPCS | Mod: S$GLB,,, | Performed by: INTERNAL MEDICINE

## 2022-10-26 PROCEDURE — 1160F PR REVIEW ALL MEDS BY PRESCRIBER/CLIN PHARMACIST DOCUMENTED: ICD-10-PCS | Mod: CPTII,S$GLB,, | Performed by: INTERNAL MEDICINE

## 2022-10-26 PROCEDURE — 1101F PR PT FALLS ASSESS DOC 0-1 FALLS W/OUT INJ PAST YR: ICD-10-PCS | Mod: CPTII,S$GLB,, | Performed by: INTERNAL MEDICINE

## 2022-10-26 PROCEDURE — G0008 ADMIN INFLUENZA VIRUS VAC: HCPCS | Mod: S$GLB,,, | Performed by: INTERNAL MEDICINE

## 2022-10-26 PROCEDURE — 3078F DIAST BP <80 MM HG: CPT | Mod: CPTII,S$GLB,, | Performed by: INTERNAL MEDICINE

## 2022-10-26 PROCEDURE — 3288F PR FALLS RISK ASSESSMENT DOCUMENTED: ICD-10-PCS | Mod: CPTII,S$GLB,, | Performed by: INTERNAL MEDICINE

## 2022-10-26 PROCEDURE — 3078F PR MOST RECENT DIASTOLIC BLOOD PRESSURE < 80 MM HG: ICD-10-PCS | Mod: CPTII,S$GLB,, | Performed by: INTERNAL MEDICINE

## 2022-10-26 NOTE — PROGRESS NOTES
SUBJECTIVE:    Patient ID: Cynthia N Cushing is a 77 y.o. female.    Chief Complaint: Medication Refill, Hypertension, and Follow-up    Medication Refill  Pertinent negatives include no abdominal pain, chest pain, chills, congestion, coughing, fatigue, fever, headaches, joint swelling, nausea, neck pain, numbness, rash, sore throat, vomiting or weakness.   Hypertension  Pertinent negatives include no chest pain, headaches, neck pain, palpitations or shortness of breath.   Follow-up  Pertinent negatives include no abdominal pain, chest pain, chills, congestion, coughing, fatigue, fever, headaches, joint swelling, nausea, neck pain, numbness, rash, sore throat, vomiting or weakness.     Patient states sometimes in the am her glucoses are in 70's and she feels low-before bed she checks it and sometimes its high-July A1C was 7.6 BUN 27 Cr 1.5    BP  good but pulse rate is low, prob due to medication    Na 139 K 4.4 Glu 123     Lab Visit on 07/15/2022   Component Date Value Ref Range Status    Sodium 07/15/2022 139  136 - 145 mmol/L Final    Potassium 07/15/2022 4.4  3.5 - 5.1 mmol/L Final    Chloride 07/15/2022 107  95 - 110 mmol/L Final    CO2 07/15/2022 23  23 - 29 mmol/L Final    Glucose 07/15/2022 123 (H)  70 - 110 mg/dL Final    BUN 07/15/2022 27 (H)  8 - 23 mg/dL Final    Creatinine 07/15/2022 1.5 (H)  0.5 - 1.4 mg/dL Final    Calcium 07/15/2022 9.9  8.7 - 10.5 mg/dL Final    Anion Gap 07/15/2022 9  8 - 16 mmol/L Final    eGFR if  07/15/2022 38.5 (A)  >60 mL/min/1.73 m^2 Final    eGFR if non  07/15/2022 33.4 (A)  >60 mL/min/1.73 m^2 Final    Hemoglobin A1C 07/15/2022 7.6 (H)  4.5 - 6.2 % Final    Estimated Avg Glucose 07/15/2022 171 (H)  68 - 131 mg/dL Final   Office Visit on 05/13/2022   Component Date Value Ref Range Status    POC Molecular Influenza A Ag 05/13/2022 Negative  Negative, Not Reported Final    POC Molecular Influenza B Ag 05/13/2022 Negative  Negative, Not Reported  I reviewed the patient's symptoms with Nephrologists Dr Fuentes and Dr Alexis as well as Elle Brasher.  Recommend Post void residual check.  Also Elle would like to have a flat Xray.  The patient confirmed [place] [facility] [place]  I have tasked the schedulers to make appointments and confirm with the patient.       Final     Acceptable 05/13/2022 Yes   Final    POC Rapid COVID 05/13/2022 Negative  Negative Final     Acceptable 05/13/2022 Yes   Final   Hospital Outpatient Visit on 04/11/2022   Component Date Value Ref Range Status    AORTIC VALVE CUSP SEPERATION 04/11/2022 1.30  cm Final    AV mean gradient 04/11/2022 15  mmHg Final    Ao VTI 04/11/2022 71.20  cm Final    Ao peak abimael 04/11/2022 2.68  m/s Final    AV peak gradient 04/11/2022 29  mmHg Final    Ao root annulus 04/11/2022 2.80  cm Final    LA size 04/11/2022 5.10  cm Final    LVOT diameter 04/11/2022 2.00  cm Final    LVOT peak VTI 04/11/2022 33.90  cm Final    LVOT peak abimael 04/11/2022 1.18  m/s Final    BSA 04/11/2022 1.87  m2 Final    AV valve area 04/11/2022 1.50  cm2 Final    AV Velocity Ratio 04/11/2022 0.44   Final    AV index (prosthetic) 04/11/2022 0.48   Final    LVOT area 04/11/2022 3.1  cm2 Final    LVOT stroke volume 04/11/2022 106.45  cm3 Final    EF 04/11/2022 55  % Final   Lab Visit on 03/22/2022   Component Date Value Ref Range Status    Sodium 03/22/2022 138  136 - 145 mmol/L Final    Potassium 03/22/2022 4.0  3.5 - 5.1 mmol/L Final    Chloride 03/22/2022 108  95 - 110 mmol/L Final    CO2 03/22/2022 24  23 - 29 mmol/L Final    Glucose 03/22/2022 210 (H)  70 - 110 mg/dL Final    BUN 03/22/2022 23  8 - 23 mg/dL Final    Creatinine 03/22/2022 1.6 (H)  0.5 - 1.4 mg/dL Final    Calcium 03/22/2022 9.4  8.7 - 10.5 mg/dL Final    Total Protein 03/22/2022 7.2  6.0 - 8.4 g/dL Final    Albumin 03/22/2022 4.0  3.5 - 5.2 g/dL Final    Total Bilirubin 03/22/2022 0.7  0.1 - 1.0 mg/dL Final    Alkaline Phosphatase 03/22/2022 65  55 - 135 U/L Final    AST 03/22/2022 28  10 - 40 U/L Final    ALT 03/22/2022 36  10 - 44 U/L Final    Anion Gap 03/22/2022 6 (L)  8 - 16 mmol/L Final    eGFR if African American 03/22/2022 35.8 (A)  >60 mL/min/1.73 m^2 Final    eGFR if non  03/22/2022 31.1 (A)  >60 mL/min/1.73 m^2 Final   Lab  Visit on 02/07/2022   Component Date Value Ref Range Status    Hemoglobin A1C 02/07/2022 7.3 (H)  4.5 - 6.2 % Final    Estimated Avg Glucose 02/07/2022 163 (H)  68 - 131 mg/dL Final    Calcium 02/07/2022 9.8  8.7 - 10.5 mg/dL Final    PTH, Intact 02/07/2022 24.0  9.0 - 77.0 pg/mL Final    WBC 02/07/2022 8.50  3.90 - 12.70 K/uL Final    RBC 02/07/2022 4.03  4.00 - 5.40 M/uL Final    Hemoglobin 02/07/2022 10.4 (L)  12.0 - 16.0 g/dL Final    Hematocrit 02/07/2022 34.3 (L)  37.0 - 48.5 % Final    MCV 02/07/2022 85  82 - 98 fL Final    MCH 02/07/2022 25.8 (L)  27.0 - 31.0 pg Final    MCHC 02/07/2022 30.3 (L)  32.0 - 36.0 g/dL Final    RDW 02/07/2022 15.1 (H)  11.5 - 14.5 % Final    Platelets 02/07/2022 239  150 - 450 K/uL Final    MPV 02/07/2022 12.1  9.2 - 12.9 fL Final    Sodium 02/07/2022 141  136 - 145 mmol/L Final    Potassium 02/07/2022 3.7  3.5 - 5.1 mmol/L Final    Chloride 02/07/2022 105  95 - 110 mmol/L Final    CO2 02/07/2022 24  23 - 29 mmol/L Final    Glucose 02/07/2022 82  70 - 110 mg/dL Final    BUN 02/07/2022 20  8 - 23 mg/dL Final    Creatinine 02/07/2022 1.7 (H)  0.5 - 1.4 mg/dL Final    Calcium 02/07/2022 9.8  8.7 - 10.5 mg/dL Final    Total Protein 02/07/2022 6.9  6.0 - 8.4 g/dL Final    Albumin 02/07/2022 3.9  3.5 - 5.2 g/dL Final    Total Bilirubin 02/07/2022 0.7  0.1 - 1.0 mg/dL Final    Alkaline Phosphatase 02/07/2022 64  55 - 135 U/L Final    AST 02/07/2022 29  10 - 40 U/L Final    ALT 02/07/2022 35  10 - 44 U/L Final    Anion Gap 02/07/2022 12  8 - 16 mmol/L Final    eGFR if  02/07/2022 33.3 (A)  >60 mL/min/1.73 m^2 Final    eGFR if non African American 02/07/2022 28.9 (A)  >60 mL/min/1.73 m^2 Final    Cholesterol 02/07/2022 114 (L)  120 - 199 mg/dL Final    Triglycerides 02/07/2022 204 (H)  30 - 150 mg/dL Final    HDL 02/07/2022 34 (L)  40 - 75 mg/dL Final    LDL Cholesterol 02/07/2022 39.2 (L)  63.0 - 159.0 mg/dL Final    HDL/Cholesterol Ratio 02/07/2022 29.8  20.0 - 50.0 %  Final    Total Cholesterol/HDL Ratio 02/07/2022 3.4  2.0 - 5.0 Final    Non-HDL Cholesterol 02/07/2022 80  mg/dL Final    TSH 02/07/2022 3.240  0.340 - 5.600 uIU/mL Final    Hemoglobin A1C 02/07/2022 7.3 (H)  4.5 - 6.2 % Final    Estimated Avg Glucose 02/07/2022 163 (H)  68 - 131 mg/dL Final    Microalbumin, Urine 02/07/2022 902.6 (H)  <19.9 ug/mL Final    Creatinine, Urine 02/07/2022 132.0  15.0 - 325.0 mg/dL Final    Microalb/Creat Ratio 02/07/2022 683.8 (H)  0.0 - 30.0 ug/mg Final       Past Medical History:   Diagnosis Date    Allergy     Breast mass     Cataract     Diabetes mellitus     GERD (gastroesophageal reflux disease)     Hernia, hiatal     Hyperlipidemia     Hypertension     Kidney stone     Osteoarthritis     Renal insufficiency     Seasonal allergies     Sleep apnea     SSS (sick sinus syndrome)     SVT (supraventricular tachycardia)      Past Surgical History:   Procedure Laterality Date    BREAST BIOPSY      BREAST CYST ASPIRATION      BREAST SURGERY      CERVICAL DISC SURGERY      EYE SURGERY      cataracts bilateral    HYSTERECTOMY       Family History   Problem Relation Age of Onset    Stroke Mother     Cancer Mother     Breast cancer Mother     Cancer Father     Breast cancer Maternal Aunt     Breast cancer Maternal Grandmother        Marital Status:   Alcohol History:  reports no history of alcohol use.  Tobacco History:  reports that she has never smoked. She has never used smokeless tobacco.  Drug History:  reports no history of drug use.    Review of patient's allergies indicates:   Allergen Reactions    Biaxin [clarithromycin]     Prandin [repaglinide] Nausea And Vomiting    Amlodipine     Chlorphen-phenyltolox-pe-ppa     Ciprofloxacin Nausea And Vomiting    Omnicef [cefdinir]     Propoxyphene     Quinine Nausea And Vomiting       Current Outpatient Medications:     amLODIPine (NORVASC) 10 MG tablet, Take 1 tablet (10 mg total) by mouth once daily., Disp: 30 tablet, Rfl: 11     "aspirin 81 MG Chew, Take 81 mg by mouth once daily., Disp: , Rfl:     BD ANNA 2ND GEN PEN NEEDLE 32 gauge x 5/32" Ndle, INJECT 1 NEEDLE INTO THE SKIN EVERY EVENING, Disp: 100 each, Rfl: 5    bisoprolol (ZEBETA) 5 MG tablet, TAKE 1/2 TABLET EVERY DAY, Disp: 45 tablet, Rfl: 2    blood sugar diagnostic (ACCU-CHEK AMADOR PLUS TEST STRP) Strp, TEST BLOOD SUGAR FOUR TIMES DAILY, Disp: 400 strip, Rfl: 4    famotidine (PEPCID) 20 MG tablet, TAKE 1 TABLET TWICE DAILY, Disp: 180 tablet, Rfl: 2    guanFACINE (TENEX) 2 MG tablet, TAKE 1 TABLET EVERY EVENING, Disp: 90 tablet, Rfl: 0    hydrALAZINE (APRESOLINE) 10 MG tablet, TAKE 1 TABLET(10 MG) BY MOUTH THREE TIMES DAILY, Disp: 90 tablet, Rfl: 11    icosapent ethyL (VASCEPA) 1 gram Cap, Take 2 capsules (2 g total) by mouth 2 (two) times daily., Disp: 360 capsule, Rfl: 1    insulin (LANTUS SOLOSTAR U-100 INSULIN) glargine 100 units/mL SubQ pen, INJECT 51 UNITS UNDER THE SKIN AT NIGHT (Patient taking differently: INJECT 53 UNITS UNDER THE SKIN AT NIGHT), Disp: 15 mL, Rfl: 5    lancets (ACCU-CHEK SOFTCLIX LANCETS) Misc, TEST BLOOD SUGAR FOUR TIMES DAILY, Disp: 400 each, Rfl: 3    metFORMIN (GLUCOPHAGE) 500 MG tablet, TAKE 2 TABLETS IN THE MORNING AND TAKE 1 TABLET AT NIGHT, Disp: 270 tablet, Rfl: 0    olmesartan (BENICAR) 40 MG tablet, TAKE 1 TABLET EVERY DAY, Disp: 90 tablet, Rfl: 3    rosuvastatin (CRESTOR) 20 MG tablet, Take 1 tablet (20 mg total) by mouth once daily., Disp: 90 tablet, Rfl: 3    spironolactone (ALDACTONE) 50 MG tablet, TAKE 1 TABLET TWICE DAILY, Disp: 180 tablet, Rfl: 2    Review of Systems   Constitutional:  Negative for activity change, appetite change, chills, fatigue, fever and unexpected weight change.   HENT:  Negative for congestion, ear pain, hearing loss, postnasal drip, sinus pain, sneezing, sore throat, tinnitus and trouble swallowing.    Eyes:  Negative for pain, discharge and visual disturbance.   Respiratory:  Negative for cough, choking, shortness " of breath and wheezing.    Cardiovascular:  Negative for chest pain, palpitations and leg swelling.   Gastrointestinal:  Negative for abdominal distention, abdominal pain, blood in stool, constipation, diarrhea, nausea and vomiting.   Endocrine: Negative for cold intolerance and heat intolerance.   Genitourinary:  Negative for difficulty urinating, dysuria, frequency, pelvic pain and urgency.   Musculoskeletal:  Negative for back pain, joint swelling and neck pain.   Skin:  Negative for pallor and rash.   Allergic/Immunologic: Negative for environmental allergies and food allergies.   Neurological:  Negative for dizziness, tremors, weakness, numbness and headaches.   Hematological:  Does not bruise/bleed easily.   Psychiatric/Behavioral:  Negative for agitation, confusion, dysphoric mood, sleep disturbance and suicidal ideas. The patient is not nervous/anxious.         Objective:      Vitals    Vitals - 1 value per visit 6/30/2022 10/11/2022 10/11/2022 10/26/2022 10/26/2022   SYSTOLIC 138 - 122 - 120   DIASTOLIC 64 - 80 - 68   Pulse 56 - 50 - 48   Temp 98.3 - - - 97.9   Resp 14 - - - 14   SPO2 98 - 98 - 99   Weight (lb) 172 - 172 - 172   Weight (kg) 78.019 - 78.019 - 78.019   Height 63 - 63 - 63   BMI (Calculated) 30.5 - 30.5 - 30.5   VISIT REPORT - - - - -   Pain Score  - 0 - 0 -       Physical Exam  Vitals and nursing note reviewed.   Constitutional:       General: She is not in acute distress.     Appearance: She is obese. She is not ill-appearing.   HENT:      Head: Normocephalic and atraumatic.   Eyes:      Extraocular Movements: Extraocular movements intact.      Conjunctiva/sclera: Conjunctivae normal.      Pupils: Pupils are equal, round, and reactive to light.   Cardiovascular:      Rate and Rhythm: Normal rate and regular rhythm.      Pulses: Normal pulses.      Heart sounds: Murmur (basilar) heard.   Pulmonary:      Effort: Pulmonary effort is normal.      Breath sounds: Normal breath sounds.   Abdominal:       General: Bowel sounds are normal.      Palpations: Abdomen is soft.   Musculoskeletal:      Right lower leg: No edema.      Left lower leg: No edema.   Skin:     General: Skin is warm and dry.   Neurological:      General: No focal deficit present.      Mental Status: She is alert and oriented to person, place, and time.   Psychiatric:         Mood and Affect: Mood normal.         Thought Content: Thought content normal.         Assessment:       1. Uncontrolled type 2 diabetes mellitus with hyperglycemia    2. Well-controlled hypertension    3. Aortic ejection murmur    4. Stage 3b chronic kidney disease    5. Flu vaccine need    6. Need for pneumococcal vaccination    7. Special screening for malignant neoplasms, colon           Plan:       Uncontrolled type 2 diabetes mellitus with hyperglycemia        -     patient knows why her studies are up and she will return to her previous dietary control andf ollow up 3 months    Flu vaccine need  -     Influenza - Quadrivalent - High Dose (65+) (PF) (IM)    Need for pneumococcal vaccination      Follow up in about 3 months (around 1/26/2023) for FOLLOW UP LABS.        11/1/2022 Teri Adams M.D.

## 2023-04-11 ENCOUNTER — OFFICE VISIT (OUTPATIENT)
Dept: CARDIOLOGY | Facility: CLINIC | Age: 78
End: 2023-04-11
Payer: MEDICARE

## 2023-04-11 VITALS
WEIGHT: 172 LBS | BODY MASS INDEX: 30.48 KG/M2 | HEIGHT: 63 IN | SYSTOLIC BLOOD PRESSURE: 130 MMHG | HEART RATE: 57 BPM | OXYGEN SATURATION: 98 % | DIASTOLIC BLOOD PRESSURE: 78 MMHG

## 2023-04-11 DIAGNOSIS — I49.5 SSS (SICK SINUS SYNDROME): Primary | ICD-10-CM

## 2023-04-11 DIAGNOSIS — I35.0 AORTIC VALVE STENOSIS, ETIOLOGY OF CARDIAC VALVE DISEASE UNSPECIFIED: ICD-10-CM

## 2023-04-11 DIAGNOSIS — N18.32 STAGE 3B CHRONIC KIDNEY DISEASE: ICD-10-CM

## 2023-04-11 DIAGNOSIS — I47.10 SVT (SUPRAVENTRICULAR TACHYCARDIA): ICD-10-CM

## 2023-04-11 DIAGNOSIS — E78.2 MIXED HYPERLIPIDEMIA: ICD-10-CM

## 2023-04-11 DIAGNOSIS — I10 ESSENTIAL HYPERTENSION, BENIGN: ICD-10-CM

## 2023-04-11 PROCEDURE — 3288F FALL RISK ASSESSMENT DOCD: CPT | Mod: CPTII,S$GLB,, | Performed by: INTERNAL MEDICINE

## 2023-04-11 PROCEDURE — 1101F PR PT FALLS ASSESS DOC 0-1 FALLS W/OUT INJ PAST YR: ICD-10-PCS | Mod: CPTII,S$GLB,, | Performed by: INTERNAL MEDICINE

## 2023-04-11 PROCEDURE — 99213 PR OFFICE/OUTPT VISIT, EST, LEVL III, 20-29 MIN: ICD-10-PCS | Mod: S$GLB,,, | Performed by: INTERNAL MEDICINE

## 2023-04-11 PROCEDURE — 1159F MED LIST DOCD IN RCRD: CPT | Mod: CPTII,S$GLB,, | Performed by: INTERNAL MEDICINE

## 2023-04-11 PROCEDURE — 1126F PR PAIN SEVERITY QUANTIFIED, NO PAIN PRESENT: ICD-10-PCS | Mod: CPTII,S$GLB,, | Performed by: INTERNAL MEDICINE

## 2023-04-11 PROCEDURE — 1159F PR MEDICATION LIST DOCUMENTED IN MEDICAL RECORD: ICD-10-PCS | Mod: CPTII,S$GLB,, | Performed by: INTERNAL MEDICINE

## 2023-04-11 PROCEDURE — 3075F PR MOST RECENT SYSTOLIC BLOOD PRESS GE 130-139MM HG: ICD-10-PCS | Mod: CPTII,S$GLB,, | Performed by: INTERNAL MEDICINE

## 2023-04-11 PROCEDURE — 3078F DIAST BP <80 MM HG: CPT | Mod: CPTII,S$GLB,, | Performed by: INTERNAL MEDICINE

## 2023-04-11 PROCEDURE — 3078F PR MOST RECENT DIASTOLIC BLOOD PRESSURE < 80 MM HG: ICD-10-PCS | Mod: CPTII,S$GLB,, | Performed by: INTERNAL MEDICINE

## 2023-04-11 PROCEDURE — 3075F SYST BP GE 130 - 139MM HG: CPT | Mod: CPTII,S$GLB,, | Performed by: INTERNAL MEDICINE

## 2023-04-11 PROCEDURE — 1160F PR REVIEW ALL MEDS BY PRESCRIBER/CLIN PHARMACIST DOCUMENTED: ICD-10-PCS | Mod: CPTII,S$GLB,, | Performed by: INTERNAL MEDICINE

## 2023-04-11 PROCEDURE — 99213 OFFICE O/P EST LOW 20 MIN: CPT | Mod: S$GLB,,, | Performed by: INTERNAL MEDICINE

## 2023-04-11 PROCEDURE — 1101F PT FALLS ASSESS-DOCD LE1/YR: CPT | Mod: CPTII,S$GLB,, | Performed by: INTERNAL MEDICINE

## 2023-04-11 PROCEDURE — 1126F AMNT PAIN NOTED NONE PRSNT: CPT | Mod: CPTII,S$GLB,, | Performed by: INTERNAL MEDICINE

## 2023-04-11 PROCEDURE — 3288F PR FALLS RISK ASSESSMENT DOCUMENTED: ICD-10-PCS | Mod: CPTII,S$GLB,, | Performed by: INTERNAL MEDICINE

## 2023-04-11 PROCEDURE — 1160F RVW MEDS BY RX/DR IN RCRD: CPT | Mod: CPTII,S$GLB,, | Performed by: INTERNAL MEDICINE

## 2023-04-11 NOTE — ASSESSMENT & PLAN NOTE
She has mild degree of aortic stenosis with peak velocity of 2.68 will repeat the echocardiogram to evaluate the valve

## 2023-04-11 NOTE — ASSESSMENT & PLAN NOTE
Controlled on 10 mg of amlodipine, 2 mg of Tenex, and mg of hydralazine, 40 mg of Benicar, 50 mg of spironolactone

## 2023-04-11 NOTE — PROGRESS NOTES
"     Patient ID:  Cynthia N Cushing is a 78 y.o. female who presents for follow-up of Hypertension and Hyperlipidemia      She has been doing well denies any dizziness weakness.  She denies any palpitations.  She is complaining of right knee pain.  She has not been too active.  She has occasional dyspnea on exertion.      Past Medical History:   Diagnosis Date    Allergy     Breast mass     Cataract     Diabetes mellitus     GERD (gastroesophageal reflux disease)     Hernia, hiatal     Hyperlipidemia     Hypertension     Kidney stone     Osteoarthritis     Renal insufficiency     Seasonal allergies     Sleep apnea     SSS (sick sinus syndrome)     SVT (supraventricular tachycardia)         Past Surgical History:   Procedure Laterality Date    BREAST BIOPSY      BREAST CYST ASPIRATION      BREAST SURGERY      CERVICAL DISC SURGERY      EYE SURGERY      cataracts bilateral    HYSTERECTOMY            Current Outpatient Medications   Medication Instructions    amLODIPine (NORVASC) 10 mg, Oral, Daily    aspirin 81 mg, Oral, Daily    BD ANNA 2ND GEN PEN NEEDLE 32 gauge x 5/32" Ndle INJECT 1 NEEDLE INTO THE SKIN EVERY EVENING    bisoprolol (ZEBETA) 5 MG tablet TAKE 1/2 TABLET EVERY DAY    blood sugar diagnostic (ACCU-CHEK AMADOR PLUS TEST STRP) Strp TEST BLOOD SUGAR FOUR TIMES DAILY    famotidine (PEPCID) 20 MG tablet TAKE 1 TABLET TWICE DAILY    guanFACINE (TENEX) 2 MG tablet TAKE 1 TABLET EVERY EVENING    hydrALAZINE (APRESOLINE) 10 MG tablet TAKE 1 TABLET(10 MG) BY MOUTH THREE TIMES DAILY    icosapent ethyL (VASCEPA) 2 g, Oral, 2 times daily    lancets (ACCU-CHEK SOFTCLIX LANCETS) Misc TEST BLOOD SUGAR FOUR TIMES DAILY    LANTUS SOLOSTAR U-100 INSULIN glargine 100 units/mL SubQ pen ADMINISTER 51 UNITS UNDER THE SKIN AT NIGHT    metFORMIN (GLUCOPHAGE) 500 MG tablet TAKE 2 TABLETS IN THE MORNING AND TAKE 1 TABLET AT NIGHT    olmesartan (BENICAR) 40 MG tablet TAKE 1 TABLET EVERY DAY    rosuvastatin (CRESTOR) 20 mg, Oral, " "Daily    spironolactone (ALDACTONE) 50 MG tablet TAKE 1 TABLET TWICE DAILY        Review of patient's allergies indicates:   Allergen Reactions    Biaxin [clarithromycin]     Prandin [repaglinide] Nausea And Vomiting    Amlodipine     Chlorphen-phenyltolox-pe-ppa     Ciprofloxacin Nausea And Vomiting    Omnicef [cefdinir]     Propoxyphene     Quinine Nausea And Vomiting        Review of Systems   Cardiovascular:  Positive for dyspnea on exertion. Negative for chest pain and palpitations.   Respiratory:  Negative for cough and shortness of breath.    Musculoskeletal:  Positive for joint pain.      Objective:     Vitals:    04/11/23 1258   BP: 130/78   BP Location: Left arm   Patient Position: Sitting   BP Method: Medium (Manual)   Pulse: (!) 57   SpO2: 98%   Weight: 78 kg (172 lb)   Height: 5' 3" (1.6 m)       Physical Exam  Vitals and nursing note reviewed.   Constitutional:       Appearance: She is well-developed.   HENT:      Head: Normocephalic and atraumatic.   Eyes:      Conjunctiva/sclera: Conjunctivae normal.   Cardiovascular:      Rate and Rhythm: Normal rate and regular rhythm.      Heart sounds: Murmur (Grade 3/6 systolic murmur at the base) heard.   Pulmonary:      Effort: Pulmonary effort is normal.      Breath sounds: Normal breath sounds.   Abdominal:      General: Bowel sounds are normal.      Palpations: Abdomen is soft.   Musculoskeletal:         General: Normal range of motion.   Skin:     General: Skin is warm and dry.   Neurological:      Mental Status: She is alert and oriented to person, place, and time.   Psychiatric:         Behavior: Behavior normal.         Thought Content: Thought content normal.         Judgment: Judgment normal.     CMP  Sodium   Date Value Ref Range Status   07/15/2022 139 136 - 145 mmol/L Final     Potassium   Date Value Ref Range Status   07/15/2022 4.4 3.5 - 5.1 mmol/L Final     Chloride   Date Value Ref Range Status   07/15/2022 107 95 - 110 mmol/L Final     CO2 "   Date Value Ref Range Status   07/15/2022 23 23 - 29 mmol/L Final     Glucose   Date Value Ref Range Status   07/15/2022 123 (H) 70 - 110 mg/dL Final     BUN   Date Value Ref Range Status   07/15/2022 27 (H) 8 - 23 mg/dL Final     Creatinine   Date Value Ref Range Status   07/15/2022 1.5 (H) 0.5 - 1.4 mg/dL Final     Calcium   Date Value Ref Range Status   07/15/2022 9.9 8.7 - 10.5 mg/dL Final     Total Protein   Date Value Ref Range Status   03/22/2022 7.2 6.0 - 8.4 g/dL Final     Albumin   Date Value Ref Range Status   03/22/2022 4.0 3.5 - 5.2 g/dL Final     Total Bilirubin   Date Value Ref Range Status   03/22/2022 0.7 0.1 - 1.0 mg/dL Final     Comment:     For infants and newborns, interpretation of results should be based  on gestational age, weight and in agreement with clinical  observations.    Premature Infant recommended reference ranges:  Up to 24 hours.............<8.0 mg/dL  Up to 48 hours............<12.0 mg/dL  3-5 days..................<15.0 mg/dL  6-29 days.................<15.0 mg/dL       Alkaline Phosphatase   Date Value Ref Range Status   03/22/2022 65 55 - 135 U/L Final     AST   Date Value Ref Range Status   03/22/2022 28 10 - 40 U/L Final     ALT   Date Value Ref Range Status   03/22/2022 36 10 - 44 U/L Final     Anion Gap   Date Value Ref Range Status   07/15/2022 9 8 - 16 mmol/L Final     eGFR if    Date Value Ref Range Status   07/15/2022 38.5 (A) >60 mL/min/1.73 m^2 Final     eGFR if non    Date Value Ref Range Status   07/15/2022 33.4 (A) >60 mL/min/1.73 m^2 Final     Comment:     Calculation used to obtain the estimated glomerular filtration  rate (eGFR) is the CKD-EPI equation.         BMP  Lab Results   Component Value Date     07/15/2022    K 4.4 07/15/2022     07/15/2022    CO2 23 07/15/2022    BUN 27 (H) 07/15/2022    CREATININE 1.5 (H) 07/15/2022    CALCIUM 9.9 07/15/2022    ANIONGAP 9 07/15/2022    ESTGFRAFRICA 38.5 (A) 07/15/2022     EGFRNONAA 33.4 (A) 07/15/2022      BNP  @LABRCNTIP(BNP,BNPTRIAGEBLO)@   Lab Results   Component Value Date    CHOL 114 (L) 02/07/2022    CHOL 152 04/19/2021    CHOL 116 (L) 11/06/2019     Lab Results   Component Value Date    HDL 34 (L) 02/07/2022    HDL 36 (L) 04/19/2021    HDL 35 (L) 11/06/2019     Lab Results   Component Value Date    LDLCALC 39.2 (L) 02/07/2022    LDLCALC 41.6 (L) 04/19/2021    LDLCALC 54.6 (L) 11/06/2019     Lab Results   Component Value Date    TRIG 204 (H) 02/07/2022    TRIG 372 (H) 04/19/2021    TRIG 132 11/06/2019     Lab Results   Component Value Date    CHOLHDL 29.8 02/07/2022    CHOLHDL 23.7 04/19/2021    CHOLHDL 30.2 11/06/2019      Lab Results   Component Value Date    TSH 3.240 02/07/2022     Lab Results   Component Value Date    HGBA1C 7.6 (H) 07/15/2022     Lab Results   Component Value Date    WBC 8.50 02/07/2022    HGB 10.4 (L) 02/07/2022    HCT 34.3 (L) 02/07/2022    MCV 85 02/07/2022     02/07/2022         Results for orders placed during the hospital encounter of 04/11/22    Echo Saline Bubble? No    Interpretation Summary  · The left ventricle is normal in size with concentric remodeling and normal systolic function.  · The estimated ejection fraction is 55%.  · Normal left ventricular diastolic function.  · Moderate left atrial enlargement.  · There is mild aortic valve stenosis.  · Aortic valve area is 1.50 cm2; peak velocity is 2.68 m/s; mean gradient is 15 mmHg.     No results found for this or any previous visit.         Assessment:       Essential hypertension, benign  Controlled on 10 mg of amlodipine, 2 mg of Tenex, and mg of hydralazine, 40 mg of Benicar, 50 mg of spironolactone    Hyperlipidemia  Controlled on 20 mg of rosuvastatin    SSS (sick sinus syndrome)  Stable asymptomatic    Uncontrolled type 2 diabetes mellitus with diabetic nephropathy, with long-term current use of insulin  Aware    Aortic stenosis  She has mild degree of aortic stenosis with peak  velocity of 2.68 will repeat the echocardiogram to evaluate the valve       Plan:           Obtain an echocardiogram to assess the aortic valve.  She will be seen in the office in 6 months.  Blood work will be obtained from her primary care physician

## 2023-05-11 RX ORDER — LANCETS
EACH MISCELLANEOUS
Qty: 400 EACH | Refills: 3 | Status: SHIPPED | OUTPATIENT
Start: 2023-05-11

## 2023-07-03 ENCOUNTER — HOSPITAL ENCOUNTER (OUTPATIENT)
Dept: RADIOLOGY | Facility: HOSPITAL | Age: 78
Discharge: HOME | End: 2023-07-03
Attending: OBSTETRICS & GYNECOLOGY
Payer: MEDICARE

## 2023-07-03 VITALS — HEIGHT: 63 IN | BODY MASS INDEX: 30.46 KG/M2 | WEIGHT: 171.94 LBS

## 2023-07-03 DIAGNOSIS — Z12.31 ENCOUNTER FOR SCREENING MAMMOGRAM FOR MALIGNANT NEOPLASM OF BREAST: ICD-10-CM

## 2023-07-03 PROCEDURE — 77067 SCR MAMMO BI INCL CAD: CPT | Mod: TC,PO

## 2023-07-17 DIAGNOSIS — E78.1 HIGH BLOOD TRIGLYCERIDES: ICD-10-CM

## 2023-07-18 RX ORDER — ROSUVASTATIN CALCIUM 20 MG/1
TABLET, COATED ORAL
Qty: 90 TABLET | Refills: 3 | Status: SHIPPED | OUTPATIENT
Start: 2023-07-18 | End: 2023-09-05

## 2023-07-19 RX ORDER — SPIRONOLACTONE 50 MG/1
TABLET, FILM COATED ORAL
Qty: 180 TABLET | Refills: 2 | Status: SHIPPED | OUTPATIENT
Start: 2023-07-19 | End: 2023-09-05

## 2023-07-19 RX ORDER — FAMOTIDINE 20 MG/1
TABLET, FILM COATED ORAL
Qty: 180 TABLET | Refills: 2 | Status: SHIPPED | OUTPATIENT
Start: 2023-07-19

## 2023-07-19 RX ORDER — BISOPROLOL FUMARATE 5 MG/1
TABLET, FILM COATED ORAL
Qty: 45 TABLET | Refills: 2 | Status: SHIPPED | OUTPATIENT
Start: 2023-07-19 | End: 2024-02-02

## 2023-08-07 DIAGNOSIS — I10 WELL-CONTROLLED HYPERTENSION: ICD-10-CM

## 2023-08-07 DIAGNOSIS — E11.65 UNCONTROLLED TYPE 2 DIABETES MELLITUS WITH HYPERGLYCEMIA: ICD-10-CM

## 2023-08-08 RX ORDER — METFORMIN HYDROCHLORIDE 500 MG/1
TABLET ORAL
Qty: 270 TABLET | Refills: 0 | Status: SHIPPED | OUTPATIENT
Start: 2023-08-08 | End: 2023-09-05

## 2023-08-08 RX ORDER — GUANFACINE 2 MG/1
TABLET ORAL
Qty: 90 TABLET | Refills: 0 | Status: SHIPPED | OUTPATIENT
Start: 2023-08-08 | End: 2024-01-10

## 2023-08-16 ENCOUNTER — OFFICE VISIT (OUTPATIENT)
Dept: FAMILY MEDICINE | Facility: CLINIC | Age: 78
End: 2023-08-16
Payer: MEDICARE

## 2023-08-16 VITALS
DIASTOLIC BLOOD PRESSURE: 82 MMHG | HEIGHT: 63 IN | SYSTOLIC BLOOD PRESSURE: 136 MMHG | WEIGHT: 164 LBS | OXYGEN SATURATION: 99 % | TEMPERATURE: 98 F | BODY MASS INDEX: 29.06 KG/M2 | HEART RATE: 54 BPM | RESPIRATION RATE: 14 BRPM

## 2023-08-16 DIAGNOSIS — Z78.0 ENCOUNTER FOR OSTEOPOROSIS SCREENING IN ASYMPTOMATIC POSTMENOPAUSAL PATIENT: ICD-10-CM

## 2023-08-16 DIAGNOSIS — Z78.0 MENOPAUSE: ICD-10-CM

## 2023-08-16 DIAGNOSIS — E78.1 PURE HYPERTRIGLYCERIDEMIA: ICD-10-CM

## 2023-08-16 DIAGNOSIS — E11.65 UNCONTROLLED TYPE 2 DIABETES MELLITUS WITH HYPERGLYCEMIA: Primary | ICD-10-CM

## 2023-08-16 DIAGNOSIS — I10 ESSENTIAL HYPERTENSION, BENIGN: ICD-10-CM

## 2023-08-16 DIAGNOSIS — Z13.820 ENCOUNTER FOR OSTEOPOROSIS SCREENING IN ASYMPTOMATIC POSTMENOPAUSAL PATIENT: ICD-10-CM

## 2023-08-16 DIAGNOSIS — N18.32 STAGE 3B CHRONIC KIDNEY DISEASE: ICD-10-CM

## 2023-08-16 PROCEDURE — 1126F PR PAIN SEVERITY QUANTIFIED, NO PAIN PRESENT: ICD-10-PCS | Mod: CPTII,S$GLB,, | Performed by: INTERNAL MEDICINE

## 2023-08-16 PROCEDURE — 1126F AMNT PAIN NOTED NONE PRSNT: CPT | Mod: CPTII,S$GLB,, | Performed by: INTERNAL MEDICINE

## 2023-08-16 PROCEDURE — 99214 PR OFFICE/OUTPT VISIT, EST, LEVL IV, 30-39 MIN: ICD-10-PCS | Mod: S$GLB,,, | Performed by: INTERNAL MEDICINE

## 2023-08-16 PROCEDURE — 1159F PR MEDICATION LIST DOCUMENTED IN MEDICAL RECORD: ICD-10-PCS | Mod: CPTII,S$GLB,, | Performed by: INTERNAL MEDICINE

## 2023-08-16 PROCEDURE — 3075F SYST BP GE 130 - 139MM HG: CPT | Mod: CPTII,S$GLB,, | Performed by: INTERNAL MEDICINE

## 2023-08-16 PROCEDURE — 3079F PR MOST RECENT DIASTOLIC BLOOD PRESSURE 80-89 MM HG: ICD-10-PCS | Mod: CPTII,S$GLB,, | Performed by: INTERNAL MEDICINE

## 2023-08-16 PROCEDURE — 3288F PR FALLS RISK ASSESSMENT DOCUMENTED: ICD-10-PCS | Mod: CPTII,S$GLB,, | Performed by: INTERNAL MEDICINE

## 2023-08-16 PROCEDURE — 3288F FALL RISK ASSESSMENT DOCD: CPT | Mod: CPTII,S$GLB,, | Performed by: INTERNAL MEDICINE

## 2023-08-16 PROCEDURE — 1101F PT FALLS ASSESS-DOCD LE1/YR: CPT | Mod: CPTII,S$GLB,, | Performed by: INTERNAL MEDICINE

## 2023-08-16 PROCEDURE — 3079F DIAST BP 80-89 MM HG: CPT | Mod: CPTII,S$GLB,, | Performed by: INTERNAL MEDICINE

## 2023-08-16 PROCEDURE — 1101F PR PT FALLS ASSESS DOC 0-1 FALLS W/OUT INJ PAST YR: ICD-10-PCS | Mod: CPTII,S$GLB,, | Performed by: INTERNAL MEDICINE

## 2023-08-16 PROCEDURE — 1159F MED LIST DOCD IN RCRD: CPT | Mod: CPTII,S$GLB,, | Performed by: INTERNAL MEDICINE

## 2023-08-16 PROCEDURE — 99214 OFFICE O/P EST MOD 30 MIN: CPT | Mod: S$GLB,,, | Performed by: INTERNAL MEDICINE

## 2023-08-16 PROCEDURE — 3075F PR MOST RECENT SYSTOLIC BLOOD PRESS GE 130-139MM HG: ICD-10-PCS | Mod: CPTII,S$GLB,, | Performed by: INTERNAL MEDICINE

## 2023-08-16 NOTE — PROGRESS NOTES
SUBJECTIVE:    Patient ID: Cynthia N Cushing is a 78 y.o. female.    Chief Complaint: Medication Refill, Hypertension, and Follow-up    HPI    Hypertension-Home blood pressure readings: usual about what it is here/. Salt intake and diet: salt not added to cooking. Usual weight: stable. Associated signs and symptoms: none. Patient denies: blurred vision, chest pain, dyspnea, headache, neck aches, orthopnea, palpitations, paroxysmal nocturnal dyspnea, peripheral edema, pulsating in the ears, and tiredness/fatigue. Use of agents associated with hypertension: none. Medication compliance: taking as prescribed.    Diabetes- Current symptoms include: none. Patient denies foot ulcerations, hyperglycemia, hypoglycemia , increased appetite, nausea, paresthesia of the feet, polydipsia, polyuria, visual disturbances, vomiting, and weight loss. Evaluation to date has included: fasting blood sugar, fasting lipid panel, hemoglobin A1C, and microalbuminuria. Home sugars: 140-150. Current treatments: see list. Last dilated eye exam up to date next exam in September.feels better with glucoses 140-150    Glucose 123 BUN 27 Cr 1.5 A1C 7.6 last year-we emphasized the need for labs every 3 -6 months    No visits with results within 1 Year(s) from this visit.   Latest known visit with results is:   Lab Visit on 07/15/2022   Component Date Value Ref Range Status    Sodium 07/15/2022 139  136 - 145 mmol/L Final    Potassium 07/15/2022 4.4  3.5 - 5.1 mmol/L Final    Chloride 07/15/2022 107  95 - 110 mmol/L Final    CO2 07/15/2022 23  23 - 29 mmol/L Final    Glucose 07/15/2022 123 (H)  70 - 110 mg/dL Final    BUN 07/15/2022 27 (H)  8 - 23 mg/dL Final    Creatinine 07/15/2022 1.5 (H)  0.5 - 1.4 mg/dL Final    Calcium 07/15/2022 9.9  8.7 - 10.5 mg/dL Final    Anion Gap 07/15/2022 9  8 - 16 mmol/L Final    eGFR if  07/15/2022 38.5 (A)  >60 mL/min/1.73 m^2 Final    eGFR if non  07/15/2022 33.4 (A)  >60  "mL/min/1.73 m^2 Final    Hemoglobin A1C 07/15/2022 7.6 (H)  4.5 - 6.2 % Final    Estimated Avg Glucose 07/15/2022 171 (H)  68 - 131 mg/dL Final       Past Medical History:   Diagnosis Date    Allergy     Breast mass     Cataract     Diabetes mellitus     GERD (gastroesophageal reflux disease)     Hernia, hiatal     Hyperlipidemia     Hypertension     Kidney stone     Osteoarthritis     Renal insufficiency     Seasonal allergies     Sleep apnea     SSS (sick sinus syndrome)     SVT (supraventricular tachycardia)      Past Surgical History:   Procedure Laterality Date    BREAST BIOPSY      BREAST CYST ASPIRATION      BREAST SURGERY      CERVICAL DISC SURGERY      EYE SURGERY      cataracts bilateral    HYSTERECTOMY       Family History   Problem Relation Age of Onset    Stroke Mother     Cancer Mother     Breast cancer Mother     Cancer Father     Breast cancer Maternal Aunt        Marital Status:   Alcohol History:  reports no history of alcohol use.  Tobacco History:  reports that she has never smoked. She has never used smokeless tobacco.  Drug History:  reports no history of drug use.    Review of patient's allergies indicates:   Allergen Reactions    Biaxin [clarithromycin]     Prandin [repaglinide] Nausea And Vomiting    Amlodipine     Chlorphen-phenyltolox-pe-ppa     Ciprofloxacin Nausea And Vomiting    Omnicef [cefdinir]     Propoxyphene     Quinine Nausea And Vomiting       Current Outpatient Medications:     amLODIPine (NORVASC) 10 MG tablet, Take 1 tablet (10 mg total) by mouth once daily., Disp: 30 tablet, Rfl: 11    aspirin 81 MG Chew, Take 81 mg by mouth once daily., Disp: , Rfl:     BD ANNA 2ND GEN PEN NEEDLE 32 gauge x 5/32" Ndle, INJECT 1 NEEDLE INTO THE SKIN EVERY EVENING, Disp: 100 each, Rfl: 5    bisoprolol (ZEBETA) 5 MG tablet, TAKE 1/2 TABLET EVERY DAY, Disp: 45 tablet, Rfl: 2    blood sugar diagnostic (ACCU-CHEK AMADOR PLUS TEST STRP) Strp, TEST BLOOD SUGAR FOUR TIMES DAILY, Disp: 400 " strip, Rfl: 4    famotidine (PEPCID) 20 MG tablet, TAKE 1 TABLET TWICE DAILY, Disp: 180 tablet, Rfl: 2    guanFACINE (TENEX) 2 MG tablet, TAKE 1 TABLET EVERY EVENING, Disp: 90 tablet, Rfl: 0    hydrALAZINE (APRESOLINE) 10 MG tablet, TAKE 1 TABLET(10 MG) BY MOUTH THREE TIMES DAILY, Disp: 90 tablet, Rfl: 11    icosapent ethyL (VASCEPA) 1 gram Cap, Take 2 capsules (2 g total) by mouth 2 (two) times daily., Disp: 360 capsule, Rfl: 1    lancets (ACCU-CHEK SOFTCLIX LANCETS) Misc, TEST BLOOD SUGAR FOUR TIMES DAILY, Disp: 400 each, Rfl: 3    LANTUS SOLOSTAR U-100 INSULIN glargine 100 units/mL SubQ pen, ADMINISTER 51 UNITS UNDER THE SKIN AT NIGHT (Patient taking differently: ADMINISTER 53 UNITS UNDER THE SKIN AT NIGHT), Disp: 15 mL, Rfl: 5    metFORMIN (GLUCOPHAGE) 500 MG tablet, TAKE 2 TABLETS IN THE MORNING AND TAKE 1 TABLET AT NIGHT, Disp: 270 tablet, Rfl: 0    olmesartan (BENICAR) 40 MG tablet, TAKE 1 TABLET EVERY DAY, Disp: 90 tablet, Rfl: 3    rosuvastatin (CRESTOR) 20 MG tablet, TAKE 1 TABLET ONE TIME DAILY, Disp: 90 tablet, Rfl: 3    spironolactone (ALDACTONE) 50 MG tablet, TAKE 1 TABLET TWICE DAILY, Disp: 180 tablet, Rfl: 2    Review of Systems   Constitutional:  Negative for appetite change, chills, diaphoresis, fatigue, fever and unexpected weight change.   HENT:  Negative for congestion, ear pain, hearing loss, nosebleeds, postnasal drip, sinus pressure, sinus pain, sneezing, sore throat, tinnitus, trouble swallowing and voice change.    Eyes:  Negative for photophobia, pain, itching and visual disturbance.   Respiratory:  Negative for apnea, cough, chest tightness, shortness of breath, wheezing and stridor.    Cardiovascular:  Negative for chest pain, palpitations and leg swelling.   Gastrointestinal:  Negative for abdominal distention, abdominal pain, blood in stool, constipation, diarrhea, nausea and vomiting.   Endocrine: Negative for cold intolerance, heat intolerance, polydipsia and polyuria.    Genitourinary:  Negative for decreased urine volume, difficulty urinating, dyspareunia, dysuria, flank pain, frequency, hematuria, menstrual problem, pelvic pain, urgency, vaginal discharge and vaginal pain.   Musculoskeletal:  Negative for arthralgias, back pain, joint swelling, myalgias, neck pain and neck stiffness.   Skin:  Negative for pallor.   Allergic/Immunologic: Negative for environmental allergies and food allergies.   Neurological:  Negative for dizziness, tremors, speech difficulty, weakness, light-headedness and numbness.   Hematological:  Does not bruise/bleed easily.   Psychiatric/Behavioral:  Negative for agitation, confusion, decreased concentration, sleep disturbance and suicidal ideas. The patient is not nervous/anxious.           Objective:      Vitals - 1 value per visit 2/14/2023 4/11/2023 4/11/2023 7/3/2023 8/16/2023 8/16/2023   SYSTOLIC - - 130 - - 136   DIASTOLIC - - 78 - - 82   Pulse - - 57 - - 54   Temp - - - - - 98.2   Resp - - - - - 14   SPO2 - - 98 - - 99   Weight (lb) 172 - 172 171.96 - 164   Weight (kg) 78.019 - 78.019 78 - 74.39   Height 63 - 63 63 - 63   BMI (Calculated) 30.5 - 30.5 30.5 - 29.1   VISIT REPORT [id number]  [id number]; 17NONCRENCREPNotFromCR  [id number]; 17NONCRENCREPNotFromCR  [id number]; - 17NONCRENCREPNotFromCR  [id number]; 17NONCRENCREPNotFromCR  [id number];   Pain Score  - 0 - - 0 -   Some recent data might be hidden   (    Physical Exam  Vitals and nursing note reviewed.   Constitutional:       General: She is not in acute distress.     Appearance: She is not ill-appearing.   HENT:      Head: Normocephalic and atraumatic.      Mouth/Throat:      Mouth: Mucous membranes are moist.   Eyes:      Extraocular Movements: Extraocular movements intact.      Pupils: Pupils are equal, round, and reactive to light.   Cardiovascular:      Rate and Rhythm: Normal rate and regular rhythm.      Pulses: Normal pulses.      Heart sounds: Normal heart  sounds. No murmur heard.  Pulmonary:      Effort: Pulmonary effort is normal.      Breath sounds: Normal breath sounds.   Abdominal:      General: Abdomen is flat.      Palpations: Abdomen is soft.   Musculoskeletal:         General: No tenderness.      Cervical back: Normal range of motion and neck supple. No rigidity or tenderness.   Skin:     General: Skin is warm.      Capillary Refill: Capillary refill takes less than 2 seconds.      Coloration: Skin is not jaundiced.   Neurological:      General: No focal deficit present.      Mental Status: She is alert and oriented to person, place, and time.   Psychiatric:         Mood and Affect: Mood normal.         Thought Content: Thought content normal.           Assessment:       1. Uncontrolled type 2 diabetes mellitus with hyperglycemia    2. Stage 3b chronic kidney disease    3. Essential hypertension, benign    4. Pure hypertriglyceridemia    5. Menopause    6. Encounter for osteoporosis screening in asymptomatic postmenopausal patient         Plan:       Uncontrolled type 2 diabetes mellitus with hyperglycemia  -     Hemoglobin A1C; Standing    Stage 3b chronic kidney disease  -     Comprehensive Metabolic Panel; Future; Expected date: 08/16/2023  -     Microalbumin/Creatinine Ratio, Urine; Future; Expected date: 08/16/2023    Essential hypertension, benign  -     CBC Auto Differential; Future; Expected date: 08/16/2023  -     Comprehensive Metabolic Panel; Future; Expected date: 08/16/2023    Pure hypertriglyceridemia  -     Lipid Panel; Future; Expected date: 08/16/2023    Menopause  -     DXA Bone Density Axial Skeleton 1 or more sites; Future    Encounter for osteoporosis screening in asymptomatic postmenopausal patient  -     DXA Bone Density Axial Skeleton 1 or more sites; Future      Follow up in about 3 months (around 11/16/2023) for FOLLOW UP LABS, FOLLOW-UP STATUS, FOLLOW UP MEDICATIONS.        8/16/2023 Teri Adams M.D.

## 2023-08-17 ENCOUNTER — HOSPITAL ENCOUNTER (OUTPATIENT)
Dept: RADIOLOGY | Facility: HOSPITAL | Age: 78
Discharge: HOME | End: 2023-08-17
Attending: INTERNAL MEDICINE
Payer: MEDICARE

## 2023-08-17 DIAGNOSIS — Z13.820 ENCOUNTER FOR OSTEOPOROSIS SCREENING IN ASYMPTOMATIC POSTMENOPAUSAL PATIENT: ICD-10-CM

## 2023-08-17 DIAGNOSIS — E11.65 UNCONTROLLED TYPE 2 DIABETES MELLITUS WITH HYPERGLYCEMIA: Primary | ICD-10-CM

## 2023-08-17 DIAGNOSIS — Z78.0 MENOPAUSE: ICD-10-CM

## 2023-08-17 DIAGNOSIS — Z78.0 ENCOUNTER FOR OSTEOPOROSIS SCREENING IN ASYMPTOMATIC POSTMENOPAUSAL PATIENT: ICD-10-CM

## 2023-08-17 PROCEDURE — 77080 DXA BONE DENSITY AXIAL: CPT | Mod: TC,PO

## 2023-08-28 ENCOUNTER — LAB VISIT (OUTPATIENT)
Dept: LAB | Facility: HOSPITAL | Age: 78
End: 2023-08-28
Attending: INTERNAL MEDICINE
Payer: MEDICARE

## 2023-08-28 DIAGNOSIS — E11.65 UNCONTROLLED TYPE 2 DIABETES MELLITUS WITH HYPERGLYCEMIA: ICD-10-CM

## 2023-08-28 LAB
ANION GAP SERPL CALC-SCNC: 10 MMOL/L (ref 8–16)
BUN SERPL-MCNC: 19 MG/DL (ref 8–23)
CALCIUM SERPL-MCNC: 9.5 MG/DL (ref 8.7–10.5)
CHLORIDE SERPL-SCNC: 110 MMOL/L (ref 95–110)
CO2 SERPL-SCNC: 23 MMOL/L (ref 23–29)
CREAT SERPL-MCNC: 1.5 MG/DL (ref 0.5–1.4)
EST. GFR  (NO RACE VARIABLE): 35.4 ML/MIN/1.73 M^2
ESTIMATED AVG GLUCOSE: 169 MG/DL (ref 68–131)
GLUCOSE SERPL-MCNC: 108 MG/DL (ref 70–110)
HBA1C MFR BLD: 7.5 % (ref 4.5–6.2)
POTASSIUM SERPL-SCNC: 3.6 MMOL/L (ref 3.5–5.1)
SODIUM SERPL-SCNC: 143 MMOL/L (ref 136–145)

## 2023-08-28 PROCEDURE — 83036 HEMOGLOBIN GLYCOSYLATED A1C: CPT | Performed by: INTERNAL MEDICINE

## 2023-08-28 PROCEDURE — 80048 BASIC METABOLIC PNL TOTAL CA: CPT | Performed by: INTERNAL MEDICINE

## 2023-08-28 PROCEDURE — 36415 COLL VENOUS BLD VENIPUNCTURE: CPT | Performed by: INTERNAL MEDICINE

## 2023-08-28 NOTE — PROGRESS NOTES
I need to see a diary of acucheks-Mondays fasting and two hoursafter breakfast and wed fasting and two hours after lunch and Fridays fasting and two hours after supper-send every Monday from the week before

## 2023-09-04 NOTE — PROGRESS NOTES
SUBJECTIVE:    Patient ID: Cynthia N Cushing is a 78 y.o. female.    Chief Complaint: Diabetes and Hypertension    HPI    Patient in for follow-up diabetes and hypertension    Diabetes-Current symptoms include: hyperglycemia and hypoglycemia . Patient denies foot ulcerations, increased appetite, nausea, paresthesia of the feet, polydipsia, polyuria, visual disturbances, vomiting, and weight loss. Evaluation to date has included: fasting blood sugar, fasting lipid panel, hemoglobin A1C, and microalbuminuria. Home sugars: BGs are high at bedtime. Current treatments: see list. Last dilated eye exam up to date. She states she becomes trembly at glucose levels of 80  She is now off metformin    A1C 7.5 Na 143 K 3.6 glu 108    Hypertension- Home blood pressure readings: not doing. Salt intake and diet: salt not added to cooking and salt shaker on table. Usual weight: weight gain due to fluid. Associated signs and symptoms: peripheral edema. Patient denies: chest pain, dyspnea, headache, neck aches, orthopnea, paroxysmal nocturnal dyspnea, peripheral edema, pulsating in the ears, and tiredness/fatigue. Use of agents associated with hypertension: none. Medication compliance: taking as prescribed.  She is now off spironolactone and crestor    BUN 19 Cr 1.5      Lab Visit on 08/28/2023   Component Date Value Ref Range Status    Hemoglobin A1C 08/28/2023 7.5 (H)  4.5 - 6.2 % Final    Estimated Avg Glucose 08/28/2023 169 (H)  68 - 131 mg/dL Final    Sodium 08/28/2023 143  136 - 145 mmol/L Final    Potassium 08/28/2023 3.6  3.5 - 5.1 mmol/L Final    Chloride 08/28/2023 110  95 - 110 mmol/L Final    CO2 08/28/2023 23  23 - 29 mmol/L Final    Glucose 08/28/2023 108  70 - 110 mg/dL Final    BUN 08/28/2023 19  8 - 23 mg/dL Final    Creatinine 08/28/2023 1.5 (H)  0.5 - 1.4 mg/dL Final    Calcium 08/28/2023 9.5  8.7 - 10.5 mg/dL Final    Anion Gap 08/28/2023 10  8 - 16 mmol/L Final    eGFR 08/28/2023 35.4 (A)  >60 mL/min/1.73 m^2  Final   Lab Visit on 08/17/2023   Component Date Value Ref Range Status    TSH 08/17/2023 3.890  0.340 - 5.600 uIU/mL Final    WBC 08/17/2023 11.56  3.90 - 12.70 K/uL Final    RBC 08/17/2023 3.57 (L)  4.00 - 5.40 M/uL Final    Hemoglobin 08/17/2023 9.5 (L)  12.0 - 16.0 g/dL Final    Hematocrit 08/17/2023 29.7 (L)  37.0 - 48.5 % Final    MCV 08/17/2023 83  82 - 98 fL Final    MCH 08/17/2023 26.6 (L)  27.0 - 31.0 pg Final    MCHC 08/17/2023 32.0  32.0 - 36.0 g/dL Final    RDW 08/17/2023 14.9 (H)  11.5 - 14.5 % Final    Platelets 08/17/2023 238  150 - 450 K/uL Final    MPV 08/17/2023 11.3  9.2 - 12.9 fL Final    Immature Granulocytes 08/17/2023 0.5  0.0 - 0.5 % Final    Gran # (ANC) 08/17/2023 6.3  1.8 - 7.7 K/uL Final    Immature Grans (Abs) 08/17/2023 0.06 (H)  0.00 - 0.04 K/uL Final    Lymph # 08/17/2023 3.2  1.0 - 4.8 K/uL Final    Mono # 08/17/2023 1.4 (H)  0.3 - 1.0 K/uL Final    Eos # 08/17/2023 0.5  0.0 - 0.5 K/uL Final    Baso # 08/17/2023 0.13  0.00 - 0.20 K/uL Final    nRBC 08/17/2023 0  0 /100 WBC Final    Gran % 08/17/2023 54.6  38.0 - 73.0 % Final    Lymph % 08/17/2023 27.6  18.0 - 48.0 % Final    Mono % 08/17/2023 11.7  4.0 - 15.0 % Final    Eosinophil % 08/17/2023 4.5  0.0 - 8.0 % Final    Basophil % 08/17/2023 1.1  0.0 - 1.9 % Final    Differential Method 08/17/2023 Automated   Final    Sodium 08/17/2023 139  136 - 145 mmol/L Final    Potassium 08/17/2023 4.2  3.5 - 5.1 mmol/L Final    Chloride 08/17/2023 107  95 - 110 mmol/L Final    CO2 08/17/2023 21 (L)  23 - 29 mmol/L Final    Glucose 08/17/2023 131 (H)  70 - 110 mg/dL Final    BUN 08/17/2023 26 (H)  8 - 23 mg/dL Final    Creatinine 08/17/2023 2.1 (H)  0.5 - 1.4 mg/dL Final    Calcium 08/17/2023 9.4  8.7 - 10.5 mg/dL Final    Total Protein 08/17/2023 7.1  6.0 - 8.4 g/dL Final    Albumin 08/17/2023 4.1  3.5 - 5.2 g/dL Final    Total Bilirubin 08/17/2023 0.7  0.1 - 1.0 mg/dL Final    Alkaline Phosphatase 08/17/2023 57  55 - 135 U/L Final    AST  08/17/2023 33  10 - 40 U/L Final    ALT 08/17/2023 36  10 - 44 U/L Final    eGFR 08/17/2023 23.7 (A)  >60 mL/min/1.73 m^2 Final    Anion Gap 08/17/2023 11  8 - 16 mmol/L Final    Cholesterol 08/17/2023 110 (L)  120 - 199 mg/dL Final    Triglycerides 08/17/2023 339 (H)  30 - 150 mg/dL Final    HDL 08/17/2023 33 (L)  40 - 75 mg/dL Final    LDL Cholesterol 08/17/2023 9.2 (L)  63.0 - 159.0 mg/dL Final    HDL/Cholesterol Ratio 08/17/2023 30.0  20.0 - 50.0 % Final    Total Cholesterol/HDL Ratio 08/17/2023 3.3  2.0 - 5.0 Final    Non-HDL Cholesterol 08/17/2023 77  mg/dL Final    Hemoglobin A1C 08/17/2023 6.7 (H)  4.5 - 6.2 % Final    Estimated Avg Glucose 08/17/2023 146 (H)  68 - 131 mg/dL Final    Microalbumin, Urine 08/17/2023 1038.1 (H)  <19.9 ug/mL Final    Creatinine, Urine 08/17/2023 160.0  15.0 - 325.0 mg/dL Final    Microalb/Creat Ratio 08/17/2023 648.8 (H)  0.0 - 30.0 ug/mg Final       Past Medical History:   Diagnosis Date    Allergy     Breast mass     Cataract     Diabetes mellitus     GERD (gastroesophageal reflux disease)     Hernia, hiatal     Hyperlipidemia     Hypertension     Kidney stone     Osteoarthritis     Renal insufficiency     Seasonal allergies     Sleep apnea     SSS (sick sinus syndrome)     SVT (supraventricular tachycardia)      Past Surgical History:   Procedure Laterality Date    BREAST BIOPSY      BREAST CYST ASPIRATION      BREAST SURGERY      CERVICAL DISC SURGERY      EYE SURGERY      cataracts bilateral    HYSTERECTOMY       Family History   Problem Relation Age of Onset    Stroke Mother     Cancer Mother     Breast cancer Mother     Cancer Father     Breast cancer Maternal Aunt        Marital Status:   Alcohol History:  reports no history of alcohol use.  Tobacco History:  reports that she has never smoked. She has never used smokeless tobacco.  Drug History:  reports no history of drug use.    Review of patient's allergies indicates:   Allergen Reactions    Biaxin  "[clarithromycin]     Prandin [repaglinide] Nausea And Vomiting    Amlodipine     Chlorphen-phenyltolox-pe-ppa     Ciprofloxacin Nausea And Vomiting    Omnicef [cefdinir]     Propoxyphene     Quinine Nausea And Vomiting       Current Outpatient Medications:     amLODIPine (NORVASC) 10 MG tablet, Take 1 tablet (10 mg total) by mouth once daily., Disp: 30 tablet, Rfl: 11    aspirin 81 MG Chew, Take 81 mg by mouth once daily., Disp: , Rfl:     BD ANNA 2ND GEN PEN NEEDLE 32 gauge x 5/32" Ndle, INJECT 1 NEEDLE INTO THE SKIN EVERY EVENING, Disp: 100 each, Rfl: 5    bisoprolol (ZEBETA) 5 MG tablet, TAKE 1/2 TABLET EVERY DAY, Disp: 45 tablet, Rfl: 2    blood sugar diagnostic (ACCU-CHEK AMADOR PLUS TEST STRP) Strp, TEST BLOOD SUGAR FOUR TIMES DAILY, Disp: 400 strip, Rfl: 4    famotidine (PEPCID) 20 MG tablet, TAKE 1 TABLET TWICE DAILY, Disp: 180 tablet, Rfl: 2    guanFACINE (TENEX) 2 MG tablet, TAKE 1 TABLET EVERY EVENING, Disp: 90 tablet, Rfl: 0    hydrALAZINE (APRESOLINE) 10 MG tablet, TAKE 1 TABLET(10 MG) BY MOUTH THREE TIMES DAILY, Disp: 90 tablet, Rfl: 11    icosapent ethyL (VASCEPA) 1 gram Cap, Take 2 capsules (2 g total) by mouth 2 (two) times daily., Disp: 360 capsule, Rfl: 1    lancets (ACCU-CHEK SOFTCLIX LANCETS) Misc, TEST BLOOD SUGAR FOUR TIMES DAILY, Disp: 400 each, Rfl: 3    LANTUS SOLOSTAR U-100 INSULIN glargine 100 units/mL SubQ pen, ADMINISTER 51 UNITS UNDER THE SKIN AT NIGHT (Patient taking differently: ADMINISTER 53 UNITS UNDER THE SKIN AT NIGHT), Disp: 15 mL, Rfl: 5    olmesartan (BENICAR) 40 MG tablet, TAKE 1 TABLET EVERY DAY, Disp: 90 tablet, Rfl: 3    furosemide (LASIX) 20 MG tablet, Take 1 tablet (20 mg total) by mouth once daily., Disp: 30 tablet, Rfl: 0    Review of Systems   Constitutional:  Positive for unexpected weight change (fluid). Negative for appetite change, chills, diaphoresis, fatigue and fever.   HENT:  Negative for congestion, ear pain, hearing loss, nosebleeds, postnasal drip, sinus " "pressure, sinus pain, sneezing, sore throat, tinnitus, trouble swallowing and voice change.    Eyes:  Negative for photophobia, pain, itching and visual disturbance.   Respiratory:  Negative for apnea, cough, chest tightness, shortness of breath, wheezing and stridor.    Cardiovascular:  Positive for leg swelling. Negative for chest pain and palpitations.   Gastrointestinal:  Negative for abdominal distention, abdominal pain, blood in stool, constipation, diarrhea, nausea and vomiting.   Endocrine: Negative for cold intolerance, heat intolerance, polydipsia and polyuria.   Genitourinary:  Negative for difficulty urinating, dyspareunia, dysuria, flank pain, frequency, hematuria, menstrual problem, pelvic pain, urgency, vaginal discharge and vaginal pain.   Musculoskeletal:  Negative for arthralgias, back pain, joint swelling, myalgias, neck pain and neck stiffness.   Skin:  Negative for pallor.   Allergic/Immunologic: Negative for environmental allergies and food allergies.   Neurological:  Negative for dizziness, tremors, speech difficulty, weakness, light-headedness and numbness.   Hematological:  Does not bruise/bleed easily.   Psychiatric/Behavioral:  Negative for agitation, confusion, decreased concentration, sleep disturbance and suicidal ideas. The patient is not nervous/anxious.           Objective:          2/9/2023    12:52 PM 2/14/2023     1:31 PM 4/11/2023    12:58 PM 7/3/2023    11:06 AM 8/16/2023     5:19 PM 9/5/2023     2:08 PM   Vitals - 1 value per visit   SYSTOLIC   130  136 168   DIASTOLIC   78  82 94   Pulse   57  54 76   Temp     98.2 °F (36.8 °C) 98.4 °F (36.9 °C)   Resp     14 16   SPO2   98 %  99 % 98 %   Weight (lb) 172 172 172 171.96 164 173.8   Weight (kg) 78.019 78.019 78.019 78 74.39 78.835   Height 5' 3" (1.6 m) 5' 3" (1.6 m) 5' 3" (1.6 m) 5' 3" (1.6 m) 5' 3" (1.6 m) 5' 3" (1.6 m)   BMI (Calculated) 30.5 30.5 30.5 30.5 29.1 30.8   Pain Score Three Two Zero  Zero    (    Physical " Exam  Vitals and nursing note reviewed.   Constitutional:       General: She is not in acute distress.     Appearance: Normal appearance. She is well-developed. She is obese. She is not ill-appearing or diaphoretic.   HENT:      Head: Normocephalic and atraumatic.      Right Ear: Tympanic membrane, ear canal and external ear normal. There is no impacted cerumen.      Left Ear: Tympanic membrane, ear canal and external ear normal. There is no impacted cerumen.      Nose: Nose normal.      Mouth/Throat:      Mouth: Mucous membranes are moist.      Pharynx: Oropharynx is clear. Uvula midline.   Eyes:      General: No scleral icterus.        Right eye: No discharge.         Left eye: No discharge.      Extraocular Movements: Extraocular movements intact.      Conjunctiva/sclera: Conjunctivae normal.      Pupils: Pupils are equal, round, and reactive to light.      Right eye: Pupil is round and reactive.      Left eye: Pupil is round and reactive.   Neck:      Thyroid: No thyromegaly.      Vascular: No carotid bruit or JVD.      Trachea: Trachea normal.   Cardiovascular:      Rate and Rhythm: Normal rate and regular rhythm.      Pulses: Normal pulses.      Heart sounds: Murmur (aortic ejection murmur) heard.      No friction rub. No gallop.      Comments: Frequent APC  Pulmonary:      Effort: Pulmonary effort is normal. No respiratory distress.      Breath sounds: Normal breath sounds. No wheezing or rales.   Abdominal:      General: Bowel sounds are normal. There is no distension.      Palpations: Abdomen is soft. There is no mass.      Tenderness: There is no abdominal tenderness. There is no right CVA tenderness, left CVA tenderness, guarding or rebound.      Hernia: No hernia is present.      Comments: ascites   Musculoskeletal:         General: No swelling, tenderness, deformity or signs of injury. Normal range of motion.      Cervical back: Normal range of motion and neck supple.      Right lower leg: Edema (1+)  present.      Left lower leg: Edema (1+) present.   Skin:     General: Skin is warm and dry.      Capillary Refill: Capillary refill takes less than 2 seconds.      Coloration: Skin is not pale.      Findings: No bruising, erythema, lesion or rash.      Nails: There is no clubbing.   Neurological:      General: No focal deficit present.      Mental Status: She is alert and oriented to person, place, and time.      Cranial Nerves: No cranial nerve deficit.      Sensory: No sensory deficit.      Motor: No weakness.      Coordination: Coordination normal.      Gait: Gait normal.      Deep Tendon Reflexes: Reflexes normal.   Psychiatric:         Mood and Affect: Mood normal.         Speech: Speech normal.         Behavior: Behavior normal. Behavior is cooperative.         Thought Content: Thought content normal.         Judgment: Judgment normal.           Assessment:       1. Poorly-controlled hypertension    2. Uncontrolled type 2 diabetes mellitus with hyperglycemia    3. Bilateral leg edema    4. Stage 3b chronic kidney disease    5. Other ascites         Plan:       Poorly-controlled hypertension        -     keep batteries in the cuff     Uncontrolled type 2 diabetes mellitus with hyperglycemia        -     we will address this when she brings diary of glucose reading    Bilateral leg edema        -     start lasix 20 mg every day    Stage 3b chronic kidney disease        -      Follow renal function    Other ascites        -     check response to lasix      Follow up in about 1 week (around 9/12/2023) for FOLLOW UP MEDICATIONS, FOLLOW-UP STATUS.        9/5/2023 Teri Adams M.D.

## 2023-09-05 ENCOUNTER — OFFICE VISIT (OUTPATIENT)
Dept: FAMILY MEDICINE | Facility: CLINIC | Age: 78
End: 2023-09-05
Payer: MEDICARE

## 2023-09-05 VITALS
WEIGHT: 173.81 LBS | HEART RATE: 76 BPM | RESPIRATION RATE: 16 BRPM | TEMPERATURE: 98 F | HEIGHT: 63 IN | DIASTOLIC BLOOD PRESSURE: 82 MMHG | BODY MASS INDEX: 30.8 KG/M2 | OXYGEN SATURATION: 98 % | SYSTOLIC BLOOD PRESSURE: 150 MMHG

## 2023-09-05 DIAGNOSIS — N18.32 STAGE 3B CHRONIC KIDNEY DISEASE: ICD-10-CM

## 2023-09-05 DIAGNOSIS — I10 POORLY-CONTROLLED HYPERTENSION: Primary | ICD-10-CM

## 2023-09-05 DIAGNOSIS — E11.65 UNCONTROLLED TYPE 2 DIABETES MELLITUS WITH HYPERGLYCEMIA: ICD-10-CM

## 2023-09-05 DIAGNOSIS — R60.0 BILATERAL LEG EDEMA: Primary | ICD-10-CM

## 2023-09-05 DIAGNOSIS — R60.0 BILATERAL LEG EDEMA: ICD-10-CM

## 2023-09-05 DIAGNOSIS — R18.8 OTHER ASCITES: ICD-10-CM

## 2023-09-05 PROCEDURE — 99214 OFFICE O/P EST MOD 30 MIN: CPT | Mod: S$GLB,,, | Performed by: INTERNAL MEDICINE

## 2023-09-05 PROCEDURE — 1159F PR MEDICATION LIST DOCUMENTED IN MEDICAL RECORD: ICD-10-PCS | Mod: CPTII,S$GLB,, | Performed by: INTERNAL MEDICINE

## 2023-09-05 PROCEDURE — 99214 PR OFFICE/OUTPT VISIT, EST, LEVL IV, 30-39 MIN: ICD-10-PCS | Mod: S$GLB,,, | Performed by: INTERNAL MEDICINE

## 2023-09-05 PROCEDURE — 3077F PR MOST RECENT SYSTOLIC BLOOD PRESSURE >= 140 MM HG: ICD-10-PCS | Mod: CPTII,S$GLB,, | Performed by: INTERNAL MEDICINE

## 2023-09-05 PROCEDURE — 1160F PR REVIEW ALL MEDS BY PRESCRIBER/CLIN PHARMACIST DOCUMENTED: ICD-10-PCS | Mod: CPTII,S$GLB,, | Performed by: INTERNAL MEDICINE

## 2023-09-05 PROCEDURE — 1160F RVW MEDS BY RX/DR IN RCRD: CPT | Mod: CPTII,S$GLB,, | Performed by: INTERNAL MEDICINE

## 2023-09-05 PROCEDURE — 3077F SYST BP >= 140 MM HG: CPT | Mod: CPTII,S$GLB,, | Performed by: INTERNAL MEDICINE

## 2023-09-05 PROCEDURE — 1159F MED LIST DOCD IN RCRD: CPT | Mod: CPTII,S$GLB,, | Performed by: INTERNAL MEDICINE

## 2023-09-05 PROCEDURE — 3080F DIAST BP >= 90 MM HG: CPT | Mod: CPTII,S$GLB,, | Performed by: INTERNAL MEDICINE

## 2023-09-05 PROCEDURE — 3080F PR MOST RECENT DIASTOLIC BLOOD PRESSURE >= 90 MM HG: ICD-10-PCS | Mod: CPTII,S$GLB,, | Performed by: INTERNAL MEDICINE

## 2023-09-05 RX ORDER — FUROSEMIDE 20 MG/1
20 TABLET ORAL DAILY
Qty: 30 TABLET | Refills: 0 | Status: SHIPPED | OUTPATIENT
Start: 2023-09-05 | End: 2023-10-09 | Stop reason: SDUPTHER

## 2023-09-13 DIAGNOSIS — E11.9 DIABETES MELLITUS WITHOUT COMPLICATION: ICD-10-CM

## 2023-09-13 RX ORDER — PEN NEEDLE, DIABETIC 30 GX3/16"
NEEDLE, DISPOSABLE MISCELLANEOUS
Qty: 100 EACH | Refills: 5 | Status: SHIPPED | OUTPATIENT
Start: 2023-09-13

## 2023-09-20 LAB
LEFT EYE DM RETINOPATHY: NEGATIVE
RIGHT EYE DM RETINOPATHY: NEGATIVE

## 2023-09-21 ENCOUNTER — PATIENT OUTREACH (OUTPATIENT)
Dept: ADMINISTRATIVE | Facility: HOSPITAL | Age: 78
End: 2023-09-21
Payer: MEDICARE

## 2023-09-21 RX ORDER — HYDRALAZINE HYDROCHLORIDE 10 MG/1
TABLET, FILM COATED ORAL
Qty: 90 TABLET | Refills: 11 | Status: SHIPPED | OUTPATIENT
Start: 2023-09-21 | End: 2023-10-10

## 2023-09-21 NOTE — PROGRESS NOTES
Population Health Chart Review & Patient Outreach Details:     Reason for Outreach Encounter:     [x]  Non-Compliant Report   []  Payor Report ([employer], PHN, BCBS, MSSP, MCIP, UHC, etc.)   []  Pre-Visit Chart Review     Updates Requested / Reviewed:     []  Care Everywhere    [x]     []  External Sources ([facility], [facility], [facility], etc.)   []  Care Team Updated    Patient Outreach Method:    []  Telephone Outreach Completed   [] Successful   [] Left Voicemail   [] Unable to Contact (wrong number, no voicemail)  []  [employer] Portal Outreach Sent  []  Letter Outreach Mailed  []  Fax Sent for External Records  [x]  External Records Upload    Health Maintenance Topics Addressed and Outreach Outcomes / Actions Taken:        []      Breast Cancer Screening []  Mammo Scheduled      []  External Records Requested     []  Added Reminder to Complete to Upcoming Primary Care Appt Notes     []  Patient Declined     []  Patient Will Call Back to Schedule     []  Patient Will Schedule with External Provider / Order Routed if Applicable             []       Cervical Cancer Screening []  Pap Scheduled      []  External Records Requested     []  Added Reminder to Complete to Upcoming Primary Care Appt Notes     []  Patient Declined     []  Patient Will Call Back to Schedule     []  Patient Will Schedule with External Provider               []          Colorectal Cancer Screening []  Colonoscopy Case Request or Referral Placed     []  External Records Requested     []  Added Reminder to Complete to Upcoming Primary Care Appt Notes     []  Patient Declined     []  Patient Will Call Back to Schedule     []  Patient Will Schedule with External Provider     []  Fit Kit Mailed (add the SmartPhrase under additional notes)     []  Reminded Patient to Complete Home Test             [x]      Diabetic Eye Exam []  Eye Camera Scheduled or Optometry Referral Placed     []  External Records Requested     []  Added Reminder to Complete to  Upcoming Primary Care Appt Notes     []  Patient Declined     []  Patient Will Call Back to Schedule     []  Patient Will Schedule with External Provider             []      Blood Pressure Control []  Primary Care Follow Up Visit Scheduled     []  Remote Blood Pressure Reading Captured     []  Added Reminder to Complete to Upcoming Primary Care Appt Notes     []  Patient Declined     []  Patient Will Call Back / Patient Will Send Portal Message with Reading     []  Patient Will Call Back to Schedule Provider Visit             []       HbA1c & Other Labs []  Lab Appt Scheduled for Due Labs     []  Primary Care Follow Up Visit Scheduled      []  Reminded Patient to Complete Home Test     []  Added Reminder to Complete to Upcoming Primary Care Appt Notes     []  Patient Declined     []  Patient Will Call Back to Schedule     []  Patient Will Schedule with External Provider / Order Routed if Applicable           []    Schedule Primary Care Appt []  Primary Care Appt Scheduled     []  Patient Declined     []  Patient Will Call Back to Schedule     []  Pt Established with External Provider & Updated Care Team             []      Medication Adherence []  Primary Care Appointment Scheduled     []  Added Reminder to Upcoming Primary Care Appt Notes     []  Patient Reminded to  Prescription     []  Patient Declined, Provider Notified if Needed     []  Sent Provider Message to Review and/or Add Exclusion to Problem List             []      Osteoporosis Screening []  DXA Appointment Scheduled     []  External Records Requested     []  Added Reminder to Complete to Upcoming Primary Care Appt Notes     []  Patient Declined     []  Patient Will Call Back to Schedule     []  Patient Will Schedule with External Provider / Order Routed if Applicable     Additional Care Coordinator Notes:         Further Action Needed If Patient Returns Outreach:

## 2023-10-06 ENCOUNTER — HOSPITAL ENCOUNTER (OUTPATIENT)
Dept: CARDIOLOGY | Facility: HOSPITAL | Age: 78
Discharge: HOME | End: 2023-10-06
Attending: INTERNAL MEDICINE
Payer: MEDICARE

## 2023-10-06 VITALS — BODY MASS INDEX: 30.65 KG/M2 | WEIGHT: 173 LBS | HEIGHT: 63 IN

## 2023-10-06 DIAGNOSIS — I35.0 AORTIC VALVE STENOSIS, ETIOLOGY OF CARDIAC VALVE DISEASE UNSPECIFIED: ICD-10-CM

## 2023-10-06 DIAGNOSIS — I49.5 SSS (SICK SINUS SYNDROME): ICD-10-CM

## 2023-10-06 DIAGNOSIS — I47.10 SVT (SUPRAVENTRICULAR TACHYCARDIA): ICD-10-CM

## 2023-10-06 DIAGNOSIS — I10 ESSENTIAL HYPERTENSION, BENIGN: ICD-10-CM

## 2023-10-06 DIAGNOSIS — N18.32 STAGE 3B CHRONIC KIDNEY DISEASE: ICD-10-CM

## 2023-10-06 DIAGNOSIS — E78.2 MIXED HYPERLIPIDEMIA: ICD-10-CM

## 2023-10-06 LAB
AORTIC ROOT ANNULUS: 2.8 CM
AORTIC VALVE CUSP SEPERATION: 1.2 CM
AV INDEX (PROSTH): 0.44
AV MEAN GRADIENT: 16 MMHG
AV PEAK GRADIENT: 29 MMHG
AV REGURGITATION PRESSURE HALF TIME: 908 MS
AV VALVE AREA BY VELOCITY RATIO: 1.34 CM²
AV VALVE AREA: 1.39 CM²
AV VELOCITY RATIO: 0.43
BSA FOR ECHO PROCEDURE: 1.87 M2
CV ECHO LV RWT: 0.52 CM
DOP CALC AO PEAK VEL: 2.67 M/S
DOP CALC AO VTI: 71.1 CM
DOP CALC LVOT AREA: 3.1 CM2
DOP CALC LVOT DIAMETER: 2 CM
DOP CALC LVOT PEAK VEL: 1.14 M/S
DOP CALC LVOT STROKE VOLUME: 98.91 CM3
DOP CALCLVOT PEAK VEL VTI: 31.5 CM
E WAVE DECELERATION TIME: 179 MSEC
E/A RATIO: 1.58
E/E' RATIO: 9.22 M/S
ECHO LV POSTERIOR WALL: 1.32 CM (ref 0.6–1.1)
EJECTION FRACTION: 53 %
FRACTIONAL SHORTENING: 22 % (ref 28–44)
INTERVENTRICULAR SEPTUM: 1.31 CM (ref 0.6–1.1)
IVRT: 84 MSEC
LEFT ATRIUM SIZE: 4.9 CM
LEFT INTERNAL DIMENSION IN SYSTOLE: 3.97 CM (ref 2.1–4)
LEFT VENTRICLE DIASTOLIC VOLUME INDEX: 67.03 ML/M2
LEFT VENTRICLE DIASTOLIC VOLUME: 122 ML
LEFT VENTRICLE MASS INDEX: 149 G/M2
LEFT VENTRICLE SYSTOLIC VOLUME INDEX: 37.8 ML/M2
LEFT VENTRICLE SYSTOLIC VOLUME: 68.8 ML
LEFT VENTRICULAR INTERNAL DIMENSION IN DIASTOLE: 5.06 CM (ref 3.5–6)
LEFT VENTRICULAR MASS: 271.17 G
LV LATERAL E/E' RATIO: 8.15 M/S
LV SEPTAL E/E' RATIO: 10.6 M/S
LVOT MG: 3 MMHG
LVOT MV: 0.78 CM/S
MV PEAK A VEL: 0.67 M/S
MV PEAK E VEL: 1.06 M/S
MV STENOSIS PRESSURE HALF TIME: 71 MS
MV VALVE AREA P 1/2 METHOD: 3.1 CM2
PISA AR MAX VEL: 2.82 M/S
PISA TR MAX VEL: 2.5 M/S
PV MV: 0.96 M/S
PV PEAK GRADIENT: 8 MMHG
PV PEAK VELOCITY: 1.43 M/S
RA PRESSURE ESTIMATED: 3 MMHG
RIGHT VENTRICULAR END-DIASTOLIC DIMENSION: 3.4 CM
RV TB RVSP: 6 MMHG
TDI LATERAL: 0.13 M/S
TDI SEPTAL: 0.1 M/S
TDI: 0.12 M/S
TR MAX PG: 25 MMHG
TV REST PULMONARY ARTERY PRESSURE: 28 MMHG
Z-SCORE OF LEFT VENTRICULAR DIMENSION IN END DIASTOLE: 0.04
Z-SCORE OF LEFT VENTRICULAR DIMENSION IN END SYSTOLE: 1.92

## 2023-10-06 PROCEDURE — 93306 TTE W/DOPPLER COMPLETE: CPT | Mod: 26,,, | Performed by: INTERNAL MEDICINE

## 2023-10-06 PROCEDURE — 93306 ECHO (CUPID ONLY): ICD-10-PCS | Mod: 26,,, | Performed by: INTERNAL MEDICINE

## 2023-10-06 PROCEDURE — 93306 TTE W/DOPPLER COMPLETE: CPT

## 2023-10-09 DIAGNOSIS — R60.0 BILATERAL LEG EDEMA: ICD-10-CM

## 2023-10-09 RX ORDER — FUROSEMIDE 20 MG/1
20 TABLET ORAL DAILY
Qty: 30 TABLET | Refills: 0 | Status: SHIPPED | OUTPATIENT
Start: 2023-10-09 | End: 2023-12-11 | Stop reason: SDUPTHER

## 2023-10-10 ENCOUNTER — OFFICE VISIT (OUTPATIENT)
Dept: CARDIOLOGY | Facility: CLINIC | Age: 78
End: 2023-10-10
Payer: MEDICARE

## 2023-10-10 VITALS
WEIGHT: 170 LBS | HEIGHT: 63 IN | DIASTOLIC BLOOD PRESSURE: 82 MMHG | BODY MASS INDEX: 30.12 KG/M2 | HEART RATE: 80 BPM | SYSTOLIC BLOOD PRESSURE: 190 MMHG

## 2023-10-10 DIAGNOSIS — I10 ESSENTIAL HYPERTENSION, BENIGN: ICD-10-CM

## 2023-10-10 DIAGNOSIS — I49.5 SSS (SICK SINUS SYNDROME): Chronic | ICD-10-CM

## 2023-10-10 PROCEDURE — 3079F DIAST BP 80-89 MM HG: CPT | Mod: CPTII,S$GLB,, | Performed by: INTERNAL MEDICINE

## 2023-10-10 PROCEDURE — 1101F PR PT FALLS ASSESS DOC 0-1 FALLS W/OUT INJ PAST YR: ICD-10-PCS | Mod: CPTII,S$GLB,, | Performed by: INTERNAL MEDICINE

## 2023-10-10 PROCEDURE — 3077F SYST BP >= 140 MM HG: CPT | Mod: CPTII,S$GLB,, | Performed by: INTERNAL MEDICINE

## 2023-10-10 PROCEDURE — 1126F PR PAIN SEVERITY QUANTIFIED, NO PAIN PRESENT: ICD-10-PCS | Mod: CPTII,S$GLB,, | Performed by: INTERNAL MEDICINE

## 2023-10-10 PROCEDURE — 3288F FALL RISK ASSESSMENT DOCD: CPT | Mod: CPTII,S$GLB,, | Performed by: INTERNAL MEDICINE

## 2023-10-10 PROCEDURE — 1126F AMNT PAIN NOTED NONE PRSNT: CPT | Mod: CPTII,S$GLB,, | Performed by: INTERNAL MEDICINE

## 2023-10-10 PROCEDURE — 1160F PR REVIEW ALL MEDS BY PRESCRIBER/CLIN PHARMACIST DOCUMENTED: ICD-10-PCS | Mod: CPTII,S$GLB,, | Performed by: INTERNAL MEDICINE

## 2023-10-10 PROCEDURE — 99213 PR OFFICE/OUTPT VISIT, EST, LEVL III, 20-29 MIN: ICD-10-PCS | Mod: S$GLB,,, | Performed by: INTERNAL MEDICINE

## 2023-10-10 PROCEDURE — 1101F PT FALLS ASSESS-DOCD LE1/YR: CPT | Mod: CPTII,S$GLB,, | Performed by: INTERNAL MEDICINE

## 2023-10-10 PROCEDURE — 1159F PR MEDICATION LIST DOCUMENTED IN MEDICAL RECORD: ICD-10-PCS | Mod: CPTII,S$GLB,, | Performed by: INTERNAL MEDICINE

## 2023-10-10 PROCEDURE — 3079F PR MOST RECENT DIASTOLIC BLOOD PRESSURE 80-89 MM HG: ICD-10-PCS | Mod: CPTII,S$GLB,, | Performed by: INTERNAL MEDICINE

## 2023-10-10 PROCEDURE — 3288F PR FALLS RISK ASSESSMENT DOCUMENTED: ICD-10-PCS | Mod: CPTII,S$GLB,, | Performed by: INTERNAL MEDICINE

## 2023-10-10 PROCEDURE — 1160F RVW MEDS BY RX/DR IN RCRD: CPT | Mod: CPTII,S$GLB,, | Performed by: INTERNAL MEDICINE

## 2023-10-10 PROCEDURE — 99213 OFFICE O/P EST LOW 20 MIN: CPT | Mod: S$GLB,,, | Performed by: INTERNAL MEDICINE

## 2023-10-10 PROCEDURE — 3077F PR MOST RECENT SYSTOLIC BLOOD PRESSURE >= 140 MM HG: ICD-10-PCS | Mod: CPTII,S$GLB,, | Performed by: INTERNAL MEDICINE

## 2023-10-10 PROCEDURE — 1159F MED LIST DOCD IN RCRD: CPT | Mod: CPTII,S$GLB,, | Performed by: INTERNAL MEDICINE

## 2023-10-10 RX ORDER — HYDRALAZINE HYDROCHLORIDE 25 MG/1
25 TABLET, FILM COATED ORAL 3 TIMES DAILY
Qty: 90 TABLET | Refills: 11 | Status: SHIPPED | OUTPATIENT
Start: 2023-10-10 | End: 2023-12-12

## 2023-10-10 NOTE — PROGRESS NOTES
"     Patient ID:  Cynthia N Cushing is a 78 y.o. female who presents for follow-up of Hyperlipidemia, Hypertension, and Results      She was on Lasix then he was discontinued for few days and she developed a lot of edema.  She is back on it.  She is complaining of dyspnea on exertion her blood pressure taking by me was 188/92.        Past Medical History:   Diagnosis Date    Allergy     Breast mass     Cataract     Diabetes mellitus     GERD (gastroesophageal reflux disease)     Hernia, hiatal     Hyperlipidemia     Hypertension     Kidney stone     Osteoarthritis     Renal insufficiency     Seasonal allergies     Sleep apnea     SSS (sick sinus syndrome)     SVT (supraventricular tachycardia)         Past Surgical History:   Procedure Laterality Date    BREAST BIOPSY      BREAST CYST ASPIRATION      BREAST SURGERY      CERVICAL DISC SURGERY      EYE SURGERY      cataracts bilateral    HYSTERECTOMY            Current Outpatient Medications   Medication Instructions    ACCU-CHEK AMADOR PLUS TEST STRP Strp TEST BLOOD SUGAR FOUR TIMES DAILY    amLODIPine (NORVASC) 10 mg, Oral, Daily    aspirin 81 mg, Oral, Daily    bisoprolol (ZEBETA) 5 MG tablet TAKE 1/2 TABLET EVERY DAY    famotidine (PEPCID) 20 MG tablet TAKE 1 TABLET TWICE DAILY    furosemide (LASIX) 20 mg, Oral, Daily    guanFACINE (TENEX) 2 MG tablet TAKE 1 TABLET EVERY EVENING    hydrALAZINE (APRESOLINE) 25 mg, Oral, 3 times daily    lancets (ACCU-CHEK SOFTCLIX LANCETS) Misc TEST BLOOD SUGAR FOUR TIMES DAILY    LANTUS SOLOSTAR U-100 INSULIN glargine 100 units/mL SubQ pen ADMINISTER 51 UNITS UNDER THE SKIN AT NIGHT    olmesartan (BENICAR) 40 MG tablet TAKE 1 TABLET EVERY DAY    pen needle, diabetic (BD ANNA 2ND GEN PEN NEEDLE) 32 gauge x 5/32" Ndle INJECT 1 NEEDLE INTO THE SKIN EVERY EVENING    rosuvastatin (CRESTOR) 20 mg, Oral, Nightly        Review of patient's allergies indicates:   Allergen Reactions    Biaxin [clarithromycin]     Prandin [repaglinide] Nausea " "And Vomiting    Amlodipine     Chlorphen-phenyltolox-pe-ppa     Ciprofloxacin Nausea And Vomiting    Omnicef [cefdinir]     Propoxyphene     Quinine Nausea And Vomiting        Review of Systems   Cardiovascular:  Positive for dyspnea on exertion. Negative for chest pain.   Respiratory:  Negative for cough, shortness of breath and wheezing.         Objective:     Vitals:    10/10/23 0924   BP: (!) 190/82   BP Location: Left arm   Patient Position: Sitting   Pulse: 80   Weight: 77.1 kg (170 lb)   Height: 5' 3" (1.6 m)       Physical Exam  Vitals and nursing note reviewed.   Constitutional:       Appearance: She is well-developed.   HENT:      Head: Normocephalic and atraumatic.   Eyes:      Conjunctiva/sclera: Conjunctivae normal.   Cardiovascular:      Rate and Rhythm: Normal rate and regular rhythm.      Heart sounds: Murmur (Grade 4/6 systolic murmur at the base) heard.   Pulmonary:      Effort: Pulmonary effort is normal.      Breath sounds: Normal breath sounds.   Abdominal:      General: Bowel sounds are normal.      Palpations: Abdomen is soft.   Musculoskeletal:         General: Normal range of motion.   Skin:     General: Skin is warm and dry.   Neurological:      Mental Status: She is alert and oriented to person, place, and time.   Psychiatric:         Behavior: Behavior normal.         Thought Content: Thought content normal.         Judgment: Judgment normal.       CMP  Sodium   Date Value Ref Range Status   08/28/2023 143 136 - 145 mmol/L Final     Potassium   Date Value Ref Range Status   08/28/2023 3.6 3.5 - 5.1 mmol/L Final     Chloride   Date Value Ref Range Status   08/28/2023 110 95 - 110 mmol/L Final     CO2   Date Value Ref Range Status   08/28/2023 23 23 - 29 mmol/L Final     Glucose   Date Value Ref Range Status   08/28/2023 108 70 - 110 mg/dL Final     BUN   Date Value Ref Range Status   08/28/2023 19 8 - 23 mg/dL Final     Creatinine   Date Value Ref Range Status   08/28/2023 1.5 (H) 0.5 - " 1.4 mg/dL Final     Calcium   Date Value Ref Range Status   08/28/2023 9.5 8.7 - 10.5 mg/dL Final     Total Protein   Date Value Ref Range Status   08/17/2023 7.1 6.0 - 8.4 g/dL Final     Albumin   Date Value Ref Range Status   08/17/2023 4.1 3.5 - 5.2 g/dL Final     Total Bilirubin   Date Value Ref Range Status   08/17/2023 0.7 0.1 - 1.0 mg/dL Final     Comment:     For infants and newborns, interpretation of results should be based  on gestational age, weight and in agreement with clinical  observations.    Premature Infant recommended reference ranges:  Up to 24 hours.............<8.0 mg/dL  Up to 48 hours............<12.0 mg/dL  3-5 days..................<15.0 mg/dL  6-29 days.................<15.0 mg/dL       Alkaline Phosphatase   Date Value Ref Range Status   08/17/2023 57 55 - 135 U/L Final     AST   Date Value Ref Range Status   08/17/2023 33 10 - 40 U/L Final     ALT   Date Value Ref Range Status   08/17/2023 36 10 - 44 U/L Final     Anion Gap   Date Value Ref Range Status   08/28/2023 10 8 - 16 mmol/L Final     eGFR if    Date Value Ref Range Status   07/15/2022 38.5 (A) >60 mL/min/1.73 m^2 Final     eGFR if non    Date Value Ref Range Status   07/15/2022 33.4 (A) >60 mL/min/1.73 m^2 Final     Comment:     Calculation used to obtain the estimated glomerular filtration  rate (eGFR) is the CKD-EPI equation.         BMP  Lab Results   Component Value Date     08/28/2023    K 3.6 08/28/2023     08/28/2023    CO2 23 08/28/2023    BUN 19 08/28/2023    CREATININE 1.5 (H) 08/28/2023    CALCIUM 9.5 08/28/2023    ANIONGAP 10 08/28/2023    ESTGFRAFRICA 38.5 (A) 07/15/2022    EGFRNONAA 33.4 (A) 07/15/2022      BNP  @LABRCNTIP(BNP,BNPTRIAGEBLO)@   Lab Results   Component Value Date    CHOL 110 (L) 08/17/2023    CHOL 114 (L) 02/07/2022    CHOL 152 04/19/2021     Lab Results   Component Value Date    HDL 33 (L) 08/17/2023    HDL 34 (L) 02/07/2022    HDL 36 (L) 04/19/2021      Lab Results   Component Value Date    LDLCALC 9.2 (L) 08/17/2023    LDLCALC 39.2 (L) 02/07/2022    LDLCALC 41.6 (L) 04/19/2021     Lab Results   Component Value Date    TRIG 339 (H) 08/17/2023    TRIG 204 (H) 02/07/2022    TRIG 372 (H) 04/19/2021     Lab Results   Component Value Date    CHOLHDL 30.0 08/17/2023    CHOLHDL 29.8 02/07/2022    CHOLHDL 23.7 04/19/2021      Lab Results   Component Value Date    TSH 3.890 08/17/2023     Lab Results   Component Value Date    HGBA1C 7.5 (H) 08/28/2023     Lab Results   Component Value Date    WBC 11.56 08/17/2023    HGB 9.5 (L) 08/17/2023    HCT 29.7 (L) 08/17/2023    MCV 83 08/17/2023     08/17/2023         Results for orders placed during the hospital encounter of 10/06/23    Echo Saline Bubble? No    Interpretation Summary    Left Ventricle: The left ventricle is normal in size. Mildly increased wall thickness. There is mild concentric hypertrophy. Normal wall motion. There is normal systolic function. Ejection fraction by visual approximation is 53%. There is normal diastolic function.    Left Atrium: Left atrium is mildly dilated.    Right Ventricle: Normal right ventricular cavity size. Wall thickness is normal. Right ventricle wall motion  is normal. Systolic function is normal.    Aortic Valve: The aortic valve is a trileaflet valve. There is moderate aortic valve sclerosis. Mildly calcified cusps. Mildly restricted motion. There is moderate stenosis. Aortic valve area by VTI is 1.39 cm². Aortic valve peak velocity is 2.67 m/s. Mean gradient is 16 mmHg. The dimensionless index is 0.44.    Mitral Valve: There is mild regurgitation with a centrally directed jet.    Tricuspid Valve: There is mild regurgitation.    IVC/SVC: Normal venous pressure at 3 mmHg.    The estimated pulmonary artery systolic pressure is 28 mmHg.     No results found for this or any previous visit.         Assessment:       Essential hypertension, benign  Not well controlled will  increase the doses of hydralazine to 25 mg 3 times a day.  She is on bisoprolol 5 mg, Benicar 40 mg, Tenex 2 mg q.h.s.    SSS (sick sinus syndrome)  She has some degree of tachy-jimi syndrome.       Plan:           Increase the hydralazine to 25 mg 3 times a day.  She will be seen in the office in 3 months

## 2023-10-22 NOTE — PROGRESS NOTES
SUBJECTIVE:    Patient ID: Cynthia N Cushing is a 78 y.o. female.    Chief Complaint: Medication Refill, Hypertension, and Follow-up    HPI  Patient here for follow-up for HTN and DM    Hypertension-Home blood pressure readings: usual 136/whatever/. Salt intake and diet: salt not added to cooking and salt shaker not on table. Usual weight: stable. Associated signs and symptoms: none. Patient denies: blurred vision, chest pain, dyspnea, headache, neck aches, orthopnea, palpitations, paroxysmal nocturnal dyspnea, peripheral edema, pulsating in the ears, and tiredness/fatigue. Use of agents associated with hypertension: none. Medication compliance: taking as prescribed.SInce she returned to the diuretic she has had very little swelling in the legs unless she stands for any length of time-    Diabetes-Current symptoms include: none. Patient denies foot ulcerations, hyperglycemia, hypoglycemia , increased appetite, nausea, paresthesia of the feet, polydipsia, polyuria, visual disturbances, vomiting, and weight loss. Evaluation to date has included: fasting blood sugar, fasting lipid panel, hemoglobin A1C, and microalbuminuria. Home sugars: at night the sugar runs about 150 and 200. Current treatments: see med list. She has had some low blood sugars during the night-with sx around 70-75-80  A!C 7.5 two months ago-urine was POSITIVE     Hospital Outpatient Visit on 10/06/2023   Component Date Value Ref Range Status    BSA 10/06/2023 1.87  m2 Final    LVOT stroke volume 10/06/2023 98.91  cm3 Final    LVIDd 10/06/2023 5.06  3.5 - 6.0 cm Final    LV Systolic Volume 10/06/2023 68.80  mL Final    LV Systolic Volume Index 10/06/2023 37.8  mL/m2 Final    LVIDs 10/06/2023 3.97  2.1 - 4.0 cm Final    LV Diastolic Volume 10/06/2023 122.00  mL Final    LV Diastolic Volume Index 10/06/2023 67.03  mL/m2 Final    IVS 10/06/2023 1.31 (A)  0.6 - 1.1 cm Final    LVOT diameter 10/06/2023 2.00  cm Final    LVOT area 10/06/2023 3.1  cm2  Final    FS 10/06/2023 22 (A)  28 - 44 % Final    Left Ventricle Relative Wall Thick* 10/06/2023 0.52  cm Final    Posterior Wall 10/06/2023 1.32 (A)  0.6 - 1.1 cm Final    LV mass 10/06/2023 271.17  g Final    LV Mass Index 10/06/2023 149  g/m2 Final    MV Peak E Garrison 10/06/2023 1.06  m/s Final    TDI LATERAL 10/06/2023 0.13  m/s Final    TDI SEPTAL 10/06/2023 0.10  m/s Final    E/E' ratio 10/06/2023 9.22  m/s Final    MV Peak A Garrison 10/06/2023 0.67  m/s Final    TR Max Garrison 10/06/2023 2.50  m/s Final    E/A ratio 10/06/2023 1.58   Final    IVRT 10/06/2023 84.00  msec Final    E wave deceleration time 10/06/2023 179.00  msec Final    LV SEPTAL E/E' RATIO 10/06/2023 10.60  m/s Final    LV LATERAL E/E' RATIO 10/06/2023 8.15  m/s Final    LVOT peak garrison 10/06/2023 1.14  m/s Final    Left Ventricular Outflow Tract Jodi* 10/06/2023 0.78  cm/s Final    Left Ventricular Outflow Tract Jodi* 10/06/2023 3.00  mmHg Final    LA size 10/06/2023 4.90  cm Final    RVDD 10/06/2023 3.40  cm Final    AV regurgitation pressure 1/2 time 10/06/2023 908  ms Final    AR Max Garrison 10/06/2023 2.82  m/s Final    AV mean gradient 10/06/2023 16  mmHg Final    AV peak gradient 10/06/2023 29  mmHg Final    Ao peak garrison 10/06/2023 2.67  m/s Final    Ao VTI 10/06/2023 71.10  cm Final    LVOT peak VTI 10/06/2023 31.50  cm Final    AV valve area 10/06/2023 1.39  cm² Final    AV Velocity Ratio 10/06/2023 0.43   Final    AV index (prosthetic) 10/06/2023 0.44   Final    ROSALINDA by Velocity Ratio 10/06/2023 1.34  cm² Final    MV stenosis pressure 1/2 time 10/06/2023 71.00  ms Final    MV valve area p 1/2 method 10/06/2023 3.10  cm2 Final    Triscuspid Valve Regurgitation Pea* 10/06/2023 25  mmHg Final    PV PEAK VELOCITY 10/06/2023 1.43  m/s Final    PV peak gradient 10/06/2023 8  mmHg Final    Pulmonary Valve Mean Velocity 10/06/2023 0.96  m/s Final    Ao root annulus 10/06/2023 2.80  cm Final    Mean e' 10/06/2023 0.12  m/s Final    ZLVIDS 10/06/2023 1.92   Final     ZLVIDD 10/06/2023 0.04   Final    AORTIC VALVE CUSP SEPERATION 10/06/2023 1.20  cm Final    TV resting pulmonary artery pressu* 10/06/2023 28  mmHg Final    RV TB RVSP 10/06/2023 6  mmHg Final    Est. RA pres 10/06/2023 3  mmHg Final    EF 10/06/2023 53  % Final   Patient Outreach on 09/21/2023   Component Date Value Ref Range Status    Left Eye DM Retinopathy 09/20/2023 Negative   Final    Right Eye DM Retinopathy 09/20/2023 Negative   Final   Lab Visit on 08/28/2023   Component Date Value Ref Range Status    Hemoglobin A1C 08/28/2023 7.5 (H)  4.5 - 6.2 % Final    Estimated Avg Glucose 08/28/2023 169 (H)  68 - 131 mg/dL Final    Sodium 08/28/2023 143  136 - 145 mmol/L Final    Potassium 08/28/2023 3.6  3.5 - 5.1 mmol/L Final    Chloride 08/28/2023 110  95 - 110 mmol/L Final    CO2 08/28/2023 23  23 - 29 mmol/L Final    Glucose 08/28/2023 108  70 - 110 mg/dL Final    BUN 08/28/2023 19  8 - 23 mg/dL Final    Creatinine 08/28/2023 1.5 (H)  0.5 - 1.4 mg/dL Final    Calcium 08/28/2023 9.5  8.7 - 10.5 mg/dL Final    Anion Gap 08/28/2023 10  8 - 16 mmol/L Final    eGFR 08/28/2023 35.4 (A)  >60 mL/min/1.73 m^2 Final   Lab Visit on 08/17/2023   Component Date Value Ref Range Status    TSH 08/17/2023 3.890  0.340 - 5.600 uIU/mL Final    WBC 08/17/2023 11.56  3.90 - 12.70 K/uL Final    RBC 08/17/2023 3.57 (L)  4.00 - 5.40 M/uL Final    Hemoglobin 08/17/2023 9.5 (L)  12.0 - 16.0 g/dL Final    Hematocrit 08/17/2023 29.7 (L)  37.0 - 48.5 % Final    MCV 08/17/2023 83  82 - 98 fL Final    MCH 08/17/2023 26.6 (L)  27.0 - 31.0 pg Final    MCHC 08/17/2023 32.0  32.0 - 36.0 g/dL Final    RDW 08/17/2023 14.9 (H)  11.5 - 14.5 % Final    Platelets 08/17/2023 238  150 - 450 K/uL Final    MPV 08/17/2023 11.3  9.2 - 12.9 fL Final    Immature Granulocytes 08/17/2023 0.5  0.0 - 0.5 % Final    Gran # (ANC) 08/17/2023 6.3  1.8 - 7.7 K/uL Final    Immature Grans (Abs) 08/17/2023 0.06 (H)  0.00 - 0.04 K/uL Final    Lymph # 08/17/2023 3.2  1.0  - 4.8 K/uL Final    Mono # 08/17/2023 1.4 (H)  0.3 - 1.0 K/uL Final    Eos # 08/17/2023 0.5  0.0 - 0.5 K/uL Final    Baso # 08/17/2023 0.13  0.00 - 0.20 K/uL Final    nRBC 08/17/2023 0  0 /100 WBC Final    Gran % 08/17/2023 54.6  38.0 - 73.0 % Final    Lymph % 08/17/2023 27.6  18.0 - 48.0 % Final    Mono % 08/17/2023 11.7  4.0 - 15.0 % Final    Eosinophil % 08/17/2023 4.5  0.0 - 8.0 % Final    Basophil % 08/17/2023 1.1  0.0 - 1.9 % Final    Differential Method 08/17/2023 Automated   Final    Sodium 08/17/2023 139  136 - 145 mmol/L Final    Potassium 08/17/2023 4.2  3.5 - 5.1 mmol/L Final    Chloride 08/17/2023 107  95 - 110 mmol/L Final    CO2 08/17/2023 21 (L)  23 - 29 mmol/L Final    Glucose 08/17/2023 131 (H)  70 - 110 mg/dL Final    BUN 08/17/2023 26 (H)  8 - 23 mg/dL Final    Creatinine 08/17/2023 2.1 (H)  0.5 - 1.4 mg/dL Final    Calcium 08/17/2023 9.4  8.7 - 10.5 mg/dL Final    Total Protein 08/17/2023 7.1  6.0 - 8.4 g/dL Final    Albumin 08/17/2023 4.1  3.5 - 5.2 g/dL Final    Total Bilirubin 08/17/2023 0.7  0.1 - 1.0 mg/dL Final    Alkaline Phosphatase 08/17/2023 57  55 - 135 U/L Final    AST 08/17/2023 33  10 - 40 U/L Final    ALT 08/17/2023 36  10 - 44 U/L Final    eGFR 08/17/2023 23.7 (A)  >60 mL/min/1.73 m^2 Final    Anion Gap 08/17/2023 11  8 - 16 mmol/L Final    Cholesterol 08/17/2023 110 (L)  120 - 199 mg/dL Final    Triglycerides 08/17/2023 339 (H)  30 - 150 mg/dL Final    HDL 08/17/2023 33 (L)  40 - 75 mg/dL Final    LDL Cholesterol 08/17/2023 9.2 (L)  63.0 - 159.0 mg/dL Final    HDL/Cholesterol Ratio 08/17/2023 30.0  20.0 - 50.0 % Final    Total Cholesterol/HDL Ratio 08/17/2023 3.3  2.0 - 5.0 Final    Non-HDL Cholesterol 08/17/2023 77  mg/dL Final    Hemoglobin A1C 08/17/2023 6.7 (H)  4.5 - 6.2 % Final    Estimated Avg Glucose 08/17/2023 146 (H)  68 - 131 mg/dL Final    Microalbumin, Urine 08/17/2023 1038.1 (H)  <19.9 ug/mL Final    Creatinine, Urine 08/17/2023 160.0  15.0 - 325.0 mg/dL Final     Microalb/Creat Ratio 08/17/2023 648.8 (H)  0.0 - 30.0 ug/mg Final       Past Medical History:   Diagnosis Date    Allergy     Breast mass     Cataract     Diabetes mellitus     GERD (gastroesophageal reflux disease)     Hernia, hiatal     Hyperlipidemia     Hypertension     Kidney stone     Osteoarthritis     Renal insufficiency     Seasonal allergies     Sleep apnea     SSS (sick sinus syndrome)     SVT (supraventricular tachycardia)      Past Surgical History:   Procedure Laterality Date    BREAST BIOPSY      BREAST CYST ASPIRATION      BREAST SURGERY      CERVICAL DISC SURGERY      EYE SURGERY      cataracts bilateral    HYSTERECTOMY       Family History   Problem Relation Age of Onset    Stroke Mother     Cancer Mother     Breast cancer Mother     Cancer Father     Breast cancer Maternal Aunt        Marital Status:   Alcohol History:  reports no history of alcohol use.  Tobacco History:  reports that she has never smoked. She has never used smokeless tobacco.  Drug History:  reports no history of drug use.    Review of patient's allergies indicates:   Allergen Reactions    Biaxin [clarithromycin]     Prandin [repaglinide] Nausea And Vomiting    Amlodipine     [id number]     Ciprofloxacin Nausea And Vomiting    Omnicef [cefdinir]     Propoxyphene     Quinine Nausea And Vomiting       Current Outpatient Medications:     amLODIPine (NORVASC) 10 MG tablet, Take 1 tablet (10 mg total) by mouth once daily., Disp: 30 tablet, Rfl: 11    aspirin 81 MG Chew, Take 81 mg by mouth once daily., Disp: , Rfl:     bisoprolol (ZEBETA) 5 MG tablet, TAKE 1/2 TABLET EVERY DAY, Disp: 45 tablet, Rfl: 2    blood sugar diagnostic (ACCU-CHEK AMADOR PLUS TEST STRP) Strp, TEST BLOOD SUGAR FOUR TIMES DAILY, Disp: 400 strip, Rfl: 4    famotidine (PEPCID) 20 MG tablet, TAKE 1 TABLET TWICE DAILY, Disp: 180 tablet, Rfl: 2    furosemide (LASIX) 20 MG tablet, Take 1 tablet (20 mg total) by mouth once daily., Disp: 30  "tablet, Rfl: 0    guanFACINE (TENEX) 2 MG tablet, TAKE 1 TABLET EVERY EVENING, Disp: 90 tablet, Rfl: 0    hydrALAZINE (APRESOLINE) 25 MG tablet, Take 1 tablet (25 mg total) by mouth 3 (three) times daily., Disp: 90 tablet, Rfl: 11    lancets (ACCU-CHEK SOFTCLIX LANCETS) Misc, TEST BLOOD SUGAR FOUR TIMES DAILY, Disp: 400 each, Rfl: 3    LANTUS SOLOSTAR U-100 INSULIN glargine 100 units/mL SubQ pen, ADMINISTER 51 UNITS UNDER THE SKIN AT NIGHT (Patient taking differently: ADMINISTER 55 UNITS UNDER THE SKIN AT NIGHT), Disp: 15 mL, Rfl: 5    olmesartan (BENICAR) 40 MG tablet, TAKE 1 TABLET EVERY DAY, Disp: 90 tablet, Rfl: 3    pen needle, diabetic (BD ANNA 2ND GEN PEN NEEDLE) 32 gauge x 5/32" Ndle, INJECT 1 NEEDLE INTO THE SKIN EVERY EVENING, Disp: 100 each, Rfl: 5    rosuvastatin (CRESTOR) 20 MG tablet, Take 20 mg by mouth every evening., Disp: , Rfl:     Review of Systems   Constitutional:  Negative for appetite change, chills, diaphoresis, fatigue, fever and unexpected weight change.   HENT:  Negative for congestion, ear pain, hearing loss, nosebleeds, postnasal drip, sinus pressure, sinus pain, sneezing, sore throat, tinnitus, trouble swallowing and voice change.    Eyes:  Negative for photophobia, pain, itching and visual disturbance.   Respiratory:  Negative for apnea, cough, chest tightness, shortness of breath, wheezing and stridor.    Cardiovascular:  Negative for chest pain, palpitations and leg swelling.   Gastrointestinal:  Negative for abdominal distention, abdominal pain, blood in stool, constipation, diarrhea, nausea and vomiting.   Endocrine: Negative for cold intolerance, heat intolerance, polydipsia and polyuria.   Genitourinary:  Negative for difficulty urinating, dyspareunia, dysuria, flank pain, frequency, hematuria, menstrual problem, pelvic pain, urgency, vaginal discharge and vaginal pain.   Musculoskeletal:  Positive for arthralgias (right knee). Negative for back pain, joint swelling, myalgias, " "neck pain and neck stiffness.   Skin:  Negative for pallor.   Allergic/Immunologic: Negative for environmental allergies and food allergies.   Neurological:  Negative for dizziness, tremors, speech difficulty, weakness, light-headedness and numbness.   Hematological:  Does not bruise/bleed easily.   Psychiatric/Behavioral:  Positive for sleep disturbance. Negative for agitation, confusion, decreased concentration and suicidal ideas. The patient is not nervous/anxious.           Objective:          7/3/2023    11:06 AM 8/16/2023     5:19 PM 9/5/2023     2:08 PM 10/6/2023    10:54 AM 10/10/2023     9:24 AM 10/23/2023     2:05 PM   Vitals - 1 value per visit   SYSTOLIC  136 168  190 152   DIASTOLIC  82 94  82 82   Pulse  54 76  80 52   Temp  98.2 °F (36.8 °C) 98.4 °F (36.9 °C)   98.9 °F (37.2 °C)   Resp  14 16   12   SPO2  99 % 98 %   98 %   Weight (lb) 171.96 164 173.8 173 170 166   Weight (kg) 78 74.39 78.835 78.472 77.111 75.297   Height 5' 3" (1.6 m) 5' 3" (1.6 m) 5' 3" (1.6 m) 5' 3" (1.6 m) 5' 3" (1.6 m) 5' 3" (1.6 m)   BMI (Calculated) 30.5 29.1 30.8 30.7 30.1 29.4   Pain Score  Zero   Zero Zero   (    Physical Exam  Vitals and nursing note reviewed.   Constitutional:       General: She is not in acute distress.     Appearance: She is obese. She is not ill-appearing.   HENT:      Head: Normocephalic and atraumatic.      Nose: Nose normal.      Mouth/Throat:      Mouth: Mucous membranes are moist.   Eyes:      Extraocular Movements: Extraocular movements intact.      Conjunctiva/sclera: Conjunctivae normal.      Pupils: Pupils are equal, round, and reactive to light.   Neck:      Vascular: No carotid bruit.   Cardiovascular:      Rate and Rhythm: Normal rate and regular rhythm.      Heart sounds: Murmur (3/6 aortic systolic murmur) heard.   Pulmonary:      Effort: Pulmonary effort is normal. No respiratory distress.      Breath sounds: No wheezing.      Comments: Decreased breath sounds  Abdominal:      General: " Bowel sounds are normal.      Palpations: Abdomen is soft.   Musculoskeletal:      Right lower leg: No edema.      Left lower leg: No edema.   Lymphadenopathy:      Cervical: No cervical adenopathy.   Skin:     General: Skin is warm and dry.   Neurological:      Mental Status: She is alert.      Motor: No weakness.      Gait: Gait normal.   Psychiatric:         Mood and Affect: Mood normal.         Behavior: Behavior normal.         Thought Content: Thought content normal.           Assessment:       1. Poorly-controlled hypertension    2. White coat syndrome with diagnosis of hypertension    3. Uncontrolled type 2 diabetes mellitus with hyperglycemia    4. Stage 3b chronic kidney disease    5. Other iron deficiency anemia    6. Aortic ejection murmur         Plan:       Poorly-controlled hypertension  -     CBC Auto Differential; Future; Expected date: 10/23/2023  -     Comprehensive Metabolic Panel; Future; Expected date: 10/23/2023    White Coat Hypertension  -     patient will keep diary of her BP at home and bring it to the office in two weeks    Uncontrolled type 2 diabetes mellitus with hyperglycemia  -     Hemoglobin A1C; Future; Expected date: 11/28/2023  -     Comprehensive Metabolic Panel; Future; Expected date: 10/23/2023  -     Lipid Panel; Future; Expected date: 04/23/2024  -     Microalbumin/Creatinine Ratio, Urine; Future; Expected date: 10/23/2023    Stage 3b chronic kidney disease  -     Comprehensive Metabolic Panel; Future; Expected date: 10/23/2023  -     Microalbumin/Creatinine Ratio, Urine; Future; Expected date: 10/23/2023    Other iron deficiency anemia        -     rechecking CBC with next labs     Aortic ejection murmur        -     Cardiology following-we reviewed her last ECHO this month    Other orders  -     Influenza - Quadrivalent - High Dose (65+) (PF) (IM)      Follow up in about 3 months (around 1/23/2024) for pt will bring me her BP diary in two weeks.        10/23/2023 Teri  JERROD Adams.

## 2023-10-23 ENCOUNTER — OFFICE VISIT (OUTPATIENT)
Dept: FAMILY MEDICINE | Facility: CLINIC | Age: 78
End: 2023-10-23
Payer: MEDICARE

## 2023-10-23 VITALS
HEIGHT: 63 IN | HEART RATE: 52 BPM | DIASTOLIC BLOOD PRESSURE: 96 MMHG | OXYGEN SATURATION: 98 % | BODY MASS INDEX: 29.41 KG/M2 | RESPIRATION RATE: 12 BRPM | TEMPERATURE: 99 F | SYSTOLIC BLOOD PRESSURE: 174 MMHG | WEIGHT: 166 LBS

## 2023-10-23 DIAGNOSIS — N18.32 STAGE 3B CHRONIC KIDNEY DISEASE: ICD-10-CM

## 2023-10-23 DIAGNOSIS — I10 WHITE COAT SYNDROME WITH DIAGNOSIS OF HYPERTENSION: ICD-10-CM

## 2023-10-23 DIAGNOSIS — E11.65 UNCONTROLLED TYPE 2 DIABETES MELLITUS WITH HYPERGLYCEMIA: ICD-10-CM

## 2023-10-23 DIAGNOSIS — I35.1 AORTIC EJECTION MURMUR: ICD-10-CM

## 2023-10-23 DIAGNOSIS — D50.8 OTHER IRON DEFICIENCY ANEMIA: ICD-10-CM

## 2023-10-23 DIAGNOSIS — I10 POORLY-CONTROLLED HYPERTENSION: Primary | ICD-10-CM

## 2023-10-23 PROCEDURE — 1126F AMNT PAIN NOTED NONE PRSNT: CPT | Mod: CPTII,S$GLB,, | Performed by: INTERNAL MEDICINE

## 2023-10-23 PROCEDURE — 99214 OFFICE O/P EST MOD 30 MIN: CPT | Mod: S$GLB,,, | Performed by: INTERNAL MEDICINE

## 2023-10-23 PROCEDURE — G0008 ADMIN INFLUENZA VIRUS VAC: HCPCS | Mod: S$GLB,,, | Performed by: INTERNAL MEDICINE

## 2023-10-23 PROCEDURE — 90662 FLU VACCINE - QUADRIVALENT - HIGH DOSE (65+) PRESERVATIVE FREE IM: ICD-10-PCS | Mod: S$GLB,,, | Performed by: INTERNAL MEDICINE

## 2023-10-23 PROCEDURE — 1126F PR PAIN SEVERITY QUANTIFIED, NO PAIN PRESENT: ICD-10-PCS | Mod: CPTII,S$GLB,, | Performed by: INTERNAL MEDICINE

## 2023-10-23 PROCEDURE — 3288F PR FALLS RISK ASSESSMENT DOCUMENTED: ICD-10-PCS | Mod: CPTII,S$GLB,, | Performed by: INTERNAL MEDICINE

## 2023-10-23 PROCEDURE — 3077F PR MOST RECENT SYSTOLIC BLOOD PRESSURE >= 140 MM HG: ICD-10-PCS | Mod: CPTII,S$GLB,, | Performed by: INTERNAL MEDICINE

## 2023-10-23 PROCEDURE — 1101F PT FALLS ASSESS-DOCD LE1/YR: CPT | Mod: CPTII,S$GLB,, | Performed by: INTERNAL MEDICINE

## 2023-10-23 PROCEDURE — 1159F MED LIST DOCD IN RCRD: CPT | Mod: CPTII,S$GLB,, | Performed by: INTERNAL MEDICINE

## 2023-10-23 PROCEDURE — 3080F DIAST BP >= 90 MM HG: CPT | Mod: CPTII,S$GLB,, | Performed by: INTERNAL MEDICINE

## 2023-10-23 PROCEDURE — 1159F PR MEDICATION LIST DOCUMENTED IN MEDICAL RECORD: ICD-10-PCS | Mod: CPTII,S$GLB,, | Performed by: INTERNAL MEDICINE

## 2023-10-23 PROCEDURE — 3288F FALL RISK ASSESSMENT DOCD: CPT | Mod: CPTII,S$GLB,, | Performed by: INTERNAL MEDICINE

## 2023-10-23 PROCEDURE — 90662 IIV NO PRSV INCREASED AG IM: CPT | Mod: S$GLB,,, | Performed by: INTERNAL MEDICINE

## 2023-10-23 PROCEDURE — 99214 PR OFFICE/OUTPT VISIT, EST, LEVL IV, 30-39 MIN: ICD-10-PCS | Mod: S$GLB,,, | Performed by: INTERNAL MEDICINE

## 2023-10-23 PROCEDURE — 3077F SYST BP >= 140 MM HG: CPT | Mod: CPTII,S$GLB,, | Performed by: INTERNAL MEDICINE

## 2023-10-23 PROCEDURE — G0008 FLU VACCINE - QUADRIVALENT - HIGH DOSE (65+) PRESERVATIVE FREE IM: ICD-10-PCS | Mod: S$GLB,,, | Performed by: INTERNAL MEDICINE

## 2023-10-23 PROCEDURE — 3080F PR MOST RECENT DIASTOLIC BLOOD PRESSURE >= 90 MM HG: ICD-10-PCS | Mod: CPTII,S$GLB,, | Performed by: INTERNAL MEDICINE

## 2023-10-23 PROCEDURE — 1101F PR PT FALLS ASSESS DOC 0-1 FALLS W/OUT INJ PAST YR: ICD-10-PCS | Mod: CPTII,S$GLB,, | Performed by: INTERNAL MEDICINE

## 2023-10-23 RX ORDER — SPIRONOLACTONE 50 MG/1
50 TABLET, FILM COATED ORAL 2 TIMES DAILY
COMMUNITY
Start: 2023-10-03 | End: 2023-10-23

## 2023-10-23 RX ORDER — ROSUVASTATIN CALCIUM 20 MG/1
20 TABLET, COATED ORAL NIGHTLY
COMMUNITY
Start: 2023-10-03 | End: 2024-02-02 | Stop reason: SDUPTHER

## 2023-10-24 RX ORDER — BLOOD SUGAR DIAGNOSTIC
STRIP MISCELLANEOUS
Qty: 400 STRIP | Refills: 10 | Status: SHIPPED | OUTPATIENT
Start: 2023-10-24

## 2023-10-28 NOTE — ASSESSMENT & PLAN NOTE
Not well controlled will increase the doses of hydralazine to 25 mg 3 times a day.  She is on bisoprolol 5 mg, Benicar 40 mg, Tenex 2 mg q.h.s.

## 2023-11-21 ENCOUNTER — PATIENT MESSAGE (OUTPATIENT)
Dept: FAMILY MEDICINE | Facility: CLINIC | Age: 78
End: 2023-11-21
Payer: MEDICARE

## 2023-11-29 RX ORDER — OLMESARTAN MEDOXOMIL 40 MG/1
TABLET ORAL
Qty: 90 TABLET | Refills: 3 | Status: ON HOLD | OUTPATIENT
Start: 2023-11-29 | End: 2024-03-12 | Stop reason: HOSPADM

## 2023-12-06 NOTE — TELEPHONE ENCOUNTER
----- Message from Irena Person sent at 7/30/2019 10:47 AM CDT -----  [facility] - NEEDS DM FU WITH [facility]   Pt admitted. Dx of fracture of right acetabulum.

## 2023-12-08 ENCOUNTER — PATIENT MESSAGE (OUTPATIENT)
Dept: ADMINISTRATIVE | Facility: HOSPITAL | Age: 78
End: 2023-12-08
Payer: MEDICARE

## 2023-12-11 DIAGNOSIS — R60.0 BILATERAL LEG EDEMA: ICD-10-CM

## 2023-12-11 RX ORDER — FUROSEMIDE 20 MG/1
20 TABLET ORAL DAILY
Qty: 30 TABLET | Refills: 0 | Status: SHIPPED | OUTPATIENT
Start: 2023-12-11 | End: 2024-01-17

## 2023-12-12 ENCOUNTER — OFFICE VISIT (OUTPATIENT)
Dept: CARDIOLOGY | Facility: CLINIC | Age: 78
End: 2023-12-12
Payer: MEDICARE

## 2023-12-12 VITALS
SYSTOLIC BLOOD PRESSURE: 196 MMHG | WEIGHT: 173 LBS | HEART RATE: 66 BPM | OXYGEN SATURATION: 99 % | DIASTOLIC BLOOD PRESSURE: 100 MMHG | BODY MASS INDEX: 30.65 KG/M2 | HEIGHT: 63 IN

## 2023-12-12 DIAGNOSIS — N18.32 STAGE 3B CHRONIC KIDNEY DISEASE: ICD-10-CM

## 2023-12-12 DIAGNOSIS — I10 ESSENTIAL HYPERTENSION, BENIGN: ICD-10-CM

## 2023-12-12 DIAGNOSIS — I35.0 AORTIC VALVE STENOSIS, ETIOLOGY OF CARDIAC VALVE DISEASE UNSPECIFIED: ICD-10-CM

## 2023-12-12 DIAGNOSIS — I49.5 SSS (SICK SINUS SYNDROME): Primary | ICD-10-CM

## 2023-12-12 PROCEDURE — 3077F PR MOST RECENT SYSTOLIC BLOOD PRESSURE >= 140 MM HG: ICD-10-PCS | Mod: CPTII,S$GLB,, | Performed by: INTERNAL MEDICINE

## 2023-12-12 PROCEDURE — 1160F RVW MEDS BY RX/DR IN RCRD: CPT | Mod: CPTII,S$GLB,, | Performed by: INTERNAL MEDICINE

## 2023-12-12 PROCEDURE — 3288F PR FALLS RISK ASSESSMENT DOCUMENTED: ICD-10-PCS | Mod: CPTII,S$GLB,, | Performed by: INTERNAL MEDICINE

## 2023-12-12 PROCEDURE — 93000 ELECTROCARDIOGRAM COMPLETE: CPT | Mod: S$GLB,,, | Performed by: INTERNAL MEDICINE

## 2023-12-12 PROCEDURE — 99214 PR OFFICE/OUTPT VISIT, EST, LEVL IV, 30-39 MIN: ICD-10-PCS | Mod: S$GLB,,, | Performed by: INTERNAL MEDICINE

## 2023-12-12 PROCEDURE — 3080F PR MOST RECENT DIASTOLIC BLOOD PRESSURE >= 90 MM HG: ICD-10-PCS | Mod: CPTII,S$GLB,, | Performed by: INTERNAL MEDICINE

## 2023-12-12 PROCEDURE — 99214 OFFICE O/P EST MOD 30 MIN: CPT | Mod: S$GLB,,, | Performed by: INTERNAL MEDICINE

## 2023-12-12 PROCEDURE — 93000 EKG 12-LEAD: ICD-10-PCS | Mod: S$GLB,,, | Performed by: INTERNAL MEDICINE

## 2023-12-12 PROCEDURE — 1159F MED LIST DOCD IN RCRD: CPT | Mod: CPTII,S$GLB,, | Performed by: INTERNAL MEDICINE

## 2023-12-12 PROCEDURE — 3080F DIAST BP >= 90 MM HG: CPT | Mod: CPTII,S$GLB,, | Performed by: INTERNAL MEDICINE

## 2023-12-12 PROCEDURE — 1101F PR PT FALLS ASSESS DOC 0-1 FALLS W/OUT INJ PAST YR: ICD-10-PCS | Mod: CPTII,S$GLB,, | Performed by: INTERNAL MEDICINE

## 2023-12-12 PROCEDURE — 1160F PR REVIEW ALL MEDS BY PRESCRIBER/CLIN PHARMACIST DOCUMENTED: ICD-10-PCS | Mod: CPTII,S$GLB,, | Performed by: INTERNAL MEDICINE

## 2023-12-12 PROCEDURE — 3077F SYST BP >= 140 MM HG: CPT | Mod: CPTII,S$GLB,, | Performed by: INTERNAL MEDICINE

## 2023-12-12 PROCEDURE — 1126F AMNT PAIN NOTED NONE PRSNT: CPT | Mod: CPTII,S$GLB,, | Performed by: INTERNAL MEDICINE

## 2023-12-12 PROCEDURE — 1101F PT FALLS ASSESS-DOCD LE1/YR: CPT | Mod: CPTII,S$GLB,, | Performed by: INTERNAL MEDICINE

## 2023-12-12 PROCEDURE — 1126F PR PAIN SEVERITY QUANTIFIED, NO PAIN PRESENT: ICD-10-PCS | Mod: CPTII,S$GLB,, | Performed by: INTERNAL MEDICINE

## 2023-12-12 PROCEDURE — 3288F FALL RISK ASSESSMENT DOCD: CPT | Mod: CPTII,S$GLB,, | Performed by: INTERNAL MEDICINE

## 2023-12-12 PROCEDURE — 1159F PR MEDICATION LIST DOCUMENTED IN MEDICAL RECORD: ICD-10-PCS | Mod: CPTII,S$GLB,, | Performed by: INTERNAL MEDICINE

## 2023-12-12 RX ORDER — HYDRALAZINE HYDROCHLORIDE 50 MG/1
50 TABLET, FILM COATED ORAL 3 TIMES DAILY
Qty: 90 TABLET | Refills: 11 | Status: SHIPPED | OUTPATIENT
Start: 2023-12-12 | End: 2024-12-11

## 2023-12-12 NOTE — PROGRESS NOTES
Patient ID:  Cynthia N Cushing is a 78 y.o. female who presents for follow-up of Hypertension and Hyperlipidemia      Essential hypertension, benign  Not well controlled will increase the doses of hydralazine to 25 mg 3 times a day.  She is on bisoprolol 5 mg, Benicar 40 mg, Tenex 2 mg q.h.s.     SSS (sick sinus syndrome)  She has some degree of tachy-jimi syndrome.  Her blood pressure remains elevated.  She is up most of the night.  She wakes up late in the afternoon.  Her sleeping pattern is out of whack.  It is questionable when she is taking her medications.  Her blood pressure remains elevated at home will increase the hydralazine to 50 mg p.o. q.8 hours.  She is to take the 1st dose whenever she wakes up a 2nd dose a proximally 6-8 hours after the 1st dose and then before she goes back to sleep.  She denies any chest pain she does have a mild headache today.          Past Medical History:   Diagnosis Date    Allergy     Breast mass     Cataract     Diabetes mellitus     GERD (gastroesophageal reflux disease)     Hernia, hiatal     Hyperlipidemia     Hypertension     Kidney stone     Osteoarthritis     Renal insufficiency     Seasonal allergies     Sleep apnea     SSS (sick sinus syndrome)     SVT (supraventricular tachycardia)         Past Surgical History:   Procedure Laterality Date    BREAST BIOPSY      BREAST CYST ASPIRATION      BREAST SURGERY      CERVICAL DISC SURGERY      EYE SURGERY      cataracts bilateral    HYSTERECTOMY            Current Outpatient Medications   Medication Instructions    ACCU-CHEK AMADOR PLUS TEST STRP Strp TEST BLOOD SUGAR FOUR TIMES DAILY    amLODIPine (NORVASC) 10 mg, Oral, Daily    aspirin 81 mg, Oral, Daily    bisoprolol (ZEBETA) 5 MG tablet TAKE 1/2 TABLET EVERY DAY    famotidine (PEPCID) 20 MG tablet TAKE 1 TABLET TWICE DAILY    furosemide (LASIX) 20 mg, Oral, Daily    guanFACINE (TENEX) 2 MG tablet TAKE 1 TABLET EVERY EVENING    hydrALAZINE (APRESOLINE) 50 mg, Oral,  Problem: Hemodialysis  Goal: Dialysis: Safe, effective, and comfortable hemodialysis treatment (Hemodialysis nurse only)  Outcome: Outcome Met, Continue evaluating goal progress toward completion  Patient tolerated treatment, met goals, ran 3 hours  removed 3 kgs. Goal: Dialysis: Free of complications related to initiation/termination of dialysis (Hemodialysis nurse only)  Outcome: Outcome Met, Continue evaluating goal progress toward completion  No issues with access. "3 times daily    lancets (ACCU-CHEK SOFTCLIX LANCETS) Misc TEST BLOOD SUGAR FOUR TIMES DAILY    LANTUS SOLOSTAR U-100 INSULIN glargine 100 units/mL SubQ pen ADMINISTER 51 UNITS UNDER THE SKIN AT NIGHT    olmesartan (BENICAR) 40 MG tablet TAKE 1 TABLET EVERY DAY    pen needle, diabetic (BD ANNA 2ND GEN PEN NEEDLE) 32 gauge x 5/32" Ndle INJECT 1 NEEDLE INTO THE SKIN EVERY EVENING    rosuvastatin (CRESTOR) 20 mg, Oral, Nightly        Review of patient's allergies indicates:   Allergen Reactions    Biaxin [clarithromycin]     Prandin [repaglinide] Nausea And Vomiting    Amlodipine     Chlorphen-phenyltolox-pe-ppa     Ciprofloxacin Nausea And Vomiting    Omnicef [cefdinir]     Propoxyphene     Quinine Nausea And Vomiting        Review of Systems   Cardiovascular:  Negative for chest pain, irregular heartbeat and palpitations.   Respiratory:  Negative for cough and shortness of breath.    Neurological:  Positive for headaches.        Objective:     Vitals:    12/12/23 0949   BP: (!) 196/100   BP Location: Left arm   Patient Position: Sitting   BP Method: Medium (Manual)   Pulse: 66   SpO2: 99%   Weight: 78.5 kg (173 lb)   Height: 5' 3" (1.6 m)       Physical Exam  Vitals and nursing note reviewed.   Constitutional:       Appearance: She is well-developed.   HENT:      Head: Normocephalic and atraumatic.   Eyes:      Conjunctiva/sclera: Conjunctivae normal.   Cardiovascular:      Rate and Rhythm: Normal rate and regular rhythm.      Heart sounds: Murmur (Grade 2/6 systolic at the base) heard.   Pulmonary:      Effort: Pulmonary effort is normal.      Breath sounds: Normal breath sounds.   Abdominal:      General: Bowel sounds are normal.      Palpations: Abdomen is soft.   Musculoskeletal:         General: Normal range of motion.   Skin:     General: Skin is warm and dry.   Neurological:      Mental Status: She is alert and oriented to person, place, and time.   Psychiatric:         Behavior: Behavior normal.         " Thought Content: Thought content normal.         Judgment: Judgment normal.       CMP  Sodium   Date Value Ref Range Status   08/28/2023 143 136 - 145 mmol/L Final     Potassium   Date Value Ref Range Status   08/28/2023 3.6 3.5 - 5.1 mmol/L Final     Chloride   Date Value Ref Range Status   08/28/2023 110 95 - 110 mmol/L Final     CO2   Date Value Ref Range Status   08/28/2023 23 23 - 29 mmol/L Final     Glucose   Date Value Ref Range Status   08/28/2023 108 70 - 110 mg/dL Final     BUN   Date Value Ref Range Status   08/28/2023 19 8 - 23 mg/dL Final     Creatinine   Date Value Ref Range Status   08/28/2023 1.5 (H) 0.5 - 1.4 mg/dL Final     Calcium   Date Value Ref Range Status   08/28/2023 9.5 8.7 - 10.5 mg/dL Final     Total Protein   Date Value Ref Range Status   08/17/2023 7.1 6.0 - 8.4 g/dL Final     Albumin   Date Value Ref Range Status   08/17/2023 4.1 3.5 - 5.2 g/dL Final     Total Bilirubin   Date Value Ref Range Status   08/17/2023 0.7 0.1 - 1.0 mg/dL Final     Comment:     For infants and newborns, interpretation of results should be based  on gestational age, weight and in agreement with clinical  observations.    Premature Infant recommended reference ranges:  Up to 24 hours.............<8.0 mg/dL  Up to 48 hours............<12.0 mg/dL  3-5 days..................<15.0 mg/dL  6-29 days.................<15.0 mg/dL       Alkaline Phosphatase   Date Value Ref Range Status   08/17/2023 57 55 - 135 U/L Final     AST   Date Value Ref Range Status   08/17/2023 33 10 - 40 U/L Final     ALT   Date Value Ref Range Status   08/17/2023 36 10 - 44 U/L Final     Anion Gap   Date Value Ref Range Status   08/28/2023 10 8 - 16 mmol/L Final     eGFR if    Date Value Ref Range Status   07/15/2022 38.5 (A) >60 mL/min/1.73 m^2 Final     eGFR if non    Date Value Ref Range Status   07/15/2022 33.4 (A) >60 mL/min/1.73 m^2 Final     Comment:     Calculation used to obtain the estimated  glomerular filtration  rate (eGFR) is the CKD-EPI equation.         BMP  Lab Results   Component Value Date     08/28/2023    K 3.6 08/28/2023     08/28/2023    CO2 23 08/28/2023    BUN 19 08/28/2023    CREATININE 1.5 (H) 08/28/2023    CALCIUM 9.5 08/28/2023    ANIONGAP 10 08/28/2023    ESTGFRAFRICA 38.5 (A) 07/15/2022    EGFRNONAA 33.4 (A) 07/15/2022      BNP  @LABRCNTIP(BNP,BNPTRIAGEBLO)@   Lab Results   Component Value Date    CHOL 110 (L) 08/17/2023    CHOL 114 (L) 02/07/2022    CHOL 152 04/19/2021     Lab Results   Component Value Date    HDL 33 (L) 08/17/2023    HDL 34 (L) 02/07/2022    HDL 36 (L) 04/19/2021     Lab Results   Component Value Date    LDLCALC 9.2 (L) 08/17/2023    LDLCALC 39.2 (L) 02/07/2022    LDLCALC 41.6 (L) 04/19/2021     Lab Results   Component Value Date    TRIG 339 (H) 08/17/2023    TRIG 204 (H) 02/07/2022    TRIG 372 (H) 04/19/2021     Lab Results   Component Value Date    CHOLHDL 30.0 08/17/2023    CHOLHDL 29.8 02/07/2022    CHOLHDL 23.7 04/19/2021      Lab Results   Component Value Date    TSH 3.890 08/17/2023     Lab Results   Component Value Date    HGBA1C 7.5 (H) 08/28/2023     Lab Results   Component Value Date    WBC 11.56 08/17/2023    HGB 9.5 (L) 08/17/2023    HCT 29.7 (L) 08/17/2023    MCV 83 08/17/2023     08/17/2023         Results for orders placed during the hospital encounter of 10/06/23    Echo Saline Bubble? No    Interpretation Summary    Left Ventricle: The left ventricle is normal in size. Mildly increased wall thickness. There is mild concentric hypertrophy. Normal wall motion. There is normal systolic function. Ejection fraction by visual approximation is 53%. There is normal diastolic function.    Left Atrium: Left atrium is mildly dilated.    Right Ventricle: Normal right ventricular cavity size. Wall thickness is normal. Right ventricle wall motion  is normal. Systolic function is normal.    Aortic Valve: The aortic valve is a trileaflet valve.  There is moderate aortic valve sclerosis. Mildly calcified cusps. Mildly restricted motion. There is moderate stenosis. Aortic valve area by VTI is 1.39 cm². Aortic valve peak velocity is 2.67 m/s. Mean gradient is 16 mmHg. The dimensionless index is 0.44.    Mitral Valve: There is mild regurgitation with a centrally directed jet.    Tricuspid Valve: There is mild regurgitation.    IVC/SVC: Normal venous pressure at 3 mmHg.    The estimated pulmonary artery systolic pressure is 28 mmHg.     No results found for this or any previous visit.         Assessment:       Essential hypertension, benign  The importance of her blood pressure has been discussed with her she is to take her medication and be compliant with it.  Will increase the doses of hydralazine to 50 mg in the morning 50 mg at bedtime and 50 mg approximately 6-8 hours after the 1st dose in the morning.    SSS (sick sinus syndrome)  Asymptomatic.  The uses of beta-blocker is limited    Stage 3b chronic kidney disease  Stable       Plan:       Increase hydralazine to 50 mg p.o. t.i.d..  She is to take the bisoprolol in the morning with hydralazine.  She is to take guanfacine 2 mg, Benicar 40 mg, amlodipine 10 mg, rosuvastatin 20 mg q.h.s..  She will be seen in the office in 1 month

## 2023-12-12 NOTE — ASSESSMENT & PLAN NOTE
The importance of her blood pressure has been discussed with her she is to take her medication and be compliant with it.  Will increase the doses of hydralazine to 50 mg in the morning 50 mg at bedtime and 50 mg approximately 6-8 hours after the 1st dose in the morning.

## 2024-01-08 DIAGNOSIS — I10 WELL-CONTROLLED HYPERTENSION: ICD-10-CM

## 2024-01-10 RX ORDER — GUANFACINE 2 MG/1
TABLET ORAL
Qty: 90 TABLET | Refills: 3 | Status: SHIPPED | OUTPATIENT
Start: 2024-01-10

## 2024-01-11 ENCOUNTER — TELEPHONE (OUTPATIENT)
Dept: CARDIOLOGY | Facility: CLINIC | Age: 79
End: 2024-01-11
Payer: MEDICARE

## 2024-01-11 NOTE — TELEPHONE ENCOUNTER
----- Message from Jane Acevedo sent at 1/11/2024 11:22 AM CST -----  Type:  Sooner Appointment Request    Caller is requesting a sooner appointment.  Caller declined first available appointment listed below.  Caller will not accept being placed on the waitlist and is requesting a message be sent to doctor.    Name of Caller:  pt daughter, Rachel  When is the first available appointment?  Today but she can't get her out she sick--and the next avail att is 2/14 and she need to be seen sooner--please call and advise  Symptoms:  1 month f/u  Would the patient rather a call back or a response via [facility]? call  Best Call Back Number: [phone]  Additional Information:  thank you

## 2024-01-13 DIAGNOSIS — R60.0 BILATERAL LEG EDEMA: ICD-10-CM

## 2024-01-15 DIAGNOSIS — E11.65 UNCONTROLLED TYPE 2 DIABETES MELLITUS WITH HYPERGLYCEMIA: ICD-10-CM

## 2024-01-16 RX ORDER — INSULIN GLARGINE 100 [IU]/ML
INJECTION, SOLUTION SUBCUTANEOUS
Qty: 15 ML | Refills: 5 | Status: SHIPPED | OUTPATIENT
Start: 2024-01-16

## 2024-01-17 RX ORDER — FUROSEMIDE 20 MG/1
20 TABLET ORAL
Qty: 30 TABLET | Refills: 0 | Status: SHIPPED | OUTPATIENT
Start: 2024-01-17 | End: 2024-05-01 | Stop reason: SDUPTHER

## 2024-02-02 ENCOUNTER — OFFICE VISIT (OUTPATIENT)
Dept: CARDIOLOGY | Facility: CLINIC | Age: 79
End: 2024-02-02
Payer: MEDICARE

## 2024-02-02 VITALS
WEIGHT: 172.5 LBS | BODY MASS INDEX: 30.56 KG/M2 | OXYGEN SATURATION: 97 % | HEART RATE: 74 BPM | DIASTOLIC BLOOD PRESSURE: 80 MMHG | HEIGHT: 63 IN | SYSTOLIC BLOOD PRESSURE: 144 MMHG

## 2024-02-02 DIAGNOSIS — E78.5 HYPERLIPIDEMIA, UNSPECIFIED HYPERLIPIDEMIA TYPE: ICD-10-CM

## 2024-02-02 DIAGNOSIS — E11.65 UNCONTROLLED TYPE 2 DIABETES MELLITUS WITH HYPERGLYCEMIA: ICD-10-CM

## 2024-02-02 DIAGNOSIS — I49.5 SSS (SICK SINUS SYNDROME): Chronic | ICD-10-CM

## 2024-02-02 DIAGNOSIS — I10 ESSENTIAL HYPERTENSION, BENIGN: Primary | ICD-10-CM

## 2024-02-02 PROCEDURE — 1126F AMNT PAIN NOTED NONE PRSNT: CPT | Mod: HCNC,CPTII,S$GLB, | Performed by: INTERNAL MEDICINE

## 2024-02-02 PROCEDURE — 3288F FALL RISK ASSESSMENT DOCD: CPT | Mod: HCNC,CPTII,S$GLB, | Performed by: INTERNAL MEDICINE

## 2024-02-02 PROCEDURE — 1160F RVW MEDS BY RX/DR IN RCRD: CPT | Mod: HCNC,CPTII,S$GLB, | Performed by: INTERNAL MEDICINE

## 2024-02-02 PROCEDURE — 99999 PR PBB SHADOW E&M-EST. PATIENT-LVL IV: CPT | Mod: PBBFAC,HCNC,, | Performed by: INTERNAL MEDICINE

## 2024-02-02 PROCEDURE — 3079F DIAST BP 80-89 MM HG: CPT | Mod: HCNC,CPTII,S$GLB, | Performed by: INTERNAL MEDICINE

## 2024-02-02 PROCEDURE — 3077F SYST BP >= 140 MM HG: CPT | Mod: HCNC,CPTII,S$GLB, | Performed by: INTERNAL MEDICINE

## 2024-02-02 PROCEDURE — 99214 OFFICE O/P EST MOD 30 MIN: CPT | Mod: HCNC,S$GLB,, | Performed by: INTERNAL MEDICINE

## 2024-02-02 PROCEDURE — 1159F MED LIST DOCD IN RCRD: CPT | Mod: HCNC,CPTII,S$GLB, | Performed by: INTERNAL MEDICINE

## 2024-02-02 PROCEDURE — 1101F PT FALLS ASSESS-DOCD LE1/YR: CPT | Mod: HCNC,CPTII,S$GLB, | Performed by: INTERNAL MEDICINE

## 2024-02-02 RX ORDER — LABETALOL 100 MG/1
100 TABLET, FILM COATED ORAL 2 TIMES DAILY
Qty: 60 TABLET | Refills: 11 | Status: ON HOLD | OUTPATIENT
Start: 2024-02-02 | End: 2024-03-12 | Stop reason: HOSPADM

## 2024-02-02 RX ORDER — ROSUVASTATIN CALCIUM 10 MG/1
10 TABLET, COATED ORAL NIGHTLY
Qty: 90 TABLET | Refills: 2 | Status: SHIPPED | OUTPATIENT
Start: 2024-02-02

## 2024-02-02 NOTE — PROGRESS NOTES
Patient ID:  Cynthia N Cushing is a 78 y.o. female who presents for follow-up of Hypertension and SSS      Essential hypertension, benign  The importance of her blood pressure has been discussed with her she is to take her medication and be compliant with it.  Will increase the doses of hydralazine to 50 mg in the morning 50 mg at bedtime and 50 mg approximately 6-8 hours after the 1st dose in the morning.     SSS (sick sinus syndrome)  Asymptomatic.  The uses of beta-blocker is limited     Stage 3b chronic kidney disease  Stable  Her blood pressure has been very labile.  Her sleeping pattern is quite unusual.  She usually is up until 2 or 3 in the morning.  During the last visit she was asked to take hydralazine 50 mg p.o. b.i.d..  Bisoprolol 2.5 mg q.a.m..  She is to take her guanfacine 2 mg Benicar 40 mg, amlodipine 10 mg and Crestor 10 mg Q HS.  She thinks she has been doing that.  She has a new blood pressure machine that is on her wrist she believes that is not too accurate.  Her pulse on the machine is usually in the 40s.  Her blood pressure at times he has not well controlled.  She is to bring all her medications as well as her blood pressure machine during the next visit.        Past Medical History:   Diagnosis Date    Allergy     Breast mass     Cataract     Diabetes mellitus     GERD (gastroesophageal reflux disease)     Hernia, hiatal     Hyperlipidemia     Hypertension     Kidney stone     Osteoarthritis     Renal insufficiency     Seasonal allergies     Sleep apnea     SSS (sick sinus syndrome)     SVT (supraventricular tachycardia)         Past Surgical History:   Procedure Laterality Date    BREAST BIOPSY      BREAST CYST ASPIRATION      BREAST SURGERY      CERVICAL DISC SURGERY      EYE SURGERY      cataracts bilateral    HYSTERECTOMY            Current Outpatient Medications   Medication Instructions    ACCU-CHEK AMADOR PLUS TEST STRP Strp TEST BLOOD SUGAR FOUR TIMES DAILY    amLODIPine  "(NORVASC) 10 mg, Oral, Daily    aspirin 81 mg, Oral, Daily    famotidine (PEPCID) 20 MG tablet TAKE 1 TABLET TWICE DAILY    furosemide (LASIX) 20 mg, Oral    guanFACINE (TENEX) 2 MG tablet TAKE 1 TABLET EVERY EVENING    hydrALAZINE (APRESOLINE) 50 mg, Oral, 3 times daily    labetaloL (NORMODYNE) 100 mg, Oral, 2 times daily    lancets (ACCU-CHEK SOFTCLIX LANCETS) Misc TEST BLOOD SUGAR FOUR TIMES DAILY    LANTUS SOLOSTAR U-100 INSULIN glargine 100 units/mL SubQ pen ADMINISTER 51 UNITS UNDER THE SKIN AT NIGHT    olmesartan (BENICAR) 40 MG tablet TAKE 1 TABLET EVERY DAY    pen needle, diabetic (BD ANNA 2ND GEN PEN NEEDLE) 32 gauge x 5/32" Ndle INJECT 1 NEEDLE INTO THE SKIN EVERY EVENING    rosuvastatin (CRESTOR) 10 mg, Oral, Nightly        Review of patient's allergies indicates:   Allergen Reactions    Biaxin [clarithromycin]     Prandin [repaglinide] Nausea And Vomiting    Amlodipine     Chlorphen-phenyltolox-pe-ppa     Ciprofloxacin Nausea And Vomiting    Omnicef [cefdinir]     Propoxyphene     Quinine Nausea And Vomiting        Review of Systems   Cardiovascular:  Positive for dyspnea on exertion. Negative for chest pain.   Respiratory:  Negative for cough and shortness of breath.         Objective:     Vitals:    02/02/24 1311   BP: (!) 144/80   BP Location: Left arm   Patient Position: Sitting   BP Method: Medium (Manual)   Pulse: 74   SpO2: 97%   Weight: 78.3 kg (172 lb 8.2 oz)   Height: 5' 3" (1.6 m)       Physical Exam  Vitals and nursing note reviewed.   Constitutional:       Appearance: She is well-developed.   HENT:      Head: Normocephalic and atraumatic.   Eyes:      Conjunctiva/sclera: Conjunctivae normal.   Cardiovascular:      Rate and Rhythm: Normal rate and regular rhythm.      Heart sounds: Normal heart sounds.   Pulmonary:      Effort: Pulmonary effort is normal.      Breath sounds: Normal breath sounds.   Abdominal:      General: Bowel sounds are normal.      Palpations: Abdomen is soft. "   Musculoskeletal:         General: Normal range of motion.   Skin:     General: Skin is warm and dry.   Neurological:      Mental Status: She is alert and oriented to person, place, and time.   Psychiatric:         Behavior: Behavior normal.         Thought Content: Thought content normal.         Judgment: Judgment normal.       CMP  Sodium   Date Value Ref Range Status   08/28/2023 143 136 - 145 mmol/L Final     Potassium   Date Value Ref Range Status   08/28/2023 3.6 3.5 - 5.1 mmol/L Final     Chloride   Date Value Ref Range Status   08/28/2023 110 95 - 110 mmol/L Final     CO2   Date Value Ref Range Status   08/28/2023 23 23 - 29 mmol/L Final     Glucose   Date Value Ref Range Status   08/28/2023 108 70 - 110 mg/dL Final     BUN   Date Value Ref Range Status   08/28/2023 19 8 - 23 mg/dL Final     Creatinine   Date Value Ref Range Status   08/28/2023 1.5 (H) 0.5 - 1.4 mg/dL Final     Calcium   Date Value Ref Range Status   08/28/2023 9.5 8.7 - 10.5 mg/dL Final     Total Protein   Date Value Ref Range Status   08/17/2023 7.1 6.0 - 8.4 g/dL Final     Albumin   Date Value Ref Range Status   08/17/2023 4.1 3.5 - 5.2 g/dL Final     Total Bilirubin   Date Value Ref Range Status   08/17/2023 0.7 0.1 - 1.0 mg/dL Final     Comment:     For infants and newborns, interpretation of results should be based  on gestational age, weight and in agreement with clinical  observations.    Premature Infant recommended reference ranges:  Up to 24 hours.............<8.0 mg/dL  Up to 48 hours............<12.0 mg/dL  3-5 days..................<15.0 mg/dL  6-29 days.................<15.0 mg/dL       Alkaline Phosphatase   Date Value Ref Range Status   08/17/2023 57 55 - 135 U/L Final     AST   Date Value Ref Range Status   08/17/2023 33 10 - 40 U/L Final     ALT   Date Value Ref Range Status   08/17/2023 36 10 - 44 U/L Final     Anion Gap   Date Value Ref Range Status   08/28/2023 10 8 - 16 mmol/L Final     eGFR if     Date Value Ref Range Status   07/15/2022 38.5 (A) >60 mL/min/1.73 m^2 Final     eGFR if non    Date Value Ref Range Status   07/15/2022 33.4 (A) >60 mL/min/1.73 m^2 Final     Comment:     Calculation used to obtain the estimated glomerular filtration  rate (eGFR) is the CKD-EPI equation.         BMP  Lab Results   Component Value Date     08/28/2023    K 3.6 08/28/2023     08/28/2023    CO2 23 08/28/2023    BUN 19 08/28/2023    CREATININE 1.5 (H) 08/28/2023    CALCIUM 9.5 08/28/2023    ANIONGAP 10 08/28/2023    ESTGFRAFRICA 38.5 (A) 07/15/2022    EGFRNONAA 33.4 (A) 07/15/2022      BNP  @LABRCNTIP(BNP,BNPTRIAGEBLO)@   Lab Results   Component Value Date    CHOL 110 (L) 08/17/2023    CHOL 114 (L) 02/07/2022    CHOL 152 04/19/2021     Lab Results   Component Value Date    HDL 33 (L) 08/17/2023    HDL 34 (L) 02/07/2022    HDL 36 (L) 04/19/2021     Lab Results   Component Value Date    LDLCALC 9.2 (L) 08/17/2023    LDLCALC 39.2 (L) 02/07/2022    LDLCALC 41.6 (L) 04/19/2021     Lab Results   Component Value Date    TRIG 339 (H) 08/17/2023    TRIG 204 (H) 02/07/2022    TRIG 372 (H) 04/19/2021     Lab Results   Component Value Date    CHOLHDL 30.0 08/17/2023    CHOLHDL 29.8 02/07/2022    CHOLHDL 23.7 04/19/2021      Lab Results   Component Value Date    TSH 3.890 08/17/2023     Lab Results   Component Value Date    HGBA1C 7.5 (H) 08/28/2023     Lab Results   Component Value Date    WBC 11.56 08/17/2023    HGB 9.5 (L) 08/17/2023    HCT 29.7 (L) 08/17/2023    MCV 83 08/17/2023     08/17/2023         Results for orders placed during the hospital encounter of 10/06/23    Echo Saline Bubble? No    Interpretation Summary    Left Ventricle: The left ventricle is normal in size. Mildly increased wall thickness. There is mild concentric hypertrophy. Normal wall motion. There is normal systolic function. Ejection fraction by visual approximation is 53%. There is normal diastolic function.    Left  Atrium: Left atrium is mildly dilated.    Right Ventricle: Normal right ventricular cavity size. Wall thickness is normal. Right ventricle wall motion  is normal. Systolic function is normal.    Aortic Valve: The aortic valve is a trileaflet valve. There is moderate aortic valve sclerosis. Mildly calcified cusps. Mildly restricted motion. There is moderate stenosis. Aortic valve area by VTI is 1.39 cm². Aortic valve peak velocity is 2.67 m/s. Mean gradient is 16 mmHg. The dimensionless index is 0.44.    Mitral Valve: There is mild regurgitation with a centrally directed jet.    Tricuspid Valve: There is mild regurgitation.    IVC/SVC: Normal venous pressure at 3 mmHg.    The estimated pulmonary artery systolic pressure is 28 mmHg.     No results found for this or any previous visit.         Assessment:       Essential hypertension, benign  Quite labile.  Unfortunately she is bradycardic and the doses of beta-blockers as well as guanfacine can not be pushed.  Will attempt to discontinue bisoprolol and try low doses of labetalol to see if her blood pressure is better controlled    Hyperlipidemia  She has been on 20 mg of rosuvastatin her LDL cholesterol is extremely low will decrease the dose to 10 mg daily.    SSS (sick sinus syndrome)  Some degree of sick sinus syndrome.  She will probably benefit from a event monitor.  Will consider during her next visit    Uncontrolled type 2 diabetes mellitus with hyperglycemia  Followed by her primary care physician Dr. Adams       Plan:       She is to take hydralazine 50 mg p.o. t.i.d..  Discontinue the bisoprolol  Try labetalol 100 mg p.o. b.i.d..  If her pulse is less than 40 she is to take half a tablet twice a day.  Continue guanfacine 2 mg, Benicar 40 mg, amlodipine 10 mg and rosuvastatin 10 mg q.h.s..  She will be seen in the office in 1 month  She is to bring her blood pressure machine as well as all her medications

## 2024-02-03 NOTE — ASSESSMENT & PLAN NOTE
She has been on 20 mg of rosuvastatin her LDL cholesterol is extremely low will decrease the dose to 10 mg daily.

## 2024-02-03 NOTE — ASSESSMENT & PLAN NOTE
Quite labile.  Unfortunately she is bradycardic and the doses of beta-blockers as well as guanfacine can not be pushed.  Will attempt to discontinue bisoprolol and try low doses of labetalol to see if her blood pressure is better controlled

## 2024-02-03 NOTE — ASSESSMENT & PLAN NOTE
Some degree of sick sinus syndrome.  She will probably benefit from a event monitor.  Will consider during her next visit

## 2024-02-27 DIAGNOSIS — Z00.00 ENCOUNTER FOR MEDICARE ANNUAL WELLNESS EXAM: ICD-10-CM

## 2024-03-03 ENCOUNTER — CLINICAL SUPPORT (OUTPATIENT)
Dept: CARDIOLOGY | Facility: HOSPITAL | Age: 79
DRG: 242 | End: 2024-03-03
Attending: INTERNAL MEDICINE
Payer: MEDICARE

## 2024-03-03 ENCOUNTER — HOSPITAL ENCOUNTER (INPATIENT)
Facility: HOSPITAL | Age: 79
LOS: 9 days | Discharge: HOME HEALTH | DRG: 242 | End: 2024-03-12
Attending: EMERGENCY MEDICINE | Admitting: INTERNAL MEDICINE
Payer: MEDICARE

## 2024-03-03 VITALS — DIASTOLIC BLOOD PRESSURE: 72 MMHG | SYSTOLIC BLOOD PRESSURE: 144 MMHG

## 2024-03-03 DIAGNOSIS — I48.91 AFIB: ICD-10-CM

## 2024-03-03 DIAGNOSIS — J96.01 ACUTE HYPOXEMIC RESPIRATORY FAILURE: ICD-10-CM

## 2024-03-03 DIAGNOSIS — I21.4 NSTEMI (NON-ST ELEVATED MYOCARDIAL INFARCTION): ICD-10-CM

## 2024-03-03 DIAGNOSIS — I16.1 HYPERTENSIVE EMERGENCY: ICD-10-CM

## 2024-03-03 DIAGNOSIS — R00.1 BRADYCARDIA: ICD-10-CM

## 2024-03-03 DIAGNOSIS — R06.00 DYSPNEA: ICD-10-CM

## 2024-03-03 DIAGNOSIS — J81.0 ACUTE PULMONARY EDEMA: Primary | ICD-10-CM

## 2024-03-03 DIAGNOSIS — R07.9 CHEST PAIN: ICD-10-CM

## 2024-03-03 DIAGNOSIS — J96.01 ACUTE RESPIRATORY FAILURE WITH HYPOXIA: ICD-10-CM

## 2024-03-03 DIAGNOSIS — Z78.9 INTUBATION OF AIRWAY PERFORMED WITHOUT DIFFICULTY: ICD-10-CM

## 2024-03-03 DIAGNOSIS — R00.2 PALPITATIONS: ICD-10-CM

## 2024-03-03 DIAGNOSIS — I49.9 ARRHYTHMIA: ICD-10-CM

## 2024-03-03 DIAGNOSIS — R06.02 SOB (SHORTNESS OF BREATH) ON EXERTION: ICD-10-CM

## 2024-03-03 PROBLEM — Z71.89 ACP (ADVANCE CARE PLANNING): Status: ACTIVE | Noted: 2024-03-03

## 2024-03-03 LAB
ALBUMIN SERPL BCP-MCNC: 3.6 G/DL (ref 3.5–5.2)
ALLENS TEST: ABNORMAL
ALP SERPL-CCNC: 69 U/L (ref 55–135)
ALT SERPL W/O P-5'-P-CCNC: 26 U/L (ref 10–44)
ANION GAP SERPL CALC-SCNC: 13 MMOL/L (ref 8–16)
APTT PPP: 22.5 SEC (ref 21–32)
APTT PPP: 33.6 SEC (ref 21–32)
AST SERPL-CCNC: 25 U/L (ref 10–40)
B-OH-BUTYR BLD STRIP-SCNC: 0 MMOL/L (ref 0–0.5)
BACTERIA #/AREA URNS HPF: ABNORMAL /HPF
BASOPHILS # BLD AUTO: 0.08 K/UL (ref 0–0.2)
BASOPHILS # BLD AUTO: 0.25 K/UL (ref 0–0.2)
BASOPHILS NFR BLD: 0.5 % (ref 0–1.9)
BASOPHILS NFR BLD: 1.7 % (ref 0–1.9)
BILIRUB SERPL-MCNC: 0.4 MG/DL (ref 0.1–1)
BILIRUB UR QL STRIP: NEGATIVE
BNP SERPL-MCNC: 757 PG/ML (ref 0–99)
BUN SERPL-MCNC: 22 MG/DL (ref 8–23)
CALCIUM SERPL-MCNC: 8.7 MG/DL (ref 8.7–10.5)
CHLORIDE SERPL-SCNC: 109 MMOL/L (ref 95–110)
CLARITY UR: ABNORMAL
CO2 SERPL-SCNC: 18 MMOL/L (ref 23–29)
COLOR UR: YELLOW
CREAT SERPL-MCNC: 2 MG/DL (ref 0.5–1.4)
DELSYS: ABNORMAL
DIFFERENTIAL METHOD BLD: ABNORMAL
DIFFERENTIAL METHOD BLD: ABNORMAL
EOSINOPHIL # BLD AUTO: 0.2 K/UL (ref 0–0.5)
EOSINOPHIL # BLD AUTO: 1 K/UL (ref 0–0.5)
EOSINOPHIL NFR BLD: 1 % (ref 0–8)
EOSINOPHIL NFR BLD: 6.8 % (ref 0–8)
ERYTHROCYTE [DISTWIDTH] IN BLOOD BY AUTOMATED COUNT: 17 % (ref 11.5–14.5)
ERYTHROCYTE [DISTWIDTH] IN BLOOD BY AUTOMATED COUNT: 17.5 % (ref 11.5–14.5)
ERYTHROCYTE [SEDIMENTATION RATE] IN BLOOD BY WESTERGREN METHOD: 18 MM/H
EST. GFR  (NO RACE VARIABLE): 25.1 ML/MIN/1.73 M^2
FIO2: 40
FIO2: 80
GLUCOSE SERPL-MCNC: 103 MG/DL (ref 70–110)
GLUCOSE SERPL-MCNC: 126 MG/DL (ref 70–110)
GLUCOSE SERPL-MCNC: 220 MG/DL (ref 70–110)
GLUCOSE SERPL-MCNC: 336 MG/DL (ref 70–110)
GLUCOSE UR QL STRIP: ABNORMAL
GRAN CASTS #/AREA URNS LPF: 7 /LPF
HCO3 UR-SCNC: 19 MMOL/L (ref 24–28)
HCO3 UR-SCNC: 19.6 MMOL/L (ref 24–28)
HCO3 UR-SCNC: 20.5 MMOL/L (ref 24–28)
HCT VFR BLD AUTO: 28.7 % (ref 37–48.5)
HCT VFR BLD AUTO: 36.8 % (ref 37–48.5)
HCT VFR BLD CALC: 27 %PCV (ref 36–54)
HGB BLD-MCNC: 11.1 G/DL (ref 12–16)
HGB BLD-MCNC: 8.2 G/DL (ref 12–16)
HGB UR QL STRIP: ABNORMAL
HYALINE CASTS #/AREA URNS LPF: 9 /LPF
IMM GRANULOCYTES # BLD AUTO: 0.08 K/UL (ref 0–0.04)
IMM GRANULOCYTES # BLD AUTO: 0.11 K/UL (ref 0–0.04)
IMM GRANULOCYTES NFR BLD AUTO: 0.5 % (ref 0–0.5)
IMM GRANULOCYTES NFR BLD AUTO: 0.7 % (ref 0–0.5)
INFLUENZA A, MOLECULAR: NEGATIVE
INFLUENZA B, MOLECULAR: NEGATIVE
INR PPP: 0.9 (ref 0.8–1.2)
INR PPP: 0.9 (ref 0.8–1.2)
KETONES UR QL STRIP: NEGATIVE
LACTATE SERPL-SCNC: 1.7 MMOL/L (ref 0.5–1.9)
LACTATE SERPL-SCNC: 2.7 MMOL/L (ref 0.5–1.9)
LACTATE SERPL-SCNC: 2.7 MMOL/L (ref 0.5–1.9)
LEUKOCYTE ESTERASE UR QL STRIP: NEGATIVE
LYMPHOCYTES # BLD AUTO: 1.3 K/UL (ref 1–4.8)
LYMPHOCYTES # BLD AUTO: 5.7 K/UL (ref 1–4.8)
LYMPHOCYTES NFR BLD: 38.4 % (ref 18–48)
LYMPHOCYTES NFR BLD: 8.3 % (ref 18–48)
MAGNESIUM SERPL-MCNC: 1.8 MG/DL (ref 1.6–2.6)
MAGNESIUM SERPL-MCNC: 2 MG/DL (ref 1.6–2.6)
MCH RBC QN AUTO: 25.8 PG (ref 27–31)
MCH RBC QN AUTO: 26 PG (ref 27–31)
MCHC RBC AUTO-ENTMCNC: 28.6 G/DL (ref 32–36)
MCHC RBC AUTO-ENTMCNC: 30.2 G/DL (ref 32–36)
MCV RBC AUTO: 85 FL (ref 82–98)
MCV RBC AUTO: 91 FL (ref 82–98)
MICROSCOPIC COMMENT: ABNORMAL
MIN VOL: 9.3
MIN VOL: 9.3
MODE: ABNORMAL
MONOCYTES # BLD AUTO: 1 K/UL (ref 0.3–1)
MONOCYTES # BLD AUTO: 1.3 K/UL (ref 0.3–1)
MONOCYTES NFR BLD: 6.6 % (ref 4–15)
MONOCYTES NFR BLD: 8.4 % (ref 4–15)
NEUTROPHILS # BLD AUTO: 12.6 K/UL (ref 1.8–7.7)
NEUTROPHILS # BLD AUTO: 6.5 K/UL (ref 1.8–7.7)
NEUTROPHILS NFR BLD: 44.2 % (ref 38–73)
NEUTROPHILS NFR BLD: 82.9 % (ref 38–73)
NITRITE UR QL STRIP: NEGATIVE
NRBC BLD-RTO: 0 /100 WBC
NRBC BLD-RTO: 0 /100 WBC
PCO2 BLDA: 33 MMHG (ref 35–45)
PCO2 BLDA: 39.2 MMHG (ref 35–45)
PCO2 BLDA: 45.9 MMHG (ref 35–45)
PEEP: 5
PH SMN: 7.26 [PH] (ref 7.35–7.45)
PH SMN: 7.31 [PH] (ref 7.35–7.45)
PH SMN: 7.37 [PH] (ref 7.35–7.45)
PH UR STRIP: 6 [PH] (ref 5–8)
PHOSPHATE SERPL-MCNC: 4.4 MG/DL (ref 2.7–4.5)
PIP: 39
PIP: 48
PLATELET # BLD AUTO: 262 K/UL (ref 150–450)
PLATELET # BLD AUTO: 416 K/UL (ref 150–450)
PLATELET BLD QL SMEAR: ABNORMAL
PMV BLD AUTO: 11.7 FL (ref 9.2–12.9)
PMV BLD AUTO: 11.9 FL (ref 9.2–12.9)
PO2 BLDA: 255 MMHG (ref 80–100)
PO2 BLDA: 261 MMHG (ref 80–100)
PO2 BLDA: 84 MMHG (ref 80–100)
POC BE: -6 MMOL/L
POC BE: -7 MMOL/L
POC BE: -7 MMOL/L
POC IONIZED CALCIUM: 1.2 MMOL/L (ref 1.06–1.42)
POC SATURATED O2: 100 % (ref 95–100)
POC SATURATED O2: 100 % (ref 95–100)
POC SATURATED O2: 96 % (ref 95–100)
POC TCO2: 20 MMOL/L (ref 23–27)
POC TCO2: 21 MMOL/L (ref 23–27)
POC TCO2: 22 MMOL/L (ref 23–27)
POTASSIUM BLD-SCNC: 3.7 MMOL/L (ref 3.5–5.1)
POTASSIUM SERPL-SCNC: 3.6 MMOL/L (ref 3.5–5.1)
PROCALCITONIN SERPL IA-MCNC: 0.1 NG/ML (ref 0–0.5)
PROT SERPL-MCNC: 6.8 G/DL (ref 6–8.4)
PROT UR QL STRIP: ABNORMAL
PROTHROMBIN TIME: 10.3 SEC (ref 9–12.5)
PROTHROMBIN TIME: 9.7 SEC (ref 9–12.5)
RBC # BLD AUTO: 3.15 M/UL (ref 4–5.4)
RBC # BLD AUTO: 4.31 M/UL (ref 4–5.4)
RBC #/AREA URNS HPF: 2 /HPF (ref 0–4)
SAMPLE: ABNORMAL
SARS-COV-2 RDRP RESP QL NAA+PROBE: NEGATIVE
SITE: ABNORMAL
SODIUM BLD-SCNC: 143 MMOL/L (ref 136–145)
SODIUM SERPL-SCNC: 140 MMOL/L (ref 136–145)
SP GR UR STRIP: 1.01 (ref 1–1.03)
SP02: 100
SP02: 99
SPECIMEN SOURCE: NORMAL
SQUAMOUS #/AREA URNS HPF: 2 /HPF
TROPONIN I SERPL HS-MCNC: 1216.8 PG/ML (ref 0–14.9)
TROPONIN I SERPL HS-MCNC: 1669.4 PG/ML (ref 0–14.9)
TROPONIN I SERPL HS-MCNC: 467.9 PG/ML (ref 0–14.9)
URN SPEC COLLECT METH UR: ABNORMAL
UROBILINOGEN UR STRIP-ACNC: NEGATIVE EU/DL
VT: 370
VT: 500
VT: 500
WAXY CASTS #/AREA URNS LPF: 1 /LPF
WBC # BLD AUTO: 14.8 K/UL (ref 3.9–12.7)
WBC # BLD AUTO: 15.2 K/UL (ref 3.9–12.7)
WBC #/AREA URNS HPF: 3 /HPF (ref 0–5)

## 2024-03-03 PROCEDURE — 82803 BLOOD GASES ANY COMBINATION: CPT

## 2024-03-03 PROCEDURE — 20000000 HC ICU ROOM

## 2024-03-03 PROCEDURE — 82010 KETONE BODYS QUAN: CPT | Performed by: EMERGENCY MEDICINE

## 2024-03-03 PROCEDURE — 0BH17EZ INSERTION OF ENDOTRACHEAL AIRWAY INTO TRACHEA, VIA NATURAL OR ARTIFICIAL OPENING: ICD-10-PCS | Performed by: EMERGENCY MEDICINE

## 2024-03-03 PROCEDURE — 93005 ELECTROCARDIOGRAM TRACING: CPT | Performed by: INTERNAL MEDICINE

## 2024-03-03 PROCEDURE — 96366 THER/PROPH/DIAG IV INF ADDON: CPT

## 2024-03-03 PROCEDURE — 83880 ASSAY OF NATRIURETIC PEPTIDE: CPT | Performed by: EMERGENCY MEDICINE

## 2024-03-03 PROCEDURE — 25000003 PHARM REV CODE 250: Performed by: EMERGENCY MEDICINE

## 2024-03-03 PROCEDURE — 36600 WITHDRAWAL OF ARTERIAL BLOOD: CPT

## 2024-03-03 PROCEDURE — 83605 ASSAY OF LACTIC ACID: CPT | Performed by: EMERGENCY MEDICINE

## 2024-03-03 PROCEDURE — 63600175 PHARM REV CODE 636 W HCPCS: Performed by: INTERNAL MEDICINE

## 2024-03-03 PROCEDURE — 84295 ASSAY OF SERUM SODIUM: CPT

## 2024-03-03 PROCEDURE — 83735 ASSAY OF MAGNESIUM: CPT | Performed by: EMERGENCY MEDICINE

## 2024-03-03 PROCEDURE — 94761 N-INVAS EAR/PLS OXIMETRY MLT: CPT | Mod: XB

## 2024-03-03 PROCEDURE — 96365 THER/PROPH/DIAG IV INF INIT: CPT

## 2024-03-03 PROCEDURE — 63600175 PHARM REV CODE 636 W HCPCS: Performed by: EMERGENCY MEDICINE

## 2024-03-03 PROCEDURE — 93010 ELECTROCARDIOGRAM REPORT: CPT | Mod: 76,,, | Performed by: INTERNAL MEDICINE

## 2024-03-03 PROCEDURE — 36415 COLL VENOUS BLD VENIPUNCTURE: CPT | Performed by: EMERGENCY MEDICINE

## 2024-03-03 PROCEDURE — 87502 INFLUENZA DNA AMP PROBE: CPT | Performed by: EMERGENCY MEDICINE

## 2024-03-03 PROCEDURE — 99223 1ST HOSP IP/OBS HIGH 75: CPT | Mod: ,,, | Performed by: INTERNAL MEDICINE

## 2024-03-03 PROCEDURE — 85014 HEMATOCRIT: CPT

## 2024-03-03 PROCEDURE — 94002 VENT MGMT INPAT INIT DAY: CPT

## 2024-03-03 PROCEDURE — 85730 THROMBOPLASTIN TIME PARTIAL: CPT | Mod: 91 | Performed by: INTERNAL MEDICINE

## 2024-03-03 PROCEDURE — 93306 TTE W/DOPPLER COMPLETE: CPT

## 2024-03-03 PROCEDURE — 99900031 HC PATIENT EDUCATION (STAT)

## 2024-03-03 PROCEDURE — 93306 TTE W/DOPPLER COMPLETE: CPT | Mod: 26,,, | Performed by: INTERNAL MEDICINE

## 2024-03-03 PROCEDURE — 82962 GLUCOSE BLOOD TEST: CPT

## 2024-03-03 PROCEDURE — 83735 ASSAY OF MAGNESIUM: CPT | Mod: 91 | Performed by: INTERNAL MEDICINE

## 2024-03-03 PROCEDURE — 51702 INSERT TEMP BLADDER CATH: CPT

## 2024-03-03 PROCEDURE — 99900035 HC TECH TIME PER 15 MIN (STAT)

## 2024-03-03 PROCEDURE — 84145 PROCALCITONIN (PCT): CPT | Performed by: EMERGENCY MEDICINE

## 2024-03-03 PROCEDURE — 84100 ASSAY OF PHOSPHORUS: CPT | Performed by: INTERNAL MEDICINE

## 2024-03-03 PROCEDURE — 84484 ASSAY OF TROPONIN QUANT: CPT | Mod: 91 | Performed by: INTERNAL MEDICINE

## 2024-03-03 PROCEDURE — 84484 ASSAY OF TROPONIN QUANT: CPT | Mod: 91 | Performed by: EMERGENCY MEDICINE

## 2024-03-03 PROCEDURE — 85610 PROTHROMBIN TIME: CPT | Mod: 91 | Performed by: EMERGENCY MEDICINE

## 2024-03-03 PROCEDURE — 31500 INSERT EMERGENCY AIRWAY: CPT

## 2024-03-03 PROCEDURE — 27100171 HC OXYGEN HIGH FLOW UP TO 24 HOURS

## 2024-03-03 PROCEDURE — 25000003 PHARM REV CODE 250: Performed by: INTERNAL MEDICINE

## 2024-03-03 PROCEDURE — 84484 ASSAY OF TROPONIN QUANT: CPT | Performed by: INTERNAL MEDICINE

## 2024-03-03 PROCEDURE — 81001 URINALYSIS AUTO W/SCOPE: CPT | Performed by: INTERNAL MEDICINE

## 2024-03-03 PROCEDURE — 85025 COMPLETE CBC W/AUTO DIFF WBC: CPT | Mod: 91 | Performed by: EMERGENCY MEDICINE

## 2024-03-03 PROCEDURE — 85730 THROMBOPLASTIN TIME PARTIAL: CPT | Performed by: EMERGENCY MEDICINE

## 2024-03-03 PROCEDURE — 83605 ASSAY OF LACTIC ACID: CPT | Mod: 91 | Performed by: INTERNAL MEDICINE

## 2024-03-03 PROCEDURE — 93010 ELECTROCARDIOGRAM REPORT: CPT | Mod: ,,, | Performed by: INTERNAL MEDICINE

## 2024-03-03 PROCEDURE — 36620 INSERTION CATHETER ARTERY: CPT

## 2024-03-03 PROCEDURE — 80053 COMPREHEN METABOLIC PANEL: CPT | Performed by: EMERGENCY MEDICINE

## 2024-03-03 PROCEDURE — U0002 COVID-19 LAB TEST NON-CDC: HCPCS | Performed by: EMERGENCY MEDICINE

## 2024-03-03 PROCEDURE — 82330 ASSAY OF CALCIUM: CPT

## 2024-03-03 PROCEDURE — 5A1945Z RESPIRATORY VENTILATION, 24-96 CONSECUTIVE HOURS: ICD-10-PCS | Performed by: EMERGENCY MEDICINE

## 2024-03-03 PROCEDURE — 99291 CRITICAL CARE FIRST HOUR: CPT

## 2024-03-03 PROCEDURE — 37799 UNLISTED PX VASCULAR SURGERY: CPT

## 2024-03-03 PROCEDURE — 99900026 HC AIRWAY MAINTENANCE (STAT)

## 2024-03-03 PROCEDURE — 84132 ASSAY OF SERUM POTASSIUM: CPT

## 2024-03-03 PROCEDURE — 87040 BLOOD CULTURE FOR BACTERIA: CPT | Mod: 59 | Performed by: EMERGENCY MEDICINE

## 2024-03-03 RX ORDER — IBUPROFEN 200 MG
24 TABLET ORAL
Status: DISCONTINUED | OUTPATIENT
Start: 2024-03-03 | End: 2024-03-12 | Stop reason: HOSPADM

## 2024-03-03 RX ORDER — ASPIRIN 300 MG/1
300 SUPPOSITORY RECTAL
Status: COMPLETED | OUTPATIENT
Start: 2024-03-03 | End: 2024-03-03

## 2024-03-03 RX ORDER — FENTANYL CITRATE 50 UG/ML
50 INJECTION, SOLUTION INTRAMUSCULAR; INTRAVENOUS
Status: DISPENSED | OUTPATIENT
Start: 2024-03-03 | End: 2024-03-03

## 2024-03-03 RX ORDER — METOPROLOL TARTRATE 25 MG/1
12.5 TABLET ORAL 2 TIMES DAILY
Status: DISCONTINUED | OUTPATIENT
Start: 2024-03-03 | End: 2024-03-04

## 2024-03-03 RX ORDER — ACETAMINOPHEN 325 MG/1
650 TABLET ORAL EVERY 4 HOURS PRN
Status: DISCONTINUED | OUTPATIENT
Start: 2024-03-03 | End: 2024-03-12 | Stop reason: HOSPADM

## 2024-03-03 RX ORDER — PROPOFOL 10 MG/ML
0-50 INJECTION, EMULSION INTRAVENOUS CONTINUOUS
Status: DISCONTINUED | OUTPATIENT
Start: 2024-03-03 | End: 2024-03-04

## 2024-03-03 RX ORDER — FUROSEMIDE 10 MG/ML
40 INJECTION INTRAMUSCULAR; INTRAVENOUS EVERY 12 HOURS
Status: DISCONTINUED | OUTPATIENT
Start: 2024-03-03 | End: 2024-03-04

## 2024-03-03 RX ORDER — METOPROLOL TARTRATE 1 MG/ML
5 INJECTION, SOLUTION INTRAVENOUS EVERY 12 HOURS
Status: DISCONTINUED | OUTPATIENT
Start: 2024-03-03 | End: 2024-03-03

## 2024-03-03 RX ORDER — FAMOTIDINE 10 MG/ML
20 INJECTION INTRAVENOUS DAILY
Status: DISCONTINUED | OUTPATIENT
Start: 2024-03-03 | End: 2024-03-04

## 2024-03-03 RX ORDER — NITROGLYCERIN 20 MG/100ML
0-400 INJECTION INTRAVENOUS CONTINUOUS
Status: DISCONTINUED | OUTPATIENT
Start: 2024-03-03 | End: 2024-03-04

## 2024-03-03 RX ORDER — MUPIROCIN 20 MG/G
OINTMENT TOPICAL 2 TIMES DAILY
Status: COMPLETED | OUTPATIENT
Start: 2024-03-03 | End: 2024-03-08

## 2024-03-03 RX ORDER — MAGNESIUM SULFATE HEPTAHYDRATE 40 MG/ML
2 INJECTION, SOLUTION INTRAVENOUS
Status: DISCONTINUED | OUTPATIENT
Start: 2024-03-03 | End: 2024-03-12 | Stop reason: HOSPADM

## 2024-03-03 RX ORDER — INSULIN ASPART 100 [IU]/ML
0-5 INJECTION, SOLUTION INTRAVENOUS; SUBCUTANEOUS
Status: DISCONTINUED | OUTPATIENT
Start: 2024-03-03 | End: 2024-03-12 | Stop reason: HOSPADM

## 2024-03-03 RX ORDER — HYDRALAZINE HYDROCHLORIDE 20 MG/ML
10 INJECTION INTRAMUSCULAR; INTRAVENOUS EVERY 8 HOURS PRN
Status: DISCONTINUED | OUTPATIENT
Start: 2024-03-03 | End: 2024-03-12 | Stop reason: HOSPADM

## 2024-03-03 RX ORDER — FUROSEMIDE 10 MG/ML
40 INJECTION INTRAMUSCULAR; INTRAVENOUS
Status: COMPLETED | OUTPATIENT
Start: 2024-03-03 | End: 2024-03-03

## 2024-03-03 RX ORDER — SODIUM CHLORIDE 0.9 % (FLUSH) 0.9 %
10 SYRINGE (ML) INJECTION
Status: DISCONTINUED | OUTPATIENT
Start: 2024-03-03 | End: 2024-03-12 | Stop reason: HOSPADM

## 2024-03-03 RX ORDER — METOPROLOL TARTRATE 25 MG/1
12.5 TABLET ORAL 2 TIMES DAILY
Status: DISCONTINUED | OUTPATIENT
Start: 2024-03-03 | End: 2024-03-03

## 2024-03-03 RX ORDER — FENTANYL CITRATE 50 UG/ML
50 INJECTION, SOLUTION INTRAMUSCULAR; INTRAVENOUS
Status: DISCONTINUED | OUTPATIENT
Start: 2024-03-03 | End: 2024-03-12 | Stop reason: HOSPADM

## 2024-03-03 RX ORDER — NAPROXEN SODIUM 220 MG/1
81 TABLET, FILM COATED ORAL DAILY
Status: DISCONTINUED | OUTPATIENT
Start: 2024-03-03 | End: 2024-03-12 | Stop reason: HOSPADM

## 2024-03-03 RX ORDER — FAMOTIDINE 20 MG/50ML
20 INJECTION, SOLUTION INTRAVENOUS DAILY
Status: DISCONTINUED | OUTPATIENT
Start: 2024-03-03 | End: 2024-03-03

## 2024-03-03 RX ORDER — GLUCAGON 1 MG
1 KIT INJECTION
Status: DISCONTINUED | OUTPATIENT
Start: 2024-03-03 | End: 2024-03-12 | Stop reason: HOSPADM

## 2024-03-03 RX ORDER — MAGNESIUM SULFATE HEPTAHYDRATE 40 MG/ML
4 INJECTION, SOLUTION INTRAVENOUS
Status: DISCONTINUED | OUTPATIENT
Start: 2024-03-03 | End: 2024-03-12 | Stop reason: HOSPADM

## 2024-03-03 RX ORDER — HEPARIN SODIUM,PORCINE/D5W 25000/250
0-40 INTRAVENOUS SOLUTION INTRAVENOUS CONTINUOUS
Status: DISCONTINUED | OUTPATIENT
Start: 2024-03-03 | End: 2024-03-05

## 2024-03-03 RX ORDER — IBUPROFEN 200 MG
16 TABLET ORAL
Status: DISCONTINUED | OUTPATIENT
Start: 2024-03-03 | End: 2024-03-12 | Stop reason: HOSPADM

## 2024-03-03 RX ADMIN — FENTANYL CITRATE 50 MCG: 50 INJECTION, SOLUTION INTRAMUSCULAR; INTRAVENOUS at 12:03

## 2024-03-03 RX ADMIN — FUROSEMIDE 40 MG: 10 INJECTION, SOLUTION INTRAMUSCULAR; INTRAVENOUS at 10:03

## 2024-03-03 RX ADMIN — HEPARIN SODIUM AND DEXTROSE 12 UNITS/KG/HR: 10000; 5 INJECTION INTRAVENOUS at 11:03

## 2024-03-03 RX ADMIN — ASPIRIN 300 MG: 300 SUPPOSITORY RECTAL at 09:03

## 2024-03-03 RX ADMIN — INSULIN DETEMIR 10 UNITS: 100 INJECTION, SOLUTION SUBCUTANEOUS at 01:03

## 2024-03-03 RX ADMIN — FAMOTIDINE 20 MG: 10 INJECTION, SOLUTION INTRAVENOUS at 01:03

## 2024-03-03 RX ADMIN — FENTANYL CITRATE 50 MCG: 50 INJECTION, SOLUTION INTRAMUSCULAR; INTRAVENOUS at 11:03

## 2024-03-03 RX ADMIN — MUPIROCIN 1 G: 20 OINTMENT TOPICAL at 09:03

## 2024-03-03 RX ADMIN — PROPOFOL 5 MCG/KG/MIN: 10 INJECTION, EMULSION INTRAVENOUS at 08:03

## 2024-03-03 RX ADMIN — FENTANYL CITRATE 50 MCG: 50 INJECTION, SOLUTION INTRAMUSCULAR; INTRAVENOUS at 04:03

## 2024-03-03 RX ADMIN — INSULIN DETEMIR 10 UNITS: 100 INJECTION, SOLUTION SUBCUTANEOUS at 09:03

## 2024-03-03 RX ADMIN — HEPARIN SODIUM AND DEXTROSE 14 UNITS/KG/HR: 10000; 5 INJECTION INTRAVENOUS at 05:03

## 2024-03-03 RX ADMIN — FUROSEMIDE 40 MG: 10 INJECTION, SOLUTION INTRAVENOUS at 09:03

## 2024-03-03 RX ADMIN — NITROGLYCERIN 150 MCG/MIN: 20 INJECTION INTRAVENOUS at 08:03

## 2024-03-03 RX ADMIN — MAGNESIUM SULFATE HEPTAHYDRATE 2 G: 40 INJECTION, SOLUTION INTRAVENOUS at 02:03

## 2024-03-03 RX ADMIN — PROPOFOL 40 MCG/KG/MIN: 10 INJECTION, EMULSION INTRAVENOUS at 02:03

## 2024-03-03 RX ADMIN — FENTANYL CITRATE 50 MCG: 50 INJECTION, SOLUTION INTRAMUSCULAR; INTRAVENOUS at 09:03

## 2024-03-03 RX ADMIN — INSULIN ASPART 3 UNITS: 100 INJECTION, SOLUTION INTRAVENOUS; SUBCUTANEOUS at 01:03

## 2024-03-03 RX ADMIN — POTASSIUM BICARBONATE 50 MEQ: 977.5 TABLET, EFFERVESCENT ORAL at 01:03

## 2024-03-03 RX ADMIN — PROPOFOL 40 MCG/KG/MIN: 10 INJECTION, EMULSION INTRAVENOUS at 05:03

## 2024-03-03 NOTE — ASSESSMENT & PLAN NOTE
-check lipid panel  -continue home meds  -lifestyle modifications  -follow up with the PCP and cardiology clinic

## 2024-03-03 NOTE — ASSESSMENT & PLAN NOTE
Creatine stable for now. BMP reviewed- noted Estimated Creatinine Clearance: 22.7 mL/min (A) (based on SCr of 2 mg/dL (H)). according to latest data. Based on current GFR, CKD stage is stage 3 - GFR 30-59.  Monitor UOP and serial BMP and adjust therapy as needed. Renally dose meds. Avoid nephrotoxic medications and procedures.    -trend renal function  -avoid NSAIDs and nephrotoxic agents  -creatinine on admission 2.0  -baseline creatinine 1.5 with GFR 33 on outpatient office visit in February 2024 with cardiology clinic

## 2024-03-03 NOTE — ASSESSMENT & PLAN NOTE
-patient follow up in outpatient cardiology clinic  -check echo, previous EF 53% on echo in October 2023

## 2024-03-03 NOTE — CONSULTS
[facility]  Department of Cardiology  Consult Note      PATIENT NAME: Cynthia N Cushing  MRN: [id number]  TODAY'S DATE: 03/03/2024  ADMIT DATE: 3/3/2024                          CONSULT REQUESTED BY: Zaki Longoria MD    SUBJECTIVE     PRINCIPAL PROBLEM: Acute hypoxemic respiratory failure      REASON FOR CONSULT:  NSTEMI      HPI:  Chief Complaint   Patient presents with    Respiratory Distress         HPI: Mrs. Cushing is a pleasant 78-year-old female who has a past medical history of hypertension, diabetes, hyperlipidemia, sleep apnea, SVT, SSS and follow up in cardiology clinic with Dr. Mendieta with recent office visit, last echo October 6, 2023 with EF of 53% with normal diastolic dysfunction and moderate AS who presents to the emergency room today with complaints of increased shortness of breath that started last night, got progressively worse.  The patient was started on CPAP by EMS.  Patient's vital signs in the ER showed blood pressure initially as high as 250/130, tachypnea, .  The patient in the ER was in respiratory distress, she was intubated to protect her airway.  Patient's laboratories and x-rays done.  Patient's case was discussed with the ER physician at the time of admission.  Laboratories showed troponin 467.9, COVID 19 screen negative, lactic acid 2.7, magnesium 2.0, calcitonin 0.098, sodium 140, potassium 3.6, chloride 109, CO2 18, BUN 22, creatinine 2.0, glucose 336, AST 25, anion gap normal at 13, PT INR 0.9, WBCs 14.8, hemoglobin 11, platelet count 416.  ABG show pH 7.25/pCO2 45.9/PO2 255/bicarbonate 20.5/oxygen saturation 100% this was done on ventilator.  Chest x-ray showed changes of pulmonary edema with ETT in place.  The patient was recommended to be admitted to the ICU for acute respiratory failure with hypoxia, hypertensive emergency, possible sepsis with lactic acid level 2.7.  Patient was given Lasix 40 mg IV in the ER, started on nitroglycerin drip.  Patient  started on CHF order set, sepsis order set, no fluid bolus given due to concerns of pulmonary edema and risk of fluid overload, IV antibiotics, blood cultures, cardiology consult, and Pulmonary critical Care consult.  Patient is a full code status.      Review of patient's allergies indicates:   Allergen Reactions    Biaxin [clarithromycin]     Prandin [repaglinide] Nausea And Vomiting    Amlodipine     Chlorphen-phenyltolox-pe-ppa     Ciprofloxacin Nausea And Vomiting    Omnicef [cefdinir]     Propoxyphene     Quinine Nausea And Vomiting       Past Medical History:   Diagnosis Date    Allergy     Breast mass     Cataract     Diabetes mellitus     GERD (gastroesophageal reflux disease)     Hernia, hiatal     Hyperlipidemia     Hypertension     Kidney stone     Osteoarthritis     Renal insufficiency     Seasonal allergies     Sleep apnea     SSS (sick sinus syndrome)     SVT (supraventricular tachycardia)      Past Surgical History:   Procedure Laterality Date    BREAST BIOPSY      BREAST CYST ASPIRATION      BREAST SURGERY      CERVICAL DISC SURGERY      EYE SURGERY      cataracts bilateral    HYSTERECTOMY       Social History     Tobacco Use    Smoking status: Never    Smokeless tobacco: Never   Substance Use Topics    Alcohol use: No    Drug use: No        REVIEW OF SYSTEMS  Intubated/sedated    OBJECTIVE     VITAL SIGNS (Most Recent)  Temp: 97.2 °F (36.2 °C) (03/03/24 1115)  Pulse: (!) 50 (03/03/24 1430)  Resp: (!) 21 (03/03/24 1430)  BP: 125/62 (03/03/24 1430)  SpO2: 100 % (03/03/24 1430)    VENTILATION STATUS  Resp: (!) 21 (03/03/24 1430)  SpO2: 100 % (03/03/24 1430)  Vent Mode: A/C  Oxygen Concentration (%):  [] 40  Resp Rate Total:  [18 br/min-58 br/min] 18 br/min  Vt Set:  [370 mL-500 mL] 370 mL  PEEP/CPAP:  [5 cmH20] 5 cmH20  Pressure Support:  [0 cmH20] 0 cmH20  Mean Airway Pressure:  [8.3 rwA27-34 cmH20] 8.3 cmH20        I & O (Last 24H):  Intake/Output Summary (Last 24 hours) at 3/3/2024 1506  Last  data filed at 3/3/2024 1314  Gross per 24 hour   Intake --   Output 350 ml   Net -350 ml       WEIGHTS  Wt Readings from Last 3 Encounters:   03/03/24 1115 75.9 kg (167 lb 5.3 oz)   03/03/24 0813 76.7 kg (169 lb 1.5 oz)   02/02/24 1311 78.3 kg (172 lb 8.2 oz)   12/12/23 0949 78.5 kg (173 lb)       PHYSICAL EXAM /65 NIBP, 146/63 per art line; NSR on telemetry    GENERAL: critically ill elderly female breathing per vent in no apparent distress.  HEENT: Normocephalic.    NECK: No JVD.   CARDIAC: Regular rate and rhythm. S1 is normal.S2 is normal.No gallops, clicks or murmurs noted at this time.  CHEST ANATOMY: normal.   LUNGS: +Crackles; .   ABDOMEN: Soft, non distended, hypoactive BS.    EXTREMITIES: No edema  CENTRAL NERVOUS SYSTEM: sedated/intubated  SKIN: No rash   Linares draining clear yellow urine  HOME MEDICATIONS:  No current facility-administered medications on file prior to encounter.     Current Outpatient Medications on File Prior to Encounter   Medication Sig Dispense Refill    famotidine (PEPCID) 20 MG tablet TAKE 1 TABLET TWICE DAILY (Patient taking differently: Take 20 mg by mouth 2 (two) times daily.) 180 tablet 2    furosemide (LASIX) 20 MG tablet TAKE 1 TABLET(20 MG) BY MOUTH EVERY DAY (Patient taking differently: Take 20 mg by mouth once daily.) 30 tablet 0    guanFACINE (TENEX) 2 MG tablet TAKE 1 TABLET EVERY EVENING (Patient taking differently: Take 2 mg by mouth nightly.) 90 tablet 3    hydrALAZINE (APRESOLINE) 50 MG tablet Take 1 tablet (50 mg total) by mouth 3 (three) times daily. 90 tablet 11    labetaloL (NORMODYNE) 100 MG tablet Take 1 tablet (100 mg total) by mouth 2 (two) times daily. 60 tablet 11    LANTUS SOLOSTAR U-100 INSULIN glargine 100 units/mL SubQ pen ADMINISTER 51 UNITS UNDER THE SKIN AT NIGHT (Patient taking differently: Inject 51 Units into the skin every evening. ADMINISTER 51 UNITS UNDER THE SKIN AT NIGHT) 15 mL 5    olmesartan (BENICAR) 40 MG tablet TAKE 1 TABLET EVERY  "DAY (Patient taking differently: Take 40 mg by mouth once daily.) 90 tablet 3    rosuvastatin (CRESTOR) 10 MG tablet Take 1 tablet (10 mg total) by mouth every evening. 90 tablet 2    ACCU-CHEK AMADOR PLUS TEST STRP Strp TEST BLOOD SUGAR FOUR TIMES DAILY 400 strip 10    amLODIPine (NORVASC) 10 MG tablet Take 1 tablet (10 mg total) by mouth once daily. 30 tablet 11    aspirin 81 MG Chew Take 81 mg by mouth once daily.      lancets (ACCU-CHEK SOFTCLIX LANCETS) Misc TEST BLOOD SUGAR FOUR TIMES DAILY 400 each 3    pen needle, diabetic (BD ANNA 2ND GEN PEN NEEDLE) 32 gauge x 5/32" Ndle INJECT 1 NEEDLE INTO THE SKIN EVERY EVENING 100 each 5       SCHEDULED MEDS:   aspirin  81 mg Oral Daily    famotidine (PF)  20 mg Intravenous Daily    furosemide (LASIX) injection  40 mg Intravenous Q12H    insulin detemir U-100  10 Units Subcutaneous BID    metoprolol  5 mg Intravenous Q12H    mupirocin   Nasal BID       CONTINUOUS INFUSIONS:   heparin (porcine) in D5W 12 Units/kg/hr (24 1125)    nitroGLYCERIN Stopped (24 1130)    propofoL 40 mcg/kg/min (24 1404)       PRN MEDS:acetaminophen, dextrose 50% in water (D50W), dextrose 50% in water (D50W), [] fentaNYL **FOLLOWED BY** fentaNYL, glucagon (human recombinant), glucose, glucose, heparin (PORCINE), heparin (PORCINE), hydrALAZINE, insulin aspart U-100, magnesium sulfate IVPB, magnesium sulfate IVPB, potassium bicarbonate, potassium bicarbonate, potassium bicarbonate, sodium chloride 0.9%    LABS AND DIAGNOSTICS     CBC LAST 3 DAYS  Recent Labs   Lab 24  0827 24  1013 24  1253   WBC 14.80* 15.20*  --    RBC 4.31 3.15*  --    HGB 11.1* 8.2*  --    HCT 36.8* 28.7* 27*   MCV 85 91  --    MCH 25.8* 26.0*  --    MCHC 30.2* 28.6*  --    RDW 17.0* 17.5*  --     262  --    MPV 11.7 11.9  --    GRAN 44.2  6.5 82.9*  12.6*  --    LYMPH 38.4  5.7* 8.3*  1.3  --    MONO 8.4  1.3* 6.6  1.0  --    BASO 0.25* 0.08  --    NRBC 0 0  --  "       COAGULATION LAST 3 DAYS  Recent Labs   Lab 03/03/24  0827 03/03/24  1013   INR 0.9 0.9   APTT  --  22.5       CHEMISTRY LAST 3 DAYS  Recent Labs   Lab 03/03/24  0827 03/03/24  0838 03/03/24  1038 03/03/24  1225 03/03/24  1253     --   --   --   --    K 3.6  --   --   --   --      --   --   --   --    CO2 18*  --   --   --   --    ANIONGAP 13  --   --   --   --    BUN 22  --   --   --   --    CREATININE 2.0*  --   --   --   --    *  --   --   --   --    CALCIUM 8.7  --   --   --   --    PH  --  7.257* 7.306*  --  7.369   MG 2.0  --   --  1.8  --    ALBUMIN 3.6  --   --   --   --    PROT 6.8  --   --   --   --    ALKPHOS 69  --   --   --   --    ALT 26  --   --   --   --    AST 25  --   --   --   --    BILITOT 0.4  --   --   --   --        CARDIAC PROFILE LAST 3 DAYS  Recent Labs   Lab 03/03/24  0827   *       ENDOCRINE LAST 3 DAYS  Recent Labs   Lab 03/03/24  0827   PROCAL 0.098       LAST ARTERIAL BLOOD GAS  ABG  Recent Labs   Lab 03/03/24  1253   PH 7.369   PO2 84   PCO2 33.0*   HCO3 19.0*   BE -6*       LAST 7 DAYS MICROBIOLOGY   Microbiology Results (last 7 days)       Procedure Component Value Units Date/Time    Blood culture [[id number]]     Order Status: Canceled Specimen: Blood     Blood culture [[id number]]     Order Status: Canceled Specimen: Blood     Blood culture x two cultures. Draw prior to antibiotics. [[id number]] Collected: 03/03/24 0903    Order Status: Sent Specimen: Blood Updated: 03/03/24 0943    Blood culture x two cultures. Draw prior to antibiotics. [[id number]] Collected: 03/03/24 0903    Order Status: Sent Specimen: Blood Updated: 03/03/24 0943            MOST RECENT IMAGING  X-Ray Chest AP Portable  HISTORY: Respiratory failure, intubation.    FINDINGS: Portable chest radiograph at 0806 hours compared to 05/13/2022 shows ET tube with distal tip at the level of T5-T6, within 2 cm of the michelle. Nasogastric tube has its distal portion overlying the proximal  gastric body.    The cardiac silhouette is within normal limits, with the central pulmonary vasculature prominent and indistinct. The lungs are symmetrically expanded, with diffuse reticulonodular densities and patchy groundglass opacities, and blunting of the costophrenic angles. There is no consolidation or pneumothorax.    IMPRESSION:  1. ET and NG tubes as described.  2. Central pulmonary vascular congestion, with diffuse interstitial pulmonary edema/hypervolemia, and small pleural effusions suspected.    Electronically signed by:  Saji Hahn MD  03/03/2024 08:59 AM CST Workstation: [id number]      ECHOCARDIOGRAM RESULTS (last 5)  Results for orders placed during the hospital encounter of 10/06/23    Echo Saline Bubble? No    Interpretation Summary    Left Ventricle: The left ventricle is normal in size. Mildly increased wall thickness. There is mild concentric hypertrophy. Normal wall motion. There is normal systolic function. Ejection fraction by visual approximation is 53%. There is normal diastolic function.    Left Atrium: Left atrium is mildly dilated.    Right Ventricle: Normal right ventricular cavity size. Wall thickness is normal. Right ventricle wall motion  is normal. Systolic function is normal.    Aortic Valve: The aortic valve is a trileaflet valve. There is moderate aortic valve sclerosis. Mildly calcified cusps. Mildly restricted motion. There is moderate stenosis. Aortic valve area by VTI is 1.39 cm². Aortic valve peak velocity is 2.67 m/s. Mean gradient is 16 mmHg. The dimensionless index is 0.44.    Mitral Valve: There is mild regurgitation with a centrally directed jet.    Tricuspid Valve: There is mild regurgitation.    IVC/SVC: Normal venous pressure at 3 mmHg.    The estimated pulmonary artery systolic pressure is 28 mmHg.      Results for orders placed during the hospital encounter of 04/11/22    Echo Saline Bubble? No    Interpretation Summary  · The left ventricle is normal in size  with concentric remodeling and normal systolic function.  · The estimated ejection fraction is 55%.  · Normal left ventricular diastolic function.  · Moderate left atrial enlargement.  · There is mild aortic valve stenosis.  · Aortic valve area is 1.50 cm2; peak velocity is 2.68 m/s; mean gradient is 15 mmHg.      CURRENT/PREVIOUS VISIT EKG  Results for orders placed or performed during the hospital encounter of 03/03/24   EKG 12-lead    Collection Time: 03/03/24 12:02 PM   Result Value Ref Range    QRS Duration 104 ms    OHS QTC Calculation 525 ms    Narrative    Test Reason : R00.1,    Vent. Rate : 061 BPM     Atrial Rate : 061 BPM     P-R Int : 162 ms          QRS Dur : 104 ms      QT Int : 522 ms       P-R-T Axes : -29 -15 210 degrees     QTc Int : 525 ms    Sinus rhythm with occasional Premature ventricular complexes  LVH with repolarization abnormality ( [place] product )  Prolonged QT  Abnormal ECG  When compared with ECG of 03-MAR-2024 12:02,  Previous ECG has undetermined rhythm, needs review    Referred By: [email or web address]   SELF           Confirmed By:            ASSESSMENT/PLAN:     Active Hospital Problems    Diagnosis    *Acute hypoxemic respiratory failure    ACP (advance care planning)     -I spoke with the patient's daughters Mrs. Duran and Mrs. Ramos.  They were updated on the patient's condition.  Their questions and concerns were addressed.  They request that the patient be a full code status.      Aortic stenosis    Hyperlipidemia    Stage 3b chronic kidney disease    Uncontrolled type 2 diabetes mellitus with hyperglycemia    SSS (sick sinus syndrome)    Essential hypertension, benign       ASSESSMENT & PLAN:   Acute hypoxemic respiratory failure  Acute heart failure  Hypertensive emergency  Aortic stenosis  NSTEMI  CKD  HTN   DM2    RECOMMENDATIONS:    Continue to trend troponin Q 6 hrs until trended down  Start aspirin 81 mg daily per OGT   Continue heparin drip  Continue diuresis with Lasix 40 mg  IV BID and obtain strict I/O  resume low dose BB metoprolol tartrate 12.5 mg BID per OGT  Obtain echocardiogram to evaluate LV and valvular function  Will plan ischemic work up once patient stabilized      Vannessa Soriano NP  [facility]  Department of Cardiology  Date of Service: 03/03/2024        I have personally examined the patient, I have reviewed the Nurse Practitioner's history and physical, assessment, and plan. I agree with the findings and plan.  Pt presented with acute pulmonary edema possibly triggered by HTN emergency. Intubated in ED. BP improved. Trop elevated, possibly demand ischemia. Plan is to continue diuresis and monitor Is & Os , renal function closely.  Trend troponins,  continue IV heparin for now.  Echocardiogram.  Ischemia workup once patient is clinically improved and extubated. Will follow  Discussed with the patient's family and Pulmonary critical Care.      Dr. Alejandra ALEXANDER  [facility]  Department of Cardiology  Date of Service: 03/03/2024

## 2024-03-03 NOTE — SUBJECTIVE & OBJECTIVE
Past Medical History:   Diagnosis Date    Allergy     Breast mass     Cataract     Diabetes mellitus     GERD (gastroesophageal reflux disease)     Hernia, hiatal     Hyperlipidemia     Hypertension     Kidney stone     Osteoarthritis     Renal insufficiency     Seasonal allergies     Sleep apnea     SSS (sick sinus syndrome)     SVT (supraventricular tachycardia)        Past Surgical History:   Procedure Laterality Date    BREAST BIOPSY      BREAST CYST ASPIRATION      BREAST SURGERY      CERVICAL DISC SURGERY      EYE SURGERY      cataracts bilateral    HYSTERECTOMY         Review of patient's allergies indicates:   Allergen Reactions    Biaxin [clarithromycin]     Prandin [repaglinide] Nausea And Vomiting    Amlodipine     Chlorphen-phenyltolox-pe-ppa     Ciprofloxacin Nausea And Vomiting    Omnicef [cefdinir]     Propoxyphene     Quinine Nausea And Vomiting       No current facility-administered medications on file prior to encounter.     Current Outpatient Medications on File Prior to Encounter   Medication Sig    ACCU-CHEK AMADOR PLUS TEST STRP Strp TEST BLOOD SUGAR FOUR TIMES DAILY    amLODIPine (NORVASC) 10 MG tablet Take 1 tablet (10 mg total) by mouth once daily.    aspirin 81 MG Chew Take 81 mg by mouth once daily.    famotidine (PEPCID) 20 MG tablet TAKE 1 TABLET TWICE DAILY (Patient taking differently: Take 20 mg by mouth 2 (two) times daily.)    furosemide (LASIX) 20 MG tablet TAKE 1 TABLET(20 MG) BY MOUTH EVERY DAY (Patient taking differently: Take 20 mg by mouth once daily.)    guanFACINE (TENEX) 2 MG tablet TAKE 1 TABLET EVERY EVENING (Patient taking differently: Take 2 mg by mouth nightly.)    hydrALAZINE (APRESOLINE) 50 MG tablet Take 1 tablet (50 mg total) by mouth 3 (three) times daily.    labetaloL (NORMODYNE) 100 MG tablet Take 1 tablet (100 mg total) by mouth 2 (two) times daily.    lancets (ACCU-CHEK SOFTCLIX LANCETS) Misc TEST BLOOD SUGAR FOUR TIMES DAILY    LANTUS SOLOSTAR U-100 INSULIN  "glargine 100 units/mL SubQ pen ADMINISTER 51 UNITS UNDER THE SKIN AT NIGHT (Patient taking differently: Inject 51 Units into the skin every evening. ADMINISTER 51 UNITS UNDER THE SKIN AT NIGHT)    olmesartan (BENICAR) 40 MG tablet TAKE 1 TABLET EVERY DAY (Patient taking differently: Take 40 mg by mouth once daily.)    pen needle, diabetic (BD ANNA 2ND GEN PEN NEEDLE) 32 gauge x 5/32" Ndle INJECT 1 NEEDLE INTO THE SKIN EVERY EVENING    rosuvastatin (CRESTOR) 10 MG tablet Take 1 tablet (10 mg total) by mouth every evening.     Family History       Problem Relation (Age of Onset)    Breast cancer Mother, Maternal Aunt    Cancer Mother, Father    Stroke Mother          Tobacco Use    Smoking status: Never    Smokeless tobacco: Never   Substance and Sexual Activity    Alcohol use: No    Drug use: No    Sexual activity: Never     Review of Systems   Reason unable to perform ROS: Patient is intubated and sedated, unable to obtain ROS.     Objective:     Vital Signs (Most Recent):  Pulse: 91 (03/03/24 0900)  BP: (!) 145/92 (03/03/24 0915)  SpO2: (!) 94 % (03/03/24 0900) Vital Signs (24h Range):  Pulse:  [] 91  SpO2:  [94 %-100 %] 94 %  BP: (145-254)/() 145/92     Weight: 76.7 kg (169 lb 1.5 oz)  Body mass index is 29.95 kg/m².     Physical Exam  Vitals reviewed.   Constitutional:       Comments: Patient is intubated and sedated   HENT:      Head: Normocephalic and atraumatic.      Right Ear: External ear normal.      Left Ear: External ear normal.      Nose: Nose normal.      Mouth/Throat:      Mouth: Mucous membranes are moist.      Pharynx: Oropharynx is clear.   Cardiovascular:      Rate and Rhythm: Regular rhythm. Tachycardia present.      Pulses: Normal pulses.      Heart sounds: Normal heart sounds.   Pulmonary:      Breath sounds: Rales present.   Abdominal:      General: Abdomen is flat. Bowel sounds are normal.      Palpations: Abdomen is soft.   Musculoskeletal:         General: Normal range of motion. " "     Cervical back: Normal range of motion and neck supple.   Skin:     General: Skin is warm.      Capillary Refill: Capillary refill takes less than 2 seconds.   Neurological:      General: No focal deficit present.      Cranial Nerves: No cranial nerve deficit.      Comments: Intubated and sedated                Significant Labs: All pertinent labs within the past 24 hours have been reviewed.  ABGs:   Recent Labs   Lab 03/03/24  0838   PH 7.257*   PCO2 45.9*   HCO3 20.5*   POCSATURATED 100   BE -7*   PO2 255*     Blood Culture: No results for input(s): "LABBLOO" in the last 48 hours.  BMP:   Recent Labs   Lab 03/03/24  0827   *      K 3.6      CO2 18*   BUN 22   CREATININE 2.0*   CALCIUM 8.7   MG 2.0     CBC:   Recent Labs   Lab 03/03/24  0827   WBC 14.80*   HGB 11.1*   HCT 36.8*        CMP:   Recent Labs   Lab 03/03/24  0827      K 3.6      CO2 18*   *   BUN 22   CREATININE 2.0*   CALCIUM 8.7   PROT 6.8   ALBUMIN 3.6   BILITOT 0.4   ALKPHOS 69   AST 25   ALT 26   ANIONGAP 13     Cardiac Markers: No results for input(s): "CKMB", "MYOGLOBIN", "BNP", "TROPISTAT" in the last 48 hours.  Coagulation:   Recent Labs   Lab 03/03/24  0827   INR 0.9     Lactic Acid:   Recent Labs   Lab 03/03/24  0827   LACTATE 2.7*     Troponin:   Recent Labs   Lab 03/03/24  0827   TROPONINIHS 467.9*         Significant Imaging: I have reviewed all pertinent imaging results/findings within the past 24 hours.  "

## 2024-03-03 NOTE — PLAN OF CARE
[facility]  Initial Discharge Assessment       Primary Care Provider: Teri Adams MD    Admission Diagnosis: Dyspnea [R06.00]  Dyspnea [R06.00]    Admission Date: 3/3/2024  Expected Discharge Date:     Transition of Care Barriers: None    Payor: HU[employer] MANAGED MEDICARE / Plan: HU[employer] MEDICARE HMO / Product Type: Capitation /     Extended Emergency Contact Information  Primary Emergency Contact: Cushing,Mary   [facility]  Home Phone: [phone]  Relation: Daughter  Secondary Emergency Contact: Cushing,Brenda  Mobile Phone: [phone]  Relation: Daughter    Discharge Plan A: Home  Discharge Plan B: Home      [facility] - [facility] [address] [facility]  [address] [address]  Phone: [phone] Fax: [phone]    [id number] - [facility] [address]  AT [facility]  [address] [facility] [address]  Phone: [phone] Fax: [phone]    CM completed discharge assessment with patient's two daughters at bedside. Pt AAOx4s. Pt lives alone. Demographics, PCP, and insurance verified. No home health or DME.  No dialysis. Pt completes ADLs without assistance. Pt to discharge home via family transport. Pt has no other needs to be addressed at this time.    Initial Assessment (most recent)       Adult Discharge Assessment - 03/03/24 1325          Discharge Assessment    Assessment Type Discharge Planning Assessment     Confirmed/corrected address, phone number and insurance Yes     Confirmed Demographics Correct on Facesheet     Source of Information family     If unable to respond/provide information was family/caregiver contacted? Yes     Contact Name/Number Mary Cushing (daughter) [phone]     When was your last doctors appointment? --   3-4 months ago    Communicated ASHELY with patient/caregiver Date not available/Unable to determine     Reason For Admission Acute hypoxemic respiratory failure      People in Home alone     Do you expect to return to your current living situation? Yes     Do you have help at home or someone to help you manage your care at home? Yes     Who are your caregiver(s) and their phone number(s)? Mary Cushing (daughter) [phone] and Brenda Cushing (daughter) [phone]     Prior to hospitilization cognitive status: Alert/Oriented     Current cognitive status: Unable to Assess     Walking or Climbing Stairs Difficulty no     Dressing/Bathing Difficulty no     Home Layout Able to live on 1st floor     Equipment Currently Used at Home none     Readmission within 30 days? No     Patient currently being followed by outpatient case management? No     Do you currently have service(s) that help you manage your care at home? No     Do you take prescription medications? Yes     Do you have prescription coverage? Yes     Coverage [employer] Medicare HMO     Do you have any problems affording any of your prescribed medications? No     Is the patient taking medications as prescribed? yes     Who is going to help you get home at discharge? Family     How do you get to doctors appointments? car, drives self;family or friend will provide     Are you on dialysis? No     Do you take coumadin? No     Discharge Plan A Home     Discharge Plan B Home     DME Needed Upon Discharge  other (see comments)   TBD    Discharge Plan discussed with: Adult children     Transition of Care Barriers None

## 2024-03-03 NOTE — ASSESSMENT & PLAN NOTE
-patient follow up in outpatient cardiology clinic  -echo October 2023 showed EF 53%, normal diastolic function, and moderate AS.  -cardiology consulted this admission  -check follow up echo to assess LV function and for  AS

## 2024-03-03 NOTE — ASSESSMENT & PLAN NOTE
Patient with Hypercapnic and Hypoxic Respiratory failure which is Acute.  she is not on home oxygen. Supplemental oxygen was provided and noted- Vent Mode: A/C  Oxygen Concentration (%):  [] 80  Resp Rate Total:  [18 br/min-58 br/min] 18 br/min  Vt Set:  [500 mL] 500 mL  PEEP/CPAP:  [5 cmH20] 5 cmH20  Pressure Support:  [0 cmH20] 0 cmH20  Mean Airway Pressure:  [13 epD09-90 cmH20] 25 cmH20    .   Signs/symptoms of respiratory failure include- tachypnea, increased work of breathing, respiratory distress, and use of accessory muscles. Contributing diagnoses includes - CHF and Pneumonia Labs and images were reviewed. Patient Has recent ABG, which has been reviewed. Will treat underlying causes and adjust management of respiratory failure as follows- patient is intubated and sedated      -patient admitted to the ICU for acute respiratory failure with hypoxia, possible due to CHF with pulmonary edema, hypertensive emergency, rule out pneumonia, rule out CAD with elevated troponin  -patient is intubated and sedated  -pulmonary critical care consult  -cardiology consultation due to elevated troponin with possible pulmonary edema changes on chest x-ray  -CHF order set  -sepsis order set  -patient on IV nitroglycerin drip  -patient was given Lasix 40 mg IV once in the ER.  Started Lasix 40 IV b.i.d., may need t.i.d.  -check blood cultures  -trend troponin, on admission 467  -patient was started on IV heparin protocol  -check echo  -started on IV Zosyn  -keep oxygen saturation greater than 92%

## 2024-03-03 NOTE — CONSULTS
Pulmonary/Critical Care Consult      PATIENT NAME: Cynthia N Cushing  MRN: [id number]  TODAY'S DATE: 2024  ADMIT DATE: 3/3/2024  AGE: 78 y.o. : 1945    CONSULT REQUESTED BY: Zaki Longoria MD    REASON FOR CONSULT:   Flash pulmonary edema on ventilator    HISTORY OF PRESENT ILLNESS   Cynthia N Cushing is a 78 y.o. female with a PMH of moderate aortic stenosis, HFpEF, SVT, sick sinus syndrome, HTN, DM2, CKD 3, and LAUREN on whom we have been consulted for acute hypoxic respiratory failure secondary to hypertensive emergency and flash pulmonary edema.    The patient called her daughter complaining of dyspnea this morning; no chest pain. Went to ED. She had an SBP up to 254. She was hypoxic and in severe respiratory distress, so she was intubated. Lasix was given. BP was controlled with nitroglycerin drip. Oxygenation improved rapidly.      SMOKING HISTORY  Never smoker.    EXPOSURE HISTORY   was smoker.    REVIEW OF SYSTEMS  As in HPI.    ALLERGIES  Review of patient's allergies indicates:   Allergen Reactions    Biaxin [clarithromycin]     Prandin [repaglinide] Nausea And Vomiting    Amlodipine     Chlorphen-phenyltolox-pe-ppa     Ciprofloxacin Nausea And Vomiting    Omnicef [cefdinir]     Propoxyphene     Quinine Nausea And Vomiting       INPATIENT SCHEDULED MEDICATIONS   aspirin  81 mg Oral Daily    famotidine  20 mg Intravenous Daily    furosemide (LASIX) injection  40 mg Intravenous Q12H    insulin detemir U-100  10 Units Subcutaneous BID    metoprolol  5 mg Intravenous Q12H    piperacillin-tazobactam (Zosyn) IV (PEDS and ADULTS) (extended infusion is not appropriate)  3.375 g Intravenous Q8H      heparin (porcine) in D5W 12 Units/kg/hr (24 1125)    nitroGLYCERIN Stopped (24 1130)    propofoL 40 mcg/kg/min (24 1154)       MEDICAL AND SURGICAL HISTORY  Past Medical History:   Diagnosis Date    Allergy     Breast mass     Cataract     Diabetes mellitus     GERD (gastroesophageal  "reflux disease)     Hernia, hiatal     Hyperlipidemia     Hypertension     Kidney stone     Osteoarthritis     Renal insufficiency     Seasonal allergies     Sleep apnea     SSS (sick sinus syndrome)     SVT (supraventricular tachycardia)      Past Surgical History:   Procedure Laterality Date    BREAST BIOPSY      BREAST CYST ASPIRATION      BREAST SURGERY      CERVICAL DISC SURGERY      EYE SURGERY      cataracts bilateral    HYSTERECTOMY         ALCOHOL, TOBACCO AND DRUG USE  Social History     Tobacco Use   Smoking Status Never   Smokeless Tobacco Never     Social History     Substance and Sexual Activity   Alcohol Use No     Social History     Substance and Sexual Activity   Drug Use No       FAMILY HISTORY  Family History   Problem Relation Age of Onset    Stroke Mother     Cancer Mother     Breast cancer Mother     Cancer Father     Breast cancer Maternal Aunt        VITAL SIGNS (MOST RECENT)  Temp: 97.2 °F (36.2 °C) (03/03/24 1115)  Pulse: 65 (03/03/24 1145)  Resp: (!) 22 (03/03/24 1145)  BP: (!) 95/51 (03/03/24 1115)  SpO2: 100 % (03/03/24 1145)    INTAKE AND OUTPUT (LAST 24 HOURS):No intake or output data in the 24 hours ending 03/03/24 1159    WEIGHT  Wt Readings from Last 1 Encounters:   03/03/24 75.9 kg (167 lb 5.3 oz)       PHYSICAL EXAM  GENERAL: A&O. NAD. Partially sedated on vent RASS +1  HEENT: Extraocular movements intact. Pharynx moist.  NECK: Supple. No JVD or hepatojugular reflux.  HEART: Normal rate and regular rhythm. No murmur or gallop auscultated.  LUNGS: Clear to auscultation bilaterally.  ABDOMEN: Soft, non-tender, non-distended, no masses palpated.  EXTREMITIES: Normal muscle tone and joint movement, no cyanosis or clubbing.   LYMPHATICS: No adenopathy palpated, no edema.  SKIN: Dry, intact, no lesions.   NEURO: No gross motor or cognitive deficit.  PSYCH: Appropriate affect    ACUTE PHASE REACTANT (LAST 24 HOURS)  No results for input(s): "FERRITIN", "CRP", "LDH", "DDIMER" in the last " 24 hours.    CBC LAST (LAST 24 HOURS)  Recent Labs   Lab 03/03/24  1013   WBC 15.20*   RBC 3.15*   HGB 8.2*   HCT 28.7*   MCV 91   MCH 26.0*   MCHC 28.6*   RDW 17.5*      MPV 11.9   GRAN 82.9*  12.6*   LYMPH 8.3*  1.3   MONO 6.6  1.0   BASO 0.08   NRBC 0       CHEMISTRY LAST (LAST 24 HOURS)  Recent Labs   Lab 03/03/24  0827 03/03/24  0838 03/03/24  1038     --   --    K 3.6  --   --      --   --    CO2 18*  --   --    ANIONGAP 13  --   --    BUN 22  --   --    CREATININE 2.0*  --   --    *  --   --    CALCIUM 8.7  --   --    PH  --    < > 7.306*   MG 2.0  --   --    ALBUMIN 3.6  --   --    PROT 6.8  --   --    ALKPHOS 69  --   --    ALT 26  --   --    AST 25  --   --    BILITOT 0.4  --   --     < > = values in this interval not displayed.       COAGULATION LAST (LAST 24 HOURS)  Recent Labs   Lab 03/03/24  1013   INR 0.9   APTT 22.5       CARDIAC PROFILE (LAST 24 HOURS)  Recent Labs   Lab 03/03/24  0827   *       LAST 7 DAYS MICROBIOLOGY   Microbiology Results (last 7 days)       Procedure Component Value Units Date/Time    Blood culture [[id number]]     Order Status: Sent Specimen: Blood     Blood culture [[id number]]     Order Status: Sent Specimen: Blood     Blood culture x two cultures. Draw prior to antibiotics. [[id number]] Collected: 03/03/24 0903    Order Status: Sent Specimen: Blood Updated: 03/03/24 0943    Blood culture x two cultures. Draw prior to antibiotics. [[id number]] Collected: 03/03/24 0903    Order Status: Sent Specimen: Blood Updated: 03/03/24 0943            MOST RECENT IMAGING  X-Ray Chest AP Portable  HISTORY: Respiratory failure, intubation.    FINDINGS: Portable chest radiograph at 0806 hours compared to 05/13/2022 shows ET tube with distal tip at the level of T5-T6, within 2 cm of the michelle. Nasogastric tube has its distal portion overlying the proximal gastric body.    The cardiac silhouette is within normal limits, with the central pulmonary  vasculature prominent and indistinct. The lungs are symmetrically expanded, with diffuse reticulonodular densities and patchy groundglass opacities, and blunting of the costophrenic angles. There is no consolidation or pneumothorax.    IMPRESSION:  1. ET and NG tubes as described.  2. Central pulmonary vascular congestion, with diffuse interstitial pulmonary edema/hypervolemia, and small pleural effusions suspected.    Electronically signed by:  Saji Hahn MD  03/03/2024 08:59 AM CST Workstation: [id number]      CURRENT VISIT EKG  Results for orders placed or performed during the hospital encounter of 03/03/24   EKG 12-lead   Result Value Ref Range    QRS Duration 104 ms    [facility] QTC Calculation 505 ms    Narrative    Test Reason : R06.00,    Vent. Rate : 120 BPM     Atrial Rate : 120 BPM     P-R Int : 174 ms          QRS Dur : 104 ms      QT Int : 358 ms       P-R-T Axes : 075 -25 149 degrees     QTc Int : 505 ms    Sinus tachycardia with Premature atrial complexes  LVH with repolarization abnormality  Abnormal ECG  When compared with ECG of 12-DEC-2023 10:01,  Premature atrial complexes are now Present  Vent. rate has increased BY  59 BPM    Referred By:             Confirmed By:        ECHOCARDIOGRAM RESULTS  Results for orders placed during the hospital encounter of 10/06/23    Echo Saline Bubble? No    Interpretation Summary    Left Ventricle: The left ventricle is normal in size. Mildly increased wall thickness. There is mild concentric hypertrophy. Normal wall motion. There is normal systolic function. Ejection fraction by visual approximation is 53%. There is normal diastolic function.    Left Atrium: Left atrium is mildly dilated.    Right Ventricle: Normal right ventricular cavity size. Wall thickness is normal. Right ventricle wall motion  is normal. Systolic function is normal.    Aortic Valve: The aortic valve is a trileaflet valve. There is moderate aortic valve sclerosis. Mildly calcified cusps.  Mildly restricted motion. There is moderate stenosis. Aortic valve area by VTI is 1.39 cm². Aortic valve peak velocity is 2.67 m/s. Mean gradient is 16 mmHg. The dimensionless index is 0.44.    Mitral Valve: There is mild regurgitation with a centrally directed jet.    Tricuspid Valve: There is mild regurgitation.    IVC/SVC: Normal venous pressure at 3 mmHg.    The estimated pulmonary artery systolic pressure is 28 mmHg.        RESPIRATORY SUPPORT  Vent Mode: A/C  Oxygen Concentration (%):  [] 50  Resp Rate Total:  [18 br/min-58 br/min] 18 br/min  Vt Set:  [500 mL] 500 mL  PEEP/CPAP:  [5 cmH20] 5 cmH20  Pressure Support:  [0 cmH20] 0 cmH20  Mean Airway Pressure:  [11 xsN42-78 cmH20] 11 cmH20           LAST ARTERIAL BLOOD GAS  ABG  Recent Labs   Lab 03/03/24  1038   PH 7.306*   PO2 261*   PCO2 39.2   HCO3 19.6*   BE -7*     CXR 3/3/24: New diffuse bilateral hazy airspace opacities with basilar predominance consistent with pulmonary edema.     IMPRESSION AND PLAN  Cynthia N Cushing is a 78 y.o. female with a PMH of moderate aortic stenosis, HFpEF, SVT, sick sinus syndrome, HTN, DM2, CKD 3, and LAUREN on whom we have been consulted for acute hypoxic respiratory failure secondary to hypertensive emergency and flash pulmonary edema.    #Hypertensive emergency  #Flash pulmonary edema  #NSTEMI  #Acute hypoxic respiratory failure requiring intubation  #Acutely decompensated valvular heart failure (AS)  #Lactic acidosis likely due to CHF  #BRIDGETTE on CKD 3, likely cardiorenal  Flu and COVID (-). No fever, leukocytosis, or productive cough. Procal (-). Infection unlikely.  - BP control  - diurese carefully  - s/p Lasix 40 mg IV at 10 am in ED  - likely resume Lasix tomorrow  - GDMT  - f/u TTE  - cardiology consulted  - trend trop, EKG  - heparin gtt  - LTVV  - ABGs qAm and PRN  - sedate to RASS 0 w/ propofol and fentanyl pushes  - SAT/SBT in AM  - Abx d/c'ed  - trend lactate  - repeat CXR in AM  - monitor BMP      Discussed  with daughters, RN, cardiology, hosptialist. I have reviewed the patient's most recent CBC and serum chemistry, as well as the most recent chest x-ray and/or chest CT. My interpretation of the chest imaging is as above. The patient is at high risk of morbidity from the following test or treatment: mechanical ventialtion, and I have ordered ABGs to monitor it. The patient is at high risk of morbidity or death and should be admitted to the ICU.    Bo Antonio MD  [facility] / [facility]  Department of Pulmonology  Date of Service: 03/03/2024  11:59 AM

## 2024-03-03 NOTE — PLAN OF CARE
Problem: Adult Inpatient Plan of Care  Goal: Plan of Care Review  Outcome: Ongoing, Progressing  Goal: Patient-Specific Goal (Individualized)  Outcome: Ongoing, Progressing  Goal: Absence of Hospital-Acquired Illness or Injury  Outcome: Ongoing, Progressing  Goal: Optimal Comfort and Wellbeing  Outcome: Ongoing, Progressing  Goal: Readiness for Transition of Care  Outcome: Ongoing, Progressing     Problem: Diabetes Comorbidity  Goal: Blood Glucose Level Within Targeted Range  Outcome: Ongoing, Progressing     Problem: Fall Injury Risk  Goal: Absence of Fall and Fall-Related Injury  Outcome: Ongoing, Progressing     Problem: Restraint, Nonbehavioral (Nonviolent)  Goal: Absence of Harm or Injury  Outcome: Ongoing, Progressing     Problem: Infection  Goal: Absence of Infection Signs and Symptoms  Outcome: Ongoing, Progressing

## 2024-03-03 NOTE — PLAN OF CARE
03/03/24 1138   Patient Assessment/Suction   Level of Consciousness (AVPU) unresponsive   Respiratory Effort Normal;Unlabored   Expansion/Accessory Muscles/Retractions no use of accessory muscles;no retractions   PRE-TX-O2   Device (Oxygen Therapy) ventilator   $ Is the patient on High Flow Oxygen? Yes   Oxygen Concentration (%) 50   SpO2 99 %   Pulse Oximetry Type Continuous   $ Pulse Oximetry - Multiple Charge Pulse Oximetry - Multiple        Airway - Non-Surgical 03/03/24 0830   Placement Date/Time: 03/03/24 0830   Inserted by: MD  Secured at: Lips   Secured at 21 cm   Measured At Lips   Vent Select   Conventional Vent Y   Charged w/in last 24h YES   Preset Conventional Ventilator Settings   Vent ID [id number]   Vent Type    Ventilation Type VC   Vent Mode A/C   Humidity HME   Set Rate 18 BPM   Vt Set 500 mL   PEEP/CPAP 5 cmH20   Peak Flow 60 L/min   Peak End Inspiratory Pressure 22 cmH20   I-Trigger Type  V-TRIG   Trigger Sensitivity Flow/I-Trigger 3 L/min   Patient Ventilator Parameters   Resp Rate Total 18 br/min   Peak Airway Pressure 30 cmH20   Mean Airway Pressure 11 cmH20   Plateau Pressure 0 cmH20   Exhaled Vt 536 mL   Total Ve 9.68 L/m   I:E Ratio Measured 1:2.70   Auto PEEP 0 cmH20   Tubing ID (mm) 7.5 mm   Tube Type ET   Conventional Ventilator Alarms   Alarms On Y   Resp Rate High Alarm 45 br/min   Press High Alarm 60 cmH2O   Apnea Rate 28   Apnea Volume (mL) 0 mL   Apnea Oxygen Concentration  100   Apnea Flow Rate (L/min) 60   T Apnea 20 sec(s)   Ready to Wean/Extubation Screen   FIO2<=50 (chart decimal) 0.5   MV<16L (chart vol.) 9.68   PEEP <=8 (chart #) 5

## 2024-03-03 NOTE — NURSING
Nurses Note -- 4 Eyes      3/3/2024   12:42 PM      Skin assessed during: Admit      [x] No Altered Skin Integrity Present    []Prevention Measures Documented      [] Yes- Altered Skin Integrity Present or Discovered   [] LDA Added if Not in Epic (Describe Wound)   [] New Altered Skin Integrity was Present on Admit and Documented in LDA   [] Wound Image Taken    Wound Care Consulted? No    Attending Nurse:  Irena Jefferson RN/Staff Member:   Anna Marie DAVIS

## 2024-03-03 NOTE — ED PROVIDER NOTES
Encounter Date: 3/3/2024       History     Chief Complaint   Patient presents with    Respiratory Distress     78-year-old female brought in by EMS for respiratory distress.  It is reported that patient had shortness of breath since yesterday.  It is reported that patient all the sudden had difficulty with breathing.  Patient unable to provide any history.  EMS put her on CPAP due to severe respiratory distress.  Patient in severe respiratory distress upon arrival.  Patient unable to speak.  Unable to open her eyes.  History limited by the patient's severe respiratory distress.      Review of patient's allergies indicates:   Allergen Reactions    Biaxin [clarithromycin]     Prandin [repaglinide] Nausea And Vomiting    Amlodipine     Chlorphen-phenyltolox-pe-ppa     Ciprofloxacin Nausea And Vomiting    Omnicef [cefdinir]     Propoxyphene     Quinine Nausea And Vomiting     Past Medical History:   Diagnosis Date    Allergy     Breast mass     Cataract     Diabetes mellitus     GERD (gastroesophageal reflux disease)     Hernia, hiatal     Hyperlipidemia     Hypertension     Kidney stone     Osteoarthritis     Renal insufficiency     Seasonal allergies     Sleep apnea     SSS (sick sinus syndrome)     SVT (supraventricular tachycardia)      Past Surgical History:   Procedure Laterality Date    BREAST BIOPSY      BREAST CYST ASPIRATION      BREAST SURGERY      CERVICAL DISC SURGERY      EYE SURGERY      cataracts bilateral    HYSTERECTOMY       Family History   Problem Relation Age of Onset    Stroke Mother     Cancer Mother     Breast cancer Mother     Cancer Father     Breast cancer Maternal Aunt      Social History     Tobacco Use    Smoking status: Never    Smokeless tobacco: Never   Substance Use Topics    Alcohol use: No    Drug use: No     Review of Systems   Unable to perform ROS: Severe respiratory distress       Physical Exam     Initial Vitals   BP Pulse Resp Temp SpO2   03/03/24 0820 03/03/24 0813 03/03/24  1145 03/03/24 1108 03/03/24 0813   (!) 254/136 103 (!) 22 97.2 °F (36.2 °C) 100 %      MAP       --                Physical Exam    Nursing note and vitals reviewed.  Constitutional: She appears well-developed and well-nourished. She is diaphoretic. She appears distressed.   HENT:   Head: Normocephalic and atraumatic.   Mouth/Throat: No oropharyngeal exudate.   Eyes: Conjunctivae and EOM are normal. Pupils are equal, round, and reactive to light.   Neck: Neck supple. No tracheal deviation present.   Cardiovascular:  Regular rhythm, normal heart sounds and intact distal pulses.           No murmur heard.  Tachycardic   Pulmonary/Chest: No stridor. She is in respiratory distress (tachypnea, accessory muscle usage, unable to speak). She has no wheezes. She has no rhonchi. She has rales.   Abdominal: Abdomen is soft. She exhibits no distension. There is no abdominal tenderness. There is no rebound.   Musculoskeletal:         General: No tenderness or edema. Normal range of motion.      Cervical back: Neck supple.     Neurological: She is alert. She has normal strength. No cranial nerve deficit or sensory deficit.   Skin: Skin is warm. Capillary refill takes less than 2 seconds. No rash noted. No erythema. No pallor.   Psychiatric: She has a normal mood and affect. Thought content normal.         ED Course   Intubation    Date/Time: 3/3/2024 1:10 PM  Location procedure was performed: [facility] EMERGENCY DEPARTMENT    Performed by: Iker Bartholomew DO  Authorized by: Zaki Longoria MD  Consent Done: Emergent Situation  Consent: Verbal consent obtained.  Indications: respiratory failure  Intubation method: video-assisted  Patient status: paralyzed (RSI)  Preoxygenation: BVM  Sedatives: etomidate  Paralytic: rocuronium  Laryngoscope size: Glide 3  Tube size: 7.5 mm  Tube type: cuffed  Number of attempts: 1  Ventilation between attempts: BVM  Cricoid pressure: no  Cords visualized: yes  Post-procedure assessment: ETCO2  monitor  Breath sounds: rales/crackles  Cuff inflated: yes  ETT to lip: 21 cm  Tube secured with: ETT ty  Chest x-ray interpreted by me.  Chest x-ray findings: endotracheal tube in appropriate position  Patient tolerance: Patient tolerated the procedure well with no immediate complications  Complications: No      Verbal consent not obtained, emergent situation  Labs Reviewed   CBC W/ AUTO DIFFERENTIAL - Abnormal; Notable for the following components:       Result Value    WBC 14.80 (*)     Hemoglobin 11.1 (*)     Hematocrit 36.8 (*)     MCH 25.8 (*)     MCHC 30.2 (*)     RDW 17.0 (*)     Immature Grans (Abs) 0.08 (*)     Lymph # 5.7 (*)     Mono # 1.3 (*)     Eos # 1.0 (*)     Baso # 0.25 (*)     All other components within normal limits   COMPREHENSIVE METABOLIC PANEL - Abnormal; Notable for the following components:    CO2 18 (*)     Glucose 336 (*)     Creatinine 2.0 (*)     eGFR 25.1 (*)     All other components within normal limits   LACTIC ACID, PLASMA - Abnormal; Notable for the following components:    Lactate (Lactic Acid) 2.7 (*)     All other components within normal limits   B-TYPE NATRIURETIC PEPTIDE - Abnormal; Notable for the following components:     (*)     All other components within normal limits   TROPONIN I HIGH SENSITIVITY - Abnormal; Notable for the following components:    Troponin I High Sensitivity 467.9 (*)     All other components within normal limits   CBC W/ AUTO DIFFERENTIAL - Abnormal; Notable for the following components:    WBC 15.20 (*)     RBC 3.15 (*)     Hemoglobin 8.2 (*)     Hematocrit 28.7 (*)     MCH 26.0 (*)     MCHC 28.6 (*)     RDW 17.5 (*)     Immature Granulocytes 0.7 (*)     Gran # (ANC) 12.6 (*)     Immature Grans (Abs) 0.11 (*)     Gran % 82.9 (*)     Lymph % 8.3 (*)     All other components within normal limits    Narrative:     Draw baseline aPTT prior to starting the heparin bolus or  infusion  (if patient is on warfarin prior to heparin therapy)    ISTAT PROCEDURE - Abnormal; Notable for the following components:    POC PH 7.257 (*)     POC PCO2 45.9 (*)     POC PO2 255 (*)     POC HCO3 20.5 (*)     POC BE -7 (*)     POC TCO2 22 (*)     All other components within normal limits   ISTAT PROCEDURE - Abnormal; Notable for the following components:    POC PH 7.306 (*)     POC PO2 261 (*)     POC HCO3 19.6 (*)     POC BE -7 (*)     POC TCO2 21 (*)     All other components within normal limits   CULTURE, BLOOD   CULTURE, BLOOD   INFLUENZA A AND B ANTIGEN    Narrative:     Specimen Source->Nasopharyngeal Swab   MAGNESIUM   PROCALCITONIN   PROTIME-INR   SARS-COV-2 RNA AMPLIFICATION, QUAL   BETA - HYDROXYBUTYRATE, SERUM    Narrative:     Draw baseline aPTT prior to starting the heparin bolus or  infusion  (if patient is on warfarin prior to heparin therapy)   APTT    Narrative:     Draw baseline aPTT prior to starting the heparin bolus or  infusion  (if patient is on warfarin prior to heparin therapy)   PROTIME-INR    Narrative:     Draw baseline aPTT prior to starting the heparin bolus or  infusion  (if patient is on warfarin prior to heparin therapy)        ECG Results              EKG 12-lead (In process)        Collection Time Result Time QRS Duration [facility] QTC Calculation    03/03/24 08:25:10 03/03/24 08:55:31 104 505                     In process by Interface, Lab In [facility] (03/03/24 08:55:38)                   Narrative:    Test Reason : R06.00,    Vent. Rate : 120 BPM     Atrial Rate : 120 BPM     P-R Int : 174 ms          QRS Dur : 104 ms      QT Int : 358 ms       P-R-T Axes : 075 -25 149 degrees     QTc Int : 505 ms    Sinus tachycardia with Premature atrial complexes  LVH with repolarization abnormality  Abnormal ECG  When compared with ECG of 12-DEC-2023 10:01,  Premature atrial complexes are now Present  Vent. rate has increased BY  59 BPM    Referred By:             Confirmed By:                                   Imaging Results              X-Ray  Chest AP Portable (Final result)  Result time 03/03/24 08:59:51      Final result by Saji Hahn MD (03/03/24 08:59:51)                   Narrative:    HISTORY: Respiratory failure, intubation.    FINDINGS: Portable chest radiograph at 0806 hours compared to 05/13/2022 shows ET tube with distal tip at the level of T5-T6, within 2 cm of the michelle. Nasogastric tube has its distal portion overlying the proximal gastric body.    The cardiac silhouette is within normal limits, with the central pulmonary vasculature prominent and indistinct. The lungs are symmetrically expanded, with diffuse reticulonodular densities and patchy groundglass opacities, and blunting of the costophrenic angles. There is no consolidation or pneumothorax.    IMPRESSION:  1. ET and NG tubes as described.  2. Central pulmonary vascular congestion, with diffuse interstitial pulmonary edema/hypervolemia, and small pleural effusions suspected.    Electronically signed by:  Saji aHhn MD  03/03/2024 08:59 AM CST Workstation: [id number]                                     Medications   propofol (DIPRIVAN) 10 mg/mL infusion (25 mcg/kg/min × 76.7 kg Intravenous Rate/Dose Change 3/3/24 1314)   nitroGLYCERIN in 5 % dextrose 50 mg/250 mL (200 mcg/mL) infusion (0 mcg/min Intravenous Stopped 3/3/24 1130)   heparin 25,000 units in dextrose 5% 250 mL (100 units/mL) infusion LOW INTENSITY nomogram - [facility] (12 Units/kg/hr × 62.1 kg (Adjusted) Intravenous New Bag 3/3/24 1125)   heparin 25,000 units in dextrose 5% (100 units/ml) IV bolus from bag LOW INTENSITY nomogram - [facility] (has no administration in time range)   heparin 25,000 units in dextrose 5% (100 units/ml) IV bolus from bag LOW INTENSITY nomogram - [facility] (has no administration in time range)   aspirin chewable tablet 81 mg (81 mg Oral Not Given 3/3/24 1200)   furosemide injection 40 mg (40 mg Intravenous Not Given 3/3/24 1200)   hydrALAZINE injection 10 mg (has no administration in time range)    metoprolol injection 5 mg (5 mg Intravenous Not Given 3/3/24 1200)   insulin detemir U-100 (Levemir) pen 10 Units (has no administration in time range)   sodium chloride 0.9% flush 10 mL (has no administration in time range)   glucose chewable tablet 16 g (has no administration in time range)   glucose chewable tablet 24 g (has no administration in time range)   dextrose 50% injection 12.5 g (has no administration in time range)   dextrose 50% injection 25 g (has no administration in time range)   glucagon (human recombinant) injection 1 mg (has no administration in time range)   acetaminophen tablet 650 mg (has no administration in time range)   insulin aspart U-100 pen 0-5 Units (has no administration in time range)   fentaNYL 50 mcg/mL injection 50 mcg ( Intravenous Canceled Entry 3/3/24 1252)     Followed by   fentaNYL 50 mcg/mL injection 50 mcg (50 mcg Intravenous Given 3/3/24 1258)   famotidine (PF) injection 20 mg (has no administration in time range)   furosemide injection 40 mg (40 mg Intravenous Given 3/3/24 1024)   aspirin suppository 300 mg (300 mg Rectal Given 3/3/24 0951)   heparin 25,000 units in dextrose 5% (100 units/ml) IV bolus from bag LOW INTENSITY nomogram - [facility] (3,720 Units Intravenous Bolus from Bag 3/3/24 1054)     Medical Decision Making  Differential includes but not limited to chest wall pain, acute coronary syndrome, PE, pericarditis, myocarditis, pericardial tamponade, aortic dissection, pneumonia, pneumothorax, Boerhaave syndrome, anemia, electrolyte abnormalities, herpes zoster, pancreatitis, anxiety, chest wall strain, costochondritis, flash pulmonary edema, NSTEMI,    Emergent evaluation of a 78-year-old female presents emergency department as mentioned above.  Patient presents emergency department with respiratory failure.  Severe respiratory distress.  Concern for imminent respiratory failure so decision was made to intubate the patient.  Patient was very fatigued sweaty he  extremely hypertensive.  Patient intubated in the ER.  Chest x-ray with pulmonary edema.  Overall impression is severe acute pulmonary edema.  Patient has been intubated without difficulty.  Patient placed on nitroglycerin drip for extremely elevated hypertensive emergency.  Patient has been given IV Lasix.  Patient will be admitted to the ICU.  Troponin is elevated.  I discussed with Cardiology.  Heparin drip started as well.  Patient overall with acute hypertensive emergency with flash pulmonary edema more than likely.    Amount and/or Complexity of Data Reviewed  External Data Reviewed: labs and notes.  Labs: ordered. Decision-making details documented in ED Course.  Radiology: ordered and independent interpretation performed. Decision-making details documented in ED Course.  ECG/medicine tests: ordered and independent interpretation performed. Decision-making details documented in ED Course.    Risk  OTC drugs.  Prescription drug management.  Decision regarding hospitalization.  Diagnosis or treatment significantly limited by social determinants of health.              Attending Attestation:             Attending ED Notes:   Attending Critical Care:   Critical Care Times:   Direct Patient Care (initial evaluation, reassessments, and time considering the case)................................................................10 minutes.   Additional History from reviewing old medical records or taking additional history from the family, EMS, PCP, etc.......................10 minutes.   Ordering, Reviewing, and Interpreting Diagnostic Studies...............................................................................................................10 minutes.   Documentation..................................................................................................................................................................................5 minutes.    ==============================================================  · Total Critical Care Time - exclusive of procedural time: 35 minutes.  ==============================================================  Critical care was necessary to treat or prevent imminent or life-threatening deterioration of the following conditions:  Respiratory failure, intubation.   Critical care was time spent personally by me on the following activities: obtaining history from patient or relative, examination of patient, review of x-rays / CT sent with the patient, ordering lab, x-rays, and/or EKG, development of treatment plan with patient or relative, ordering and performing treatments and interventions, interpretation of cardiac measurements and re-evaluation of patient's conition.   Critical Care Condition: potentially life-threatening         ED Course as of 03/03/24 1316   Sun Mar 03, 2024   0829 EKG 8:25 a.m. sinus tachycardia rate of 120, occasional premature atrial complexes.  Left ventricular hypertrophy.  No STEMI.  EKG interpreted independently by me. [JR]   0836 Patient had normal systolic and diastolic function on ECHO performed 1 year ago. [JR]   0926 I discussed the case with Dr. Longoria who will admit the patient. [JR]   0929 Will titrate down the nitroglycerin drip.  Pressure 120/80 [JR]   0942 I discussed the case with Dr. Roberts from Cardiology who agreed with present management agree with heparin drip.  He will see the patient in the hospital.  No need for emergent catheterization [JR]      ED Course User Index  [JR] Iker Bartholomew DO                           Clinical Impression:  Final diagnoses:  [R06.00] Dyspnea  [J81.0] Acute pulmonary edema (Primary)  [J96.01] Acute respiratory failure with hypoxia  [I16.1] Hypertensive emergency          ED Disposition Condition    Admit Stable                Iker Bartholomew DO  03/03/24 1316

## 2024-03-03 NOTE — Clinical Note
The lead thresholds were tested and was secured in place. A new lead was attached to the right atrium.

## 2024-03-03 NOTE — PHARMACY MED REC
"              .    Admission Medication History     The home medication history was taken by Elinor Gomez.    You may go to "Admission" then "Reconcile Home Medications" tabs to review and/or act upon these items.     The home medication list has been updated by the Pharmacy department.   Please read ALL comments highlighted in yellow.   Please address this information as you see fit.    Feel free to contact us if you have any questions or require assistance.        Medications listed below were obtained from: Patient/family and Analytic software- [employer]  No current facility-administered medications on file prior to encounter.     Current Outpatient Medications on File Prior to Encounter   Medication Sig Dispense Refill    famotidine (PEPCID) 20 MG tablet TAKE 1 TABLET TWICE DAILY (Patient taking differently: Take 20 mg by mouth 2 (two) times daily.) 180 tablet 2    furosemide (LASIX) 20 MG tablet TAKE 1 TABLET(20 MG) BY MOUTH EVERY DAY (Patient taking differently: Take 20 mg by mouth once daily.) 30 tablet 0    guanFACINE (TENEX) 2 MG tablet TAKE 1 TABLET EVERY EVENING (Patient taking differently: Take 2 mg by mouth nightly.) 90 tablet 3    hydrALAZINE (APRESOLINE) 50 MG tablet Take 1 tablet (50 mg total) by mouth 3 (three) times daily. 90 tablet 11    labetaloL (NORMODYNE) 100 MG tablet Take 1 tablet (100 mg total) by mouth 2 (two) times daily. 60 tablet 11    LANTUS SOLOSTAR U-100 INSULIN glargine 100 units/mL SubQ pen ADMINISTER 51 UNITS UNDER THE SKIN AT NIGHT (Patient taking differently: Inject 51 Units into the skin every evening. ADMINISTER 51 UNITS UNDER THE SKIN AT NIGHT) 15 mL 5    olmesartan (BENICAR) 40 MG tablet TAKE 1 TABLET EVERY DAY (Patient taking differently: Take 40 mg by mouth once daily.) 90 tablet 3    rosuvastatin (CRESTOR) 10 MG tablet Take 1 tablet (10 mg total) by mouth every evening. 90 tablet 2    ACCU-CHEK AMADOR PLUS TEST STRP Strp TEST BLOOD SUGAR FOUR TIMES DAILY 400 " "strip 10    amLODIPine (NORVASC) 10 MG tablet Take 1 tablet (10 mg total) by mouth once daily. 30 tablet 11    aspirin 81 MG Chew Take 81 mg by mouth once daily.      lancets (ACCU-CHEK SOFTCLIX LANCETS) Misc TEST BLOOD SUGAR FOUR TIMES DAILY 400 each 3    pen needle, diabetic (BD ANNA 2ND GEN PEN NEEDLE) 32 gauge x 5/32" Ndle INJECT 1 NEEDLE INTO THE SKIN EVERY EVENING 100 each 5         Elinor Gomez  EXT 1924        "

## 2024-03-03 NOTE — H&P
[facility] Medicine  History & Physical    Patient Name: Cynthia N Cushing  MRN: [id number]  Patient Class: IP- Inpatient  Admission Date: 3/3/2024  Attending Physician: Zaki Longoria MD   Primary Care Provider: Teri Adams MD         Patient information was obtained from relative(s), past medical records, and ER records.     Subjective:     Principal Problem:Acute hypoxemic respiratory failure    Chief Complaint:   Chief Complaint   Patient presents with    Respiratory Distress        HPI: Mrs. Cushing is a pleasant 78-year-old female who has a past medical history of hypertension, diabetes, hyperlipidemia, sleep apnea, SVT, SSS and follow up in cardiology clinic with Dr. Mendieta with recent office visit, last echo October 6, 2023 with EF of 53% with normal diastolic dysfunction and moderate AS who presents to the emergency room today with complaints of increased shortness of breath that started last night, got progressively worse.  The patient was started on CPAP by EMS.  Patient's vital signs in the ER showed blood pressure initially as high as 250/130, tachypnea, .  The patient in the ER was in respiratory distress, she was intubated to protect her airway.  Patient's laboratories and x-rays done.  Patient's case was discussed with the ER physician at the time of admission.  Laboratories showed troponin 467.9, COVID 19 screen negative, lactic acid 2.7, magnesium 2.0, calcitonin 0.098, sodium 140, potassium 3.6, chloride 109, CO2 18, BUN 22, creatinine 2.0, glucose 336, AST 25, anion gap normal at 13, PT INR 0.9, WBCs 14.8, hemoglobin 11, platelet count 416.  ABG show pH 7.25/pCO2 45.9/PO2 255/bicarbonate 20.5/oxygen saturation 100% this was done on ventilator.  Chest x-ray showed changes of pulmonary edema with ETT in place.  The patient was recommended to be admitted to the ICU for acute respiratory failure with hypoxia, hypertensive emergency, possible sepsis with lactic  acid level 2.7.  Patient was given Lasix 40 mg IV in the ER, started on nitroglycerin drip.  Patient started on CHF order set, sepsis order set, no fluid bolus given due to concerns of pulmonary edema and risk of fluid overload, IV antibiotics, blood cultures, cardiology consult, and Pulmonary critical Care consult.  Patient is a full code status.    No new subjective & objective note has been filed under this hospital service since the last note was generated.    Assessment/Plan:     * Acute hypoxemic respiratory failure  Patient with Hypercapnic and Hypoxic Respiratory failure which is Acute.  she is not on home oxygen. Supplemental oxygen was provided and noted- Vent Mode: A/C  Oxygen Concentration (%):  [] 80  Resp Rate Total:  [18 br/min-58 br/min] 18 br/min  Vt Set:  [500 mL] 500 mL  PEEP/CPAP:  [5 cmH20] 5 cmH20  Pressure Support:  [0 cmH20] 0 cmH20  Mean Airway Pressure:  [13 uyR00-00 cmH20] 25 cmH20    .   Signs/symptoms of respiratory failure include- tachypnea, increased work of breathing, respiratory distress, and use of accessory muscles. Contributing diagnoses includes - CHF and Pneumonia Labs and images were reviewed. Patient Has recent ABG, which has been reviewed. Will treat underlying causes and adjust management of respiratory failure as follows- patient is intubated and sedated      -patient admitted to the ICU for acute respiratory failure with hypoxia, possible due to CHF with pulmonary edema, hypertensive emergency, rule out pneumonia, rule out CAD with elevated troponin  -patient is intubated and sedated  -pulmonary critical care consult  -cardiology consultation due to elevated troponin with possible pulmonary edema changes on chest x-ray  -CHF order set  -sepsis order set  -patient on IV nitroglycerin drip  -patient was given Lasix 40 mg IV once in the ER.  Started Lasix 40 IV b.i.d., may need t.i.d.  -check blood cultures  -trend troponin, on admission 467  -patient was started on IV  "heparin protocol  -check echo  -started on IV Zosyn  -keep oxygen saturation greater than 92%    Aortic stenosis  -patient follow up in outpatient cardiology clinic  -echo October 2023 showed EF 53%, normal diastolic function, and moderate AS.  -cardiology consulted this admission  -check follow up echo to assess LV function and for  AS      Hyperlipidemia  -check lipid panel  -continue home meds  -lifestyle modifications  -follow up with the PCP and cardiology clinic      Stage 3b chronic kidney disease  Creatine stable for now. BMP reviewed- noted Estimated Creatinine Clearance: 22.7 mL/min (A) (based on SCr of 2 mg/dL (H)). according to latest data. Based on current GFR, CKD stage is stage 3 - GFR 30-59.  Monitor UOP and serial BMP and adjust therapy as needed. Renally dose meds. Avoid nephrotoxic medications and procedures.    -trend renal function  -avoid NSAIDs and nephrotoxic agents  -creatinine on admission 2.0  -baseline creatinine 1.5 with GFR 33 on outpatient office visit in February 2024 with cardiology clinic    Uncontrolled type 2 diabetes mellitus with hyperglycemia  Patient's FSGs are controlled on current medication regimen.  Last A1c reviewed-   Lab Results   Component Value Date    HGBA1C 7.5 (H) 08/28/2023     Most recent fingerstick glucose reviewed- No results for input(s): "POCTGLUCOSE" in the last 24 hours.  Current correctional scale  Low  Maintain anti-hyperglycemic dose as follows- start patient on Levemir 10 units SC b.i.d., adjust as needed to obtain optimal glucose control less than 180  Antihyperglycemics (From admission, onward)      None          Hold Oral hypoglycemics while patient is in the hospital.    SSS (sick sinus syndrome)  -patient follow up in outpatient cardiology clinic  -check echo, previous EF 53% on echo in October 2023      Essential hypertension, benign  Chronic, uncontrolled. Latest blood pressure and vitals reviewed-     Pulse:  []   BP: (145-254)/() "   SpO2:  [94 %-100 %] .   Home meds for hypertension were reviewed and noted below.   Hypertension Medications               amLODIPine (NORVASC) 10 MG tablet Take 1 tablet (10 mg total) by mouth once daily.    furosemide (LASIX) 20 MG tablet TAKE 1 TABLET(20 MG) BY MOUTH EVERY DAY    guanFACINE (TENEX) 2 MG tablet TAKE 1 TABLET EVERY EVENING    hydrALAZINE (APRESOLINE) 50 MG tablet Take 1 tablet (50 mg total) by mouth 3 (three) times daily.    labetaloL (NORMODYNE) 100 MG tablet Take 1 tablet (100 mg total) by mouth 2 (two) times daily.    olmesartan (BENICAR) 40 MG tablet TAKE 1 TABLET EVERY DAY            While in the hospital, will manage blood pressure as follows; Adjust home antihypertensive regimen as follows- patient was started on IV nitroglycerin drip, resume home meds when tolerating oral intake and as tolerated    Will utilize p.r.n. blood pressure medication only if patient's blood pressure greater than 180/110 and she develops symptoms such as worsening chest pain or shortness of breath.      VTE Risk Mitigation (From admission, onward)           Ordered     Place sequential compression device  Until discontinued         03/03/24 1146     IP VTE HIGH RISK PATIENT  Once         03/03/24 1146     heparin 25,000 units in dextrose 5% (100 units/ml) IV bolus from bag LOW INTENSITY nomogram - [facility]  As needed (PRN)        Question:  Heparin Infusion Adjustment (DO NOT MODIFY ANSWER)  Answer:  \\[email or web address]\epic\Images\Pharmacy\HeparinInfusions\heparin LOW INTENSITY nomogram for [facility] [id number]    03/03/24 0942     heparin 25,000 units in dextrose 5% (100 units/ml) IV bolus from bag LOW INTENSITY nomogram - [facility]  As needed (PRN)        Question:  Heparin Infusion Adjustment (DO NOT MODIFY ANSWER)  Answer:  \\[email or web address]\epic\Images\Pharmacy\HeparinInfusions\heparin LOW INTENSITY nomogram for [facility] [id number]    03/03/24 0942     heparin 25,000 units in dextrose 5% 250 mL (100 units/mL) infusion LOW INTENSITY  nomogram - [facility]  Continuous        Question:  Begin at (units/kg/hr)  Answer:  12 03/03/24 0942                                    Zaki Longoria MD  Department of Hospital Medicine  [facility]

## 2024-03-03 NOTE — ASSESSMENT & PLAN NOTE
"Patient's FSGs are controlled on current medication regimen.  Last A1c reviewed-   Lab Results   Component Value Date    HGBA1C 7.5 (H) 08/28/2023     Most recent fingerstick glucose reviewed- No results for input(s): "POCTGLUCOSE" in the last 24 hours.  Current correctional scale  Low  Maintain anti-hyperglycemic dose as follows- start patient on Levemir 10 units SC b.i.d., adjust as needed to obtain optimal glucose control less than 180  Antihyperglycemics (From admission, onward)      None          Hold Oral hypoglycemics while patient is in the hospital.  "

## 2024-03-03 NOTE — ASSESSMENT & PLAN NOTE
Chronic, uncontrolled. Latest blood pressure and vitals reviewed-     Pulse:  []   BP: (145-254)/()   SpO2:  [94 %-100 %] .   Home meds for hypertension were reviewed and noted below.   Hypertension Medications               amLODIPine (NORVASC) 10 MG tablet Take 1 tablet (10 mg total) by mouth once daily.    furosemide (LASIX) 20 MG tablet TAKE 1 TABLET(20 MG) BY MOUTH EVERY DAY    guanFACINE (TENEX) 2 MG tablet TAKE 1 TABLET EVERY EVENING    hydrALAZINE (APRESOLINE) 50 MG tablet Take 1 tablet (50 mg total) by mouth 3 (three) times daily.    labetaloL (NORMODYNE) 100 MG tablet Take 1 tablet (100 mg total) by mouth 2 (two) times daily.    olmesartan (BENICAR) 40 MG tablet TAKE 1 TABLET EVERY DAY            While in the hospital, will manage blood pressure as follows; Adjust home antihypertensive regimen as follows- patient was started on IV nitroglycerin drip, resume home meds when tolerating oral intake and as tolerated    Will utilize p.r.n. blood pressure medication only if patient's blood pressure greater than 180/110 and she develops symptoms such as worsening chest pain or shortness of breath.   PHYSICAL EXAM:    Vital Signs Last 24 Hrs  T(C): 36.8 (25 Mar 2023 19:33), Max: 37.2 (25 Mar 2023 11:15)  T(F): 98.2 (25 Mar 2023 19:33), Max: 99 (25 Mar 2023 11:15)  HR: 86 (25 Mar 2023 19:33) (81 - 100)  BP: 91/57 (25 Mar 2023 19:33) (91/57 - 108/54)  BP(mean): --  RR: 23 (25 Mar 2023 19:33) (23 - 26)  SpO2: 100% (25 Mar 2023 19:33) (97% - 100%)    Parameters below as of 25 Mar 2023 19:33  Patient On (Oxygen Delivery Method): nasal cannula  O2 Flow (L/min): 4      GENERAL: Pt lying in bed comfortably in NAD  HEENT:  Atraumatic, EOMI, PERRL, conjunctiva and sclera clear, MMM  NECK: Supple,  CHEST/LUNG: Diffuse rhonchi and rales  HEART: Regular rate and rhythm;  ABDOMEN: Bowel sounds present; Soft, Nontender, Nondistended, +PEG tube  EXTREMITIES:  2+ Peripheral Pulses, brisk capillary refill. pedal edema  NEUROLOGICAL:  Alert & Oriented X3 , generalized weakness, nonfocal exam  MSK: FROM x 4 extremities   SKIN: warm, dry

## 2024-03-03 NOTE — HPI
Mrs. Cushing is a pleasant 78-year-old female who has a past medical history of hypertension, diabetes, hyperlipidemia, sleep apnea, SVT, SSS and follow up in cardiology clinic with Dr. Mendieta with recent office visit, last echo October 6, 2023 with EF of 53% with normal diastolic dysfunction and moderate AS who presents to the emergency room today with complaints of increased shortness of breath that started last night, got progressively worse.  The patient was started on CPAP by EMS.  Patient's vital signs in the ER showed blood pressure initially as high as 250/130, tachypnea, .  The patient in the ER was in respiratory distress, she was intubated to protect her airway.  Patient's laboratories and x-rays done.  Patient's case was discussed with the ER physician at the time of admission.  Laboratories showed troponin 467.9, COVID 19 screen negative, lactic acid 2.7, magnesium 2.0, calcitonin 0.098, sodium 140, potassium 3.6, chloride 109, CO2 18, BUN 22, creatinine 2.0, glucose 336, AST 25, anion gap normal at 13, PT INR 0.9, WBCs 14.8, hemoglobin 11, platelet count 416.  ABG show pH 7.25/pCO2 45.9/PO2 255/bicarbonate 20.5/oxygen saturation 100% this was done on ventilator.  Chest x-ray showed changes of pulmonary edema with ETT in place.  The patient was recommended to be admitted to the ICU for acute respiratory failure with hypoxia, hypertensive emergency, possible sepsis with lactic acid level 2.7.  Patient was given Lasix 40 mg IV in the ER, started on nitroglycerin drip.  Patient started on CHF order set, sepsis order set, no fluid bolus given due to concerns of pulmonary edema and risk of fluid overload, IV antibiotics, blood cultures, cardiology consult, and Pulmonary critical Care consult.  Patient is a full code status.   Problem: Discharge Planning  Goal: *Discharge to safe environment  Outcome: Progressing Towards Goal  Goal: *Knowledge of medication management  Outcome: Progressing Towards Goal  Goal: *Knowledge of discharge instructions  Outcome: Progressing Towards Goal     Problem: Patient Education: Go to Patient Education Activity  Goal: Patient/Family Education  Outcome: Progressing Towards Goal     Problem: Pain  Goal: *Control of Pain  Outcome: Progressing Towards Goal     Problem: Patient Education: Go to Patient Education Activity  Goal: Patient/Family Education  Outcome: Progressing Towards Goal     Problem: Falls - Risk of  Goal: *Absence of Falls  Description: Document Yesika Fall Risk and appropriate interventions in the flowsheet.   Outcome: Progressing Towards Goal  Note: Fall Risk Interventions:            Medication Interventions: Teach patient to arise slowly                   Problem: Patient Education: Go to Patient Education Activity  Goal: Patient/Family Education  Outcome: Progressing Towards Goal     Problem: Hypertension  Goal: *Blood pressure within specified parameters  Outcome: Progressing Towards Goal  Goal: *Fluid volume balance  Outcome: Progressing Towards Goal  Goal: *Labs within defined limits  Outcome: Progressing Towards Goal     Problem: Patient Education: Go to Patient Education Activity  Goal: Patient/Family Education  Outcome: Progressing Towards Goal     Problem: Patient Education: Go to Patient Education Activity  Goal: Patient/Family Education  Outcome: Progressing Towards Goal     Problem: Vaginal Delivery: Day of Deliver-Laboring  Goal: Off Pathway (Use only if patient is Off Pathway)  Outcome: Progressing Towards Goal  Goal: Activity/Safety  Outcome: Progressing Towards Goal  Goal: Consults, if ordered  Outcome: Progressing Towards Goal  Goal: Diagnostic Test/Procedures  Outcome: Progressing Towards Goal  Goal: Nutrition/Diet  Outcome: Progressing Towards Goal  Goal: Discharge Planning  Outcome: Progressing Towards Goal  Goal: Medications  Outcome: Progressing Towards Goal  Goal: Respiratory  Outcome: Progressing Towards Goal  Goal: Treatments/Interventions/Procedures  Outcome: Progressing Towards Goal  Goal: *Vital signs within defined limits  Outcome: Progressing Towards Goal  Goal: *Labs within defined limits  Outcome: Progressing Towards Goal  Goal: *Hemodynamically stable  Outcome: Progressing Towards Goal  Goal: *Optimal pain control at patient's stated goal  Outcome: Progressing Towards Goal     Problem: Vaginal Delivery: Day of Delivery-Post delivery  Goal: Off Pathway (Use only if patient is Off Pathway)  Outcome: Progressing Towards Goal  Goal: Activity/Safety  Outcome: Progressing Towards Goal  Goal: Consults, if ordered  Outcome: Progressing Towards Goal  Goal: Nutrition/Diet  Outcome: Progressing Towards Goal  Goal: Discharge Planning  Outcome: Progressing Towards Goal  Goal: Medications  Outcome: Progressing Towards Goal  Goal: Treatments/Interventions/Procedures  Outcome: Progressing Towards Goal  Goal: *Vital signs within defined limits  Outcome: Progressing Towards Goal  Goal: *Labs within defined limits  Outcome: Progressing Towards Goal  Goal: *Hemodynamically stable  Outcome: Progressing Towards Goal  Goal: *Optimal pain control at patient's stated goal  Outcome: Progressing Towards Goal  Goal: *Participates in infant care  Outcome: Progressing Towards Goal  Goal: *Demonstrates progressive activity  Outcome: Progressing Towards Goal  Goal: *Tolerating diet  Outcome: Progressing Towards Goal     Problem: Vaginal Delivery: Postpartum Day 1  Goal: Off Pathway (Use only if patient is Off Pathway)  Outcome: Progressing Towards Goal  Goal: Activity/Safety  Outcome: Progressing Towards Goal  Goal: Consults, if ordered  Outcome: Progressing Towards Goal  Goal: Diagnostic Test/Procedures  Outcome: Progressing Towards Goal  Goal: Nutrition/Diet  Outcome: Progressing Towards Goal  Goal: Discharge Planning  Outcome: Progressing Towards Goal  Goal: Medications  Outcome: Progressing Towards Goal  Goal: Treatments/Interventions/Procedures  Outcome: Progressing Towards Goal  Goal: Psychosocial  Outcome: Progressing Towards Goal  Goal: *Vital signs within defined limits  Outcome: Progressing Towards Goal  Goal: *Labs within defined limits  Outcome: Progressing Towards Goal  Goal: *Hemodynamically stable  Outcome: Progressing Towards Goal  Goal: *Optimal pain control at patient's stated goal  Outcome: Progressing Towards Goal  Goal: *Participates in infant care  Outcome: Progressing Towards Goal  Goal: *Demonstrates progressive activity  Outcome: Progressing Towards Goal  Goal: *Performs self perineal care  Outcome: Progressing Towards Goal  Goal: *Appropriate parent-infant bonding  Outcome: Progressing Towards Goal  Goal: *Tolerating diet  Outcome: Progressing Towards Goal  Goal: *Performs self breast care  Outcome: Progressing Towards Goal     Problem: Vaginal Delivery: Postpartum 2  Goal: Off Pathway (Use only if patient is Off Pathway)  Outcome: Progressing Towards Goal  Goal: Activity/Safety  Outcome: Progressing Towards Goal  Goal: Consults, if ordered  Outcome: Progressing Towards Goal  Goal: Nutrition/Diet  Outcome: Progressing Towards Goal  Goal: Discharge Planning  Outcome: Progressing Towards Goal  Goal: Medications  Outcome: Progressing Towards Goal  Goal: Treatments/Interventions/Procedures  Outcome: Progressing Towards Goal  Goal: Psychosocial  Outcome: Progressing Towards Goal     Problem: Vaginal Delivery: Discharge Outcomes  Goal: *Verbalizes name, dosage, time, side effects, and number of days to continue medications  Outcome: Progressing Towards Goal  Goal: *Describes available resources and support systems  Outcome: Progressing Towards Goal  Goal: *No signs and symptoms of infection  Outcome: Progressing Towards Goal  Goal: *Birth certificate information completed  Outcome: Progressing Towards Goal  Goal: *Received and verbalizes understanding of discharge plan and instructions  Outcome: Progressing Towards Goal  Goal: *Vital signs within defined limits  Outcome: Progressing Towards Goal  Goal: *Labs within defined limits  Outcome: Progressing Towards Goal  Goal: *Hemodynamically stable  Outcome: Progressing Towards Goal  Goal: *Optimal pain control at patient's stated goal  Outcome: Progressing Towards Goal  Goal: *Participates in infant care  Outcome: Progressing Towards Goal  Goal: *Demonstrates progressive activity  Outcome: Progressing Towards Goal  Goal: *Appropriate parent-infant bonding  Outcome: Progressing Towards Goal  Goal: *Tolerating diet  Outcome: Progressing Towards Goal

## 2024-03-04 PROBLEM — I16.1 HYPERTENSIVE EMERGENCY: Status: ACTIVE | Noted: 2024-03-04

## 2024-03-04 LAB
ALBUMIN SERPL BCP-MCNC: 3.4 G/DL (ref 3.5–5.2)
ALLENS TEST: ABNORMAL
ALP SERPL-CCNC: 69 U/L (ref 55–135)
ALT SERPL W/O P-5'-P-CCNC: 23 U/L (ref 10–44)
ANION GAP SERPL CALC-SCNC: 11 MMOL/L (ref 8–16)
AORTIC ROOT ANNULUS: 3 CM
AORTIC VALVE CUSP SEPERATION: 1.1 CM
APTT PPP: 36.2 SEC (ref 21–32)
APTT PPP: 37.9 SEC (ref 21–32)
APTT PPP: 40.7 SEC (ref 21–32)
APTT PPP: 43.3 SEC (ref 21–32)
AST SERPL-CCNC: 28 U/L (ref 10–40)
AV INDEX (PROSTH): 0.31
AV MEAN GRADIENT: 13 MMHG
AV PEAK GRADIENT: 23 MMHG
AV VALVE AREA BY VELOCITY RATIO: 1.05 CM²
AV VALVE AREA: 0.99 CM²
AV VELOCITY RATIO: 0.33
BASOPHILS # BLD AUTO: 0.08 K/UL (ref 0–0.2)
BASOPHILS # BLD AUTO: 0.08 K/UL (ref 0–0.2)
BASOPHILS NFR BLD: 0.6 % (ref 0–1.9)
BASOPHILS NFR BLD: 0.6 % (ref 0–1.9)
BILIRUB SERPL-MCNC: 0.4 MG/DL (ref 0.1–1)
BSA FOR ECHO PROCEDURE: 1.84 M2
BUN SERPL-MCNC: 23 MG/DL (ref 8–23)
CALCIUM SERPL-MCNC: 9 MG/DL (ref 8.7–10.5)
CHLORIDE SERPL-SCNC: 107 MMOL/L (ref 95–110)
CO2 SERPL-SCNC: 24 MMOL/L (ref 23–29)
CREAT SERPL-MCNC: 1.8 MG/DL (ref 0.5–1.4)
CV ECHO LV RWT: 0.44 CM
DELSYS: ABNORMAL
DIFFERENTIAL METHOD BLD: ABNORMAL
DIFFERENTIAL METHOD BLD: ABNORMAL
DOP CALC AO PEAK VEL: 2.4 M/S
DOP CALC AO VTI: 59.2 CM
DOP CALC LVOT AREA: 3.1 CM2
DOP CALC LVOT DIAMETER: 2 CM
DOP CALC LVOT PEAK VEL: 0.8 M/S
DOP CALC LVOT STROKE VOLUME: 58.4 CM3
DOP CALC MV VTI: 30.5 CM
DOP CALCLVOT PEAK VEL VTI: 18.6 CM
E WAVE DECELERATION TIME: 243 MSEC
E/A RATIO: 1.35
E/E' RATIO: 14.6 M/S
ECHO LV POSTERIOR WALL: 1.17 CM (ref 0.6–1.1)
EOSINOPHIL # BLD AUTO: 0.3 K/UL (ref 0–0.5)
EOSINOPHIL # BLD AUTO: 0.3 K/UL (ref 0–0.5)
EOSINOPHIL NFR BLD: 2.1 % (ref 0–8)
EOSINOPHIL NFR BLD: 2.1 % (ref 0–8)
ERYTHROCYTE [DISTWIDTH] IN BLOOD BY AUTOMATED COUNT: 16.8 % (ref 11.5–14.5)
ERYTHROCYTE [DISTWIDTH] IN BLOOD BY AUTOMATED COUNT: 16.8 % (ref 11.5–14.5)
ERYTHROCYTE [SEDIMENTATION RATE] IN BLOOD BY WESTERGREN METHOD: 18 MM/H
EST. GFR  (NO RACE VARIABLE): 28.5 ML/MIN/1.73 M^2
FIO2: 30
FIO2: 30
FRACTIONAL SHORTENING: 25 % (ref 28–44)
GLUCOSE SERPL-MCNC: 106 MG/DL (ref 70–110)
GLUCOSE SERPL-MCNC: 108 MG/DL (ref 70–110)
GLUCOSE SERPL-MCNC: 120 MG/DL (ref 70–110)
GLUCOSE SERPL-MCNC: 122 MG/DL (ref 70–110)
GLUCOSE SERPL-MCNC: 122 MG/DL (ref 70–110)
GLUCOSE SERPL-MCNC: 125 MG/DL (ref 70–110)
GLUCOSE SERPL-MCNC: 153 MG/DL (ref 70–110)
HCO3 UR-SCNC: 24 MMOL/L (ref 24–28)
HCO3 UR-SCNC: 24.1 MMOL/L (ref 24–28)
HCO3 UR-SCNC: 25.7 MMOL/L (ref 24–28)
HCT VFR BLD AUTO: 31.6 % (ref 37–48.5)
HCT VFR BLD AUTO: 31.6 % (ref 37–48.5)
HCT VFR BLD CALC: 27 %PCV (ref 36–54)
HCT VFR BLD CALC: 30 %PCV (ref 36–54)
HCT VFR BLD CALC: 32 %PCV (ref 36–54)
HGB BLD-MCNC: 10 G/DL (ref 12–16)
HGB BLD-MCNC: 10 G/DL (ref 12–16)
IMM GRANULOCYTES # BLD AUTO: 0.06 K/UL (ref 0–0.04)
IMM GRANULOCYTES # BLD AUTO: 0.06 K/UL (ref 0–0.04)
IMM GRANULOCYTES NFR BLD AUTO: 0.5 % (ref 0–0.5)
IMM GRANULOCYTES NFR BLD AUTO: 0.5 % (ref 0–0.5)
INTERVENTRICULAR SEPTUM: 1.22 CM (ref 0.6–1.1)
IVC DIAMETER: 2.08 CM
LEFT ATRIUM SIZE: 4.3 CM
LEFT ATRIUM VOLUME INDEX MOD: 41.8 ML/M2
LEFT ATRIUM VOLUME MOD: 74.9 CM3
LEFT INTERNAL DIMENSION IN SYSTOLE: 3.94 CM (ref 2.1–4)
LEFT VENTRICLE DIASTOLIC VOLUME INDEX: 74.95 ML/M2
LEFT VENTRICLE DIASTOLIC VOLUME: 134.16 ML
LEFT VENTRICLE MASS INDEX: 142 G/M2
LEFT VENTRICLE SYSTOLIC VOLUME INDEX: 37.7 ML/M2
LEFT VENTRICLE SYSTOLIC VOLUME: 67.53 ML
LEFT VENTRICULAR INTERNAL DIMENSION IN DIASTOLE: 5.28 CM (ref 3.5–6)
LEFT VENTRICULAR MASS: 253.54 G
LV LATERAL E/E' RATIO: 12.17 M/S
LV SEPTAL E/E' RATIO: 18.25 M/S
LVOT MG: 2 MMHG
LVOT MV: 0.54 CM/S
LYMPHOCYTES # BLD AUTO: 2 K/UL (ref 1–4.8)
LYMPHOCYTES # BLD AUTO: 2 K/UL (ref 1–4.8)
LYMPHOCYTES NFR BLD: 15 % (ref 18–48)
LYMPHOCYTES NFR BLD: 15 % (ref 18–48)
MAGNESIUM SERPL-MCNC: 2.1 MG/DL (ref 1.6–2.6)
MCH RBC QN AUTO: 25.3 PG (ref 27–31)
MCH RBC QN AUTO: 25.3 PG (ref 27–31)
MCHC RBC AUTO-ENTMCNC: 31.6 G/DL (ref 32–36)
MCHC RBC AUTO-ENTMCNC: 31.6 G/DL (ref 32–36)
MCV RBC AUTO: 80 FL (ref 82–98)
MCV RBC AUTO: 80 FL (ref 82–98)
MODE: ABNORMAL
MODE: ABNORMAL
MONOCYTES # BLD AUTO: 1.2 K/UL (ref 0.3–1)
MONOCYTES # BLD AUTO: 1.2 K/UL (ref 0.3–1)
MONOCYTES NFR BLD: 9.2 % (ref 4–15)
MONOCYTES NFR BLD: 9.2 % (ref 4–15)
MV MEAN GRADIENT: 1 MMHG
MV PEAK A VEL: 0.54 M/S
MV PEAK E VEL: 0.73 M/S
MV PEAK GRADIENT: 3 MMHG
MV STENOSIS PRESSURE HALF TIME: 73 MS
MV VALVE AREA BY CONTINUITY EQUATION: 1.91 CM2
MV VALVE AREA P 1/2 METHOD: 3.01 CM2
NEUTROPHILS # BLD AUTO: 9.6 K/UL (ref 1.8–7.7)
NEUTROPHILS # BLD AUTO: 9.6 K/UL (ref 1.8–7.7)
NEUTROPHILS NFR BLD: 72.6 % (ref 38–73)
NEUTROPHILS NFR BLD: 72.6 % (ref 38–73)
NRBC BLD-RTO: 0 /100 WBC
NRBC BLD-RTO: 0 /100 WBC
OHS LV EJECTION FRACTION SIMPSONS BIPLANE MOD: 49 %
PCO2 BLDA: 32.5 MMHG (ref 35–45)
PCO2 BLDA: 33.6 MMHG (ref 35–45)
PCO2 BLDA: 35.2 MMHG (ref 35–45)
PEEP: 5
PEEP: 5
PH SMN: 7.46 [PH] (ref 7.35–7.45)
PH SMN: 7.47 [PH] (ref 7.35–7.45)
PH SMN: 7.47 [PH] (ref 7.35–7.45)
PHOSPHATE SERPL-MCNC: 3.9 MG/DL (ref 2.7–4.5)
PISA TR MAX VEL: 3.03 M/S
PLATELET # BLD AUTO: 297 K/UL (ref 150–450)
PLATELET # BLD AUTO: 297 K/UL (ref 150–450)
PMV BLD AUTO: 11.2 FL (ref 9.2–12.9)
PMV BLD AUTO: 11.2 FL (ref 9.2–12.9)
PO2 BLDA: 108 MMHG (ref 80–100)
PO2 BLDA: 80 MMHG (ref 80–100)
PO2 BLDA: 84 MMHG (ref 80–100)
POC BE: 0 MMOL/L
POC BE: 0 MMOL/L
POC BE: 2 MMOL/L
POC IONIZED CALCIUM: 1.19 MMOL/L (ref 1.06–1.42)
POC IONIZED CALCIUM: 1.2 MMOL/L (ref 1.06–1.42)
POC IONIZED CALCIUM: 1.22 MMOL/L (ref 1.06–1.42)
POC SATURATED O2: 97 % (ref 95–100)
POC SATURATED O2: 97 % (ref 95–100)
POC SATURATED O2: 99 % (ref 95–100)
POC TCO2: 25 MMOL/L (ref 23–27)
POC TCO2: 25 MMOL/L (ref 23–27)
POC TCO2: 27 MMOL/L (ref 23–27)
POTASSIUM BLD-SCNC: 3.7 MMOL/L (ref 3.5–5.1)
POTASSIUM BLD-SCNC: 3.8 MMOL/L (ref 3.5–5.1)
POTASSIUM BLD-SCNC: 3.8 MMOL/L (ref 3.5–5.1)
POTASSIUM SERPL-SCNC: 3.8 MMOL/L (ref 3.5–5.1)
PROT SERPL-MCNC: 6.3 G/DL (ref 6–8.4)
PS: 10
PV MV: 0.7 M/S
PV PEAK GRADIENT: 4 MMHG
PV PEAK VELOCITY: 1.02 M/S
RA PRESSURE ESTIMATED: 8 MMHG
RBC # BLD AUTO: 3.95 M/UL (ref 4–5.4)
RBC # BLD AUTO: 3.95 M/UL (ref 4–5.4)
RV TB RVSP: 11 MMHG
RV TISSUE DOPPLER FREE WALL SYSTOLIC VELOCITY 1 (APICAL 4 CHAMBER VIEW): 9.79 CM/S
SAMPLE: ABNORMAL
SITE: ABNORMAL
SODIUM BLD-SCNC: 141 MMOL/L (ref 136–145)
SODIUM BLD-SCNC: 142 MMOL/L (ref 136–145)
SODIUM BLD-SCNC: 142 MMOL/L (ref 136–145)
SODIUM SERPL-SCNC: 142 MMOL/L (ref 136–145)
TDI LATERAL: 0.06 M/S
TDI SEPTAL: 0.04 M/S
TDI: 0.05 M/S
TR MAX PG: 37 MMHG
TROPONIN I SERPL HS-MCNC: 1178.2 PG/ML (ref 0–14.9)
TROPONIN I SERPL HS-MCNC: 1271.1 PG/ML (ref 0–14.9)
TROPONIN I SERPL HS-MCNC: 1336.6 PG/ML (ref 0–14.9)
TROPONIN I SERPL HS-MCNC: 1336.6 PG/ML (ref 0–14.9)
TROPONIN I SERPL HS-MCNC: 1556 PG/ML (ref 0–14.9)
TROPONIN I SERPL HS-MCNC: 1556 PG/ML (ref 0–14.9)
TV REST PULMONARY ARTERY PRESSURE: 45 MMHG
VT: 370
WBC # BLD AUTO: 13.19 K/UL (ref 3.9–12.7)
WBC # BLD AUTO: 13.19 K/UL (ref 3.9–12.7)
Z-SCORE OF LEFT VENTRICULAR DIMENSION IN END DIASTOLE: 0.65
Z-SCORE OF LEFT VENTRICULAR DIMENSION IN END SYSTOLE: 2

## 2024-03-04 PROCEDURE — 94003 VENT MGMT INPAT SUBQ DAY: CPT

## 2024-03-04 PROCEDURE — 84484 ASSAY OF TROPONIN QUANT: CPT | Mod: 91 | Performed by: INTERNAL MEDICINE

## 2024-03-04 PROCEDURE — 25000003 PHARM REV CODE 250: Performed by: INTERNAL MEDICINE

## 2024-03-04 PROCEDURE — 37799 UNLISTED PX VASCULAR SURGERY: CPT

## 2024-03-04 PROCEDURE — 99900035 HC TECH TIME PER 15 MIN (STAT)

## 2024-03-04 PROCEDURE — 27000221 HC OXYGEN, UP TO 24 HOURS

## 2024-03-04 PROCEDURE — 82803 BLOOD GASES ANY COMBINATION: CPT

## 2024-03-04 PROCEDURE — 84295 ASSAY OF SERUM SODIUM: CPT

## 2024-03-04 PROCEDURE — 85025 COMPLETE CBC W/AUTO DIFF WBC: CPT | Performed by: EMERGENCY MEDICINE

## 2024-03-04 PROCEDURE — 99291 CRITICAL CARE FIRST HOUR: CPT | Mod: ,,, | Performed by: STUDENT IN AN ORGANIZED HEALTH CARE EDUCATION/TRAINING PROGRAM

## 2024-03-04 PROCEDURE — 85014 HEMATOCRIT: CPT

## 2024-03-04 PROCEDURE — 63600175 PHARM REV CODE 636 W HCPCS: Performed by: INTERNAL MEDICINE

## 2024-03-04 PROCEDURE — 83735 ASSAY OF MAGNESIUM: CPT | Performed by: INTERNAL MEDICINE

## 2024-03-04 PROCEDURE — 94761 N-INVAS EAR/PLS OXIMETRY MLT: CPT

## 2024-03-04 PROCEDURE — 85730 THROMBOPLASTIN TIME PARTIAL: CPT | Mod: 91 | Performed by: HOSPITALIST

## 2024-03-04 PROCEDURE — 80053 COMPREHEN METABOLIC PANEL: CPT | Performed by: INTERNAL MEDICINE

## 2024-03-04 PROCEDURE — 84132 ASSAY OF SERUM POTASSIUM: CPT

## 2024-03-04 PROCEDURE — 85730 THROMBOPLASTIN TIME PARTIAL: CPT | Performed by: EMERGENCY MEDICINE

## 2024-03-04 PROCEDURE — 25000003 PHARM REV CODE 250: Performed by: NURSE PRACTITIONER

## 2024-03-04 PROCEDURE — 20000000 HC ICU ROOM

## 2024-03-04 PROCEDURE — 99233 SBSQ HOSP IP/OBS HIGH 50: CPT | Mod: ,,, | Performed by: INTERNAL MEDICINE

## 2024-03-04 PROCEDURE — 84100 ASSAY OF PHOSPHORUS: CPT | Performed by: INTERNAL MEDICINE

## 2024-03-04 PROCEDURE — 82330 ASSAY OF CALCIUM: CPT

## 2024-03-04 PROCEDURE — 99900026 HC AIRWAY MAINTENANCE (STAT)

## 2024-03-04 PROCEDURE — 63600175 PHARM REV CODE 636 W HCPCS: Performed by: EMERGENCY MEDICINE

## 2024-03-04 PROCEDURE — 25000003 PHARM REV CODE 250: Performed by: STUDENT IN AN ORGANIZED HEALTH CARE EDUCATION/TRAINING PROGRAM

## 2024-03-04 PROCEDURE — 63600175 PHARM REV CODE 636 W HCPCS: Performed by: NURSE PRACTITIONER

## 2024-03-04 RX ORDER — HYDROMORPHONE HYDROCHLORIDE 1 MG/ML
0.2 INJECTION, SOLUTION INTRAMUSCULAR; INTRAVENOUS; SUBCUTANEOUS ONCE
Status: COMPLETED | OUTPATIENT
Start: 2024-03-04 | End: 2024-03-04

## 2024-03-04 RX ORDER — AMLODIPINE BESYLATE 5 MG/1
10 TABLET ORAL DAILY
Status: DISCONTINUED | OUTPATIENT
Start: 2024-03-04 | End: 2024-03-10

## 2024-03-04 RX ORDER — FAMOTIDINE 20 MG/1
20 TABLET, FILM COATED ORAL DAILY
Status: DISCONTINUED | OUTPATIENT
Start: 2024-03-04 | End: 2024-03-12 | Stop reason: HOSPADM

## 2024-03-04 RX ORDER — METOPROLOL TARTRATE 25 MG/1
25 TABLET, FILM COATED ORAL 2 TIMES DAILY
Status: DISCONTINUED | OUTPATIENT
Start: 2024-03-04 | End: 2024-03-05

## 2024-03-04 RX ORDER — HYDRALAZINE HYDROCHLORIDE 25 MG/1
50 TABLET, FILM COATED ORAL 3 TIMES DAILY
Status: DISCONTINUED | OUTPATIENT
Start: 2024-03-04 | End: 2024-03-04

## 2024-03-04 RX ORDER — FUROSEMIDE 10 MG/ML
40 INJECTION INTRAMUSCULAR; INTRAVENOUS EVERY 8 HOURS
Status: DISCONTINUED | OUTPATIENT
Start: 2024-03-04 | End: 2024-03-05

## 2024-03-04 RX ADMIN — FAMOTIDINE 20 MG: 20 TABLET ORAL at 02:03

## 2024-03-04 RX ADMIN — ACETAMINOPHEN 650 MG: 325 TABLET ORAL at 08:03

## 2024-03-04 RX ADMIN — FUROSEMIDE 40 MG: 10 INJECTION, SOLUTION INTRAVENOUS at 02:03

## 2024-03-04 RX ADMIN — HYDROMORPHONE HYDROCHLORIDE 0.2 MG: 0.5 INJECTION, SOLUTION INTRAMUSCULAR; INTRAVENOUS; SUBCUTANEOUS at 06:03

## 2024-03-04 RX ADMIN — MUPIROCIN 1 G: 20 OINTMENT TOPICAL at 09:03

## 2024-03-04 RX ADMIN — METOPROLOL TARTRATE 12.5 MG: 25 TABLET, FILM COATED ORAL at 08:03

## 2024-03-04 RX ADMIN — MUPIROCIN 1 G: 20 OINTMENT TOPICAL at 08:03

## 2024-03-04 RX ADMIN — FUROSEMIDE 40 MG: 10 INJECTION, SOLUTION INTRAVENOUS at 09:03

## 2024-03-04 RX ADMIN — METOPROLOL TARTRATE 25 MG: 25 TABLET, FILM COATED ORAL at 09:03

## 2024-03-04 RX ADMIN — HYDRALAZINE HYDROCHLORIDE 10 MG: 20 INJECTION INTRAMUSCULAR; INTRAVENOUS at 04:03

## 2024-03-04 RX ADMIN — AMLODIPINE BESYLATE 10 MG: 5 TABLET ORAL at 02:03

## 2024-03-04 RX ADMIN — FAMOTIDINE 20 MG: 10 INJECTION, SOLUTION INTRAVENOUS at 08:03

## 2024-03-04 RX ADMIN — ASPIRIN 81 MG 81 MG: 81 TABLET ORAL at 08:03

## 2024-03-04 RX ADMIN — INSULIN DETEMIR 10 UNITS: 100 INJECTION, SOLUTION SUBCUTANEOUS at 09:03

## 2024-03-04 RX ADMIN — PROPOFOL 40 MCG/KG/MIN: 10 INJECTION, EMULSION INTRAVENOUS at 12:03

## 2024-03-04 RX ADMIN — HEPARIN SODIUM AND DEXTROSE 16 UNITS/KG/HR: 10000; 5 INJECTION INTRAVENOUS at 06:03

## 2024-03-04 RX ADMIN — FUROSEMIDE 40 MG: 10 INJECTION, SOLUTION INTRAVENOUS at 08:03

## 2024-03-04 NOTE — CONSULTS
Nephrology Consult Note        Patient Name: Cynthia N Cushing  MRN: [id number]    Patient Class: IP- Inpatient   Admission Date: 3/3/2024  Length of Stay: 1 days  Date of Service: 3/4/2024    Attending Physician: Jacob Almeida MD  Primary Care Provider: Teri Adams MD    Reason for Consult: yasir/htn emergency/hf exacerbation    SUBJECTIVE:     HPI: 78F with hypertension, diabetes, hyperlipidemia, sleep apnea, SVT, SSS and follow up in cardiology clinic with Dr. Mendieta, last echo October 6, 2023 with EF of 53% with normal diastolic dysfunction and moderate AS presents to the emergency room with increased shortness of breath since last night, progressively worse. The patient was started on CPAP by EMS, blood pressure initially as high as 250/130, tachypnea, . In the ER was in respiratory distress and was intubated to protect her airway.      Laboratories showed troponin 467.9, COVID 19 screen negative, lactic acid 2.7, CO2 18, BUN 22, creatinine 2.0, glucose 336, anion gap normal at 13.     ABG with pH 7.25/pCO2 45.9/PO2 255/bicarbonate 20.5/oxygen saturation 100% while on ventilator.    UO with tsai around 3L so far as documented. UA bland with known proteinuria and some hyaline casts of unclear significance.    Chest x-ray consistent with pulmonary edema with ETT in place. Patient was given Lasix 40 mg IV in the ER, started on nitroglycerin drip. No fluid bolus given due to concerns of pulmonary edema and risk of fluid overload, but received IV antibiotics, blood cultures were obtained.     Past Medical History:   Diagnosis Date    Allergy     Breast mass     Cataract     Diabetes mellitus     GERD (gastroesophageal reflux disease)     Hernia, hiatal     Hyperlipidemia     Hypertension     Kidney stone     Osteoarthritis     Renal insufficiency     Seasonal allergies     Sleep apnea     SSS (sick sinus syndrome)     SVT (supraventricular tachycardia)      Past Surgical History:   Procedure Laterality  Date    BREAST BIOPSY      BREAST CYST ASPIRATION      BREAST SURGERY      CERVICAL DISC SURGERY      EYE SURGERY      cataracts bilateral    HYSTERECTOMY       Family History   Problem Relation Age of Onset    Stroke Mother     Cancer Mother     Breast cancer Mother     Cancer Father     Breast cancer Maternal Aunt      Social History     Tobacco Use    Smoking status: Never    Smokeless tobacco: Never   Substance Use Topics    Alcohol use: No    Drug use: No       Review of patient's allergies indicates:   Allergen Reactions    Biaxin [clarithromycin]     Prandin [repaglinide] Nausea And Vomiting    Amlodipine     Chlorphen-phenyltolox-pe-ppa     Ciprofloxacin Nausea And Vomiting    Omnicef [cefdinir]     Propoxyphene     Quinine Nausea And Vomiting       Outpatient meds:  No current facility-administered medications on file prior to encounter.     Current Outpatient Medications on File Prior to Encounter   Medication Sig Dispense Refill    famotidine (PEPCID) 20 MG tablet TAKE 1 TABLET TWICE DAILY (Patient taking differently: Take 20 mg by mouth 2 (two) times daily.) 180 tablet 2    furosemide (LASIX) 20 MG tablet TAKE 1 TABLET(20 MG) BY MOUTH EVERY DAY (Patient taking differently: Take 20 mg by mouth once daily.) 30 tablet 0    guanFACINE (TENEX) 2 MG tablet TAKE 1 TABLET EVERY EVENING (Patient taking differently: Take 2 mg by mouth nightly.) 90 tablet 3    hydrALAZINE (APRESOLINE) 50 MG tablet Take 1 tablet (50 mg total) by mouth 3 (three) times daily. 90 tablet 11    labetaloL (NORMODYNE) 100 MG tablet Take 1 tablet (100 mg total) by mouth 2 (two) times daily. 60 tablet 11    LANTUS SOLOSTAR U-100 INSULIN glargine 100 units/mL SubQ pen ADMINISTER 51 UNITS UNDER THE SKIN AT NIGHT (Patient taking differently: Inject 51 Units into the skin every evening. ADMINISTER 51 UNITS UNDER THE SKIN AT NIGHT) 15 mL 5    olmesartan (BENICAR) 40 MG tablet TAKE 1 TABLET EVERY DAY (Patient taking differently: Take 40 mg by  "mouth once daily.) 90 tablet 3    rosuvastatin (CRESTOR) 10 MG tablet Take 1 tablet (10 mg total) by mouth every evening. 90 tablet 2    ACCU-CHEK AMADOR PLUS TEST STRP Strp TEST BLOOD SUGAR FOUR TIMES DAILY 400 strip 10    amLODIPine (NORVASC) 10 MG tablet Take 1 tablet (10 mg total) by mouth once daily. 30 tablet 11    aspirin 81 MG Chew Take 81 mg by mouth once daily.      lancets (ACCU-CHEK SOFTCLIX LANCETS) Misc TEST BLOOD SUGAR FOUR TIMES DAILY 400 each 3    pen needle, diabetic (BD ANNA 2ND GEN PEN NEEDLE) 32 gauge x 5/32" Ndle INJECT 1 NEEDLE INTO THE SKIN EVERY EVENING 100 each 5       Scheduled meds:   aspirin  81 mg Oral Daily    famotidine (PF)  20 mg Intravenous Daily    furosemide (LASIX) injection  40 mg Intravenous Q8H    insulin detemir U-100  10 Units Subcutaneous BID    metoprolol tartrate  12.5 mg Per OG tube BID    mupirocin   Nasal BID       Infusions:   heparin (porcine) in D5W 16 Units/kg/hr (24 0626)    nitroGLYCERIN Stopped (24 1130)    propofoL Stopped (24 0400)       PRN meds:  acetaminophen, dextrose 50% in water (D50W), dextrose 50% in water (D50W), [] fentaNYL **FOLLOWED BY** fentaNYL, glucagon (human recombinant), glucose, glucose, heparin (PORCINE), heparin (PORCINE), hydrALAZINE, insulin aspart U-100, magnesium sulfate IVPB, magnesium sulfate IVPB, potassium bicarbonate, potassium bicarbonate, potassium bicarbonate, sodium chloride 0.9%    Review of Systems:  Unable to obtain, intubated.    OBJECTIVE:     Vital Signs and IO:  Temp:  [97.4 °F (36.3 °C)-97.9 °F (36.6 °C)]   Pulse:  [46-80]   Resp:  [14-48]   BP: ()/(51-86)   SpO2:  [95 %-100 %]   Arterial Line BP: (103-184)/(42-81)   I/O last 3 completed shifts:  In: 788.7 [I.V.:732.9; IV Piggyback:55.8]  Out: 3120 [Urine:2820; Drains:300]  Wt Readings from Last 5 Encounters:   24 75.9 kg (167 lb 5.3 oz)   24 78.3 kg (172 lb 8.2 oz)   23 78.5 kg (173 lb)   10/23/23 75.3 kg (166 lb) " "  10/10/23 77.1 kg (170 lb)     Body mass index is 29.64 kg/m².    Physical Exam  Constitutional:       General: Patient is not in acute distress.     Appearance: Patient is well-developed. She is not diaphoretic.   HENT:      Head: Normocephalic and atraumatic.      Mouth/Throat: Mucous membranes are moist.   Eyes:      General: No scleral icterus.     Pupils: Pupils are equal, round, and reactive to light.   Cardiovascular:      Rate and Rhythm: Normal rate and regular rhythm.   Pulmonary:      Effort: Pulmonary effort is normal. No respiratory distress.      Breath sounds: No stridor.      ON vent, intubated.  Abdominal:      General: There is no distension.      Palpations: Abdomen is soft.   Musculoskeletal:         General: No deformity. Normal range of motion.      Cervical back: Neck supple.   Skin:     General: Skin is warm and dry.      Findings: No rash present. No erythema.   Neurological:      Mental Status: Patient is NOT alert and oriented to person, place, and time.      Cranial Nerves: No cranial nerve deficit.   Psychiatric:         Behavior: Behavior can not be assessed due to sedation and intubation    Laboratory:  Recent Labs   Lab 03/03/24  0827 03/04/24  0455    142   K 3.6 3.8    107   CO2 18* 24   BUN 22 23   CREATININE 2.0* 1.8*   * 122*       Recent Labs   Lab 03/03/24  0827 03/03/24  1225 03/04/24  0455   CALCIUM 8.7  --  9.0   ALBUMIN 3.6  --  3.4*   PHOS  --  4.4 3.9   MG 2.0 1.8 2.1       Recent Labs   Lab 07/12/21  1103 02/07/22  0706   PTH, Intact 24.4 24.0       No results for input(s): "POCTGLUCOSE" in the last 168 hours.    Recent Labs   Lab 07/15/22  0730 08/17/23  0701 08/28/23  0748   Hemoglobin A1C 7.6 H 6.7 H 7.5 H       Recent Labs   Lab 03/03/24  0827 03/03/24  1013 03/03/24  1253 03/04/24  0417 03/04/24  0452 03/04/24  0455   WBC 14.80* 15.20*  --   --   --  13.19*  13.19*   HGB 11.1* 8.2*  --   --   --  10.0*  10.0*   HCT 36.8* 28.7*   < > 30* 32* " 31.6*  31.6*    262  --   --   --  297  297   MCV 85 91  --   --   --  80*  80*   MCHC 30.2* 28.6*  --   --   --  31.6*  31.6*   MONO 8.4  1.3* 6.6  1.0  --   --   --  9.2  9.2  1.2*  1.2*   EOSINOPHIL 6.8 1.0  --   --   --  2.1  2.1    < > = values in this interval not displayed.       Recent Labs   Lab 03/03/24  0827 03/04/24  0455   BILITOT 0.4 0.4   PROT 6.8 6.3   ALBUMIN 3.6 3.4*   ALKPHOS 69 69   ALT 26 23   AST 25 28       Recent Labs   Lab 10/20/21  1237 03/03/24  1224   Color, UA  --  Yellow   Appearance, UA  --  Hazy A   Clarity, UA Clear  --    pH, UA  --  6.0   Specific Gravity, UA  --  1.010   Protein, UA  --  3+ A   Glucose, UA  --  2+ A   Ketones, UA  --  Negative   Urobilinogen, UA  --  Negative   Bilirubin (UA)  --  Negative   Occult Blood UA  --  2+ A   Nitrite, UA  --  Negative   RBC, UA  --  2   WBC, UA  --  3   Bacteria  --  Rare   Hyaline Casts, UA  --  9 A       Recent Labs   Lab 03/04/24  0313 03/04/24  0417 03/04/24  0452   POC PH 7.463 H 7.475 H 7.471 H   POC PCO2 33.6 L 32.5 L 35.2   POC HCO3 24.1 24.0 25.7   POC PO2 108 H 84 80   POC SATURATED O2 99 97 97   POC BE 0 0 2   Sample ARTERIAL ARTERIAL ARTERIAL       Microbiology Results (last 7 days)       Procedure Component Value Units Date/Time    Blood culture x two cultures. Draw prior to antibiotics. [[id number]] Collected: 03/03/24 0903    Order Status: Completed Specimen: Blood Updated: 03/03/24 1758     Blood Culture, Routine No Growth to date    Narrative:      Aerobic and anaerobic    Blood culture x two cultures. Draw prior to antibiotics. [[id number]] Collected: 03/03/24 0903    Order Status: Completed Specimen: Blood Updated: 03/03/24 1758     Blood Culture, Routine No Growth to date    Narrative:      Aerobic and anaerobic    Blood culture [[id number]]     Order Status: Canceled Specimen: Blood     Blood culture [[id number]]     Order Status: Canceled Specimen: Blood             ASSESSMENT/PLAN:     Non-oliguric  BRIDGETTE due to cardiorenal syndrome due to acute on chronic HFpEF exacerbation with volume overload, NSTEMI, HTN emergency, pulmonary edema  Acute respiratory failure requiring intubation  Sepsis possible but seems unlikely  CKD stage 3  DM  Anemia of CKD  No NSAIDs, ACEI/ARB, IV contrast or other nephrotoxins.  Keep MAP > 60, SBP > 100.  Dose meds for GFR < 30 ml/min.  Renal diet or TF - low K, low phos.  Monitor UO, diuretics and BP control per Cards - if unable to urinate, consider dialysis.  Treat sepsis, adjust meds based on Cx reports.  Hgb and HCT are acceptable. Monitor for now.'  Control DM.    Thank you for allowing us to participate in the care of your patient!   We will follow the patient and provide recommendations as needed.    Patient care time was spent personally by me on the following activities:     Obtaining a history.  Examination of patient.  Providing medical care at the patients bedside.  Developing a treatment plan with patient or surrogate and bedside caregivers.  Ordering and reviewing laboratory studies, radiographic studies, pulse oximetry.  Ordering and performing treatments and interventions.  Evaluation of patient's response to treatment.  Discussions with consultants while on the unit and immediately available to the patient.  Re-evaluation of the patient's condition.  Documentation in the medical record.     Vito Britton MD    [facility]  [address] [place] [address], [place] [address]    [phone] - tel  [phone] - fax    3/4/2024

## 2024-03-04 NOTE — PROGRESS NOTES
[facility]  Department of Cardiology  Progress Note      PATIENT NAME: Cynthia N Cushing  MRN: [id number]  TODAY'S DATE: 03/04/2024  ADMIT DATE: 3/3/2024                          CONSULT REQUESTED BY: Jacob Almeida MD    SUBJECTIVE     PRINCIPAL PROBLEM: Acute hypoxemic respiratory failure      3/4/24:  Patient seen resting in bed in the ICU with no acute distress noted.  She remains hypertensive with blood pressure in the 160s to 170s and is currently on a heparin drip.  Patient reports that she is feeling anxious and restless from being in bed.  She is requesting to sit up on the side of the bed and has preferred to sit up in the bed as opposed to lying down.    REASON FOR CONSULT:  NSTEMI      HPI:  Chief Complaint   Patient presents with    Respiratory Distress         HPI: Mrs. Cushing is a pleasant 78-year-old female who has a past medical history of hypertension, diabetes, hyperlipidemia, sleep apnea, SVT, SSS and follow up in cardiology clinic with Dr. Mendieta with recent office visit, last echo October 6, 2023 with EF of 53% with normal diastolic dysfunction and moderate AS who presents to the emergency room today with complaints of increased shortness of breath that started last night, got progressively worse.  The patient was started on CPAP by EMS.  Patient's vital signs in the ER showed blood pressure initially as high as 250/130, tachypnea, .  The patient in the ER was in respiratory distress, she was intubated to protect her airway.  Patient's laboratories and x-rays done.  Patient's case was discussed with the ER physician at the time of admission.  Laboratories showed troponin 467.9, COVID 19 screen negative, lactic acid 2.7, magnesium 2.0, calcitonin 0.098, sodium 140, potassium 3.6, chloride 109, CO2 18, BUN 22, creatinine 2.0, glucose 336, AST 25, anion gap normal at 13, PT INR 0.9, WBCs 14.8, hemoglobin 11, platelet count 416.  ABG show pH 7.25/pCO2 45.9/PO2 255/bicarbonate  20.5/oxygen saturation 100% this was done on ventilator.  Chest x-ray showed changes of pulmonary edema with ETT in place.  The patient was recommended to be admitted to the ICU for acute respiratory failure with hypoxia, hypertensive emergency, possible sepsis with lactic acid level 2.7.  Patient was given Lasix 40 mg IV in the ER, started on nitroglycerin drip.  Patient started on CHF order set, sepsis order set, no fluid bolus given due to concerns of pulmonary edema and risk of fluid overload, IV antibiotics, blood cultures, cardiology consult, and Pulmonary critical Care consult.  Patient is a full code status.      Review of patient's allergies indicates:   Allergen Reactions    Biaxin [clarithromycin]     Prandin [repaglinide] Nausea And Vomiting    Amlodipine     Chlorphen-phenyltolox-pe-ppa     Ciprofloxacin Nausea And Vomiting    Omnicef [cefdinir]     Propoxyphene     Quinine Nausea And Vomiting       Past Medical History:   Diagnosis Date    Allergy     Breast mass     Cataract     Diabetes mellitus     GERD (gastroesophageal reflux disease)     Hernia, hiatal     Hyperlipidemia     Hypertension     Kidney stone     Osteoarthritis     Renal insufficiency     Seasonal allergies     Sleep apnea     SSS (sick sinus syndrome)     SVT (supraventricular tachycardia)      Past Surgical History:   Procedure Laterality Date    BREAST BIOPSY      BREAST CYST ASPIRATION      BREAST SURGERY      CERVICAL DISC SURGERY      EYE SURGERY      cataracts bilateral    HYSTERECTOMY       Social History     Tobacco Use    Smoking status: Never    Smokeless tobacco: Never   Substance Use Topics    Alcohol use: No    Drug use: No        REVIEW OF SYSTEMS  Intubated/sedated    OBJECTIVE     VITAL SIGNS (Most Recent)  Temp: 97.9 °F (36.6 °C) (03/04/24 0701)  Pulse: 70 (03/04/24 1000)  Resp: 14 (03/04/24 1000)  BP: 138/61 (03/04/24 1000)  SpO2: 95 % (03/04/24 1000)    VENTILATION STATUS  Resp: 14 (03/04/24 1000)  SpO2: 95 %  (03/04/24 1000)  Vent Mode: Spont  Oxygen Concentration (%):  [30-40] 30  Resp Rate Total:  [15 br/min-25 br/min] 17 br/min  Vt Set:  [370 mL] 370 mL  PEEP/CPAP:  [5 cmH20] 5 cmH20  Pressure Support:  [10 cmH20] 10 cmH20  Mean Airway Pressure:  [8.3 nhG62-47 cmH20] 8.7 cmH20        I & O (Last 24H):  Intake/Output Summary (Last 24 hours) at 3/4/2024 1229  Last data filed at 3/4/2024 0901  Gross per 24 hour   Intake 1016.37 ml   Output 3120 ml   Net -2103.63 ml         WEIGHTS  Wt Readings from Last 3 Encounters:   03/03/24 1115 75.9 kg (167 lb 5.3 oz)   03/03/24 0813 76.7 kg (169 lb 1.5 oz)   02/02/24 1311 78.3 kg (172 lb 8.2 oz)   12/12/23 0949 78.5 kg (173 lb)       PHYSICAL EXAM /65 NIBP, 146/63 per art line; NSR on telemetry    GENERAL: critically ill elderly female breathing per vent in no apparent distress.  HEENT: Normocephalic.    NECK: No JVD.   CARDIAC: Regular rate and rhythm. S1 is normal.S2 is normal.No gallops, clicks or murmurs noted at this time.  CHEST ANATOMY: normal.   LUNGS: +Crackles; .   ABDOMEN: Soft, non distended, hypoactive BS.    EXTREMITIES: No edema  CENTRAL NERVOUS SYSTEM: sedated/intubated  SKIN: No rash   Linares draining clear yellow urine  HOME MEDICATIONS:  No current facility-administered medications on file prior to encounter.     Current Outpatient Medications on File Prior to Encounter   Medication Sig Dispense Refill    famotidine (PEPCID) 20 MG tablet TAKE 1 TABLET TWICE DAILY (Patient taking differently: Take 20 mg by mouth 2 (two) times daily.) 180 tablet 2    furosemide (LASIX) 20 MG tablet TAKE 1 TABLET(20 MG) BY MOUTH EVERY DAY (Patient taking differently: Take 20 mg by mouth once daily.) 30 tablet 0    guanFACINE (TENEX) 2 MG tablet TAKE 1 TABLET EVERY EVENING (Patient taking differently: Take 2 mg by mouth nightly.) 90 tablet 3    hydrALAZINE (APRESOLINE) 50 MG tablet Take 1 tablet (50 mg total) by mouth 3 (three) times daily. 90 tablet 11    labetaloL (NORMODYNE)  "100 MG tablet Take 1 tablet (100 mg total) by mouth 2 (two) times daily. 60 tablet 11    LANTUS SOLOSTAR U-100 INSULIN glargine 100 units/mL SubQ pen ADMINISTER 51 UNITS UNDER THE SKIN AT NIGHT (Patient taking differently: Inject 51 Units into the skin every evening. ADMINISTER 51 UNITS UNDER THE SKIN AT NIGHT) 15 mL 5    olmesartan (BENICAR) 40 MG tablet TAKE 1 TABLET EVERY DAY (Patient taking differently: Take 40 mg by mouth once daily.) 90 tablet 3    rosuvastatin (CRESTOR) 10 MG tablet Take 1 tablet (10 mg total) by mouth every evening. 90 tablet 2    ACCU-CHEK AMADOR PLUS TEST STRP Strp TEST BLOOD SUGAR FOUR TIMES DAILY 400 strip 10    amLODIPine (NORVASC) 10 MG tablet Take 1 tablet (10 mg total) by mouth once daily. 30 tablet 11    aspirin 81 MG Chew Take 81 mg by mouth once daily.      lancets (ACCU-CHEK SOFTCLIX LANCETS) Misc TEST BLOOD SUGAR FOUR TIMES DAILY 400 each 3    pen needle, diabetic (BD ANNA 2ND GEN PEN NEEDLE) 32 gauge x 5/32" Ndle INJECT 1 NEEDLE INTO THE SKIN EVERY EVENING 100 each 5       SCHEDULED MEDS:   amLODIPine  10 mg Oral Daily    aspirin  81 mg Oral Daily    famotidine  20 mg Oral Daily    furosemide (LASIX) injection  40 mg Intravenous Q8H    hydrALAZINE  50 mg Oral TID    insulin detemir U-100  10 Units Subcutaneous BID    metoprolol tartrate  12.5 mg Per OG tube BID    mupirocin   Nasal BID       CONTINUOUS INFUSIONS:   heparin (porcine) in D5W 16 Units/kg/hr (24 0626)       PRN MEDS:acetaminophen, dextrose 50% in water (D50W), dextrose 50% in water (D50W), [] fentaNYL **FOLLOWED BY** fentaNYL, glucagon (human recombinant), glucose, glucose, heparin (PORCINE), heparin (PORCINE), hydrALAZINE, insulin aspart U-100, magnesium sulfate IVPB, magnesium sulfate IVPB, potassium bicarbonate, potassium bicarbonate, potassium bicarbonate, sodium chloride 0.9%    LABS AND DIAGNOSTICS     CBC LAST 3 DAYS  Recent Labs   Lab 24  0827 24  1013 24  1253 24  0417 " 03/04/24  0452 03/04/24  0455   WBC 14.80* 15.20*  --   --   --  13.19*  13.19*   RBC 4.31 3.15*  --   --   --  3.95*  3.95*   HGB 11.1* 8.2*  --   --   --  10.0*  10.0*   HCT 36.8* 28.7*   < > 30* 32* 31.6*  31.6*   MCV 85 91  --   --   --  80*  80*   MCH 25.8* 26.0*  --   --   --  25.3*  25.3*   MCHC 30.2* 28.6*  --   --   --  31.6*  31.6*   RDW 17.0* 17.5*  --   --   --  16.8*  16.8*    262  --   --   --  297  297   MPV 11.7 11.9  --   --   --  11.2  11.2   GRAN 44.2  6.5 82.9*  12.6*  --   --   --  72.6  72.6  9.6*  9.6*   LYMPH 38.4  5.7* 8.3*  1.3  --   --   --  15.0*  15.0*  2.0  2.0   MONO 8.4  1.3* 6.6  1.0  --   --   --  9.2  9.2  1.2*  1.2*   BASO 0.25* 0.08  --   --   --  0.08  0.08   NRBC 0 0  --   --   --  0  0    < > = values in this interval not displayed.         COAGULATION LAST 3 DAYS  Recent Labs   Lab 03/03/24  0827 03/03/24  1013 03/03/24  1013 03/03/24  1636 03/03/24  2326 03/04/24  0455   INR 0.9 0.9  --   --   --   --    APTT  --  22.5   < > 33.6* 40.7* 36.2*    < > = values in this interval not displayed.         CHEMISTRY LAST 3 DAYS  Recent Labs   Lab 03/03/24  0827 03/03/24  0838 03/03/24  1225 03/03/24  1253 03/04/24 0313 03/04/24 0417 03/04/24 0452 03/04/24 0455     --   --   --   --   --   --  142   K 3.6  --   --   --   --   --   --  3.8     --   --   --   --   --   --  107   CO2 18*  --   --   --   --   --   --  24   ANIONGAP 13  --   --   --   --   --   --  11   BUN 22  --   --   --   --   --   --  23   CREATININE 2.0*  --   --   --   --   --   --  1.8*   *  --   --   --   --   --   --  122*   CALCIUM 8.7  --   --   --   --   --   --  9.0   PH  --    < >  --    < > 7.463* 7.475* 7.471*  --    MG 2.0  --  1.8  --   --   --   --  2.1   ALBUMIN 3.6  --   --   --   --   --   --  3.4*   PROT 6.8  --   --   --   --   --   --  6.3   ALKPHOS 69  --   --   --   --   --   --  69   ALT 26  --   --   --   --   --   --  23   AST 25  --    --   --   --   --   --  28   BILITOT 0.4  --   --   --   --   --   --  0.4    < > = values in this interval not displayed.         CARDIAC PROFILE LAST 3 DAYS  Recent Labs   Lab 03/03/24 0827   *         ENDOCRINE LAST 3 DAYS  Recent Labs   Lab 03/03/24 0827   PROCAL 0.098         LAST ARTERIAL BLOOD GAS  ABG  Recent Labs   Lab 03/04/24  0452   PH 7.471*   PO2 80   PCO2 35.2   HCO3 25.7   BE 2         LAST 7 DAYS MICROBIOLOGY   Microbiology Results (last 7 days)       Procedure Component Value Units Date/Time    Blood culture x two cultures. Draw prior to antibiotics. [[id number]] Collected: 03/03/24 0903    Order Status: Completed Specimen: Blood Updated: 03/03/24 1758     Blood Culture, Routine No Growth to date    Narrative:      Aerobic and anaerobic    Blood culture x two cultures. Draw prior to antibiotics. [[id number]] Collected: 03/03/24 0903    Order Status: Completed Specimen: Blood Updated: 03/03/24 1758     Blood Culture, Routine No Growth to date    Narrative:      Aerobic and anaerobic    Blood culture [[id number]]     Order Status: Canceled Specimen: Blood     Blood culture [[id number]]     Order Status: Canceled Specimen: Blood             MOST RECENT IMAGING  Echo Saline Bubble? No    Left Ventricle: The left ventricle is normal in size. Mildly increased   wall thickness. There is mild concentric hypertrophy. Mild global   hypokinesis present. There is mildly reduced systolic function with a   visually estimated ejection fraction of 45 - 50%. There is normal   diastolic function.    Right Ventricle: Normal right ventricular cavity size. Wall thickness   is normal. Right ventricle wall motion  is normal. Systolic function is   normal.    Left Atrium: Left atrium is moderately dilated.    Aortic Valve: The aortic valve is a trileaflet valve. There is moderate   aortic valve sclerosis. There is mild annular calcification present.   Moderately restricted motion. There is moderate to severe  stenosis. Aortic   valve area by VTI is 0.99 cm². Aortic valve peak velocity is 2.40 m/s.   Mean gradient is 13 mmHg. The dimensionless index is 0.31.    Tricuspid Valve: The tricuspid valve is structurally normal. There is   normal leaflet mobility. There is mild regurgitation with a centrally   directed jet.    Pulmonary Artery: There is mild to moderate pulmonary hypertension. The   estimated pulmonary artery systolic pressure is 45 mmHg.    IVC/SVC: Intermediate venous pressure at 8 mmHg.    Pericardium: There is a trivial circumferential effusion. Pericardial   effusion is echolucent. No indication of cardiac tamponade.  X-Ray Chest AP Portable  Portable chest x-ray at 4:40 AM is compared to prior study 3/3/2024    Clinical history is edema    The heart is enlarged. There is improvement of the interstitial and groundglass opacities compatible with pulmonary edema. There are no confluent infiltrates or pleural effusions. There are no acute osseous abnormalities.    IMPRESSION: Improvement of the pulmonary edema    Electronically signed by:  Norah Thomas MD  03/04/2024 07:55 AM CST Workstation: [id number]      ECHOCARDIOGRAM RESULTS (last 5)  Results for orders placed during the hospital encounter of 10/06/23    Echo Saline Bubble? No    Interpretation Summary    Left Ventricle: The left ventricle is normal in size. Mildly increased wall thickness. There is mild concentric hypertrophy. Normal wall motion. There is normal systolic function. Ejection fraction by visual approximation is 53%. There is normal diastolic function.    Left Atrium: Left atrium is mildly dilated.    Right Ventricle: Normal right ventricular cavity size. Wall thickness is normal. Right ventricle wall motion  is normal. Systolic function is normal.    Aortic Valve: The aortic valve is a trileaflet valve. There is moderate aortic valve sclerosis. Mildly calcified cusps. Mildly restricted motion. There is moderate stenosis. Aortic valve  area by VTI is 1.39 cm². Aortic valve peak velocity is 2.67 m/s. Mean gradient is 16 mmHg. The dimensionless index is 0.44.    Mitral Valve: There is mild regurgitation with a centrally directed jet.    Tricuspid Valve: There is mild regurgitation.    IVC/SVC: Normal venous pressure at 3 mmHg.    The estimated pulmonary artery systolic pressure is 28 mmHg.      Results for orders placed during the hospital encounter of 04/11/22    Echo Saline Bubble? No    Interpretation Summary  · The left ventricle is normal in size with concentric remodeling and normal systolic function.  · The estimated ejection fraction is 55%.  · Normal left ventricular diastolic function.  · Moderate left atrial enlargement.  · There is mild aortic valve stenosis.  · Aortic valve area is 1.50 cm2; peak velocity is 2.68 m/s; mean gradient is 15 mmHg.      CURRENT/PREVIOUS VISIT EKG  Results for orders placed or performed during the hospital encounter of 03/03/24   EKG 12-lead    Collection Time: 03/03/24  2:53 PM   Result Value Ref Range    QRS Duration 106 ms    [facility] QTC Calculation 514 ms    Narrative    Test Reason : I21.4,    Vent. Rate : 058 BPM     Atrial Rate : 000 BPM     P-R Int : 000 ms          QRS Dur : 106 ms      QT Int : 524 ms       P-R-T Axes : 000 -15 205 degrees     QTc Int : 514 ms    Atrial fibrillation with slow ventricular response  LVH with repolarization abnormality ( Art product )  Prolonged QT  Abnormal ECG  When compared with ECG of 03-MAR-2024 12:02,  Atrial fibrillation has replaced Sinus rhythm    Referred By: [email or web address]   SELF           Confirmed By:            ASSESSMENT/PLAN:     Active Hospital Problems    Diagnosis    *Acute hypoxemic respiratory failure    ACP (advance care planning)     -I spoke with the patient's daughters Mrs. Duran and Mrs. Ramos.  They were updated on the patient's condition.  Their questions and concerns were addressed.  They request that the patient be a full code status.       Aortic stenosis    Hyperlipidemia    Stage 3b chronic kidney disease    Uncontrolled type 2 diabetes mellitus with hyperglycemia    SSS (sick sinus syndrome)    Essential hypertension, benign       ASSESSMENT & PLAN:   Acute hypoxemic respiratory failure  Acute heart failure  Hypertensive emergency  Aortic stenosis  NSTEMI  BRIDGETTE on CKD  HTN   DM2    RECOMMENDATIONS:    Will increase diuresis to Lasix 40 mg IV t.i.d..  She is about 2.3 L negative thus far.  Renal function slightly improved overnight.  Nephrology consulted.  Continue strict I&O.  Daily weights.  Troponin levels have begun to trend downward.  Consideration for angiogram versus stress test as the patient improves.  Will need to monitor her renal function closely.    Mary White NP  Formerly [facility]  Department of Cardiology  Date of Service: 03/04/2024

## 2024-03-04 NOTE — PROGRESS NOTES
[facility]  Progress Note  Pulmonary/Critical Care      PATIENT NAME: Cynthia N Cushing  MRN: [id number]  TODAY'S DATE: 2024  12:07 PM  ADMIT DATE: 3/3/2024  AGE: 78 y.o. : 1945    HPI/INTERVAL HISTORY (See H&P for complete P,F,SHx) :   Cynthia N Cushing is a 78 y.o. female with a PMH of moderate aortic stenosis, HFpEF, SVT, sick sinus syndrome, HTN, DM2, CKD 3, and LAUREN on whom we have been consulted for acute hypoxic respiratory failure secondary to hypertensive emergency and flash pulmonary edema.     The patient called her daughter complaining of dyspnea this morning; no chest pain. Went to ED. She had an SBP up to 254. She was hypoxic and in severe respiratory distress, so she was intubated. Lasix was given. BP was controlled with nitroglycerin drip. Oxygenation improved rapidly.    Today:     She is doing well this morning, on nasal cannula, she is not in any distress. No chest pain.     Review of Systems   Constitutional:  Negative for appetite change, chills, fever and unexpected weight change.   HENT:  Negative for nosebleeds, postnasal drip, trouble swallowing and voice change.    Eyes:  Negative for visual disturbance.   Respiratory:  Positive for shortness of breath. Negative for cough and wheezing.    Cardiovascular:  Positive for leg swelling. Negative for chest pain.   Gastrointestinal:  Negative for diarrhea, nausea and vomiting.   Endocrine: Negative for cold intolerance and heat intolerance.   Genitourinary:  Negative for dysuria, flank pain and frequency.   Musculoskeletal:  Negative for neck pain and neck stiffness.   Allergic/Immunologic: Negative for environmental allergies and immunocompromised state.   Neurological:  Negative for syncope, speech difficulty and weakness.   Hematological:  Negative for adenopathy.   Psychiatric/Behavioral:  Negative for confusion.            VITAL SIGNS (MOST RECENT)  Temp: 97.9 °F (36.6 °C) (24 0701)  Pulse: 70 (24  1000)  Resp: 14 (03/04/24 1000)  BP: 138/61 (03/04/24 1000)  SpO2: 95 % (03/04/24 1000)    INTAKE AND OUTPUT (LAST 24 HOURS):  Intake/Output Summary (Last 24 hours) at 3/4/2024 1207  Last data filed at 3/4/2024 0901  Gross per 24 hour   Intake 1016.37 ml   Output 3120 ml   Net -2103.63 ml       WEIGHT  Wt Readings from Last 1 Encounters:   03/03/24 75.9 kg (167 lb 5.3 oz)       Physical Exam  Vitals reviewed.   Constitutional:       General: She is not in acute distress.     Appearance: She is well-developed. She is obese. She is ill-appearing. She is not diaphoretic.   HENT:      Head: Normocephalic and atraumatic.      Mouth/Throat:      Pharynx: No oropharyngeal exudate or posterior oropharyngeal erythema.   Eyes:      General: No scleral icterus.     Pupils: Pupils are equal, round, and reactive to light.   Neck:      Vascular: No JVD.   Cardiovascular:      Rate and Rhythm: Normal rate and regular rhythm.      Heart sounds: Normal heart sounds. No murmur heard.  Pulmonary:      Effort: Pulmonary effort is normal. No respiratory distress.      Breath sounds: Rales present. No wheezing.   Abdominal:      General: Bowel sounds are normal. There is distension.      Palpations: Abdomen is soft.      Tenderness: There is no abdominal tenderness.   Musculoskeletal:         General: Swelling present.      Cervical back: Normal range of motion and neck supple. No rigidity.      Right lower leg: Edema present.      Left lower leg: Edema present.   Skin:     General: Skin is warm and dry.      Capillary Refill: Capillary refill takes less than 2 seconds.      Coloration: Skin is not pale.      Findings: No rash.   Neurological:      General: No focal deficit present.      Mental Status: She is alert and oriented to person, place, and time.      Cranial Nerves: No cranial nerve deficit.      Motor: No weakness or abnormal muscle tone.           VENTILATOR SETTINGS  Vent Mode: Spont  Oxygen Concentration (%):  [30-40]  "30  Resp Rate Total:  [15 br/min-25 br/min] 17 br/min  Vt Set:  [370 mL] 370 mL  PEEP/CPAP:  [5 cmH20] 5 cmH20  Pressure Support:  [10 cmH20] 10 cmH20  Mean Airway Pressure:  [8.3 zvX97-66 cmH20] 8.7 cmH20           LAST ARTERIAL BLOOD GAS  ABG  Recent Labs   Lab 03/04/24  0452   PH 7.471*   PO2 80   PCO2 35.2   HCO3 25.7   BE 2       ACUTE PHASE REACTANT (LAST 24 HOURS)  No results for input(s): "FERRITIN", "CRP", "LDH", "DDIMER" in the last 24 hours.    CBC LAST (LAST 24 HOURS)  Recent Labs   Lab 03/04/24 0455   WBC 13.19*  13.19*   RBC 3.95*  3.95*   HGB 10.0*  10.0*   HCT 31.6*  31.6*   MCV 80*  80*   MCH 25.3*  25.3*   MCHC 31.6*  31.6*   RDW 16.8*  16.8*     297   MPV 11.2  11.2   GRAN 72.6  72.6  9.6*  9.6*   LYMPH 15.0*  15.0*  2.0  2.0   MONO 9.2  9.2  1.2*  1.2*   BASO 0.08  0.08   NRBC 0  0       CHEMISTRY LAST (LAST 24 HOURS)  Recent Labs   Lab 03/04/24 0452 03/04/24  0455   NA  --  142   K  --  3.8   CL  --  107   CO2  --  24   ANIONGAP  --  11   BUN  --  23   CREATININE  --  1.8*   GLU  --  122*   CALCIUM  --  9.0   PH 7.471*  --    MG  --  2.1   ALBUMIN  --  3.4*   PROT  --  6.3   ALKPHOS  --  69   ALT  --  23   AST  --  28   BILITOT  --  0.4       COAGULATION LAST (LAST 24 HOURS)  Recent Labs   Lab 03/04/24  0455   APTT 36.2*       CARDIAC PROFILE (LAST 24 HOURS)  Recent Labs   Lab 03/03/24  0827 03/03/24  1225 03/04/24  0455   *  --   --    TROPONINIHS 467.9*   < > 1178.2*    < > = values in this interval not displayed.       LAST 7 DAYS MICROBIOLOGY   Microbiology Results (last 7 days)       Procedure Component Value Units Date/Time    Blood culture x two cultures. Draw prior to antibiotics. [[id number]] Collected: 03/03/24 0903    Order Status: Completed Specimen: Blood Updated: 03/03/24 1758     Blood Culture, Routine No Growth to date    Narrative:      Aerobic and anaerobic    Blood culture x two cultures. Draw prior to antibiotics. [[id number]] Collected: " 03/03/24 0903    Order Status: Completed Specimen: Blood Updated: 03/03/24 1758     Blood Culture, Routine No Growth to date    Narrative:      Aerobic and anaerobic    Blood culture [[id number]]     Order Status: Canceled Specimen: Blood     Blood culture [[id number]]     Order Status: Canceled Specimen: Blood             MOST RECENT IMAGING  Echo Saline Bubble? No    Left Ventricle: The left ventricle is normal in size. Mildly increased   wall thickness. There is mild concentric hypertrophy. Mild global   hypokinesis present. There is mildly reduced systolic function with a   visually estimated ejection fraction of 45 - 50%. There is normal   diastolic function.    Right Ventricle: Normal right ventricular cavity size. Wall thickness   is normal. Right ventricle wall motion  is normal. Systolic function is   normal.    Left Atrium: Left atrium is moderately dilated.    Aortic Valve: The aortic valve is a trileaflet valve. There is moderate   aortic valve sclerosis. There is mild annular calcification present.   Moderately restricted motion. There is moderate to severe stenosis. Aortic   valve area by VTI is 0.99 cm². Aortic valve peak velocity is 2.40 m/s.   Mean gradient is 13 mmHg. The dimensionless index is 0.31.    Tricuspid Valve: The tricuspid valve is structurally normal. There is   normal leaflet mobility. There is mild regurgitation with a centrally   directed jet.    Pulmonary Artery: There is mild to moderate pulmonary hypertension. The   estimated pulmonary artery systolic pressure is 45 mmHg.    IVC/SVC: Intermediate venous pressure at 8 mmHg.    Pericardium: There is a trivial circumferential effusion. Pericardial   effusion is echolucent. No indication of cardiac tamponade.  X-Ray Chest AP Portable  Portable chest x-ray at 4:40 AM is compared to prior study 3/3/2024    Clinical history is edema    The heart is enlarged. There is improvement of the interstitial and groundglass opacities compatible  with pulmonary edema. There are no confluent infiltrates or pleural effusions. There are no acute osseous abnormalities.    IMPRESSION: Improvement of the pulmonary edema    Electronically signed by:  Norah Thomas MD  03/04/2024 07:55 AM [facility] Workstation: [id number]      CURRENT VISIT EKG  Results for orders placed or performed during the hospital encounter of 03/03/24   EKG 12-lead   Result Value Ref Range    QRS Duration 104 ms    [facility] QTC Calculation 525 ms    Narrative    Test Reason : R00.1,    Vent. Rate : 061 BPM     Atrial Rate : 061 BPM     P-R Int : 162 ms          QRS Dur : 104 ms      QT Int : 522 ms       P-R-T Axes : -29 -15 210 degrees     QTc Int : 525 ms    Sinus rhythm with occasional Premature ventricular complexes  LVH with repolarization abnormality ( Art product )  Prolonged QT  Abnormal ECG  When compared with ECG of 03-MAR-2024 12:02,  Previous ECG has undetermined rhythm, needs review    Referred By: [email or web address]   SELF           Confirmed By:        ECHOCARDIOGRAM RESULTS  Results for orders placed during the hospital encounter of 03/03/24    Echo Saline Bubble? No    Interpretation Summary    Left Ventricle: The left ventricle is normal in size. Mildly increased wall thickness. There is mild concentric hypertrophy. Mild global hypokinesis present. There is mildly reduced systolic function with a visually estimated ejection fraction of 45 - 50%. There is normal diastolic function.    Right Ventricle: Normal right ventricular cavity size. Wall thickness is normal. Right ventricle wall motion  is normal. Systolic function is normal.    Left Atrium: Left atrium is moderately dilated.    Aortic Valve: The aortic valve is a trileaflet valve. There is moderate aortic valve sclerosis. There is mild annular calcification present. Moderately restricted motion. There is moderate to severe stenosis. Aortic valve area by VTI is 0.99 cm². Aortic valve peak velocity is 2.40 m/s. Mean gradient is 13  mmHg. The dimensionless index is 0.31.    Tricuspid Valve: The tricuspid valve is structurally normal. There is normal leaflet mobility. There is mild regurgitation with a centrally directed jet.    Pulmonary Artery: There is mild to moderate pulmonary hypertension. The estimated pulmonary artery systolic pressure is 45 mmHg.    IVC/SVC: Intermediate venous pressure at 8 mmHg.    Pericardium: There is a trivial circumferential effusion. Pericardial effusion is echolucent. No indication of cardiac tamponade.      ASSESSMENT:   #Hypertensive emergency  #Flash pulmonary edema  #NSTEMI  #Acute hypoxic respiratory failure requiring intubation  #Acutely decompensated valvular heart failure (AS)  #Lactic acidosis likely due to CHF  #BRIDGETTE on CKD 3, likely cardiorenal    Ms Cushing is a 78-year-old woman with a history of valvular heart disease due to aortic stenosis, and underlying biventricular heart failure due to chronic diastolic dysfunction, and chronic medical conditions including hypertension diabetes CKD 3 and obesity who presents with acute respiratory failure in the setting of hypertensive emergency.    She is appearing improved.  She was apparently extubated overnight.  She is much better blood pressure control, current blood pressures are ranging from 170-180.  Her presenting blood pressure was much greater than 250.  She has had other evidence of end-organ damage related to hypertensive emergency including troponin elevation, acute kidney injury, flash pulmonary edema.  She has some mild confusion, but she is alert awake and able to follow commands.  Chest x-ray indicates pulmonary edema is much improved, she still has some mild interstitial edema    PLAN:   - okay for step-down from ICU  - recommend to start home medications, added amlodipine and hydralazine.  She was started on Lopressor, can continue this or restart her home labetalol  - continue heparin drip, cardiology consultation pending, no need to  continue trending troponins, they appear to have peaked and are now downtrending  - continue oral Pepcid for reflux    Samir Cage MD  Date of Service: 03/04/2024  12:07 PM    Upon my evaluation, this patient had a high probability of imminent or life-threatening deterioration, which required my direct attention, intervention, and personal management.    I have personally provided at least 35 minutes of critical care time exclusive of time spent on separately billable procedures. Over 50% of the time of this encounter was spent in direct care at the bedside. Time includes review of laboratory data, radiology results, discussion with consultants, and monitoring for potential decompensation. Interventions were performed as documented above.

## 2024-03-04 NOTE — CONSULTS
Attempted to educate pt today on her uncontrolled diabetes and she had just gotten her meal and it was about 3 pm and she wanted me to  leave the information and come back and go over it with her after she ate so her dinner would not get cold. I left the nutritional handouts and will return to see her on Wednesday to go over it with her.

## 2024-03-04 NOTE — NURSING
Nurses Note -- 4 Eyes      3/4/2024   9:20 AM      Skin assessed during: Transfer      [x] No Altered Skin Integrity Present    [x]Prevention Measures Documented      [] Yes- Altered Skin Integrity Present or Discovered   [] LDA Added if Not in Epic (Describe Wound)   [] New Altered Skin Integrity was Present on Admit and Documented in LDA   [] Wound Image Taken    Wound Care Consulted? No    Attending Nurse:  Jessica Jefferson RN/Staff Member:   Sara DAVIS

## 2024-03-04 NOTE — PLAN OF CARE
03/03/24 1924   Patient Assessment/Suction   Level of Consciousness (AVPU) responds to pain   Respiratory Effort Unlabored   Expansion/Accessory Muscles/Retractions expansion symmetric   All Lung Fields Breath Sounds coarse   Rhythm/Pattern, Respiratory assisted mechanically   Cough Frequency with stimulation   Cough Type assisted   Suction Method tracheal   $ Suction Charges Inline Suction Procedure Stat Charge   Secretions Amount small   Secretions Color white   Secretions Characteristics thin   PRE-TX-O2   Device (Oxygen Therapy) ventilator   $ Is the patient on Low Flow Oxygen? Yes   Oxygen Concentration (%) 30   SpO2 100 %   Pulse Oximetry Type Continuous   $ Pulse Oximetry - Multiple Charge Pulse Oximetry - Multiple   Pulse (!) 57   Resp 20        Airway - Non-Surgical 03/03/24 0830   Placement Date/Time: 03/03/24 0830   Inserted by: MD  Secured at: Lips   Secured at 21 cm   Measured At Lips   Secured Location Right   Secured by Commercial tube ty   Position of ETT in xRay In good position   Bite Block secure and patent   Site Condition Cool;Dry   Status Intact;Secured;Patent   Site Assessment Clean;Dry;No bleeding   Cuff Pressure 24 cm H2O   Vent Select   Charged w/in last 24h YES   Preset Conventional Ventilator Settings   Vent ID [id number]   Vent Type    Ventilation Type VC   Vent Mode A/C   Humidity HME   Set Rate 18 BPM   Vt Set 370 mL   PEEP/CPAP 5 cmH20   Peak Flow 70 L/min   Peak End Inspiratory Pressure 16 cmH20   I-Trigger Type  V-TRIG   Trigger Sensitivity Flow/I-Trigger 3 L/min   Patient Ventilator Parameters   Resp Rate Total 19 br/min   Peak Airway Pressure 26 cmH20   Mean Airway Pressure 8.6 cmH20   Plateau Pressure 0 cmH20   Exhaled Vt 391 mL   Total Ve 7.9 L/m   I:E Ratio Measured 1:4.80   Auto PEEP 0 cmH20   Conventional Ventilator Alarms   Alarms On Y   Resp Rate High Alarm 45 br/min   Press High Alarm 60 cmH2O   Apnea Rate 28   Apnea Volume (mL) 0 mL   Apnea Oxygen Concentration  100    Apnea Flow Rate (L/min) 60   T Apnea 20 sec(s)   IHI Ventilator Associated Pneumonia Bundle (Required)   Head of Bed Elevated  HOB 45   Oral Care Teeth brushed;Lip moisturizer applied;Mouth swabbed;Mouth moisturizer;Mouth suctioned;Oral suctioning prior to turn;Suction toothette;Suction toothette toothbrush;with mouthwash   Vent Circut Breaks Minimized Yes   Ready to Wean/Extubation Screen   FIO2<=50 (chart decimal) 0.3   MV<16L (chart vol.) 7.9   PEEP <=8 (chart #) 5   Ready to Wean Parameters   F/VT Ratio<105 (RSBI) (!) 51.15   Vital Capacity   Vital Capacity (mL) 0   Education   $ Education Ventilator Oxygen;15 min

## 2024-03-05 PROBLEM — I21.4 NSTEMI (NON-ST ELEVATED MYOCARDIAL INFARCTION): Status: ACTIVE | Noted: 2024-03-05

## 2024-03-05 PROBLEM — I50.33 ACUTE ON CHRONIC HEART FAILURE WITH PRESERVED EJECTION FRACTION (HFPEF): Status: ACTIVE | Noted: 2024-03-05

## 2024-03-05 LAB
ALBUMIN SERPL BCP-MCNC: 3.3 G/DL (ref 3.5–5.2)
ALP SERPL-CCNC: 66 U/L (ref 55–135)
ALT SERPL W/O P-5'-P-CCNC: 20 U/L (ref 10–44)
ANION GAP SERPL CALC-SCNC: 12 MMOL/L (ref 8–16)
APTT PPP: 42.6 SEC (ref 21–32)
AST SERPL-CCNC: 22 U/L (ref 10–40)
BASOPHILS # BLD AUTO: 0.09 K/UL (ref 0–0.2)
BASOPHILS # BLD AUTO: 0.09 K/UL (ref 0–0.2)
BASOPHILS NFR BLD: 0.8 % (ref 0–1.9)
BASOPHILS NFR BLD: 0.8 % (ref 0–1.9)
BILIRUB SERPL-MCNC: 0.6 MG/DL (ref 0.1–1)
BUN SERPL-MCNC: 25 MG/DL (ref 8–23)
CALCIUM SERPL-MCNC: 8.6 MG/DL (ref 8.7–10.5)
CHLORIDE SERPL-SCNC: 100 MMOL/L (ref 95–110)
CO2 SERPL-SCNC: 26 MMOL/L (ref 23–29)
CREAT SERPL-MCNC: 2.1 MG/DL (ref 0.5–1.4)
DIFFERENTIAL METHOD BLD: ABNORMAL
DIFFERENTIAL METHOD BLD: ABNORMAL
EOSINOPHIL # BLD AUTO: 0.3 K/UL (ref 0–0.5)
EOSINOPHIL # BLD AUTO: 0.3 K/UL (ref 0–0.5)
EOSINOPHIL NFR BLD: 2.7 % (ref 0–8)
EOSINOPHIL NFR BLD: 2.7 % (ref 0–8)
ERYTHROCYTE [DISTWIDTH] IN BLOOD BY AUTOMATED COUNT: 16.6 % (ref 11.5–14.5)
ERYTHROCYTE [DISTWIDTH] IN BLOOD BY AUTOMATED COUNT: 16.6 % (ref 11.5–14.5)
EST. GFR  (NO RACE VARIABLE): 23.7 ML/MIN/1.73 M^2
GLUCOSE SERPL-MCNC: 133 MG/DL (ref 70–110)
GLUCOSE SERPL-MCNC: 140 MG/DL (ref 70–110)
GLUCOSE SERPL-MCNC: 160 MG/DL (ref 70–110)
GLUCOSE SERPL-MCNC: 170 MG/DL (ref 70–110)
HCT VFR BLD AUTO: 30.4 % (ref 37–48.5)
HCT VFR BLD AUTO: 30.4 % (ref 37–48.5)
HGB BLD-MCNC: 9.6 G/DL (ref 12–16)
HGB BLD-MCNC: 9.6 G/DL (ref 12–16)
IMM GRANULOCYTES # BLD AUTO: 0.04 K/UL (ref 0–0.04)
IMM GRANULOCYTES # BLD AUTO: 0.04 K/UL (ref 0–0.04)
IMM GRANULOCYTES NFR BLD AUTO: 0.4 % (ref 0–0.5)
IMM GRANULOCYTES NFR BLD AUTO: 0.4 % (ref 0–0.5)
LYMPHOCYTES # BLD AUTO: 1.6 K/UL (ref 1–4.8)
LYMPHOCYTES # BLD AUTO: 1.6 K/UL (ref 1–4.8)
LYMPHOCYTES NFR BLD: 14.4 % (ref 18–48)
LYMPHOCYTES NFR BLD: 14.4 % (ref 18–48)
MAGNESIUM SERPL-MCNC: 1.8 MG/DL (ref 1.6–2.6)
MCH RBC QN AUTO: 25.7 PG (ref 27–31)
MCH RBC QN AUTO: 25.7 PG (ref 27–31)
MCHC RBC AUTO-ENTMCNC: 31.6 G/DL (ref 32–36)
MCHC RBC AUTO-ENTMCNC: 31.6 G/DL (ref 32–36)
MCV RBC AUTO: 81 FL (ref 82–98)
MCV RBC AUTO: 81 FL (ref 82–98)
MONOCYTES # BLD AUTO: 1.2 K/UL (ref 0.3–1)
MONOCYTES # BLD AUTO: 1.2 K/UL (ref 0.3–1)
MONOCYTES NFR BLD: 10.6 % (ref 4–15)
MONOCYTES NFR BLD: 10.6 % (ref 4–15)
NEUTROPHILS # BLD AUTO: 8.1 K/UL (ref 1.8–7.7)
NEUTROPHILS # BLD AUTO: 8.1 K/UL (ref 1.8–7.7)
NEUTROPHILS NFR BLD: 71.1 % (ref 38–73)
NEUTROPHILS NFR BLD: 71.1 % (ref 38–73)
NRBC BLD-RTO: 0 /100 WBC
NRBC BLD-RTO: 0 /100 WBC
PHOSPHATE SERPL-MCNC: 4.5 MG/DL (ref 2.7–4.5)
PLATELET # BLD AUTO: 276 K/UL (ref 150–450)
PLATELET # BLD AUTO: 276 K/UL (ref 150–450)
PMV BLD AUTO: 11.4 FL (ref 9.2–12.9)
PMV BLD AUTO: 11.4 FL (ref 9.2–12.9)
POTASSIUM SERPL-SCNC: 3.2 MMOL/L (ref 3.5–5.1)
PROT SERPL-MCNC: 6.2 G/DL (ref 6–8.4)
RBC # BLD AUTO: 3.74 M/UL (ref 4–5.4)
RBC # BLD AUTO: 3.74 M/UL (ref 4–5.4)
SODIUM SERPL-SCNC: 138 MMOL/L (ref 136–145)
TROPONIN I SERPL HS-MCNC: 1033.4 PG/ML (ref 0–14.9)
TROPONIN I SERPL HS-MCNC: 1033.4 PG/ML (ref 0–14.9)
TROPONIN I SERPL HS-MCNC: 638.6 PG/ML (ref 0–14.9)
WBC # BLD AUTO: 11.32 K/UL (ref 3.9–12.7)
WBC # BLD AUTO: 11.32 K/UL (ref 3.9–12.7)

## 2024-03-05 PROCEDURE — 25000003 PHARM REV CODE 250: Performed by: INTERNAL MEDICINE

## 2024-03-05 PROCEDURE — 99233 SBSQ HOSP IP/OBS HIGH 50: CPT | Mod: ,,, | Performed by: INTERNAL MEDICINE

## 2024-03-05 PROCEDURE — 63600175 PHARM REV CODE 636 W HCPCS: Performed by: INTERNAL MEDICINE

## 2024-03-05 PROCEDURE — 84484 ASSAY OF TROPONIN QUANT: CPT | Mod: 91 | Performed by: INTERNAL MEDICINE

## 2024-03-05 PROCEDURE — 94799 UNLISTED PULMONARY SVC/PX: CPT

## 2024-03-05 PROCEDURE — 80053 COMPREHEN METABOLIC PANEL: CPT | Performed by: INTERNAL MEDICINE

## 2024-03-05 PROCEDURE — 99900035 HC TECH TIME PER 15 MIN (STAT)

## 2024-03-05 PROCEDURE — 85730 THROMBOPLASTIN TIME PARTIAL: CPT | Performed by: EMERGENCY MEDICINE

## 2024-03-05 PROCEDURE — 27000221 HC OXYGEN, UP TO 24 HOURS

## 2024-03-05 PROCEDURE — 85025 COMPLETE CBC W/AUTO DIFF WBC: CPT | Performed by: EMERGENCY MEDICINE

## 2024-03-05 PROCEDURE — 63600175 PHARM REV CODE 636 W HCPCS: Performed by: EMERGENCY MEDICINE

## 2024-03-05 PROCEDURE — 99291 CRITICAL CARE FIRST HOUR: CPT | Mod: ,,, | Performed by: STUDENT IN AN ORGANIZED HEALTH CARE EDUCATION/TRAINING PROGRAM

## 2024-03-05 PROCEDURE — 25000003 PHARM REV CODE 250: Performed by: STUDENT IN AN ORGANIZED HEALTH CARE EDUCATION/TRAINING PROGRAM

## 2024-03-05 PROCEDURE — 83735 ASSAY OF MAGNESIUM: CPT | Performed by: INTERNAL MEDICINE

## 2024-03-05 PROCEDURE — 84100 ASSAY OF PHOSPHORUS: CPT | Performed by: INTERNAL MEDICINE

## 2024-03-05 PROCEDURE — 12000002 HC ACUTE/MED SURGE SEMI-PRIVATE ROOM

## 2024-03-05 PROCEDURE — 63600175 PHARM REV CODE 636 W HCPCS: Performed by: NURSE PRACTITIONER

## 2024-03-05 PROCEDURE — 84484 ASSAY OF TROPONIN QUANT: CPT | Performed by: INTERNAL MEDICINE

## 2024-03-05 PROCEDURE — 94761 N-INVAS EAR/PLS OXIMETRY MLT: CPT

## 2024-03-05 RX ORDER — HYDRALAZINE HYDROCHLORIDE 25 MG/1
50 TABLET, FILM COATED ORAL EVERY 8 HOURS
Status: DISCONTINUED | OUTPATIENT
Start: 2024-03-05 | End: 2024-03-10

## 2024-03-05 RX ORDER — ONDANSETRON HYDROCHLORIDE 2 MG/ML
4 INJECTION, SOLUTION INTRAVENOUS EVERY 8 HOURS PRN
Status: DISCONTINUED | OUTPATIENT
Start: 2024-03-05 | End: 2024-03-12 | Stop reason: HOSPADM

## 2024-03-05 RX ORDER — METOPROLOL TARTRATE 50 MG/1
50 TABLET ORAL 2 TIMES DAILY
Status: DISCONTINUED | OUTPATIENT
Start: 2024-03-05 | End: 2024-03-07

## 2024-03-05 RX ORDER — ONDANSETRON HYDROCHLORIDE 2 MG/ML
4 INJECTION, SOLUTION INTRAVENOUS EVERY 8 HOURS PRN
Status: DISCONTINUED | OUTPATIENT
Start: 2024-03-05 | End: 2024-03-05

## 2024-03-05 RX ORDER — FUROSEMIDE 10 MG/ML
40 INJECTION INTRAMUSCULAR; INTRAVENOUS DAILY
Status: DISCONTINUED | OUTPATIENT
Start: 2024-03-06 | End: 2024-03-06

## 2024-03-05 RX ORDER — ENOXAPARIN SODIUM 100 MG/ML
1 INJECTION SUBCUTANEOUS EVERY 24 HOURS
Status: DISCONTINUED | OUTPATIENT
Start: 2024-03-05 | End: 2024-03-07

## 2024-03-05 RX ORDER — POTASSIUM CHLORIDE 20 MEQ/1
20 TABLET, EXTENDED RELEASE ORAL 3 TIMES DAILY
Status: DISCONTINUED | OUTPATIENT
Start: 2024-03-05 | End: 2024-03-07

## 2024-03-05 RX ADMIN — HYDRALAZINE HYDROCHLORIDE 50 MG: 25 TABLET ORAL at 03:03

## 2024-03-05 RX ADMIN — POTASSIUM CHLORIDE 20 MEQ: 1500 TABLET, EXTENDED RELEASE ORAL at 09:03

## 2024-03-05 RX ADMIN — POTASSIUM CHLORIDE 20 MEQ: 1500 TABLET, EXTENDED RELEASE ORAL at 11:03

## 2024-03-05 RX ADMIN — POTASSIUM CHLORIDE 20 MEQ: 1500 TABLET, EXTENDED RELEASE ORAL at 03:03

## 2024-03-05 RX ADMIN — POTASSIUM BICARBONATE 35 MEQ: 977.5 TABLET, EFFERVESCENT ORAL at 08:03

## 2024-03-05 RX ADMIN — HEPARIN SODIUM AND DEXTROSE 11 UNITS/KG/HR: 10000; 5 INJECTION INTRAVENOUS at 06:03

## 2024-03-05 RX ADMIN — INSULIN DETEMIR 10 UNITS: 100 INJECTION, SOLUTION SUBCUTANEOUS at 08:03

## 2024-03-05 RX ADMIN — METOPROLOL TARTRATE 25 MG: 25 TABLET, FILM COATED ORAL at 08:03

## 2024-03-05 RX ADMIN — ACETAMINOPHEN 650 MG: 325 TABLET ORAL at 06:03

## 2024-03-05 RX ADMIN — FAMOTIDINE 20 MG: 20 TABLET ORAL at 08:03

## 2024-03-05 RX ADMIN — ENOXAPARIN SODIUM 80 MG: 80 INJECTION SUBCUTANEOUS at 03:03

## 2024-03-05 RX ADMIN — HYDRALAZINE HYDROCHLORIDE 50 MG: 25 TABLET ORAL at 09:03

## 2024-03-05 RX ADMIN — METOPROLOL TARTRATE 50 MG: 50 TABLET, FILM COATED ORAL at 09:03

## 2024-03-05 RX ADMIN — MAGNESIUM SULFATE HEPTAHYDRATE 2 G: 40 INJECTION, SOLUTION INTRAVENOUS at 08:03

## 2024-03-05 RX ADMIN — FUROSEMIDE 40 MG: 10 INJECTION, SOLUTION INTRAVENOUS at 06:03

## 2024-03-05 RX ADMIN — ONDANSETRON 4 MG: 2 INJECTION INTRAMUSCULAR; INTRAVENOUS at 06:03

## 2024-03-05 RX ADMIN — MUPIROCIN 1 G: 20 OINTMENT TOPICAL at 08:03

## 2024-03-05 RX ADMIN — MUPIROCIN 1 G: 20 OINTMENT TOPICAL at 09:03

## 2024-03-05 RX ADMIN — INSULIN DETEMIR 10 UNITS: 100 INJECTION, SOLUTION SUBCUTANEOUS at 09:03

## 2024-03-05 RX ADMIN — ASPIRIN 81 MG 81 MG: 81 TABLET ORAL at 08:03

## 2024-03-05 RX ADMIN — AMLODIPINE BESYLATE 10 MG: 5 TABLET ORAL at 08:03

## 2024-03-05 NOTE — SUBJECTIVE & OBJECTIVE
Interval History: Patient seen and examined. No events. Remains somewhat confused. Discussed with family      Objective:     Vital Signs (Most Recent):  Temp: 98.1 °F (36.7 °C) (03/04/24 1505)  Pulse: 74 (03/04/24 1600)  Resp: (!) 21 (03/04/24 1600)  BP: (!) 140/59 (03/04/24 1700)  SpO2: (!) 92 % (03/04/24 1600) Vital Signs (24h Range):  Temp:  [97.5 °F (36.4 °C)-98.1 °F (36.7 °C)] 98.1 °F (36.7 °C)  Pulse:  [46-80] 74  Resp:  [14-48] 21  SpO2:  [92 %-100 %] 92 %  BP: (106-200)/(53-86) 140/59  Arterial Line BP: (119-190)/() 150/64     Weight: 75.9 kg (167 lb 5.3 oz)  Body mass index is 29.64 kg/m².    Intake/Output Summary (Last 24 hours) at 3/4/2024 1922  Last data filed at 3/4/2024 1851  Gross per 24 hour   Intake 709.99 ml   Output 4275 ml   Net -3565.01 ml         Physical Exam  Vitals and nursing note reviewed.   Constitutional:       General: She is not in acute distress.  Eyes:      Pupils: Pupils are equal, round, and reactive to light.   Cardiovascular:      Rate and Rhythm: Normal rate and regular rhythm.      Heart sounds: No murmur heard.  Pulmonary:      Effort: Pulmonary effort is normal.      Breath sounds: Normal breath sounds.   Abdominal:      General: There is no distension.      Palpations: Abdomen is soft.      Tenderness: There is no abdominal tenderness.   Skin:     Coloration: Skin is not pale.      Findings: No rash.   Neurological:      Mental Status: She is alert. She is disoriented.             Significant Labs: All pertinent labs within the past 24 hours have been reviewed.    Significant Imaging: I have reviewed all pertinent imaging results/findings within the past 24 hours.

## 2024-03-05 NOTE — PROGRESS NOTES
[facility]  Department of Cardiology  Progress Note      PATIENT NAME: Cynthia N Cushing  MRN: [id number]  TODAY'S DATE: 03/05/2024  ADMIT DATE: 3/3/2024                          CONSULT REQUESTED BY: Alley Bass MD    SUBJECTIVE     PRINCIPAL PROBLEM: Acute hypoxemic respiratory failure    3/5/24:  Patient remained in ICU overnight. BP has been 150-160s. She reports minimal improvement in orthopnea when lying back but has put out a good bit of urine.     3/4/24:  Patient seen resting in bed in the ICU with no acute distress noted.  She remains hypertensive with blood pressure in the 160s to 170s and is currently on a heparin drip.  Patient reports that she is feeling anxious and restless from being in bed.  She is requesting to sit up on the side of the bed and has preferred to sit up in the bed as opposed to lying down.    REASON FOR CONSULT:  NSTEMI      HPI:  Chief Complaint   Patient presents with    Respiratory Distress         HPI: Mrs. Cushing is a pleasant 78-year-old female who has a past medical history of hypertension, diabetes, hyperlipidemia, sleep apnea, SVT, SSS and follow up in cardiology clinic with Dr. Mendieta with recent office visit, last echo October 6, 2023 with EF of 53% with normal diastolic dysfunction and moderate AS who presents to the emergency room today with complaints of increased shortness of breath that started last night, got progressively worse.  The patient was started on CPAP by EMS.  Patient's vital signs in the ER showed blood pressure initially as high as 250/130, tachypnea, .  The patient in the ER was in respiratory distress, she was intubated to protect her airway.  Patient's laboratories and x-rays done.  Patient's case was discussed with the ER physician at the time of admission.  Laboratories showed troponin 467.9, COVID 19 screen negative, lactic acid 2.7, magnesium 2.0, calcitonin 0.098, sodium 140, potassium 3.6, chloride 109, CO2 18,  BUN 22, creatinine 2.0, glucose 336, AST 25, anion gap normal at 13, PT INR 0.9, WBCs 14.8, hemoglobin 11, platelet count 416.  ABG show pH 7.25/pCO2 45.9/PO2 255/bicarbonate 20.5/oxygen saturation 100% this was done on ventilator.  Chest x-ray showed changes of pulmonary edema with ETT in place.  The patient was recommended to be admitted to the ICU for acute respiratory failure with hypoxia, hypertensive emergency, possible sepsis with lactic acid level 2.7.  Patient was given Lasix 40 mg IV in the ER, started on nitroglycerin drip.  Patient started on CHF order set, sepsis order set, no fluid bolus given due to concerns of pulmonary edema and risk of fluid overload, IV antibiotics, blood cultures, cardiology consult, and Pulmonary critical Care consult.  Patient is a full code status.      Review of patient's allergies indicates:   Allergen Reactions    Biaxin [clarithromycin]     Prandin [repaglinide] Nausea And Vomiting    Amlodipine     Chlorphen-phenyltolox-pe-ppa     Ciprofloxacin Nausea And Vomiting    Omnicef [cefdinir]     Propoxyphene     Quinine Nausea And Vomiting       Past Medical History:   Diagnosis Date    Allergy     Breast mass     Cataract     Diabetes mellitus     GERD (gastroesophageal reflux disease)     Hernia, hiatal     Hyperlipidemia     Hypertension     Kidney stone     Osteoarthritis     Renal insufficiency     Seasonal allergies     Sleep apnea     SSS (sick sinus syndrome)     SVT (supraventricular tachycardia)      Past Surgical History:   Procedure Laterality Date    BREAST BIOPSY      BREAST CYST ASPIRATION      BREAST SURGERY      CERVICAL DISC SURGERY      EYE SURGERY      cataracts bilateral    HYSTERECTOMY       Social History     Tobacco Use    Smoking status: Never    Smokeless tobacco: Never   Substance Use Topics    Alcohol use: No    Drug use: No        REVIEW OF SYSTEMS  Intubated/sedated    OBJECTIVE     VITAL SIGNS (Most Recent)  Temp: 98.2 °F (36.8 °C) (03/05/24  0701)  Pulse: 71 (03/05/24 0901)  Resp: 14 (03/05/24 0901)  BP: (!) 158/81 (03/05/24 0901)  SpO2: 98 % (03/05/24 0901)    VENTILATION STATUS  Resp: 14 (03/05/24 0901)  SpO2: 98 % (03/05/24 0901)           I & O (Last 24H):  Intake/Output Summary (Last 24 hours) at 3/5/2024 1120  Last data filed at 3/5/2024 0800  Gross per 24 hour   Intake 445.66 ml   Output 2025 ml   Net -1579.34 ml         WEIGHTS  Wt Readings from Last 3 Encounters:   03/03/24 1115 75.9 kg (167 lb 5.3 oz)   03/03/24 0813 76.7 kg (169 lb 1.5 oz)   02/02/24 1311 78.3 kg (172 lb 8.2 oz)   12/12/23 0949 78.5 kg (173 lb)       PHYSICAL EXAM /65 NIBP, 146/63 per art line; NSR on telemetry    GENERAL: critically ill elderly female breathing per vent in no apparent distress.  HEENT: Normocephalic.    NECK: No JVD.   CARDIAC: Regular rate and rhythm. S1 is normal.S2 is normal.No gallops, clicks or murmurs noted at this time.  CHEST ANATOMY: normal.   LUNGS: +Crackles; .   ABDOMEN: Soft, non distended, hypoactive BS.    EXTREMITIES: No edema  CENTRAL NERVOUS SYSTEM: sedated/intubated  SKIN: No rash   Linares draining clear yellow urine  HOME MEDICATIONS:  No current facility-administered medications on file prior to encounter.     Current Outpatient Medications on File Prior to Encounter   Medication Sig Dispense Refill    famotidine (PEPCID) 20 MG tablet TAKE 1 TABLET TWICE DAILY (Patient taking differently: Take 20 mg by mouth 2 (two) times daily.) 180 tablet 2    furosemide (LASIX) 20 MG tablet TAKE 1 TABLET(20 MG) BY MOUTH EVERY DAY (Patient taking differently: Take 20 mg by mouth once daily.) 30 tablet 0    guanFACINE (TENEX) 2 MG tablet TAKE 1 TABLET EVERY EVENING (Patient taking differently: Take 2 mg by mouth nightly.) 90 tablet 3    hydrALAZINE (APRESOLINE) 50 MG tablet Take 1 tablet (50 mg total) by mouth 3 (three) times daily. 90 tablet 11    labetaloL (NORMODYNE) 100 MG tablet Take 1 tablet (100 mg total) by mouth 2 (two) times daily. 60  "tablet 11    LANTUS SOLOSTAR U-100 INSULIN glargine 100 units/mL SubQ pen ADMINISTER 51 UNITS UNDER THE SKIN AT NIGHT (Patient taking differently: Inject 51 Units into the skin every evening. ADMINISTER 51 UNITS UNDER THE SKIN AT NIGHT) 15 mL 5    olmesartan (BENICAR) 40 MG tablet TAKE 1 TABLET EVERY DAY (Patient taking differently: Take 40 mg by mouth once daily.) 90 tablet 3    rosuvastatin (CRESTOR) 10 MG tablet Take 1 tablet (10 mg total) by mouth every evening. 90 tablet 2    ACCU-CHEK AMADOR PLUS TEST STRP Strp TEST BLOOD SUGAR FOUR TIMES DAILY 400 strip 10    amLODIPine (NORVASC) 10 MG tablet Take 1 tablet (10 mg total) by mouth once daily. 30 tablet 11    aspirin 81 MG Chew Take 81 mg by mouth once daily.      lancets (ACCU-CHEK SOFTCLIX LANCETS) Misc TEST BLOOD SUGAR FOUR TIMES DAILY 400 each 3    pen needle, diabetic (BD ANNA 2ND GEN PEN NEEDLE) 32 gauge x 5/32" Ndle INJECT 1 NEEDLE INTO THE SKIN EVERY EVENING 100 each 5       SCHEDULED MEDS:   amLODIPine  10 mg Oral Daily    aspirin  81 mg Oral Daily    famotidine  20 mg Oral Daily    furosemide (LASIX) injection  40 mg Intravenous Q8H    insulin detemir U-100  10 Units Subcutaneous BID    metoprolol tartrate  25 mg Per OG tube BID    mupirocin   Nasal BID    potassium chloride  20 mEq Oral TID       CONTINUOUS INFUSIONS:   heparin (porcine) in D5W 11 Units/kg/hr (24 0602)       PRN MEDS:acetaminophen, dextrose 50% in water (D50W), dextrose 50% in water (D50W), [] fentaNYL **FOLLOWED BY** fentaNYL, glucagon (human recombinant), glucose, glucose, heparin (PORCINE), heparin (PORCINE), hydrALAZINE, insulin aspart U-100, magnesium sulfate IVPB, magnesium sulfate IVPB, potassium bicarbonate, potassium bicarbonate, potassium bicarbonate, sodium chloride 0.9%    LABS AND DIAGNOSTICS     CBC LAST 3 DAYS  Recent Labs   Lab 24  1013 24  1253 24  0452 24  0455 24  0239   WBC 15.20*  --   --  13.19*  13.19* 11.32  11.32 "   RBC 3.15*  --   --  3.95*  3.95* 3.74*  3.74*   HGB 8.2*  --   --  10.0*  10.0* 9.6*  9.6*   HCT 28.7*   < > 32* 31.6*  31.6* 30.4*  30.4*   MCV 91  --   --  80*  80* 81*  81*   MCH 26.0*  --   --  25.3*  25.3* 25.7*  25.7*   MCHC 28.6*  --   --  31.6*  31.6* 31.6*  31.6*   RDW 17.5*  --   --  16.8*  16.8* 16.6*  16.6*     --   --  297  297 276  276   MPV 11.9  --   --  11.2  11.2 11.4  11.4   GRAN 82.9*  12.6*  --   --  72.6  72.6  9.6*  9.6* 71.1  71.1  8.1*  8.1*   LYMPH 8.3*  1.3  --   --  15.0*  15.0*  2.0  2.0 14.4*  14.4*  1.6  1.6   MONO 6.6  1.0  --   --  9.2  9.2  1.2*  1.2* 10.6  10.6  1.2*  1.2*   BASO 0.08  --   --  0.08  0.08 0.09  0.09   NRBC 0  --   --  0  0 0  0    < > = values in this interval not displayed.         COAGULATION LAST 3 DAYS  Recent Labs   Lab 03/03/24  0827 03/03/24  1013 03/03/24  1636 03/04/24  1244 03/04/24  2019 03/05/24  0239   INR 0.9 0.9  --   --   --   --    APTT  --  22.5   < > 37.9* 43.3* 42.6*    < > = values in this interval not displayed.         CHEMISTRY LAST 3 DAYS  Recent Labs   Lab 03/03/24  0827 03/03/24  0838 03/03/24  1225 03/03/24  1253 03/04/24  0313 03/04/24  0417 03/04/24  0452 03/04/24  0455 03/05/24  0239     --   --   --   --   --   --  142 138   K 3.6  --   --   --   --   --   --  3.8 3.2*     --   --   --   --   --   --  107 100   CO2 18*  --   --   --   --   --   --  24 26   ANIONGAP 13  --   --   --   --   --   --  11 12   BUN 22  --   --   --   --   --   --  23 25*   CREATININE 2.0*  --   --   --   --   --   --  1.8* 2.1*   *  --   --   --   --   --   --  122* 160*   CALCIUM 8.7  --   --   --   --   --   --  9.0 8.6*   PH  --    < >  --    < > 7.463* 7.475* 7.471*  --   --    MG 2.0  --  1.8  --   --   --   --  2.1 1.8   ALBUMIN 3.6  --   --   --   --   --   --  3.4* 3.3*   PROT 6.8  --   --   --   --   --   --  6.3 6.2   ALKPHOS 69  --   --   --   --   --   --  69 66   ALT 26  --    --   --   --   --   --  23 20   AST 25  --   --   --   --   --   --  28 22   BILITOT 0.4  --   --   --   --   --   --  0.4 0.6    < > = values in this interval not displayed.         CARDIAC PROFILE LAST 3 DAYS  Recent Labs   Lab 03/03/24 0827   *         ENDOCRINE LAST 3 DAYS  Recent Labs   Lab 03/03/24 0827   PROCAL 0.098         LAST ARTERIAL BLOOD GAS  ABG  Recent Labs   Lab 03/04/24  0452   PH 7.471*   PO2 80   PCO2 35.2   HCO3 25.7   BE 2         LAST 7 DAYS MICROBIOLOGY   Microbiology Results (last 7 days)       Procedure Component Value Units Date/Time    Blood culture x two cultures. Draw prior to antibiotics. [[id number]] Collected: 03/03/24 0903    Order Status: Completed Specimen: Blood Updated: 03/04/24 1232     Blood Culture, Routine No Growth to date      No Growth to date    Narrative:      Aerobic and anaerobic    Blood culture x two cultures. Draw prior to antibiotics. [[id number]] Collected: 03/03/24 0903    Order Status: Completed Specimen: Blood Updated: 03/04/24 1232     Blood Culture, Routine No Growth to date      No Growth to date    Narrative:      Aerobic and anaerobic    Blood culture [[id number]]     Order Status: Canceled Specimen: Blood     Blood culture [[id number]]     Order Status: Canceled Specimen: Blood             MOST RECENT IMAGING  Echo Saline Bubble? No    Left Ventricle: The left ventricle is normal in size. Mildly increased   wall thickness. There is mild concentric hypertrophy. Mild global   hypokinesis present. There is mildly reduced systolic function with a   visually estimated ejection fraction of 45 - 50%. There is normal   diastolic function.    Right Ventricle: Normal right ventricular cavity size. Wall thickness   is normal. Right ventricle wall motion  is normal. Systolic function is   normal.    Left Atrium: Left atrium is moderately dilated.    Aortic Valve: The aortic valve is a trileaflet valve. There is moderate   aortic valve sclerosis. There is  mild annular calcification present.   Moderately restricted motion. There is moderate to severe stenosis. Aortic   valve area by VTI is 0.99 cm². Aortic valve peak velocity is 2.40 m/s.   Mean gradient is 13 mmHg. The dimensionless index is 0.31.    Tricuspid Valve: The tricuspid valve is structurally normal. There is   normal leaflet mobility. There is mild regurgitation with a centrally   directed jet.    Pulmonary Artery: There is mild to moderate pulmonary hypertension. The   estimated pulmonary artery systolic pressure is 45 mmHg.    IVC/SVC: Intermediate venous pressure at 8 mmHg.    Pericardium: There is a trivial circumferential effusion. Pericardial   effusion is echolucent. No indication of cardiac tamponade.  X-Ray Chest AP Portable  Portable chest x-ray at 4:40 AM is compared to prior study 3/3/2024    Clinical history is edema    The heart is enlarged. There is improvement of the interstitial and groundglass opacities compatible with pulmonary edema. There are no confluent infiltrates or pleural effusions. There are no acute osseous abnormalities.    IMPRESSION: Improvement of the pulmonary edema    Electronically signed by:  Norah Thomas MD  03/04/2024 07:55 AM CST Workstation: [id number]      ECHOCARDIOGRAM RESULTS (last 5)  Results for orders placed during the hospital encounter of 10/06/23    Echo Saline Bubble? No    Interpretation Summary    Left Ventricle: The left ventricle is normal in size. Mildly increased wall thickness. There is mild concentric hypertrophy. Normal wall motion. There is normal systolic function. Ejection fraction by visual approximation is 53%. There is normal diastolic function.    Left Atrium: Left atrium is mildly dilated.    Right Ventricle: Normal right ventricular cavity size. Wall thickness is normal. Right ventricle wall motion  is normal. Systolic function is normal.    Aortic Valve: The aortic valve is a trileaflet valve. There is moderate aortic valve  sclerosis. Mildly calcified cusps. Mildly restricted motion. There is moderate stenosis. Aortic valve area by VTI is 1.39 cm². Aortic valve peak velocity is 2.67 m/s. Mean gradient is 16 mmHg. The dimensionless index is 0.44.    Mitral Valve: There is mild regurgitation with a centrally directed jet.    Tricuspid Valve: There is mild regurgitation.    IVC/SVC: Normal venous pressure at 3 mmHg.    The estimated pulmonary artery systolic pressure is 28 mmHg.      Results for orders placed during the hospital encounter of 04/11/22    Echo Saline Bubble? No    Interpretation Summary  · The left ventricle is normal in size with concentric remodeling and normal systolic function.  · The estimated ejection fraction is 55%.  · Normal left ventricular diastolic function.  · Moderate left atrial enlargement.  · There is mild aortic valve stenosis.  · Aortic valve area is 1.50 cm2; peak velocity is 2.68 m/s; mean gradient is 15 mmHg.      CURRENT/PREVIOUS VISIT EKG  Results for orders placed or performed during the hospital encounter of 03/03/24   EKG 12-lead    Collection Time: 03/03/24  2:53 PM   Result Value Ref Range    QRS Duration 106 ms    [facility] QTC Calculation 514 ms    Narrative    Test Reason : I21.4,    Vent. Rate : 058 BPM     Atrial Rate : 000 BPM     P-R Int : 000 ms          QRS Dur : 106 ms      QT Int : 524 ms       P-R-T Axes : 000 -15 205 degrees     QTc Int : 514 ms    Atrial fibrillation with slow ventricular response  LVH with repolarization abnormality ( [place]ston product )  Prolonged QT  Abnormal ECG  When compared with ECG of 03-MAR-2024 12:02,  Atrial fibrillation has replaced Sinus rhythm    Referred By: [email or web address]   SELF           Confirmed By:            ASSESSMENT/PLAN:     Active Hospital Problems    Diagnosis    *Acute hypoxemic respiratory failure    Hypertensive emergency    ACP (advance care planning)     -I spoke with the patient's daughters Mrs. Duran and Mrs. Ramos.  They were updated on the  patient's condition.  Their questions and concerns were addressed.  They request that the patient be a full code status.      Aortic stenosis    Hyperlipidemia    Stage 3b chronic kidney disease    Uncontrolled type 2 diabetes mellitus with hyperglycemia    SSS (sick sinus syndrome)    Iron deficiency anemia       ASSESSMENT & PLAN:   Acute hypoxemic respiratory failure  Acute heart failure EF 45-50 %  Hypertensive emergency  Aortic stenosis, likely moderate  NSTEMI  CKD  HTN   DM2    RECOMMENDATIONS:  Continue furosemide 40 mg IV q 8 hours. Strict I&O.   Daily weights.   Can dc heparin drip today as it has been 48 hours.   Continue amlodipine 10 mg po daily and metoprolol tartrate 25 mg PO BID.   Hold Arb for now.   Add hydralazine 25 mg po TID. Will titrate up for BP control.   Discussed option for angiogram versus stress test. She is high risk for renal injury with angiogram. Will plan for stress test later this week once she is more improved.   Okay to move to stepdown unit when a bed is available.     Mary White NP  [facility]  Department of Cardiology  Date of Service: 03/05/2024

## 2024-03-05 NOTE — CONSULTS
Nephrology Consult Note        Patient Name: Cynthia N Cushing  MRN: [id number]    Patient Class: IP- Inpatient   Admission Date: 3/3/2024  Length of Stay: 2 days  Date of Service: 3/5/2024    Attending Physician: Alley Bass MD  Primary Care Provider: Teri Adams MD    Reason for Consult: yasir/htn emergency/hf exacerbation    SUBJECTIVE:     HPI: 78F with hypertension, diabetes, hyperlipidemia, sleep apnea, SVT, SSS and follow up in cardiology clinic with Dr. Mendieta, last echo October 6, 2023 with EF of 53% with normal diastolic dysfunction and moderate AS presents to the emergency room with increased shortness of breath since last night, progressively worse. The patient was started on CPAP by EMS, blood pressure initially as high as 250/130, tachypnea, . In the ER was in respiratory distress and was intubated to protect her airway.      Laboratories showed troponin 467.9, COVID 19 screen negative, lactic acid 2.7, CO2 18, BUN 22, creatinine 2.0, glucose 336, anion gap normal at 13.     ABG with pH 7.25/pCO2 45.9/PO2 255/bicarbonate 20.5/oxygen saturation 100% while on ventilator.    UO with tsai around 3L so far as documented. UA bland with known proteinuria and some hyaline casts of unclear significance.    Chest x-ray consistent with pulmonary edema with ETT in place. Patient was given Lasix 40 mg IV in the ER, started on nitroglycerin drip. No fluid bolus given due to concerns of pulmonary edema and risk of fluid overload, but received IV antibiotics, blood cultures were obtained.     3/5  VSS, good UO, stable renal function. Mild hypokalemia - adjust diet, replete orally.    Past Medical History:   Diagnosis Date    Allergy     Breast mass     Cataract     Diabetes mellitus     GERD (gastroesophageal reflux disease)     Hernia, hiatal     Hyperlipidemia     Hypertension     Kidney stone     Osteoarthritis     Renal insufficiency     Seasonal allergies     Sleep apnea     SSS (sick sinus  syndrome)     SVT (supraventricular tachycardia)      Past Surgical History:   Procedure Laterality Date    BREAST BIOPSY      BREAST CYST ASPIRATION      BREAST SURGERY      CERVICAL DISC SURGERY      EYE SURGERY      cataracts bilateral    HYSTERECTOMY       Family History   Problem Relation Age of Onset    Stroke Mother     Cancer Mother     Breast cancer Mother     Cancer Father     Breast cancer Maternal Aunt      Social History     Tobacco Use    Smoking status: Never    Smokeless tobacco: Never   Substance Use Topics    Alcohol use: No    Drug use: No       Review of patient's allergies indicates:   Allergen Reactions    Biaxin [clarithromycin]     Prandin [repaglinide] Nausea And Vomiting    Amlodipine     Chlorphen-phenyltolox-pe-ppa     Ciprofloxacin Nausea And Vomiting    Omnicef [cefdinir]     Propoxyphene     Quinine Nausea And Vomiting       Outpatient meds:  No current facility-administered medications on file prior to encounter.     Current Outpatient Medications on File Prior to Encounter   Medication Sig Dispense Refill    famotidine (PEPCID) 20 MG tablet TAKE 1 TABLET TWICE DAILY (Patient taking differently: Take 20 mg by mouth 2 (two) times daily.) 180 tablet 2    furosemide (LASIX) 20 MG tablet TAKE 1 TABLET(20 MG) BY MOUTH EVERY DAY (Patient taking differently: Take 20 mg by mouth once daily.) 30 tablet 0    guanFACINE (TENEX) 2 MG tablet TAKE 1 TABLET EVERY EVENING (Patient taking differently: Take 2 mg by mouth nightly.) 90 tablet 3    hydrALAZINE (APRESOLINE) 50 MG tablet Take 1 tablet (50 mg total) by mouth 3 (three) times daily. 90 tablet 11    labetaloL (NORMODYNE) 100 MG tablet Take 1 tablet (100 mg total) by mouth 2 (two) times daily. 60 tablet 11    LANTUS SOLOSTAR U-100 INSULIN glargine 100 units/mL SubQ pen ADMINISTER 51 UNITS UNDER THE SKIN AT NIGHT (Patient taking differently: Inject 51 Units into the skin every evening. ADMINISTER 51 UNITS UNDER THE SKIN AT NIGHT) 15 mL 5     "olmesartan (BENICAR) 40 MG tablet TAKE 1 TABLET EVERY DAY (Patient taking differently: Take 40 mg by mouth once daily.) 90 tablet 3    rosuvastatin (CRESTOR) 10 MG tablet Take 1 tablet (10 mg total) by mouth every evening. 90 tablet 2    ACCU-CHEK AMADOR PLUS TEST STRP Strp TEST BLOOD SUGAR FOUR TIMES DAILY 400 strip 10    amLODIPine (NORVASC) 10 MG tablet Take 1 tablet (10 mg total) by mouth once daily. 30 tablet 11    aspirin 81 MG Chew Take 81 mg by mouth once daily.      lancets (ACCU-CHEK SOFTCLIX LANCETS) Misc TEST BLOOD SUGAR FOUR TIMES DAILY 400 each 3    pen needle, diabetic (BD ANNA 2ND GEN PEN NEEDLE) 32 gauge x 5/32" Ndle INJECT 1 NEEDLE INTO THE SKIN EVERY EVENING 100 each 5       Scheduled meds:   amLODIPine  10 mg Oral Daily    aspirin  81 mg Oral Daily    famotidine  20 mg Oral Daily    furosemide (LASIX) injection  40 mg Intravenous Q8H    insulin detemir U-100  10 Units Subcutaneous BID    metoprolol tartrate  25 mg Per OG tube BID    mupirocin   Nasal BID       Infusions:   heparin (porcine) in D5W 11 Units/kg/hr (24 0602)       PRN meds:  acetaminophen, dextrose 50% in water (D50W), dextrose 50% in water (D50W), [] fentaNYL **FOLLOWED BY** fentaNYL, glucagon (human recombinant), glucose, glucose, heparin (PORCINE), heparin (PORCINE), hydrALAZINE, insulin aspart U-100, magnesium sulfate IVPB, magnesium sulfate IVPB, potassium bicarbonate, potassium bicarbonate, potassium bicarbonate, sodium chloride 0.9%    Review of Systems:    OBJECTIVE:     Vital Signs and IO:  Temp:  [97.7 °F (36.5 °C)-98.4 °F (36.9 °C)]   Pulse:  []   Resp:  [15-33]   BP: (138-191)/()   SpO2:  [91 %-99 %]   Arterial Line BP: (101-190)/()   I/O last 3 completed shifts:  In: 1015.3 [P.O.:380; I.V.:598.1; IV Piggyback:37.2]  Out: 4375 [Urine:4075; Drains:300]  Wt Readings from Last 5 Encounters:   24 75.9 kg (167 lb 5.3 oz)   24 78.3 kg (172 lb 8.2 oz)   23 78.5 kg (173 lb) " "  10/23/23 75.3 kg (166 lb)   10/10/23 77.1 kg (170 lb)     Body mass index is 29.64 kg/m².    Physical Exam  Constitutional:       General: Patient is not in acute distress.     Appearance: Patient is well-developed. She is not diaphoretic.   HENT:      Head: Normocephalic and atraumatic.      Mouth/Throat: Mucous membranes are moist.   Eyes:      General: No scleral icterus.     Pupils: Pupils are equal, round, and reactive to light.   Cardiovascular:      Rate and Rhythm: Normal rate and regular rhythm.   Pulmonary:      Effort: Pulmonary effort is normal. No respiratory distress.      Breath sounds: No stridor.   Abdominal:      General: There is no distension.      Palpations: Abdomen is soft.   Musculoskeletal:         General: No deformity. Normal range of motion.      Cervical back: Neck supple.   Skin:     General: Skin is warm and dry.      Findings: No rash present. No erythema.   Neurological:      Mental Status: Patient is alert and oriented to person, place, and time.      Cranial Nerves: No cranial nerve deficit.   Psychiatric:         Behavior: Behavior is normal    Laboratory:  Recent Labs   Lab 03/03/24  0827 03/04/24  0455 03/05/24  0239    142 138   K 3.6 3.8 3.2*    107 100   CO2 18* 24 26   BUN 22 23 25*   CREATININE 2.0* 1.8* 2.1*   * 122* 160*         Recent Labs   Lab 03/03/24  0827 03/03/24  1225 03/04/24  0455 03/05/24  0239   CALCIUM 8.7  --  9.0 8.6*   ALBUMIN 3.6  --  3.4* 3.3*   PHOS  --  4.4 3.9 4.5   MG 2.0 1.8 2.1 1.8         Recent Labs   Lab 07/12/21  1103 02/07/22  0706   PTH, Intact 24.4 24.0         No results for input(s): "POCTGLUCOSE" in the last 168 hours.    Recent Labs   Lab 07/15/22  0730 08/17/23  0701 08/28/23  0748   Hemoglobin A1C 7.6 H 6.7 H 7.5 H         Recent Labs   Lab 03/03/24  1013 03/03/24  1253 03/04/24  0452 03/04/24  0455 03/05/24  0239   WBC 15.20*  --   --  13.19*  13.19* 11.32  11.32   HGB 8.2*  --   --  10.0*  10.0* 9.6*  9.6* "   HCT 28.7*   < > 32* 31.6*  31.6* 30.4*  30.4*     --   --  297  297 276  276   MCV 91  --   --  80*  80* 81*  81*   MCHC 28.6*  --   --  31.6*  31.6* 31.6*  31.6*   MONO 6.6  1.0  --   --  9.2  9.2  1.2*  1.2* 10.6  10.6  1.2*  1.2*   EOSINOPHIL 1.0  --   --  2.1  2.1 2.7  2.7    < > = values in this interval not displayed.         Recent Labs   Lab 03/03/24  0827 03/04/24  0455 03/05/24  0239   BILITOT 0.4 0.4 0.6   PROT 6.8 6.3 6.2   ALBUMIN 3.6 3.4* 3.3*   ALKPHOS 69 69 66   ALT 26 23 20   AST 25 28 22         Recent Labs   Lab 10/20/21  1237 03/03/24  1224   Color, UA  --  Yellow   Appearance, UA  --  Hazy A   Clarity, UA Clear  --    pH, UA  --  6.0   Specific Gravity, UA  --  1.010   Protein, UA  --  3+ A   Glucose, UA  --  2+ A   Ketones, UA  --  Negative   Urobilinogen, UA  --  Negative   Bilirubin (UA)  --  Negative   Occult Blood UA  --  2+ A   Nitrite, UA  --  Negative   RBC, UA  --  2   WBC, UA  --  3   Bacteria  --  Rare   Hyaline Casts, UA  --  9 A         Recent Labs   Lab 03/04/24  0313 03/04/24  0417 03/04/24  0452   POC PH 7.463 H 7.475 H 7.471 H   POC PCO2 33.6 L 32.5 L 35.2   POC HCO3 24.1 24.0 25.7   POC PO2 108 H 84 80   POC SATURATED O2 99 97 97   POC BE 0 0 2   Sample ARTERIAL ARTERIAL ARTERIAL         Microbiology Results (last 7 days)       Procedure Component Value Units Date/Time    Blood culture x two cultures. Draw prior to antibiotics. [[id number]] Collected: 03/03/24 0903    Order Status: Completed Specimen: Blood Updated: 03/04/24 1232     Blood Culture, Routine No Growth to date      No Growth to date    Narrative:      Aerobic and anaerobic    Blood culture x two cultures. Draw prior to antibiotics. [[id number]] Collected: 03/03/24 0903    Order Status: Completed Specimen: Blood Updated: 03/04/24 1232     Blood Culture, Routine No Growth to date      No Growth to date    Narrative:      Aerobic and anaerobic    Blood culture [[id number]]     Order Status:  Canceled Specimen: Blood     Blood culture [[id number]]     Order Status: Canceled Specimen: Blood             ASSESSMENT/PLAN:     Non-oliguric BRIDGETTE due to cardiorenal syndrome due to acute on chronic HFpEF exacerbation with volume overload, NSTEMI, HTN emergency, pulmonary edema  Acute respiratory failure requiring intubation  Sepsis possible but seems unlikely  Hypokalemia  CKD stage 3  DM  Anemia of CKD  No NSAIDs, ACEI/ARB, IV contrast or other nephrotoxins.  Keep MAP > 60, SBP > 100.  Dose meds for GFR < 30 ml/min.  Keep non-renal diet.  Monitor UO, diuretics and BP control per Cards, replete lytes - if unable to urinate, consider dialysis.  Treat sepsis, adjust meds based on Cx reports.  Hgb and HCT are acceptable. Monitor for now. Agree with heparin gtt for NSTEMI.  Control DM.    Thank you for allowing us to participate in the care of your patient!   We will follow the patient and provide recommendations as needed.    Patient care time was spent personally by me on the following activities:     Obtaining a history.  Examination of patient.  Providing medical care at the patients bedside.  Developing a treatment plan with patient or surrogate and bedside caregivers.  Ordering and reviewing laboratory studies, radiographic studies, pulse oximetry.  Ordering and performing treatments and interventions.  Evaluation of patient's response to treatment.  Discussions with consultants while on the unit and immediately available to the patient.  Re-evaluation of the patient's condition.  Documentation in the medical record.     Vito Britton MD    [facility]  [address] [place] [address]  [place] [id number]    [phone] - tel  [phone] - fax    3/5/2024

## 2024-03-05 NOTE — PROGRESS NOTES
[facility] Medicine  Progress Note    Patient Name: Cynthia N Cushing  MRN: [id number]  Patient Class: IP- Inpatient   Admission Date: 3/3/2024  Length of Stay: 2 days  Attending Physician: Alley Bass MD  Primary Care Provider: Teri Adams MD        Subjective:     Principal Problem:Acute hypoxemic respiratory failure        HPI:  Mrs. Cushing is a pleasant 78-year-old female who has a past medical history of hypertension, diabetes, hyperlipidemia, sleep apnea, SVT, SSS and follow up in cardiology clinic with Dr. Mendieta with recent office visit, last echo October 6, 2023 with EF of 53% with normal diastolic dysfunction and moderate AS who presents to the emergency room today with complaints of increased shortness of breath that started last night, got progressively worse.  The patient was started on CPAP by EMS.  Patient's vital signs in the ER showed blood pressure initially as high as 250/130, tachypnea, .  The patient in the ER was in respiratory distress, she was intubated to protect her airway.  Patient's laboratories and x-rays done.  Patient's case was discussed with the ER physician at the time of admission.  Laboratories showed troponin 467.9, COVID 19 screen negative, lactic acid 2.7, magnesium 2.0, calcitonin 0.098, sodium 140, potassium 3.6, chloride 109, CO2 18, BUN 22, creatinine 2.0, glucose 336, AST 25, anion gap normal at 13, PT INR 0.9, WBCs 14.8, hemoglobin 11, platelet count 416.  ABG show pH 7.25/pCO2 45.9/PO2 255/bicarbonate 20.5/oxygen saturation 100% this was done on ventilator.  Chest x-ray showed changes of pulmonary edema with ETT in place.  The patient was recommended to be admitted to the ICU for acute respiratory failure with hypoxia, hypertensive emergency, possible sepsis with lactic acid level 2.7.  Patient was given Lasix 40 mg IV in the ER, started on nitroglycerin drip.  Patient started on CHF order set, sepsis order set, no fluid  bolus given due to concerns of pulmonary edema and risk of fluid overload, IV antibiotics, blood cultures, cardiology consult, and Pulmonary critical Care consult.  Patient is a full code status.    Overview/Hospital Course:  This is a 78 year old female with history of HFpEF, was admitted to ICU with fluid overload secondary to decompensated heart failure. Patient was intubated on admission. Echo was repeated which showed EF reduced to 45%. Patient was diuresed. Cardiology was consulted. Patient was extubated next day and currently on RA. Cardiology planning on possible angiogram due to reduced EF. Patient can be transferred out of the ICU.     Interval History: Patient is alert and oriented and feels much better. She is on RA. SOB is improved and no chest pain.     Review of Systems  Objective:     Vital Signs (Most Recent):  Temp: 98.2 °F (36.8 °C) (03/05/24 0701)  Pulse: 71 (03/05/24 0901)  Resp: 14 (03/05/24 0901)  BP: (!) 158/81 (03/05/24 0901)  SpO2: 98 % (03/05/24 0901) Vital Signs (24h Range):  Temp:  [98 °F (36.7 °C)-98.4 °F (36.9 °C)] 98.2 °F (36.8 °C)  Pulse:  [] 71  Resp:  [14-33] 14  SpO2:  [91 %-99 %] 98 %  BP: (138-191)/() 158/81  Arterial Line BP: (101-190)/() 144/69     Weight: 75.9 kg (167 lb 5.3 oz)  Body mass index is 29.64 kg/m².    Intake/Output Summary (Last 24 hours) at 3/5/2024 1315  Last data filed at 3/5/2024 0800  Gross per 24 hour   Intake 445.66 ml   Output 2025 ml   Net -1579.34 ml         Physical Exam  Vitals and nursing note reviewed.   Constitutional:       General: She is not in acute distress.  HENT:      Head: Atraumatic.      Mouth/Throat:      Mouth: Mucous membranes are moist.      Pharynx: Oropharynx is clear.   Eyes:      General: No scleral icterus.     Conjunctiva/sclera: Conjunctivae normal.      Pupils: Pupils are equal, round, and reactive to light.   Cardiovascular:      Rate and Rhythm: Normal rate and regular rhythm.      Heart sounds: No murmur  heard.  Pulmonary:      Effort: No respiratory distress.      Breath sounds: Rales present. No wheezing or rhonchi.   Abdominal:      General: Abdomen is flat. Bowel sounds are normal.      Palpations: Abdomen is soft.   Musculoskeletal:         General: Swelling (trace) present. No deformity.      Cervical back: No rigidity or tenderness.   Skin:     Coloration: Skin is not jaundiced or pale.      Findings: No bruising, erythema or rash.   Neurological:      General: No focal deficit present.      Mental Status: She is alert and oriented to person, place, and time.      Cranial Nerves: No cranial nerve deficit.      Sensory: No sensory deficit.      Motor: No weakness.   Psychiatric:         Mood and Affect: Mood normal.         Behavior: Behavior normal.             Significant Labs: All pertinent labs within the past 24 hours have been reviewed.  CBC:   Recent Labs   Lab 03/04/24 0452 03/04/24 0455 03/05/24  0239   WBC  --  13.19*  13.19* 11.32  11.32   HGB  --  10.0*  10.0* 9.6*  9.6*   HCT 32* 31.6*  31.6* 30.4*  30.4*   PLT  --  297  297 276  276     CMP:   Recent Labs   Lab 03/04/24 0455 03/05/24  0239    138   K 3.8 3.2*    100   CO2 24 26   * 160*   BUN 23 25*   CREATININE 1.8* 2.1*   CALCIUM 9.0 8.6*   PROT 6.3 6.2   ALBUMIN 3.4* 3.3*   BILITOT 0.4 0.6   ALKPHOS 69 66   AST 28 22   ALT 23 20   ANIONGAP 11 12       Significant Imaging: I have reviewed all pertinent imaging results/findings within the past 24 hours.    Assessment/Plan:      * Acute hypoxemic respiratory failure  Resolved. Patient currently on RA      Acute on chronic heart failure with preserved ejection fraction (HFpEF)  Echo was repeated this admission, EF reduced to 45%  - Cr slightly up trending, Patient clinically euvolemic with clear lungs, pedal edema minimal, I will back down on the Lasix to 40 mg daily   - Cont metoprolol  - follow further recommendations from cardiology       Hypertensive emergency  BP  uncontrolled  - Cont Norvasc, Metoprolol and IV Lasix   - added hydralazine 50 mg TID     NSTEMI (non-ST elevated myocardial infarction)  Troponin down trending   - heparin drip for 48 hrs   - cont aspirin, Statins   - Follow cardiology plans for angiogram       Aortic stenosis  - follow cardiology recommendations       SSS (sick sinus syndrome)        Iron deficiency anemia  Patient's anemia is currently controlled. Has not received any PRBCs to date. Etiology likely d/t chronic disease due to Chronic Kidney Disease  Current CBC reviewed-   Lab Results   Component Value Date    HGB 9.6 (L) 03/05/2024    HGB 9.6 (L) 03/05/2024    HCT 30.4 (L) 03/05/2024    HCT 30.4 (L) 03/05/2024     Monitor serial CBC and transfuse if patient becomes hemodynamically unstable, symptomatic or H/H drops below 7/21.    Uncontrolled type 2 diabetes mellitus with hyperglycemia  Patient's FSGs are controlled on current medication regimen.  Last A1c reviewed-   Lab Results   Component Value Date    HGBA1C 7.5 (H) 08/28/2023     Most recent fingerstick glucose reviewed-   POC Glucose   Date Value Ref Range Status   03/05/2024 140 (H) 70 - 110 Final   03/04/2024 153 (H) 70 - 110 Final   03/04/2024 120 (H) 70 - 110 Final   03/04/2024 125 (H) 70 - 110 Final      Current correctional scale  Low  Maintain anti-hyperglycemic dose as follows-   Antihyperglycemics (From admission, onward)      Start     Stop Route Frequency Ordered    03/03/24 1200  insulin detemir U-100 (Levemir) pen 10 Units         -- SubQ 2 times daily 03/03/24 1146    03/03/24 1146  insulin aspart U-100 pen 0-5 Units         -- SubQ Before meals & nightly PRN 03/03/24 1146          Hold Oral hypoglycemics while patient is in the hospital.    Stage 3b chronic kidney disease  BRIDGETTE on CKD III  Creatine stable for now. BMP reviewed- noted Estimated Creatinine Clearance: 21.5 mL/min (A) (based on SCr of 2.1 mg/dL (H)). according to latest data. Based on current GFR, CKD stage is  stage 3 - GFR 30-59.  Monitor UOP and serial BMP and adjust therapy as needed. Renally dose meds. Avoid nephrotoxic medications and procedures.    creatinine on admission 2.0  baseline creatinine 1.5 with GFR 33 on outpatient office visit in February 2024 with cardiology clinic  Cr is 2.1 today, patient is euvolemic     - reduce Lasix to 40 mg daily   - strict I/O    ACP (advance care planning)  Continue full code status      Hyperlipidemia   Patient is chronically on statin.will continue for now. Monitor clinically. Last LDL was   Lab Results   Component Value Date    LDLCALC 9.2 (L) 08/17/2023              VTE Risk Mitigation (From admission, onward)           Ordered     enoxaparin injection 80 mg  Every 24 hours         03/05/24 1130     Place sequential compression device  Until discontinued         03/03/24 1146     IP VTE HIGH RISK PATIENT  Once         03/03/24 1146                    Discharge Planning   ASHELY: 3/7/2024     Code Status: Full Code   Is the patient medically ready for discharge?:     Reason for patient still in hospital (select all that apply): Treatment  Discharge Plan A: Home            Transfer out of the ICU.       Alley Bass MD  Department of Hospital Medicine   [facility]

## 2024-03-05 NOTE — PROGRESS NOTES
[facility] Medicine  Progress Note    Patient Name: Cynthia N Cushing  MRN: [id number]  Patient Class: IP- Inpatient   Admission Date: 3/3/2024  Length of Stay: 1 days  Attending Physician: Jacob Almeida MD  Primary Care Provider: Teri Adams MD        Subjective:     Principal Problem:Acute hypoxemic respiratory failure        HPI:  Mrs. Cushing is a pleasant 78-year-old female who has a past medical history of hypertension, diabetes, hyperlipidemia, sleep apnea, SVT, SSS and follow up in cardiology clinic with Dr. Mendieta with recent office visit, last echo October 6, 2023 with EF of 53% with normal diastolic dysfunction and moderate AS who presents to the emergency room today with complaints of increased shortness of breath that started last night, got progressively worse.  The patient was started on CPAP by EMS.  Patient's vital signs in the ER showed blood pressure initially as high as 250/130, tachypnea, .  The patient in the ER was in respiratory distress, she was intubated to protect her airway.  Patient's laboratories and x-rays done.  Patient's case was discussed with the ER physician at the time of admission.  Laboratories showed troponin 467.9, COVID 19 screen negative, lactic acid 2.7, magnesium 2.0, calcitonin 0.098, sodium 140, potassium 3.6, chloride 109, CO2 18, BUN 22, creatinine 2.0, glucose 336, AST 25, anion gap normal at 13, PT INR 0.9, WBCs 14.8, hemoglobin 11, platelet count 416.  ABG show pH 7.25/pCO2 45.9/PO2 255/bicarbonate 20.5/oxygen saturation 100% this was done on ventilator.  Chest x-ray showed changes of pulmonary edema with ETT in place.  The patient was recommended to be admitted to the ICU for acute respiratory failure with hypoxia, hypertensive emergency, possible sepsis with lactic acid level 2.7.  Patient was given Lasix 40 mg IV in the ER, started on nitroglycerin drip.  Patient started on CHF order set, sepsis order set, no fluid bolus  given due to concerns of pulmonary edema and risk of fluid overload, IV antibiotics, blood cultures, cardiology consult, and Pulmonary critical Care consult.  Patient is a full code status.    Overview/Hospital Course:  No notes on file    Interval History: Patient seen and examined. No events. Remains somewhat confused. Discussed with family      Objective:     Vital Signs (Most Recent):  Temp: 98.1 °F (36.7 °C) (03/04/24 1505)  Pulse: 74 (03/04/24 1600)  Resp: (!) 21 (03/04/24 1600)  BP: (!) 140/59 (03/04/24 1700)  SpO2: (!) 92 % (03/04/24 1600) Vital Signs (24h Range):  Temp:  [97.5 °F (36.4 °C)-98.1 °F (36.7 °C)] 98.1 °F (36.7 °C)  Pulse:  [46-80] 74  Resp:  [14-48] 21  SpO2:  [92 %-100 %] 92 %  BP: (106-200)/(53-86) 140/59  Arterial Line BP: (119-190)/() 150/64     Weight: 75.9 kg (167 lb 5.3 oz)  Body mass index is 29.64 kg/m².    Intake/Output Summary (Last 24 hours) at 3/4/2024 1922  Last data filed at 3/4/2024 1851  Gross per 24 hour   Intake 709.99 ml   Output 4275 ml   Net -3565.01 ml         Physical Exam  Vitals and nursing note reviewed.   Constitutional:       General: She is not in acute distress.  Eyes:      Pupils: Pupils are equal, round, and reactive to light.   Cardiovascular:      Rate and Rhythm: Normal rate and regular rhythm.      Heart sounds: No murmur heard.  Pulmonary:      Effort: Pulmonary effort is normal.      Breath sounds: Normal breath sounds.   Abdominal:      General: There is no distension.      Palpations: Abdomen is soft.      Tenderness: There is no abdominal tenderness.   Skin:     Coloration: Skin is not pale.      Findings: No rash.   Neurological:      Mental Status: She is alert. She is disoriented.             Significant Labs: All pertinent labs within the past 24 hours have been reviewed.    Significant Imaging: I have reviewed all pertinent imaging results/findings within the past 24 hours.    Assessment/Plan:      * Acute hypoxemic respiratory  failure  Patient with Hypercapnic and Hypoxic Respiratory failure which is Acute.  she is not on home oxygen. Supplemental oxygen was provided and noted-     Vent Mode: Spont  Oxygen Concentration (%):  [30] 30  Resp Rate Total:  [15 br/min-25 br/min] 17 br/min  Vt Set:  [370 mL] 370 mL  PEEP/CPAP:  [5 cmH20] 5 cmH20  Pressure Support:  [10 cmH20] 10 cmH20  Mean Airway Pressure:  [8.3 ibS41-51 cmH20] 8.7 cmH20    Patient admitted to the ICU for acute respiratory failure with hypoxia, possible due to CHF with pulmonary edema, hypertensive emergency, rule out pneumonia, rule out CAD with elevated troponin  -patient extubated 3/3  -cardiology consultation due to elevated troponin with possible pulmonary edema changes on chest x-ray  -patient was started on IV heparin protocol      Hypertensive emergency  Patient has a current diagnosis of Hypertensive emergency with end organ damage evidenced by hypertensive encephalopathy and acute heart failure which is uncontrolled.  Latest blood pressure and vitals reviewed-   Temp:  [97.5 °F (36.4 °C)-98.1 °F (36.7 °C)]   Pulse:  [46-80]   Resp:  [14-48]   BP: (106-200)/(53-86)   SpO2:  [92 %-100 %]   Arterial Line BP: (119-190)/() .   Patient currently off IV antihypertensives.   Home meds for hypertension were reviewed and noted below.   Hypertension Medications               amLODIPine (NORVASC) 10 MG tablet Take 1 tablet (10 mg total) by mouth once daily.    furosemide (LASIX) 20 MG tablet TAKE 1 TABLET(20 MG) BY MOUTH EVERY DAY    guanFACINE (TENEX) 2 MG tablet TAKE 1 TABLET EVERY EVENING    hydrALAZINE (APRESOLINE) 50 MG tablet Take 1 tablet (50 mg total) by mouth 3 (three) times daily.    labetaloL (NORMODYNE) 100 MG tablet Take 1 tablet (100 mg total) by mouth 2 (two) times daily.    olmesartan (BENICAR) 40 MG tablet TAKE 1 TABLET EVERY DAY            Medication adjustment for hospital antihypertensives is as follows- Continue toprol, amlodipine. Continue permissive  hypertension    Will aim for controlled BP reduction by medications noted above. Monitor and mitigate end organ damage as indicated.    ACP (advance care planning)  Continue full code status      Aortic stenosis  -patient follow up in outpatient cardiology clinic  -echo October 2023 showed EF 53%, normal diastolic function, and moderate AS.  -cardiology consulted this admission  -check follow up echo to assess LV function and for  AS      Hyperlipidemia   Patient is chronically on statin.will continue for now. Monitor clinically. Last LDL was   Lab Results   Component Value Date    LDLCALC 9.2 (L) 08/17/2023            Stage 3b chronic kidney disease  Creatine stable for now. BMP reviewed- noted Estimated Creatinine Clearance: 25.1 mL/min (A) (based on SCr of 1.8 mg/dL (H)). according to latest data. Based on current GFR, CKD stage is stage 3 - GFR 30-59.  Monitor UOP and serial BMP and adjust therapy as needed. Renally dose meds. Avoid nephrotoxic medications and procedures.    -trend renal function  -avoid NSAIDs and nephrotoxic agents  -creatinine on admission 2.0  -baseline creatinine 1.5 with GFR 33 on outpatient office visit in February 2024 with cardiology clinic    Uncontrolled type 2 diabetes mellitus with hyperglycemia  Patient's FSGs are controlled on current medication regimen.  Last A1c reviewed-   Lab Results   Component Value Date    HGBA1C 7.5 (H) 08/28/2023     Most recent fingerstick glucose reviewed-   POC Glucose   Date Value Ref Range Status   03/04/2024 120 (H) 70 - 110 Final   03/04/2024 125 (H) 70 - 110 Final   03/04/2024 122 (H) 70 - 110 mg/dL Final   03/04/2024 108 70 - 110 mg/dL Final      Current correctional scale  Low  Maintain anti-hyperglycemic dose as follows-   Antihyperglycemics (From admission, onward)      Start     Stop Route Frequency Ordered    03/03/24 1200  insulin detemir U-100 (Levemir) pen 10 Units         -- SubQ 2 times daily 03/03/24 1146    03/03/24 1146  insulin  aspart U-100 pen 0-5 Units         -- SubQ Before meals & nightly PRN 03/03/24 1146          Hold Oral hypoglycemics while patient is in the hospital.    SSS (sick sinus syndrome)  -patient follow up in outpatient cardiology clinic  -check echo, previous EF 53% on echo in October 2023      Iron deficiency anemia  Patient's anemia is currently controlled. Has not received any PRBCs to date. Etiology likely d/t chronic disease due to Chronic Kidney Disease  Current CBC reviewed-   Lab Results   Component Value Date    HGB 10.0 (L) 03/04/2024    HGB 10.0 (L) 03/04/2024    HCT 31.6 (L) 03/04/2024    HCT 31.6 (L) 03/04/2024     Monitor serial CBC and transfuse if patient becomes hemodynamically unstable, symptomatic or H/H drops below 7/21.      VTE Risk Mitigation (From admission, onward)           Ordered     Place sequential compression device  Until discontinued         03/03/24 1146     IP VTE HIGH RISK PATIENT  Once         03/03/24 1146     heparin 25,000 units in dextrose 5% (100 units/ml) IV bolus from bag LOW INTENSITY nomogram - [facility]  As needed (PRN)        Question:  Heparin Infusion Adjustment (DO NOT MODIFY ANSWER)  Answer:  \[email or web address]\[email or web address]\[email or web address] LOW INTENSITY nomogram for [facility] [id number]    03/03/24 0942     heparin 25,000 units in dextrose 5% (100 units/ml) IV bolus from bag LOW INTENSITY nomogram - [facility]  As needed (PRN)        Question:  Heparin Infusion Adjustment (DO NOT MODIFY ANSWER)  Answer:  \[email or web address]\[email or web address]\heparin LOW INTENSITY nomogram for [facility] [id number]    03/03/24 0942     heparin 25,000 units in dextrose 5% 250 mL (100 units/mL) infusion LOW INTENSITY nomogram - [facility]  Continuous        Question:  Begin at (units/kg/hr)  Answer:  12    03/03/24 0942                    Discharge Planning   ASHELY: 3/6/2024     Code Status: Full Code   Is the patient medically ready for discharge?:     Reason for patient still in hospital (select  all that apply): Treatment  Discharge Plan A: Home            Critical care time spent on the evaluation and treatment of severe organ dysfunction, review of pertinent labs and imaging studies, discussions with consulting providers and discussions with patient/family: 36 minutes.      Jacob Almeida MD  Department of Hospital Medicine   [facility]

## 2024-03-05 NOTE — HOSPITAL COURSE
78 year old female with history of HFpEF, was admitted to ICU with fluid overload secondary to decompensated heart failure and HTN emergency. Patient was intubated on admission. On nitro gtt for brief period. Patient was diuresed. Patient was extubated next day and weaned to RA. Had elevated troponins as well consistent with NSTEMI and was on heparin gtt for 48hr. Cardiology was consulted. Echo showed EF 45-50%, moderate to severe aortic stenosis, and PASP 45.  Developed BRIDGETTE and hyperkalemia. Hyperkalemia resolved with lokelma. Nephrology followed. Given gentle IVF. Stress testing showed a reversible area of ischemia and cardiology followed considering angiogram but was limited by renal function. Had run of SVT needing IV metoprolol. 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 Was continued on metoprolol 50 mg bid, but had recurrent psvt. Diltiazem 120 mg daily added to regimen. Patient had no further episodes of svt, and was discharged in stable condition.

## 2024-03-05 NOTE — ASSESSMENT & PLAN NOTE
Patient's FSGs are controlled on current medication regimen.  Last A1c reviewed-   Lab Results   Component Value Date    HGBA1C 7.5 (H) 08/28/2023     Most recent fingerstick glucose reviewed-   POC Glucose   Date Value Ref Range Status   03/04/2024 120 (H) 70 - 110 Final   03/04/2024 125 (H) 70 - 110 Final   03/04/2024 122 (H) 70 - 110 mg/dL Final   03/04/2024 108 70 - 110 mg/dL Final      Current correctional scale  Low  Maintain anti-hyperglycemic dose as follows-   Antihyperglycemics (From admission, onward)      Start     Stop Route Frequency Ordered    03/03/24 1200  insulin detemir U-100 (Levemir) pen 10 Units         -- SubQ 2 times daily 03/03/24 1146    03/03/24 1146  insulin aspart U-100 pen 0-5 Units         -- SubQ Before meals & nightly PRN 03/03/24 1146          Hold Oral hypoglycemics while patient is in the hospital.

## 2024-03-05 NOTE — ASSESSMENT & PLAN NOTE
Patient's anemia is currently controlled. Has not received any PRBCs to date. Etiology likely d/t chronic disease due to Chronic Kidney Disease  Current CBC reviewed-   Lab Results   Component Value Date    HGB 9.6 (L) 03/05/2024    HGB 9.6 (L) 03/05/2024    HCT 30.4 (L) 03/05/2024    HCT 30.4 (L) 03/05/2024     Monitor serial CBC and transfuse if patient becomes hemodynamically unstable, symptomatic or H/H drops below 7/21.

## 2024-03-05 NOTE — SUBJECTIVE & OBJECTIVE
Interval History: Patient is alert and oriented and feels much better. She is on RA. SOB is improved and no chest pain.     Review of Systems  Objective:     Vital Signs (Most Recent):  Temp: 98.2 °F (36.8 °C) (03/05/24 0701)  Pulse: 71 (03/05/24 0901)  Resp: 14 (03/05/24 0901)  BP: (!) 158/81 (03/05/24 0901)  SpO2: 98 % (03/05/24 0901) Vital Signs (24h Range):  Temp:  [98 °F (36.7 °C)-98.4 °F (36.9 °C)] 98.2 °F (36.8 °C)  Pulse:  [] 71  Resp:  [14-33] 14  SpO2:  [91 %-99 %] 98 %  BP: (138-191)/() 158/81  Arterial Line BP: (101-190)/() 144/69     Weight: 75.9 kg (167 lb 5.3 oz)  Body mass index is 29.64 kg/m².    Intake/Output Summary (Last 24 hours) at 3/5/2024 1315  Last data filed at 3/5/2024 0800  Gross per 24 hour   Intake 445.66 ml   Output 2025 ml   Net -1579.34 ml         Physical Exam  Vitals and nursing note reviewed.   Constitutional:       General: She is not in acute distress.  HENT:      Head: Atraumatic.      Mouth/Throat:      Mouth: Mucous membranes are moist.      Pharynx: Oropharynx is clear.   Eyes:      General: No scleral icterus.     Conjunctiva/sclera: Conjunctivae normal.      Pupils: Pupils are equal, round, and reactive to light.   Cardiovascular:      Rate and Rhythm: Normal rate and regular rhythm.      Heart sounds: No murmur heard.  Pulmonary:      Effort: No respiratory distress.      Breath sounds: Rales present. No wheezing or rhonchi.   Abdominal:      General: Abdomen is flat. Bowel sounds are normal.      Palpations: Abdomen is soft.   Musculoskeletal:         General: Swelling (trace) present. No deformity.      Cervical back: No rigidity or tenderness.   Skin:     Coloration: Skin is not jaundiced or pale.      Findings: No bruising, erythema or rash.   Neurological:      General: No focal deficit present.      Mental Status: She is alert and oriented to person, place, and time.      Cranial Nerves: No cranial nerve deficit.      Sensory: No sensory deficit.       Motor: No weakness.   Psychiatric:         Mood and Affect: Mood normal.         Behavior: Behavior normal.             Significant Labs: All pertinent labs within the past 24 hours have been reviewed.  CBC:   Recent Labs   Lab 03/04/24 0452 03/04/24 0455 03/05/24 0239   WBC  --  13.19*  13.19* 11.32  11.32   HGB  --  10.0*  10.0* 9.6*  9.6*   HCT 32* 31.6*  31.6* 30.4*  30.4*   PLT  --  297  297 276  276     CMP:   Recent Labs   Lab 03/04/24 0455 03/05/24 0239    138   K 3.8 3.2*    100   CO2 24 26   * 160*   BUN 23 25*   CREATININE 1.8* 2.1*   CALCIUM 9.0 8.6*   PROT 6.3 6.2   ALBUMIN 3.4* 3.3*   BILITOT 0.4 0.6   ALKPHOS 69 66   AST 28 22   ALT 23 20   ANIONGAP 11 12       Significant Imaging: I have reviewed all pertinent imaging results/findings within the past 24 hours.

## 2024-03-05 NOTE — ASSESSMENT & PLAN NOTE
Patient is chronically on statin.will continue for now. Monitor clinically. Last LDL was   Lab Results   Component Value Date    LDLCALC 9.2 (L) 08/17/2023

## 2024-03-05 NOTE — ASSESSMENT & PLAN NOTE
Troponin down trending   - heparin drip for 48 hrs   - cont aspirin, Statins   - Follow cardiology plans for angiogram

## 2024-03-05 NOTE — ASSESSMENT & PLAN NOTE
Patient with Hypercapnic and Hypoxic Respiratory failure which is Acute.  she is not on home oxygen. Supplemental oxygen was provided and noted-     Vent Mode: Spont  Oxygen Concentration (%):  [30] 30  Resp Rate Total:  [15 br/min-25 br/min] 17 br/min  Vt Set:  [370 mL] 370 mL  PEEP/CPAP:  [5 cmH20] 5 cmH20  Pressure Support:  [10 cmH20] 10 cmH20  Mean Airway Pressure:  [8.3 olL28-39 cmH20] 8.7 cmH20    Patient admitted to the ICU for acute respiratory failure with hypoxia, possible due to CHF with pulmonary edema, hypertensive emergency, rule out pneumonia, rule out CAD with elevated troponin  -patient extubated 3/3  -cardiology consultation due to elevated troponin with possible pulmonary edema changes on chest x-ray  -patient was started on IV heparin protocol

## 2024-03-05 NOTE — ASSESSMENT & PLAN NOTE
Patient's anemia is currently controlled. Has not received any PRBCs to date. Etiology likely d/t chronic disease due to Chronic Kidney Disease  Current CBC reviewed-   Lab Results   Component Value Date    HGB 10.0 (L) 03/04/2024    HGB 10.0 (L) 03/04/2024    HCT 31.6 (L) 03/04/2024    HCT 31.6 (L) 03/04/2024     Monitor serial CBC and transfuse if patient becomes hemodynamically unstable, symptomatic or H/H drops below 7/21.

## 2024-03-05 NOTE — ASSESSMENT & PLAN NOTE
Patient has a current diagnosis of Hypertensive emergency with end organ damage evidenced by hypertensive encephalopathy and acute heart failure which is uncontrolled.  Latest blood pressure and vitals reviewed-   Temp:  [97.5 °F (36.4 °C)-98.1 °F (36.7 °C)]   Pulse:  [46-80]   Resp:  [14-48]   BP: (106-200)/(53-86)   SpO2:  [92 %-100 %]   Arterial Line BP: (119-190)/() .   Patient currently off IV antihypertensives.   Home meds for hypertension were reviewed and noted below.   Hypertension Medications               amLODIPine (NORVASC) 10 MG tablet Take 1 tablet (10 mg total) by mouth once daily.    furosemide (LASIX) 20 MG tablet TAKE 1 TABLET(20 MG) BY MOUTH EVERY DAY    guanFACINE (TENEX) 2 MG tablet TAKE 1 TABLET EVERY EVENING    hydrALAZINE (APRESOLINE) 50 MG tablet Take 1 tablet (50 mg total) by mouth 3 (three) times daily.    labetaloL (NORMODYNE) 100 MG tablet Take 1 tablet (100 mg total) by mouth 2 (two) times daily.    olmesartan (BENICAR) 40 MG tablet TAKE 1 TABLET EVERY DAY            Medication adjustment for hospital antihypertensives is as follows- Continue toprol, amlodipine. Continue permissive hypertension    Will aim for controlled BP reduction by medications noted above. Monitor and mitigate end organ damage as indicated.

## 2024-03-05 NOTE — PROGRESS NOTES
[facility]  Progress Note  Pulmonary/Critical Care      PATIENT NAME: Cynthia N Cushing  MRN: [id number]  TODAY'S DATE: 2024  12:07 PM  ADMIT DATE: 3/3/2024  AGE: 78 y.o. : 1945    HPI/INTERVAL HISTORY (See H&P for complete P,F,SHx) :   Cynthia N Cushing is a 78 y.o. female with a PMH of moderate aortic stenosis, HFpEF, SVT, sick sinus syndrome, HTN, DM2, CKD 3, and LAUREN on whom we have been consulted for acute hypoxic respiratory failure secondary to hypertensive emergency and flash pulmonary edema.     The patient called her daughter complaining of dyspnea this morning; no chest pain. Went to ED. She had an SBP up to 254. She was hypoxic and in severe respiratory distress, so she was intubated. Lasix was given. BP was controlled with nitroglycerin drip. Oxygenation improved rapidly.    Today:     She is doing well this morning, on nasal cannula, she is not in any distress. No chest pain.     Review of Systems   Constitutional:  Negative for appetite change, chills, fever and unexpected weight change.   HENT:  Negative for nosebleeds, postnasal drip, trouble swallowing and voice change.    Eyes:  Negative for visual disturbance.   Respiratory:  Positive for shortness of breath. Negative for cough and wheezing.    Cardiovascular:  Positive for leg swelling. Negative for chest pain.   Gastrointestinal:  Negative for diarrhea, nausea and vomiting.   Endocrine: Negative for cold intolerance and heat intolerance.   Genitourinary:  Negative for dysuria, flank pain and frequency.   Musculoskeletal:  Negative for neck pain and neck stiffness.   Allergic/Immunologic: Negative for environmental allergies and immunocompromised state.   Neurological:  Negative for syncope, speech difficulty and weakness.   Hematological:  Negative for adenopathy.   Psychiatric/Behavioral:  Negative for confusion.            VITAL SIGNS (MOST RECENT)  Temp: 98.2 °F (36.8 °C) (24 0701)  Pulse: 71 (24  0901)  Resp: 14 (03/05/24 0901)  BP: (!) 158/81 (03/05/24 0901)  SpO2: 98 % (03/05/24 0901)    INTAKE AND OUTPUT (LAST 24 HOURS):  Intake/Output Summary (Last 24 hours) at 3/5/2024 1358  Last data filed at 3/5/2024 0800  Gross per 24 hour   Intake 445.66 ml   Output 2025 ml   Net -1579.34 ml         WEIGHT  Wt Readings from Last 1 Encounters:   03/03/24 75.9 kg (167 lb 5.3 oz)       Physical Exam  Vitals reviewed.   Constitutional:       General: She is not in acute distress.     Appearance: She is well-developed. She is obese. She is ill-appearing. She is not diaphoretic.   HENT:      Head: Normocephalic and atraumatic.      Mouth/Throat:      Pharynx: No oropharyngeal exudate or posterior oropharyngeal erythema.   Eyes:      General: No scleral icterus.     Pupils: Pupils are equal, round, and reactive to light.   Neck:      Vascular: No JVD.   Cardiovascular:      Rate and Rhythm: Normal rate and regular rhythm.      Heart sounds: Normal heart sounds. No murmur heard.  Pulmonary:      Effort: Pulmonary effort is normal. No respiratory distress.      Breath sounds: Rales present. No wheezing.   Abdominal:      General: Bowel sounds are normal. There is distension.      Palpations: Abdomen is soft.      Tenderness: There is no abdominal tenderness.   Musculoskeletal:         General: Swelling present.      Cervical back: Normal range of motion and neck supple. No rigidity.      Right lower leg: Edema present.      Left lower leg: Edema present.   Skin:     General: Skin is warm and dry.      Capillary Refill: Capillary refill takes less than 2 seconds.      Coloration: Skin is not pale.      Findings: No rash.   Neurological:      General: No focal deficit present.      Mental Status: She is alert and oriented to person, place, and time.      Cranial Nerves: No cranial nerve deficit.      Motor: No weakness or abnormal muscle tone.           VENTILATOR SETTINGS              LAST ARTERIAL BLOOD GAS  ABG  Recent Labs  "  Lab 03/04/24  0452   PH 7.471*   PO2 80   PCO2 35.2   HCO3 25.7   BE 2         ACUTE PHASE REACTANT (LAST 24 HOURS)  No results for input(s): "FERRITIN", "CRP", "LDH", "DDIMER" in the last 24 hours.    CBC LAST (LAST 24 HOURS)  Recent Labs   Lab 03/05/24  0239   WBC 11.32  11.32   RBC 3.74*  3.74*   HGB 9.6*  9.6*   HCT 30.4*  30.4*   MCV 81*  81*   MCH 25.7*  25.7*   MCHC 31.6*  31.6*   RDW 16.6*  16.6*     276   MPV 11.4  11.4   GRAN 71.1  71.1  8.1*  8.1*   LYMPH 14.4*  14.4*  1.6  1.6   MONO 10.6  10.6  1.2*  1.2*   BASO 0.09  0.09   NRBC 0  0         CHEMISTRY LAST (LAST 24 HOURS)  Recent Labs   Lab 03/05/24  0239      K 3.2*      CO2 26   ANIONGAP 12   BUN 25*   CREATININE 2.1*   *   CALCIUM 8.6*   MG 1.8   ALBUMIN 3.3*   PROT 6.2   ALKPHOS 66   ALT 20   AST 22   BILITOT 0.6         COAGULATION LAST (LAST 24 HOURS)  Recent Labs   Lab 03/05/24  0239   APTT 42.6*         CARDIAC PROFILE (LAST 24 HOURS)  Recent Labs   Lab 03/03/24  0827 03/03/24  1225 03/05/24  0239   *  --   --    TROPONINIHS 467.9*   < > 1033.4*  1033.4*    < > = values in this interval not displayed.         LAST 7 DAYS MICROBIOLOGY   Microbiology Results (last 7 days)       Procedure Component Value Units Date/Time    Blood culture x two cultures. Draw prior to antibiotics. [[id number]] Collected: 03/03/24 0903    Order Status: Completed Specimen: Blood Updated: 03/05/24 1232     Blood Culture, Routine No Growth to date      No Growth to date      No Growth to date    Narrative:      Aerobic and anaerobic    Blood culture x two cultures. Draw prior to antibiotics. [[id number]] Collected: 03/03/24 0903    Order Status: Completed Specimen: Blood Updated: 03/05/24 1232     Blood Culture, Routine No Growth to date      No Growth to date      No Growth to date    Narrative:      Aerobic and anaerobic    Blood culture [[id number]]     Order Status: Canceled Specimen: Blood     Blood culture " [[id number]]     Order Status: Canceled Specimen: Blood             MOST RECENT IMAGING  Echo Saline Bubble? No    Left Ventricle: The left ventricle is normal in size. Mildly increased   wall thickness. There is mild concentric hypertrophy. Mild global   hypokinesis present. There is mildly reduced systolic function with a   visually estimated ejection fraction of 45 - 50%. There is normal   diastolic function.    Right Ventricle: Normal right ventricular cavity size. Wall thickness   is normal. Right ventricle wall motion  is normal. Systolic function is   normal.    Left Atrium: Left atrium is moderately dilated.    Aortic Valve: The aortic valve is a trileaflet valve. There is moderate   aortic valve sclerosis. There is mild annular calcification present.   Moderately restricted motion. There is moderate to severe stenosis. Aortic   valve area by VTI is 0.99 cm². Aortic valve peak velocity is 2.40 m/s.   Mean gradient is 13 mmHg. The dimensionless index is 0.31.    Tricuspid Valve: The tricuspid valve is structurally normal. There is   normal leaflet mobility. There is mild regurgitation with a centrally   directed jet.    Pulmonary Artery: There is mild to moderate pulmonary hypertension. The   estimated pulmonary artery systolic pressure is 45 mmHg.    IVC/SVC: Intermediate venous pressure at 8 mmHg.    Pericardium: There is a trivial circumferential effusion. Pericardial   effusion is echolucent. No indication of cardiac tamponade.  X-Ray Chest AP Portable  Portable chest x-ray at 4:40 AM is compared to prior study 3/3/2024    Clinical history is edema    The heart is enlarged. There is improvement of the interstitial and groundglass opacities compatible with pulmonary edema. There are no confluent infiltrates or pleural effusions. There are no acute osseous abnormalities.    IMPRESSION: Improvement of the pulmonary edema    Electronically signed by:  Norah Thomas MD  03/04/2024 07:55 AM CST Workstation:  [id number]      CURRENT VISIT EKG  Results for orders placed or performed during the hospital encounter of 03/03/24   EKG 12-lead   Result Value Ref Range    QRS Duration 104 ms    [facility] QTC Calculation 525 ms    Narrative    Test Reason : R00.1,    Vent. Rate : 061 BPM     Atrial Rate : 061 BPM     P-R Int : 162 ms          QRS Dur : 104 ms      QT Int : 522 ms       P-R-T Axes : -29 -15 210 degrees     QTc Int : 525 ms    Sinus rhythm with occasional Premature ventricular complexes  LVH with repolarization abnormality ( [place] product )  Prolonged QT  Abnormal ECG  When compared with ECG of 03-MAR-2024 12:02,  Previous ECG has undetermined rhythm, needs review    Referred By: [email or web address]   SELF           Confirmed By:        ECHOCARDIOGRAM RESULTS  Results for orders placed during the hospital encounter of 03/03/24    Echo Saline Bubble? No    Interpretation Summary    Left Ventricle: The left ventricle is normal in size. Mildly increased wall thickness. There is mild concentric hypertrophy. Mild global hypokinesis present. There is mildly reduced systolic function with a visually estimated ejection fraction of 45 - 50%. There is normal diastolic function.    Right Ventricle: Normal right ventricular cavity size. Wall thickness is normal. Right ventricle wall motion  is normal. Systolic function is normal.    Left Atrium: Left atrium is moderately dilated.    Aortic Valve: The aortic valve is a trileaflet valve. There is moderate aortic valve sclerosis. There is mild annular calcification present. Moderately restricted motion. There is moderate to severe stenosis. Aortic valve area by VTI is 0.99 cm². Aortic valve peak velocity is 2.40 m/s. Mean gradient is 13 mmHg. The dimensionless index is 0.31.    Tricuspid Valve: The tricuspid valve is structurally normal. There is normal leaflet mobility. There is mild regurgitation with a centrally directed jet.    Pulmonary Artery: There is mild to moderate pulmonary  hypertension. The estimated pulmonary artery systolic pressure is 45 mmHg.    IVC/SVC: Intermediate venous pressure at 8 mmHg.    Pericardium: There is a trivial circumferential effusion. Pericardial effusion is echolucent. No indication of cardiac tamponade.      ASSESSMENT:   #Hypertensive emergency  #Flash pulmonary edema  #NSTEMI  #Acute hypoxic respiratory failure requiring intubation  #Acutely decompensated valvular heart failure (AS)  #Lactic acidosis likely due to CHF  #BRIDGETTE on CKD 3, likely cardiorenal    Ms Cushing is a 78-year-old woman with a history of valvular heart disease due to aortic stenosis, and underlying biventricular heart failure due to chronic diastolic dysfunction, and chronic medical conditions including hypertension diabetes CKD 3 and obesity who presents with acute respiratory failure in the setting of hypertensive emergency.    SBP controlled today- hovering around 160-170 today. A line was diminished.     PLAN:   - okay for step-down from ICU  - appreciate cardiology recommendations- currently on oral BP meds titrating metoprolol, continue amlodipine, and on hydral TID.   -hep drip for 24 hours completed- cardiology planning on stress test +/- angiogram    - continue oral Pepcid for reflux    Samir Cage MD  Date of Service: 03/05/2024  12:07 PM    Upon my evaluation, this patient had a high probability of imminent or life-threatening deterioration, which required my direct attention, intervention, and personal management.    I have personally provided at least 35 minutes of critical care time exclusive of time spent on separately billable procedures. Over 50% of the time of this encounter was spent in direct care at the bedside. Time includes review of laboratory data, radiology results, discussion with consultants, and monitoring for potential decompensation. Interventions were performed as documented above.

## 2024-03-05 NOTE — ASSESSMENT & PLAN NOTE
Echo was repeated this admission, EF reduced to 45%  - Cr slightly up trending, Patient clinically euvolemic with clear lungs, pedal edema minimal, I will back down on the Lasix to 40 mg daily   - Cont metoprolol  - follow further recommendations from cardiology

## 2024-03-05 NOTE — PLAN OF CARE
03/05/24 0743   Patient Assessment/Suction   Level of Consciousness (AVPU) alert   Respiratory Effort Unlabored   PRE-TX-O2   Device (Oxygen Therapy) nasal cannula   $ Is the patient on Low Flow Oxygen? Yes   Flow (L/min) 1   SpO2 98 %   Pulse Oximetry Type Continuous   $ Pulse Oximetry - Multiple Charge Pulse Oximetry - Multiple   Pulse 102   Resp (!) 24

## 2024-03-05 NOTE — ASSESSMENT & PLAN NOTE
Creatine stable for now. BMP reviewed- noted Estimated Creatinine Clearance: 25.1 mL/min (A) (based on SCr of 1.8 mg/dL (H)). according to latest data. Based on current GFR, CKD stage is stage 3 - GFR 30-59.  Monitor UOP and serial BMP and adjust therapy as needed. Renally dose meds. Avoid nephrotoxic medications and procedures.    -trend renal function  -avoid NSAIDs and nephrotoxic agents  -creatinine on admission 2.0  -baseline creatinine 1.5 with GFR 33 on outpatient office visit in February 2024 with cardiology clinic

## 2024-03-05 NOTE — ASSESSMENT & PLAN NOTE
Chronic, uncontrolled. Latest blood pressure and vitals reviewed-     Temp:  [97.5 °F (36.4 °C)-98.1 °F (36.7 °C)]   Pulse:  [46-80]   Resp:  [14-48]   BP: (106-200)/(53-86)   SpO2:  [92 %-100 %]   Arterial Line BP: (119-190)/() .   Home meds for hypertension were reviewed and noted below.   Hypertension Medications               amLODIPine (NORVASC) 10 MG tablet Take 1 tablet (10 mg total) by mouth once daily.    furosemide (LASIX) 20 MG tablet TAKE 1 TABLET(20 MG) BY MOUTH EVERY DAY    guanFACINE (TENEX) 2 MG tablet TAKE 1 TABLET EVERY EVENING    hydrALAZINE (APRESOLINE) 50 MG tablet Take 1 tablet (50 mg total) by mouth 3 (three) times daily.    labetaloL (NORMODYNE) 100 MG tablet Take 1 tablet (100 mg total) by mouth 2 (two) times daily.    olmesartan (BENICAR) 40 MG tablet TAKE 1 TABLET EVERY DAY            While in the hospital, will manage blood pressure as follows; Adjust home antihypertensive regimen as follows- Continue Metoprolol, amlodipine , and PRN hydralazine    Will utilize p.r.n. blood pressure medication only if patient's blood pressure greater than 180/110 and she develops symptoms such as worsening chest pain or shortness of breath.

## 2024-03-05 NOTE — ASSESSMENT & PLAN NOTE
Patient's FSGs are controlled on current medication regimen.  Last A1c reviewed-   Lab Results   Component Value Date    HGBA1C 7.5 (H) 08/28/2023     Most recent fingerstick glucose reviewed-   POC Glucose   Date Value Ref Range Status   03/05/2024 140 (H) 70 - 110 Final   03/04/2024 153 (H) 70 - 110 Final   03/04/2024 120 (H) 70 - 110 Final   03/04/2024 125 (H) 70 - 110 Final      Current correctional scale  Low  Maintain anti-hyperglycemic dose as follows-   Antihyperglycemics (From admission, onward)    Start     Stop Route Frequency Ordered    03/03/24 1200  insulin detemir U-100 (Levemir) pen 10 Units         -- SubQ 2 times daily 03/03/24 1146    03/03/24 1146  insulin aspart U-100 pen 0-5 Units         -- SubQ Before meals & nightly PRN 03/03/24 1146        Hold Oral hypoglycemics while patient is in the hospital.

## 2024-03-05 NOTE — ASSESSMENT & PLAN NOTE
BRIDGETTE on CKD III  Creatine stable for now. BMP reviewed- noted Estimated Creatinine Clearance: 21.5 mL/min (A) (based on SCr of 2.1 mg/dL (H)). according to latest data. Based on current GFR, CKD stage is stage 3 - GFR 30-59.  Monitor UOP and serial BMP and adjust therapy as needed. Renally dose meds. Avoid nephrotoxic medications and procedures.    creatinine on admission 2.0  baseline creatinine 1.5 with GFR 33 on outpatient office visit in February 2024 with cardiology clinic  Cr is 2.1 today, patient is euvolemic     - reduce Lasix to 40 mg daily   - strict I/O

## 2024-03-06 ENCOUNTER — PATIENT MESSAGE (OUTPATIENT)
Dept: CARDIOLOGY | Facility: CLINIC | Age: 79
End: 2024-03-06
Payer: MEDICARE

## 2024-03-06 LAB
ALBUMIN SERPL BCP-MCNC: 3.3 G/DL (ref 3.5–5.2)
ALP SERPL-CCNC: 57 U/L (ref 55–135)
ALT SERPL W/O P-5'-P-CCNC: 19 U/L (ref 10–44)
ANION GAP SERPL CALC-SCNC: 9 MMOL/L (ref 8–16)
AST SERPL-CCNC: 19 U/L (ref 10–40)
BASOPHILS # BLD AUTO: 0.11 K/UL (ref 0–0.2)
BASOPHILS NFR BLD: 0.9 % (ref 0–1.9)
BILIRUB SERPL-MCNC: 0.5 MG/DL (ref 0.1–1)
BNP SERPL-MCNC: 224 PG/ML (ref 0–99)
BUN SERPL-MCNC: 32 MG/DL (ref 8–23)
CALCIUM SERPL-MCNC: 8.7 MG/DL (ref 8.7–10.5)
CHLORIDE SERPL-SCNC: 101 MMOL/L (ref 95–110)
CO2 SERPL-SCNC: 28 MMOL/L (ref 23–29)
CREAT SERPL-MCNC: 2.6 MG/DL (ref 0.5–1.4)
DIFFERENTIAL METHOD BLD: ABNORMAL
EOSINOPHIL # BLD AUTO: 0.3 K/UL (ref 0–0.5)
EOSINOPHIL NFR BLD: 2.4 % (ref 0–8)
ERYTHROCYTE [DISTWIDTH] IN BLOOD BY AUTOMATED COUNT: 16.6 % (ref 11.5–14.5)
EST. GFR  (NO RACE VARIABLE): 18.3 ML/MIN/1.73 M^2
GLUCOSE SERPL-MCNC: 136 MG/DL (ref 70–110)
GLUCOSE SERPL-MCNC: 144 MG/DL (ref 70–110)
GLUCOSE SERPL-MCNC: 145 MG/DL (ref 70–110)
GLUCOSE SERPL-MCNC: 189 MG/DL (ref 70–110)
HCT VFR BLD AUTO: 31.3 % (ref 37–48.5)
HGB BLD-MCNC: 9.6 G/DL (ref 12–16)
IMM GRANULOCYTES # BLD AUTO: 0.08 K/UL (ref 0–0.04)
IMM GRANULOCYTES NFR BLD AUTO: 0.7 % (ref 0–0.5)
LYMPHOCYTES # BLD AUTO: 1.6 K/UL (ref 1–4.8)
LYMPHOCYTES NFR BLD: 13.9 % (ref 18–48)
MAGNESIUM SERPL-MCNC: 2.2 MG/DL (ref 1.6–2.6)
MCH RBC QN AUTO: 25.4 PG (ref 27–31)
MCHC RBC AUTO-ENTMCNC: 30.7 G/DL (ref 32–36)
MCV RBC AUTO: 83 FL (ref 82–98)
MONOCYTES # BLD AUTO: 1.3 K/UL (ref 0.3–1)
MONOCYTES NFR BLD: 11.1 % (ref 4–15)
NEUTROPHILS # BLD AUTO: 8.3 K/UL (ref 1.8–7.7)
NEUTROPHILS NFR BLD: 71 % (ref 38–73)
NRBC BLD-RTO: 0 /100 WBC
PHOSPHATE SERPL-MCNC: 3.8 MG/DL (ref 2.7–4.5)
PLATELET # BLD AUTO: 304 K/UL (ref 150–450)
PMV BLD AUTO: 11.9 FL (ref 9.2–12.9)
POTASSIUM SERPL-SCNC: 4.6 MMOL/L (ref 3.5–5.1)
PROT SERPL-MCNC: 6.3 G/DL (ref 6–8.4)
RBC # BLD AUTO: 3.78 M/UL (ref 4–5.4)
SODIUM SERPL-SCNC: 138 MMOL/L (ref 136–145)
WBC # BLD AUTO: 11.69 K/UL (ref 3.9–12.7)

## 2024-03-06 PROCEDURE — 99233 SBSQ HOSP IP/OBS HIGH 50: CPT | Mod: ,,, | Performed by: INTERNAL MEDICINE

## 2024-03-06 PROCEDURE — 97116 GAIT TRAINING THERAPY: CPT

## 2024-03-06 PROCEDURE — 25000003 PHARM REV CODE 250: Performed by: INTERNAL MEDICINE

## 2024-03-06 PROCEDURE — 63600175 PHARM REV CODE 636 W HCPCS: Performed by: INTERNAL MEDICINE

## 2024-03-06 PROCEDURE — 99233 SBSQ HOSP IP/OBS HIGH 50: CPT | Mod: ,,, | Performed by: STUDENT IN AN ORGANIZED HEALTH CARE EDUCATION/TRAINING PROGRAM

## 2024-03-06 PROCEDURE — 83880 ASSAY OF NATRIURETIC PEPTIDE: CPT | Performed by: NURSE PRACTITIONER

## 2024-03-06 PROCEDURE — 25000003 PHARM REV CODE 250: Performed by: STUDENT IN AN ORGANIZED HEALTH CARE EDUCATION/TRAINING PROGRAM

## 2024-03-06 PROCEDURE — 83735 ASSAY OF MAGNESIUM: CPT | Performed by: INTERNAL MEDICINE

## 2024-03-06 PROCEDURE — 12000002 HC ACUTE/MED SURGE SEMI-PRIVATE ROOM

## 2024-03-06 PROCEDURE — 63600175 PHARM REV CODE 636 W HCPCS: Performed by: NURSE PRACTITIONER

## 2024-03-06 PROCEDURE — 97161 PT EVAL LOW COMPLEX 20 MIN: CPT

## 2024-03-06 PROCEDURE — 94761 N-INVAS EAR/PLS OXIMETRY MLT: CPT

## 2024-03-06 PROCEDURE — 85025 COMPLETE CBC W/AUTO DIFF WBC: CPT | Performed by: INTERNAL MEDICINE

## 2024-03-06 PROCEDURE — 80053 COMPREHEN METABOLIC PANEL: CPT | Performed by: INTERNAL MEDICINE

## 2024-03-06 PROCEDURE — 36415 COLL VENOUS BLD VENIPUNCTURE: CPT | Performed by: INTERNAL MEDICINE

## 2024-03-06 PROCEDURE — 84100 ASSAY OF PHOSPHORUS: CPT | Performed by: INTERNAL MEDICINE

## 2024-03-06 RX ORDER — FUROSEMIDE 20 MG/1
20 TABLET ORAL DAILY
Status: DISCONTINUED | OUTPATIENT
Start: 2024-03-07 | End: 2024-03-07

## 2024-03-06 RX ADMIN — METOPROLOL TARTRATE 50 MG: 50 TABLET, FILM COATED ORAL at 10:03

## 2024-03-06 RX ADMIN — FUROSEMIDE 40 MG: 10 INJECTION, SOLUTION INTRAMUSCULAR; INTRAVENOUS at 08:03

## 2024-03-06 RX ADMIN — POTASSIUM CHLORIDE 20 MEQ: 1500 TABLET, EXTENDED RELEASE ORAL at 10:03

## 2024-03-06 RX ADMIN — INSULIN DETEMIR 10 UNITS: 100 INJECTION, SOLUTION SUBCUTANEOUS at 10:03

## 2024-03-06 RX ADMIN — ACETAMINOPHEN 650 MG: 325 TABLET ORAL at 05:03

## 2024-03-06 RX ADMIN — HYDRALAZINE HYDROCHLORIDE 50 MG: 25 TABLET ORAL at 03:03

## 2024-03-06 RX ADMIN — MUPIROCIN: 20 OINTMENT TOPICAL at 09:03

## 2024-03-06 RX ADMIN — MUPIROCIN 1 G: 20 OINTMENT TOPICAL at 08:03

## 2024-03-06 RX ADMIN — POTASSIUM CHLORIDE 20 MEQ: 1500 TABLET, EXTENDED RELEASE ORAL at 03:03

## 2024-03-06 RX ADMIN — METOPROLOL TARTRATE 50 MG: 50 TABLET, FILM COATED ORAL at 08:03

## 2024-03-06 RX ADMIN — ONDANSETRON 4 MG: 2 INJECTION INTRAMUSCULAR; INTRAVENOUS at 06:03

## 2024-03-06 RX ADMIN — ASPIRIN 81 MG 81 MG: 81 TABLET ORAL at 08:03

## 2024-03-06 RX ADMIN — ENOXAPARIN SODIUM 80 MG: 80 INJECTION SUBCUTANEOUS at 03:03

## 2024-03-06 RX ADMIN — AMLODIPINE BESYLATE 10 MG: 5 TABLET ORAL at 08:03

## 2024-03-06 RX ADMIN — HYDRALAZINE HYDROCHLORIDE 50 MG: 25 TABLET ORAL at 05:03

## 2024-03-06 RX ADMIN — INSULIN DETEMIR 10 UNITS: 100 INJECTION, SOLUTION SUBCUTANEOUS at 08:03

## 2024-03-06 RX ADMIN — FAMOTIDINE 20 MG: 20 TABLET ORAL at 08:03

## 2024-03-06 RX ADMIN — POTASSIUM CHLORIDE 20 MEQ: 1500 TABLET, EXTENDED RELEASE ORAL at 08:03

## 2024-03-06 RX ADMIN — HYDRALAZINE HYDROCHLORIDE 50 MG: 25 TABLET ORAL at 10:03

## 2024-03-06 NOTE — ASSESSMENT & PLAN NOTE
Echo was repeated this admission, EF reduced to 45%  - Held diuresis today due to up trending troponin   - Cont metoprolol  - for inpatient vs outpatient stress test

## 2024-03-06 NOTE — ASSESSMENT & PLAN NOTE
BRIDGETTE on CKD III  Creatine stable for now. BMP reviewed- noted Estimated Creatinine Clearance: 17.4 mL/min (A) (based on SCr of 2.6 mg/dL (H)). according to latest data. Based on current GFR, CKD stage is stage 3 - GFR 30-59.  Monitor UOP and serial BMP and adjust therapy as needed. Renally dose meds. Avoid nephrotoxic medications and procedures.    creatinine on admission 2.0  baseline creatinine 1.5 with GFR 33 on outpatient office visit in February 2024 with cardiology clinic  Cr is worsened to 2.6  - Holding lasix, consider some IVF   - Monitor BMP

## 2024-03-06 NOTE — ASSESSMENT & PLAN NOTE
Patient's FSGs are controlled on current medication regimen.  Last A1c reviewed-   Lab Results   Component Value Date    HGBA1C 7.5 (H) 08/28/2023     Most recent fingerstick glucose reviewed-   POC Glucose   Date Value Ref Range Status   03/06/2024 145 (H) 70 - 110 Final   03/05/2024 133 (H) 70 - 110 Final   03/05/2024 170 (H) 70 - 110 Final   03/05/2024 140 (H) 70 - 110 Final      Current correctional scale  Low  Maintain anti-hyperglycemic dose as follows-   Antihyperglycemics (From admission, onward)    Start     Stop Route Frequency Ordered    03/03/24 1200  insulin detemir U-100 (Levemir) pen 10 Units         -- SubQ 2 times daily 03/03/24 1146    03/03/24 1146  insulin aspart U-100 pen 0-5 Units         -- SubQ Before meals & nightly PRN 03/03/24 1146        Hold Oral hypoglycemics while patient is in the hospital.

## 2024-03-06 NOTE — PROGRESS NOTES
Formerly [facility]  Department of Cardiology  Progress Note      PATIENT NAME: Cynthia N Cushing  MRN: [id number]  TODAY'S DATE: 03/06/2024  ADMIT DATE: 3/3/2024                          CONSULT REQUESTED BY: Alley Bass MD    SUBJECTIVE     PRINCIPAL PROBLEM: Acute hypoxemic respiratory failure    3/6/24:  Patient seen resting in bed with no distress noted. Today she is lying flat. Chest xray shows improvement but not total resolution of pulmonary edema. Her creatinine has bumped from diuresis.     3/5/24:  Patient remained in ICU overnight. BP has been 150-160s. She reports minimal improvement in orthopnea when lying back but has put out a good bit of urine.     3/4/24:  Patient seen resting in bed in the ICU with no acute distress noted.  She remains hypertensive with blood pressure in the 160s to 170s and is currently on a heparin drip.  Patient reports that she is feeling anxious and restless from being in bed.  She is requesting to sit up on the side of the bed and has preferred to sit up in the bed as opposed to lying down.    REASON FOR CONSULT:  NSTEMI      HPI:  Chief Complaint   Patient presents with    Respiratory Distress         HPI: Mrs. Cushing is a pleasant 78-year-old female who has a past medical history of hypertension, diabetes, hyperlipidemia, sleep apnea, SVT, SSS and follow up in cardiology clinic with Dr. Mendieta with recent office visit, last echo October 6, 2023 with EF of 53% with normal diastolic dysfunction and moderate AS who presents to the emergency room today with complaints of increased shortness of breath that started last night, got progressively worse.  The patient was started on CPAP by EMS.  Patient's vital signs in the ER showed blood pressure initially as high as 250/130, tachypnea, .  The patient in the ER was in respiratory distress, she was intubated to protect her airway.  Patient's laboratories and x-rays done.  Patient's case was discussed with  the ER physician at the time of admission.  Laboratories showed troponin 467.9, COVID 19 screen negative, lactic acid 2.7, magnesium 2.0, calcitonin 0.098, sodium 140, potassium 3.6, chloride 109, CO2 18, BUN 22, creatinine 2.0, glucose 336, AST 25, anion gap normal at 13, PT INR 0.9, WBCs 14.8, hemoglobin 11, platelet count 416.  ABG show pH 7.25/pCO2 45.9/PO2 255/bicarbonate 20.5/oxygen saturation 100% this was done on ventilator.  Chest x-ray showed changes of pulmonary edema with ETT in place.  The patient was recommended to be admitted to the ICU for acute respiratory failure with hypoxia, hypertensive emergency, possible sepsis with lactic acid level 2.7.  Patient was given Lasix 40 mg IV in the ER, started on nitroglycerin drip.  Patient started on CHF order set, sepsis order set, no fluid bolus given due to concerns of pulmonary edema and risk of fluid overload, IV antibiotics, blood cultures, cardiology consult, and Pulmonary critical Care consult.  Patient is a full code status.      Review of patient's allergies indicates:   Allergen Reactions    Biaxin [clarithromycin]     Prandin [repaglinide] Nausea And Vomiting    Amlodipine     Chlorphen-phenyltolox-pe-ppa     Ciprofloxacin Nausea And Vomiting    Omnicef [cefdinir]     Propoxyphene     Quinine Nausea And Vomiting       Past Medical History:   Diagnosis Date    Allergy     Breast mass     Cataract     Diabetes mellitus     GERD (gastroesophageal reflux disease)     Hernia, hiatal     Hyperlipidemia     Hypertension     Kidney stone     Osteoarthritis     Renal insufficiency     Seasonal allergies     Sleep apnea     SSS (sick sinus syndrome)     SVT (supraventricular tachycardia)      Past Surgical History:   Procedure Laterality Date    BREAST BIOPSY      BREAST CYST ASPIRATION      BREAST SURGERY      CERVICAL DISC SURGERY      EYE SURGERY      cataracts bilateral    HYSTERECTOMY       Social History     Tobacco Use    Smoking status: Never     Smokeless tobacco: Never   Substance Use Topics    Alcohol use: No    Drug use: No        REVIEW OF SYSTEMS  Intubated/sedated    OBJECTIVE     VITAL SIGNS (Most Recent)  Temp: 98.2 °F (36.8 °C) (03/06/24 0701)  Pulse: 65 (03/06/24 1000)  Resp: 20 (03/06/24 0901)  BP: 132/61 (03/06/24 1000)  SpO2: 95 % (03/06/24 1000)    VENTILATION STATUS  Resp: 20 (03/06/24 0901)  SpO2: 95 % (03/06/24 1000)           I & O (Last 24H):  Intake/Output Summary (Last 24 hours) at 3/6/2024 1219  Last data filed at 3/6/2024 0654  Gross per 24 hour   Intake 240 ml   Output 300 ml   Net -60 ml         WEIGHTS  Wt Readings from Last 3 Encounters:   03/03/24 1115 75.9 kg (167 lb 5.3 oz)   03/03/24 0813 76.7 kg (169 lb 1.5 oz)   02/02/24 1311 78.3 kg (172 lb 8.2 oz)   12/12/23 0949 78.5 kg (173 lb)       PHYSICAL EXAM /65 NIBP, 146/63 per art line; NSR on telemetry    GENERAL: critically ill elderly female breathing per vent in no apparent distress.  HEENT: Normocephalic.    NECK: No JVD.   CARDIAC: Regular rate and rhythm. S1 is normal.S2 is normal.No gallops, clicks or murmurs noted at this time.  CHEST ANATOMY: normal.   LUNGS: +Crackles; .   ABDOMEN: Soft, non distended, hypoactive BS.    EXTREMITIES: No edema  CENTRAL NERVOUS SYSTEM: sedated/intubated  SKIN: No rash   Linares draining clear yellow urine  HOME MEDICATIONS:  No current facility-administered medications on file prior to encounter.     Current Outpatient Medications on File Prior to Encounter   Medication Sig Dispense Refill    famotidine (PEPCID) 20 MG tablet TAKE 1 TABLET TWICE DAILY (Patient taking differently: Take 20 mg by mouth 2 (two) times daily.) 180 tablet 2    furosemide (LASIX) 20 MG tablet TAKE 1 TABLET(20 MG) BY MOUTH EVERY DAY (Patient taking differently: Take 20 mg by mouth once daily.) 30 tablet 0    guanFACINE (TENEX) 2 MG tablet TAKE 1 TABLET EVERY EVENING (Patient taking differently: Take 2 mg by mouth nightly.) 90 tablet 3    hydrALAZINE  "(APRESOLINE) 50 MG tablet Take 1 tablet (50 mg total) by mouth 3 (three) times daily. 90 tablet 11    labetaloL (NORMODYNE) 100 MG tablet Take 1 tablet (100 mg total) by mouth 2 (two) times daily. 60 tablet 11    LANTUS SOLOSTAR U-100 INSULIN glargine 100 units/mL SubQ pen ADMINISTER 51 UNITS UNDER THE SKIN AT NIGHT (Patient taking differently: Inject 51 Units into the skin every evening. ADMINISTER 51 UNITS UNDER THE SKIN AT NIGHT) 15 mL 5    olmesartan (BENICAR) 40 MG tablet TAKE 1 TABLET EVERY DAY (Patient taking differently: Take 40 mg by mouth once daily.) 90 tablet 3    rosuvastatin (CRESTOR) 10 MG tablet Take 1 tablet (10 mg total) by mouth every evening. 90 tablet 2    ACCU-CHEK AMADOR PLUS TEST STRP Strp TEST BLOOD SUGAR FOUR TIMES DAILY 400 strip 10    amLODIPine (NORVASC) 10 MG tablet Take 1 tablet (10 mg total) by mouth once daily. 30 tablet 11    aspirin 81 MG Chew Take 81 mg by mouth once daily.      lancets (ACCU-CHEK SOFTCLIX LANCETS) Misc TEST BLOOD SUGAR FOUR TIMES DAILY 400 each 3    pen needle, diabetic (BD ANNA 2ND GEN PEN NEEDLE) 32 gauge x 5/32" Ndle INJECT 1 NEEDLE INTO THE SKIN EVERY EVENING 100 each 5       SCHEDULED MEDS:   amLODIPine  10 mg Oral Daily    aspirin  81 mg Oral Daily    enoxparin  1 mg/kg Subcutaneous Q24H (treatment, non-standard time)    famotidine  20 mg Oral Daily    hydrALAZINE  50 mg Oral Q8H    insulin detemir U-100  10 Units Subcutaneous BID    metoprolol tartrate  50 mg Oral BID    mupirocin   Nasal BID    potassium chloride  20 mEq Oral TID       CONTINUOUS INFUSIONS:        PRN MEDS:acetaminophen, dextrose 50% in water (D50W), dextrose 50% in water (D50W), [] fentaNYL **FOLLOWED BY** fentaNYL, glucagon (human recombinant), glucose, glucose, hydrALAZINE, insulin aspart U-100, magnesium sulfate IVPB, magnesium sulfate IVPB, ondansetron, potassium bicarbonate, potassium bicarbonate, potassium bicarbonate, sodium chloride 0.9%    LABS AND DIAGNOSTICS     CBC LAST " 3 DAYS  Recent Labs   Lab 03/04/24  0455 03/05/24 0239 03/06/24  0258   WBC 13.19*  13.19* 11.32  11.32 11.69   RBC 3.95*  3.95* 3.74*  3.74* 3.78*   HGB 10.0*  10.0* 9.6*  9.6* 9.6*   HCT 31.6*  31.6* 30.4*  30.4* 31.3*   MCV 80*  80* 81*  81* 83   MCH 25.3*  25.3* 25.7*  25.7* 25.4*   MCHC 31.6*  31.6* 31.6*  31.6* 30.7*   RDW 16.8*  16.8* 16.6*  16.6* 16.6*     297 276  276 304   MPV 11.2  11.2 11.4  11.4 11.9   GRAN 72.6  72.6  9.6*  9.6* 71.1  71.1  8.1*  8.1* 71.0  8.3*   LYMPH 15.0*  15.0*  2.0  2.0 14.4*  14.4*  1.6  1.6 13.9*  1.6   MONO 9.2  9.2  1.2*  1.2* 10.6  10.6  1.2*  1.2* 11.1  1.3*   BASO 0.08  0.08 0.09  0.09 0.11   NRBC 0  0 0  0 0         COAGULATION LAST 3 DAYS  Recent Labs   Lab 03/03/24  0827 03/03/24  1013 03/03/24  1636 03/04/24  1244 03/04/24 2019 03/05/24 0239   INR 0.9 0.9  --   --   --   --    APTT  --  22.5   < > 37.9* 43.3* 42.6*    < > = values in this interval not displayed.         CHEMISTRY LAST 3 DAYS  Recent Labs   Lab 03/04/24  0313 03/04/24  0417 03/04/24  0452 03/04/24  0455 03/05/24  0239 03/06/24  0258   NA  --   --   --  142 138 138   K  --   --   --  3.8 3.2* 4.6   CL  --   --   --  107 100 101   CO2  --   --   --  24 26 28   ANIONGAP  --   --   --  11 12 9   BUN  --   --   --  23 25* 32*   CREATININE  --   --   --  1.8* 2.1* 2.6*   GLU  --   --   --  122* 160* 144*   CALCIUM  --   --   --  9.0 8.6* 8.7   PH 7.463* 7.475* 7.471*  --   --   --    MG  --   --   --  2.1 1.8 2.2   ALBUMIN  --   --   --  3.4* 3.3* 3.3*   PROT  --   --   --  6.3 6.2 6.3   ALKPHOS  --   --   --  69 66 57   ALT  --   --   --  23 20 19   AST  --   --   --  28 22 19   BILITOT  --   --   --  0.4 0.6 0.5         CARDIAC PROFILE LAST 3 DAYS  Recent Labs   Lab 03/03/24 0827 03/06/24  0258   * 224*         ENDOCRINE LAST 3 DAYS  Recent Labs   Lab 03/03/24 0827   PROCAL 0.098         LAST ARTERIAL BLOOD GAS  ABG  Recent Labs   Lab  03/04/24  0452   PH 7.471*   PO2 80   PCO2 35.2   HCO3 25.7   BE 2         LAST 7 DAYS MICROBIOLOGY   Microbiology Results (last 7 days)       Procedure Component Value Units Date/Time    Blood culture x two cultures. Draw prior to antibiotics. [[id number]] Collected: 03/03/24 0903    Order Status: Completed Specimen: Blood Updated: 03/05/24 1232     Blood Culture, Routine No Growth to date      No Growth to date      No Growth to date    Narrative:      Aerobic and anaerobic    Blood culture x two cultures. Draw prior to antibiotics. [[id number]] Collected: 03/03/24 0903    Order Status: Completed Specimen: Blood Updated: 03/05/24 1232     Blood Culture, Routine No Growth to date      No Growth to date      No Growth to date    Narrative:      Aerobic and anaerobic    Blood culture [[id number]]     Order Status: Canceled Specimen: Blood     Blood culture [[id number]]     Order Status: Canceled Specimen: Blood             MOST RECENT IMAGING  X-Ray Chest 1 View  Chest, single view    HISTORY: Respiratory distress.    Comparison 3/4/2024.    The cardiac silhouette appears mildly enlarged, likely accentuated by rotation of the patient. The central pulmonary vasculature is not acutely engorged. There has been further improvement in previously demonstrated interstitial pulmonary edema. No new infiltrate, volume loss or significant effusion identified.    IMPRESSION:    Improving pulmonary edema.    Electronically signed by:  Philip Orellana MD  03/06/2024 07:44 AM CST Workstation: [id number]      ECHOCARDIOGRAM RESULTS (last 5)  Results for orders placed during the hospital encounter of 10/06/23    Echo Saline Bubble? No    Interpretation Summary    Left Ventricle: The left ventricle is normal in size. Mildly increased wall thickness. There is mild concentric hypertrophy. Normal wall motion. There is normal systolic function. Ejection fraction by visual approximation is 53%. There is normal diastolic function.    Left  Atrium: Left atrium is mildly dilated.    Right Ventricle: Normal right ventricular cavity size. Wall thickness is normal. Right ventricle wall motion  is normal. Systolic function is normal.    Aortic Valve: The aortic valve is a trileaflet valve. There is moderate aortic valve sclerosis. Mildly calcified cusps. Mildly restricted motion. There is moderate stenosis. Aortic valve area by VTI is 1.39 cm². Aortic valve peak velocity is 2.67 m/s. Mean gradient is 16 mmHg. The dimensionless index is 0.44.    Mitral Valve: There is mild regurgitation with a centrally directed jet.    Tricuspid Valve: There is mild regurgitation.    IVC/SVC: Normal venous pressure at 3 mmHg.    The estimated pulmonary artery systolic pressure is 28 mmHg.      Results for orders placed during the hospital encounter of 04/11/22    Echo Saline Bubble? No    Interpretation Summary  · The left ventricle is normal in size with concentric remodeling and normal systolic function.  · The estimated ejection fraction is 55%.  · Normal left ventricular diastolic function.  · Moderate left atrial enlargement.  · There is mild aortic valve stenosis.  · Aortic valve area is 1.50 cm2; peak velocity is 2.68 m/s; mean gradient is 15 mmHg.      CURRENT/PREVIOUS VISIT EKG  Results for orders placed or performed during the hospital encounter of 03/03/24   EKG 12-lead    Collection Time: 03/03/24  2:53 PM   Result Value Ref Range    QRS Duration 106 ms    [facility] QTC Calculation 514 ms    Narrative    Test Reason : I21.4,    Vent. Rate : 058 BPM     Atrial Rate : 000 BPM     P-R Int : 000 ms          QRS Dur : 106 ms      QT Int : 524 ms       P-R-T Axes : 000 -15 205 degrees     QTc Int : 514 ms    Atrial fibrillation with slow ventricular response  LVH with repolarization abnormality ( Art product )  Prolonged QT  Abnormal ECG  When compared with ECG of 03-MAR-2024 12:02,  Atrial fibrillation has replaced Sinus rhythm    Referred By: [email or web address]   SELF            Confirmed By:            ASSESSMENT/PLAN:     Active Hospital Problems    Diagnosis    *Acute hypoxemic respiratory failure    Acute on chronic heart failure with preserved ejection fraction (HFpEF)    NSTEMI (non-ST elevated myocardial infarction)    Hypertensive emergency    ACP (advance care planning)     -I spoke with the patient's daughters Mrs. Duran and Mrs. Ramos.  They were updated on the patient's condition.  Their questions and concerns were addressed.  They request that the patient be a full code status.      Aortic stenosis    Hyperlipidemia    Stage 3b chronic kidney disease    Uncontrolled type 2 diabetes mellitus with hyperglycemia    SSS (sick sinus syndrome)    Iron deficiency anemia       ASSESSMENT & PLAN:   Acute hypoxemic respiratory failure  Acute heart failure EF 45-50 %  Hypertensive emergency  Aortic stenosis, likely moderate  NSTEMI  CKD  HTN   DM2    RECOMMENDATIONS:    Discussed the recommendation for stress test versus conservative medical management. Patient verbalized understanding that if stress test is positive the next recommendation would be an angiogram which could be detrimental to renal function. If she would like to proceed with stress test, please notify us and we will place orders.   Agree with holding diuresis for now. Consider low dose oral furosemide tomorrow if renal function is improved.   Bp stable. Continue amlodipine 10 mg po daily, metoprolol tartrate 50 mg po BID, and hydralazine 50 mg po TID.   Will follow.     Mary White NP  Former[facility]  Department of Cardiology  Date of Service: 03/06/2024

## 2024-03-06 NOTE — ASSESSMENT & PLAN NOTE
Troponin down trending   Completed heparin drip for 48 hrs   - cont aspirin, Statins   - inpatient vs outpatient stress test per cardiology. Patient has not made a decision.

## 2024-03-06 NOTE — CARE UPDATE
03/06/24 0720   Patient Assessment/Suction   Level of Consciousness (AVPU) alert   Respiratory Effort Normal;Unlabored   Expansion/Accessory Muscles/Retractions no use of accessory muscles   All Lung Fields Breath Sounds diminished   PRE-TX-O2   Device (Oxygen Therapy) room air   SpO2 95 %   Pulse Oximetry Type Continuous   $ Pulse Oximetry - Multiple Charge Pulse Oximetry - Multiple   Pulse 66   Resp 10

## 2024-03-06 NOTE — PROGRESS NOTES
"[facility]  Adult Nutrition   Progress Note (Nutrition Education)     SUMMARY     Recommendations  1) Continue current cardiac/1200 mL FR diet and add diabetic diet restrictions.   2) Educate pt on diabetes due to an A1C of 7.5.    Goals:   1) Pt to continue to meet 100% of her EEN/EPN.   2) Pt to understand the diabetic diet and be able to lower her A1C to WNL's of standard reference range.    Nutrition Goal Status: progressing towards goal    Communication of RD Recs: other (comment)    Dietitian Rounds Brief  Follow Up Nutrition Note: Saw pt and daughter again today to conduct diabetes education and pt was not interested but with some coaxing from her daughter I got her to agree to coming to OP counseling and her daughter wants to come with her because she wants her mother to control her diabetes. Gave pt my information and the handouts for the nutrition education and told them that the OP dietitian would be contacting them to set up the counseling. Pt is eating 75% or more of her cardiac diet. Her skin is intact and labs have been reviewed. Will continue to follow biweekly and prn till discharge.    LBM was on 3/3/24    Altered nutrition-related labs R/T diabetes AEB an A1C of 7.5.    Nutrition Related Social Determinants of Health: SDOH: Adequate food in home environment    Diet order:   Current Diet Order: Cardiac/1200 mL Fluid Restriction      Evaluation of Received Nutrient/Fluid Intake  Energy Calories Required: meeting needs  Protein Required: meeting needs  Fluid Required: meeting needs  Tolerance: tolerating     % Intake of Estimated Energy Needs: 75 - 100 %  % Meal Intake: 75 - 100 %      Intake/Output Summary (Last 24 hours) at 3/6/2024 1147  Last data filed at 3/6/2024 0654  Gross per 24 hour   Intake 240 ml   Output 300 ml   Net -60 ml        Anthropometrics  Temp: 98.2 °F (36.8 °C)  Height Method: Stated  Height: 5' 3" (160 cm)  Height (inches): 63 in  Weight Method: Bed " Scale  Weight: 75.9 kg (167 lb 5.3 oz)  Weight (lb): 167.33 lb  Ideal Body Weight (IBW), Female: 115 lb  % Ideal Body Weight, Female (lb): 145.5 %  BMI (Calculated): 29.6  BMI Grade: 25 - 29.9 - overweight       Estimated/Assessed Needs  Weight Used For Calorie Calculations: 75.9 kg (167 lb 5.3 oz)  Energy Calorie Requirements (kcal): 1372-0474 kcals/day (20-25 kcals/kg ABW)  Energy Need Method: Kcal/kg  Protein Requirements: 63-78 g/day (1.2-1.5 g/kg IBW)  Weight Used For Protein Calculations: 52 kg (114 lb 10.2 oz)     Estimated Fluid Requirement Method: RDA Method  RDA Method (mL): 1518  CHO Requirement: 190-237 g CHO/day    Reason for Assessment  Reason For Assessment: consult, RD follow-up  Diagnosis: other (see comments), pulmonary disease  Relevant Medical History: Hypertension, Allergy, Kidney stone, Hernia, hiatal, Seasonal allergies, Cataract, Hyperlipidemia, SVT (supraventricular tachycardia), SSS (sick sinus syndrome), Sleep apnea, Diabetes mellitus, GERD (gastroesophageal reflux disease), Renal insufficiency, Breast mass, Osteoarthritis  Interdisciplinary Rounds: attended  Nutrition Discharge Planning: Pending    Nutrition/Diet History  Spiritual, Cultural Beliefs, Jew Practices, Values that Affect Care: no  Food Allergies: NKFA  Factors Affecting Nutritional Intake: None identified at this time    Nutrition Risk Screen  Nutrition Risk Screen: no indicators present     MST Score: 0  Have you recently lost weight without trying?: No  Weight loss score: 0  Have you been eating poorly because of a decreased appetite?: No  Appetite score: 0       Weight History:  Wt Readings from Last 5 Encounters:   03/03/24 75.9 kg (167 lb 5.3 oz)   02/02/24 78.3 kg (172 lb 8.2 oz)   12/12/23 78.5 kg (173 lb)   10/23/23 75.3 kg (166 lb)   10/10/23 77.1 kg (170 lb)        Lab/Procedures/Meds: Pertinent Labs/Meds Reviewed    Medications:Pertinent Medications Reviewed  Scheduled Meds:   amLODIPine  10 mg Oral Daily     aspirin  81 mg Oral Daily    enoxparin  1 mg/kg Subcutaneous Q24H (treatment, non-standard time)    famotidine  20 mg Oral Daily    hydrALAZINE  50 mg Oral Q8H    insulin detemir U-100  10 Units Subcutaneous BID    metoprolol tartrate  50 mg Oral BID    mupirocin   Nasal BID    potassium chloride  20 mEq Oral TID     Continuous Infusions:  PRN Meds:.acetaminophen, dextrose 50% in water (D50W), dextrose 50% in water (D50W), [] fentaNYL **FOLLOWED BY** fentaNYL, glucagon (human recombinant), glucose, glucose, hydrALAZINE, insulin aspart U-100, magnesium sulfate IVPB, magnesium sulfate IVPB, ondansetron, potassium bicarbonate, potassium bicarbonate, potassium bicarbonate, sodium chloride 0.9%    Labs: Pertinent Labs Reviewed  Clinical Chemistry:  Recent Labs   Lab 24  0455 24  0239 24  0258    138 138   K 3.8 3.2* 4.6    100 101   CO2 24 26 28   * 160* 144*   BUN 23 25* 32*   CREATININE 1.8* 2.1* 2.6*   CALCIUM 9.0 8.6* 8.7   PROT 6.3 6.2 6.3   ALBUMIN 3.4* 3.3* 3.3*   BILITOT 0.4 0.6 0.5   ALKPHOS 69 66 57   AST 28 22 19   ALT 23 20 19   ANIONGAP 11 12 9   MG 2.1 1.8 2.2   PHOS 3.9 4.5 3.8     CBC:   Recent Labs   Lab 24  0258   WBC 11.69   RBC 3.78*   HGB 9.6*   HCT 31.3*      MCV 83   MCH 25.4*   MCHC 30.7*       Cardiac Profile:  Recent Labs   Lab 24  0827 24  0258   * 224*       Monitor and Evaluation  Food and Nutrient Intake: food and beverage intake, energy intake  Food and Nutrient Adminstration: diet order  Knowledge/Beliefs/Attitudes: food and nutrition knowledge/skill, beliefs and attitudes  Physical Activity and Function: nutrition-related ADLs and IADLs, factors affecting access to physical activity  Anthropometric Measurements: weight, weight change, body mass index  Biochemical Data, Medical Tests and Procedures: lipid profile, inflammatory profile, glucose/endocrine profile, gastrointestinal profile, electrolyte and renal  panel  Nutrition-Focused Physical Findings: overall appearance     Nutrition Risk  Level of Risk/Frequency of Follow-up: moderate     Nutrition Follow-Up  RD Follow-up?: Yes      Mariajose Calhoun RD 03/06/2024 11:47 AM

## 2024-03-06 NOTE — ASSESSMENT & PLAN NOTE
BP better controlled today   - Cont Norvasc, Metoprolol   - hydralazine 50 mg TID   - Holding Lasix

## 2024-03-06 NOTE — PROGRESS NOTES
[facility]  Progress Note  Pulmonary/Critical Care      PATIENT NAME: Cynthia N Cushing  MRN: [id number]  TODAY'S DATE: 2024  12:07 PM  ADMIT DATE: 3/3/2024  AGE: 78 y.o. : 1945    HPI/INTERVAL HISTORY (See H&P for complete P,F,SHx) :   Cynthia N Cushing is a 78 y.o. female with a PMH of moderate aortic stenosis, HFpEF, SVT, sick sinus syndrome, HTN, DM2, CKD 3, and LAUREN on whom we have been consulted for acute hypoxic respiratory failure secondary to hypertensive emergency and flash pulmonary edema.     The patient called her daughter complaining of dyspnea this morning; no chest pain. Went to ED. She had an SBP up to 254. She was hypoxic and in severe respiratory distress, so she was intubated. Lasix was given. BP was controlled with nitroglycerin drip. Oxygenation improved rapidly.    Today:     She is doing well this morning, on nasal cannula, she is not in any distress. No chest pain. Tolerating diet.     Review of Systems   Constitutional:  Negative for appetite change, chills, fever and unexpected weight change.   HENT:  Negative for nosebleeds, postnasal drip, trouble swallowing and voice change.    Eyes:  Negative for visual disturbance.   Respiratory:  Positive for shortness of breath. Negative for cough and wheezing.    Cardiovascular:  Positive for leg swelling. Negative for chest pain.   Gastrointestinal:  Negative for diarrhea, nausea and vomiting.   Endocrine: Negative for cold intolerance and heat intolerance.   Genitourinary:  Negative for dysuria, flank pain and frequency.   Musculoskeletal:  Negative for neck pain and neck stiffness.   Allergic/Immunologic: Negative for environmental allergies and immunocompromised state.   Neurological:  Negative for syncope, speech difficulty and weakness.   Hematological:  Negative for adenopathy.   Psychiatric/Behavioral:  Negative for confusion.            VITAL SIGNS (MOST RECENT)  Temp: 98.2 °F (36.8 °C) (24 0701)  Pulse: 73  (03/06/24 0901)  Resp: 20 (03/06/24 0901)  BP: (!) 116/57 (03/06/24 0901)  SpO2: 96 % (03/06/24 0901)    INTAKE AND OUTPUT (LAST 24 HOURS):  Intake/Output Summary (Last 24 hours) at 3/6/2024 0923  Last data filed at 3/6/2024 0654  Gross per 24 hour   Intake 240 ml   Output 300 ml   Net -60 ml         WEIGHT  Wt Readings from Last 1 Encounters:   03/03/24 75.9 kg (167 lb 5.3 oz)       Physical Exam  Vitals reviewed.   Constitutional:       General: She is not in acute distress.     Appearance: She is well-developed. She is obese. She is ill-appearing. She is not diaphoretic.   HENT:      Head: Normocephalic and atraumatic.      Mouth/Throat:      Pharynx: No oropharyngeal exudate or posterior oropharyngeal erythema.   Eyes:      General: No scleral icterus.     Pupils: Pupils are equal, round, and reactive to light.   Neck:      Vascular: No JVD.   Cardiovascular:      Rate and Rhythm: Normal rate and regular rhythm.      Heart sounds: Normal heart sounds. No murmur heard.  Pulmonary:      Effort: Pulmonary effort is normal. No respiratory distress.      Breath sounds: Rales present. No wheezing.   Abdominal:      General: Bowel sounds are normal. There is distension.      Palpations: Abdomen is soft.      Tenderness: There is no abdominal tenderness.   Musculoskeletal:         General: Swelling present.      Cervical back: Normal range of motion and neck supple. No rigidity.      Right lower leg: Edema present.      Left lower leg: Edema present.   Skin:     General: Skin is warm and dry.      Capillary Refill: Capillary refill takes less than 2 seconds.      Coloration: Skin is not pale.      Findings: No rash.   Neurological:      General: No focal deficit present.      Mental Status: She is alert and oriented to person, place, and time.      Cranial Nerves: No cranial nerve deficit.      Motor: No weakness or abnormal muscle tone.           VENTILATOR SETTINGS              LAST ARTERIAL BLOOD GAS  ABG  Recent  "Labs   Lab 03/04/24  0452   PH 7.471*   PO2 80   PCO2 35.2   HCO3 25.7   BE 2         ACUTE PHASE REACTANT (LAST 24 HOURS)  No results for input(s): "FERRITIN", "CRP", "LDH", "DDIMER" in the last 24 hours.    CBC LAST (LAST 24 HOURS)  Recent Labs   Lab 03/06/24  0258   WBC 11.69   RBC 3.78*   HGB 9.6*   HCT 31.3*   MCV 83   MCH 25.4*   MCHC 30.7*   RDW 16.6*      MPV 11.9   GRAN 71.0  8.3*   LYMPH 13.9*  1.6   MONO 11.1  1.3*   BASO 0.11   NRBC 0         CHEMISTRY LAST (LAST 24 HOURS)  Recent Labs   Lab 03/06/24  0258      K 4.6      CO2 28   ANIONGAP 9   BUN 32*   CREATININE 2.6*   *   CALCIUM 8.7   MG 2.2   ALBUMIN 3.3*   PROT 6.3   ALKPHOS 57   ALT 19   AST 19   BILITOT 0.5         COAGULATION LAST (LAST 24 HOURS)  No results for input(s): "LABPT", "INR", "APTT" in the last 24 hours.      CARDIAC PROFILE (LAST 24 HOURS)  Recent Labs   Lab 03/05/24  1409 03/06/24  0258   BNP  --  224*   TROPONINIHS 638.6*  --          LAST 7 DAYS MICROBIOLOGY   Microbiology Results (last 7 days)       Procedure Component Value Units Date/Time    Blood culture x two cultures. Draw prior to antibiotics. [[id number]] Collected: 03/03/24 0903    Order Status: Completed Specimen: Blood Updated: 03/05/24 1232     Blood Culture, Routine No Growth to date      No Growth to date      No Growth to date    Narrative:      Aerobic and anaerobic    Blood culture x two cultures. Draw prior to antibiotics. [[id number]] Collected: 03/03/24 0903    Order Status: Completed Specimen: Blood Updated: 03/05/24 1232     Blood Culture, Routine No Growth to date      No Growth to date      No Growth to date    Narrative:      Aerobic and anaerobic    Blood culture [[id number]]     Order Status: Canceled Specimen: Blood     Blood culture [[id number]]     Order Status: Canceled Specimen: Blood             MOST RECENT IMAGING  X-Ray Chest 1 View  Chest, single view    HISTORY: Respiratory distress.    Comparison 3/4/2024.    The " cardiac silhouette appears mildly enlarged, likely accentuated by rotation of the patient. The central pulmonary vasculature is not acutely engorged. There has been further improvement in previously demonstrated interstitial pulmonary edema. No new infiltrate, volume loss or significant effusion identified.    IMPRESSION:    Improving pulmonary edema.    Electronically signed by:  Philip Orellana MD  03/06/2024 07:44 AM CST Workstation: [id number]      CURRENT VISIT EKG  Results for orders placed or performed during the hospital encounter of 03/03/24   EKG 12-lead   Result Value Ref Range    QRS Duration 104 ms    OHS QTC Calculation 525 ms    Narrative    Test Reason : R00.1,    Vent. Rate : 061 BPM     Atrial Rate : 061 BPM     P-R Int : 162 ms          QRS Dur : 104 ms      QT Int : 522 ms       P-R-T Axes : -29 -15 210 degrees     QTc Int : 525 ms    Sinus rhythm with occasional Premature ventricular complexes  LVH with repolarization abnormality ( Art product )  Prolonged QT  Abnormal ECG  When compared with ECG of 03-MAR-2024 12:02,  Previous ECG has undetermined rhythm, needs review    Referred By: [email or web address]   SELF           Confirmed By:        ECHOCARDIOGRAM RESULTS  Results for orders placed during the hospital encounter of 03/03/24    Echo Saline Bubble? No    Interpretation Summary    Left Ventricle: The left ventricle is normal in size. Mildly increased wall thickness. There is mild concentric hypertrophy. Mild global hypokinesis present. There is mildly reduced systolic function with a visually estimated ejection fraction of 45 - 50%. There is normal diastolic function.    Right Ventricle: Normal right ventricular cavity size. Wall thickness is normal. Right ventricle wall motion  is normal. Systolic function is normal.    Left Atrium: Left atrium is moderately dilated.    Aortic Valve: The aortic valve is a trileaflet valve. There is moderate aortic valve sclerosis. There is mild annular  calcification present. Moderately restricted motion. There is moderate to severe stenosis. Aortic valve area by VTI is 0.99 cm². Aortic valve peak velocity is 2.40 m/s. Mean gradient is 13 mmHg. The dimensionless index is 0.31.    Tricuspid Valve: The tricuspid valve is structurally normal. There is normal leaflet mobility. There is mild regurgitation with a centrally directed jet.    Pulmonary Artery: There is mild to moderate pulmonary hypertension. The estimated pulmonary artery systolic pressure is 45 mmHg.    IVC/SVC: Intermediate venous pressure at 8 mmHg.    Pericardium: There is a trivial circumferential effusion. Pericardial effusion is echolucent. No indication of cardiac tamponade.      ASSESSMENT:   #Hypertensive emergency  #Flash pulmonary edema  #NSTEMI  #Acute hypoxic respiratory failure requiring intubation  #Acutely decompensated valvular heart failure (AS)  #Lactic acidosis likely due to CHF  #BRIDGETTE on CKD 3, likely cardiorenal    Ms Cushing is a 78-year-old woman with a history of valvular heart disease due to aortic stenosis, and underlying biventricular heart failure due to chronic diastolic dysfunction, and chronic medical conditions including hypertension diabetes CKD 3 and obesity who presents with acute respiratory failure in the setting of hypertensive emergency.    SBP controlled today now in 's.     I had a discussion with her- it seems like she recently stopped taking her BP meds at home- and she reports some confusion with her meds and what she was supposed to be taking at home.     PLAN:   - okay for step-down from ICU - if no immediate plans for angiogram, would discharge home and follow up with cardiology outpatient for stress test- if cardiology feels this is appropriate- will reach out to them.   - appreciate cardiology recommendations- currently on oral BP meds titrating metoprolol, continue amlodipine, and on hydral TID.   -hep drip for 24 hours completed- cardiology  planning on stress test , currently on lovenox per cardiology  - continue oral Pepcid for reflux  - recommend PT/OT consultation  - she needs some help home in regards to taking her medications.   - she looks euvolemic- will hold IV lasix for now.   - D/C eulalio Cage MD  Date of Service: 03/06/2024  12:07 PM

## 2024-03-06 NOTE — PT/OT/SLP EVAL
Physical Therapy Evaluation    Patient Name:  Cynthia N Cushing   MRN:  [id number]    Recommendations:     Discharge Recommendations: Moderate Intensity Therapy; however she declines placement and would like to D/C home with HHPT   Discharge Equipment Recommendations: none   Barriers to discharge: Decreased caregiver support    Assessment:     Cynthia N Cushing is a 78 y.o. female admitted with a medical diagnosis of Acute hypoxemic respiratory failure.  She presents with the following impairments/functional limitations: weakness, impaired endurance, impaired functional mobility, gait instability, impaired balance, decreased lower extremity function, decreased safety awareness, pain, impaired cardiopulmonary response to activity. Patient sitting EOB and is agreeable to participation with PT evaluation. She lives alone and does not use and AD for ambulation at baseline. She requires CGA for sit to stand with RW. She ambulated 10' in room with RW, CGA, slow gait speed, VC for RW use, and decreased weight bearing on right foot with patient attributing to bone-on-bone arthritis in her right knee which she states she does not plan to have surgery on. She is agreeable to sit up in chair with all needs met. Patient endorses feeling much weaker than baseline, but declines placement upon D/C.     Rehab Prognosis: Good; patient would benefit from acute skilled PT services to address these deficits and reach maximum level of function.    Recent Surgery: * No surgery found *      Plan:     During this hospitalization, patient to be seen 6 x/week to address the identified rehab impairments via gait training, therapeutic activities, therapeutic exercises and progress toward the following goals:    Plan of Care Expires:  04/06/24    Subjective     Chief Complaint: RLE pain   Patient/Family Comments/goals: home upon D/C  Pain/Comfort:  Pain Rating 1:  (not rated)  Location - Side 1: Right  Location 1: foot  Pain Addressed 1:  Reposition, Distraction    Patients cultural, spiritual, Yazidi conflicts given the current situation:      Living Environment:  Patient lives alone in a H with no [facility]. She has 2 daughters that live 5 miles away that can assist, but both work  Prior to admission, patients level of function was independent. She denies any recent falls or PT. She does endorse driving.  Equipment used at home: walker, rolling.  DME owned (not currently used): none.  Upon discharge, patient will have assistance from family.    Objective:     Communicated with RN Anna Marie prior to session.  Patient found sitting edge of bed with blood pressure cuff, pulse ox (continuous), telemetry  upon PT entry to room.    General Precautions: Standard, fall  Orthopedic Precautions:N/A   Braces: N/A  Respiratory Status: Room air    Exams:  RLE Strength: WFL  LLE Strength: WFL    Functional Mobility:  Transfers:     Sit to Stand:  contact guard assistance with rolling walker  Gait: 10' in room with RW, CGA, slow gait speed, VC for RW use, and decreased weight bearing on right foot with patient attributing to bone-on-bone arthritis in her right knee which she states she does not plan to have surgery on      AM-PAC 6 CLICK MOBILITY  Total Score:19       Treatment & Education:  Patient was educated on the importance of OOB activity and functional mobility to negate negative effects of prolonged bed rest during hospitalization, safe transfers and ambulation, and D/C planning     Patient left up in chair with all lines intact, call button in reach, and RN notified.    GOALS:   Multidisciplinary Problems       Physical Therapy Goals          Problem: Physical Therapy    Goal Priority Disciplines Outcome Goal Variances Interventions   Physical Therapy Goal     PT, PT/OT      Description: Goals to be met by: 24    Patient will increase functional independence with mobility by performin. Supine to sit with Supervision  2. Sit to stand transfer  with Supervision  3. Bed to chair transfer with Supervision using Rolling Walker  4. Gait  x 250 feet with Supervision using Rolling Walker.   5. Lower extremity exercise program x20 reps per handout, with supervision                       History:     Past Medical History:   Diagnosis Date    Allergy     Breast mass     Cataract     Diabetes mellitus     GERD (gastroesophageal reflux disease)     Hernia, hiatal     Hyperlipidemia     Hypertension     Kidney stone     Osteoarthritis     Renal insufficiency     Seasonal allergies     Sleep apnea     SSS (sick sinus syndrome)     SVT (supraventricular tachycardia)        Past Surgical History:   Procedure Laterality Date    BREAST BIOPSY      BREAST CYST ASPIRATION      BREAST SURGERY      CERVICAL DISC SURGERY      EYE SURGERY      cataracts bilateral    HYSTERECTOMY         Time Tracking:     PT Received On: 03/06/24  PT Start Time: 1313     PT Stop Time: 1329  PT Total Time (min): 16 min     Billable Minutes: Evaluation 6 and Gait Training 10      03/06/2024

## 2024-03-06 NOTE — SUBJECTIVE & OBJECTIVE
Interval History: Patient is feeling great. Wanting to go home. Denies any chest pain or SOB.     Review of Systems  Objective:     Vital Signs (Most Recent):  Temp: 98.2 °F (36.8 °C) (03/06/24 0701)  Pulse: 65 (03/06/24 1000)  Resp: 20 (03/06/24 0901)  BP: 132/61 (03/06/24 1000)  SpO2: 95 % (03/06/24 1000) Vital Signs (24h Range):  Temp:  [98.2 °F (36.8 °C)-98.4 °F (36.9 °C)] 98.2 °F (36.8 °C)  Pulse:  [60-92] 65  Resp:  [10-26] 20  SpO2:  [91 %-99 %] 95 %  BP: (114-154)/(55-88) 132/61     Weight: 75.9 kg (167 lb 5.3 oz)  Body mass index is 29.64 kg/m².    Intake/Output Summary (Last 24 hours) at 3/6/2024 1352  Last data filed at 3/6/2024 0654  Gross per 24 hour   Intake 240 ml   Output 300 ml   Net -60 ml         Physical Exam  Vitals and nursing note reviewed.   Constitutional:       General: She is not in acute distress.     Appearance: She is obese. She is not toxic-appearing.   HENT:      Head: Atraumatic.      Mouth/Throat:      Mouth: Mucous membranes are moist.      Pharynx: Oropharynx is clear.   Eyes:      General: No scleral icterus.     Conjunctiva/sclera: Conjunctivae normal.      Pupils: Pupils are equal, round, and reactive to light.   Cardiovascular:      Rate and Rhythm: Normal rate and regular rhythm.      Heart sounds: No murmur heard.  Pulmonary:      Effort: No respiratory distress.      Breath sounds: No wheezing, rhonchi or rales.   Abdominal:      General: Abdomen is flat. Bowel sounds are normal.      Palpations: Abdomen is soft.   Musculoskeletal:         General: Swelling (trace) present. No deformity.      Cervical back: No rigidity or tenderness.   Skin:     Coloration: Skin is not jaundiced or pale.      Findings: No bruising, erythema or rash.   Neurological:      General: No focal deficit present.      Mental Status: She is alert and oriented to person, place, and time.      Cranial Nerves: No cranial nerve deficit.      Sensory: No sensory deficit.      Motor: No weakness.    Psychiatric:         Mood and Affect: Mood normal.         Behavior: Behavior normal.             Significant Labs: All pertinent labs within the past 24 hours have been reviewed.  CBC:   Recent Labs   Lab 03/05/24  0239 03/06/24  0258   WBC 11.32  11.32 11.69   HGB 9.6*  9.6* 9.6*   HCT 30.4*  30.4* 31.3*     276 304     CMP:   Recent Labs   Lab 03/05/24 0239 03/06/24  0258    138   K 3.2* 4.6    101   CO2 26 28   * 144*   BUN 25* 32*   CREATININE 2.1* 2.6*   CALCIUM 8.6* 8.7   PROT 6.2 6.3   ALBUMIN 3.3* 3.3*   BILITOT 0.6 0.5   ALKPHOS 66 57   AST 22 19   ALT 20 19   ANIONGAP 12 9       Significant Imaging: I have reviewed all pertinent imaging results/findings within the past 24 hours.

## 2024-03-06 NOTE — PROGRESS NOTES
[facility]  Progress Note    Patient Name: Cynthia N Cushing  MRN: [id number]  Patient Class: IP- Inpatient   Admission Date: 3/3/2024  Length of Stay: 3 days  Attending Physician: Alley Bass MD  Primary Care Provider: Teri Adams MD        Subjective:     Principal Problem:Acute hypoxemic respiratory failure        HPI:  Mrs. Cushing is a pleasant 78-year-old female who has a past medical history of hypertension, diabetes, hyperlipidemia, sleep apnea, SVT, SSS and follow up in cardiology clinic with Dr. Mendieta with recent office visit, last echo October 6, 2023 with EF of 53% with normal diastolic dysfunction and moderate AS who presents to the emergency room today with complaints of increased shortness of breath that started last night, got progressively worse.  The patient was started on CPAP by EMS.  Patient's vital signs in the ER showed blood pressure initially as high as 250/130, tachypnea, .  The patient in the ER was in respiratory distress, she was intubated to protect her airway.  Patient's laboratories and x-rays done.  Patient's case was discussed with the ER physician at the time of admission.  Laboratories showed troponin 467.9, COVID 19 screen negative, lactic acid 2.7, magnesium 2.0, calcitonin 0.098, sodium 140, potassium 3.6, chloride 109, CO2 18, BUN 22, creatinine 2.0, glucose 336, AST 25, anion gap normal at 13, PT INR 0.9, WBCs 14.8, hemoglobin 11, platelet count 416.  ABG show pH 7.25/pCO2 45.9/PO2 255/bicarbonate 20.5/oxygen saturation 100% this was done on ventilator.  Chest x-ray showed changes of pulmonary edema with ETT in place.  The patient was recommended to be admitted to the ICU for acute respiratory failure with hypoxia, hypertensive emergency, possible sepsis with lactic acid level 2.7.  Patient was given Lasix 40 mg IV in the ER, started on nitroglycerin drip.  Patient started on CHF order set, sepsis order set, no fluid  bolus given due to concerns of pulmonary edema and risk of fluid overload, IV antibiotics, blood cultures, cardiology consult, and Pulmonary critical Care consult.  Patient is a full code status.    Overview/Hospital Course:  This is a 78 year old female with history of HFpEF, was admitted to ICU with fluid overload secondary to decompensated heart failure. Patient was intubated on admission. Echo was repeated which showed EF reduced to 45%. Patient was diuresed. Cardiology was consulted. Patient was extubated next day and currently on RA. Cardiology planning on possible angiogram vs stress test due to reduced EF. Patient can be transferred out of the ICU. Patient's Cr up trended today and Lasix has been held.     Interval History: Patient is feeling great. Wanting to go home. Denies any chest pain or SOB.     Review of Systems  Objective:     Vital Signs (Most Recent):  Temp: 98.2 °F (36.8 °C) (03/06/24 0701)  Pulse: 65 (03/06/24 1000)  Resp: 20 (03/06/24 0901)  BP: 132/61 (03/06/24 1000)  SpO2: 95 % (03/06/24 1000) Vital Signs (24h Range):  Temp:  [98.2 °F (36.8 °C)-98.4 °F (36.9 °C)] 98.2 °F (36.8 °C)  Pulse:  [60-92] 65  Resp:  [10-26] 20  SpO2:  [91 %-99 %] 95 %  BP: (114-154)/(55-88) 132/61     Weight: 75.9 kg (167 lb 5.3 oz)  Body mass index is 29.64 kg/m².    Intake/Output Summary (Last 24 hours) at 3/6/2024 1352  Last data filed at 3/6/2024 0654  Gross per 24 hour   Intake 240 ml   Output 300 ml   Net -60 ml         Physical Exam  Vitals and nursing note reviewed.   Constitutional:       General: She is not in acute distress.     Appearance: She is obese. She is not toxic-appearing.   HENT:      Head: Atraumatic.      Mouth/Throat:      Mouth: Mucous membranes are moist.      Pharynx: Oropharynx is clear.   Eyes:      General: No scleral icterus.     Conjunctiva/sclera: Conjunctivae normal.      Pupils: Pupils are equal, round, and reactive to light.   Cardiovascular:      Rate and Rhythm: Normal rate and  regular rhythm.      Heart sounds: No murmur heard.  Pulmonary:      Effort: No respiratory distress.      Breath sounds: No wheezing, rhonchi or rales.   Abdominal:      General: Abdomen is flat. Bowel sounds are normal.      Palpations: Abdomen is soft.   Musculoskeletal:         General: Swelling (trace) present. No deformity.      Cervical back: No rigidity or tenderness.   Skin:     Coloration: Skin is not jaundiced or pale.      Findings: No bruising, erythema or rash.   Neurological:      General: No focal deficit present.      Mental Status: She is alert and oriented to person, place, and time.      Cranial Nerves: No cranial nerve deficit.      Sensory: No sensory deficit.      Motor: No weakness.   Psychiatric:         Mood and Affect: Mood normal.         Behavior: Behavior normal.             Significant Labs: All pertinent labs within the past 24 hours have been reviewed.  CBC:   Recent Labs   Lab 03/05/24  0239 03/06/24  0258   WBC 11.32  11.32 11.69   HGB 9.6*  9.6* 9.6*   HCT 30.4*  30.4* 31.3*     276 304     CMP:   Recent Labs   Lab 03/05/24  0239 03/06/24  0258    138   K 3.2* 4.6    101   CO2 26 28   * 144*   BUN 25* 32*   CREATININE 2.1* 2.6*   CALCIUM 8.6* 8.7   PROT 6.2 6.3   ALBUMIN 3.3* 3.3*   BILITOT 0.6 0.5   ALKPHOS 66 57   AST 22 19   ALT 20 19   ANIONGAP 12 9       Significant Imaging: I have reviewed all pertinent imaging results/findings within the past 24 hours.    Assessment/Plan:      * Acute hypoxemic respiratory failure  Resolved. Patient currently on RA      Acute on chronic heart failure with preserved ejection fraction (HFpEF)  Echo was repeated this admission, EF reduced to 45%  - Held diuresis today due to up trending troponin   - Cont metoprolol  - for inpatient vs outpatient stress test       Hypertensive emergency  BP better controlled today   - Cont Norvasc, Metoprolol   - hydralazine 50 mg TID   - Holding Lasix     NSTEMI (non-ST elevated  myocardial infarction)  Troponin down trending   Completed heparin drip for 48 hrs   - cont aspirin, Statins   - inpatient vs outpatient stress test per cardiology. Patient has not made a decision.       Aortic stenosis  - follow cardiology recommendations       SSS (sick sinus syndrome)        Iron deficiency anemia  Patient's anemia is currently controlled. Has not received any PRBCs to date. Etiology likely d/t chronic disease due to Chronic Kidney Disease  Current CBC reviewed-   Lab Results   Component Value Date    HGB 9.6 (L) 03/05/2024    HGB 9.6 (L) 03/05/2024    HCT 30.4 (L) 03/05/2024    HCT 30.4 (L) 03/05/2024     Monitor serial CBC and transfuse if patient becomes hemodynamically unstable, symptomatic or H/H drops below 7/21.    Uncontrolled type 2 diabetes mellitus with hyperglycemia  Patient's FSGs are controlled on current medication regimen.  Last A1c reviewed-   Lab Results   Component Value Date    HGBA1C 7.5 (H) 08/28/2023     Most recent fingerstick glucose reviewed-   POC Glucose   Date Value Ref Range Status   03/06/2024 145 (H) 70 - 110 Final   03/05/2024 133 (H) 70 - 110 Final   03/05/2024 170 (H) 70 - 110 Final   03/05/2024 140 (H) 70 - 110 Final      Current correctional scale  Low  Maintain anti-hyperglycemic dose as follows-   Antihyperglycemics (From admission, onward)      Start     Stop Route Frequency Ordered    03/03/24 1200  insulin detemir U-100 (Levemir) pen 10 Units         -- SubQ 2 times daily 03/03/24 1146    03/03/24 1146  insulin aspart U-100 pen 0-5 Units         -- SubQ Before meals & nightly PRN 03/03/24 1146          Hold Oral hypoglycemics while patient is in the hospital.    Stage 3b chronic kidney disease  BRIDGETTE on CKD III  Creatine stable for now. BMP reviewed- noted Estimated Creatinine Clearance: 17.4 mL/min (A) (based on SCr of 2.6 mg/dL (H)). according to latest data. Based on current GFR, CKD stage is stage 3 - GFR 30-59.  Monitor UOP and serial BMP and adjust  therapy as needed. Renally dose meds. Avoid nephrotoxic medications and procedures.    creatinine on admission 2.0  baseline creatinine 1.5 with GFR 33 on outpatient office visit in February 2024 with cardiology clinic  Cr is worsened to 2.6  - Holding lasix, consider some IVF   - Monitor BMP     ACP (advance care planning)  Continue full code status      Hyperlipidemia   Patient is chronically on statin.will continue for now. Monitor clinically. Last LDL was   Lab Results   Component Value Date    LDLCALC 9.2 (L) 08/17/2023              VTE Risk Mitigation (From admission, onward)           Ordered     enoxaparin injection 80 mg  Every 24 hours         03/05/24 1130     Place sequential compression device  Until discontinued         03/03/24 1146     IP VTE HIGH RISK PATIENT  Once         03/03/24 1146                    Discharge Planning   ASHELY: 3/8/2024     Code Status: Full Code   Is the patient medically ready for discharge?:     Reason for patient still in hospital (select all that apply): Patient trending condition  Discharge Plan A: Home            Transfer out of the ICU. Inpatient vs outpatient stress test. Monitor today due to uptrending Cr. If no stress test can DC tomorrow if cr improving       Alley Bass MD  Department of Hospital Medicine   [facility] hospital

## 2024-03-06 NOTE — PROGRESS NOTES
Nephrology Consult Note        Patient Name: Cynthia N Cushing  MRN: [id number]    Patient Class: IP- Inpatient   Admission Date: 3/3/2024  Length of Stay: 3 days  Date of Service: 3/6/2024    Attending Physician: Alley Bass MD  Primary Care Provider: Teri Adams MD    Reason for Consult: yasir/htn emergency/hf exacerbation    SUBJECTIVE:     HPI: 78F with hypertension, diabetes, hyperlipidemia, sleep apnea, SVT, SSS and follow up in cardiology clinic with Dr. Mendieta, last echo October 6, 2023 with EF of 53% with normal diastolic dysfunction and moderate AS presents to the emergency room with increased shortness of breath since last night, progressively worse. The patient was started on CPAP by EMS, blood pressure initially as high as 250/130, tachypnea, . In the ER was in respiratory distress and was intubated to protect her airway.      Laboratories showed troponin 467.9, COVID 19 screen negative, lactic acid 2.7, CO2 18, BUN 22, creatinine 2.0, glucose 336, anion gap normal at 13.     ABG with pH 7.25/pCO2 45.9/PO2 255/bicarbonate 20.5/oxygen saturation 100% while on ventilator.    UO with tsai around 3L so far as documented. UA bland with known proteinuria and some hyaline casts of unclear significance.    Chest x-ray consistent with pulmonary edema with ETT in place. Patient was given Lasix 40 mg IV in the ER, started on nitroglycerin drip. No fluid bolus given due to concerns of pulmonary edema and risk of fluid overload, but received IV antibiotics, blood cultures were obtained.     3/5  VSS, good UO, stable renal function. Mild hypokalemia - adjust diet, replete orally.  3/6 VSS, BP better controlled. UO good, tsai removed.Diuretics stopped. sCr is up. Remains on Amlodipine, hydralazine, BBL - will defer decision to cards.    Past Medical History:   Diagnosis Date    Allergy     Breast mass     Cataract     Diabetes mellitus     GERD (gastroesophageal reflux disease)     Hernia, hiatal      Hyperlipidemia     Hypertension     Kidney stone     Osteoarthritis     Renal insufficiency     Seasonal allergies     Sleep apnea     SSS (sick sinus syndrome)     SVT (supraventricular tachycardia)      Past Surgical History:   Procedure Laterality Date    BREAST BIOPSY      BREAST CYST ASPIRATION      BREAST SURGERY      CERVICAL DISC SURGERY      EYE SURGERY      cataracts bilateral    HYSTERECTOMY       Family History   Problem Relation Age of Onset    Stroke Mother     Cancer Mother     Breast cancer Mother     Cancer Father     Breast cancer Maternal Aunt      Social History     Tobacco Use    Smoking status: Never    Smokeless tobacco: Never   Substance Use Topics    Alcohol use: No    Drug use: No       Review of patient's allergies indicates:   Allergen Reactions    Biaxin [clarithromycin]     Prandin [repaglinide] Nausea And Vomiting    Amlodipine     Chlorphen-phenyltolox-pe-ppa     Ciprofloxacin Nausea And Vomiting    Omnicef [cefdinir]     Propoxyphene     Quinine Nausea And Vomiting       Outpatient meds:  No current facility-administered medications on file prior to encounter.     Current Outpatient Medications on File Prior to Encounter   Medication Sig Dispense Refill    famotidine (PEPCID) 20 MG tablet TAKE 1 TABLET TWICE DAILY (Patient taking differently: Take 20 mg by mouth 2 (two) times daily.) 180 tablet 2    furosemide (LASIX) 20 MG tablet TAKE 1 TABLET(20 MG) BY MOUTH EVERY DAY (Patient taking differently: Take 20 mg by mouth once daily.) 30 tablet 0    guanFACINE (TENEX) 2 MG tablet TAKE 1 TABLET EVERY EVENING (Patient taking differently: Take 2 mg by mouth nightly.) 90 tablet 3    hydrALAZINE (APRESOLINE) 50 MG tablet Take 1 tablet (50 mg total) by mouth 3 (three) times daily. 90 tablet 11    labetaloL (NORMODYNE) 100 MG tablet Take 1 tablet (100 mg total) by mouth 2 (two) times daily. 60 tablet 11    LANTUS SOLOSTAR U-100 INSULIN glargine 100 units/mL SubQ pen ADMINISTER 51 UNITS UNDER  "THE SKIN AT NIGHT (Patient taking differently: Inject 51 Units into the skin every evening. ADMINISTER 51 UNITS UNDER THE SKIN AT NIGHT) 15 mL 5    olmesartan (BENICAR) 40 MG tablet TAKE 1 TABLET EVERY DAY (Patient taking differently: Take 40 mg by mouth once daily.) 90 tablet 3    rosuvastatin (CRESTOR) 10 MG tablet Take 1 tablet (10 mg total) by mouth every evening. 90 tablet 2    ACCU-CHEK AMADOR PLUS TEST STRP Strp TEST BLOOD SUGAR FOUR TIMES DAILY 400 strip 10    amLODIPine (NORVASC) 10 MG tablet Take 1 tablet (10 mg total) by mouth once daily. 30 tablet 11    aspirin 81 MG Chew Take 81 mg by mouth once daily.      lancets (ACCU-CHEK SOFTCLIX LANCETS) Misc TEST BLOOD SUGAR FOUR TIMES DAILY 400 each 3    pen needle, diabetic (BD ANNA 2ND GEN PEN NEEDLE) 32 gauge x 5/32" Ndle INJECT 1 NEEDLE INTO THE SKIN EVERY EVENING 100 each 5       Scheduled meds:   amLODIPine  10 mg Oral Daily    aspirin  81 mg Oral Daily    enoxparin  1 mg/kg Subcutaneous Q24H (treatment, non-standard time)    famotidine  20 mg Oral Daily    hydrALAZINE  50 mg Oral Q8H    insulin detemir U-100  10 Units Subcutaneous BID    metoprolol tartrate  50 mg Oral BID    mupirocin   Nasal BID    potassium chloride  20 mEq Oral TID       Infusions:        PRN meds:  acetaminophen, dextrose 50% in water (D50W), dextrose 50% in water (D50W), [] fentaNYL **FOLLOWED BY** fentaNYL, glucagon (human recombinant), glucose, glucose, hydrALAZINE, insulin aspart U-100, magnesium sulfate IVPB, magnesium sulfate IVPB, ondansetron, potassium bicarbonate, potassium bicarbonate, potassium bicarbonate, sodium chloride 0.9%    Review of Systems:    OBJECTIVE:     Vital Signs and IO:  Temp:  [98 °F (36.7 °C)-98.4 °F (36.9 °C)]   Pulse:  [60-92]   Resp:  [10-26]   BP: (114-164)/(55-88)   SpO2:  [91 %-99 %]   I/O last 3 completed shifts:  In: 543.6 [P.O.:420; I.V.:123.6]  Out: 425 [Urine:425]  Wt Readings from Last 5 Encounters:   24 75.9 kg (167 lb 5.3 oz) " "  02/02/24 78.3 kg (172 lb 8.2 oz)   12/12/23 78.5 kg (173 lb)   10/23/23 75.3 kg (166 lb)   10/10/23 77.1 kg (170 lb)     Body mass index is 29.64 kg/m².    Physical Exam  Constitutional:       General: Patient is not in acute distress.     Appearance: Patient is well-developed. She is not diaphoretic.   HENT:      Head: Normocephalic and atraumatic.      Mouth/Throat: Mucous membranes are moist.   Eyes:      General: No scleral icterus.     Pupils: Pupils are equal, round, and reactive to light.   Cardiovascular:      Rate and Rhythm: Normal rate and regular rhythm.   Pulmonary:      Effort: Pulmonary effort is normal. No respiratory distress.      Breath sounds: No stridor.   Abdominal:      General: There is no distension.      Palpations: Abdomen is soft.   Musculoskeletal:         General: No deformity. Normal range of motion.      Cervical back: Neck supple.   Skin:     General: Skin is warm and dry.      Findings: No rash present. No erythema.   Neurological:      Mental Status: Patient is alert and oriented to person, place, and time.      Cranial Nerves: No cranial nerve deficit.   Psychiatric:         Behavior: Behavior is normal    Laboratory:  Recent Labs   Lab 03/04/24  0455 03/05/24  0239 03/06/24  0258    138 138   K 3.8 3.2* 4.6    100 101   CO2 24 26 28   BUN 23 25* 32*   CREATININE 1.8* 2.1* 2.6*   * 160* 144*         Recent Labs   Lab 03/04/24 0455 03/05/24  0239 03/06/24  0258   CALCIUM 9.0 8.6* 8.7   ALBUMIN 3.4* 3.3* 3.3*   PHOS 3.9 4.5 3.8   MG 2.1 1.8 2.2         Recent Labs   Lab 07/12/21  1103 02/07/22  0706   PTH, Intact 24.4 24.0         No results for input(s): "POCTGLUCOSE" in the last 168 hours.    Recent Labs   Lab 07/15/22  0730 08/17/23  0701 08/28/23  0748   Hemoglobin A1C 7.6 H 6.7 H 7.5 H         Recent Labs   Lab 03/04/24  0455 03/05/24  0239 03/06/24  0258   WBC 13.19*  13.19* 11.32  11.32 11.69   HGB 10.0*  10.0* 9.6*  9.6* 9.6*   HCT 31.6*  31.6* " 30.4*  30.4* 31.3*     297 276  276 304   MCV 80*  80* 81*  81* 83   MCHC 31.6*  31.6* 31.6*  31.6* 30.7*   MONO 9.2  9.2  1.2*  1.2* 10.6  10.6  1.2*  1.2* 11.1  1.3*   EOSINOPHIL 2.1  2.1 2.7  2.7 2.4         Recent Labs   Lab 03/04/24  0455 03/05/24  0239 03/06/24  0258   BILITOT 0.4 0.6 0.5   PROT 6.3 6.2 6.3   ALBUMIN 3.4* 3.3* 3.3*   ALKPHOS 69 66 57   ALT 23 20 19   AST 28 22 19         Recent Labs   Lab 10/20/21  1237 03/03/24  1224   Color, UA  --  Yellow   Appearance, UA  --  Hazy A   Clarity, UA Clear  --    pH, UA  --  6.0   Specific Gravity, UA  --  1.010   Protein, UA  --  3+ A   Glucose, UA  --  2+ A   Ketones, UA  --  Negative   Urobilinogen, UA  --  Negative   Bilirubin (UA)  --  Negative   Occult Blood UA  --  2+ A   Nitrite, UA  --  Negative   RBC, UA  --  2   WBC, UA  --  3   Bacteria  --  Rare   Hyaline Casts, UA  --  9 A         Recent Labs   Lab 03/04/24  0313 03/04/24  0417 03/04/24  0452   POC PH 7.463 H 7.475 H 7.471 H   POC PCO2 33.6 L 32.5 L 35.2   POC HCO3 24.1 24.0 25.7   POC PO2 108 H 84 80   POC SATURATED O2 99 97 97   POC BE 0 0 2   Sample ARTERIAL ARTERIAL ARTERIAL         Microbiology Results (last 7 days)       Procedure Component Value Units Date/Time    Blood culture x two cultures. Draw prior to antibiotics. [[id number]] Collected: 03/03/24 0903    Order Status: Completed Specimen: Blood Updated: 03/05/24 1232     Blood Culture, Routine No Growth to date      No Growth to date      No Growth to date    Narrative:      Aerobic and anaerobic    Blood culture x two cultures. Draw prior to antibiotics. [[id number]] Collected: 03/03/24 0903    Order Status: Completed Specimen: Blood Updated: 03/05/24 1232     Blood Culture, Routine No Growth to date      No Growth to date      No Growth to date    Narrative:      Aerobic and anaerobic    Blood culture [[id number]]     Order Status: Canceled Specimen: Blood     Blood culture [[id number]]     Order Status:  Canceled Specimen: Blood             ASSESSMENT/PLAN:     Non-oliguric BRIDGETTE due to cardiorenal syndrome due to acute on chronic HFpEF exacerbation with volume overload, NSTEMI, HTN emergency, pulmonary edema  Acute respiratory failure requiring intubation  Sepsis possible but seems unlikely  Hypokalemia  CKD stage 3  DM  Anemia of CKD  No NSAIDs, ACEI/ARB, IV contrast or other nephrotoxins.  Keep MAP > 60, SBP > 100.  Dose meds for GFR < 30 ml/min.  Keep non-renal diet and K supplement for now, decrease dose.  Monitor UO, diuretics and BP control per Cards, replete lytes as needed  No need for dialysis at present.  Treat sepsis, adjust meds based on Cx reports.  Hgb and HCT are acceptable. Monitor for now. Agree with heparin gtt for NSTEMI.  Control DM.    Thank you for allowing us to participate in the care of your patient!   We will follow the patient and provide recommendations as needed.    Patient care time was spent personally by me on the following activities:     Obtaining a history.  Examination of patient.  Providing medical care at the patients bedside.  Developing a treatment plan with patient or surrogate and bedside caregivers.  Ordering and reviewing laboratory studies, radiographic studies, pulse oximetry.  Ordering and performing treatments and interventions.  Evaluation of patient's response to treatment.  Discussions with consultants while on the unit and immediately available to the patient.  Re-evaluation of the patient's condition.  Documentation in the medical record.     Vito Britton MD    [facility]  [address] [place] [address]  [place] [id number]    [phone] - tel  [phone] - fax    3/6/2024

## 2024-03-07 ENCOUNTER — CLINICAL SUPPORT (OUTPATIENT)
Dept: CARDIOLOGY | Facility: HOSPITAL | Age: 79
DRG: 242 | End: 2024-03-07
Attending: EMERGENCY MEDICINE
Payer: MEDICARE

## 2024-03-07 PROBLEM — N17.9 AKI (ACUTE KIDNEY INJURY): Status: ACTIVE | Noted: 2022-03-31

## 2024-03-07 PROBLEM — I16.1 HYPERTENSIVE EMERGENCY: Status: RESOLVED | Noted: 2024-03-04 | Resolved: 2024-03-07

## 2024-03-07 LAB
ALBUMIN SERPL BCP-MCNC: 3.4 G/DL (ref 3.5–5.2)
ALP SERPL-CCNC: 63 U/L (ref 55–135)
ALT SERPL W/O P-5'-P-CCNC: 21 U/L (ref 10–44)
ANION GAP SERPL CALC-SCNC: 11 MMOL/L (ref 8–16)
ANION GAP SERPL CALC-SCNC: 9 MMOL/L (ref 8–16)
AST SERPL-CCNC: 19 U/L (ref 10–40)
BASOPHILS # BLD AUTO: 0.09 K/UL (ref 0–0.2)
BASOPHILS NFR BLD: 0.8 % (ref 0–1.9)
BILIRUB SERPL-MCNC: 0.5 MG/DL (ref 0.1–1)
BUN SERPL-MCNC: 49 MG/DL (ref 8–23)
BUN SERPL-MCNC: 51 MG/DL (ref 8–23)
CALCIUM SERPL-MCNC: 8.6 MG/DL (ref 8.7–10.5)
CALCIUM SERPL-MCNC: 9 MG/DL (ref 8.7–10.5)
CHLORIDE SERPL-SCNC: 101 MMOL/L (ref 95–110)
CHLORIDE SERPL-SCNC: 99 MMOL/L (ref 95–110)
CO2 SERPL-SCNC: 23 MMOL/L (ref 23–29)
CO2 SERPL-SCNC: 26 MMOL/L (ref 23–29)
CREAT SERPL-MCNC: 3.4 MG/DL (ref 0.5–1.4)
CREAT SERPL-MCNC: 3.5 MG/DL (ref 0.5–1.4)
CV PHARM DOSE: 0.4 MG
CV STRESS BASE HR: 58 BPM
DIASTOLIC BLOOD PRESSURE: 60 MMHG
DIFFERENTIAL METHOD BLD: ABNORMAL
EOSINOPHIL # BLD AUTO: 0.2 K/UL (ref 0–0.5)
EOSINOPHIL NFR BLD: 1.7 % (ref 0–8)
ERYTHROCYTE [DISTWIDTH] IN BLOOD BY AUTOMATED COUNT: 16.5 % (ref 11.5–14.5)
EST. GFR  (NO RACE VARIABLE): 12.8 ML/MIN/1.73 M^2
EST. GFR  (NO RACE VARIABLE): 13.3 ML/MIN/1.73 M^2
ESTIMATED AVG GLUCOSE: 174 MG/DL (ref 68–131)
GLUCOSE SERPL-MCNC: 111 MG/DL (ref 70–110)
GLUCOSE SERPL-MCNC: 140 MG/DL (ref 70–110)
GLUCOSE SERPL-MCNC: 145 MG/DL (ref 70–110)
GLUCOSE SERPL-MCNC: 187 MG/DL (ref 70–110)
GLUCOSE SERPL-MCNC: 209 MG/DL (ref 70–110)
GLUCOSE SERPL-MCNC: 210 MG/DL (ref 70–110)
HBA1C MFR BLD: 7.7 % (ref 4.5–6.2)
HCT VFR BLD AUTO: 28.9 % (ref 37–48.5)
HGB BLD-MCNC: 9.1 G/DL (ref 12–16)
IMM GRANULOCYTES # BLD AUTO: 0.1 K/UL (ref 0–0.04)
IMM GRANULOCYTES NFR BLD AUTO: 0.8 % (ref 0–0.5)
LYMPHOCYTES # BLD AUTO: 1.6 K/UL (ref 1–4.8)
LYMPHOCYTES NFR BLD: 13.1 % (ref 18–48)
MAGNESIUM SERPL-MCNC: 2.3 MG/DL (ref 1.6–2.6)
MCH RBC QN AUTO: 25.9 PG (ref 27–31)
MCHC RBC AUTO-ENTMCNC: 31.5 G/DL (ref 32–36)
MCV RBC AUTO: 82 FL (ref 82–98)
MONOCYTES # BLD AUTO: 1.4 K/UL (ref 0.3–1)
MONOCYTES NFR BLD: 12 % (ref 4–15)
NEUTROPHILS # BLD AUTO: 8.5 K/UL (ref 1.8–7.7)
NEUTROPHILS NFR BLD: 71.6 % (ref 38–73)
NRBC BLD-RTO: 0 /100 WBC
OHS CV CPX 1 MINUTE RECOVERY HEART RATE: 64 BPM
OHS CV CPX 85 PERCENT MAX PREDICTED HEART RATE MALE: 121
OHS CV CPX MAX PREDICTED HEART RATE: 142
OHS CV CPX PATIENT IS FEMALE: 1
OHS CV CPX PATIENT IS MALE: 0
OHS CV CPX PEAK DIASTOLIC BLOOD PRESSURE: 62 MMHG
OHS CV CPX PEAK HEAR RATE: 73 BPM
OHS CV CPX PEAK RATE PRESSURE PRODUCT: 9855
OHS CV CPX PEAK SYSTOLIC BLOOD PRESSURE: 135 MMHG
OHS CV CPX PERCENT MAX PREDICTED HEART RATE ACHIEVED: 53
OHS CV CPX RATE PRESSURE PRODUCT PRESENTING: 7250
PHOSPHATE SERPL-MCNC: 4.4 MG/DL (ref 2.7–4.5)
PLATELET # BLD AUTO: 303 K/UL (ref 150–450)
PMV BLD AUTO: 11.4 FL (ref 9.2–12.9)
POTASSIUM SERPL-SCNC: 4.7 MMOL/L (ref 3.5–5.1)
POTASSIUM SERPL-SCNC: 5.5 MMOL/L (ref 3.5–5.1)
PROT SERPL-MCNC: 6.5 G/DL (ref 6–8.4)
RBC # BLD AUTO: 3.51 M/UL (ref 4–5.4)
SODIUM SERPL-SCNC: 134 MMOL/L (ref 136–145)
SODIUM SERPL-SCNC: 135 MMOL/L (ref 136–145)
SYSTOLIC BLOOD PRESSURE: 125 MMHG
WBC # BLD AUTO: 11.92 K/UL (ref 3.9–12.7)

## 2024-03-07 PROCEDURE — 25000003 PHARM REV CODE 250: Performed by: INTERNAL MEDICINE

## 2024-03-07 PROCEDURE — 85025 COMPLETE CBC W/AUTO DIFF WBC: CPT | Performed by: INTERNAL MEDICINE

## 2024-03-07 PROCEDURE — 84100 ASSAY OF PHOSPHORUS: CPT | Performed by: INTERNAL MEDICINE

## 2024-03-07 PROCEDURE — 93017 CV STRESS TEST TRACING ONLY: CPT

## 2024-03-07 PROCEDURE — 94761 N-INVAS EAR/PLS OXIMETRY MLT: CPT

## 2024-03-07 PROCEDURE — 25000003 PHARM REV CODE 250: Performed by: NURSE PRACTITIONER

## 2024-03-07 PROCEDURE — 99233 SBSQ HOSP IP/OBS HIGH 50: CPT | Mod: ,,, | Performed by: INTERNAL MEDICINE

## 2024-03-07 PROCEDURE — 93010 ELECTROCARDIOGRAM REPORT: CPT | Mod: ,,, | Performed by: INTERNAL MEDICINE

## 2024-03-07 PROCEDURE — 80053 COMPREHEN METABOLIC PANEL: CPT | Performed by: INTERNAL MEDICINE

## 2024-03-07 PROCEDURE — 83735 ASSAY OF MAGNESIUM: CPT | Performed by: INTERNAL MEDICINE

## 2024-03-07 PROCEDURE — 25000003 PHARM REV CODE 250: Performed by: STUDENT IN AN ORGANIZED HEALTH CARE EDUCATION/TRAINING PROGRAM

## 2024-03-07 PROCEDURE — 80048 BASIC METABOLIC PNL TOTAL CA: CPT | Performed by: STUDENT IN AN ORGANIZED HEALTH CARE EDUCATION/TRAINING PROGRAM

## 2024-03-07 PROCEDURE — 83036 HEMOGLOBIN GLYCOSYLATED A1C: CPT | Performed by: STUDENT IN AN ORGANIZED HEALTH CARE EDUCATION/TRAINING PROGRAM

## 2024-03-07 PROCEDURE — 36415 COLL VENOUS BLD VENIPUNCTURE: CPT | Performed by: STUDENT IN AN ORGANIZED HEALTH CARE EDUCATION/TRAINING PROGRAM

## 2024-03-07 PROCEDURE — 63600175 PHARM REV CODE 636 W HCPCS: Performed by: NURSE PRACTITIONER

## 2024-03-07 PROCEDURE — 36415 COLL VENOUS BLD VENIPUNCTURE: CPT | Performed by: INTERNAL MEDICINE

## 2024-03-07 PROCEDURE — 93018 CV STRESS TEST I&R ONLY: CPT | Mod: ,,, | Performed by: INTERNAL MEDICINE

## 2024-03-07 PROCEDURE — 93016 CV STRESS TEST SUPVJ ONLY: CPT | Mod: ,,, | Performed by: INTERNAL MEDICINE

## 2024-03-07 PROCEDURE — 63600175 PHARM REV CODE 636 W HCPCS: Performed by: STUDENT IN AN ORGANIZED HEALTH CARE EDUCATION/TRAINING PROGRAM

## 2024-03-07 PROCEDURE — 12000002 HC ACUTE/MED SURGE SEMI-PRIVATE ROOM

## 2024-03-07 PROCEDURE — 93005 ELECTROCARDIOGRAM TRACING: CPT | Performed by: INTERNAL MEDICINE

## 2024-03-07 PROCEDURE — 99406 BEHAV CHNG SMOKING 3-10 MIN: CPT

## 2024-03-07 RX ORDER — CLOPIDOGREL BISULFATE 75 MG/1
75 TABLET ORAL DAILY
Status: DISCONTINUED | OUTPATIENT
Start: 2024-03-07 | End: 2024-03-12 | Stop reason: HOSPADM

## 2024-03-07 RX ORDER — REGADENOSON 0.08 MG/ML
0.4 INJECTION, SOLUTION INTRAVENOUS
Status: COMPLETED | OUTPATIENT
Start: 2024-03-07 | End: 2024-03-07

## 2024-03-07 RX ORDER — TALC
6 POWDER (GRAM) TOPICAL NIGHTLY PRN
Status: DISCONTINUED | OUTPATIENT
Start: 2024-03-07 | End: 2024-03-12 | Stop reason: HOSPADM

## 2024-03-07 RX ORDER — METOPROLOL TARTRATE 25 MG/1
25 TABLET, FILM COATED ORAL 2 TIMES DAILY
Status: DISCONTINUED | OUTPATIENT
Start: 2024-03-07 | End: 2024-03-09

## 2024-03-07 RX ADMIN — SODIUM CHLORIDE 250 ML: 9 INJECTION, SOLUTION INTRAVENOUS at 12:03

## 2024-03-07 RX ADMIN — METOPROLOL TARTRATE 25 MG: 25 TABLET, FILM COATED ORAL at 08:03

## 2024-03-07 RX ADMIN — MUPIROCIN 1 G: 20 OINTMENT TOPICAL at 09:03

## 2024-03-07 RX ADMIN — SODIUM ZIRCONIUM CYCLOSILICATE 5 G: 5 POWDER, FOR SUSPENSION ORAL at 11:03

## 2024-03-07 RX ADMIN — AMLODIPINE BESYLATE 10 MG: 5 TABLET ORAL at 09:03

## 2024-03-07 RX ADMIN — SODIUM ZIRCONIUM CYCLOSILICATE 10 G: 10 POWDER, FOR SUSPENSION ORAL at 04:03

## 2024-03-07 RX ADMIN — POTASSIUM CHLORIDE 20 MEQ: 1500 TABLET, EXTENDED RELEASE ORAL at 09:03

## 2024-03-07 RX ADMIN — REGADENOSON 0.4 MG: 0.08 INJECTION, SOLUTION INTRAVENOUS at 12:03

## 2024-03-07 RX ADMIN — HYDRALAZINE HYDROCHLORIDE 50 MG: 25 TABLET ORAL at 05:03

## 2024-03-07 RX ADMIN — INSULIN DETEMIR 10 UNITS: 100 INJECTION, SOLUTION SUBCUTANEOUS at 08:03

## 2024-03-07 RX ADMIN — SODIUM CHLORIDE 250 ML: 9 INJECTION, SOLUTION INTRAVENOUS at 02:03

## 2024-03-07 RX ADMIN — INSULIN ASPART 1 UNITS: 100 INJECTION, SOLUTION INTRAVENOUS; SUBCUTANEOUS at 08:03

## 2024-03-07 RX ADMIN — Medication 6 MG: at 11:03

## 2024-03-07 RX ADMIN — MUPIROCIN 1 G: 20 OINTMENT TOPICAL at 08:03

## 2024-03-07 RX ADMIN — FUROSEMIDE 20 MG: 20 TABLET ORAL at 09:03

## 2024-03-07 RX ADMIN — CLOPIDOGREL BISULFATE 75 MG: 75 TABLET, FILM COATED ORAL at 04:03

## 2024-03-07 RX ADMIN — INSULIN DETEMIR 10 UNITS: 100 INJECTION, SOLUTION SUBCUTANEOUS at 09:03

## 2024-03-07 RX ADMIN — ENOXAPARIN SODIUM 80 MG: 80 INJECTION SUBCUTANEOUS at 02:03

## 2024-03-07 RX ADMIN — ASPIRIN 81 MG 81 MG: 81 TABLET ORAL at 09:03

## 2024-03-07 RX ADMIN — HYDRALAZINE HYDROCHLORIDE 50 MG: 25 TABLET ORAL at 02:03

## 2024-03-07 RX ADMIN — FAMOTIDINE 20 MG: 20 TABLET ORAL at 09:03

## 2024-03-07 NOTE — PROGRESS NOTES
[facility]  Department of Cardiology  Progress Note      PATIENT NAME: Cynthia N Cushing  MRN: [id number]  TODAY'S DATE: 03/07/2024  ADMIT DATE: 3/3/2024                          CONSULT REQUESTED BY: Josiah Russ MD    SUBJECTIVE     PRINCIPAL PROBLEM: Acute hypoxemic respiratory failure    3/7/24:  Patient seen in the stress lab this AM and again this afternoon with her daughter. She is anxious to go home. Denies any chest pain. HR has been on the low side.     3/6/24:  Patient seen resting in bed with no distress noted. Today she is lying flat. Chest xray shows improvement but not total resolution of pulmonary edema. Her creatinine has bumped from diuresis.     3/5/24:  Patient remained in ICU overnight. BP has been 150-160s. She reports minimal improvement in orthopnea when lying back but has put out a good bit of urine.     3/4/24:  Patient seen resting in bed in the ICU with no acute distress noted.  She remains hypertensive with blood pressure in the 160s to 170s and is currently on a heparin drip.  Patient reports that she is feeling anxious and restless from being in bed.  She is requesting to sit up on the side of the bed and has preferred to sit up in the bed as opposed to lying down.    REASON FOR CONSULT:  NSTEMI      HPI:  Chief Complaint   Patient presents with    Respiratory Distress         HPI: Mrs. Cushing is a pleasant 78-year-old female who has a past medical history of hypertension, diabetes, hyperlipidemia, sleep apnea, SVT, SSS and follow up in cardiology clinic with Dr. Mendieta with recent office visit, last echo October 6, 2023 with EF of 53% with normal diastolic dysfunction and moderate AS who presents to the emergency room today with complaints of increased shortness of breath that started last night, got progressively worse.  The patient was started on CPAP by EMS.  Patient's vital signs in the ER showed blood pressure initially as high as 250/130, tachypnea, .   The patient in the ER was in respiratory distress, she was intubated to protect her airway.  Patient's laboratories and x-rays done.  Patient's case was discussed with the ER physician at the time of admission.  Laboratories showed troponin 467.9, COVID 19 screen negative, lactic acid 2.7, magnesium 2.0, calcitonin 0.098, sodium 140, potassium 3.6, chloride 109, CO2 18, BUN 22, creatinine 2.0, glucose 336, AST 25, anion gap normal at 13, PT INR 0.9, WBCs 14.8, hemoglobin 11, platelet count 416.  ABG show pH 7.25/pCO2 45.9/PO2 255/bicarbonate 20.5/oxygen saturation 100% this was done on ventilator.  Chest x-ray showed changes of pulmonary edema with ETT in place.  The patient was recommended to be admitted to the ICU for acute respiratory failure with hypoxia, hypertensive emergency, possible sepsis with lactic acid level 2.7.  Patient was given Lasix 40 mg IV in the ER, started on nitroglycerin drip.  Patient started on CHF order set, sepsis order set, no fluid bolus given due to concerns of pulmonary edema and risk of fluid overload, IV antibiotics, blood cultures, cardiology consult, and Pulmonary critical Care consult.  Patient is a full code status.      Review of patient's allergies indicates:   Allergen Reactions    Biaxin [clarithromycin]     Prandin [repaglinide] Nausea And Vomiting    Amlodipine     Chlorphen-phenyltolox-pe-ppa     Ciprofloxacin Nausea And Vomiting    Omnicef [cefdinir]     Propoxyphene     Quinine Nausea And Vomiting       Past Medical History:   Diagnosis Date    Allergy     Breast mass     Cataract     Diabetes mellitus     GERD (gastroesophageal reflux disease)     Hernia, hiatal     Hyperlipidemia     Hypertension     Kidney stone     Osteoarthritis     Renal insufficiency     Seasonal allergies     Sleep apnea     SSS (sick sinus syndrome)     SVT (supraventricular tachycardia)      Past Surgical History:   Procedure Laterality Date    BREAST BIOPSY      BREAST CYST ASPIRATION       BREAST SURGERY      CERVICAL DISC SURGERY      EYE SURGERY      cataracts bilateral    HYSTERECTOMY       Social History     Tobacco Use    Smoking status: Never    Smokeless tobacco: Never   Substance Use Topics    Alcohol use: No    Drug use: No        REVIEW OF SYSTEMS      OBJECTIVE     VITAL SIGNS (Most Recent)  Temp: 99 °F (37.2 °C) (03/07/24 1107)  Pulse: 62 (03/07/24 1107)  Resp: 18 (03/07/24 1107)  BP: 136/63 (03/07/24 1107)  SpO2: 96 % (03/07/24 1107)    VENTILATION STATUS  Resp: 18 (03/07/24 1107)  SpO2: 96 % (03/07/24 1107)           I & O (Last 24H):  Intake/Output Summary (Last 24 hours) at 3/7/2024 1324  Last data filed at 3/6/2024 1700  Gross per 24 hour   Intake 480 ml   Output --   Net 480 ml         WEIGHTS  Wt Readings from Last 3 Encounters:   03/03/24 1115 75.9 kg (167 lb 5.3 oz)   03/03/24 0813 76.7 kg (169 lb 1.5 oz)   02/02/24 1311 78.3 kg (172 lb 8.2 oz)   12/12/23 0949 78.5 kg (173 lb)       PHYSICAL EXAM /65 NIBP, 146/63 per art line; NSR on telemetry    GENERAL: critically ill elderly female breathing per vent in no apparent distress.  HEENT: Normocephalic.    NECK: No JVD.   CARDIAC: Regular rate and rhythm. S1 is normal.S2 is normal.No gallops, clicks or murmurs noted at this time.  CHEST ANATOMY: normal.   LUNGS: CTA.   ABDOMEN: Soft, non distended, hypoactive BS.    EXTREMITIES: No edema  CENTRAL NERVOUS SYSTEM: AAO x3   SKIN: No rash   HOME MEDICATIONS:  No current facility-administered medications on file prior to encounter.     Current Outpatient Medications on File Prior to Encounter   Medication Sig Dispense Refill    famotidine (PEPCID) 20 MG tablet TAKE 1 TABLET TWICE DAILY (Patient taking differently: Take 20 mg by mouth 2 (two) times daily.) 180 tablet 2    furosemide (LASIX) 20 MG tablet TAKE 1 TABLET(20 MG) BY MOUTH EVERY DAY (Patient taking differently: Take 20 mg by mouth once daily.) 30 tablet 0    guanFACINE (TENEX) 2 MG tablet TAKE 1 TABLET EVERY EVENING  "(Patient taking differently: Take 2 mg by mouth nightly.) 90 tablet 3    hydrALAZINE (APRESOLINE) 50 MG tablet Take 1 tablet (50 mg total) by mouth 3 (three) times daily. 90 tablet 11    labetaloL (NORMODYNE) 100 MG tablet Take 1 tablet (100 mg total) by mouth 2 (two) times daily. 60 tablet 11    LANTUS SOLOSTAR U-100 INSULIN glargine 100 units/mL SubQ pen ADMINISTER 51 UNITS UNDER THE SKIN AT NIGHT (Patient taking differently: Inject 51 Units into the skin every evening. ADMINISTER 51 UNITS UNDER THE SKIN AT NIGHT) 15 mL 5    olmesartan (BENICAR) 40 MG tablet TAKE 1 TABLET EVERY DAY (Patient taking differently: Take 40 mg by mouth once daily.) 90 tablet 3    rosuvastatin (CRESTOR) 10 MG tablet Take 1 tablet (10 mg total) by mouth every evening. 90 tablet 2    ACCU-CHEK AMADOR PLUS TEST STRP Strp TEST BLOOD SUGAR FOUR TIMES DAILY 400 strip 10    amLODIPine (NORVASC) 10 MG tablet Take 1 tablet (10 mg total) by mouth once daily. 30 tablet 11    aspirin 81 MG Chew Take 81 mg by mouth once daily.      lancets (ACCU-CHEK SOFTCLIX LANCETS) Misc TEST BLOOD SUGAR FOUR TIMES DAILY 400 each 3    pen needle, diabetic (BD ANNA 2ND GEN PEN NEEDLE) 32 gauge x 5/32" Ndle INJECT 1 NEEDLE INTO THE SKIN EVERY EVENING 100 each 5       SCHEDULED MEDS:   amLODIPine  10 mg Oral Daily    aspirin  81 mg Oral Daily    enoxparin  1 mg/kg Subcutaneous Q24H (treatment, non-standard time)    famotidine  20 mg Oral Daily    hydrALAZINE  50 mg Oral Q8H    insulin detemir U-100  10 Units Subcutaneous BID    metoprolol tartrate  25 mg Oral BID    mupirocin   Nasal BID    sodium chloride 0.9%  250 mL Intravenous Once       CONTINUOUS INFUSIONS:        PRN MEDS:acetaminophen, dextrose 50% in water (D50W), dextrose 50% in water (D50W), [] fentaNYL **FOLLOWED BY** fentaNYL, glucagon (human recombinant), glucose, glucose, hydrALAZINE, insulin aspart U-100, magnesium sulfate IVPB, magnesium sulfate IVPB, ondansetron, potassium bicarbonate, " potassium bicarbonate, potassium bicarbonate, sodium chloride 0.9%    LABS AND DIAGNOSTICS     CBC LAST 3 DAYS  Recent Labs   Lab 03/05/24  0239 03/06/24  0258 03/07/24  0955   WBC 11.32  11.32 11.69 11.92   RBC 3.74*  3.74* 3.78* 3.51*   HGB 9.6*  9.6* 9.6* 9.1*   HCT 30.4*  30.4* 31.3* 28.9*   MCV 81*  81* 83 82   MCH 25.7*  25.7* 25.4* 25.9*   MCHC 31.6*  31.6* 30.7* 31.5*   RDW 16.6*  16.6* 16.6* 16.5*     276 304 303   MPV 11.4  11.4 11.9 11.4   GRAN 71.1  71.1  8.1*  8.1* 71.0  8.3* 71.6  8.5*   LYMPH 14.4*  14.4*  1.6  1.6 13.9*  1.6 13.1*  1.6   MONO 10.6  10.6  1.2*  1.2* 11.1  1.3* 12.0  1.4*   BASO 0.09  0.09 0.11 0.09   NRBC 0  0 0 0         COAGULATION LAST 3 DAYS  Recent Labs   Lab 03/03/24  0827 03/03/24  1013 03/03/24  1636 03/04/24  1244 03/04/24 2019 03/05/24  0239   INR 0.9 0.9  --   --   --   --    APTT  --  22.5   < > 37.9* 43.3* 42.6*    < > = values in this interval not displayed.         CHEMISTRY LAST 3 DAYS  Recent Labs   Lab 03/04/24  0313 03/04/24  0417 03/04/24  0452 03/04/24  0455 03/05/24  0239 03/06/24  0258 03/07/24  0955   NA  --   --   --    < > 138 138 134*   K  --   --   --    < > 3.2* 4.6 5.5*   CL  --   --   --    < > 100 101 99   CO2  --   --   --    < > 26 28 26   ANIONGAP  --   --   --    < > 12 9 9   BUN  --   --   --    < > 25* 32* 49*   CREATININE  --   --   --    < > 2.1* 2.6* 3.5*   GLU  --   --   --    < > 160* 144* 187*   CALCIUM  --   --   --    < > 8.6* 8.7 9.0   PH 7.463* 7.475* 7.471*  --   --   --   --    MG  --   --   --    < > 1.8 2.2 2.3   ALBUMIN  --   --   --    < > 3.3* 3.3* 3.4*   PROT  --   --   --    < > 6.2 6.3 6.5   ALKPHOS  --   --   --    < > 66 57 63   ALT  --   --   --    < > 20 19 21   AST  --   --   --    < > 22 19 19   BILITOT  --   --   --    < > 0.6 0.5 0.5    < > = values in this interval not displayed.         CARDIAC PROFILE LAST 3 DAYS  Recent Labs   Lab 03/03/24  0827 03/06/24  0258   * 224*          ENDOCRINE LAST 3 DAYS  Recent Labs   Lab 03/03/24  0827   PROCAL 0.098         LAST ARTERIAL BLOOD GAS  ABG  Recent Labs   Lab 03/04/24  0452   PH 7.471*   PO2 80   PCO2 35.2   HCO3 25.7   BE 2         LAST 7 DAYS MICROBIOLOGY   Microbiology Results (last 7 days)       Procedure Component Value Units Date/Time    Blood culture x two cultures. Draw prior to antibiotics. [[id number]] Collected: 03/03/24 0903    Order Status: Completed Specimen: Blood Updated: 03/07/24 1232     Blood Culture, Routine No Growth to date      No Growth to date      No Growth to date      No Growth to date      No Growth to date    Narrative:      Aerobic and anaerobic    Blood culture x two cultures. Draw prior to antibiotics. [[id number]] Collected: 03/03/24 0903    Order Status: Completed Specimen: Blood Updated: 03/07/24 1232     Blood Culture, Routine No Growth to date      No Growth to date      No Growth to date      No Growth to date      No Growth to date    Narrative:      Aerobic and anaerobic    Blood culture [[id number]]     Order Status: Canceled Specimen: Blood     Blood culture [[id number]]     Order Status: Canceled Specimen: Blood             MOST RECENT IMAGING  X-Ray Chest 1 View  Chest, single view    HISTORY: Respiratory distress.    Comparison 3/4/2024.    The cardiac silhouette appears mildly enlarged, likely accentuated by rotation of the patient. The central pulmonary vasculature is not acutely engorged. There has been further improvement in previously demonstrated interstitial pulmonary edema. No new infiltrate, volume loss or significant effusion identified.    IMPRESSION:    Improving pulmonary edema.    Electronically signed by:  Philip Orellana MD  03/06/2024 07:44 AM CST Workstation: [id number]      ECHOCARDIOGRAM RESULTS (last 5)  Results for orders placed during the hospital encounter of 10/06/23    Echo Saline Bubble? No    Interpretation Summary    Left Ventricle: The left ventricle is normal in  size. Mildly increased wall thickness. There is mild concentric hypertrophy. Normal wall motion. There is normal systolic function. Ejection fraction by visual approximation is 53%. There is normal diastolic function.    Left Atrium: Left atrium is mildly dilated.    Right Ventricle: Normal right ventricular cavity size. Wall thickness is normal. Right ventricle wall motion  is normal. Systolic function is normal.    Aortic Valve: The aortic valve is a trileaflet valve. There is moderate aortic valve sclerosis. Mildly calcified cusps. Mildly restricted motion. There is moderate stenosis. Aortic valve area by VTI is 1.39 cm². Aortic valve peak velocity is 2.67 m/s. Mean gradient is 16 mmHg. The dimensionless index is 0.44.    Mitral Valve: There is mild regurgitation with a centrally directed jet.    Tricuspid Valve: There is mild regurgitation.    IVC/SVC: Normal venous pressure at 3 mmHg.    The estimated pulmonary artery systolic pressure is 28 mmHg.      Results for orders placed during the hospital encounter of 04/11/22    Echo Saline Bubble? No    Interpretation Summary  · The left ventricle is normal in size with concentric remodeling and normal systolic function.  · The estimated ejection fraction is 55%.  · Normal left ventricular diastolic function.  · Moderate left atrial enlargement.  · There is mild aortic valve stenosis.  · Aortic valve area is 1.50 cm2; peak velocity is 2.68 m/s; mean gradient is 15 mmHg.      CURRENT/PREVIOUS VISIT EKG  Results for orders placed or performed during the hospital encounter of 03/03/24   EKG 12-lead    Collection Time: 03/03/24  2:53 PM   Result Value Ref Range    QRS Duration 106 ms    [facility] QTC Calculation 514 ms    Narrative    Test Reason : I21.4,    Vent. Rate : 058 BPM     Atrial Rate : 000 BPM     P-R Int : 000 ms          QRS Dur : 106 ms      QT Int : 524 ms       P-R-T Axes : 000 -15 205 degrees     QTc Int : 514 ms    Atrial fibrillation with slow ventricular  response  LVH with repolarization abnormality ( Art product )  Prolonged QT  Abnormal ECG  When compared with ECG of 03-MAR-2024 12:02,  Atrial fibrillation has replaced Sinus rhythm    Referred By: [email or web address]   SELF           Confirmed By:            ASSESSMENT/PLAN:     Active Hospital Problems    Diagnosis    *Acute hypoxemic respiratory failure    Acute on chronic heart failure with preserved ejection fraction (HFpEF)    NSTEMI (non-ST elevated myocardial infarction)    Hypertensive emergency    ACP (advance care planning)     -I spoke with the patient's daughters Mrs. Duran and Mrs. Ramos.  They were updated on the patient's condition.  Their questions and concerns were addressed.  They request that the patient be a full code status.      Aortic stenosis    Hyperlipidemia    Stage 3b chronic kidney disease    Uncontrolled type 2 diabetes mellitus with hyperglycemia    SSS (sick sinus syndrome)    Iron deficiency anemia       ASSESSMENT & PLAN:   Acute hypoxemic respiratory failure  Acute heart failure EF 45-50 %  Hypertensive emergency  Aortic stenosis, likely moderate  NSTEMI  CKD  HTN   DM2    RECOMMENDATIONS:    Nuclear stress test shows reversibility in the inferior apical region with TID of 1.01.   Patient is high risk for post angiographic renal decline. Will try to maximize medications and see how she does.   Hold diuresis for now.   Give gentle IV fluids. 250 ml of NS given in stress lab. Please give additional 250 ml of NS over 2 hours.   Trend labs. Would like to see renal improvement before dc home.   Can stop lovenox and start her on Plavix. Continue aspirin.   Agree with reducing metoprolol tartrate to 25 mg po BID due to bradycardia this morning. Continue hydralazine 50 mg po TID and amlodipine 10 mg po daily.     Mary White NP  [facility]  Department of Cardiology  Date of Service: 03/07/2024

## 2024-03-07 NOTE — PLAN OF CARE
"Confirmed with pt and 2 daughters at bedside that pt lives alone but her 2 daughters like "right down the street"; pt's plan is to DC home when medically cleared with assistance from daughters.   I asked her if she was agreeable to HH at discharge, she replied "ask me later".   CM will follow up with pt regarding needs upon discharge.    03/07/24 7984   Discharge Reassessment   Assessment Type Discharge Planning Reassessment       "

## 2024-03-07 NOTE — ASSESSMENT & PLAN NOTE
Patient's FSGs are controlled on current medication regimen.  Last A1c reviewed-   Lab Results   Component Value Date    HGBA1C 7.7 (H) 03/07/2024     Most recent fingerstick glucose reviewed-   POC Glucose   Date Value Ref Range Status   03/07/2024 111 (H) 70 - 110 Final   03/07/2024 145 (H) 70 - 110 Final   03/06/2024 189 (H) 70 - 110 Final   03/06/2024 136 (H) 70 - 110 Final      Current correctional scale  Low  Maintain anti-hyperglycemic dose as follows-   Antihyperglycemics (From admission, onward)    Start     Stop Route Frequency Ordered    03/03/24 1200  insulin detemir U-100 (Levemir) pen 10 Units         -- SubQ 2 times daily 03/03/24 1146    03/03/24 1146  insulin aspart U-100 pen 0-5 Units         -- SubQ Before meals & nightly PRN 03/03/24 1146        Hold Oral hypoglycemics while patient is in the hospital.

## 2024-03-07 NOTE — ASSESSMENT & PLAN NOTE
sCr worsening  - hold lasix  - renally dose meds and avoid nephrotoxins as able  - trend renal function daily  - nephro following

## 2024-03-07 NOTE — ASSESSMENT & PLAN NOTE
Troponin down trending   Completed heparin drip for 48 hrs   Nuclear imaging stress test portion positive  - cont aspirin, plavix, Statins, beta blocker  - cardiology following, conservative care for now with BRIDGETTE

## 2024-03-07 NOTE — NURSING
Nurses Note -- 4 Eyes      3/6/2024   11:45 PM      Skin assessed during: Admit      [x] No Altered Skin Integrity Present    []Prevention Measures Documented      [] Yes- Altered Skin Integrity Present or Discovered   [] LDA Added if Not in Epic (Describe Wound)   [] New Altered Skin Integrity was Present on Admit and Documented in LDA   [] Wound Image Taken    Wound Care Consulted? No    Attending Nurse:  Zofia Jefferson RN/Staff Member:   whitney

## 2024-03-07 NOTE — PT/OT/SLP PROGRESS
Physical Therapy      Patient Name:  Cynthia N Cushing   MRN:  [id number]    Patient not seen today secondary to Patient unwilling to participate, Other (Comment) (Pt having Nuclear Medicine testing today. Pt's daughters present and agreeable to assist pt at home upon d/c as pt is refusing SNF admit.). Will follow-up 3/8/24.

## 2024-03-07 NOTE — NURSING NOTE
Lexiscan portion of Stress Test completed without complications. Patient verbalized understanding of pre-post test instructions. Test supervision and discharge from lab per Mary White NP.

## 2024-03-07 NOTE — PROGRESS NOTES
Formerly [facility], [facility] Medicine  Progress Note    Patient Name: Cynthia N Cushing  MRN: [id number]  Patient Class: IP- Inpatient   Admission Date: 3/3/2024  Length of Stay: 4 days  Attending Physician: Josaih Russ MD  Primary Care Provider: Teri Adams MD        Subjective:     Principal Problem:Acute hypoxemic respiratory failure        HPI:  Mrs. Cushing is a pleasant 78-year-old female who has a past medical history of hypertension, diabetes, hyperlipidemia, sleep apnea, SVT, SSS and follow up in cardiology clinic with Dr. Mendieta with recent office visit, last echo October 6, 2023 with EF of 53% with normal diastolic dysfunction and moderate AS who presents to the emergency room today with complaints of increased shortness of breath that started last night, got progressively worse.  The patient was started on CPAP by EMS.  Patient's vital signs in the ER showed blood pressure initially as high as 250/130, tachypnea, .  The patient in the ER was in respiratory distress, she was intubated to protect her airway.  Patient's laboratories and x-rays done.  Patient's case was discussed with the ER physician at the time of admission.  Laboratories showed troponin 467.9, COVID 19 screen negative, lactic acid 2.7, magnesium 2.0, calcitonin 0.098, sodium 140, potassium 3.6, chloride 109, CO2 18, BUN 22, creatinine 2.0, glucose 336, AST 25, anion gap normal at 13, PT INR 0.9, WBCs 14.8, hemoglobin 11, platelet count 416.  ABG show pH 7.25/pCO2 45.9/PO2 255/bicarbonate 20.5/oxygen saturation 100% this was done on ventilator.  Chest x-ray showed changes of pulmonary edema with ETT in place.  The patient was recommended to be admitted to the ICU for acute respiratory failure with hypoxia, hypertensive emergency, possible sepsis with lactic acid level 2.7.  Patient was given Lasix 40 mg IV in the ER, started on nitroglycerin drip.  Patient started on CHF order set, sepsis order set, no fluid bolus  given due to concerns of pulmonary edema and risk of fluid overload, IV antibiotics, blood cultures, cardiology consult, and Pulmonary critical Care consult.  Patient is a full code status.    Overview/Hospital Course:  78 year old female with history of HFpEF, was admitted to ICU with fluid overload secondary to decompensated heart failure and HTN emergency. Patient was intubated on admission. On nitro gtt for brief period. Patient was diuresed. Patient was extubated next day and weaned to RA. Had elevated troponins as well consistent with NSTEMI and was on heparin gtt for 48hr. Cardiology was consulted. Echo showed EF 45-50%, moderate to severe aortic stenosis, and PASP 45.  Developed BRIDGETTE and hyperkalemia. Hyperkalemia resolved with lokelma. Nephrology followed. Given gentle IVF. Stress testing showed a reversible area of ischemia and cardiology followed considering angiogram but was limited by renal function.     Interval History: Patient seen and examined. NAEON. Feeling generally okay. Asking when she will be cleared for discharge. Daughters bedside.      Objective:     Vital Signs (Most Recent):  Temp: 99 °F (37.2 °C) (03/07/24 1107)  Pulse: 62 (03/07/24 1107)  Resp: 18 (03/07/24 1107)  BP: 136/63 (03/07/24 1107)  SpO2: 96 % (03/07/24 1107) Vital Signs (24h Range):  Temp:  [97.7 °F (36.5 °C)-99 °F (37.2 °C)] 99 °F (37.2 °C)  Pulse:  [57-66] 62  Resp:  [18-22] 18  SpO2:  [93 %-100 %] 96 %  BP: (103-144)/(55-70) 136/63     Weight: 75.9 kg (167 lb 5.3 oz)  Body mass index is 29.64 kg/m².    Intake/Output Summary (Last 24 hours) at 3/7/2024 1714  Last data filed at 3/7/2024 1645  Gross per 24 hour   Intake 620 ml   Output 325 ml   Net 295 ml         Physical Exam  Vitals reviewed.   Constitutional:       General: She is not in acute distress.  HENT:      Head: Normocephalic and atraumatic.   Cardiovascular:      Rate and Rhythm: Normal rate and regular rhythm.   Pulmonary:      Effort: Pulmonary effort is normal.  No respiratory distress.   Neurological:      General: No focal deficit present.      Mental Status: She is alert and oriented to person, place, and time. Mental status is at baseline.   Psychiatric:         Mood and Affect: Affect normal.         Behavior: Behavior normal.         Thought Content: Thought content normal.             Significant Labs: All pertinent labs within the past 24 hours have been reviewed.    Significant Imaging: I have reviewed all pertinent imaging results/findings within the past 24 hours.    Assessment/Plan:      * Acute hypoxemic respiratory failure  Resolved. Was from CHF. Patient currently on RA  Pulm signed off    NSTEMI (non-ST elevated myocardial infarction)  Troponin down trending   Completed heparin drip for 48 hrs   Nuclear imaging stress test portion positive  - cont aspirin, plavix, Statins, beta blocker  - cardiology following, conservative care for now with BRIDGETTE    Acute on chronic heart failure with preserved ejection fraction (HFpEF)  Echo was repeated this admission, EF reduced to 45%, AS  - Held diuresis today due to up trending BRIDGETTE  - Cont metoprolol  - cards following    ACP (advance care planning)  Continue full code status    Aortic stenosis  - follow cardiology recommendations     Hyperlipidemia   Patient is chronically on statin.will continue for now. Monitor clinically. Last LDL was   Lab Results   Component Value Date    LDLCALC 9.2 (L) 08/17/2023        BRIDGETTE (acute kidney injury)  sCr worsening  - hold lasix  - renally dose meds and avoid nephrotoxins as able  - trend renal function daily  - nephro following    Uncontrolled type 2 diabetes mellitus with hyperglycemia  Patient's FSGs are controlled on current medication regimen.  Last A1c reviewed-   Lab Results   Component Value Date    HGBA1C 7.7 (H) 03/07/2024     Most recent fingerstick glucose reviewed-   POC Glucose   Date Value Ref Range Status   03/07/2024 111 (H) 70 - 110 Final   03/07/2024 145 (H) 70 -  110 Final   03/06/2024 189 (H) 70 - 110 Final   03/06/2024 136 (H) 70 - 110 Final      Current correctional scale  Low  Maintain anti-hyperglycemic dose as follows-   Antihyperglycemics (From admission, onward)      Start     Stop Route Frequency Ordered    03/03/24 1200  insulin detemir U-100 (Levemir) pen 10 Units         -- SubQ 2 times daily 03/03/24 1146    03/03/24 1146  insulin aspart U-100 pen 0-5 Units         -- SubQ Before meals & nightly PRN 03/03/24 1146          Hold Oral hypoglycemics while patient is in the hospital.    SSS (sick sinus syndrome)  - continue metoprolol at reduced dose due to bradycardia    Iron deficiency anemia  Patient's anemia is currently controlled. Has not received any PRBCs to date. Etiology likely d/t chronic disease due to Chronic Kidney Disease  Current CBC reviewed-   Lab Results   Component Value Date    HGB 9.1 (L) 03/07/2024    HCT 28.9 (L) 03/07/2024     Monitor serial CBC and transfuse if patient becomes hemodynamically unstable, symptomatic or H/H drops below 7/21.      VTE Risk Mitigation (From admission, onward)           Ordered     Place sequential compression device  Until discontinued         03/03/24 1146     IP VTE HIGH RISK PATIENT  Once         03/03/24 1146                    Discharge Planning   ASHELY: 3/8/2024     Code Status: Full Code   Is the patient medically ready for discharge?:     Reason for patient still in hospital (select all that apply): Patient trending condition, Laboratory test, Treatment, and Consult recommendations  Discharge Plan A: Home                  Josiah Russ MD  Department of Hospital Medicine   [facility]

## 2024-03-07 NOTE — CARE UPDATE
03/07/24 0853   Patient Assessment/Suction   Level of Consciousness (AVPU) alert   Respiratory Effort Unlabored;Normal   Expansion/Accessory Muscles/Retractions no use of accessory muscles   All Lung Fields Breath Sounds clear;diminished   Rhythm/Pattern, Respiratory no shortness of breath reported   Cough Frequency no cough   PRE-TX-O2   Device (Oxygen Therapy) room air   SpO2 96 %   Pulse Oximetry Type Continuous   $ Pulse Oximetry - Multiple Charge Pulse Oximetry - Multiple   Pulse 61   Resp 18

## 2024-03-07 NOTE — PLAN OF CARE
AAOx4. VSS on room air. Denies chest pain/SOB. OOB to bathroom. X1 BM. Stress test completed. Tolerating diet. Remains sinus rhythm to sinus jimi on monitor, meds adjusted. Pt's family at bedside.     Problem: Adult Inpatient Plan of Care  Goal: Plan of Care Review  Outcome: Ongoing, Progressing  Goal: Absence of Hospital-Acquired Illness or Injury  Outcome: Ongoing, Progressing  Goal: Optimal Comfort and Wellbeing  Outcome: Ongoing, Progressing

## 2024-03-07 NOTE — ASSESSMENT & PLAN NOTE
Patient's anemia is currently controlled. Has not received any PRBCs to date. Etiology likely d/t chronic disease due to Chronic Kidney Disease  Current CBC reviewed-   Lab Results   Component Value Date    HGB 9.1 (L) 03/07/2024    HCT 28.9 (L) 03/07/2024     Monitor serial CBC and transfuse if patient becomes hemodynamically unstable, symptomatic or H/H drops below 7/21.

## 2024-03-07 NOTE — SUBJECTIVE & OBJECTIVE
Interval History: Patient seen and examined. MICAH. Feeling generally okay. Asking when she will be cleared for discharge. Daughters bedside.      Objective:     Vital Signs (Most Recent):  Temp: 99 °F (37.2 °C) (03/07/24 1107)  Pulse: 62 (03/07/24 1107)  Resp: 18 (03/07/24 1107)  BP: 136/63 (03/07/24 1107)  SpO2: 96 % (03/07/24 1107) Vital Signs (24h Range):  Temp:  [97.7 °F (36.5 °C)-99 °F (37.2 °C)] 99 °F (37.2 °C)  Pulse:  [57-66] 62  Resp:  [18-22] 18  SpO2:  [93 %-100 %] 96 %  BP: (103-144)/(55-70) 136/63     Weight: 75.9 kg (167 lb 5.3 oz)  Body mass index is 29.64 kg/m².    Intake/Output Summary (Last 24 hours) at 3/7/2024 1714  Last data filed at 3/7/2024 1645  Gross per 24 hour   Intake 620 ml   Output 325 ml   Net 295 ml         Physical Exam  Vitals reviewed.   Constitutional:       General: She is not in acute distress.  HENT:      Head: Normocephalic and atraumatic.   Cardiovascular:      Rate and Rhythm: Normal rate and regular rhythm.   Pulmonary:      Effort: Pulmonary effort is normal. No respiratory distress.   Neurological:      General: No focal deficit present.      Mental Status: She is alert and oriented to person, place, and time. Mental status is at baseline.   Psychiatric:         Mood and Affect: Affect normal.         Behavior: Behavior normal.         Thought Content: Thought content normal.             Significant Labs: All pertinent labs within the past 24 hours have been reviewed.    Significant Imaging: I have reviewed all pertinent imaging results/findings within the past 24 hours.

## 2024-03-07 NOTE — ASSESSMENT & PLAN NOTE
Echo was repeated this admission, EF reduced to 45%, AS  - Held diuresis today due to up trending BRIDGETTE  - Cont metoprolol  - cards following

## 2024-03-07 NOTE — PROGRESS NOTES
Nephrology Consult Note        Patient Name: Cynthia N Cushing  MRN: [id number]    Patient Class: IP- Inpatient   Admission Date: 3/3/2024  Length of Stay: 4 days  Date of Service: 3/7/2024    Attending Physician: Josiah Russ MD  Primary Care Provider: Teri Adams MD    Reason for Consult: yasir/htn emergency/hf exacerbation    SUBJECTIVE:     HPI: 78F with hypertension, diabetes, hyperlipidemia, sleep apnea, SVT, SSS and follow up in cardiology clinic with Dr. Mendieta, last echo October 6, 2023 with EF of 53% with normal diastolic dysfunction and moderate AS presents to the emergency room with increased shortness of breath since last night, progressively worse. The patient was started on CPAP by EMS, blood pressure initially as high as 250/130, tachypnea, . In the ER was in respiratory distress and was intubated to protect her airway.      Laboratories showed troponin 467.9, COVID 19 screen negative, lactic acid 2.7, CO2 18, BUN 22, creatinine 2.0, glucose 336, anion gap normal at 13.     ABG with pH 7.25/pCO2 45.9/PO2 255/bicarbonate 20.5/oxygen saturation 100% while on ventilator.    UO with tsai around 3L so far as documented. UA bland with known proteinuria and some hyaline casts of unclear significance.    Chest x-ray consistent with pulmonary edema with ETT in place. Patient was given Lasix 40 mg IV in the ER, started on nitroglycerin drip. No fluid bolus given due to concerns of pulmonary edema and risk of fluid overload, but received IV antibiotics, blood cultures were obtained.     3/5  VSS, good UO, stable renal function. Mild hypokalemia - adjust diet, replete orally.  3/6 VSS, BP better controlled. UO good, tsai removed.Diuretics stopped. sCr is up. Remains on Amlodipine, hydralazine, BBL - will defer decision to cards.  3/7 VSS. Remains hypertensive, stress test shows reversible ischemia. Appreciate cards input, holding diuretics, giving gentle IVF despite high BP. BP meds adjusted BBL  decreased for bradycardia. Hyperkalemia noted - increase Lokelma dose and switch to renal diet.    Past Medical History:   Diagnosis Date    Allergy     Breast mass     Cataract     Diabetes mellitus     GERD (gastroesophageal reflux disease)     Hernia, hiatal     Hyperlipidemia     Hypertension     Kidney stone     Osteoarthritis     Renal insufficiency     Seasonal allergies     Sleep apnea     SSS (sick sinus syndrome)     SVT (supraventricular tachycardia)      Past Surgical History:   Procedure Laterality Date    BREAST BIOPSY      BREAST CYST ASPIRATION      BREAST SURGERY      CERVICAL DISC SURGERY      EYE SURGERY      cataracts bilateral    HYSTERECTOMY       Family History   Problem Relation Age of Onset    Stroke Mother     Cancer Mother     Breast cancer Mother     Cancer Father     Breast cancer Maternal Aunt      Social History     Tobacco Use    Smoking status: Never    Smokeless tobacco: Never   Substance Use Topics    Alcohol use: No    Drug use: No       Review of patient's allergies indicates:   Allergen Reactions    Biaxin [clarithromycin]     Prandin [repaglinide] Nausea And Vomiting    Amlodipine     Chlorphen-phenyltolox-pe-ppa     Ciprofloxacin Nausea And Vomiting    Omnicef [cefdinir]     Propoxyphene     Quinine Nausea And Vomiting       Outpatient meds:  No current facility-administered medications on file prior to encounter.     Current Outpatient Medications on File Prior to Encounter   Medication Sig Dispense Refill    famotidine (PEPCID) 20 MG tablet TAKE 1 TABLET TWICE DAILY (Patient taking differently: Take 20 mg by mouth 2 (two) times daily.) 180 tablet 2    furosemide (LASIX) 20 MG tablet TAKE 1 TABLET(20 MG) BY MOUTH EVERY DAY (Patient taking differently: Take 20 mg by mouth once daily.) 30 tablet 0    guanFACINE (TENEX) 2 MG tablet TAKE 1 TABLET EVERY EVENING (Patient taking differently: Take 2 mg by mouth nightly.) 90 tablet 3    hydrALAZINE (APRESOLINE) 50 MG tablet Take 1  "tablet (50 mg total) by mouth 3 (three) times daily. 90 tablet 11    labetaloL (NORMODYNE) 100 MG tablet Take 1 tablet (100 mg total) by mouth 2 (two) times daily. 60 tablet 11    LANTUS SOLOSTAR U-100 INSULIN glargine 100 units/mL SubQ pen ADMINISTER 51 UNITS UNDER THE SKIN AT NIGHT (Patient taking differently: Inject 51 Units into the skin every evening. ADMINISTER 51 UNITS UNDER THE SKIN AT NIGHT) 15 mL 5    olmesartan (BENICAR) 40 MG tablet TAKE 1 TABLET EVERY DAY (Patient taking differently: Take 40 mg by mouth once daily.) 90 tablet 3    rosuvastatin (CRESTOR) 10 MG tablet Take 1 tablet (10 mg total) by mouth every evening. 90 tablet 2    ACCU-CHEK AMADOR PLUS TEST STRP Strp TEST BLOOD SUGAR FOUR TIMES DAILY 400 strip 10    amLODIPine (NORVASC) 10 MG tablet Take 1 tablet (10 mg total) by mouth once daily. 30 tablet 11    aspirin 81 MG Chew Take 81 mg by mouth once daily.      lancets (ACCU-CHEK SOFTCLIX LANCETS) Misc TEST BLOOD SUGAR FOUR TIMES DAILY 400 each 3    pen needle, diabetic (BD ANNA 2ND GEN PEN NEEDLE) 32 gauge x 5/32" Ndle INJECT 1 NEEDLE INTO THE SKIN EVERY EVENING 100 each 5       Scheduled meds:   amLODIPine  10 mg Oral Daily    aspirin  81 mg Oral Daily    clopidogreL  75 mg Oral Daily    famotidine  20 mg Oral Daily    hydrALAZINE  50 mg Oral Q8H    insulin detemir U-100  10 Units Subcutaneous BID    metoprolol tartrate  25 mg Oral BID    mupirocin   Nasal BID    sodium chloride 0.9%  250 mL Intravenous Once       Infusions:        PRN meds:  acetaminophen, dextrose 50% in water (D50W), dextrose 50% in water (D50W), [] fentaNYL **FOLLOWED BY** fentaNYL, glucagon (human recombinant), glucose, glucose, hydrALAZINE, insulin aspart U-100, magnesium sulfate IVPB, magnesium sulfate IVPB, ondansetron, potassium bicarbonate, potassium bicarbonate, potassium bicarbonate, sodium chloride 0.9%    Review of Systems:    OBJECTIVE:     Vital Signs and IO:  Temp:  [97.7 °F (36.5 °C)-99 °F (37.2 °C)] " "  Pulse:  [57-66]   Resp:  [18-22]   BP: (103-144)/(51-70)   SpO2:  [93 %-100 %]   I/O last 3 completed shifts:  In: 480 [P.O.:480]  Out: 300 [Urine:300]  Wt Readings from Last 5 Encounters:   03/03/24 75.9 kg (167 lb 5.3 oz)   02/02/24 78.3 kg (172 lb 8.2 oz)   12/12/23 78.5 kg (173 lb)   10/23/23 75.3 kg (166 lb)   10/10/23 77.1 kg (170 lb)     Body mass index is 29.64 kg/m².    Physical Exam  Constitutional:       General: Patient is not in acute distress.     Appearance: Patient is well-developed. She is not diaphoretic.   HENT:      Head: Normocephalic and atraumatic.      Mouth/Throat: Mucous membranes are moist.   Eyes:      General: No scleral icterus.     Pupils: Pupils are equal, round, and reactive to light.   Cardiovascular:      Rate and Rhythm: Normal rate and regular rhythm.   Pulmonary:      Effort: Pulmonary effort is normal. No respiratory distress.      Breath sounds: No stridor.   Abdominal:      General: There is no distension.      Palpations: Abdomen is soft.   Musculoskeletal:         General: No deformity. Normal range of motion.      Cervical back: Neck supple.   Skin:     General: Skin is warm and dry.      Findings: No rash present. No erythema.   Neurological:      Mental Status: Patient is alert and oriented to person, place, and time.      Cranial Nerves: No cranial nerve deficit.   Psychiatric:         Behavior: Behavior is normal    Laboratory:  Recent Labs   Lab 03/05/24  0239 03/06/24  0258 03/07/24  0955    138 134*   K 3.2* 4.6 5.5*    101 99   CO2 26 28 26   BUN 25* 32* 49*   CREATININE 2.1* 2.6* 3.5*   * 144* 187*         Recent Labs   Lab 03/05/24  0239 03/06/24  0258 03/07/24  0955   CALCIUM 8.6* 8.7 9.0   ALBUMIN 3.3* 3.3* 3.4*   PHOS 4.5 3.8 4.4   MG 1.8 2.2 2.3         Recent Labs   Lab 07/12/21  1103 02/07/22  0706   PTH, Intact 24.4 24.0         No results for input(s): "POCTGLUCOSE" in the last 168 hours.    Recent Labs   Lab 08/17/23  0701 " 08/28/23  0748 03/07/24  0955   Hemoglobin A1C 6.7 H 7.5 H 7.7 H         Recent Labs   Lab 03/05/24  0239 03/06/24  0258 03/07/24  0955   WBC 11.32  11.32 11.69 11.92   HGB 9.6*  9.6* 9.6* 9.1*   HCT 30.4*  30.4* 31.3* 28.9*     276 304 303   MCV 81*  81* 83 82   MCHC 31.6*  31.6* 30.7* 31.5*   MONO 10.6  10.6  1.2*  1.2* 11.1  1.3* 12.0  1.4*   EOSINOPHIL 2.7  2.7 2.4 1.7         Recent Labs   Lab 03/05/24  0239 03/06/24  0258 03/07/24  0955   BILITOT 0.6 0.5 0.5   PROT 6.2 6.3 6.5   ALBUMIN 3.3* 3.3* 3.4*   ALKPHOS 66 57 63   ALT 20 19 21   AST 22 19 19         Recent Labs   Lab 10/20/21  1237 03/03/24  1224   Color, UA  --  Yellow   Appearance, UA  --  Hazy A   Clarity, UA Clear  --    pH, UA  --  6.0   Specific Gravity, UA  --  1.010   Protein, UA  --  3+ A   Glucose, UA  --  2+ A   Ketones, UA  --  Negative   Urobilinogen, UA  --  Negative   Bilirubin (UA)  --  Negative   Occult Blood UA  --  2+ A   Nitrite, UA  --  Negative   RBC, UA  --  2   WBC, UA  --  3   Bacteria  --  Rare   Hyaline Casts, UA  --  9 A         Recent Labs   Lab 03/04/24  0313 03/04/24  0417 03/04/24  0452   POC PH 7.463 H 7.475 H 7.471 H   POC PCO2 33.6 L 32.5 L 35.2   POC HCO3 24.1 24.0 25.7   POC PO2 108 H 84 80   POC SATURATED O2 99 97 97   POC BE 0 0 2   Sample ARTERIAL ARTERIAL ARTERIAL         Microbiology Results (last 7 days)       Procedure Component Value Units Date/Time    Blood culture x two cultures. Draw prior to antibiotics. [[id number]] Collected: 03/03/24 0903    Order Status: Completed Specimen: Blood Updated: 03/07/24 1232     Blood Culture, Routine No Growth to date      No Growth to date      No Growth to date      No Growth to date      No Growth to date    Narrative:      Aerobic and anaerobic    Blood culture x two cultures. Draw prior to antibiotics. [[id number]] Collected: 03/03/24 0903    Order Status: Completed Specimen: Blood Updated: 03/07/24 1232     Blood Culture, Routine No Growth to date       No Growth to date      No Growth to date      No Growth to date      No Growth to date    Narrative:      Aerobic and anaerobic    Blood culture [[id number]]     Order Status: Canceled Specimen: Blood     Blood culture [[id number]]     Order Status: Canceled Specimen: Blood             ASSESSMENT/PLAN:     Non-oliguric BRIDGETTE due to cardiorenal syndrome due to acute on chronic HFpEF exacerbation with volume overload, NSTEMI, HTN emergency, pulmonary edema  Acute respiratory failure requiring intubation  Sepsis possible but seems unlikely  Hypokalemia -> Hyperkalemia  CKD stage 3  DM  Anemia of CKD  No NSAIDs, ACEI/ARB, IV contrast or other nephrotoxins.  Keep MAP > 60, SBP > 100.  Dose meds for GFR < 30 ml/min.  Switch to renal diet and stop K supplement and add Lokelma.  Monitor UO, diuretics and BP control per Cards, replete lytes as needed.  No need for dialysis at present.  Hgb and HCT are acceptable. Monitor for now. Agree with heparin gtt for NSTEMI.  Control DM.    Thank you for allowing us to participate in the care of your patient!   We will follow the patient and provide recommendations as needed.    Patient care time was spent personally by me on the following activities:     Obtaining a history.  Examination of patient.  Providing medical care at the patients bedside.  Developing a treatment plan with patient or surrogate and bedside caregivers.  Ordering and reviewing laboratory studies, radiographic studies, pulse oximetry.  Ordering and performing treatments and interventions.  Evaluation of patient's response to treatment.  Discussions with consultants while on the unit and immediately available to the patient.  Re-evaluation of the patient's condition.  Documentation in the medical record.     Vito Britton MD    [facility]  [address] [place] [address]  [place] [address]    [phone] - tel  [phone] - fax    3/7/2024

## 2024-03-08 LAB
ALBUMIN SERPL BCP-MCNC: 3.2 G/DL (ref 3.5–5.2)
ALP SERPL-CCNC: 59 U/L (ref 55–135)
ALT SERPL W/O P-5'-P-CCNC: 21 U/L (ref 10–44)
ANION GAP SERPL CALC-SCNC: 8 MMOL/L (ref 8–16)
AST SERPL-CCNC: 18 U/L (ref 10–40)
BACTERIA BLD CULT: NORMAL
BACTERIA BLD CULT: NORMAL
BASOPHILS # BLD AUTO: 0.07 K/UL (ref 0–0.2)
BASOPHILS NFR BLD: 0.8 % (ref 0–1.9)
BILIRUB SERPL-MCNC: 0.4 MG/DL (ref 0.1–1)
BUN SERPL-MCNC: 50 MG/DL (ref 8–23)
CALCIUM SERPL-MCNC: 8.6 MG/DL (ref 8.7–10.5)
CHLORIDE SERPL-SCNC: 104 MMOL/L (ref 95–110)
CO2 SERPL-SCNC: 25 MMOL/L (ref 23–29)
CREAT SERPL-MCNC: 3 MG/DL (ref 0.5–1.4)
DIFFERENTIAL METHOD BLD: ABNORMAL
EOSINOPHIL # BLD AUTO: 0.4 K/UL (ref 0–0.5)
EOSINOPHIL NFR BLD: 5.2 % (ref 0–8)
ERYTHROCYTE [DISTWIDTH] IN BLOOD BY AUTOMATED COUNT: 16.2 % (ref 11.5–14.5)
EST. GFR  (NO RACE VARIABLE): 15.4 ML/MIN/1.73 M^2
GLUCOSE SERPL-MCNC: 118 MG/DL (ref 70–110)
GLUCOSE SERPL-MCNC: 150 MG/DL (ref 70–110)
GLUCOSE SERPL-MCNC: 157 MG/DL (ref 70–110)
GLUCOSE SERPL-MCNC: 167 MG/DL (ref 70–110)
GLUCOSE SERPL-MCNC: 209 MG/DL (ref 70–110)
HCT VFR BLD AUTO: 28.2 % (ref 37–48.5)
HGB BLD-MCNC: 8.7 G/DL (ref 12–16)
IMM GRANULOCYTES # BLD AUTO: 0.09 K/UL (ref 0–0.04)
IMM GRANULOCYTES NFR BLD AUTO: 1.1 % (ref 0–0.5)
LYMPHOCYTES # BLD AUTO: 1.6 K/UL (ref 1–4.8)
LYMPHOCYTES NFR BLD: 19.3 % (ref 18–48)
MAGNESIUM SERPL-MCNC: 2.4 MG/DL (ref 1.6–2.6)
MCH RBC QN AUTO: 25.2 PG (ref 27–31)
MCHC RBC AUTO-ENTMCNC: 30.9 G/DL (ref 32–36)
MCV RBC AUTO: 82 FL (ref 82–98)
MONOCYTES # BLD AUTO: 1.1 K/UL (ref 0.3–1)
MONOCYTES NFR BLD: 12.7 % (ref 4–15)
NEUTROPHILS # BLD AUTO: 5.1 K/UL (ref 1.8–7.7)
NEUTROPHILS NFR BLD: 60.9 % (ref 38–73)
NRBC BLD-RTO: 0 /100 WBC
PHOSPHATE SERPL-MCNC: 5.1 MG/DL (ref 2.7–4.5)
PLATELET # BLD AUTO: 276 K/UL (ref 150–450)
PMV BLD AUTO: 11 FL (ref 9.2–12.9)
POTASSIUM SERPL-SCNC: 4.7 MMOL/L (ref 3.5–5.1)
PROT SERPL-MCNC: 5.9 G/DL (ref 6–8.4)
RBC # BLD AUTO: 3.45 M/UL (ref 4–5.4)
SODIUM SERPL-SCNC: 137 MMOL/L (ref 136–145)
WBC # BLD AUTO: 8.4 K/UL (ref 3.9–12.7)

## 2024-03-08 PROCEDURE — 12000002 HC ACUTE/MED SURGE SEMI-PRIVATE ROOM

## 2024-03-08 PROCEDURE — 36415 COLL VENOUS BLD VENIPUNCTURE: CPT | Performed by: INTERNAL MEDICINE

## 2024-03-08 PROCEDURE — 25000003 PHARM REV CODE 250: Performed by: NURSE PRACTITIONER

## 2024-03-08 PROCEDURE — 93010 ELECTROCARDIOGRAM REPORT: CPT | Mod: ,,, | Performed by: INTERNAL MEDICINE

## 2024-03-08 PROCEDURE — 25000003 PHARM REV CODE 250: Performed by: INTERNAL MEDICINE

## 2024-03-08 PROCEDURE — 25000003 PHARM REV CODE 250: Performed by: STUDENT IN AN ORGANIZED HEALTH CARE EDUCATION/TRAINING PROGRAM

## 2024-03-08 PROCEDURE — 84100 ASSAY OF PHOSPHORUS: CPT | Performed by: INTERNAL MEDICINE

## 2024-03-08 PROCEDURE — 25000003 PHARM REV CODE 250

## 2024-03-08 PROCEDURE — 25000003 PHARM REV CODE 250: Performed by: HOSPITALIST

## 2024-03-08 PROCEDURE — 80053 COMPREHEN METABOLIC PANEL: CPT | Performed by: INTERNAL MEDICINE

## 2024-03-08 PROCEDURE — 99233 SBSQ HOSP IP/OBS HIGH 50: CPT | Mod: ,,, | Performed by: INTERNAL MEDICINE

## 2024-03-08 PROCEDURE — 94760 N-INVAS EAR/PLS OXIMETRY 1: CPT

## 2024-03-08 PROCEDURE — 93005 ELECTROCARDIOGRAM TRACING: CPT | Performed by: INTERNAL MEDICINE

## 2024-03-08 PROCEDURE — 83735 ASSAY OF MAGNESIUM: CPT | Performed by: INTERNAL MEDICINE

## 2024-03-08 PROCEDURE — 85025 COMPLETE CBC W/AUTO DIFF WBC: CPT | Performed by: INTERNAL MEDICINE

## 2024-03-08 RX ORDER — METOPROLOL TARTRATE 1 MG/ML
INJECTION, SOLUTION INTRAVENOUS
Status: COMPLETED
Start: 2024-03-08 | End: 2024-03-08

## 2024-03-08 RX ORDER — MICONAZOLE NITRATE 2 %
POWDER (GRAM) TOPICAL 2 TIMES DAILY
Status: DISCONTINUED | OUTPATIENT
Start: 2024-03-08 | End: 2024-03-12 | Stop reason: HOSPADM

## 2024-03-08 RX ORDER — ATORVASTATIN CALCIUM 10 MG/1
10 TABLET, FILM COATED ORAL NIGHTLY
Status: DISCONTINUED | OUTPATIENT
Start: 2024-03-08 | End: 2024-03-12 | Stop reason: HOSPADM

## 2024-03-08 RX ADMIN — ATORVASTATIN CALCIUM 10 MG: 10 TABLET, FILM COATED ORAL at 09:03

## 2024-03-08 RX ADMIN — MICONAZOLE NITRATE: 20 POWDER TOPICAL at 11:03

## 2024-03-08 RX ADMIN — METOPROLOL TARTRATE 25 MG: 25 TABLET, FILM COATED ORAL at 08:03

## 2024-03-08 RX ADMIN — ASPIRIN 81 MG 81 MG: 81 TABLET ORAL at 08:03

## 2024-03-08 RX ADMIN — HYDRALAZINE HYDROCHLORIDE 50 MG: 25 TABLET ORAL at 03:03

## 2024-03-08 RX ADMIN — FAMOTIDINE 20 MG: 20 TABLET ORAL at 08:03

## 2024-03-08 RX ADMIN — METOPROLOL TARTRATE: 1 INJECTION, SOLUTION INTRAVENOUS at 11:03

## 2024-03-08 RX ADMIN — AMLODIPINE BESYLATE 10 MG: 5 TABLET ORAL at 08:03

## 2024-03-08 RX ADMIN — INSULIN DETEMIR 10 UNITS: 100 INJECTION, SOLUTION SUBCUTANEOUS at 08:03

## 2024-03-08 RX ADMIN — HYDRALAZINE HYDROCHLORIDE 50 MG: 25 TABLET ORAL at 06:03

## 2024-03-08 RX ADMIN — SODIUM ZIRCONIUM CYCLOSILICATE 10 G: 10 POWDER, FOR SUSPENSION ORAL at 08:03

## 2024-03-08 RX ADMIN — INSULIN DETEMIR 10 UNITS: 100 INJECTION, SOLUTION SUBCUTANEOUS at 09:03

## 2024-03-08 RX ADMIN — METOPROLOL TARTRATE 25 MG: 25 TABLET, FILM COATED ORAL at 09:03

## 2024-03-08 RX ADMIN — MUPIROCIN 1 G: 20 OINTMENT TOPICAL at 08:03

## 2024-03-08 RX ADMIN — CLOPIDOGREL BISULFATE 75 MG: 75 TABLET, FILM COATED ORAL at 08:03

## 2024-03-08 NOTE — PLAN OF CARE
Problem: Adult Inpatient Plan of Care  Goal: Plan of Care Review  Outcome: Ongoing, Progressing  Goal: Patient-Specific Goal (Individualized)  Outcome: Ongoing, Progressing  Goal: Absence of Hospital-Acquired Illness or Injury  Outcome: Ongoing, Progressing  Goal: Optimal Comfort and Wellbeing  Outcome: Ongoing, Progressing  Goal: Readiness for Transition of Care  Outcome: Ongoing, Progressing     Problem: Diabetes Comorbidity  Goal: Blood Glucose Level Within Targeted Range  Outcome: Ongoing, Progressing     Problem: Fall Injury Risk  Goal: Absence of Fall and Fall-Related Injury  Outcome: Ongoing, Progressing     Problem: Infection  Goal: Absence of Infection Signs and Symptoms  Outcome: Ongoing, Progressing     Problem: Skin Injury Risk Increased  Goal: Skin Health and Integrity  Outcome: Ongoing, Progressing     Problem: Fluid and Electrolyte Imbalance (Acute Kidney Injury/Impairment)  Goal: Fluid and Electrolyte Balance  Outcome: Ongoing, Progressing     Problem: Oral Intake Inadequate (Acute Kidney Injury/Impairment)  Goal: Optimal Nutrition Intake  Outcome: Ongoing, Progressing     Problem: Renal Function Impairment (Acute Kidney Injury/Impairment)  Goal: Effective Renal Function  Outcome: Ongoing, Progressing

## 2024-03-08 NOTE — ASSESSMENT & PLAN NOTE
Patient's anemia is currently controlled. Has not received any PRBCs to date. Etiology likely d/t chronic disease due to Chronic Kidney Disease  Current CBC reviewed-   Lab Results   Component Value Date    HGB 8.7 (L) 03/08/2024    HCT 28.2 (L) 03/08/2024     Monitor serial CBC and transfuse if patient becomes hemodynamically unstable, symptomatic or H/H drops below 7/21.

## 2024-03-08 NOTE — ASSESSMENT & PLAN NOTE
sCr little better  - gentle IVF from cardiology  - hold lasix  - renally dose meds and avoid nephrotoxins as able  - trend renal function daily  - nephro following

## 2024-03-08 NOTE — PROGRESS NOTES
[facility]  Department of Cardiology  Progress Note      PATIENT NAME: Cynthia N Cushing  MRN: [id number]  TODAY'S DATE: 03/08/2024  ADMIT DATE: 3/3/2024                          CONSULT REQUESTED BY: Josiah Russ MD    SUBJECTIVE     PRINCIPAL PROBLEM: Acute hypoxemic respiratory failure    3/8/24:  Patient seen resting in bed with no distress noted. Breathing is stable lying near flat.     3/7/24:  Patient seen in the stress lab this AM and again this afternoon with her daughter. She is anxious to go home. Denies any chest pain. HR has been on the low side.     3/6/24:  Patient seen resting in bed with no distress noted. Today she is lying flat. Chest xray shows improvement but not total resolution of pulmonary edema. Her creatinine has bumped from diuresis.     3/5/24:  Patient remained in ICU overnight. BP has been 150-160s. She reports minimal improvement in orthopnea when lying back but has put out a good bit of urine.     3/4/24:  Patient seen resting in bed in the ICU with no acute distress noted.  She remains hypertensive with blood pressure in the 160s to 170s and is currently on a heparin drip.  Patient reports that she is feeling anxious and restless from being in bed.  She is requesting to sit up on the side of the bed and has preferred to sit up in the bed as opposed to lying down.    REASON FOR CONSULT:  NSTEMI      HPI:  Chief Complaint   Patient presents with    Respiratory Distress         HPI: Mrs. Cushing is a pleasant 78-year-old female who has a past medical history of hypertension, diabetes, hyperlipidemia, sleep apnea, SVT, SSS and follow up in cardiology clinic with Dr. Mendieta with recent office visit, last echo October 6, 2023 with EF of 53% with normal diastolic dysfunction and moderate AS who presents to the emergency room today with complaints of increased shortness of breath that started last night, got progressively worse.  The patient was started on CPAP by EMS.   Patient's vital signs in the ER showed blood pressure initially as high as 250/130, tachypnea, .  The patient in the ER was in respiratory distress, she was intubated to protect her airway.  Patient's laboratories and x-rays done.  Patient's case was discussed with the ER physician at the time of admission.  Laboratories showed troponin 467.9, COVID 19 screen negative, lactic acid 2.7, magnesium 2.0, calcitonin 0.098, sodium 140, potassium 3.6, chloride 109, CO2 18, BUN 22, creatinine 2.0, glucose 336, AST 25, anion gap normal at 13, PT INR 0.9, WBCs 14.8, hemoglobin 11, platelet count 416.  ABG show pH 7.25/pCO2 45.9/PO2 255/bicarbonate 20.5/oxygen saturation 100% this was done on ventilator.  Chest x-ray showed changes of pulmonary edema with ETT in place.  The patient was recommended to be admitted to the ICU for acute respiratory failure with hypoxia, hypertensive emergency, possible sepsis with lactic acid level 2.7.  Patient was given Lasix 40 mg IV in the ER, started on nitroglycerin drip.  Patient started on CHF order set, sepsis order set, no fluid bolus given due to concerns of pulmonary edema and risk of fluid overload, IV antibiotics, blood cultures, cardiology consult, and Pulmonary critical Care consult.  Patient is a full code status.      Review of patient's allergies indicates:   Allergen Reactions    Biaxin [clarithromycin]     Prandin [repaglinide] Nausea And Vomiting    Amlodipine     Chlorphen-phenyltolox-pe-ppa     Ciprofloxacin Nausea And Vomiting    Omnicef [cefdinir]     Propoxyphene     Quinine Nausea And Vomiting       Past Medical History:   Diagnosis Date    Allergy     Breast mass     Cataract     Diabetes mellitus     GERD (gastroesophageal reflux disease)     Hernia, hiatal     Hyperlipidemia     Hypertension     Kidney stone     Osteoarthritis     Renal insufficiency     Seasonal allergies     Sleep apnea     SSS (sick sinus syndrome)     SVT (supraventricular tachycardia)       Past Surgical History:   Procedure Laterality Date    BREAST BIOPSY      BREAST CYST ASPIRATION      BREAST SURGERY      CERVICAL DISC SURGERY      EYE SURGERY      cataracts bilateral    HYSTERECTOMY       Social History     Tobacco Use    Smoking status: Never    Smokeless tobacco: Never   Substance Use Topics    Alcohol use: No    Drug use: No        REVIEW OF SYSTEMS      OBJECTIVE     VITAL SIGNS (Most Recent)  Temp: 98.1 °F (36.7 °C) (03/08/24 1100)  Pulse: (!) 154 (03/08/24 1100)  Resp: 18 (03/08/24 1100)  BP: 122/75 (03/08/24 1145)  SpO2: 97 % (03/08/24 1100)    VENTILATION STATUS  Resp: 18 (03/08/24 1100)  SpO2: 97 % (03/08/24 1100)  Oxygen Concentration (%):  [21] 21        I & O (Last 24H):  Intake/Output Summary (Last 24 hours) at 3/8/2024 1258  Last data filed at 3/8/2024 0926  Gross per 24 hour   Intake 1500 ml   Output 1625 ml   Net -125 ml         WEIGHTS  Wt Readings from Last 3 Encounters:   03/08/24 0400 75.9 kg (167 lb 6.4 oz)   03/03/24 1115 75.9 kg (167 lb 5.3 oz)   03/03/24 0813 76.7 kg (169 lb 1.5 oz)   02/02/24 1311 78.3 kg (172 lb 8.2 oz)   12/12/23 0949 78.5 kg (173 lb)       PHYSICAL EXAM     GENERAL: resting comfortably in bed lying near flat   HEENT: Normocephalic.    NECK: No JVD.   CARDIAC: SR on telemetry noted at this time.  CHEST ANATOMY: normal.   LUNGS: CTA.   ABDOMEN: Soft, non distended, hypoactive BS.    EXTREMITIES: No edema  CENTRAL NERVOUS SYSTEM: AAO x3   SKIN: No rash   HOME MEDICATIONS:  No current facility-administered medications on file prior to encounter.     Current Outpatient Medications on File Prior to Encounter   Medication Sig Dispense Refill    famotidine (PEPCID) 20 MG tablet TAKE 1 TABLET TWICE DAILY (Patient taking differently: Take 20 mg by mouth 2 (two) times daily.) 180 tablet 2    furosemide (LASIX) 20 MG tablet TAKE 1 TABLET(20 MG) BY MOUTH EVERY DAY (Patient taking differently: Take 20 mg by mouth once daily.) 30 tablet 0    guanFACINE (TENEX) 2  "MG tablet TAKE 1 TABLET EVERY EVENING (Patient taking differently: Take 2 mg by mouth nightly.) 90 tablet 3    hydrALAZINE (APRESOLINE) 50 MG tablet Take 1 tablet (50 mg total) by mouth 3 (three) times daily. 90 tablet 11    labetaloL (NORMODYNE) 100 MG tablet Take 1 tablet (100 mg total) by mouth 2 (two) times daily. 60 tablet 11    LANTUS SOLOSTAR U-100 INSULIN glargine 100 units/mL SubQ pen ADMINISTER 51 UNITS UNDER THE SKIN AT NIGHT (Patient taking differently: Inject 51 Units into the skin every evening. ADMINISTER 51 UNITS UNDER THE SKIN AT NIGHT) 15 mL 5    olmesartan (BENICAR) 40 MG tablet TAKE 1 TABLET EVERY DAY (Patient taking differently: Take 40 mg by mouth once daily.) 90 tablet 3    rosuvastatin (CRESTOR) 10 MG tablet Take 1 tablet (10 mg total) by mouth every evening. 90 tablet 2    ACCU-CHEK AMADOR PLUS TEST STRP Strp TEST BLOOD SUGAR FOUR TIMES DAILY 400 strip 10    amLODIPine (NORVASC) 10 MG tablet Take 1 tablet (10 mg total) by mouth once daily. 30 tablet 11    aspirin 81 MG Chew Take 81 mg by mouth once daily.      lancets (ACCU-CHEK SOFTCLIX LANCETS) Misc TEST BLOOD SUGAR FOUR TIMES DAILY 400 each 3    pen needle, diabetic (BD ANNA 2ND GEN PEN NEEDLE) 32 gauge x 5/32" Ndle INJECT 1 NEEDLE INTO THE SKIN EVERY EVENING 100 each 5       SCHEDULED MEDS:   amLODIPine  10 mg Oral Daily    aspirin  81 mg Oral Daily    clopidogreL  75 mg Oral Daily    famotidine  20 mg Oral Daily    hydrALAZINE  50 mg Oral Q8H    insulin detemir U-100  10 Units Subcutaneous BID    metoprolol tartrate  25 mg Oral BID    sodium zirconium cyclosilicate  10 g Oral Daily       CONTINUOUS INFUSIONS:        PRN MEDS:acetaminophen, dextrose 50% in water (D50W), dextrose 50% in water (D50W), [] fentaNYL **FOLLOWED BY** fentaNYL, glucagon (human recombinant), glucose, glucose, hydrALAZINE, insulin aspart U-100, magnesium sulfate IVPB, magnesium sulfate IVPB, melatonin, ondansetron, potassium bicarbonate, potassium " bicarbonate, potassium bicarbonate, sodium chloride 0.9%    LABS AND DIAGNOSTICS     CBC LAST 3 DAYS  Recent Labs   Lab 03/06/24  0258 03/07/24  0955 03/08/24  0601   WBC 11.69 11.92 8.40   RBC 3.78* 3.51* 3.45*   HGB 9.6* 9.1* 8.7*   HCT 31.3* 28.9* 28.2*   MCV 83 82 82   MCH 25.4* 25.9* 25.2*   MCHC 30.7* 31.5* 30.9*   RDW 16.6* 16.5* 16.2*    303 276   MPV 11.9 11.4 11.0   GRAN 71.0  8.3* 71.6  8.5* 60.9  5.1   LYMPH 13.9*  1.6 13.1*  1.6 19.3  1.6   MONO 11.1  1.3* 12.0  1.4* 12.7  1.1*   BASO 0.11 0.09 0.07   NRBC 0 0 0         COAGULATION LAST 3 DAYS  Recent Labs   Lab 03/03/24  0827 03/03/24  1013 03/03/24  1636 03/04/24  1244 03/04/24  2019 03/05/24  0239   INR 0.9 0.9  --   --   --   --    APTT  --  22.5   < > 37.9* 43.3* 42.6*    < > = values in this interval not displayed.         CHEMISTRY LAST 3 DAYS  Recent Labs   Lab 03/04/24  0313 03/04/24  0417 03/04/24  0452 03/04/24  0455 03/06/24  0258 03/07/24  0955 03/07/24  1457 03/08/24  0601   NA  --   --   --    < > 138 134* 135* 137   K  --   --   --    < > 4.6 5.5* 4.7 4.7   CL  --   --   --    < > 101 99 101 104   CO2  --   --   --    < > 28 26 23 25   ANIONGAP  --   --   --    < > 9 9 11 8   BUN  --   --   --    < > 32* 49* 51* 50*   CREATININE  --   --   --    < > 2.6* 3.5* 3.4* 3.0*   GLU  --   --   --    < > 144* 187* 210* 150*   CALCIUM  --   --   --    < > 8.7 9.0 8.6* 8.6*   PH 7.463* 7.475* 7.471*  --   --   --   --   --    MG  --   --   --    < > 2.2 2.3  --  2.4   ALBUMIN  --   --   --    < > 3.3* 3.4*  --  3.2*   PROT  --   --   --    < > 6.3 6.5  --  5.9*   ALKPHOS  --   --   --    < > 57 63  --  59   ALT  --   --   --    < > 19 21  --  21   AST  --   --   --    < > 19 19  --  18   BILITOT  --   --   --    < > 0.5 0.5  --  0.4    < > = values in this interval not displayed.         CARDIAC PROFILE LAST 3 DAYS  Recent Labs   Lab 03/03/24  0827 03/06/24  0258   * 224*         ENDOCRINE LAST 3 DAYS  Recent Labs   Lab  03/03/24  0827   PROCAL 0.098         LAST ARTERIAL BLOOD GAS  ABG  Recent Labs   Lab 03/04/24  0452   PH 7.471*   PO2 80   PCO2 35.2   HCO3 25.7   BE 2         LAST 7 DAYS MICROBIOLOGY   Microbiology Results (last 7 days)       Procedure Component Value Units Date/Time    Blood culture x two cultures. Draw prior to antibiotics. [[id number]] Collected: 03/03/24 0903    Order Status: Completed Specimen: Blood Updated: 03/08/24 1232     Blood Culture, Routine No growth after 5 days.    Narrative:      Aerobic and anaerobic    Blood culture x two cultures. Draw prior to antibiotics. [[id number]] Collected: 03/03/24 0903    Order Status: Completed Specimen: Blood Updated: 03/08/24 1232     Blood Culture, Routine No growth after 5 days.    Narrative:      Aerobic and anaerobic    Blood culture [[id number]]     Order Status: Canceled Specimen: Blood     Blood culture [[id number]]     Order Status: Canceled Specimen: Blood             MOST RECENT IMAGING  NM Myocardial Perfusion Spect Multi Pharmacologic  Myocardial perfusion scan with ejection fraction calculation    Clinical data: Chest pain    Radiopharmaceutical: 10.4 mCi Technetium 99m Myoview at rest; 27 mCi Technetium 99m Myoview following LexiScan stress    PROCEDURE:  Gated tomographic reconstruction images were obtained, with imaging in the short axis, as well as vertical and horizontal long axes.    FINDINGS: There is a reversible defect in the inferior apical region suggestive of stress-induced ischemia. There are no additional reversible or fixed defects. There is no wall motion abnormality.  The ejection fraction is 49%    IMPRESSION: Reversible defect in the inferior apical region suggestive of stress-induced ischemia. Correlation with EKG changes is recommended    Ejection fraction 49%    Electronically signed by:  Norah Thomas MD  03/07/2024 02:24 PM [facility] Workstation: [id number]  Nuclear Stress Test    The ECG portion of the study is negative for  ischemia.    The patient reported no chest pain during the stress test.    The nuclear portion of this study will be reported separately.      ECHOCARDIOGRAM RESULTS (last 5)  Results for orders placed during the hospital encounter of 10/06/23    Echo Saline Bubble? No    Interpretation Summary    Left Ventricle: The left ventricle is normal in size. Mildly increased wall thickness. There is mild concentric hypertrophy. Normal wall motion. There is normal systolic function. Ejection fraction by visual approximation is 53%. There is normal diastolic function.    Left Atrium: Left atrium is mildly dilated.    Right Ventricle: Normal right ventricular cavity size. Wall thickness is normal. Right ventricle wall motion  is normal. Systolic function is normal.    Aortic Valve: The aortic valve is a trileaflet valve. There is moderate aortic valve sclerosis. Mildly calcified cusps. Mildly restricted motion. There is moderate stenosis. Aortic valve area by VTI is 1.39 cm². Aortic valve peak velocity is 2.67 m/s. Mean gradient is 16 mmHg. The dimensionless index is 0.44.    Mitral Valve: There is mild regurgitation with a centrally directed jet.    Tricuspid Valve: There is mild regurgitation.    IVC/SVC: Normal venous pressure at 3 mmHg.    The estimated pulmonary artery systolic pressure is 28 mmHg.      Results for orders placed during the hospital encounter of 04/11/22    Echo Saline Bubble? No    Interpretation Summary  · The left ventricle is normal in size with concentric remodeling and normal systolic function.  · The estimated ejection fraction is 55%.  · Normal left ventricular diastolic function.  · Moderate left atrial enlargement.  · There is mild aortic valve stenosis.  · Aortic valve area is 1.50 cm2; peak velocity is 2.68 m/s; mean gradient is 15 mmHg.      CURRENT/PREVIOUS VISIT EKG  Results for orders placed or performed during the hospital encounter of 03/03/24   EKG 12-lead    Collection Time: 03/08/24  11:29 AM   Result Value Ref Range    QRS Duration 102 ms    [facility] QTC Calculation 501 ms    Narrative    Test Reason : R07.9,    Vent. Rate : 078 BPM     Atrial Rate : 078 BPM     P-R Int : 164 ms          QRS Dur : 102 ms      QT Int : 440 ms       P-R-T Axes : 068 010 -58 degrees     QTc Int : 501 ms    Normal sinus rhythm  ST and T wave abnormality, consider inferior ischemia  Abnormal ECG  When compared with ECG of 07-MAR-2024 19:46,  Premature atrial complexes are no longer Present  T wave inversion now evident in Inferior leads  Nonspecific T wave abnormality now evident in Anterior leads  T wave inversion less evident in Lateral leads    Referred By: [email or web address]   SELF           Confirmed By:            ASSESSMENT/PLAN:     Active Hospital Problems    Diagnosis    *Acute hypoxemic respiratory failure    Acute on chronic heart failure with preserved ejection fraction (HFpEF)    NSTEMI (non-ST elevated myocardial infarction)    ACP (advance care planning)     -I spoke with the patient's daughters Mrs. Duran and Mrs. Ramos.  They were updated on the patient's condition.  Their questions and concerns were addressed.  They request that the patient be a full code status.      Aortic stenosis    Hyperlipidemia    BRIDGETTE (acute kidney injury)    Uncontrolled type 2 diabetes mellitus with hyperglycemia    SSS (sick sinus syndrome)    Iron deficiency anemia       ASSESSMENT & PLAN:   SVT  Atrial bigeminy   Bradycardia  Acute hypoxemic respiratory failure- resolved  BRIDGETTE on CKD  Abnormal stress test  Acute heart failure EF 45-50 %  Hypertensive emergency  Aortic stenosis, likely moderate  NSTEMI  HTN   DM2    RECOMMENDATIONS:    Recommend proceeding with conservative medical management for the time being at least until her kidney function is improved.   She has had a run of SVT earlier today at which time she sustained in the 150s for a few minutes. She was given some metoprolol IV and her HR is back in SR in the 50s.   This  may be elicited by the dehydration. Will give another 250 cc NS bolus over 3 hours today.   Continue metoprolol tartrate to 25 mg po BID. Continue hydralazine 50 mg po TID and amlodipine 10 mg po daily.   Will follow.     Mary White NP  [facility]  Department of Cardiology  Date of Service: 03/08/2024

## 2024-03-08 NOTE — PROGRESS NOTES
Critical access hospital Medicine  Progress Note    Patient Name: Cynthia N Cushing  MRN: [id number]  Patient Class: IP- Inpatient   Admission Date: 3/3/2024  Length of Stay: 5 days  Attending Physician: Josiah Russ MD  Primary Care Provider: Teri Adams MD        Subjective:     Principal Problem:Acute hypoxemic respiratory failure        HPI:  Mrs. Cushing is a pleasant 78-year-old female who has a past medical history of hypertension, diabetes, hyperlipidemia, sleep apnea, SVT, SSS and follow up in cardiology clinic with Dr. Mendieta with recent office visit, last echo October 6, 2023 with EF of 53% with normal diastolic dysfunction and moderate AS who presents to the emergency room today with complaints of increased shortness of breath that started last night, got progressively worse.  The patient was started on CPAP by EMS.  Patient's vital signs in the ER showed blood pressure initially as high as 250/130, tachypnea, .  The patient in the ER was in respiratory distress, she was intubated to protect her airway.  Patient's laboratories and x-rays done.  Patient's case was discussed with the ER physician at the time of admission.  Laboratories showed troponin 467.9, COVID 19 screen negative, lactic acid 2.7, magnesium 2.0, calcitonin 0.098, sodium 140, potassium 3.6, chloride 109, CO2 18, BUN 22, creatinine 2.0, glucose 336, AST 25, anion gap normal at 13, PT INR 0.9, WBCs 14.8, hemoglobin 11, platelet count 416.  ABG show pH 7.25/pCO2 45.9/PO2 255/bicarbonate 20.5/oxygen saturation 100% this was done on ventilator.  Chest x-ray showed changes of pulmonary edema with ETT in place.  The patient was recommended to be admitted to the ICU for acute respiratory failure with hypoxia, hypertensive emergency, possible sepsis with lactic acid level 2.7.  Patient was given Lasix 40 mg IV in the ER, started on nitroglycerin drip.  Patient started on CHF order set, sepsis order set, no fluid bolus  given due to concerns of pulmonary edema and risk of fluid overload, IV antibiotics, blood cultures, cardiology consult, and Pulmonary critical Care consult.  Patient is a full code status.    Overview/Hospital Course:  78 year old female with history of HFpEF, was admitted to ICU with fluid overload secondary to decompensated heart failure and HTN emergency. Patient was intubated on admission. On nitro gtt for brief period. Patient was diuresed. Patient was extubated next day and weaned to RA. Had elevated troponins as well consistent with NSTEMI and was on heparin gtt for 48hr. Cardiology was consulted. Echo showed EF 45-50%, moderate to severe aortic stenosis, and PASP 45.  Developed BRIDGETTE and hyperkalemia. Hyperkalemia resolved with lokelma. Nephrology followed. Given gentle IVF. Stress testing showed a reversible area of ischemia and cardiology followed considering angiogram but was limited by renal function. Had run of SVT needing IV metoprolol.    Interval History: Patient seen and examined. NAEON. Had run of SVT with rates in 150s this AM symptomatic. Vagal maneuver utilized with having her blow through a straw with the end pinched plus a dose of 5mg metoprolol IV put her back to normal sinus rhythm and symptoms resolved.      Objective:     Vital Signs (Most Recent):  Temp: 98.1 °F (36.7 °C) (03/08/24 1100)  Pulse: (!) 154 (03/08/24 1100)  Resp: 18 (03/08/24 1100)  BP: 122/75 (03/08/24 1145)  SpO2: 97 % (03/08/24 1100) Vital Signs (24h Range):  Temp:  [97.7 °F (36.5 °C)-98.5 °F (36.9 °C)] 98.1 °F (36.7 °C)  Pulse:  [] 154  Resp:  [16-18] 18  SpO2:  [95 %-99 %] 97 %  BP: (106-148)/(52-75) 122/75     Weight: 75.9 kg (167 lb 6.4 oz)  Body mass index is 29.65 kg/m².    Intake/Output Summary (Last 24 hours) at 3/8/2024 1616  Last data filed at 3/8/2024 1300  Gross per 24 hour   Intake 1120 ml   Output 1500 ml   Net -380 ml         Physical Exam  Vitals reviewed.   Constitutional:       General: She is not in  acute distress.  HENT:      Head: Normocephalic and atraumatic.   Cardiovascular:      Rate and Rhythm: Normal rate and regular rhythm.   Pulmonary:      Effort: Pulmonary effort is normal. No respiratory distress.   Neurological:      General: No focal deficit present.      Mental Status: She is alert and oriented to person, place, and time. Mental status is at baseline.   Psychiatric:         Mood and Affect: Affect normal.         Behavior: Behavior normal.         Thought Content: Thought content normal.             Significant Labs: All pertinent labs within the past 24 hours have been reviewed.    Significant Imaging: I have reviewed all pertinent imaging results/findings within the past 24 hours.    Assessment/Plan:      * Acute hypoxemic respiratory failure  Resolved. Was from CHF. Patient currently on RA  Pulm signed off    NSTEMI (non-ST elevated myocardial infarction)  Troponin down trending   Completed heparin drip for 48 hrs   Nuclear imaging stress test portion positive  - cont aspirin, plavix, Statins, beta blocker  - cardiology following, conservative care for now with BRIDGETTE    Acute on chronic heart failure with preserved ejection fraction (HFpEF)  Echo was repeated this admission, EF reduced to 45%, AS  - Held diuresis again due to BRIDGETTE  - Cont metoprolol  - cards following    ACP (advance care planning)  Continue full code status    Aortic stenosis  - follow cardiology recommendations     Hyperlipidemia   Patient is chronically on statin.will continue for now. Monitor clinically. Last LDL was   Lab Results   Component Value Date    LDLCALC 9.2 (L) 08/17/2023        BRIDGETTE (acute kidney injury)  sCr little better  - gentle IVF from cardiology  - hold lasix  - renally dose meds and avoid nephrotoxins as able  - trend renal function daily  - nephro following    SVT (supraventricular tachycardia)  Has history of this. Had episode 3/8 AM needing IV metoprolol  - continue metoprolol  - tele  - cardiology  following    Uncontrolled type 2 diabetes mellitus with hyperglycemia  Patient's FSGs are controlled on current medication regimen.  Last A1c reviewed-   Lab Results   Component Value Date    HGBA1C 7.7 (H) 03/07/2024     Most recent fingerstick glucose reviewed-   POC Glucose   Date Value Ref Range Status   03/08/2024 209 (H) 70 - 110 Final   03/08/2024 157 (H) 70 - 110 Final   03/07/2024 209 (H) 70 - 110 Final   03/07/2024 140 (H) 70 - 110 Final      Current correctional scale  Low  Maintain anti-hyperglycemic dose as follows-   Antihyperglycemics (From admission, onward)      Start     Stop Route Frequency Ordered    03/03/24 1200  insulin detemir U-100 (Levemir) pen 10 Units         -- SubQ 2 times daily 03/03/24 1146    03/03/24 1146  insulin aspart U-100 pen 0-5 Units         -- SubQ Before meals & nightly PRN 03/03/24 1146          Hold Oral hypoglycemics while patient is in the hospital.    SSS (sick sinus syndrome)  - continue metoprolol at reduced dose due to bradycardia    Iron deficiency anemia  Patient's anemia is currently controlled. Has not received any PRBCs to date. Etiology likely d/t chronic disease due to Chronic Kidney Disease  Current CBC reviewed-   Lab Results   Component Value Date    HGB 8.7 (L) 03/08/2024    HCT 28.2 (L) 03/08/2024     Monitor serial CBC and transfuse if patient becomes hemodynamically unstable, symptomatic or H/H drops below 7/21.      VTE Risk Mitigation (From admission, onward)           Ordered     Place sequential compression device  Until discontinued         03/03/24 1146     IP VTE HIGH RISK PATIENT  Once         03/03/24 1146                    Discharge Planning   ASHELY: 3/10/2024     Code Status: Full Code   Is the patient medically ready for discharge?:     Reason for patient still in hospital (select all that apply): Patient new problem, Patient trending condition, Laboratory test, and Treatment  Discharge Plan A: Mohsen Russ  MD  Department of Hospital Medicine   [facility]

## 2024-03-08 NOTE — ASSESSMENT & PLAN NOTE
Echo was repeated this admission, EF reduced to 45%, AS  - Held diuresis again due to BRIDGETTE  - Cont metoprolol  - cards following

## 2024-03-08 NOTE — ASSESSMENT & PLAN NOTE
Patient's FSGs are controlled on current medication regimen.  Last A1c reviewed-   Lab Results   Component Value Date    HGBA1C 7.7 (H) 03/07/2024     Most recent fingerstick glucose reviewed-   POC Glucose   Date Value Ref Range Status   03/08/2024 209 (H) 70 - 110 Final   03/08/2024 157 (H) 70 - 110 Final   03/07/2024 209 (H) 70 - 110 Final   03/07/2024 140 (H) 70 - 110 Final      Current correctional scale  Low  Maintain anti-hyperglycemic dose as follows-   Antihyperglycemics (From admission, onward)    Start     Stop Route Frequency Ordered    03/03/24 1200  insulin detemir U-100 (Levemir) pen 10 Units         -- SubQ 2 times daily 03/03/24 1146    03/03/24 1146  insulin aspart U-100 pen 0-5 Units         -- SubQ Before meals & nightly PRN 03/03/24 1146        Hold Oral hypoglycemics while patient is in the hospital.

## 2024-03-08 NOTE — ASSESSMENT & PLAN NOTE
Has history of this. Had episode 3/8 AM needing IV metoprolol  - continue metoprolol  - tele  - cardiology following

## 2024-03-08 NOTE — PROGRESS NOTES
Nephrology Consult Note        Patient Name: Cynthia N Cushing  MRN: [id number]    Patient Class: IP- Inpatient   Admission Date: 3/3/2024  Length of Stay: 5 days  Date of Service: 3/8/2024    Attending Physician: Josiah Russ MD  Primary Care Provider: Teri Adams MD    Reason for Consult: yasir/htn emergency/hf exacerbation    SUBJECTIVE:     HPI: 78F with hypertension, diabetes, hyperlipidemia, sleep apnea, SVT, SSS and follow up in cardiology clinic with Dr. Mendieta, last echo October 6, 2023 with EF of 53% with normal diastolic dysfunction and moderate AS presents to the emergency room with increased shortness of breath since last night, progressively worse. The patient was started on CPAP by EMS, blood pressure initially as high as 250/130, tachypnea, . In the ER was in respiratory distress and was intubated to protect her airway.      Laboratories showed troponin 467.9, COVID 19 screen negative, lactic acid 2.7, CO2 18, BUN 22, creatinine 2.0, glucose 336, anion gap normal at 13.     ABG with pH 7.25/pCO2 45.9/PO2 255/bicarbonate 20.5/oxygen saturation 100% while on ventilator.    UO with tsai around 3L so far as documented. UA bland with known proteinuria and some hyaline casts of unclear significance.    Chest x-ray consistent with pulmonary edema with ETT in place. Patient was given Lasix 40 mg IV in the ER, started on nitroglycerin drip. No fluid bolus given due to concerns of pulmonary edema and risk of fluid overload, but received IV antibiotics, blood cultures were obtained.     3/5  VSS, good UO, stable renal function. Mild hypokalemia - adjust diet, replete orally.  3/6 VSS, BP better controlled. UO good, tsai removed.Diuretics stopped. sCr is up. Remains on Amlodipine, hydralazine, BBL - will defer decision to cards.  3/7 VSS. Remains hypertensive, stress test shows reversible ischemia. Appreciate cards input, holding diuretics, giving gentle IVF despite high BP. BP meds adjusted BBL  decreased for bradycardia. Hyperkalemia noted - increase Lokelma dose and switch to renal diet.  3/8 VSS. No new complains.    Past Medical History:   Diagnosis Date    Allergy     Breast mass     Cataract     Diabetes mellitus     GERD (gastroesophageal reflux disease)     Hernia, hiatal     Hyperlipidemia     Hypertension     Kidney stone     Osteoarthritis     Renal insufficiency     Seasonal allergies     Sleep apnea     SSS (sick sinus syndrome)     SVT (supraventricular tachycardia)      Past Surgical History:   Procedure Laterality Date    BREAST BIOPSY      BREAST CYST ASPIRATION      BREAST SURGERY      CERVICAL DISC SURGERY      EYE SURGERY      cataracts bilateral    HYSTERECTOMY       Family History   Problem Relation Age of Onset    Stroke Mother     Cancer Mother     Breast cancer Mother     Cancer Father     Breast cancer Maternal Aunt      Social History     Tobacco Use    Smoking status: Never    Smokeless tobacco: Never   Substance Use Topics    Alcohol use: No    Drug use: No       Review of patient's allergies indicates:   Allergen Reactions    Biaxin [clarithromycin]     Prandin [repaglinide] Nausea And Vomiting    Amlodipine     Chlorphen-phenyltolox-pe-ppa     Ciprofloxacin Nausea And Vomiting    Omnicef [cefdinir]     Propoxyphene     Quinine Nausea And Vomiting       Outpatient meds:  No current facility-administered medications on file prior to encounter.     Current Outpatient Medications on File Prior to Encounter   Medication Sig Dispense Refill    famotidine (PEPCID) 20 MG tablet TAKE 1 TABLET TWICE DAILY (Patient taking differently: Take 20 mg by mouth 2 (two) times daily.) 180 tablet 2    furosemide (LASIX) 20 MG tablet TAKE 1 TABLET(20 MG) BY MOUTH EVERY DAY (Patient taking differently: Take 20 mg by mouth once daily.) 30 tablet 0    guanFACINE (TENEX) 2 MG tablet TAKE 1 TABLET EVERY EVENING (Patient taking differently: Take 2 mg by mouth nightly.) 90 tablet 3    hydrALAZINE  "(APRESOLINE) 50 MG tablet Take 1 tablet (50 mg total) by mouth 3 (three) times daily. 90 tablet 11    labetaloL (NORMODYNE) 100 MG tablet Take 1 tablet (100 mg total) by mouth 2 (two) times daily. 60 tablet 11    LANTUS SOLOSTAR U-100 INSULIN glargine 100 units/mL SubQ pen ADMINISTER 51 UNITS UNDER THE SKIN AT NIGHT (Patient taking differently: Inject 51 Units into the skin every evening. ADMINISTER 51 UNITS UNDER THE SKIN AT NIGHT) 15 mL 5    olmesartan (BENICAR) 40 MG tablet TAKE 1 TABLET EVERY DAY (Patient taking differently: Take 40 mg by mouth once daily.) 90 tablet 3    rosuvastatin (CRESTOR) 10 MG tablet Take 1 tablet (10 mg total) by mouth every evening. 90 tablet 2    ACCU-CHEK AMADOR PLUS TEST STRP Strp TEST BLOOD SUGAR FOUR TIMES DAILY 400 strip 10    amLODIPine (NORVASC) 10 MG tablet Take 1 tablet (10 mg total) by mouth once daily. 30 tablet 11    aspirin 81 MG Chew Take 81 mg by mouth once daily.      lancets (ACCU-CHEK SOFTCLIX LANCETS) Misc TEST BLOOD SUGAR FOUR TIMES DAILY 400 each 3    pen needle, diabetic (BD ANNA 2ND GEN PEN NEEDLE) 32 gauge x 5/32" Ndle INJECT 1 NEEDLE INTO THE SKIN EVERY EVENING 100 each 5       Scheduled meds:   amLODIPine  10 mg Oral Daily    aspirin  81 mg Oral Daily    clopidogreL  75 mg Oral Daily    famotidine  20 mg Oral Daily    hydrALAZINE  50 mg Oral Q8H    insulin detemir U-100  10 Units Subcutaneous BID    metoprolol tartrate  25 mg Oral BID    sodium zirconium cyclosilicate  10 g Oral Daily       Infusions:        PRN meds:  acetaminophen, dextrose 50% in water (D50W), dextrose 50% in water (D50W), [] fentaNYL **FOLLOWED BY** fentaNYL, glucagon (human recombinant), glucose, glucose, hydrALAZINE, insulin aspart U-100, magnesium sulfate IVPB, magnesium sulfate IVPB, melatonin, ondansetron, potassium bicarbonate, potassium bicarbonate, potassium bicarbonate, sodium chloride 0.9%    Review of Systems:    OBJECTIVE:     Vital Signs and IO:  Temp:  [97.7 °F (36.5 " °C)-98.5 °F (36.9 °C)]   Pulse:  []   Resp:  [16-18]   BP: (106-148)/(52-75)   SpO2:  [95 %-99 %]   I/O last 3 completed shifts:  In: 1020 [P.O.:520; IV Piggyback:500]  Out: 1225 [Urine:1225]  Wt Readings from Last 5 Encounters:   03/08/24 75.9 kg (167 lb 6.4 oz)   02/02/24 78.3 kg (172 lb 8.2 oz)   12/12/23 78.5 kg (173 lb)   10/23/23 75.3 kg (166 lb)   10/10/23 77.1 kg (170 lb)     Body mass index is 29.65 kg/m².    Physical Exam  Constitutional:       General: Patient is not in acute distress.     Appearance: Patient is well-developed. She is not diaphoretic.   HENT:      Head: Normocephalic and atraumatic.      Mouth/Throat: Mucous membranes are moist.   Eyes:      General: No scleral icterus.     Pupils: Pupils are equal, round, and reactive to light.   Cardiovascular:      Rate and Rhythm: Normal rate and regular rhythm.   Pulmonary:      Effort: Pulmonary effort is normal. No respiratory distress.      Breath sounds: No stridor.   Abdominal:      General: There is no distension.      Palpations: Abdomen is soft.   Musculoskeletal:         General: No deformity. Normal range of motion.      Cervical back: Neck supple.   Skin:     General: Skin is warm and dry.      Findings: No rash present. No erythema.   Neurological:      Mental Status: Patient is alert and oriented to person, place, and time.      Cranial Nerves: No cranial nerve deficit.   Psychiatric:         Behavior: Behavior is normal    Laboratory:  Recent Labs   Lab 03/07/24  0955 03/07/24  1457 03/08/24  0601   * 135* 137   K 5.5* 4.7 4.7   CL 99 101 104   CO2 26 23 25   BUN 49* 51* 50*   CREATININE 3.5* 3.4* 3.0*   * 210* 150*         Recent Labs   Lab 03/06/24  0258 03/07/24  0955 03/07/24  1457 03/08/24  0601   CALCIUM 8.7 9.0 8.6* 8.6*   ALBUMIN 3.3* 3.4*  --  3.2*   PHOS 3.8 4.4  --  5.1*   MG 2.2 2.3  --  2.4         Recent Labs   Lab 07/12/21  1103 02/07/22  0706   PTH, Intact 24.4 24.0         No results for input(s):  ""POCTGLUCOSE" in the last 168 hours.    Recent Labs   Lab 08/17/23  0701 08/28/23  0748 03/07/24  0955   Hemoglobin A1C 6.7 H 7.5 H 7.7 H         Recent Labs   Lab 03/06/24  0258 03/07/24  0955 03/08/24  0601   WBC 11.69 11.92 8.40   HGB 9.6* 9.1* 8.7*   HCT 31.3* 28.9* 28.2*    303 276   MCV 83 82 82   MCHC 30.7* 31.5* 30.9*   MONO 11.1  1.3* 12.0  1.4* 12.7  1.1*   EOSINOPHIL 2.4 1.7 5.2         Recent Labs   Lab 03/06/24  0258 03/07/24  0955 03/08/24  0601   BILITOT 0.5 0.5 0.4   PROT 6.3 6.5 5.9*   ALBUMIN 3.3* 3.4* 3.2*   ALKPHOS 57 63 59   ALT 19 21 21   AST 19 19 18         Recent Labs   Lab 10/20/21  1237 03/03/24  1224   Color, UA  --  Yellow   Appearance, UA  --  Hazy A   Clarity, UA Clear  --    pH, UA  --  6.0   Specific Gravity, UA  --  1.010   Protein, UA  --  3+ A   Glucose, UA  --  2+ A   Ketones, UA  --  Negative   Urobilinogen, UA  --  Negative   Bilirubin (UA)  --  Negative   Occult Blood UA  --  2+ A   Nitrite, UA  --  Negative   RBC, UA  --  2   WBC, UA  --  3   Bacteria  --  Rare   Hyaline Casts, UA  --  9 A         Recent Labs   Lab 03/04/24  0313 03/04/24  0417 03/04/24  0452   POC PH 7.463 H 7.475 H 7.471 H   POC PCO2 33.6 L 32.5 L 35.2   POC HCO3 24.1 24.0 25.7   POC PO2 108 H 84 80   POC SATURATED O2 99 97 97   POC BE 0 0 2   Sample ARTERIAL ARTERIAL ARTERIAL         Microbiology Results (last 7 days)       Procedure Component Value Units Date/Time    Blood culture x two cultures. Draw prior to antibiotics. [[id number]] Collected: 03/03/24 0903    Order Status: Completed Specimen: Blood Updated: 03/08/24 1232     Blood Culture, Routine No growth after 5 days.    Narrative:      Aerobic and anaerobic    Blood culture x two cultures. Draw prior to antibiotics. [[id number]] Collected: 03/03/24 0903    Order Status: Completed Specimen: Blood Updated: 03/08/24 1232     Blood Culture, Routine No growth after 5 days.    Narrative:      Aerobic and anaerobic    Blood culture [[id number]] "     Order Status: Canceled Specimen: Blood     Blood culture [[id number]]     Order Status: Canceled Specimen: Blood             ASSESSMENT/PLAN:     Non-oliguric BRIDGETTE due to cardiorenal syndrome due to acute on chronic HFpEF exacerbation with volume overload, NSTEMI, HTN emergency, pulmonary edema  Acute respiratory failure requiring intubation  Sepsis possible but seems unlikely  Hypokalemia -> Hyperkalemia  CKD stage 3  DM  Anemia of CKD  No NSAIDs, ACEI/ARB, IV contrast or other nephrotoxins.  Keep MAP > 60, SBP > 100.  Dose meds for GFR < 30 ml/min.  Keep on renal diet and Lokelma.  Monitor UO, diuretics and BP control per Cards, replete lytes as needed.  No need for dialysis at present.  Hgb and HCT are acceptable. Monitor for now. Agree with heparin gtt for NSTEMI.  Control DM.    Thank you for allowing us to participate in the care of your patient!   We will follow the patient and provide recommendations as needed.    Patient care time was spent personally by me on the following activities:     Obtaining a history.  Examination of patient.  Providing medical care at the patients bedside.  Developing a treatment plan with patient or surrogate and bedside caregivers.  Ordering and reviewing laboratory studies, radiographic studies, pulse oximetry.  Ordering and performing treatments and interventions.  Evaluation of patient's response to treatment.  Discussions with consultants while on the unit and immediately available to the patient.  Re-evaluation of the patient's condition.  Documentation in the medical record.     Vtio Britton MD    [facility]  [address] [place] [address]  [place] [id number]    [phone] - tel  [phone] - fax    3/8/2024

## 2024-03-08 NOTE — RESPIRATORY THERAPY
03/07/24 2224   Patient Assessment/Suction   Level of Consciousness (AVPU) alert   Respiratory Effort Normal;Unlabored   Expansion/Accessory Muscles/Retractions no retractions;no use of accessory muscles   All Lung Fields Breath Sounds Anterior:;Posterior:;Lateral:;equal bilaterally;clear   Rhythm/Pattern, Respiratory depth regular;pattern regular;unlabored   Cough Frequency no cough   PRE-TX-O2   Device (Oxygen Therapy) room air   Oxygen Concentration (%) 21   SpO2 96 %   Pulse Oximetry Type Intermittent   $ Pulse Oximetry - Multiple Charge Pulse Oximetry - Multiple   Pulse 76   Resp 16   Ready to Wean/Extubation Screen   FIO2<=50 (chart decimal) 0.21

## 2024-03-08 NOTE — PT/OT/SLP PROGRESS
"Physical Therapy      Patient Name:  Cynthia N Cushing   MRN:  [id number]    Patient not seen today secondary to Patient unwilling to participate, Other (Comment) (pt sitting EOB c/o feeling "hot" & extender came in as pt's HR on telemetry up to 150bpm. PT held tx.). Will follow-up 3/9/24.    "

## 2024-03-09 LAB
ALBUMIN SERPL BCP-MCNC: 3.3 G/DL (ref 3.5–5.2)
ALP SERPL-CCNC: 59 U/L (ref 55–135)
ALT SERPL W/O P-5'-P-CCNC: 22 U/L (ref 10–44)
ANION GAP SERPL CALC-SCNC: 10 MMOL/L (ref 8–16)
AST SERPL-CCNC: 19 U/L (ref 10–40)
BASOPHILS # BLD AUTO: 0.06 K/UL (ref 0–0.2)
BASOPHILS NFR BLD: 0.7 % (ref 0–1.9)
BILIRUB SERPL-MCNC: 0.4 MG/DL (ref 0.1–1)
BUN SERPL-MCNC: 47 MG/DL (ref 8–23)
CALCIUM SERPL-MCNC: 8.7 MG/DL (ref 8.7–10.5)
CHLORIDE SERPL-SCNC: 105 MMOL/L (ref 95–110)
CO2 SERPL-SCNC: 22 MMOL/L (ref 23–29)
CREAT SERPL-MCNC: 2.2 MG/DL (ref 0.5–1.4)
DIFFERENTIAL METHOD BLD: ABNORMAL
EOSINOPHIL # BLD AUTO: 0.4 K/UL (ref 0–0.5)
EOSINOPHIL NFR BLD: 5.2 % (ref 0–8)
ERYTHROCYTE [DISTWIDTH] IN BLOOD BY AUTOMATED COUNT: 16.1 % (ref 11.5–14.5)
EST. GFR  (NO RACE VARIABLE): 22.4 ML/MIN/1.73 M^2
GLUCOSE SERPL-MCNC: 154 MG/DL (ref 70–110)
GLUCOSE SERPL-MCNC: 161 MG/DL (ref 70–110)
GLUCOSE SERPL-MCNC: 165 MG/DL (ref 70–110)
GLUCOSE SERPL-MCNC: 182 MG/DL (ref 70–110)
GLUCOSE SERPL-MCNC: 204 MG/DL (ref 70–110)
HCT VFR BLD AUTO: 27.7 % (ref 37–48.5)
HGB BLD-MCNC: 8.5 G/DL (ref 12–16)
IMM GRANULOCYTES # BLD AUTO: 0.06 K/UL (ref 0–0.04)
IMM GRANULOCYTES NFR BLD AUTO: 0.7 % (ref 0–0.5)
LYMPHOCYTES # BLD AUTO: 1.5 K/UL (ref 1–4.8)
LYMPHOCYTES NFR BLD: 18.4 % (ref 18–48)
MAGNESIUM SERPL-MCNC: 2.2 MG/DL (ref 1.6–2.6)
MCH RBC QN AUTO: 25 PG (ref 27–31)
MCHC RBC AUTO-ENTMCNC: 30.7 G/DL (ref 32–36)
MCV RBC AUTO: 82 FL (ref 82–98)
MONOCYTES # BLD AUTO: 1 K/UL (ref 0.3–1)
MONOCYTES NFR BLD: 12.4 % (ref 4–15)
NEUTROPHILS # BLD AUTO: 5 K/UL (ref 1.8–7.7)
NEUTROPHILS NFR BLD: 62.6 % (ref 38–73)
NRBC BLD-RTO: 0 /100 WBC
PHOSPHATE SERPL-MCNC: 3.9 MG/DL (ref 2.7–4.5)
PLATELET # BLD AUTO: 291 K/UL (ref 150–450)
PMV BLD AUTO: 11.2 FL (ref 9.2–12.9)
POTASSIUM SERPL-SCNC: 4.3 MMOL/L (ref 3.5–5.1)
PROT SERPL-MCNC: 6 G/DL (ref 6–8.4)
RBC # BLD AUTO: 3.4 M/UL (ref 4–5.4)
SODIUM SERPL-SCNC: 137 MMOL/L (ref 136–145)
TROPONIN I SERPL HS-MCNC: 73.7 PG/ML (ref 0–14.9)
WBC # BLD AUTO: 8.04 K/UL (ref 3.9–12.7)

## 2024-03-09 PROCEDURE — 63600175 PHARM REV CODE 636 W HCPCS: Performed by: INTERNAL MEDICINE

## 2024-03-09 PROCEDURE — 93005 ELECTROCARDIOGRAM TRACING: CPT | Performed by: GENERAL PRACTICE

## 2024-03-09 PROCEDURE — 25000003 PHARM REV CODE 250: Performed by: INTERNAL MEDICINE

## 2024-03-09 PROCEDURE — 25000003 PHARM REV CODE 250: Performed by: STUDENT IN AN ORGANIZED HEALTH CARE EDUCATION/TRAINING PROGRAM

## 2024-03-09 PROCEDURE — 36415 COLL VENOUS BLD VENIPUNCTURE: CPT | Performed by: INTERNAL MEDICINE

## 2024-03-09 PROCEDURE — 99900035 HC TECH TIME PER 15 MIN (STAT)

## 2024-03-09 PROCEDURE — 82962 GLUCOSE BLOOD TEST: CPT

## 2024-03-09 PROCEDURE — 21000000 HC CCU ICU ROOM CHARGE

## 2024-03-09 PROCEDURE — 83735 ASSAY OF MAGNESIUM: CPT | Performed by: INTERNAL MEDICINE

## 2024-03-09 PROCEDURE — 97116 GAIT TRAINING THERAPY: CPT

## 2024-03-09 PROCEDURE — 93010 ELECTROCARDIOGRAM REPORT: CPT | Mod: 76,,, | Performed by: GENERAL PRACTICE

## 2024-03-09 PROCEDURE — 25000003 PHARM REV CODE 250

## 2024-03-09 PROCEDURE — 25000003 PHARM REV CODE 250: Performed by: NURSE PRACTITIONER

## 2024-03-09 PROCEDURE — 84100 ASSAY OF PHOSPHORUS: CPT | Performed by: INTERNAL MEDICINE

## 2024-03-09 PROCEDURE — 80053 COMPREHEN METABOLIC PANEL: CPT | Performed by: INTERNAL MEDICINE

## 2024-03-09 PROCEDURE — 84484 ASSAY OF TROPONIN QUANT: CPT | Performed by: INTERNAL MEDICINE

## 2024-03-09 PROCEDURE — 94761 N-INVAS EAR/PLS OXIMETRY MLT: CPT

## 2024-03-09 PROCEDURE — 99233 SBSQ HOSP IP/OBS HIGH 50: CPT | Mod: ,,, | Performed by: INTERNAL MEDICINE

## 2024-03-09 PROCEDURE — 85025 COMPLETE CBC W/AUTO DIFF WBC: CPT | Performed by: INTERNAL MEDICINE

## 2024-03-09 RX ORDER — METOPROLOL TARTRATE 25 MG/1
25 TABLET, FILM COATED ORAL ONCE
Status: COMPLETED | OUTPATIENT
Start: 2024-03-09 | End: 2024-03-09

## 2024-03-09 RX ORDER — HEPARIN SODIUM 5000 [USP'U]/ML
5000 INJECTION, SOLUTION INTRAVENOUS; SUBCUTANEOUS
Status: DISCONTINUED | OUTPATIENT
Start: 2024-03-09 | End: 2024-03-11

## 2024-03-09 RX ORDER — METOPROLOL TARTRATE 50 MG/1
50 TABLET ORAL 2 TIMES DAILY
Status: DISCONTINUED | OUTPATIENT
Start: 2024-03-09 | End: 2024-03-09

## 2024-03-09 RX ORDER — METOPROLOL TARTRATE 1 MG/ML
2.5 INJECTION, SOLUTION INTRAVENOUS ONCE
Status: COMPLETED | OUTPATIENT
Start: 2024-03-09 | End: 2024-03-09

## 2024-03-09 RX ORDER — METOPROLOL TARTRATE 25 MG/1
25 TABLET, FILM COATED ORAL 2 TIMES DAILY
Status: DISCONTINUED | OUTPATIENT
Start: 2024-03-09 | End: 2024-03-10

## 2024-03-09 RX ORDER — METOPROLOL TARTRATE 25 MG/1
25 TABLET, FILM COATED ORAL ONCE
Status: DISCONTINUED | OUTPATIENT
Start: 2024-03-09 | End: 2024-03-09

## 2024-03-09 RX ORDER — METOPROLOL TARTRATE 1 MG/ML
5 INJECTION, SOLUTION INTRAVENOUS ONCE
Status: COMPLETED | OUTPATIENT
Start: 2024-03-09 | End: 2024-03-09

## 2024-03-09 RX ORDER — METOPROLOL TARTRATE 1 MG/ML
INJECTION, SOLUTION INTRAVENOUS
Status: COMPLETED
Start: 2024-03-09 | End: 2024-03-09

## 2024-03-09 RX ORDER — METOPROLOL TARTRATE 1 MG/ML
INJECTION, SOLUTION INTRAVENOUS
Status: DISPENSED
Start: 2024-03-09 | End: 2024-03-10

## 2024-03-09 RX ADMIN — HYDRALAZINE HYDROCHLORIDE 50 MG: 25 TABLET ORAL at 05:03

## 2024-03-09 RX ADMIN — METOPROLOL TARTRATE 25 MG: 25 TABLET, FILM COATED ORAL at 06:03

## 2024-03-09 RX ADMIN — HYDRALAZINE HYDROCHLORIDE 50 MG: 25 TABLET ORAL at 03:03

## 2024-03-09 RX ADMIN — METOPROLOL TARTRATE 2.5 MG: 1 INJECTION, SOLUTION INTRAVENOUS at 06:03

## 2024-03-09 RX ADMIN — INSULIN DETEMIR 10 UNITS: 100 INJECTION, SOLUTION SUBCUTANEOUS at 09:03

## 2024-03-09 RX ADMIN — METOPROLOL TARTRATE 5 MG: 1 INJECTION, SOLUTION INTRAVENOUS at 01:03

## 2024-03-09 RX ADMIN — HEPARIN SODIUM 5000 UNITS: 5000 INJECTION, SOLUTION INTRAVENOUS; SUBCUTANEOUS at 04:03

## 2024-03-09 RX ADMIN — ASPIRIN 81 MG 81 MG: 81 TABLET ORAL at 09:03

## 2024-03-09 RX ADMIN — MICONAZOLE NITRATE: 20 POWDER TOPICAL at 09:03

## 2024-03-09 RX ADMIN — CLOPIDOGREL BISULFATE 75 MG: 75 TABLET, FILM COATED ORAL at 09:03

## 2024-03-09 RX ADMIN — SODIUM ZIRCONIUM CYCLOSILICATE 10 G: 10 POWDER, FOR SUSPENSION ORAL at 09:03

## 2024-03-09 RX ADMIN — HYDRALAZINE HYDROCHLORIDE 50 MG: 25 TABLET ORAL at 09:03

## 2024-03-09 RX ADMIN — FAMOTIDINE 20 MG: 20 TABLET ORAL at 09:03

## 2024-03-09 RX ADMIN — Medication 6 MG: at 09:03

## 2024-03-09 RX ADMIN — ATORVASTATIN CALCIUM 10 MG: 10 TABLET, FILM COATED ORAL at 09:03

## 2024-03-09 NOTE — PROGRESS NOTES
[facility]  Department of Cardiology  Progress Note      PATIENT NAME: Cynthia N Cushing  MRN: [id number]  TODAY'S DATE: 03/09/2024  ADMIT DATE: 3/3/2024                          CONSULT REQUESTED BY: Sunny Simental MD    SUBJECTIVE     PRINCIPAL PROBLEM: Acute hypoxemic respiratory failure  3/9/24  She is resting in bed anxious to go home.  Her breathing is doing better.  Her blood pressure is fairly well controlled.  She had an episode of SVT yesterday rate of 150 lasted for few minutes.  On further questioning she has these episodes of palpitations at home that occurred occasionally.  She has a sinus bradycardia.  She had sinus arrest during the night with a 2.8 seconds pause.  The findings have been discussed with the patient.  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    3/8/24:  Patient seen resting in bed with no distress noted. Breathing is stable lying near flat.     3/7/24:  Patient seen in the stress lab this AM and again this afternoon with her daughter. She is anxious to go home. Denies any chest pain. HR has been on the low side.     3/6/24:  Patient seen resting in bed with no distress noted. Today she is lying flat. Chest xray shows improvement but not total resolution of pulmonary edema. Her creatinine has bumped from diuresis.     3/5/24:  Patient remained in ICU overnight. BP has been 150-160s. She reports minimal improvement in orthopnea when lying back but has put out a good bit of urine.     3/4/24:  Patient seen resting in bed in the ICU with no acute distress noted.  She remains hypertensive with blood pressure in the 160s to 170s and is currently on a heparin drip.  Patient reports that she is feeling anxious and restless from being in bed.  She is requesting to sit up on the side of the bed and has preferred to sit up in the bed as opposed to lying down.    REASON FOR CONSULT:  NSTEMI      HPI:  Chief Complaint   Patient presents with    Respiratory Distress          HPI: Mrs. Cushing is a pleasant 78-year-old female who has a past medical history of hypertension, diabetes, hyperlipidemia, sleep apnea, SVT, SSS and follow up in cardiology clinic with Dr. Mendieta with recent office visit, last echo October 6, 2023 with EF of 53% with normal diastolic dysfunction and moderate AS who presents to the emergency room today with complaints of increased shortness of breath that started last night, got progressively worse.  The patient was started on CPAP by EMS.  Patient's vital signs in the ER showed blood pressure initially as high as 250/130, tachypnea, .  The patient in the ER was in respiratory distress, she was intubated to protect her airway.  Patient's laboratories and x-rays done.  Patient's case was discussed with the ER physician at the time of admission.  Laboratories showed troponin 467.9, COVID 19 screen negative, lactic acid 2.7, magnesium 2.0, calcitonin 0.098, sodium 140, potassium 3.6, chloride 109, CO2 18, BUN 22, creatinine 2.0, glucose 336, AST 25, anion gap normal at 13, PT INR 0.9, WBCs 14.8, hemoglobin 11, platelet count 416.  ABG show pH 7.25/pCO2 45.9/PO2 255/bicarbonate 20.5/oxygen saturation 100% this was done on ventilator.  Chest x-ray showed changes of pulmonary edema with ETT in place.  The patient was recommended to be admitted to the ICU for acute respiratory failure with hypoxia, hypertensive emergency, possible sepsis with lactic acid level 2.7.  Patient was given Lasix 40 mg IV in the ER, started on nitroglycerin drip.  Patient started on CHF order set, sepsis order set, no fluid bolus given due to concerns of pulmonary edema and risk of fluid overload, IV antibiotics, blood cultures, cardiology consult, and Pulmonary critical Care consult.  Patient is a full code status.      Review of patient's allergies indicates:   Allergen Reactions    Biaxin [clarithromycin]     Prandin [repaglinide] Nausea And Vomiting    Amlodipine      Chlorphen-phenyltolox-pe-ppa     Ciprofloxacin Nausea And Vomiting    Omnicef [cefdinir]     Propoxyphene     Quinine Nausea And Vomiting       Past Medical History:   Diagnosis Date    Allergy     Breast mass     Cataract     Diabetes mellitus     GERD (gastroesophageal reflux disease)     Hernia, hiatal     Hyperlipidemia     Hypertension     Kidney stone     Osteoarthritis     Renal insufficiency     Seasonal allergies     Sleep apnea     SSS (sick sinus syndrome)     SVT (supraventricular tachycardia)      Past Surgical History:   Procedure Laterality Date    BREAST BIOPSY      BREAST CYST ASPIRATION      BREAST SURGERY      CERVICAL DISC SURGERY      EYE SURGERY      cataracts bilateral    HYSTERECTOMY       Social History     Tobacco Use    Smoking status: Never    Smokeless tobacco: Never   Substance Use Topics    Alcohol use: No    Drug use: No        REVIEW OF SYSTEMS      OBJECTIVE     VITAL SIGNS (Most Recent)  Temp: 97.8 °F (36.6 °C) (03/09/24 0707)  Pulse: 82 (03/09/24 0936)  Resp: 15 (03/09/24 0807)  BP: (!) 128/58 (03/09/24 0936)  SpO2: 96 % (03/09/24 0807)    VENTILATION STATUS  Resp: 15 (03/09/24 0807)  SpO2: 96 % (03/09/24 0807)           I & O (Last 24H):  Intake/Output Summary (Last 24 hours) at 3/9/2024 1113  Last data filed at 3/9/2024 0932  Gross per 24 hour   Intake 390 ml   Output 700 ml   Net -310 ml         WEIGHTS  Wt Readings from Last 3 Encounters:   03/09/24 0400 76.1 kg (167 lb 12.8 oz)   03/08/24 0400 75.9 kg (167 lb 6.4 oz)   03/03/24 1115 75.9 kg (167 lb 5.3 oz)   03/03/24 0813 76.7 kg (169 lb 1.5 oz)   02/02/24 1311 78.3 kg (172 lb 8.2 oz)   12/12/23 0949 78.5 kg (173 lb)       PHYSICAL EXAM     GENERAL: resting comfortably in bed lying near flat   HEENT: Normocephalic.    NECK: No JVD.   CARDIAC: SR on telemetry noted at this time.  CHEST ANATOMY: normal.   LUNGS: CTA.   ABDOMEN: Soft, non distended, hypoactive BS.    EXTREMITIES: No edema  CENTRAL NERVOUS SYSTEM: AAO x3   SKIN:  "No rash   HOME MEDICATIONS:  No current facility-administered medications on file prior to encounter.     Current Outpatient Medications on File Prior to Encounter   Medication Sig Dispense Refill    famotidine (PEPCID) 20 MG tablet TAKE 1 TABLET TWICE DAILY (Patient taking differently: Take 20 mg by mouth 2 (two) times daily.) 180 tablet 2    furosemide (LASIX) 20 MG tablet TAKE 1 TABLET(20 MG) BY MOUTH EVERY DAY (Patient taking differently: Take 20 mg by mouth once daily.) 30 tablet 0    guanFACINE (TENEX) 2 MG tablet TAKE 1 TABLET EVERY EVENING (Patient taking differently: Take 2 mg by mouth nightly.) 90 tablet 3    hydrALAZINE (APRESOLINE) 50 MG tablet Take 1 tablet (50 mg total) by mouth 3 (three) times daily. 90 tablet 11    labetaloL (NORMODYNE) 100 MG tablet Take 1 tablet (100 mg total) by mouth 2 (two) times daily. 60 tablet 11    LANTUS SOLOSTAR U-100 INSULIN glargine 100 units/mL SubQ pen ADMINISTER 51 UNITS UNDER THE SKIN AT NIGHT (Patient taking differently: Inject 51 Units into the skin every evening. ADMINISTER 51 UNITS UNDER THE SKIN AT NIGHT) 15 mL 5    olmesartan (BENICAR) 40 MG tablet TAKE 1 TABLET EVERY DAY (Patient taking differently: Take 40 mg by mouth once daily.) 90 tablet 3    rosuvastatin (CRESTOR) 10 MG tablet Take 1 tablet (10 mg total) by mouth every evening. 90 tablet 2    ACCU-CHEK AMADOR PLUS TEST STRP Strp TEST BLOOD SUGAR FOUR TIMES DAILY 400 strip 10    amLODIPine (NORVASC) 10 MG tablet Take 1 tablet (10 mg total) by mouth once daily. 30 tablet 11    aspirin 81 MG Chew Take 81 mg by mouth once daily.      lancets (ACCU-CHEK SOFTCLIX LANCETS) Misc TEST BLOOD SUGAR FOUR TIMES DAILY 400 each 3    pen needle, diabetic (BD ANNA 2ND GEN PEN NEEDLE) 32 gauge x 5/32" Ndle INJECT 1 NEEDLE INTO THE SKIN EVERY EVENING 100 each 5       SCHEDULED MEDS:   amLODIPine  10 mg Oral Daily    aspirin  81 mg Oral Daily    atorvastatin  10 mg Oral QHS    clopidogreL  75 mg Oral Daily    famotidine  20 " mg Oral Daily    hydrALAZINE  50 mg Oral Q8H    insulin detemir U-100  10 Units Subcutaneous BID    metoprolol tartrate  25 mg Oral BID    miconazole NITRATE 2 %   Topical (Top) BID    sodium zirconium cyclosilicate  10 g Oral Daily       CONTINUOUS INFUSIONS:        PRN MEDS:acetaminophen, dextrose 50% in water (D50W), dextrose 50% in water (D50W), [] fentaNYL **FOLLOWED BY** fentaNYL, glucagon (human recombinant), glucose, glucose, hydrALAZINE, insulin aspart U-100, magnesium sulfate IVPB, magnesium sulfate IVPB, melatonin, ondansetron, potassium bicarbonate, potassium bicarbonate, potassium bicarbonate, sodium chloride 0.9%    LABS AND DIAGNOSTICS     CBC LAST 3 DAYS  Recent Labs   Lab 24  0955 24  0601 24  0518   WBC 11.92 8.40 8.04   RBC 3.51* 3.45* 3.40*   HGB 9.1* 8.7* 8.5*   HCT 28.9* 28.2* 27.7*   MCV 82 82 82   MCH 25.9* 25.2* 25.0*   MCHC 31.5* 30.9* 30.7*   RDW 16.5* 16.2* 16.1*    276 291   MPV 11.4 11.0 11.2   GRAN 71.6  8.5* 60.9  5.1 62.6  5.0   LYMPH 13.1*  1.6 19.3  1.6 18.4  1.5   MONO 12.0  1.4* 12.7  1.1* 12.4  1.0   BASO 0.09 0.07 0.06   NRBC 0 0 0         COAGULATION LAST 3 DAYS  Recent Labs   Lab 24  0827 24  1013 24  1636 24  1244 24  2019 24  0239   INR 0.9 0.9  --   --   --   --    APTT  --  22.5   < > 37.9* 43.3* 42.6*    < > = values in this interval not displayed.         CHEMISTRY LAST 3 DAYS  Recent Labs   Lab 24  0313 24  0417 24  0452 24  0455 24  0955 24  1457 24  0601 24  0518   NA  --   --   --    < > 134* 135* 137 137   K  --   --   --    < > 5.5* 4.7 4.7 4.3   CL  --   --   --    < > 99 101 104 105   CO2  --   --   --    < > 26 23 25 22*   ANIONGAP  --   --   --    < > 9 11 8 10   BUN  --   --   --    < > 49* 51* 50* 47*   CREATININE  --   --   --    < > 3.5* 3.4* 3.0* 2.2*   GLU  --   --   --    < > 187* 210* 150* 154*   CALCIUM  --   --   --    < > 9.0  8.6* 8.6* 8.7   PH 7.463* 7.475* 7.471*  --   --   --   --   --    MG  --   --   --    < > 2.3  --  2.4 2.2   ALBUMIN  --   --   --    < > 3.4*  --  3.2* 3.3*   PROT  --   --   --    < > 6.5  --  5.9* 6.0   ALKPHOS  --   --   --    < > 63  --  59 59   ALT  --   --   --    < > 21  --  21 22   AST  --   --   --    < > 19  --  18 19   BILITOT  --   --   --    < > 0.5  --  0.4 0.4    < > = values in this interval not displayed.         CARDIAC PROFILE LAST 3 DAYS  Recent Labs   Lab 03/03/24 0827 03/06/24  0258   * 224*         ENDOCRINE LAST 3 DAYS  Recent Labs   Lab 03/03/24 0827   PROCAL 0.098         LAST ARTERIAL BLOOD GAS  ABG  Recent Labs   Lab 03/04/24  0452   PH 7.471*   PO2 80   PCO2 35.2   HCO3 25.7   BE 2         LAST 7 DAYS MICROBIOLOGY   Microbiology Results (last 7 days)       Procedure Component Value Units Date/Time    Blood culture x two cultures. Draw prior to antibiotics. [[id number]] Collected: 03/03/24 0903    Order Status: Completed Specimen: Blood Updated: 03/08/24 1232     Blood Culture, Routine No growth after 5 days.    Narrative:      Aerobic and anaerobic    Blood culture x two cultures. Draw prior to antibiotics. [[id number]] Collected: 03/03/24 0903    Order Status: Completed Specimen: Blood Updated: 03/08/24 1232     Blood Culture, Routine No growth after 5 days.    Narrative:      Aerobic and anaerobic    Blood culture [[id number]]     Order Status: Canceled Specimen: Blood     Blood culture [[id number]]     Order Status: Canceled Specimen: Blood             MOST RECENT IMAGING  NM Myocardial Perfusion Spect Multi Pharmacologic  Myocardial perfusion scan with ejection fraction calculation    Clinical data: Chest pain    Radiopharmaceutical: 10.4 mCi Technetium 99m Myoview at rest; 27 mCi Technetium 99m Myoview following LexiScan stress    PROCEDURE:  Gated tomographic reconstruction images were obtained, with imaging in the short axis, as well as vertical and horizontal long  axes.    FINDINGS: There is a reversible defect in the inferior apical region suggestive of stress-induced ischemia. There are no additional reversible or fixed defects. There is no wall motion abnormality.  The ejection fraction is 49%    IMPRESSION: Reversible defect in the inferior apical region suggestive of stress-induced ischemia. Correlation with EKG changes is recommended    Ejection fraction 49%    Electronically signed by:  Norah Thomas MD  03/07/2024 02:24 PM [facility] Workstation: [id number]  Nuclear Stress Test    The ECG portion of the study is negative for ischemia.    The patient reported no chest pain during the stress test.    The nuclear portion of this study will be reported separately.      ECHOCARDIOGRAM RESULTS (last 5)  Results for orders placed during the hospital encounter of 10/06/23    Echo Saline Bubble? No    Interpretation Summary    Left Ventricle: The left ventricle is normal in size. Mildly increased wall thickness. There is mild concentric hypertrophy. Normal wall motion. There is normal systolic function. Ejection fraction by visual approximation is 53%. There is normal diastolic function.    Left Atrium: Left atrium is mildly dilated.    Right Ventricle: Normal right ventricular cavity size. Wall thickness is normal. Right ventricle wall motion  is normal. Systolic function is normal.    Aortic Valve: The aortic valve is a trileaflet valve. There is moderate aortic valve sclerosis. Mildly calcified cusps. Mildly restricted motion. There is moderate stenosis. Aortic valve area by VTI is 1.39 cm². Aortic valve peak velocity is 2.67 m/s. Mean gradient is 16 mmHg. The dimensionless index is 0.44.    Mitral Valve: There is mild regurgitation with a centrally directed jet.    Tricuspid Valve: There is mild regurgitation.    IVC/SVC: Normal venous pressure at 3 mmHg.    The estimated pulmonary artery systolic pressure is 28 mmHg.      Results for orders placed during the hospital  encounter of 04/11/22    Echo Saline Bubble? No    Interpretation Summary  · The left ventricle is normal in size with concentric remodeling and normal systolic function.  · The estimated ejection fraction is 55%.  · Normal left ventricular diastolic function.  · Moderate left atrial enlargement.  · There is mild aortic valve stenosis.  · Aortic valve area is 1.50 cm2; peak velocity is 2.68 m/s; mean gradient is 15 mmHg.      CURRENT/PREVIOUS VISIT EKG  Results for orders placed or performed during the hospital encounter of 03/03/24   EKG 12-lead    Collection Time: 03/08/24 11:29 AM   Result Value Ref Range    QRS Duration 102 ms    [facility] QTC Calculation 501 ms    Narrative    Test Reason : R07.9,    Vent. Rate : 078 BPM     Atrial Rate : 078 BPM     P-R Int : 164 ms          QRS Dur : 102 ms      QT Int : 440 ms       P-R-T Axes : 068 010 -58 degrees     QTc Int : 501 ms    Normal sinus rhythm  ST and T wave abnormality, consider inferior ischemia  Abnormal ECG  When compared with ECG of 07-MAR-2024 19:46,  Premature atrial complexes are no longer Present  T wave inversion now evident in Inferior leads  Nonspecific T wave abnormality now evident in Anterior leads  T wave inversion less evident in Lateral leads    Referred By: [email or web address]   SELF           Confirmed By:            ASSESSMENT/PLAN:     Active Hospital Problems    Diagnosis    *Acute hypoxemic respiratory failure    Acute on chronic heart failure with preserved ejection fraction (HFpEF)    NSTEMI (non-ST elevated myocardial infarction)    ACP (advance care planning)     -I spoke with the patient's daughters Mrs. Duran and Mrs. Ramos.  They were updated on the patient's condition.  Their questions and concerns were addressed.  They request that the patient be a full code status.      Aortic stenosis    Hyperlipidemia    BRIDGETTE (acute kidney injury)    SVT (supraventricular tachycardia)    Uncontrolled type 2 diabetes mellitus with hyperglycemia    SSS (sick  sinus syndrome)    Iron deficiency anemia       ASSESSMENT & PLAN:   SVT with tachy-jimi syndrome.  A pacemaker has been discussed with the patient.  She would like for us to contact her daughter.  Will discuss the rhythm strips with her tomorrow  Atrial bigeminy   Bradycardia  Acute hypoxemic respiratory failure- resolved  BRIDGETTE on CKD improving after hydration  Abnormal stress test  Acute heart failure EF 45-50 %  Hypertensive emergency  Aortic stenosis, likely moderate  NSTEMI  HTN   DM2    RECOMMENDATIONS:    Recommend proceeding with conservative medical management for the time being at least until her kidney function is improved.   She has had a run of SVT yesterday at which time she sustained in the 150s for a few minutes. She was given some metoprolol IV and her HR is back in SR in the 50s.  She has sick sinus syndrome this has been discussed with her.  The possibility of a pacemaker has been discussed.  She is going to discuss it with her family members.  Continue metoprolol tartrate to 25 mg po BID. Continue hydralazine 50 mg po TID and amlodipine 10 mg po daily.   Will follow.     Brijesh Mendieta MD  [facility]  Department of Cardiology  Date of Service: 03/09/2024

## 2024-03-09 NOTE — PROGRESS NOTES
Nephrology Progress Note        Patient Name: Cynthia N Cushing  MRN: [id number]    Patient Class: IP- Inpatient   Admission Date: 3/3/2024  Length of Stay: 6 days  Date of Service: 3/9/2024    Attending Physician: Sunny Simental MD  Primary Care Provider: Teri Adams MD    Reason for Consult: yasir/htn emergency/hf exacerbation    SUBJECTIVE:     HPI: 78F with hypertension, diabetes, hyperlipidemia, sleep apnea, SVT, SSS and follow up in cardiology clinic with Dr. Mendieta, last echo October 6, 2023 with EF of 53% with normal diastolic dysfunction and moderate AS presents to the emergency room with increased shortness of breath since last night, progressively worse. The patient was started on CPAP by EMS, blood pressure initially as high as 250/130, tachypnea, . In the ER was in respiratory distress and was intubated to protect her airway.      Laboratories showed troponin 467.9, COVID 19 screen negative, lactic acid 2.7, CO2 18, BUN 22, creatinine 2.0, glucose 336, anion gap normal at 13.     ABG with pH 7.25/pCO2 45.9/PO2 255/bicarbonate 20.5/oxygen saturation 100% while on ventilator.    UO with tsai around 3L so far as documented. UA bland with known proteinuria and some hyaline casts of unclear significance.    Chest x-ray consistent with pulmonary edema with ETT in place. Patient was given Lasix 40 mg IV in the ER, started on nitroglycerin drip. No fluid bolus given due to concerns of pulmonary edema and risk of fluid overload, but received IV antibiotics, blood cultures were obtained.     3/5  VSS, good UO, stable renal function. Mild hypokalemia - adjust diet, replete orally.  3/6 VSS, BP better controlled. UO good, tsai removed.Diuretics stopped. sCr is up. Remains on Amlodipine, hydralazine, BBL - will defer decision to cards.  3/7 VSS. Remains hypertensive, stress test shows reversible ischemia. Appreciate cards input, holding diuretics, giving gentle IVF despite high BP. BP meds adjusted BBL  decreased for bradycardia. Hyperkalemia noted - increase Lokelma dose and switch to renal diet.  3/8 VSS. No new complains.  3/9  VSS, renal function improving.  K+ at goal.  Pt crying, wants to go home.      Past Medical History:   Diagnosis Date    Allergy     Breast mass     Cataract     Diabetes mellitus     GERD (gastroesophageal reflux disease)     Hernia, hiatal     Hyperlipidemia     Hypertension     Kidney stone     Osteoarthritis     Renal insufficiency     Seasonal allergies     Sleep apnea     SSS (sick sinus syndrome)     SVT (supraventricular tachycardia)      Past Surgical History:   Procedure Laterality Date    BREAST BIOPSY      BREAST CYST ASPIRATION      BREAST SURGERY      CERVICAL DISC SURGERY      EYE SURGERY      cataracts bilateral    HYSTERECTOMY       Family History   Problem Relation Age of Onset    Stroke Mother     Cancer Mother     Breast cancer Mother     Cancer Father     Breast cancer Maternal Aunt      Social History     Tobacco Use    Smoking status: Never    Smokeless tobacco: Never   Substance Use Topics    Alcohol use: No    Drug use: No       Review of patient's allergies indicates:   Allergen Reactions    Biaxin [clarithromycin]     Prandin [repaglinide] Nausea And Vomiting    Amlodipine     Chlorphen-phenyltolox-pe-ppa     Ciprofloxacin Nausea And Vomiting    Omnicef [cefdinir]     Propoxyphene     Quinine Nausea And Vomiting       Outpatient meds:  No current facility-administered medications on file prior to encounter.     Current Outpatient Medications on File Prior to Encounter   Medication Sig Dispense Refill    famotidine (PEPCID) 20 MG tablet TAKE 1 TABLET TWICE DAILY (Patient taking differently: Take 20 mg by mouth 2 (two) times daily.) 180 tablet 2    furosemide (LASIX) 20 MG tablet TAKE 1 TABLET(20 MG) BY MOUTH EVERY DAY (Patient taking differently: Take 20 mg by mouth once daily.) 30 tablet 0    guanFACINE (TENEX) 2 MG tablet TAKE 1 TABLET EVERY EVENING (Patient  "taking differently: Take 2 mg by mouth nightly.) 90 tablet 3    hydrALAZINE (APRESOLINE) 50 MG tablet Take 1 tablet (50 mg total) by mouth 3 (three) times daily. 90 tablet 11    labetaloL (NORMODYNE) 100 MG tablet Take 1 tablet (100 mg total) by mouth 2 (two) times daily. 60 tablet 11    LANTUS SOLOSTAR U-100 INSULIN glargine 100 units/mL SubQ pen ADMINISTER 51 UNITS UNDER THE SKIN AT NIGHT (Patient taking differently: Inject 51 Units into the skin every evening. ADMINISTER 51 UNITS UNDER THE SKIN AT NIGHT) 15 mL 5    olmesartan (BENICAR) 40 MG tablet TAKE 1 TABLET EVERY DAY (Patient taking differently: Take 40 mg by mouth once daily.) 90 tablet 3    rosuvastatin (CRESTOR) 10 MG tablet Take 1 tablet (10 mg total) by mouth every evening. 90 tablet 2    ACCU-CHEK AMADOR PLUS TEST STRP Strp TEST BLOOD SUGAR FOUR TIMES DAILY 400 strip 10    amLODIPine (NORVASC) 10 MG tablet Take 1 tablet (10 mg total) by mouth once daily. 30 tablet 11    aspirin 81 MG Chew Take 81 mg by mouth once daily.      lancets (ACCU-CHEK SOFTCLIX LANCETS) Misc TEST BLOOD SUGAR FOUR TIMES DAILY 400 each 3    pen needle, diabetic (BD ANNA 2ND GEN PEN NEEDLE) 32 gauge x 5/32" Ndle INJECT 1 NEEDLE INTO THE SKIN EVERY EVENING 100 each 5       Scheduled meds:   amLODIPine  10 mg Oral Daily    aspirin  81 mg Oral Daily    atorvastatin  10 mg Oral QHS    clopidogreL  75 mg Oral Daily    famotidine  20 mg Oral Daily    hydrALAZINE  50 mg Oral Q8H    insulin detemir U-100  10 Units Subcutaneous BID    metoprolol tartrate  25 mg Oral BID    miconazole NITRATE 2 %   Topical (Top) BID    sodium zirconium cyclosilicate  10 g Oral Daily       Infusions:        PRN meds:  acetaminophen, dextrose 50% in water (D50W), dextrose 50% in water (D50W), [] fentaNYL **FOLLOWED BY** fentaNYL, glucagon (human recombinant), glucose, glucose, hydrALAZINE, insulin aspart U-100, magnesium sulfate IVPB, magnesium sulfate IVPB, melatonin, ondansetron, potassium " bicarbonate, potassium bicarbonate, potassium bicarbonate, sodium chloride 0.9%    Review of Systems:    OBJECTIVE:     Vital Signs and IO:  Temp:  [97.8 °F (36.6 °C)-98.2 °F (36.8 °C)]   Pulse:  []   Resp:  [15-18]   BP: (120-146)/(55-75)   SpO2:  [95 %-99 %]   I/O last 3 completed shifts:  In: 1270 [P.O.:1270]  Out: 1300 [Urine:1300]  Wt Readings from Last 5 Encounters:   03/09/24 76.1 kg (167 lb 12.8 oz)   02/02/24 78.3 kg (172 lb 8.2 oz)   12/12/23 78.5 kg (173 lb)   10/23/23 75.3 kg (166 lb)   10/10/23 77.1 kg (170 lb)     Body mass index is 29.72 kg/m².    Physical Exam  Constitutional:       General: Patient is not in acute distress.     Appearance: Patient is well-developed. She is not diaphoretic.   HENT:      Head: Normocephalic and atraumatic.      Mouth/Throat: Mucous membranes are moist.   Eyes:      General: No scleral icterus.     Pupils: Pupils are equal, round, and reactive to light.   Cardiovascular:      Rate and Rhythm: Normal rate and regular rhythm.   Pulmonary:      Effort: Pulmonary effort is normal. No respiratory distress.      Breath sounds: No stridor.   Abdominal:      General: There is no distension.      Palpations: Abdomen is soft.   Musculoskeletal:         General: No deformity. Normal range of motion.      Cervical back: Neck supple.   Skin:     General: Skin is warm and dry.      Findings: No rash present. No erythema.   Neurological:      Mental Status: Patient is alert and oriented to person, place, and time.      Cranial Nerves: No cranial nerve deficit.   Psychiatric:         Behavior: Behavior is normal    Laboratory:  Recent Labs   Lab 03/07/24  1457 03/08/24  0601 03/09/24  0518   * 137 137   K 4.7 4.7 4.3    104 105   CO2 23 25 22*   BUN 51* 50* 47*   CREATININE 3.4* 3.0* 2.2*   * 150* 154*         Recent Labs   Lab 03/07/24  0955 03/07/24  1457 03/08/24  0601 03/09/24  0518   CALCIUM 9.0 8.6* 8.6* 8.7   ALBUMIN 3.4*  --  3.2* 3.3*   PHOS 4.4  --   "5.1* 3.9   MG 2.3  --  2.4 2.2         Recent Labs   Lab 07/12/21  1103 02/07/22  0706   PTH, Intact 24.4 24.0         No results for input(s): "POCTGLUCOSE" in the last 168 hours.    Recent Labs   Lab 08/17/23  0701 08/28/23  0748 03/07/24  0955   Hemoglobin A1C 6.7 H 7.5 H 7.7 H         Recent Labs   Lab 03/07/24  0955 03/08/24  0601 03/09/24  0518   WBC 11.92 8.40 8.04   HGB 9.1* 8.7* 8.5*   HCT 28.9* 28.2* 27.7*    276 291   MCV 82 82 82   MCHC 31.5* 30.9* 30.7*   MONO 12.0  1.4* 12.7  1.1* 12.4  1.0   EOSINOPHIL 1.7 5.2 5.2         Recent Labs   Lab 03/07/24  0955 03/08/24  0601 03/09/24  0518   BILITOT 0.5 0.4 0.4   PROT 6.5 5.9* 6.0   ALBUMIN 3.4* 3.2* 3.3*   ALKPHOS 63 59 59   ALT 21 21 22   AST 19 18 19         Recent Labs   Lab 10/20/21  1237 03/03/24  1224   Color, UA  --  Yellow   Appearance, UA  --  Hazy A   Clarity, UA Clear  --    pH, UA  --  6.0   Specific Gravity, UA  --  1.010   Protein, UA  --  3+ A   Glucose, UA  --  2+ A   Ketones, UA  --  Negative   Urobilinogen, UA  --  Negative   Bilirubin (UA)  --  Negative   Occult Blood UA  --  2+ A   Nitrite, UA  --  Negative   RBC, UA  --  2   WBC, UA  --  3   Bacteria  --  Rare   Hyaline Casts, UA  --  9 A         Recent Labs   Lab 03/04/24  0313 03/04/24  0417 03/04/24  0452   POC PH 7.463 H 7.475 H 7.471 H   POC PCO2 33.6 L 32.5 L 35.2   POC HCO3 24.1 24.0 25.7   POC PO2 108 H 84 80   POC SATURATED O2 99 97 97   POC BE 0 0 2   Sample ARTERIAL ARTERIAL ARTERIAL         Microbiology Results (last 7 days)       Procedure Component Value Units Date/Time    Blood culture x two cultures. Draw prior to antibiotics. [[id number]] Collected: 03/03/24 0903    Order Status: Completed Specimen: Blood Updated: 03/08/24 1232     Blood Culture, Routine No growth after 5 days.    Narrative:      Aerobic and anaerobic    Blood culture x two cultures. Draw prior to antibiotics. [[id number]] Collected: 03/03/24 0903    Order Status: Completed Specimen: Blood " Updated: 03/08/24 1232     Blood Culture, Routine No growth after 5 days.    Narrative:      Aerobic and anaerobic    Blood culture [[id number]]     Order Status: Canceled Specimen: Blood     Blood culture [[id number]]     Order Status: Canceled Specimen: Blood             ASSESSMENT/PLAN:     Non-oliguric BRIDGETTE due to cardiorenal syndrome due to acute on chronic HFpEF exacerbation with volume overload, NSTEMI, HTN emergency, pulmonary edema  Acute respiratory failure requiring intubation  Sepsis possible but seems unlikely  Hypokalemia -> Hyperkalemia  CKD stage 3  DM  Anemia of CKD  No NSAIDs, ACEI/ARB, IV contrast or other nephrotoxins.  Keep MAP > 60, SBP > 100.  Dose meds for GFR < 30 ml/min.  Keep on renal diet and Lokelma.  Monitor UO, diuretics and BP control per Cards, replete lytes as needed.  No need for dialysis at present.  Hgb and HCT are acceptable. Monitor for now.   Control DM.    Thank you for allowing us to participate in the care of your patient!   We will follow the patient and provide recommendations as needed.    Patient care time was spent personally by me on the following activities:     Obtaining a history.  Examination of patient.  Providing medical care at the patients bedside.  Developing a treatment plan with patient or surrogate and bedside caregivers.  Ordering and reviewing laboratory studies, radiographic studies, pulse oximetry.  Ordering and performing treatments and interventions.  Evaluation of patient's response to treatment.  Discussions with consultants while on the unit and immediately available to the patient.  Re-evaluation of the patient's condition.  Documentation in the medical record.     Lindy Giron NP      [facility]  [address] [place] [address]  [place] [address]    [phone] - tel  [phone] - fax    3/9/2024

## 2024-03-09 NOTE — CARE UPDATE
03/09/24 0807   PRE-TX-O2   Device (Oxygen Therapy) room air   SpO2 96 %   Pulse Oximetry Type Intermittent   $ Pulse Oximetry - Multiple Charge Pulse Oximetry - Multiple   Pulse 77   Resp 15   Respiratory Evaluation   $ Care Plan Tech Time 15 min

## 2024-03-09 NOTE — NURSING
Nurses Note -- 4 Eyes      3/9/2024   2:48 PM      Skin assessed during: Transfer      [x] No Altered Skin Integrity Present    [x]Prevention Measures Documented      [] Yes- Altered Skin Integrity Present or Discovered   [] LDA Added if Not in Epic (Describe Wound)   [] New Altered Skin Integrity was Present on Admit and Documented in LDA   [] Wound Image Taken    Wound Care Consulted? No    Attending Nurse:  Moisés DAVIS    Second RN/Staff Member:  Kevin DAVIS    Received pt from [facility] via bed. Monitors applied, pt assessed, and 2 RN skin check done. Pt oriented to room, call light in reach, and bed locked in lowest position. Daughter notified. Safety maintained.

## 2024-03-09 NOTE — PT/OT/SLP PROGRESS
Physical Therapy Treatment    Patient Name:  Cynthia N Cushing   MRN:  [id number]    Recommendations:     Discharge Recommendations: Moderate Intensity Therapy  Discharge Equipment Recommendations: none  Barriers to discharge: None    Assessment:     Cynthia N Cushing is a 78 y.o. female admitted with a medical diagnosis of Acute hypoxemic respiratory failure.  She presents with the following impairments/functional limitations: weakness, impaired endurance, impaired functional mobility, gait instability, impaired balance, decreased lower extremity function, decreased safety awareness, pain, impaired cardiopulmonary response to activity     Patient found sitting bedside and agreeable for treatment;  transfers with CGA/ RW;  amb x 80' RW CGA;  asst to bedside chair for OOB endurance.    Rehab Prognosis: Fair; patient would benefit from acute skilled PT services to address these deficits and reach maximum level of function.    Recent Surgery: * No surgery found *      Plan:     During this hospitalization, patient to be seen 6 x/week to address the identified rehab impairments via gait training, therapeutic activities, therapeutic exercises, neuromuscular re-education and progress toward the following goals:    Plan of Care Expires:  04/09/24    Subjective     Chief Complaint: says her R knee is bad, needs a TKR but refused it;  no pain in knee today;    Patient/Family Comments/goals: return home  Pain/Comfort:  Pain Rating 1: 0/10      Objective:     Communicated with patient prior to session.  Patient found sitting edge of bed with telemetry, peripheral IV upon PT entry to room.     General Precautions: Standard, fall  Orthopedic Precautions: N/A  Braces: N/A  Respiratory Status: Room air     Functional Mobility:  Bed Mobility:     Rolling Left:  independence  Rolling Right: independence  Scooting: independence  Supine to Sit: independence  Sit to Supine: independence  Transfers:     Sit to Stand:  contact guard  assistance with rolling walker  Gait: x 80' RW CGA  Balance: sitting good;  stand st/dyn fair      AM-PAC 6 CLICK MOBILITY  Turning over in bed (including adjusting bedclothes, sheets and blankets)?: 4  Sitting down on and standing up from a chair with arms (e.g., wheelchair, bedside commode, etc.): 3  Moving from lying on back to sitting on the side of the bed?: 4  Moving to and from a bed to a chair (including a wheelchair)?: 3  Need to walk in hospital room?: 3  Climbing 3-5 steps with a railing?: 1  Basic Mobility Total Score: 18       Treatment & Education:  Edu on safety/ fall prec;  edu on use call light for help    Patient left up in chair with call button in reach and nurse notified..    GOALS:   Multidisciplinary Problems       Physical Therapy Goals          Problem: Physical Therapy    Goal Priority Disciplines Outcome Goal Variances Interventions   Physical Therapy Goal     PT, PT/OT      Description: Goals to be met by: 24    Patient will increase functional independence with mobility by performin. Supine to sit with Supervision--MET INDEP  2. Sit to stand transfer with Supervision--MET INDEP  3. Bed to chair transfer with Supervision using Rolling Walker--MET INDEP  4. Gait  x 250 feet with Supervision using Rolling Walker.   5. Lower extremity exercise program x20 reps per handout, with supervision                       Time Tracking:     PT Received On: 24  PT Start Time: 1012     PT Stop Time: 1020  PT Total Time (min): 8 min     Billable Minutes: Gait Training 8    Treatment Type: Evaluation  PT/PTA: PT     Number of PTA visits since last PT visit: 0     2024

## 2024-03-09 NOTE — PROGRESS NOTES
Patient: Cynthia N Cushing     MRN: [id number]    [facility] ACCT #: [id number]    : 1945 Age: 78 y.o. Sex:female  Room: 2538 Bed: 2538-a    Admit Date: 3/3/2024   Pt. Type: IP- Inpatient    Admitting Physician: Zaki Longoria MD    Attending Physician: Marj Person MD     Date of service: 3/9/2024        Admission diagnosis  Dyspnea [R06.00]  Dyspnea [R06.00]  Acute pulmonary edema [J81.0]    Overview/Hospital Course:  78 year old female with history of HFpEF, was admitted to ICU with fluid overload secondary to decompensated heart failure and HTN emergency. Patient was intubated on admission. On nitro gtt for brief period. Patient was diuresed. Patient was extubated next day and weaned to RA. Had elevated troponins as well consistent with NSTEMI and was on heparin gtt for 48hr. Cardiology was consulted. Echo showed EF 45-50%, moderate to severe aortic stenosis, and PASP 45.  Developed BRIDGETTE and hyperkalemia. Hyperkalemia resolved with lokelma. Nephrology followed. Given gentle IVF. Stress testing showed a reversible area of ischemia and cardiology followed considering angiogram but was limited by renal function.    Subjective: This is a late entry.  Patient was noted with palpitations earlier this afternoon associated with atrial fibrillation with RVR. She denied overt chest pain but did feel uncomfortable.      Review of systems  Rest of review of systems is negative    Medications    Current Facility-Administered Medications:     acetaminophen tablet 650 mg, 650 mg, Oral, Q4H PRN, Zaki Longoria MD, 650 mg at 24 1725    amLODIPine tablet 10 mg, 10 mg, Oral, Daily, Samir Cage MD, 10 mg at 24 0855    aspirin chewable tablet 81 mg, 81 mg, Oral, Daily, Zaki Longoria MD, 81 mg at 24 0932    atorvastatin tablet 10 mg, 10 mg, Oral, QHS, Adis Narayan MD, 10 mg at 24 2125    clopidogreL tablet 75 mg, 75 mg, Oral, Daily, Mary White NP, 75 mg at 24 0933     dextrose 50% injection 12.5 g, 12.5 g, Intravenous, PRN, Zaki Longoria MD    dextrose 50% injection 25 g, 25 g, Intravenous, PRN, Zaki Longoria MD    famotidine tablet 20 mg, 20 mg, Oral, Daily, Samir Cage MD, 20 mg at 24 0932    [] fentaNYL 50 mcg/mL injection 50 mcg, 50 mcg, Intravenous, Q1H PRN **FOLLOWED BY** fentaNYL 50 mcg/mL injection 50 mcg, 50 mcg, Intravenous, Q1H PRN, Bo Antonio MD, 50 mcg at 24 2320    glucagon (human recombinant) injection 1 mg, 1 mg, Intramuscular, PRN, Zaki Longoria MD    glucose chewable tablet 16 g, 16 g, Oral, PRN, Zaki Longoria MD    glucose chewable tablet 24 g, 24 g, Oral, PRN, Zaki Longoria MD    hydrALAZINE injection 10 mg, 10 mg, Intravenous, Q8H PRN, Zaki Longoria MD, 10 mg at 24 0404    hydrALAZINE tablet 50 mg, 50 mg, Oral, Q8H, Alley Bass MD, 50 mg at 24 1513    insulin aspart U-100 pen 0-5 Units, 0-5 Units, Subcutaneous, QID (AC + HS) PRN, Zaki Longoria MD, 1 Units at 24 2046    insulin detemir U-100 (Levemir) pen 10 Units, 10 Units, Subcutaneous, BID, Zaki Longoria MD, 10 Units at 24 0932    magnesium sulfate 2g in water 50mL IVPB (premix), 2 g, Intravenous, PRN, Zaki Longoria MD, Stopped at 24 1034    magnesium sulfate 2g in water 50mL IVPB (premix), 4 g, Intravenous, PRN, Zaki Longoria MD    melatonin tablet 6 mg, 6 mg, Oral, Nightly PRN, Josiah Russ MD, 6 mg at 24 2322    metoprolol (LOPRESSOR) 5 mg/5 mL injection, , , ,     metoprolol tartrate (LOPRESSOR) tablet 25 mg, 25 mg, Oral, BID, Josiah Russ MD, 25 mg at 24 2125    miconazole NITRATE 2 % top powder, , Topical (Top), BID, Kenzie Preciado MD, Given at 24 0900    ondansetron injection 4 mg, 4 mg, Intravenous, Q8H PRN, Alley Bass MD, 4 mg at 24 1818    potassium bicarbonate disintegrating tablet 35 mEq, 35 mEq, Oral, PRN, Zaki Longoria MD, 35 mEq at 24  0834    potassium bicarbonate disintegrating tablet 50 mEq, 50 mEq, Oral, PRN, Zaki Longoria MD, 50 mEq at 03/03/24 1342    potassium bicarbonate disintegrating tablet 60 mEq, 60 mEq, Oral, PRN, Zaki Longoria MD    sodium chloride 0.9% flush 10 mL, 10 mL, Intravenous, PRN, Zaki Longoria MD    sodium zirconium cyclosilicate packet 10 g, 10 g, Oral, Daily, Vito Britton MD, 10 g at 03/09/24 0934     Last Recorded Vitals  Vitals:    03/09/24 1532   BP:    Pulse: 78   Resp: 18   Temp:         Physical Exam  General:  Patient in moderate discomfort at the time of evaluation earlier this afternoondis  HEENT:  Head is normocephalic atraumatic, PERRLA, EOMI, oral cavity moist  Neck:  Supple, no thyromegaly, trachea midline position, no JVD noted   Respiratory:  Normal respiratory effort, lungs clear to auscultation bilaterally, no crackles or wheezing heard  Cardiovascular: tachycardic, irregular rhythm ; no murmurs or rubs or gallops noted  Abdomen:  Soft, nontender, nondistended bowel sounds heard in all quadrants, no hepatosplenomegaly   Genitourinary:  No CVA tenderness; pelvic exam deferred  Skin:  No rash, warm and dry   Extremities: No cyanosis, clubbing, minimal trace edema in lower extremities  CNS:  Alert and oriented to person, place, and time. No facial asymmetry.  No focal extremity weakness.  Sensation grossly intact.  Psychiatric:  Normal mood and affect.    Laboratory data:   Recent Labs   Lab 03/07/24  0955 03/08/24  0601 03/09/24  0518   WBC 11.92 8.40 8.04   HGB 9.1* 8.7* 8.5*   HCT 28.9* 28.2* 27.7*    276 291       Recent Labs   Lab 03/07/24  0955 03/07/24  1457 03/08/24  0601 03/09/24  0518   * 135* 137 137   K 5.5* 4.7 4.7 4.3   CL 99 101 104 105   CO2 26 23 25 22*   BUN 49* 51* 50* 47*   CREATININE 3.5* 3.4* 3.0* 2.2*   CALCIUM 9.0 8.6* 8.6* 8.7   PROT 6.5  --  5.9* 6.0   BILITOT 0.5  --  0.4 0.4   ALKPHOS 63  --  59 59   ALT 21  --  21 22   AST 19  --  18 19        Imaging studies:  X-Ray Chest AP Portable  Narrative: EXAMINATION:  XR CHEST AP PORTABLE    CLINICAL HISTORY:  oputations;  palpitations    FINDINGS:  Portable chest at 1349 compared with 03/06/2024 shows slightly enlarged cardiac silhouette size with normal mediastinal contours.    Lung volumes are low, but lungs are clear.  Pulmonary vasculature is normal. No acute osseous abnormality.  Impression: Mild cardiomegaly with low lung volumes, but no acute cardiopulmonary abnormality.    Electronically signed by: Mao Holden MD  Date:    03/09/2024  Time:    14:51       Assessement and Plan    Active Hospital Problems    Diagnosis  POA    *Acute hypoxemic respiratory failure [J96.01]  Yes    Acute on chronic heart failure with preserved ejection fraction (HFpEF) [I50.33]  Yes    NSTEMI (non-ST elevated myocardial infarction) [I21.4]  Yes    ACP (advance care planning) [Z71.89]  Not Applicable     -I spoke with the patient's daughters Mrs. Duran and Mrs. Raoms.  They were updated on the patient's condition.  Their questions and concerns were addressed.  They request that the patient be a full code status.      Aortic stenosis [I35.0]  Yes    Hyperlipidemia [E78.5]  Yes    BRIDGETTE (acute kidney injury) [N17.9]  Yes    SVT (supraventricular tachycardia) [I47.10]  Yes    Uncontrolled type 2 diabetes mellitus with hyperglycemia [E11.65]  Yes    SSS (sick sinus syndrome) [I49.5]  Yes    Iron deficiency anemia [D50.9]  Yes      Resolved Hospital Problems    Diagnosis Date Resolved POA    Hypertensive emergency [I16.1] 03/07/2024 Yes      Acute hypoxemic respiratory failure  Resolved. Was from CHF. Patient currently on RA  Pulm signed off     NSTEMI (non-ST elevated myocardial infarction)  Troponin down trending   Completed heparin drip for 48 hrs   Nuclear imaging stress test portion positive  - cont aspirin, plavix, Statins, beta blocker  - cardiology following, conservative care for now with BRIDGETTE     Acute on chronic  heart failure with preserved ejection fraction (HFpEF)  Echo was repeated this admission, EF reduced to 45%, AS  - Held diuresis again due to BRIDGETTE  - Cont metoprolol  - cards following     Aortic stenosis  - follow cardiology recommendations      Hyperlipidemia   Patient is chronically on statin    BRIDGETTE (acute kidney injury)  sCr little better  - hold lasix  - renally dose meds and avoid nephrotoxins as able  - trend renal function daily  - nephro following     SVT (supraventricular tachycardia) with tachy jimi syndrome  A. Fib with RVR  Has history of this. Had episode 3/8 AM needing IV metoprolol       3/9: IV metoprolol 5mg given x 1 . Patient transferred to stepdown unit for better monitoring     Continue metoprol PO 25 mg bid  - tele  - cardiology following; full dose anticoagulation to be discussed with cardiology prior to implementing     Uncontrolled type 2 diabetes mellitus with hyperglycemia  Patient's FSGs are controlled on current medication regimen.  Last A1c reviewed    DVT Prophylaxis: heparin       Code status: Full Code    Disposition: Follow cardiology recommendations    Disclaimer:  The above note was dictated utilizing speech recognition software.  Efforts were made to minimize and correct any transcription errors.     Sunny Simental MD

## 2024-03-09 NOTE — CARE UPDATE
03/08/24 1929   Patient Assessment/Suction   Level of Consciousness (AVPU) alert   Respiratory Effort Normal;Unlabored   Expansion/Accessory Muscles/Retractions no use of accessory muscles;no retractions;expansion symmetric   All Lung Fields Breath Sounds diminished;clear   Rhythm/Pattern, Respiratory unlabored;pattern regular;shallow   Cough Frequency no cough   PRE-TX-O2   Device (Oxygen Therapy) room air   SpO2 99 %   Pulse Oximetry Type Intermittent   $ Pulse Oximetry - Single Charge Pulse Oximetry - Single   Pulse (!) 57   Resp 18

## 2024-03-09 NOTE — NURSING
"At about 1400 was contacted by monitor tech stating patients heart rate was 130-150. Doctor was contacted and gave verbal orders stat EKG . At the beside patient presented up on the side of the bed and stated she was dizzy and felt hot.I assisted the patient back into her bed. EKG was performed stat , blood pressure, heart rate, and O2 per doctors order. /70 and o2 98% room air. Metoprolol 5 mg IV push was also ordered and given by charge nurse. Patients heart rate was up and down at the point but down from the 150's. "Patients dizziness at this point has began to subside per patient."  "

## 2024-03-10 LAB
ANION GAP SERPL CALC-SCNC: 8 MMOL/L (ref 8–16)
BUN SERPL-MCNC: 42 MG/DL (ref 8–23)
CALCIUM SERPL-MCNC: 8.7 MG/DL (ref 8.7–10.5)
CHLORIDE SERPL-SCNC: 107 MMOL/L (ref 95–110)
CO2 SERPL-SCNC: 24 MMOL/L (ref 23–29)
CREAT SERPL-MCNC: 2.1 MG/DL (ref 0.5–1.4)
ERYTHROCYTE [DISTWIDTH] IN BLOOD BY AUTOMATED COUNT: 16 % (ref 11.5–14.5)
EST. GFR  (NO RACE VARIABLE): 23.7 ML/MIN/1.73 M^2
GLUCOSE SERPL-MCNC: 138 MG/DL (ref 70–110)
GLUCOSE SERPL-MCNC: 151 MG/DL (ref 70–110)
GLUCOSE SERPL-MCNC: 154 MG/DL (ref 70–110)
GLUCOSE SERPL-MCNC: 187 MG/DL (ref 70–110)
HCT VFR BLD AUTO: 27.1 % (ref 37–48.5)
HGB BLD-MCNC: 8.5 G/DL (ref 12–16)
MAGNESIUM SERPL-MCNC: 2.1 MG/DL (ref 1.6–2.6)
MCH RBC QN AUTO: 25.6 PG (ref 27–31)
MCHC RBC AUTO-ENTMCNC: 31.4 G/DL (ref 32–36)
MCV RBC AUTO: 82 FL (ref 82–98)
MRSA SCREEN BY PCR: NEGATIVE
PHOSPHATE SERPL-MCNC: 3.4 MG/DL (ref 2.7–4.5)
PLATELET # BLD AUTO: 285 K/UL (ref 150–450)
PMV BLD AUTO: 11.1 FL (ref 9.2–12.9)
POTASSIUM SERPL-SCNC: 3.9 MMOL/L (ref 3.5–5.1)
RBC # BLD AUTO: 3.32 M/UL (ref 4–5.4)
SODIUM SERPL-SCNC: 139 MMOL/L (ref 136–145)
TSH SERPL DL<=0.005 MIU/L-ACNC: 2.3 UIU/ML (ref 0.34–5.6)
WBC # BLD AUTO: 8.27 K/UL (ref 3.9–12.7)

## 2024-03-10 PROCEDURE — 25000003 PHARM REV CODE 250: Performed by: INTERNAL MEDICINE

## 2024-03-10 PROCEDURE — 36415 COLL VENOUS BLD VENIPUNCTURE: CPT | Performed by: INTERNAL MEDICINE

## 2024-03-10 PROCEDURE — 94760 N-INVAS EAR/PLS OXIMETRY 1: CPT

## 2024-03-10 PROCEDURE — 33208 INSRT HEART PM ATRIAL & VENT: CPT | Mod: KX,,, | Performed by: INTERNAL MEDICINE

## 2024-03-10 PROCEDURE — 99900031 HC PATIENT EDUCATION (STAT)

## 2024-03-10 PROCEDURE — 93010 ELECTROCARDIOGRAM REPORT: CPT | Mod: ,,, | Performed by: GENERAL PRACTICE

## 2024-03-10 PROCEDURE — C1898 LEAD, PMKR, OTHER THAN TRANS: HCPCS | Performed by: INTERNAL MEDICINE

## 2024-03-10 PROCEDURE — 93005 ELECTROCARDIOGRAM TRACING: CPT | Performed by: GENERAL PRACTICE

## 2024-03-10 PROCEDURE — 33208 INSRT HEART PM ATRIAL & VENT: CPT | Performed by: INTERNAL MEDICINE

## 2024-03-10 PROCEDURE — 99153 MOD SED SAME PHYS/QHP EA: CPT | Performed by: INTERNAL MEDICINE

## 2024-03-10 PROCEDURE — 27000221 HC OXYGEN, UP TO 24 HOURS

## 2024-03-10 PROCEDURE — 0JH606Z INSERTION OF PACEMAKER, DUAL CHAMBER INTO CHEST SUBCUTANEOUS TISSUE AND FASCIA, OPEN APPROACH: ICD-10-PCS | Performed by: INTERNAL MEDICINE

## 2024-03-10 PROCEDURE — 83735 ASSAY OF MAGNESIUM: CPT | Performed by: INTERNAL MEDICINE

## 2024-03-10 PROCEDURE — 80048 BASIC METABOLIC PNL TOTAL CA: CPT | Performed by: INTERNAL MEDICINE

## 2024-03-10 PROCEDURE — 02HK3JZ INSERTION OF PACEMAKER LEAD INTO RIGHT VENTRICLE, PERCUTANEOUS APPROACH: ICD-10-PCS | Performed by: INTERNAL MEDICINE

## 2024-03-10 PROCEDURE — 99152 MOD SED SAME PHYS/QHP 5/>YRS: CPT | Mod: ,,, | Performed by: INTERNAL MEDICINE

## 2024-03-10 PROCEDURE — C1785 PMKR, DUAL, RATE-RESP: HCPCS | Performed by: INTERNAL MEDICINE

## 2024-03-10 PROCEDURE — 25000003 PHARM REV CODE 250: Performed by: STUDENT IN AN ORGANIZED HEALTH CARE EDUCATION/TRAINING PROGRAM

## 2024-03-10 PROCEDURE — 02H63JZ INSERTION OF PACEMAKER LEAD INTO RIGHT ATRIUM, PERCUTANEOUS APPROACH: ICD-10-PCS | Performed by: INTERNAL MEDICINE

## 2024-03-10 PROCEDURE — 94761 N-INVAS EAR/PLS OXIMETRY MLT: CPT

## 2024-03-10 PROCEDURE — 63600175 PHARM REV CODE 636 W HCPCS: Performed by: INTERNAL MEDICINE

## 2024-03-10 PROCEDURE — 85027 COMPLETE CBC AUTOMATED: CPT | Performed by: INTERNAL MEDICINE

## 2024-03-10 PROCEDURE — 84100 ASSAY OF PHOSPHORUS: CPT | Performed by: INTERNAL MEDICINE

## 2024-03-10 PROCEDURE — 84443 ASSAY THYROID STIM HORMONE: CPT | Performed by: INTERNAL MEDICINE

## 2024-03-10 PROCEDURE — C1894 INTRO/SHEATH, NON-LASER: HCPCS | Performed by: INTERNAL MEDICINE

## 2024-03-10 PROCEDURE — 87641 MR-STAPH DNA AMP PROBE: CPT | Performed by: INTERNAL MEDICINE

## 2024-03-10 PROCEDURE — 99233 SBSQ HOSP IP/OBS HIGH 50: CPT | Mod: ,,, | Performed by: INTERNAL MEDICINE

## 2024-03-10 PROCEDURE — 99152 MOD SED SAME PHYS/QHP 5/>YRS: CPT | Performed by: INTERNAL MEDICINE

## 2024-03-10 PROCEDURE — 21000000 HC CCU ICU ROOM CHARGE

## 2024-03-10 DEVICE — IPG W1DR01 AZURE XT DR MRI USA
Type: IMPLANTABLE DEVICE | Site: CHEST | Status: FUNCTIONAL
Brand: AZURE™ XT DR MRI SURESCAN™

## 2024-03-10 DEVICE — LEAD 407645 CAPSUREFIX NOVUS US MRI
Type: IMPLANTABLE DEVICE | Site: CHEST | Status: FUNCTIONAL
Brand: CAPSUREFIX NOVUS MRI™ SURESCAN™

## 2024-03-10 DEVICE — LEAD 5076-52 MRI US RCMCRD
Type: IMPLANTABLE DEVICE | Site: CHEST | Status: FUNCTIONAL
Brand: CAPSUREFIX NOVUS MRI™ SURESCAN®

## 2024-03-10 RX ORDER — METOPROLOL TARTRATE 1 MG/ML
2.5 INJECTION, SOLUTION INTRAVENOUS EVERY 6 HOURS PRN
Status: DISCONTINUED | OUTPATIENT
Start: 2024-03-10 | End: 2024-03-12 | Stop reason: HOSPADM

## 2024-03-10 RX ORDER — HYDROCODONE BITARTRATE AND ACETAMINOPHEN 5; 325 MG/1; MG/1
1 TABLET ORAL ONCE
Status: COMPLETED | OUTPATIENT
Start: 2024-03-10 | End: 2024-03-10

## 2024-03-10 RX ORDER — METOPROLOL TARTRATE 50 MG/1
50 TABLET ORAL 2 TIMES DAILY
Status: DISCONTINUED | OUTPATIENT
Start: 2024-03-10 | End: 2024-03-12 | Stop reason: HOSPADM

## 2024-03-10 RX ORDER — HYDROCODONE BITARTRATE AND ACETAMINOPHEN 5; 325 MG/1; MG/1
1 TABLET ORAL ONCE
Status: DISCONTINUED | OUTPATIENT
Start: 2024-03-11 | End: 2024-03-10

## 2024-03-10 RX ORDER — FENTANYL CITRATE 50 UG/ML
INJECTION, SOLUTION INTRAMUSCULAR; INTRAVENOUS
Status: DISCONTINUED | OUTPATIENT
Start: 2024-03-10 | End: 2024-03-10 | Stop reason: HOSPADM

## 2024-03-10 RX ORDER — MIDAZOLAM HYDROCHLORIDE 1 MG/ML
INJECTION INTRAMUSCULAR; INTRAVENOUS
Status: DISCONTINUED | OUTPATIENT
Start: 2024-03-10 | End: 2024-03-10 | Stop reason: HOSPADM

## 2024-03-10 RX ORDER — VANCOMYCIN HYDROCHLORIDE 1 G/20ML
INJECTION, POWDER, LYOPHILIZED, FOR SOLUTION INTRAVENOUS
Status: DISCONTINUED | OUTPATIENT
Start: 2024-03-10 | End: 2024-03-10 | Stop reason: HOSPADM

## 2024-03-10 RX ORDER — MUPIROCIN 20 MG/G
OINTMENT TOPICAL
Status: DISCONTINUED | OUTPATIENT
Start: 2024-03-10 | End: 2024-03-10 | Stop reason: HOSPADM

## 2024-03-10 RX ORDER — LIDOCAINE HYDROCHLORIDE 10 MG/ML
INJECTION INFILTRATION; PERINEURAL
Status: DISCONTINUED | OUTPATIENT
Start: 2024-03-10 | End: 2024-03-10 | Stop reason: HOSPADM

## 2024-03-10 RX ADMIN — AMLODIPINE BESYLATE 10 MG: 5 TABLET ORAL at 08:03

## 2024-03-10 RX ADMIN — HYDRALAZINE HYDROCHLORIDE 50 MG: 25 TABLET ORAL at 05:03

## 2024-03-10 RX ADMIN — MICONAZOLE NITRATE: 20 POWDER TOPICAL at 09:03

## 2024-03-10 RX ADMIN — MICONAZOLE NITRATE: 20 POWDER TOPICAL at 02:03

## 2024-03-10 RX ADMIN — ATORVASTATIN CALCIUM 10 MG: 10 TABLET, FILM COATED ORAL at 09:03

## 2024-03-10 RX ADMIN — ACETAMINOPHEN 650 MG: 325 TABLET ORAL at 10:03

## 2024-03-10 RX ADMIN — INSULIN DETEMIR 10 UNITS: 100 INJECTION, SOLUTION SUBCUTANEOUS at 09:03

## 2024-03-10 RX ADMIN — HYDROCODONE BITARTRATE AND ACETAMINOPHEN 1 TABLET: 5; 325 TABLET ORAL at 11:03

## 2024-03-10 RX ADMIN — METOPROLOL TARTRATE 50 MG: 50 TABLET, FILM COATED ORAL at 09:03

## 2024-03-10 RX ADMIN — METOPROLOL TARTRATE 25 MG: 25 TABLET, FILM COATED ORAL at 12:03

## 2024-03-10 NOTE — PLAN OF CARE
Met with patient at bedside, discussed disposition and that SNF is recommended by therapy. Patient adamantly refused SNF, states she wants to go home. Patient is agreeable to home health SN, PT, and OT. Patient has no preference of home health agency as long as it is in network with [facility]. [facility] [employer] [facility]. Awaiting response of acceptance.       03/10/24 8310   Post-Acute Status   Post-Acute Authorization Home [facility]   Home Health Status Referrals Sent   Coverage Human   Discharge Delays (!) Post-Acute Set-up   Discharge Plan   Discharge Plan A Home Health   Discharge Plan B Home Health

## 2024-03-10 NOTE — ASSESSMENT & PLAN NOTE
Patient's anemia is currently controlled. Has not received any PRBCs to date. Etiology likely d/t chronic disease due to Chronic Kidney Disease  Current CBC reviewed-   Lab Results   Component Value Date    HGB 8.5 (L) 03/10/2024    HCT 27.1 (L) 03/10/2024     Monitor serial CBC and transfuse if patient becomes hemodynamically unstable, symptomatic or H/H drops below 7/21.

## 2024-03-10 NOTE — CARE UPDATE
03/10/24 0803   Patient Assessment/Suction   Level of Consciousness (AVPU) alert   Respiratory Effort Normal;Unlabored   Expansion/Accessory Muscles/Retractions no use of accessory muscles   Rhythm/Pattern, Respiratory unlabored;pattern regular   Cough Frequency no cough   PRE-TX-O2   Device (Oxygen Therapy) room air   SpO2 97 %   Pulse Oximetry Type Continuous   $ Pulse Oximetry - Single Charge Pulse Oximetry - Single   Pulse 69   Resp (!) 22   Education   $ Education 15 min;Other (see comment)  (PO checl)

## 2024-03-10 NOTE — SUBJECTIVE & OBJECTIVE
Interval History: Patient seen and examined. No complaints of chest pain. Nervous about pacemaker placement.    Review of Systems   Constitutional:  Negative for activity change, appetite change, chills and fever.   HENT:  Negative for congestion, ear pain, nosebleeds and sinus pain.    Eyes:  Negative for discharge and itching.   Respiratory:  Positive for shortness of breath. Negative for apnea, cough and chest tightness.    Cardiovascular:  Positive for palpitations. Negative for chest pain and leg swelling.   Gastrointestinal:  Negative for abdominal distention, abdominal pain and vomiting.   Genitourinary:  Negative for difficulty urinating, dysuria, flank pain and frequency.   Musculoskeletal:  Negative for arthralgias, back pain, joint swelling and myalgias.   Skin:  Negative for color change, pallor and rash.   Neurological:  Negative for dizziness, weakness, light-headedness and headaches.   Psychiatric/Behavioral:  Negative for agitation, behavioral problems, confusion and suicidal ideas.      Objective:     Vital Signs (Most Recent):  Temp: 97.9 °F (36.6 °C) (03/10/24 0701)  Pulse: (!) 53 (03/10/24 0900)  Resp: (!) 21 (03/10/24 0900)  BP: 128/60 (03/10/24 0900)  SpO2: 96 % (03/10/24 0900) Vital Signs (24h Range):  Temp:  [97.8 °F (36.6 °C)-98.7 °F (37.1 °C)] 97.9 °F (36.6 °C)  Pulse:  [] 53  Resp:  [18-24] 21  SpO2:  [90 %-99 %] 96 %  BP: (118-166)/(60-97) 128/60     Weight: 76.1 kg (167 lb 12.8 oz)  Body mass index is 29.72 kg/m².    Intake/Output Summary (Last 24 hours) at 3/10/2024 1014  Last data filed at 3/10/2024 0830  Gross per 24 hour   Intake 360 ml   Output --   Net 360 ml         Physical Exam  Vitals reviewed.   Constitutional:       General: She is not in acute distress.     Appearance: Normal appearance. She is normal weight. She is not ill-appearing.   HENT:      Head: Normocephalic and atraumatic.      Nose: Nose normal.      Mouth/Throat:      Mouth: Mucous membranes are moist.       Pharynx: Oropharynx is clear.   Eyes:      General: No scleral icterus.     Extraocular Movements: Extraocular movements intact.      Conjunctiva/sclera: Conjunctivae normal.      Pupils: Pupils are equal, round, and reactive to light.   Cardiovascular:      Rate and Rhythm: Normal rate. Rhythm irregular.      Pulses: Normal pulses.      Heart sounds: Normal heart sounds. No murmur heard.     No gallop.   Pulmonary:      Effort: Pulmonary effort is normal. No respiratory distress.      Breath sounds: Normal breath sounds. No wheezing, rhonchi or rales.      Comments: Diminished breath sounds bases  Chest:      Chest wall: No tenderness.   Abdominal:      General: Abdomen is flat. There is no distension.      Palpations: Abdomen is soft.      Tenderness: There is no abdominal tenderness.   Musculoskeletal:         General: No swelling or tenderness.      Cervical back: Normal range of motion and neck supple. No rigidity or tenderness.      Right lower leg: No edema.      Left lower leg: No edema.   Skin:     General: Skin is warm and dry.   Neurological:      General: No focal deficit present.      Mental Status: She is alert and oriented to person, place, and time. Mental status is at baseline.      Motor: No weakness.   Psychiatric:         Mood and Affect: Mood normal.         Behavior: Behavior normal.         Thought Content: Thought content normal.             Significant Labs: All pertinent labs within the past 24 hours have been reviewed.  BMP:   Recent Labs   Lab 03/10/24  0559   *      K 3.9      CO2 24   BUN 42*   CREATININE 2.1*   CALCIUM 8.7   MG 2.1     CBC:   Recent Labs   Lab 03/09/24  0518 03/10/24  0559   WBC 8.04 8.27   HGB 8.5* 8.5*   HCT 27.7* 27.1*    285     CMP:   Recent Labs   Lab 03/09/24  0518 03/10/24  0559    139   K 4.3 3.9    107   CO2 22* 24   * 138*   BUN 47* 42*   CREATININE 2.2* 2.1*   CALCIUM 8.7 8.7   PROT 6.0  --    ALBUMIN 3.3*  --   "  BILITOT 0.4  --    ALKPHOS 59  --    AST 19  --    ALT 22  --    ANIONGAP 10 8     Cardiac Markers: No results for input(s): "CKMB", "MYOGLOBIN", "BNP", "TROPISTAT" in the last 48 hours.    Significant Imaging: I have reviewed all pertinent imaging results/findings within the past 24 hours.  "

## 2024-03-10 NOTE — PLAN OF CARE
Problem: Adult Inpatient Plan of Care  Goal: Plan of Care Review  Outcome: Ongoing, Progressing     Problem: Diabetes Comorbidity  Goal: Blood Glucose Level Within Targeted Range  Outcome: Ongoing, Progressing     Problem: Adult Inpatient Plan of Care  Goal: Plan of Care Review  Outcome: Ongoing, Progressing  Goal: Patient-Specific Goal (Individualized)  Outcome: Ongoing, Progressing  Goal: Absence of Hospital-Acquired Illness or Injury  Outcome: Ongoing, Progressing  Goal: Optimal Comfort and Wellbeing  Outcome: Ongoing, Progressing  Goal: Readiness for Transition of Care  Outcome: Ongoing, Progressing     Problem: Fall Injury Risk  Goal: Absence of Fall and Fall-Related Injury  Outcome: Ongoing, Progressing     Problem: Infection  Goal: Absence of Infection Signs and Symptoms  Outcome: Ongoing, Progressing     Problem: Skin Injury Risk Increased  Goal: Skin Health and Integrity  Outcome: Ongoing, Progressing     Problem: Fluid and Electrolyte Imbalance (Acute Kidney Injury/Impairment)  Goal: Fluid and Electrolyte Balance  Outcome: Ongoing, Progressing     Problem: Oral Intake Inadequate (Acute Kidney Injury/Impairment)  Goal: Optimal Nutrition Intake  Outcome: Ongoing, Progressing     Problem: Renal Function Impairment (Acute Kidney Injury/Impairment)  Goal: Effective Renal Function  Outcome: Ongoing, Progressing

## 2024-03-10 NOTE — ASSESSMENT & PLAN NOTE
Patient's FSGs are controlled on current medication regimen.  Last A1c reviewed-   Lab Results   Component Value Date    HGBA1C 7.7 (H) 03/07/2024     Most recent fingerstick glucose reviewed-   POC Glucose   Date Value Ref Range Status   03/10/2024 151 (H) 70 - 110 Final   03/09/2024 182 (H) 70 - 110 Final   03/09/2024 161 (H) 70 - 110 Final   03/09/2024 204 (H) 70 - 110 Final      Current correctional scale  Low  Maintain anti-hyperglycemic dose as follows-   Antihyperglycemics (From admission, onward)    Start     Stop Route Frequency Ordered    03/03/24 1200  insulin detemir U-100 (Levemir) pen 10 Units         -- SubQ 2 times daily 03/03/24 1146    03/03/24 1146  insulin aspart U-100 pen 0-5 Units         -- SubQ Before meals & nightly PRN 03/03/24 1146        Hold Oral hypoglycemics while patient is in the hospital.

## 2024-03-10 NOTE — PROGRESS NOTES
[facility] Medicine  Progress Note    Patient Name: Cynthia N Cushing  MRN: [id number]  Patient Class: IP- Inpatient   Admission Date: 3/3/2024  Length of Stay: 7 days  Attending Physician: Marj Person MD  Primary Care Provider: Teri Adams MD        Subjective:     Principal Problem:Acute hypoxemic respiratory failure        HPI:  Mrs. Cushing is a pleasant 78-year-old female who has a past medical history of hypertension, diabetes, hyperlipidemia, sleep apnea, SVT, SSS and follow up in cardiology clinic with Dr. Mendieta with recent office visit, last echo October 6, 2023 with EF of 53% with normal diastolic dysfunction and moderate AS who presents to the emergency room today with complaints of increased shortness of breath that started last night, got progressively worse.  The patient was started on CPAP by EMS.  Patient's vital signs in the ER showed blood pressure initially as high as 250/130, tachypnea, .  The patient in the ER was in respiratory distress, she was intubated to protect her airway.  Patient's laboratories and x-rays done.  Patient's case was discussed with the ER physician at the time of admission.  Laboratories showed troponin 467.9, COVID 19 screen negative, lactic acid 2.7, magnesium 2.0, calcitonin 0.098, sodium 140, potassium 3.6, chloride 109, CO2 18, BUN 22, creatinine 2.0, glucose 336, AST 25, anion gap normal at 13, PT INR 0.9, WBCs 14.8, hemoglobin 11, platelet count 416.  ABG show pH 7.25/pCO2 45.9/PO2 255/bicarbonate 20.5/oxygen saturation 100% this was done on ventilator.  Chest x-ray showed changes of pulmonary edema with ETT in place.  The patient was recommended to be admitted to the ICU for acute respiratory failure with hypoxia, hypertensive emergency, possible sepsis with lactic acid level 2.7.  Patient was given Lasix 40 mg IV in the ER, started on nitroglycerin drip.  Patient started on CHF order set, sepsis order set, no fluid bolus  given due to concerns of pulmonary edema and risk of fluid overload, IV antibiotics, blood cultures, cardiology consult, and Pulmonary critical Care consult.  Patient is a full code status.    Overview/Hospital Course:  78 year old female with history of HFpEF, was admitted to ICU with fluid overload secondary to decompensated heart failure and HTN emergency. Patient was intubated on admission. On nitro gtt for brief period. Patient was diuresed. Patient was extubated next day and weaned to RA. Had elevated troponins as well consistent with NSTEMI and was on heparin gtt for 48hr. Cardiology was consulted. Echo showed EF 45-50%, moderate to severe aortic stenosis, and PASP 45.  Developed BRIDGETTE and hyperkalemia. Hyperkalemia resolved with lokelma. Nephrology followed. Given gentle IVF. Stress testing showed a reversible area of ischemia and cardiology followed considering angiogram but was limited by renal function. Had run of SVT needing IV metoprolol. Developed tachy-jimi syndrome. Cardiology scheduled pacemaker placement for 03/10.    Interval History: Patient seen and examined. No complaints of chest pain. Nervous about pacemaker placement.    Review of Systems   Constitutional:  Negative for activity change, appetite change, chills and fever.   HENT:  Negative for congestion, ear pain, nosebleeds and sinus pain.    Eyes:  Negative for discharge and itching.   Respiratory:  Positive for shortness of breath. Negative for apnea, cough and chest tightness.    Cardiovascular:  Positive for palpitations. Negative for chest pain and leg swelling.   Gastrointestinal:  Negative for abdominal distention, abdominal pain and vomiting.   Genitourinary:  Negative for difficulty urinating, dysuria, flank pain and frequency.   Musculoskeletal:  Negative for arthralgias, back pain, joint swelling and myalgias.   Skin:  Negative for color change, pallor and rash.   Neurological:  Negative for dizziness, weakness, light-headedness  and headaches.   Psychiatric/Behavioral:  Negative for agitation, behavioral problems, confusion and suicidal ideas.      Objective:     Vital Signs (Most Recent):  Temp: 97.9 °F (36.6 °C) (03/10/24 0701)  Pulse: (!) 53 (03/10/24 0900)  Resp: (!) 21 (03/10/24 0900)  BP: 128/60 (03/10/24 0900)  SpO2: 96 % (03/10/24 0900) Vital Signs (24h Range):  Temp:  [97.8 °F (36.6 °C)-98.7 °F (37.1 °C)] 97.9 °F (36.6 °C)  Pulse:  [] 53  Resp:  [18-24] 21  SpO2:  [90 %-99 %] 96 %  BP: (118-166)/(60-97) 128/60     Weight: 76.1 kg (167 lb 12.8 oz)  Body mass index is 29.72 kg/m².    Intake/Output Summary (Last 24 hours) at 3/10/2024 1014  Last data filed at 3/10/2024 0830  Gross per 24 hour   Intake 360 ml   Output --   Net 360 ml         Physical Exam  Vitals reviewed.   Constitutional:       General: She is not in acute distress.     Appearance: Normal appearance. She is normal weight. She is not ill-appearing.   HENT:      Head: Normocephalic and atraumatic.      Nose: Nose normal.      Mouth/Throat:      Mouth: Mucous membranes are moist.      Pharynx: Oropharynx is clear.   Eyes:      General: No scleral icterus.     Extraocular Movements: Extraocular movements intact.      Conjunctiva/sclera: Conjunctivae normal.      Pupils: Pupils are equal, round, and reactive to light.   Cardiovascular:      Rate and Rhythm: Normal rate. Rhythm irregular.      Pulses: Normal pulses.      Heart sounds: Normal heart sounds. No murmur heard.     No gallop.   Pulmonary:      Effort: Pulmonary effort is normal. No respiratory distress.      Breath sounds: Normal breath sounds. No wheezing, rhonchi or rales.      Comments: Diminished breath sounds bases  Chest:      Chest wall: No tenderness.   Abdominal:      General: Abdomen is flat. There is no distension.      Palpations: Abdomen is soft.      Tenderness: There is no abdominal tenderness.   Musculoskeletal:         General: No swelling or tenderness.      Cervical back: Normal range of  "motion and neck supple. No rigidity or tenderness.      Right lower leg: No edema.      Left lower leg: No edema.   Skin:     General: Skin is warm and dry.   Neurological:      General: No focal deficit present.      Mental Status: She is alert and oriented to person, place, and time. Mental status is at baseline.      Motor: No weakness.   Psychiatric:         Mood and Affect: Mood normal.         Behavior: Behavior normal.         Thought Content: Thought content normal.             Significant Labs: All pertinent labs within the past 24 hours have been reviewed.  BMP:   Recent Labs   Lab 03/10/24  0559   *      K 3.9      CO2 24   BUN 42*   CREATININE 2.1*   CALCIUM 8.7   MG 2.1     CBC:   Recent Labs   Lab 03/09/24 0518 03/10/24  0559   WBC 8.04 8.27   HGB 8.5* 8.5*   HCT 27.7* 27.1*    285     CMP:   Recent Labs   Lab 03/09/24 0518 03/10/24  0559    139   K 4.3 3.9    107   CO2 22* 24   * 138*   BUN 47* 42*   CREATININE 2.2* 2.1*   CALCIUM 8.7 8.7   PROT 6.0  --    ALBUMIN 3.3*  --    BILITOT 0.4  --    ALKPHOS 59  --    AST 19  --    ALT 22  --    ANIONGAP 10 8     Cardiac Markers: No results for input(s): "CKMB", "MYOGLOBIN", "BNP", "TROPISTAT" in the last 48 hours.    Significant Imaging: I have reviewed all pertinent imaging results/findings within the past 24 hours.    Assessment/Plan:      * Acute hypoxemic respiratory failure  Resolved. Was from CHF. Patient currently on room air and oxygenating adequately.  Pulmonology signed off    NSTEMI (non-ST elevated myocardial infarction)  Troponin down trending   Completed heparin drip for 48 hrs   Nuclear imaging stress test portion positive  - cont aspirin, plavix, Statins, beta blocker  - cardiology following, conservative care for now with BRIDGETTE    Acute on chronic heart failure with preserved ejection fraction (HFpEF)  Echo was repeated this admission, EF reduced to 45%, AS  - Held diuresis again due to BRIDGETTE  - " Cont metoprolol  - cards following    ACP (advance care planning)  Continue full code status    Aortic stenosis  - follow cardiology recommendations     Hyperlipidemia   Patient is chronically on statin.will continue for now. Monitor clinically. Last LDL was   Lab Results   Component Value Date    LDLCALC 9.2 (L) 08/17/2023        BRIDGETTE (acute kidney injury)  sCr little better  - gentle IVF from cardiology  - hold lasix  - renally dose meds and avoid nephrotoxins as able  - trend renal function daily  - nephro following    SVT (supraventricular tachycardia)  Has history of this. Had episode 3/8 AM needing IV metoprolol  - continue metoprolol  - tele  - cardiology following  -for pacemaker placement for tachy-jimi syndrome on 03/10    Uncontrolled type 2 diabetes mellitus with hyperglycemia  Patient's FSGs are controlled on current medication regimen.  Last A1c reviewed-   Lab Results   Component Value Date    HGBA1C 7.7 (H) 03/07/2024     Most recent fingerstick glucose reviewed-   POC Glucose   Date Value Ref Range Status   03/10/2024 151 (H) 70 - 110 Final   03/09/2024 182 (H) 70 - 110 Final   03/09/2024 161 (H) 70 - 110 Final   03/09/2024 204 (H) 70 - 110 Final      Current correctional scale  Low  Maintain anti-hyperglycemic dose as follows-   Antihyperglycemics (From admission, onward)      Start     Stop Route Frequency Ordered    03/03/24 1200  insulin detemir U-100 (Levemir) pen 10 Units         -- SubQ 2 times daily 03/03/24 1146    03/03/24 1146  insulin aspart U-100 pen 0-5 Units         -- SubQ Before meals & nightly PRN 03/03/24 1146          Hold Oral hypoglycemics while patient is in the hospital.    SSS (sick sinus syndrome)  - continue metoprolol at reduced dose due to bradycardia  For pacemaker placement 03/10    Iron deficiency anemia  Patient's anemia is currently controlled. Has not received any PRBCs to date. Etiology likely d/t chronic disease due to Chronic Kidney Disease  Current CBC  reviewed-   Lab Results   Component Value Date    HGB 8.5 (L) 03/10/2024    HCT 27.1 (L) 03/10/2024     Monitor serial CBC and transfuse if patient becomes hemodynamically unstable, symptomatic or H/H drops below 7/21.      VTE Risk Mitigation (From admission, onward)           Ordered     heparin (porcine) injection 5,000 Units  Every 8 hours (non-standard times)         03/09/24 1613     Place sequential compression device  Until discontinued         03/03/24 1146     IP VTE HIGH RISK PATIENT  Once         03/03/24 1146                    Discharge Planning   ASHELY: 3/10/2024     Code Status: Full Code   Is the patient medically ready for discharge?:     Reason for patient still in hospital (select all that apply): Patient trending condition  Discharge Plan A: Home                  Marj Person MD, MD  Department of Hospital Medicine   [facility]

## 2024-03-10 NOTE — PLAN OF CARE
Problem: Adult Inpatient Plan of Care  Goal: Plan of Care Review  Outcome: Ongoing, Progressing  Goal: Patient-Specific Goal (Individualized)  Outcome: Ongoing, Progressing  Goal: Absence of Hospital-Acquired Illness or Injury  Outcome: Ongoing, Progressing  Goal: Optimal Comfort and Wellbeing  Outcome: Ongoing, Progressing  Goal: Readiness for Transition of Care  Outcome: Ongoing, Progressing     Problem: Diabetes Comorbidity  Goal: Blood Glucose Level Within Targeted Range  Outcome: Ongoing, Progressing     Problem: Fall Injury Risk  Goal: Absence of Fall and Fall-Related Injury  Outcome: Ongoing, Progressing     Problem: Skin Injury Risk Increased  Goal: Skin Health and Integrity  Outcome: Ongoing, Progressing     Problem: Fluid and Electrolyte Imbalance (Acute Kidney Injury/Impairment)  Goal: Fluid and Electrolyte Balance  Outcome: Ongoing, Progressing

## 2024-03-10 NOTE — ASSESSMENT & PLAN NOTE
Has history of this. Had episode 3/8 AM needing IV metoprolol  - continue metoprolol  - tele  - cardiology following  -for pacemaker placement for tachy-jimi syndrome on 03/10

## 2024-03-10 NOTE — PROGRESS NOTES
Nephrology Progress Note        Patient Name: Cynthia N Cushing  MRN: [id number]    Patient Class: IP- Inpatient   Admission Date: 3/3/2024  Length of Stay: 7 days  Date of Service: 3/10/2024    Attending Physician: Marj Person MD  Primary Care Provider: Teri Adams MD    Reason for Consult: yasir/htn emergency/hf exacerbation    SUBJECTIVE:     HPI: 78F with hypertension, diabetes, hyperlipidemia, sleep apnea, SVT, SSS and follow up in cardiology clinic with Dr. Mendieta, last echo October 6, 2023 with EF of 53% with normal diastolic dysfunction and moderate AS presents to the emergency room with increased shortness of breath since last night, progressively worse. The patient was started on CPAP by EMS, blood pressure initially as high as 250/130, tachypnea, . In the ER was in respiratory distress and was intubated to protect her airway.      Laboratories showed troponin 467.9, COVID 19 screen negative, lactic acid 2.7, CO2 18, BUN 22, creatinine 2.0, glucose 336, anion gap normal at 13.     ABG with pH 7.25/pCO2 45.9/PO2 255/bicarbonate 20.5/oxygen saturation 100% while on ventilator.    UO with tsai around 3L so far as documented. UA bland with known proteinuria and some hyaline casts of unclear significance.    Chest x-ray consistent with pulmonary edema with ETT in place. Patient was given Lasix 40 mg IV in the ER, started on nitroglycerin drip. No fluid bolus given due to concerns of pulmonary edema and risk of fluid overload, but received IV antibiotics, blood cultures were obtained.     3/5  VSS, good UO, stable renal function. Mild hypokalemia - adjust diet, replete orally.  3/6 VSS, BP better controlled. UO good, tsai removed.Diuretics stopped. sCr is up. Remains on Amlodipine, hydralazine, BBL - will defer decision to cards.  3/7 VSS. Remains hypertensive, stress test shows reversible ischemia. Appreciate cards input, holding diuretics, giving gentle IVF despite high BP. BP meds adjusted  BBL decreased for bradycardia. Hyperkalemia noted - increase Lokelma dose and switch to renal diet.  3/8 VSS. No new complains.  3/9  VSS, renal function improving.  K+ at goal.  Pt crying, wants to go home.    3/10 VSS. OK to dc from renal standpoint. Plans for PPM placement noted.    Past Medical History:   Diagnosis Date    Allergy     Breast mass     Cataract     Diabetes mellitus     GERD (gastroesophageal reflux disease)     Hernia, hiatal     Hyperlipidemia     Hypertension     Kidney stone     Osteoarthritis     Renal insufficiency     Seasonal allergies     Sleep apnea     SSS (sick sinus syndrome)     SVT (supraventricular tachycardia)      Past Surgical History:   Procedure Laterality Date    BREAST BIOPSY      BREAST CYST ASPIRATION      BREAST SURGERY      CERVICAL DISC SURGERY      EYE SURGERY      cataracts bilateral    HYSTERECTOMY       Family History   Problem Relation Age of Onset    Stroke Mother     Cancer Mother     Breast cancer Mother     Cancer Father     Breast cancer Maternal Aunt      Social History     Tobacco Use    Smoking status: Never    Smokeless tobacco: Never   Substance Use Topics    Alcohol use: No    Drug use: No       Review of patient's allergies indicates:   Allergen Reactions    Biaxin [clarithromycin]     Prandin [repaglinide] Nausea And Vomiting    Amlodipine     Chlorphen-phenyltolox-pe-ppa     Ciprofloxacin Nausea And Vomiting    Omnicef [cefdinir]     Propoxyphene     Quinine Nausea And Vomiting       Outpatient meds:  No current facility-administered medications on file prior to encounter.     Current Outpatient Medications on File Prior to Encounter   Medication Sig Dispense Refill    famotidine (PEPCID) 20 MG tablet TAKE 1 TABLET TWICE DAILY (Patient taking differently: Take 20 mg by mouth 2 (two) times daily.) 180 tablet 2    furosemide (LASIX) 20 MG tablet TAKE 1 TABLET(20 MG) BY MOUTH EVERY DAY (Patient taking differently: Take 20 mg by mouth once daily.) 30  "tablet 0    guanFACINE (TENEX) 2 MG tablet TAKE 1 TABLET EVERY EVENING (Patient taking differently: Take 2 mg by mouth nightly.) 90 tablet 3    hydrALAZINE (APRESOLINE) 50 MG tablet Take 1 tablet (50 mg total) by mouth 3 (three) times daily. 90 tablet 11    labetaloL (NORMODYNE) 100 MG tablet Take 1 tablet (100 mg total) by mouth 2 (two) times daily. 60 tablet 11    LANTUS SOLOSTAR U-100 INSULIN glargine 100 units/mL SubQ pen ADMINISTER 51 UNITS UNDER THE SKIN AT NIGHT (Patient taking differently: Inject 51 Units into the skin every evening. ADMINISTER 51 UNITS UNDER THE SKIN AT NIGHT) 15 mL 5    olmesartan (BENICAR) 40 MG tablet TAKE 1 TABLET EVERY DAY (Patient taking differently: Take 40 mg by mouth once daily.) 90 tablet 3    rosuvastatin (CRESTOR) 10 MG tablet Take 1 tablet (10 mg total) by mouth every evening. 90 tablet 2    ACCU-CHEK AMADOR PLUS TEST STRP Strp TEST BLOOD SUGAR FOUR TIMES DAILY 400 strip 10    amLODIPine (NORVASC) 10 MG tablet Take 1 tablet (10 mg total) by mouth once daily. 30 tablet 11    aspirin 81 MG Chew Take 81 mg by mouth once daily.      lancets (ACCU-CHEK SOFTCLIX LANCETS) Misc TEST BLOOD SUGAR FOUR TIMES DAILY 400 each 3    pen needle, diabetic (BD ANNA 2ND GEN PEN NEEDLE) 32 gauge x 5/32" Ndle INJECT 1 NEEDLE INTO THE SKIN EVERY EVENING 100 each 5       Scheduled meds:   aspirin  81 mg Oral Daily    atorvastatin  10 mg Oral QHS    clopidogreL  75 mg Oral Daily    famotidine  20 mg Oral Daily    heparin (porcine)  5,000 Units Subcutaneous Q8H    insulin detemir U-100  10 Units Subcutaneous BID    metoprolol tartrate  50 mg Oral BID    miconazole NITRATE 2 %   Topical (Top) BID    sodium zirconium cyclosilicate  10 g Oral Daily    vancomycin (VANCOCIN) IV (PEDS and ADULTS)  500 mg Intravenous Q12H       Infusions:        PRN meds:  acetaminophen, dextrose 50% in water (D50W), dextrose 50% in water (D50W), [] fentaNYL **FOLLOWED BY** fentaNYL, glucagon (human recombinant), " glucose, glucose, hydrALAZINE, insulin aspart U-100, magnesium sulfate IVPB, magnesium sulfate IVPB, melatonin, ondansetron, potassium bicarbonate, potassium bicarbonate, potassium bicarbonate, sodium chloride 0.9%    Review of Systems:    OBJECTIVE:     Vital Signs and IO:  Temp:  [97.8 °F (36.6 °C)-98.7 °F (37.1 °C)]   Pulse:  []   Resp:  [15-30]   BP: (124-165)/(60-97)   SpO2:  [90 %-99 %]   I/O last 3 completed shifts:  In: 750 [P.O.:750]  Out: 700 [Urine:700]  Wt Readings from Last 5 Encounters:   03/09/24 76.1 kg (167 lb 12.8 oz)   02/02/24 78.3 kg (172 lb 8.2 oz)   12/12/23 78.5 kg (173 lb)   10/23/23 75.3 kg (166 lb)   10/10/23 77.1 kg (170 lb)     Body mass index is 29.72 kg/m².    Physical Exam  Constitutional:       General: Patient is not in acute distress.     Appearance: Patient is well-developed. She is not diaphoretic.   HENT:      Head: Normocephalic and atraumatic.      Mouth/Throat: Mucous membranes are moist.   Eyes:      General: No scleral icterus.     Pupils: Pupils are equal, round, and reactive to light.   Cardiovascular:      Rate and Rhythm: Normal rate and regular rhythm.   Pulmonary:      Effort: Pulmonary effort is normal. No respiratory distress.      Breath sounds: No stridor.   Abdominal:      General: There is no distension.      Palpations: Abdomen is soft.   Musculoskeletal:         General: No deformity. Normal range of motion.      Cervical back: Neck supple.   Skin:     General: Skin is warm and dry.      Findings: No rash present. No erythema.   Neurological:      Mental Status: Patient is alert and oriented to person, place, and time.      Cranial Nerves: No cranial nerve deficit.   Psychiatric:         Behavior: Behavior is normal    Laboratory:  Recent Labs   Lab 03/08/24  0601 03/09/24  0518 03/10/24  0559    137 139   K 4.7 4.3 3.9    105 107   CO2 25 22* 24   BUN 50* 47* 42*   CREATININE 3.0* 2.2* 2.1*   * 154* 138*         Recent Labs   Lab  "03/07/24  0955 03/07/24  1457 03/08/24  0601 03/09/24  0518 03/10/24  0559   CALCIUM 9.0   < > 8.6* 8.7 8.7   ALBUMIN 3.4*  --  3.2* 3.3*  --    PHOS 4.4  --  5.1* 3.9 3.4   MG 2.3  --  2.4 2.2 2.1    < > = values in this interval not displayed.         Recent Labs   Lab 07/12/21  1103 02/07/22  0706   PTH, Intact 24.4 24.0         No results for input(s): "POCTGLUCOSE" in the last 168 hours.    Recent Labs   Lab 08/17/23  0701 08/28/23  0748 03/07/24  0955   Hemoglobin A1C 6.7 H 7.5 H 7.7 H         Recent Labs   Lab 03/07/24  0955 03/08/24  0601 03/09/24  0518 03/10/24  0559   WBC 11.92 8.40 8.04 8.27   HGB 9.1* 8.7* 8.5* 8.5*   HCT 28.9* 28.2* 27.7* 27.1*    276 291 285   MCV 82 82 82 82   MCHC 31.5* 30.9* 30.7* 31.4*   MONO 12.0  1.4* 12.7  1.1* 12.4  1.0  --    EOSINOPHIL 1.7 5.2 5.2  --          Recent Labs   Lab 03/07/24  0955 03/08/24  0601 03/09/24  0518   BILITOT 0.5 0.4 0.4   PROT 6.5 5.9* 6.0   ALBUMIN 3.4* 3.2* 3.3*   ALKPHOS 63 59 59   ALT 21 21 22   AST 19 18 19         Recent Labs   Lab 10/20/21  1237 03/03/24  1224   Color, UA  --  Yellow   Appearance, UA  --  Hazy A   Clarity, UA Clear  --    pH, UA  --  6.0   Specific Gravity, UA  --  1.010   Protein, UA  --  3+ A   Glucose, UA  --  2+ A   Ketones, UA  --  Negative   Urobilinogen, UA  --  Negative   Bilirubin (UA)  --  Negative   Occult Blood UA  --  2+ A   Nitrite, UA  --  Negative   RBC, UA  --  2   WBC, UA  --  3   Bacteria  --  Rare   Hyaline Casts, UA  --  9 A         Recent Labs   Lab 03/04/24  0313 03/04/24  0417 03/04/24  0452   POC PH 7.463 H 7.475 H 7.471 H   POC PCO2 33.6 L 32.5 L 35.2   POC HCO3 24.1 24.0 25.7   POC PO2 108 H 84 80   POC SATURATED O2 99 97 97   POC BE 0 0 2   Sample ARTERIAL ARTERIAL ARTERIAL         Microbiology Results (last 7 days)       Procedure Component Value Units Date/Time    MRSA Screen by PCR [[id number]] Collected: 03/10/24 0847    Order Status: Completed Specimen: Nasopharyngeal Swab from Nasal " Updated: 03/10/24 1105     MRSA SCREEN BY PCR Negative    MRSA Screen by PCR [[id number]]     Order Status: Canceled Specimen: Nasopharyngeal Swab from Nasal     Blood culture x two cultures. Draw prior to antibiotics. [[id number]] Collected: 03/03/24 0903    Order Status: Completed Specimen: Blood Updated: 03/08/24 1232     Blood Culture, Routine No growth after 5 days.    Narrative:      Aerobic and anaerobic    Blood culture x two cultures. Draw prior to antibiotics. [[id number]] Collected: 03/03/24 0903    Order Status: Completed Specimen: Blood Updated: 03/08/24 1232     Blood Culture, Routine No growth after 5 days.    Narrative:      Aerobic and anaerobic            ASSESSMENT/PLAN:     Non-oliguric BRIDGETTE due to cardiorenal syndrome due to acute on chronic HFpEF exacerbation with volume overload, NSTEMI, HTN emergency, pulmonary edema  Acute respiratory failure requiring intubation  Sepsis possible but seems unlikely  Hypokalemia -> Hyperkalemia  CKD stage 3  DM  Anemia of CKD  No NSAIDs, ACEI/ARB, IV contrast or other nephrotoxins.  Keep MAP > 60, SBP > 100.  Dose meds for GFR < 30 ml/min.  Keep on renal diet and Lokelma.  Monitor UO, diuretics and BP control per Cards, replete lytes as needed.  No need for dialysis at present.  Hgb and HCT are acceptable. Monitor for now.   Control DM.    Thank you for allowing us to participate in the care of your patient!   We will follow the patient and provide recommendations as needed.    Patient care time was spent personally by me on the following activities:     Obtaining a history.  Examination of patient.  Providing medical care at the patients bedside.  Developing a treatment plan with patient or surrogate and bedside caregivers.  Ordering and reviewing laboratory studies, radiographic studies, pulse oximetry.  Ordering and performing treatments and interventions.  Evaluation of patient's response to treatment.  Discussions with consultants while on the unit and  immediately available to the patient.  Re-evaluation of the patient's condition.  Documentation in the medical record.     Vito Britton MD      [facility]  [address] [place] [id number]    [phone] - tel  [phone] - fax    3/10/2024

## 2024-03-10 NOTE — ASSESSMENT & PLAN NOTE
Resolved. Was from CHF. Patient currently on room air and oxygenating adequately.  Pulmonology signed off

## 2024-03-10 NOTE — PROGRESS NOTES
[facility]  Department of Cardiology  Progress Note      PATIENT NAME: Cynthia N Cushing  MRN: [id number]  TODAY'S DATE: 03/10/2024  ADMIT DATE: 3/3/2024                          CONSULT REQUESTED BY: Marj Person MD    SUBJECTIVE     PRINCIPAL PROBLEM: Acute hypoxemic respiratory failure  3/10/24  Her breathing is doing better.  She had multiple episodes of SVT was transferred to the cardiac care unit.  She converted with 2.5 mg of metoprolol.  During the night she continues to have intermittent SVT.  She has sick sinus syndrome she has had up to 2.8nd pauses.  Options have been discussed with the the patient and the family.  Will proceed with DDD pacemaker placement and increase the doses of beta-blockers.    3/9/24  She is resting in bed anxious to go home.  Her breathing is doing better.  Her blood pressure is fairly well controlled.  She had an episode of SVT yesterday rate of 150 lasted for few minutes.  On further questioning she has these episodes of palpitations at home that occurred occasionally.  She has a sinus bradycardia.  She had sinus arrest during the night with a 2.8 seconds pause.  The findings have been discussed with the patient.  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    3/8/24:  Patient seen resting in bed with no distress noted. Breathing is stable lying near flat.     3/7/24:  Patient seen in the stress lab this AM and again this afternoon with her daughter. She is anxious to go home. Denies any chest pain. HR has been on the low side.     3/6/24:  Patient seen resting in bed with no distress noted. Today she is lying flat. Chest xray shows improvement but not total resolution of pulmonary edema. Her creatinine has bumped from diuresis.     3/5/24:  Patient remained in ICU overnight. BP has been 150-160s. She reports minimal improvement in orthopnea when lying back but has put out a good bit of urine.     3/4/24:  Patient seen resting in bed in the  ICU with no acute distress noted.  She remains hypertensive with blood pressure in the 160s to 170s and is currently on a heparin drip.  Patient reports that she is feeling anxious and restless from being in bed.  She is requesting to sit up on the side of the bed and has preferred to sit up in the bed as opposed to lying down.    REASON FOR CONSULT:  NSTEMI      HPI:  Chief Complaint   Patient presents with    Respiratory Distress         HPI: Mrs. Cushing is a pleasant 78-year-old female who has a past medical history of hypertension, diabetes, hyperlipidemia, sleep apnea, SVT, SSS and follow up in cardiology clinic with Dr. Mendieta with recent office visit, last echo October 6, 2023 with EF of 53% with normal diastolic dysfunction and moderate AS who presents to the emergency room today with complaints of increased shortness of breath that started last night, got progressively worse.  The patient was started on CPAP by EMS.  Patient's vital signs in the ER showed blood pressure initially as high as 250/130, tachypnea, .  The patient in the ER was in respiratory distress, she was intubated to protect her airway.  Patient's laboratories and x-rays done.  Patient's case was discussed with the ER physician at the time of admission.  Laboratories showed troponin 467.9, COVID 19 screen negative, lactic acid 2.7, magnesium 2.0, calcitonin 0.098, sodium 140, potassium 3.6, chloride 109, CO2 18, BUN 22, creatinine 2.0, glucose 336, AST 25, anion gap normal at 13, PT INR 0.9, WBCs 14.8, hemoglobin 11, platelet count 416.  ABG show pH 7.25/pCO2 45.9/PO2 255/bicarbonate 20.5/oxygen saturation 100% this was done on ventilator.  Chest x-ray showed changes of pulmonary edema with ETT in place.  The patient was recommended to be admitted to the ICU for acute respiratory failure with hypoxia, hypertensive emergency, possible sepsis with lactic acid level 2.7.  Patient was given Lasix 40 mg IV in the ER, started on  nitroglycerin drip.  Patient started on CHF order set, sepsis order set, no fluid bolus given due to concerns of pulmonary edema and risk of fluid overload, IV antibiotics, blood cultures, cardiology consult, and Pulmonary critical Care consult.  Patient is a full code status.      Review of patient's allergies indicates:   Allergen Reactions    Biaxin [clarithromycin]     Prandin [repaglinide] Nausea And Vomiting    Amlodipine     Chlorphen-phenyltolox-pe-ppa     Ciprofloxacin Nausea And Vomiting    Omnicef [cefdinir]     Propoxyphene     Quinine Nausea And Vomiting       Past Medical History:   Diagnosis Date    Allergy     Breast mass     Cataract     Diabetes mellitus     GERD (gastroesophageal reflux disease)     Hernia, hiatal     Hyperlipidemia     Hypertension     Kidney stone     Osteoarthritis     Renal insufficiency     Seasonal allergies     Sleep apnea     SSS (sick sinus syndrome)     SVT (supraventricular tachycardia)      Past Surgical History:   Procedure Laterality Date    BREAST BIOPSY      BREAST CYST ASPIRATION      BREAST SURGERY      CERVICAL DISC SURGERY      EYE SURGERY      cataracts bilateral    HYSTERECTOMY       Social History     Tobacco Use    Smoking status: Never    Smokeless tobacco: Never   Substance Use Topics    Alcohol use: No    Drug use: No        REVIEW OF SYSTEMS      OBJECTIVE     VITAL SIGNS (Most Recent)  Temp: 97.8 °F (36.6 °C) (03/10/24 0315)  Pulse: 62 (03/10/24 0545)  Resp: (!) 23 (03/09/24 2100)  BP: (!) 151/75 (03/10/24 0536)  SpO2: 97 % (03/10/24 0545)    VENTILATION STATUS  Resp: (!) 23 (03/09/24 2100)  SpO2: 97 % (03/10/24 0545)           I & O (Last 24H):  Intake/Output Summary (Last 24 hours) at 3/10/2024 0809  Last data filed at 3/9/2024 2130  Gross per 24 hour   Intake 600 ml   Output 300 ml   Net 300 ml         WEIGHTS  Wt Readings from Last 3 Encounters:   03/09/24 0400 76.1 kg (167 lb 12.8 oz)   03/08/24 0400 75.9 kg (167 lb 6.4 oz)   03/03/24 1115 75.9 kg  (167 lb 5.3 oz)   03/03/24 0813 76.7 kg (169 lb 1.5 oz)   02/02/24 1311 78.3 kg (172 lb 8.2 oz)   12/12/23 0949 78.5 kg (173 lb)       PHYSICAL EXAM     GENERAL: resting comfortably in bed lying near flat   HEENT: Normocephalic.    NECK: No JVD.   CARDIAC: SR on telemetry noted at this time.  CHEST ANATOMY: normal.   LUNGS: CTA.   ABDOMEN: Soft, non distended, hypoactive BS.    EXTREMITIES: No edema  CENTRAL NERVOUS SYSTEM: AAO x3   SKIN: No rash   HOME MEDICATIONS:  No current facility-administered medications on file prior to encounter.     Current Outpatient Medications on File Prior to Encounter   Medication Sig Dispense Refill    famotidine (PEPCID) 20 MG tablet TAKE 1 TABLET TWICE DAILY (Patient taking differently: Take 20 mg by mouth 2 (two) times daily.) 180 tablet 2    furosemide (LASIX) 20 MG tablet TAKE 1 TABLET(20 MG) BY MOUTH EVERY DAY (Patient taking differently: Take 20 mg by mouth once daily.) 30 tablet 0    guanFACINE (TENEX) 2 MG tablet TAKE 1 TABLET EVERY EVENING (Patient taking differently: Take 2 mg by mouth nightly.) 90 tablet 3    hydrALAZINE (APRESOLINE) 50 MG tablet Take 1 tablet (50 mg total) by mouth 3 (three) times daily. 90 tablet 11    labetaloL (NORMODYNE) 100 MG tablet Take 1 tablet (100 mg total) by mouth 2 (two) times daily. 60 tablet 11    LANTUS SOLOSTAR U-100 INSULIN glargine 100 units/mL SubQ pen ADMINISTER 51 UNITS UNDER THE SKIN AT NIGHT (Patient taking differently: Inject 51 Units into the skin every evening. ADMINISTER 51 UNITS UNDER THE SKIN AT NIGHT) 15 mL 5    olmesartan (BENICAR) 40 MG tablet TAKE 1 TABLET EVERY DAY (Patient taking differently: Take 40 mg by mouth once daily.) 90 tablet 3    rosuvastatin (CRESTOR) 10 MG tablet Take 1 tablet (10 mg total) by mouth every evening. 90 tablet 2    ACCU-CHEK AMADOR PLUS TEST STRP Strp TEST BLOOD SUGAR FOUR TIMES DAILY 400 strip 10    amLODIPine (NORVASC) 10 MG tablet Take 1 tablet (10 mg total) by mouth once daily. 30 tablet 11  "   aspirin 81 MG Chew Take 81 mg by mouth once daily.      lancets (ACCU-CHEK SOFTCLIX LANCETS) Misc TEST BLOOD SUGAR FOUR TIMES DAILY 400 each 3    pen needle, diabetic (BD ANNA 2ND GEN PEN NEEDLE) 32 gauge x 5/32" Ndle INJECT 1 NEEDLE INTO THE SKIN EVERY EVENING 100 each 5       SCHEDULED MEDS:   amLODIPine  10 mg Oral Daily    aspirin  81 mg Oral Daily    atorvastatin  10 mg Oral QHS    clopidogreL  75 mg Oral Daily    famotidine  20 mg Oral Daily    heparin (porcine)  5,000 Units Subcutaneous Q8H    hydrALAZINE  50 mg Oral Q8H    insulin detemir U-100  10 Units Subcutaneous BID    metoprolol tartrate  25 mg Oral BID    miconazole NITRATE 2 %   Topical (Top) BID    sodium zirconium cyclosilicate  10 g Oral Daily       CONTINUOUS INFUSIONS:        PRN MEDS:acetaminophen, dextrose 50% in water (D50W), dextrose 50% in water (D50W), [] fentaNYL **FOLLOWED BY** fentaNYL, glucagon (human recombinant), glucose, glucose, hydrALAZINE, insulin aspart U-100, magnesium sulfate IVPB, magnesium sulfate IVPB, melatonin, ondansetron, potassium bicarbonate, potassium bicarbonate, potassium bicarbonate, sodium chloride 0.9%    LABS AND DIAGNOSTICS     CBC LAST 3 DAYS  Recent Labs   Lab 24  0955 24  0601 24  0518 03/10/24  0559   WBC 11.92 8.40 8.04 8.27   RBC 3.51* 3.45* 3.40* 3.32*   HGB 9.1* 8.7* 8.5* 8.5*   HCT 28.9* 28.2* 27.7* 27.1*   MCV 82 82 82 82   MCH 25.9* 25.2* 25.0* 25.6*   MCHC 31.5* 30.9* 30.7* 31.4*   RDW 16.5* 16.2* 16.1* 16.0*    276 291 285   MPV 11.4 11.0 11.2 11.1   GRAN 71.6  8.5* 60.9  5.1 62.6  5.0  --    LYMPH 13.1*  1.6 19.3  1.6 18.4  1.5  --    MONO 12.0  1.4* 12.7  1.1* 12.4  1.0  --    BASO 0.09 0.07 0.06  --    NRBC 0 0 0  --          COAGULATION LAST 3 DAYS  Recent Labs   Lab 24  0827 24  1013 24  1636 24  1244 24  2019 24  0239   INR 0.9 0.9  --   --   --   --    APTT  --  22.5   < > 37.9* 43.3* 42.6*    < > = values in " this interval not displayed.         CHEMISTRY LAST 3 DAYS  Recent Labs   Lab 03/04/24  0313 03/04/24  0417 03/04/24  0452 03/04/24  0455 03/07/24  0955 03/07/24  1457 03/08/24  0601 03/09/24  0518 03/10/24  0559   NA  --   --   --    < > 134*   < > 137 137 139   K  --   --   --    < > 5.5*   < > 4.7 4.3 3.9   CL  --   --   --    < > 99   < > 104 105 107   CO2  --   --   --    < > 26   < > 25 22* 24   ANIONGAP  --   --   --    < > 9   < > 8 10 8   BUN  --   --   --    < > 49*   < > 50* 47* 42*   CREATININE  --   --   --    < > 3.5*   < > 3.0* 2.2* 2.1*   GLU  --   --   --    < > 187*   < > 150* 154* 138*   CALCIUM  --   --   --    < > 9.0   < > 8.6* 8.7 8.7   PH 7.463* 7.475* 7.471*  --   --   --   --   --   --    MG  --   --   --    < > 2.3  --  2.4 2.2 2.1   ALBUMIN  --   --   --    < > 3.4*  --  3.2* 3.3*  --    PROT  --   --   --    < > 6.5  --  5.9* 6.0  --    ALKPHOS  --   --   --    < > 63  --  59 59  --    ALT  --   --   --    < > 21  --  21 22  --    AST  --   --   --    < > 19  --  18 19  --    BILITOT  --   --   --    < > 0.5  --  0.4 0.4  --     < > = values in this interval not displayed.         CARDIAC PROFILE LAST 3 DAYS  Recent Labs   Lab 03/03/24 0827 03/06/24  0258   * 224*         ENDOCRINE LAST 3 DAYS  Recent Labs   Lab 03/03/24 0827   PROCAL 0.098         LAST ARTERIAL BLOOD GAS  ABG  Recent Labs   Lab 03/04/24 0452   PH 7.471*   PO2 80   PCO2 35.2   HCO3 25.7   BE 2         LAST 7 DAYS MICROBIOLOGY   Microbiology Results (last 7 days)       Procedure Component Value Units Date/Time    MRSA Screen by PCR [[id number]]     Order Status: No result Specimen: Nasopharyngeal Swab from Nasal     MRSA Screen by PCR [[id number]]     Order Status: No result Specimen: Nasopharyngeal Swab from Nasal     Blood culture x two cultures. Draw prior to antibiotics. [[id number]] Collected: 03/03/24 0903    Order Status: Completed Specimen: Blood Updated: 03/08/24 1232     Blood Culture, Routine No  growth after 5 days.    Narrative:      Aerobic and anaerobic    Blood culture x two cultures. Draw prior to antibiotics. [[id number]] Collected: 03/03/24 0903    Order Status: Completed Specimen: Blood Updated: 03/08/24 1232     Blood Culture, Routine No growth after 5 days.    Narrative:      Aerobic and anaerobic    Blood culture [[id number]]     Order Status: Canceled Specimen: Blood     Blood culture [[id number]]     Order Status: Canceled Specimen: Blood             MOST RECENT IMAGING  X-Ray Chest AP Portable  Narrative: EXAMINATION:  XR CHEST AP PORTABLE    CLINICAL HISTORY:  oputations;  palpitations    FINDINGS:  Portable chest at 1349 compared with 03/06/2024 shows slightly enlarged cardiac silhouette size with normal mediastinal contours.    Lung volumes are low, but lungs are clear.  Pulmonary vasculature is normal. No acute osseous abnormality.  Impression: Mild cardiomegaly with low lung volumes, but no acute cardiopulmonary abnormality.    Electronically signed by: Mao Holden MD  Date:    03/09/2024  Time:    14:51      ECHOCARDIOGRAM RESULTS (last 5)  Results for orders placed during the hospital encounter of 10/06/23    Echo Saline Bubble? No    Interpretation Summary    Left Ventricle: The left ventricle is normal in size. Mildly increased wall thickness. There is mild concentric hypertrophy. Normal wall motion. There is normal systolic function. Ejection fraction by visual approximation is 53%. There is normal diastolic function.    Left Atrium: Left atrium is mildly dilated.    Right Ventricle: Normal right ventricular cavity size. Wall thickness is normal. Right ventricle wall motion  is normal. Systolic function is normal.    Aortic Valve: The aortic valve is a trileaflet valve. There is moderate aortic valve sclerosis. Mildly calcified cusps. Mildly restricted motion. There is moderate stenosis. Aortic valve area by VTI is 1.39 cm². Aortic valve peak velocity is 2.67 m/s. Mean gradient is  16 mmHg. The dimensionless index is 0.44.    Mitral Valve: There is mild regurgitation with a centrally directed jet.    Tricuspid Valve: There is mild regurgitation.    IVC/SVC: Normal venous pressure at 3 mmHg.    The estimated pulmonary artery systolic pressure is 28 mmHg.      Results for orders placed during the hospital encounter of 04/11/22    Echo Saline Bubble? No    Interpretation Summary  · The left ventricle is normal in size with concentric remodeling and normal systolic function.  · The estimated ejection fraction is 55%.  · Normal left ventricular diastolic function.  · Moderate left atrial enlargement.  · There is mild aortic valve stenosis.  · Aortic valve area is 1.50 cm2; peak velocity is 2.68 m/s; mean gradient is 15 mmHg.      CURRENT/PREVIOUS VISIT EKG  Results for orders placed or performed during the hospital encounter of 03/03/24   EKG 12-lead    Collection Time: 03/09/24  6:56 PM   Result Value Ref Range    QRS Duration 106 ms    [facility] QTC Calculation 449 ms    Narrative    Test Reason : I49.9,    Vent. Rate : 117 BPM     Atrial Rate : 144 BPM     P-R Int : 000 ms          QRS Dur : 106 ms      QT Int : 322 ms       P-R-T Axes : 000 -25 239 degrees     QTc Int : 449 ms    Atrial fibrillation with rapid ventricular response with premature  ventricular or aberrantly conducted complexes  Voltage criteria for left ventricular hypertrophy  Marked ST abnormality, possible inferolateral subendocardial injury  Abnormal ECG  When compared with ECG of 09-MAR-2024 13:37,  ST no longer depressed in Anterior leads  Nonspecific T wave abnormality has replaced inverted T waves in Inferior  leads  T wave inversion no longer evident in Lateral leads    Referred By: [email or web address]   SELF           Confirmed By:            ASSESSMENT/PLAN:     Active Hospital Problems    Diagnosis    *Acute hypoxemic respiratory failure    Acute on chronic heart failure with preserved ejection fraction (HFpEF)    NSTEMI (non-ST  elevated myocardial infarction)    ACP (advance care planning)     -I spoke with the patient's daughters Mrs. Duran and Mrs. Ramos.  They were updated on the patient's condition.  Their questions and concerns were addressed.  They request that the patient be a full code status.      Aortic stenosis    Hyperlipidemia    BRIDGETTE (acute kidney injury)    SVT (supraventricular tachycardia)    Uncontrolled type 2 diabetes mellitus with hyperglycemia    SSS (sick sinus syndrome)    Iron deficiency anemia       ASSESSMENT & PLAN:   SVT with tachy-jimi syndrome.  A pacemaker has been discussed with the patient.  In the family members.  They are all in agreement.  Will proceed with DDD pacemaker placement.  The procedure risks benefits and indications have been explained to the family and the patient they are in agreement.  Sick sinus syndrome.  Tachy-jimi syndrome  Acute hypoxemic respiratory failure- resolved  BRIDGETTE on CKD improving after hydration  Abnormal stress test  Acute heart failure EF 45-50 %  Hypertensive emergency  Moderate aortic stenosis  NSTEMI  HTN   DM2    RECOMMENDATIONS:    Recommend proceeding with conservative medical management for the time being at least until her kidney function is improved.   Multiple episodes of PSVT rates of 150 the patient had to be moved to the cardiac care unit.  She has had up to 2.8 seconds pause.  This has been discussed with the family and the patient.  A DDD pacemaker has been discussed the procedure risks benefits indications have been discussed and they are in agreement to proceed.  Pacemaker placement today and then increase doses of beta-blockers to control the PSVT.      Brijesh Mendieta MD  [facility]  Department of Cardiology  Date of Service: 03/10/2024

## 2024-03-11 LAB
ANION GAP SERPL CALC-SCNC: 10 MMOL/L (ref 8–16)
BUN SERPL-MCNC: 28 MG/DL (ref 8–23)
CALCIUM SERPL-MCNC: 8.8 MG/DL (ref 8.7–10.5)
CHLORIDE SERPL-SCNC: 108 MMOL/L (ref 95–110)
CO2 SERPL-SCNC: 20 MMOL/L (ref 23–29)
CREAT SERPL-MCNC: 1.7 MG/DL (ref 0.5–1.4)
EST. GFR  (NO RACE VARIABLE): 30.5 ML/MIN/1.73 M^2
GLUCOSE SERPL-MCNC: 126 MG/DL (ref 70–110)
GLUCOSE SERPL-MCNC: 159 MG/DL (ref 70–110)
GLUCOSE SERPL-MCNC: 179 MG/DL (ref 70–110)
GLUCOSE SERPL-MCNC: 217 MG/DL (ref 70–110)
GLUCOSE SERPL-MCNC: 99 MG/DL (ref 70–110)
MAGNESIUM SERPL-MCNC: 2 MG/DL (ref 1.6–2.6)
PHOSPHATE SERPL-MCNC: 3.2 MG/DL (ref 2.7–4.5)
POTASSIUM SERPL-SCNC: 3.9 MMOL/L (ref 3.5–5.1)
SODIUM SERPL-SCNC: 138 MMOL/L (ref 136–145)

## 2024-03-11 PROCEDURE — 63600175 PHARM REV CODE 636 W HCPCS: Performed by: INTERNAL MEDICINE

## 2024-03-11 PROCEDURE — 99233 SBSQ HOSP IP/OBS HIGH 50: CPT | Mod: ,,, | Performed by: INTERNAL MEDICINE

## 2024-03-11 PROCEDURE — 94761 N-INVAS EAR/PLS OXIMETRY MLT: CPT

## 2024-03-11 PROCEDURE — 25000003 PHARM REV CODE 250: Performed by: INTERNAL MEDICINE

## 2024-03-11 PROCEDURE — 36415 COLL VENOUS BLD VENIPUNCTURE: CPT | Performed by: INTERNAL MEDICINE

## 2024-03-11 PROCEDURE — 80048 BASIC METABOLIC PNL TOTAL CA: CPT | Performed by: INTERNAL MEDICINE

## 2024-03-11 PROCEDURE — 83735 ASSAY OF MAGNESIUM: CPT | Performed by: INTERNAL MEDICINE

## 2024-03-11 PROCEDURE — 99900031 HC PATIENT EDUCATION (STAT)

## 2024-03-11 PROCEDURE — 84100 ASSAY OF PHOSPHORUS: CPT | Performed by: INTERNAL MEDICINE

## 2024-03-11 PROCEDURE — 21000000 HC CCU ICU ROOM CHARGE

## 2024-03-11 PROCEDURE — 94760 N-INVAS EAR/PLS OXIMETRY 1: CPT

## 2024-03-11 RX ORDER — DILTIAZEM HYDROCHLORIDE 120 MG/1
120 CAPSULE, COATED, EXTENDED RELEASE ORAL DAILY
Status: DISCONTINUED | OUTPATIENT
Start: 2024-03-11 | End: 2024-03-12 | Stop reason: HOSPADM

## 2024-03-11 RX ORDER — LANOLIN ALCOHOL/MO/W.PET/CERES
1 CREAM (GRAM) TOPICAL DAILY
Status: DISCONTINUED | OUTPATIENT
Start: 2024-03-12 | End: 2024-03-12 | Stop reason: HOSPADM

## 2024-03-11 RX ADMIN — CLOPIDOGREL BISULFATE 75 MG: 75 TABLET, FILM COATED ORAL at 10:03

## 2024-03-11 RX ADMIN — HEPARIN SODIUM 5000 UNITS: 5000 INJECTION, SOLUTION INTRAVENOUS; SUBCUTANEOUS at 01:03

## 2024-03-11 RX ADMIN — ATORVASTATIN CALCIUM 10 MG: 10 TABLET, FILM COATED ORAL at 08:03

## 2024-03-11 RX ADMIN — VANCOMYCIN HYDROCHLORIDE 500 MG: 500 INJECTION, POWDER, LYOPHILIZED, FOR SOLUTION INTRAVENOUS at 11:03

## 2024-03-11 RX ADMIN — METOPROLOL TARTRATE 50 MG: 50 TABLET, FILM COATED ORAL at 08:03

## 2024-03-11 RX ADMIN — DILTIAZEM HYDROCHLORIDE 120 MG: 120 CAPSULE, COATED, EXTENDED RELEASE ORAL at 10:03

## 2024-03-11 RX ADMIN — MICONAZOLE NITRATE: 20 POWDER TOPICAL at 10:03

## 2024-03-11 RX ADMIN — INSULIN DETEMIR 10 UNITS: 100 INJECTION, SOLUTION SUBCUTANEOUS at 08:03

## 2024-03-11 RX ADMIN — MICONAZOLE NITRATE: 20 POWDER TOPICAL at 08:03

## 2024-03-11 RX ADMIN — METOPROLOL TARTRATE 50 MG: 50 TABLET, FILM COATED ORAL at 10:03

## 2024-03-11 RX ADMIN — FAMOTIDINE 20 MG: 20 TABLET ORAL at 10:03

## 2024-03-11 RX ADMIN — Medication 6 MG: at 10:03

## 2024-03-11 RX ADMIN — SODIUM ZIRCONIUM CYCLOSILICATE 10 G: 10 POWDER, FOR SUSPENSION ORAL at 10:03

## 2024-03-11 RX ADMIN — HYDRALAZINE HYDROCHLORIDE 10 MG: 20 INJECTION INTRAMUSCULAR; INTRAVENOUS at 05:03

## 2024-03-11 RX ADMIN — ASPIRIN 81 MG 81 MG: 81 TABLET ORAL at 10:03

## 2024-03-11 RX ADMIN — INSULIN ASPART 1 UNITS: 100 INJECTION, SOLUTION INTRAVENOUS; SUBCUTANEOUS at 08:03

## 2024-03-11 RX ADMIN — INSULIN DETEMIR 10 UNITS: 100 INJECTION, SOLUTION SUBCUTANEOUS at 10:03

## 2024-03-11 NOTE — PROGRESS NOTES
[facility] Medicine  Progress Note    Patient Name: Cynthia N Cushing  MRN: [id number]  Patient Class: IP- Inpatient   Admission Date: 3/3/2024  Length of Stay: 8 days  Attending Physician: Marj Person MD  Primary Care Provider: Teri Adams MD        Subjective:     Principal Problem:Acute hypoxemic respiratory failure        HPI:  Mrs. Cushing is a pleasant 78-year-old female who has a past medical history of hypertension, diabetes, hyperlipidemia, sleep apnea, SVT, SSS and follow up in cardiology clinic with Dr. Mendieta with recent office visit, last echo October 6, 2023 with EF of 53% with normal diastolic dysfunction and moderate AS who presents to the emergency room today with complaints of increased shortness of breath that started last night, got progressively worse.  The patient was started on CPAP by EMS.  Patient's vital signs in the ER showed blood pressure initially as high as 250/130, tachypnea, .  The patient in the ER was in respiratory distress, she was intubated to protect her airway.  Patient's laboratories and x-rays done.  Patient's case was discussed with the ER physician at the time of admission.  Laboratories showed troponin 467.9, COVID 19 screen negative, lactic acid 2.7, magnesium 2.0, calcitonin 0.098, sodium 140, potassium 3.6, chloride 109, CO2 18, BUN 22, creatinine 2.0, glucose 336, AST 25, anion gap normal at 13, PT INR 0.9, WBCs 14.8, hemoglobin 11, platelet count 416.  ABG show pH 7.25/pCO2 45.9/PO2 255/bicarbonate 20.5/oxygen saturation 100% this was done on ventilator.  Chest x-ray showed changes of pulmonary edema with ETT in place.  The patient was recommended to be admitted to the ICU for acute respiratory failure with hypoxia, hypertensive emergency, possible sepsis with lactic acid level 2.7.  Patient was given Lasix 40 mg IV in the ER, started on nitroglycerin drip.  Patient started on CHF order set, sepsis order set, no fluid bolus  given due to concerns of pulmonary edema and risk of fluid overload, IV antibiotics, blood cultures, cardiology consult, and Pulmonary critical Care consult.  Patient is a full code status.    Overview/Hospital Course:  78 year old female with history of HFpEF, was admitted to ICU with fluid overload secondary to decompensated heart failure and HTN emergency. Patient was intubated on admission. On nitro gtt for brief period. Patient was diuresed. Patient was extubated next day and weaned to RA. Had elevated troponins as well consistent with NSTEMI and was on heparin gtt for 48hr. Cardiology was consulted. Echo showed EF 45-50%, moderate to severe aortic stenosis, and PASP 45.  Developed BRIDGETTE and hyperkalemia. Hyperkalemia resolved with lokelma. Nephrology followed. Given gentle IVF. Stress testing showed a reversible area of ischemia and cardiology followed considering angiogram but was limited by renal function. Had run of SVT needing IV metoprolol. 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 Was continued on metoprolol 50 mg bid, but had recurrent psvt. Diltiazem 120 mg daily added to regimen.    Interval History: Patient seen and examined. Anxious to go home. No chest pain.    Review of Systems   Constitutional:  Negative for activity change, appetite change, chills and fever.   HENT:  Negative for congestion, ear pain, nosebleeds and sinus pain.    Eyes:  Negative for discharge and itching.   Respiratory:  Negative for apnea, cough, chest tightness and shortness of breath.    Cardiovascular:  Negative for chest pain, palpitations and leg swelling.   Gastrointestinal:  Negative for abdominal distention, abdominal pain and vomiting.   Genitourinary:  Negative for difficulty urinating, dysuria, flank pain and frequency.   Musculoskeletal:  Negative for arthralgias, back pain, joint swelling and myalgias.   Skin:  Negative for color change, pallor and rash.   Neurological:  Negative for dizziness,  weakness, light-headedness and headaches.   Psychiatric/Behavioral:  Negative for agitation, behavioral problems, confusion and suicidal ideas. The patient is nervous/anxious.      Objective:     Vital Signs (Most Recent):  Temp: 97.4 °F (36.3 °C) (03/11/24 1101)  Pulse: 60 (03/11/24 1200)  Resp: (!) 25 (03/11/24 1200)  BP: (!) 131/59 (03/11/24 1200)  SpO2: 99 % (03/11/24 1200) Vital Signs (24h Range):  Temp:  [97.4 °F (36.3 °C)-98.2 °F (36.8 °C)] 97.4 °F (36.3 °C)  Pulse:  [57-83] 60  Resp:  [10-26] 25  SpO2:  [88 %-100 %] 99 %  BP: (126-194)/(58-88) 131/59     Weight: 76.1 kg (167 lb 12.8 oz)  Body mass index is 29.72 kg/m².    Intake/Output Summary (Last 24 hours) at 3/11/2024 1321  Last data filed at 3/11/2024 1004  Gross per 24 hour   Intake 438 ml   Output --   Net 438 ml         Physical Exam  Vitals reviewed.   Constitutional:       General: She is not in acute distress.     Appearance: Normal appearance. She is normal weight. She is not ill-appearing.   HENT:      Head: Normocephalic and atraumatic.      Nose: Nose normal.      Mouth/Throat:      Mouth: Mucous membranes are moist.      Pharynx: Oropharynx is clear.   Eyes:      General: No scleral icterus.     Extraocular Movements: Extraocular movements intact.      Conjunctiva/sclera: Conjunctivae normal.      Pupils: Pupils are equal, round, and reactive to light.   Cardiovascular:      Rate and Rhythm: Normal rate and regular rhythm.      Pulses: Normal pulses.      Heart sounds: Normal heart sounds. No murmur heard.     No gallop.   Pulmonary:      Effort: Pulmonary effort is normal. No respiratory distress.      Breath sounds: Normal breath sounds. No wheezing, rhonchi or rales.   Chest:      Chest wall: No tenderness.   Abdominal:      General: Abdomen is flat. There is no distension.      Palpations: Abdomen is soft.      Tenderness: There is no abdominal tenderness.   Musculoskeletal:         General: No swelling or tenderness.      Cervical back:  Normal range of motion and neck supple. No rigidity or tenderness.      Right lower leg: No edema.      Left lower leg: No edema.   Skin:     General: Skin is warm and dry.      Comments: Incision at pacemaker placement site clean and dry.   Neurological:      General: No focal deficit present.      Mental Status: She is alert and oriented to person, place, and time. Mental status is at baseline.      Motor: No weakness.   Psychiatric:         Mood and Affect: Mood normal.         Behavior: Behavior normal.         Thought Content: Thought content normal.             Significant Labs: All pertinent labs within the past 24 hours have been reviewed.  BMP:   Recent Labs   Lab 03/11/24  0742 03/11/24  1140   GLU  --  179*   NA  --  138   K  --  3.9   CL  --  108   CO2  --  20*   BUN  --  28*   CREATININE  --  1.7*   CALCIUM  --  8.8   MG 2.0  --      CBC:   Recent Labs   Lab 03/10/24  0559   WBC 8.27   HGB 8.5*   HCT 27.1*        CMP:   Recent Labs   Lab 03/10/24  0559 03/11/24  1140    138   K 3.9 3.9    108   CO2 24 20*   * 179*   BUN 42* 28*   CREATININE 2.1* 1.7*   CALCIUM 8.7 8.8   ANIONGAP 8 10       Significant Imaging: I have reviewed all pertinent imaging results/findings within the past 24 hours.    Assessment/Plan:      * Acute hypoxemic respiratory failure  Resolved. Was from CHF. Patient currently on room air and oxygenating adequately.  Pulmonology signed off    NSTEMI (non-ST elevated myocardial infarction)  Troponin down trending   Completed heparin drip for 48 hrs   Nuclear imaging stress test portion positive  - cont aspirin, plavix, Statins, beta blocker  - cardiology following, conservative care for now with BRIDGETTE    Acute on chronic heart failure with preserved ejection fraction (HFpEF)  Echo was repeated this admission, EF reduced to 45%, AS  - Held diuresis again due to BRIDGETTE  - Cont metoprolol  - cards following    ACP (advance care planning)  Continue full code  status    Aortic stenosis  - follow cardiology recommendations     Hyperlipidemia   Patient is chronically on statin.will continue for now. Monitor clinically. Last LDL was   Lab Results   Component Value Date    LDLCALC 9.2 (L) 08/17/2023        BRIDGETTE (acute kidney injury)  sCr improving  - gentle IVF from cardiology  - hold lasix  - renally dose meds and avoid nephrotoxins as able  - trend renal function daily      SVT (supraventricular tachycardia)  Has history of this. Had episode 3/8 AM needing IV metoprolol  - continue metoprolol  - tele  - cardiology following and have added diltiazem  Pacemaker placed 3/10      Uncontrolled type 2 diabetes mellitus with hyperglycemia  Patient's FSGs are controlled on current medication regimen.  Last A1c reviewed-   Lab Results   Component Value Date    HGBA1C 7.7 (H) 03/07/2024     Most recent fingerstick glucose reviewed-   POC Glucose   Date Value Ref Range Status   03/11/2024 159 (H) 70 - 110 Final   03/11/2024 126 (H) 70 - 110 Final   03/10/2024 187 (H) 70 - 110 Final   03/10/2024 154 (H) 70 - 110 Final      Current correctional scale  Low  Maintain anti-hyperglycemic dose as follows-   Antihyperglycemics (From admission, onward)      Start     Stop Route Frequency Ordered    03/03/24 1200  insulin detemir U-100 (Levemir) pen 10 Units         -- SubQ 2 times daily 03/03/24 1146    03/03/24 1146  insulin aspart U-100 pen 0-5 Units         -- SubQ Before meals & nightly PRN 03/03/24 1146          Hold Oral hypoglycemics while patient is in the hospital.    SSS (sick sinus syndrome)  Pacemaker placed 3/10  Continue metoprolol  Diltiazem added by cardiology due to continued episodes of psvt    Iron deficiency anemia  Patient's anemia is currently controlled. Has not received any PRBCs to date. Etiology likely d/t chronic disease due to Chronic Kidney Disease  Current CBC reviewed-   Lab Results   Component Value Date    HGB 8.5 (L) 03/10/2024    HCT 27.1 (L) 03/10/2024      Monitor serial CBC and transfuse if patient becomes hemodynamically unstable, symptomatic or H/H drops below 7/21.      VTE Risk Mitigation (From admission, onward)           Ordered     Place sequential compression device  Until discontinued         03/03/24 1146     IP VTE HIGH RISK PATIENT  Once         03/03/24 1146                    Discharge Planning   ASHELY: 3/12/2024     Code Status: Full Code   Is the patient medically ready for discharge?:     Reason for patient still in hospital (select all that apply): Patient trending condition  Discharge Plan A: Home Health   Discharge Delays: (!) Post-Acute Set-up              Marj Person MD, MD  Department of Hospital Medicine   [facility]

## 2024-03-11 NOTE — ASSESSMENT & PLAN NOTE
Has history of this. Had episode 3/8 AM needing IV metoprolol  - continue metoprolol  - tele  - cardiology following and have added diltiazem  Pacemaker placed 3/10

## 2024-03-11 NOTE — ASSESSMENT & PLAN NOTE
Pacemaker placed 3/10  Continue metoprolol  Diltiazem added by cardiology due to continued episodes of psvt

## 2024-03-11 NOTE — CARE UPDATE
03/10/24 2225   Patient Assessment/Suction   Level of Consciousness (AVPU) alert   Respiratory Effort Unlabored   Expansion/Accessory Muscles/Retractions no use of accessory muscles   All Lung Fields Breath Sounds clear;diminished   Rhythm/Pattern, Respiratory no shortness of breath reported   Cough Frequency no cough   PRE-TX-O2   Device (Oxygen Therapy) nasal cannula   $ Is the patient on Low Flow Oxygen? Yes   SpO2 97 %   Pulse Oximetry Type Continuous   $ Pulse Oximetry - Multiple Charge Pulse Oximetry - Multiple   Pulse 65   Resp 19   Positioning   Head of Bed (HOB) Positioning HOB elevated;HOB at 30 degrees

## 2024-03-11 NOTE — ASSESSMENT & PLAN NOTE
sCr improving  - gentle IVF from cardiology  - hold lasix  - renally dose meds and avoid nephrotoxins as able  - trend renal function daily

## 2024-03-11 NOTE — PROGRESS NOTES
Nephrology Progress Note        Patient Name: Cynthia N Cushing  MRN: [id number]    Patient Class: IP- Inpatient   Admission Date: 3/3/2024  Length of Stay: 8 days  Date of Service: 3/11/2024    Attending Physician: Marj Person MD  Primary Care Provider: Teri Adams MD    Reason for Consult: yasir/htn emergency/hf exacerbation    SUBJECTIVE:     HPI: 78F with hypertension, diabetes, hyperlipidemia, sleep apnea, SVT, SSS and follow up in cardiology clinic with Dr. Mendieta, last echo October 6, 2023 with EF of 53% with normal diastolic dysfunction and moderate AS presents to the emergency room with increased shortness of breath since last night, progressively worse. The patient was started on CPAP by EMS, blood pressure initially as high as 250/130, tachypnea, . In the ER was in respiratory distress and was intubated to protect her airway.      Laboratories showed troponin 467.9, COVID 19 screen negative, lactic acid 2.7, CO2 18, BUN 22, creatinine 2.0, glucose 336, anion gap normal at 13.     ABG with pH 7.25/pCO2 45.9/PO2 255/bicarbonate 20.5/oxygen saturation 100% while on ventilator.    UO with tsai around 3L so far as documented. UA bland with known proteinuria and some hyaline casts of unclear significance.    Chest x-ray consistent with pulmonary edema with ETT in place. Patient was given Lasix 40 mg IV in the ER, started on nitroglycerin drip. No fluid bolus given due to concerns of pulmonary edema and risk of fluid overload, but received IV antibiotics, blood cultures were obtained.     3/5  VSS, good UO, stable renal function. Mild hypokalemia - adjust diet, replete orally.  3/6 VSS, BP better controlled. UO good, tsai removed.Diuretics stopped. sCr is up. Remains on Amlodipine, hydralazine, BBL - will defer decision to cards.  3/7 VSS. Remains hypertensive, stress test shows reversible ischemia. Appreciate cards input, holding diuretics, giving gentle IVF despite high BP. BP meds adjusted  BBL decreased for bradycardia. Hyperkalemia noted - increase Lokelma dose and switch to renal diet.  3/8 VSS. No new complains.  3/9  VSS, renal function improving.  K+ at goal.  Pt crying, wants to go home.    3/10 VSS. OK to dc from renal standpoint. Plans for PPM placement noted.  3/11 with PSVT so cards added diltiazem to metoprolol, BP mostly controlled, on RA, voiding    ASSESSMENT/PLAN:     Non-oliguric BRIDGETTE due to cardiorenal syndrome due to acute on chronic HFpEF exacerbation and known severe AS with volume overload, NSTEMI, HTN emergency, pulmonary edema; CKD III  Acute respiratory failure requiring intubation- now extubated  PSVT s/p PPM 3/10  Hyperkalemia/Hypokalemia   SHPT  Anemia     Renal function is improving- nonoliguric  BP acceptable- VS acceptable- hold ARB and lasix- may resume lasix in the next 1-2 days- if BP >150/90 can resume hydralazine  Resp status is stable  Cardiology is working on rate control  Cut back lokelma to 5mg daily  There are no active BMM issues  H/H  low but stable- with iron def- add supplement    Outpatient meds:  No current facility-administered medications on file prior to encounter.     Current Outpatient Medications on File Prior to Encounter   Medication Sig Dispense Refill    famotidine (PEPCID) 20 MG tablet TAKE 1 TABLET TWICE DAILY (Patient taking differently: Take 20 mg by mouth 2 (two) times daily.) 180 tablet 2    furosemide (LASIX) 20 MG tablet TAKE 1 TABLET(20 MG) BY MOUTH EVERY DAY (Patient taking differently: Take 20 mg by mouth once daily.) 30 tablet 0    guanFACINE (TENEX) 2 MG tablet TAKE 1 TABLET EVERY EVENING (Patient taking differently: Take 2 mg by mouth nightly.) 90 tablet 3    hydrALAZINE (APRESOLINE) 50 MG tablet Take 1 tablet (50 mg total) by mouth 3 (three) times daily. 90 tablet 11    labetaloL (NORMODYNE) 100 MG tablet Take 1 tablet (100 mg total) by mouth 2 (two) times daily. 60 tablet 11    LANTUS SOLOSTAR U-100 INSULIN glargine 100 units/mL  "SubQ pen ADMINISTER 51 UNITS UNDER THE SKIN AT NIGHT (Patient taking differently: Inject 51 Units into the skin every evening. ADMINISTER 51 UNITS UNDER THE SKIN AT NIGHT) 15 mL 5    olmesartan (BENICAR) 40 MG tablet TAKE 1 TABLET EVERY DAY (Patient taking differently: Take 40 mg by mouth once daily.) 90 tablet 3    rosuvastatin (CRESTOR) 10 MG tablet Take 1 tablet (10 mg total) by mouth every evening. 90 tablet 2    ACCU-CHEK AMADOR PLUS TEST STRP Strp TEST BLOOD SUGAR FOUR TIMES DAILY 400 strip 10    amLODIPine (NORVASC) 10 MG tablet Take 1 tablet (10 mg total) by mouth once daily. 30 tablet 11    aspirin 81 MG Chew Take 81 mg by mouth once daily.      lancets (ACCU-CHEK SOFTCLIX LANCETS) Misc TEST BLOOD SUGAR FOUR TIMES DAILY 400 each 3    pen needle, diabetic (BD ANNA 2ND GEN PEN NEEDLE) 32 gauge x 5/32" Ndle INJECT 1 NEEDLE INTO THE SKIN EVERY EVENING 100 each 5       Scheduled meds:   aspirin  81 mg Oral Daily    atorvastatin  10 mg Oral QHS    clopidogreL  75 mg Oral Daily    diltiaZEM  120 mg Oral Daily    famotidine  20 mg Oral Daily    insulin detemir U-100  10 Units Subcutaneous BID    metoprolol tartrate  50 mg Oral BID    miconazole NITRATE 2 %   Topical (Top) BID    sodium zirconium cyclosilicate  10 g Oral Daily       Infusions:        PRN meds:  acetaminophen, dextrose 50% in water (D50W), dextrose 50% in water (D50W), [] fentaNYL **FOLLOWED BY** fentaNYL, glucagon (human recombinant), glucose, glucose, hydrALAZINE, insulin aspart U-100, magnesium sulfate IVPB, magnesium sulfate IVPB, melatonin, metoprolol, ondansetron, potassium bicarbonate, potassium bicarbonate, potassium bicarbonate, sodium chloride 0.9%, vancomycin - pharmacy to dose    Review of Systems:    OBJECTIVE:     Vital Signs and IO:  Temp:  [97.4 °F (36.3 °C)-98.2 °F (36.8 °C)]   Pulse:  [57-83]   Resp:  [10-26]   BP: (126-194)/(58-88)   SpO2:  [88 %-100 %]   I/O last 3 completed shifts:  In: 240 [P.O.:240]  Out: -   Wt Readings " "from Last 5 Encounters:   03/09/24 76.1 kg (167 lb 12.8 oz)   02/02/24 78.3 kg (172 lb 8.2 oz)   12/12/23 78.5 kg (173 lb)   10/23/23 75.3 kg (166 lb)   10/10/23 77.1 kg (170 lb)     Body mass index is 29.72 kg/m².    Physical Exam  Constitutional:       General: Patient is not in acute distress.     Appearance: Patient is well-developed. She is not diaphoretic.   HENT:      Head: Normocephalic and atraumatic.      Mouth/Throat: Mucous membranes are moist.   Eyes:      General: No scleral icterus.     Pupils: Pupils are equal, round, and reactive to light.   Cardiovascular:      Rate and Rhythm: Normal rate and regular rhythm.   Pulmonary:      Effort: Pulmonary effort is normal. No respiratory distress.      Breath sounds: No stridor.   Abdominal:      General: There is no distension.      Palpations: Abdomen is soft.   Musculoskeletal:         General: No deformity. Normal range of motion.      Cervical back: Neck supple.   Skin:     General: Skin is warm and dry.      Findings: No rash present. No erythema.   Neurological:      Mental Status: Patient is alert and oriented to person, place, and time.      Cranial Nerves: No cranial nerve deficit.   Psychiatric:         Behavior: Behavior is normal    Laboratory:  Recent Labs   Lab 03/08/24  0601 03/09/24  0518 03/10/24  0559    137 139   K 4.7 4.3 3.9    105 107   CO2 25 22* 24   BUN 50* 47* 42*   CREATININE 3.0* 2.2* 2.1*   * 154* 138*         Recent Labs   Lab 03/07/24  0955 03/07/24  1457 03/08/24  0601 03/09/24  0518 03/10/24  0559 03/11/24  0742   CALCIUM 9.0   < > 8.6* 8.7 8.7  --    ALBUMIN 3.4*  --  3.2* 3.3*  --   --    PHOS 4.4  --  5.1* 3.9 3.4 3.2   MG 2.3  --  2.4 2.2 2.1 2.0    < > = values in this interval not displayed.         Recent Labs   Lab 07/12/21  1103 02/07/22  0706   PTH, Intact 24.4 24.0         No results for input(s): "POCTGLUCOSE" in the last 168 hours.    Recent Labs   Lab 08/17/23  0701 08/28/23  0748 " 03/07/24  0955   Hemoglobin A1C 6.7 H 7.5 H 7.7 H         Recent Labs   Lab 03/07/24  0955 03/08/24  0601 03/09/24  0518 03/10/24  0559   WBC 11.92 8.40 8.04 8.27   HGB 9.1* 8.7* 8.5* 8.5*   HCT 28.9* 28.2* 27.7* 27.1*    276 291 285   MCV 82 82 82 82   MCHC 31.5* 30.9* 30.7* 31.4*   MONO 12.0  1.4* 12.7  1.1* 12.4  1.0  --    EOSINOPHIL 1.7 5.2 5.2  --          Recent Labs   Lab 03/07/24  0955 03/08/24  0601 03/09/24  0518   BILITOT 0.5 0.4 0.4   PROT 6.5 5.9* 6.0   ALBUMIN 3.4* 3.2* 3.3*   ALKPHOS 63 59 59   ALT 21 21 22   AST 19 18 19         Recent Labs   Lab 10/20/21  1237 03/03/24  1224   Color, UA  --  Yellow   Appearance, UA  --  Hazy A   Clarity, UA Clear  --    pH, UA  --  6.0   Specific Gravity, UA  --  1.010   Protein, UA  --  3+ A   Glucose, UA  --  2+ A   Ketones, UA  --  Negative   Urobilinogen, UA  --  Negative   Bilirubin (UA)  --  Negative   Occult Blood UA  --  2+ A   Nitrite, UA  --  Negative   RBC, UA  --  2   WBC, UA  --  3   Bacteria  --  Rare   Hyaline Casts, UA  --  9 A         Recent Labs   Lab 03/04/24  0313 03/04/24  0417 03/04/24  0452   POC PH 7.463 H 7.475 H 7.471 H   POC PCO2 33.6 L 32.5 L 35.2   POC HCO3 24.1 24.0 25.7   POC PO2 108 H 84 80   POC SATURATED O2 99 97 97   POC BE 0 0 2   Sample ARTERIAL ARTERIAL ARTERIAL         Microbiology Results (last 7 days)       Procedure Component Value Units Date/Time    MRSA Screen by PCR [[id number]] Collected: 03/10/24 0847    Order Status: Completed Specimen: Nasopharyngeal Swab from Nasal Updated: 03/10/24 1105     MRSA SCREEN BY PCR Negative    MRSA Screen by PCR [[id number]]     Order Status: Canceled Specimen: Nasopharyngeal Swab from Nasal     Blood culture x two cultures. Draw prior to antibiotics. [[id number]] Collected: 03/03/24 0903    Order Status: Completed Specimen: Blood Updated: 03/08/24 1232     Blood Culture, Routine No growth after 5 days.    Narrative:      Aerobic and anaerobic    Blood culture x two cultures.  Draw prior to antibiotics. [[id number]] Collected: 03/03/24 0903    Order Status: Completed Specimen: Blood Updated: 03/08/24 1232     Blood Culture, Routine No growth after 5 days.    Narrative:      Aerobic and anaerobic            Thank you for allowing us to participate in the care of your patient!   We will follow the patient and provide recommendations as needed.    Patient care time was spent personally by me on the following activities: > 35 min    Obtaining a history.  Examination of patient.  Providing medical care at the patients bedside.  Developing a treatment plan with patient or surrogate and bedside caregivers.  Ordering and reviewing laboratory studies, radiographic studies, pulse oximetry.  Ordering and performing treatments and interventions.  Evaluation of patient's response to treatment.  Discussions with consultants while on the unit and immediately available to the patient.  Re-evaluation of the patient's condition.  Documentation in the medical record.     Arnaldo Dudley MD      [facility]  [address] [place] [address]  [place] [address]    [phone] - tel  [phone] - fax    3/11/2024

## 2024-03-11 NOTE — PT/OT/SLP PROGRESS
Physical Therapy      Patient Name:  Cynthia N Cushing   MRN:  [id number]    Patient not seen today secondary to Other (Comment) (PT not avail in am, then in pm pt & her dtr sound asleep and did not respond to knock on door or calling pt's name.). Will follow-up 3/12/24.

## 2024-03-11 NOTE — PROGRESS NOTES
[facility]  Department of Cardiology  Progress Note      PATIENT NAME: Cynthia N Cushing  MRN: [id number]  TODAY'S DATE: 03/11/2024  ADMIT DATE: 3/3/2024                          CONSULT REQUESTED BY: Marj Person MD    SUBJECTIVE     PRINCIPAL PROBLEM: Acute hypoxemic respiratory failure  3/11/24  She is feeling much better anxious to go home.  Yesterday evening she had an episode of PSVT she converted spontaneously.  Pacemaker placed yesterday's functioning adequately.  The patient had PSVT in spite of 50 mg of metoprolol b.i.d..  Will start on diltiazem 120 mg p.o. daily.    3/10/24  Her breathing is doing better.  She had multiple episodes of SVT was transferred to the cardiac care unit.  She converted with 2.5 mg of metoprolol.  During the night she continues to have intermittent SVT.  She has sick sinus syndrome she has had up to 2.8nd pauses.  Options have been discussed with the the patient and the family.  Will proceed with DDD pacemaker placement and increase the doses of beta-blockers.    3/9/24  She is resting in bed anxious to go home.  Her breathing is doing better.  Her blood pressure is fairly well controlled.  She had an episode of SVT yesterday rate of 150 lasted for few minutes.  On further questioning she has these episodes of palpitations at home that occurred occasionally.  She has a sinus bradycardia.  She had sinus arrest during the night with a 2.8 seconds pause.  The findings have been discussed with the patient.  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    3/8/24:  Patient seen resting in bed with no distress noted. Breathing is stable lying near flat.     3/7/24:  Patient seen in the stress lab this AM and again this afternoon with her daughter. She is anxious to go home. Denies any chest pain. HR has been on the low side.     3/6/24:  Patient seen resting in bed with no distress noted. Today she is lying flat. Chest xray shows improvement but not  total resolution of pulmonary edema. Her creatinine has bumped from diuresis.     3/5/24:  Patient remained in ICU overnight. BP has been 150-160s. She reports minimal improvement in orthopnea when lying back but has put out a good bit of urine.     3/4/24:  Patient seen resting in bed in the ICU with no acute distress noted.  She remains hypertensive with blood pressure in the 160s to 170s and is currently on a heparin drip.  Patient reports that she is feeling anxious and restless from being in bed.  She is requesting to sit up on the side of the bed and has preferred to sit up in the bed as opposed to lying down.    REASON FOR CONSULT:  NSTEMI      HPI:  Chief Complaint   Patient presents with    Respiratory Distress         HPI: Mrs. Cushing is a pleasant 78-year-old female who has a past medical history of hypertension, diabetes, hyperlipidemia, sleep apnea, SVT, SSS and follow up in cardiology clinic with Dr. Mendieta with recent office visit, last echo October 6, 2023 with EF of 53% with normal diastolic dysfunction and moderate AS who presents to the emergency room today with complaints of increased shortness of breath that started last night, got progressively worse.  The patient was started on CPAP by EMS.  Patient's vital signs in the ER showed blood pressure initially as high as 250/130, tachypnea, .  The patient in the ER was in respiratory distress, she was intubated to protect her airway.  Patient's laboratories and x-rays done.  Patient's case was discussed with the ER physician at the time of admission.  Laboratories showed troponin 467.9, COVID 19 screen negative, lactic acid 2.7, magnesium 2.0, calcitonin 0.098, sodium 140, potassium 3.6, chloride 109, CO2 18, BUN 22, creatinine 2.0, glucose 336, AST 25, anion gap normal at 13, PT INR 0.9, WBCs 14.8, hemoglobin 11, platelet count 416.  ABG show pH 7.25/pCO2 45.9/PO2 255/bicarbonate 20.5/oxygen saturation 100% this was done on ventilator.   Chest x-ray showed changes of pulmonary edema with ETT in place.  The patient was recommended to be admitted to the ICU for acute respiratory failure with hypoxia, hypertensive emergency, possible sepsis with lactic acid level 2.7.  Patient was given Lasix 40 mg IV in the ER, started on nitroglycerin drip.  Patient started on CHF order set, sepsis order set, no fluid bolus given due to concerns of pulmonary edema and risk of fluid overload, IV antibiotics, blood cultures, cardiology consult, and Pulmonary critical Care consult.  Patient is a full code status.      Review of patient's allergies indicates:   Allergen Reactions    Biaxin [clarithromycin]     Prandin [repaglinide] Nausea And Vomiting    Amlodipine     Chlorphen-phenyltolox-pe-ppa     Ciprofloxacin Nausea And Vomiting    Omnicef [cefdinir]     Propoxyphene     Quinine Nausea And Vomiting       Past Medical History:   Diagnosis Date    Allergy     Breast mass     Cataract     Diabetes mellitus     GERD (gastroesophageal reflux disease)     Hernia, hiatal     Hyperlipidemia     Hypertension     Kidney stone     Osteoarthritis     Renal insufficiency     Seasonal allergies     Sleep apnea     SSS (sick sinus syndrome)     SVT (supraventricular tachycardia)      Past Surgical History:   Procedure Laterality Date    BREAST BIOPSY      BREAST CYST ASPIRATION      BREAST SURGERY      CERVICAL DISC SURGERY      EYE SURGERY      cataracts bilateral    HYSTERECTOMY       Social History     Tobacco Use    Smoking status: Never    Smokeless tobacco: Never   Substance Use Topics    Alcohol use: No    Drug use: No        REVIEW OF SYSTEMS      OBJECTIVE     VITAL SIGNS (Most Recent)  Temp: 97.4 °F (36.3 °C) (03/11/24 1101)  Pulse: 60 (03/11/24 1200)  Resp: (!) 25 (03/11/24 1200)  BP: (!) 131/59 (03/11/24 1200)  SpO2: 99 % (03/11/24 1200)    VENTILATION STATUS  Resp: (!) 25 (03/11/24 1200)  SpO2: 99 % (03/11/24 1200)           I & O (Last 24H):  Intake/Output Summary  (Last 24 hours) at 3/11/2024 1251  Last data filed at 3/11/2024 1004  Gross per 24 hour   Intake 438 ml   Output --   Net 438 ml         WEIGHTS  Wt Readings from Last 3 Encounters:   03/09/24 0400 76.1 kg (167 lb 12.8 oz)   03/08/24 0400 75.9 kg (167 lb 6.4 oz)   03/03/24 1115 75.9 kg (167 lb 5.3 oz)   03/03/24 0813 76.7 kg (169 lb 1.5 oz)   02/02/24 1311 78.3 kg (172 lb 8.2 oz)   12/12/23 0949 78.5 kg (173 lb)       PHYSICAL EXAM     GENERAL: resting comfortably in bed lying near flat   HEENT: Normocephalic.    NECK: No JVD.   CARDIAC: SR on telemetry noted at this time.  CHEST ANATOMY: normal.   LUNGS: CTA.   ABDOMEN: Soft, non distended, hypoactive BS.    EXTREMITIES: No edema  CENTRAL NERVOUS SYSTEM: AAO x3   SKIN: No rash   HOME MEDICATIONS:  No current facility-administered medications on file prior to encounter.     Current Outpatient Medications on File Prior to Encounter   Medication Sig Dispense Refill    famotidine (PEPCID) 20 MG tablet TAKE 1 TABLET TWICE DAILY (Patient taking differently: Take 20 mg by mouth 2 (two) times daily.) 180 tablet 2    furosemide (LASIX) 20 MG tablet TAKE 1 TABLET(20 MG) BY MOUTH EVERY DAY (Patient taking differently: Take 20 mg by mouth once daily.) 30 tablet 0    guanFACINE (TENEX) 2 MG tablet TAKE 1 TABLET EVERY EVENING (Patient taking differently: Take 2 mg by mouth nightly.) 90 tablet 3    hydrALAZINE (APRESOLINE) 50 MG tablet Take 1 tablet (50 mg total) by mouth 3 (three) times daily. 90 tablet 11    labetaloL (NORMODYNE) 100 MG tablet Take 1 tablet (100 mg total) by mouth 2 (two) times daily. 60 tablet 11    LANTUS SOLOSTAR U-100 INSULIN glargine 100 units/mL SubQ pen ADMINISTER 51 UNITS UNDER THE SKIN AT NIGHT (Patient taking differently: Inject 51 Units into the skin every evening. ADMINISTER 51 UNITS UNDER THE SKIN AT NIGHT) 15 mL 5    olmesartan (BENICAR) 40 MG tablet TAKE 1 TABLET EVERY DAY (Patient taking differently: Take 40 mg by mouth once daily.) 90 tablet 3  "   rosuvastatin (CRESTOR) 10 MG tablet Take 1 tablet (10 mg total) by mouth every evening. 90 tablet 2    ACCU-CHEK AMADOR PLUS TEST STRP Strp TEST BLOOD SUGAR FOUR TIMES DAILY 400 strip 10    amLODIPine (NORVASC) 10 MG tablet Take 1 tablet (10 mg total) by mouth once daily. 30 tablet 11    aspirin 81 MG Chew Take 81 mg by mouth once daily.      lancets (ACCU-CHEK SOFTCLIX LANCETS) Misc TEST BLOOD SUGAR FOUR TIMES DAILY 400 each 3    pen needle, diabetic (BD ANNA 2ND GEN PEN NEEDLE) 32 gauge x 5/32" Ndle INJECT 1 NEEDLE INTO THE SKIN EVERY EVENING 100 each 5       SCHEDULED MEDS:   aspirin  81 mg Oral Daily    atorvastatin  10 mg Oral QHS    clopidogreL  75 mg Oral Daily    diltiaZEM  120 mg Oral Daily    famotidine  20 mg Oral Daily    insulin detemir U-100  10 Units Subcutaneous BID    metoprolol tartrate  50 mg Oral BID    miconazole NITRATE 2 %   Topical (Top) BID    sodium zirconium cyclosilicate  10 g Oral Daily       CONTINUOUS INFUSIONS:        PRN MEDS:acetaminophen, dextrose 50% in water (D50W), dextrose 50% in water (D50W), [] fentaNYL **FOLLOWED BY** fentaNYL, glucagon (human recombinant), glucose, glucose, hydrALAZINE, insulin aspart U-100, magnesium sulfate IVPB, magnesium sulfate IVPB, melatonin, metoprolol, ondansetron, potassium bicarbonate, potassium bicarbonate, potassium bicarbonate, sodium chloride 0.9%, vancomycin - pharmacy to dose    LABS AND DIAGNOSTICS     CBC LAST 3 DAYS  Recent Labs   Lab 24  0955 24  0601 24  0518 03/10/24  0559   WBC 11.92 8.40 8.04 8.27   RBC 3.51* 3.45* 3.40* 3.32*   HGB 9.1* 8.7* 8.5* 8.5*   HCT 28.9* 28.2* 27.7* 27.1*   MCV 82 82 82 82   MCH 25.9* 25.2* 25.0* 25.6*   MCHC 31.5* 30.9* 30.7* 31.4*   RDW 16.5* 16.2* 16.1* 16.0*    276 291 285   MPV 11.4 11.0 11.2 11.1   GRAN 71.6  8.5* 60.9  5.1 62.6  5.0  --    LYMPH 13.1*  1.6 19.3  1.6 18.4  1.5  --    MONO 12.0  1.4* 12.7  1.1* 12.4  1.0  --    BASO 0.09 0.07 0.06  --    NRBC " "0 0 0  --          COAGULATION LAST 3 DAYS  Recent Labs   Lab 03/04/24  1244 03/04/24  2019 03/05/24  0239   APTT 37.9* 43.3* 42.6*         CHEMISTRY LAST 3 DAYS  Recent Labs   Lab 03/07/24  0955 03/07/24  1457 03/08/24  0601 03/09/24  0518 03/10/24  0559 03/11/24  0742   *   < > 137 137 139  --    K 5.5*   < > 4.7 4.3 3.9  --    CL 99   < > 104 105 107  --    CO2 26   < > 25 22* 24  --    ANIONGAP 9   < > 8 10 8  --    BUN 49*   < > 50* 47* 42*  --    CREATININE 3.5*   < > 3.0* 2.2* 2.1*  --    *   < > 150* 154* 138*  --    CALCIUM 9.0   < > 8.6* 8.7 8.7  --    MG 2.3  --  2.4 2.2 2.1 2.0   ALBUMIN 3.4*  --  3.2* 3.3*  --   --    PROT 6.5  --  5.9* 6.0  --   --    ALKPHOS 63  --  59 59  --   --    ALT 21  --  21 22  --   --    AST 19  --  18 19  --   --    BILITOT 0.5  --  0.4 0.4  --   --     < > = values in this interval not displayed.         CARDIAC PROFILE LAST 3 DAYS  Recent Labs   Lab 03/06/24  0258   *         ENDOCRINE LAST 3 DAYS  Recent Labs   Lab 03/10/24  0559   TSH 2.299         LAST ARTERIAL BLOOD GAS  ABG  No results for input(s): "PH", "PO2", "PCO2", "HCO3", "BE" in the last 168 hours.      LAST 7 DAYS MICROBIOLOGY   Microbiology Results (last 7 days)       Procedure Component Value Units Date/Time    MRSA Screen by PCR [[id number]] Collected: 03/10/24 0847    Order Status: Completed Specimen: Nasopharyngeal Swab from Nasal Updated: 03/10/24 1105     MRSA SCREEN BY PCR Negative    MRSA Screen by PCR [[id number]]     Order Status: Canceled Specimen: Nasopharyngeal Swab from Nasal     Blood culture x two cultures. Draw prior to antibiotics. [[id number]] Collected: 03/03/24 0903    Order Status: Completed Specimen: Blood Updated: 03/08/24 1232     Blood Culture, Routine No growth after 5 days.    Narrative:      Aerobic and anaerobic    Blood culture x two cultures. Draw prior to antibiotics. [[id number]] Collected: 03/03/24 0903    Order Status: Completed Specimen: Blood Updated: " 03/08/24 1232     Blood Culture, Routine No growth after 5 days.    Narrative:      Aerobic and anaerobic            MOST RECENT IMAGING  X-Ray Chest AP Portable  Reason: vascular device placement    FINDINGS:  Portable chest at 1217 compared with 3/9/2024 shows placement of dual lead left transvenous pacer. Proximal lead tip projects over right atrium and distal lead tip near right ventricular apex. Enlarged cardiac silhouette size unchanged. Mediastinal contours are normal.    No pneumothorax.    Lungs are clear. Pulmonary vasculature is normal. No acute osseous abnormality.    IMPRESSION:  Placement of left dual lead transvenous pacer without pneumothorax or other complication.    Electronically signed by:  Mao Holden MD  03/10/2024 12:53 PM CDT Workstation: [id number]  Electrophysiology Procedure    Successful implantation of PPM Dual.    I certify that I was present for the critical steps of the procedure   including the diagnostic, surgical and/or interventional portions.     Procedure Log documented by Documenter: Tschume, Sarah, RN and verified by   Brijesh Mendieta MD.    Date: 3/10/2024  Time: 12:08 PM      ECHOCARDIOGRAM RESULTS (last 5)  Results for orders placed during the hospital encounter of 10/06/23    Echo Saline Bubble? No    Interpretation Summary    Left Ventricle: The left ventricle is normal in size. Mildly increased wall thickness. There is mild concentric hypertrophy. Normal wall motion. There is normal systolic function. Ejection fraction by visual approximation is 53%. There is normal diastolic function.    Left Atrium: Left atrium is mildly dilated.    Right Ventricle: Normal right ventricular cavity size. Wall thickness is normal. Right ventricle wall motion  is normal. Systolic function is normal.    Aortic Valve: The aortic valve is a trileaflet valve. There is moderate aortic valve sclerosis. Mildly calcified cusps. Mildly restricted motion. There is moderate stenosis. Aortic  valve area by VTI is 1.39 cm². Aortic valve peak velocity is 2.67 m/s. Mean gradient is 16 mmHg. The dimensionless index is 0.44.    Mitral Valve: There is mild regurgitation with a centrally directed jet.    Tricuspid Valve: There is mild regurgitation.    IVC/SVC: Normal venous pressure at 3 mmHg.    The estimated pulmonary artery systolic pressure is 28 mmHg.      Results for orders placed during the hospital encounter of 04/11/22    Echo Saline Bubble? No    Interpretation Summary  · The left ventricle is normal in size with concentric remodeling and normal systolic function.  · The estimated ejection fraction is 55%.  · Normal left ventricular diastolic function.  · Moderate left atrial enlargement.  · There is mild aortic valve stenosis.  · Aortic valve area is 1.50 cm2; peak velocity is 2.68 m/s; mean gradient is 15 mmHg.      CURRENT/PREVIOUS VISIT EKG  Results for orders placed or performed during the hospital encounter of 03/03/24   EKG 12-lead    Collection Time: 03/10/24  2:26 PM   Result Value Ref Range    QRS Duration 106 ms    [facility]HS QTC Calculation 440 ms    Narrative    Test Reason : I49.9,    Vent. Rate : 065 BPM     Atrial Rate : 065 BPM     P-R Int : 194 ms          QRS Dur : 106 ms      QT Int : 424 ms       P-R-T Axes : 062 -29 194 degrees     QTc Int : 440 ms    Atrial-paced rhythm with Premature atrial complexes  LVH with repolarization abnormality ( Clawson product )  Abnormal ECG  When compared with ECG of 09-MAR-2024 18:56,  Electronic atrial pacemaker has replaced Atrial fibrillation  Vent. rate has decreased BY  52 BPM  Non-specific change in ST segment in Anterior leads  T wave inversion now evident in Anterior-lateral leads    Referred By: [email or web address]   SELF           Confirmed By:            ASSESSMENT/PLAN:     Active Hospital Problems    Diagnosis    *Acute hypoxemic respiratory failure    Acute on chronic heart failure with preserved ejection fraction (HFpEF)    NSTEMI (non-ST elevated  myocardial infarction)    ACP (advance care planning)     -I spoke with the patient's daughters Mrs. Duran and Mrs. Ramos.  They were updated on the patient's condition.  Their questions and concerns were addressed.  They request that the patient be a full code status.      Aortic stenosis    Hyperlipidemia    BRIDGETTE (acute kidney injury)    SVT (supraventricular tachycardia)    Uncontrolled type 2 diabetes mellitus with hyperglycemia    SSS (sick sinus syndrome)    Iron deficiency anemia       ASSESSMENT & PLAN:   SVT with tachy-jimi syndrome.  A pacemaker has been discussed with the patient.  In the family members.  They are all in agreement.  Will proceed with DDD pacemaker placement.  The procedure risks benefits and indications have been explained to the family and the patient they are in agreement.  Sick sinus syndrome.  Tachy-jimi syndrome  Status post DDD pacemaker placement  Acute hypoxemic respiratory failure- resolved  BRIDGETTE on CKD improving after hydration  Abnormal stress test  Acute heart failure EF 45-50 %  Hypertensive emergency  Moderate aortic stenosis  NSTEMI  HTN   DM2    RECOMMENDATIONS:    Continue metoprolol 50 mg b.i.d..  Add diltiazem 120 mg p.o. daily.  Monitor for PSVT.  Hopefully the patient will be able to be discharged tomorrow    Brijesh Mendieta MD  [facility]  Department of Cardiology  Date of Service: 03/11/2024

## 2024-03-11 NOTE — ASSESSMENT & PLAN NOTE
Patient's FSGs are controlled on current medication regimen.  Last A1c reviewed-   Lab Results   Component Value Date    HGBA1C 7.7 (H) 03/07/2024     Most recent fingerstick glucose reviewed-   POC Glucose   Date Value Ref Range Status   03/11/2024 159 (H) 70 - 110 Final   03/11/2024 126 (H) 70 - 110 Final   03/10/2024 187 (H) 70 - 110 Final   03/10/2024 154 (H) 70 - 110 Final      Current correctional scale  Low  Maintain anti-hyperglycemic dose as follows-   Antihyperglycemics (From admission, onward)    Start     Stop Route Frequency Ordered    03/03/24 1200  insulin detemir U-100 (Levemir) pen 10 Units         -- SubQ 2 times daily 03/03/24 1146    03/03/24 1146  insulin aspart U-100 pen 0-5 Units         -- SubQ Before meals & nightly PRN 03/03/24 1146        Hold Oral hypoglycemics while patient is in the hospital.

## 2024-03-11 NOTE — SUBJECTIVE & OBJECTIVE
Interval History: Patient seen and examined. Anxious to go home. No chest pain.    Review of Systems   Constitutional:  Negative for activity change, appetite change, chills and fever.   HENT:  Negative for congestion, ear pain, nosebleeds and sinus pain.    Eyes:  Negative for discharge and itching.   Respiratory:  Negative for apnea, cough, chest tightness and shortness of breath.    Cardiovascular:  Negative for chest pain, palpitations and leg swelling.   Gastrointestinal:  Negative for abdominal distention, abdominal pain and vomiting.   Genitourinary:  Negative for difficulty urinating, dysuria, flank pain and frequency.   Musculoskeletal:  Negative for arthralgias, back pain, joint swelling and myalgias.   Skin:  Negative for color change, pallor and rash.   Neurological:  Negative for dizziness, weakness, light-headedness and headaches.   Psychiatric/Behavioral:  Negative for agitation, behavioral problems, confusion and suicidal ideas. The patient is nervous/anxious.      Objective:     Vital Signs (Most Recent):  Temp: 97.4 °F (36.3 °C) (03/11/24 1101)  Pulse: 60 (03/11/24 1200)  Resp: (!) 25 (03/11/24 1200)  BP: (!) 131/59 (03/11/24 1200)  SpO2: 99 % (03/11/24 1200) Vital Signs (24h Range):  Temp:  [97.4 °F (36.3 °C)-98.2 °F (36.8 °C)] 97.4 °F (36.3 °C)  Pulse:  [57-83] 60  Resp:  [10-26] 25  SpO2:  [88 %-100 %] 99 %  BP: (126-194)/(58-88) 131/59     Weight: 76.1 kg (167 lb 12.8 oz)  Body mass index is 29.72 kg/m².    Intake/Output Summary (Last 24 hours) at 3/11/2024 1321  Last data filed at 3/11/2024 1004  Gross per 24 hour   Intake 438 ml   Output --   Net 438 ml         Physical Exam  Vitals reviewed.   Constitutional:       General: She is not in acute distress.     Appearance: Normal appearance. She is normal weight. She is not ill-appearing.   HENT:      Head: Normocephalic and atraumatic.      Nose: Nose normal.      Mouth/Throat:      Mouth: Mucous membranes are moist.      Pharynx: Oropharynx is  clear.   Eyes:      General: No scleral icterus.     Extraocular Movements: Extraocular movements intact.      Conjunctiva/sclera: Conjunctivae normal.      Pupils: Pupils are equal, round, and reactive to light.   Cardiovascular:      Rate and Rhythm: Normal rate and regular rhythm.      Pulses: Normal pulses.      Heart sounds: Normal heart sounds. No murmur heard.     No gallop.   Pulmonary:      Effort: Pulmonary effort is normal. No respiratory distress.      Breath sounds: Normal breath sounds. No wheezing, rhonchi or rales.   Chest:      Chest wall: No tenderness.   Abdominal:      General: Abdomen is flat. There is no distension.      Palpations: Abdomen is soft.      Tenderness: There is no abdominal tenderness.   Musculoskeletal:         General: No swelling or tenderness.      Cervical back: Normal range of motion and neck supple. No rigidity or tenderness.      Right lower leg: No edema.      Left lower leg: No edema.   Skin:     General: Skin is warm and dry.      Comments: Incision at pacemaker placement site clean and dry.   Neurological:      General: No focal deficit present.      Mental Status: She is alert and oriented to person, place, and time. Mental status is at baseline.      Motor: No weakness.   Psychiatric:         Mood and Affect: Mood normal.         Behavior: Behavior normal.         Thought Content: Thought content normal.             Significant Labs: All pertinent labs within the past 24 hours have been reviewed.  BMP:   Recent Labs   Lab 03/11/24  0742 03/11/24  1140   GLU  --  179*   NA  --  138   K  --  3.9   CL  --  108   CO2  --  20*   BUN  --  28*   CREATININE  --  1.7*   CALCIUM  --  8.8   MG 2.0  --      CBC:   Recent Labs   Lab 03/10/24  0559   WBC 8.27   HGB 8.5*   HCT 27.1*        CMP:   Recent Labs   Lab 03/10/24  0559 03/11/24  1140    138   K 3.9 3.9    108   CO2 24 20*   * 179*   BUN 42* 28*   CREATININE 2.1* 1.7*   CALCIUM 8.7 8.8   ANIONGAP  8 10       Significant Imaging: I have reviewed all pertinent imaging results/findings within the past 24 hours.

## 2024-03-11 NOTE — PLAN OF CARE
Spoke with Jeri in intake with [facility] [phone] who states they can accept this patient and SOC date is 3/13/24 with expected d/c date of 3/12/24.  Met with patient and her daughter, informed of [facility] to admit patient and they both expressed understanding of and agreement with this plan.

## 2024-03-12 ENCOUNTER — TELEPHONE (OUTPATIENT)
Dept: CARDIOLOGY | Facility: CLINIC | Age: 79
End: 2024-03-12
Payer: MEDICARE

## 2024-03-12 VITALS
HEART RATE: 60 BPM | OXYGEN SATURATION: 98 % | WEIGHT: 167.81 LBS | SYSTOLIC BLOOD PRESSURE: 145 MMHG | RESPIRATION RATE: 20 BRPM | DIASTOLIC BLOOD PRESSURE: 68 MMHG | HEIGHT: 63 IN | BODY MASS INDEX: 29.73 KG/M2 | TEMPERATURE: 98 F

## 2024-03-12 LAB
GLUCOSE SERPL-MCNC: 136 MG/DL (ref 70–110)
MAGNESIUM SERPL-MCNC: 1.9 MG/DL (ref 1.6–2.6)
PHOSPHATE SERPL-MCNC: 3.3 MG/DL (ref 2.7–4.5)

## 2024-03-12 PROCEDURE — 63600175 PHARM REV CODE 636 W HCPCS: Performed by: INTERNAL MEDICINE

## 2024-03-12 PROCEDURE — 25000003 PHARM REV CODE 250: Performed by: INTERNAL MEDICINE

## 2024-03-12 PROCEDURE — 36415 COLL VENOUS BLD VENIPUNCTURE: CPT | Performed by: INTERNAL MEDICINE

## 2024-03-12 PROCEDURE — 83735 ASSAY OF MAGNESIUM: CPT | Performed by: INTERNAL MEDICINE

## 2024-03-12 PROCEDURE — 84100 ASSAY OF PHOSPHORUS: CPT | Performed by: INTERNAL MEDICINE

## 2024-03-12 RX ORDER — FERROUS SULFATE 324(65)MG
324 TABLET, DELAYED RELEASE (ENTERIC COATED) ORAL DAILY
Qty: 30 TABLET | Refills: 0 | Status: SHIPPED | OUTPATIENT
Start: 2024-03-12 | End: 2024-04-03

## 2024-03-12 RX ORDER — DILTIAZEM HYDROCHLORIDE 120 MG/1
120 CAPSULE, COATED, EXTENDED RELEASE ORAL DAILY
Qty: 30 CAPSULE | Refills: 0 | Status: SHIPPED | OUTPATIENT
Start: 2024-03-13 | End: 2024-04-03

## 2024-03-12 RX ORDER — METOPROLOL TARTRATE 50 MG/1
50 TABLET ORAL 2 TIMES DAILY
Qty: 60 TABLET | Refills: 0 | Status: SHIPPED | OUTPATIENT
Start: 2024-03-12 | End: 2024-04-03

## 2024-03-12 RX ORDER — CLOPIDOGREL BISULFATE 75 MG/1
75 TABLET ORAL DAILY
Qty: 30 TABLET | Refills: 0 | Status: SHIPPED | OUTPATIENT
Start: 2024-03-13 | End: 2024-04-03

## 2024-03-12 RX ADMIN — HYDRALAZINE HYDROCHLORIDE 10 MG: 20 INJECTION INTRAMUSCULAR; INTRAVENOUS at 05:03

## 2024-03-12 RX ADMIN — CLOPIDOGREL BISULFATE 75 MG: 75 TABLET, FILM COATED ORAL at 09:03

## 2024-03-12 RX ADMIN — INSULIN DETEMIR 10 UNITS: 100 INJECTION, SOLUTION SUBCUTANEOUS at 10:03

## 2024-03-12 RX ADMIN — FERROUS SULFATE TAB 325 MG (65 MG ELEMENTAL FE) 1 EACH: 325 (65 FE) TAB at 09:03

## 2024-03-12 RX ADMIN — METOPROLOL TARTRATE 50 MG: 50 TABLET, FILM COATED ORAL at 09:03

## 2024-03-12 RX ADMIN — MICONAZOLE NITRATE: 20 POWDER TOPICAL at 10:03

## 2024-03-12 RX ADMIN — ASPIRIN 81 MG 81 MG: 81 TABLET ORAL at 09:03

## 2024-03-12 RX ADMIN — DILTIAZEM HYDROCHLORIDE 120 MG: 120 CAPSULE, COATED, EXTENDED RELEASE ORAL at 09:03

## 2024-03-12 RX ADMIN — FAMOTIDINE 20 MG: 20 TABLET ORAL at 09:03

## 2024-03-12 RX ADMIN — SODIUM ZIRCONIUM CYCLOSILICATE 5 G: 5 POWDER, FOR SUSPENSION ORAL at 09:03

## 2024-03-12 NOTE — NURSING
IV's DC'd. Tolerated well. DC instructions, follow up apps, and meds reviewed with pt and family. Verbalized understanding. Pt brought down via wheelchair with all belongings to family transport. Safety maintained.

## 2024-03-12 NOTE — PLAN OF CARE
Patient to transfer to home health service with [facility], verified SOC date is 3/13/24 with agency.  Patient will transport home via private vehicle with family.  Discharge orders and chart reviewed with no further post-acute discharge needs identified at this time.  At this time, patient is cleared for discharge from Case Management standpoint.        03/12/24 1113   Final Note   Assessment Type Final Discharge Note   Anticipated Discharge Disposition Home-Health   Hospital Resources/Appts/Education Provided   (Patient to coordinate with [facility] for scheduling post hospital follow up appointment with their PCP.)   Post-Acute Status   Post-Acute Authorization Home [facility]   Home Health Status Set-up Complete/Auth obtained   Coverage [employer]   Discharge Delays None known at this time

## 2024-03-12 NOTE — ASSESSMENT & PLAN NOTE
Please see below and advise. Pt's last B12 on 2/9/2022 slightly low (208).    Resume home meds  F/U with PCP in 2 weeks

## 2024-03-12 NOTE — CARE UPDATE
03/11/24 2100   Patient Assessment/Suction   Level of Consciousness (AVPU) alert   Respiratory Effort Normal;Unlabored   PRE-TX-O2   Device (Oxygen Therapy) room air   SpO2 98 %   Pulse Oximetry Type Continuous   $ Pulse Oximetry - Single Charge Pulse Oximetry - Single   $ Pulse Oximetry - Multiple Charge Pulse Oximetry - Multiple   Pulse 63   Resp (!) 22   Positioning HOB elevated 30 degrees   Education   $ Education Other (see comment);15 min

## 2024-03-12 NOTE — DISCHARGE SUMMARY
[facility]  Discharge Summary      Patient Name: Cynthia N Cushing  MRN: [id number]  PRATIK: [id number]  Patient Class: IP- Inpatient  Admission Date: 3/3/2024  Hospital Length of Stay: 9 days  Discharge Date and Time:  03/12/2024 10:38 AM  Attending Physician: Marj Person MD   Discharging Provider: Marj Person MD, MD  Primary Care Provider: Teri Adams MD    Primary Care Team: Networked reference to record PCT     HPI:   Mrs. Cushing is a pleasant 78-year-old female who has a past medical history of hypertension, diabetes, hyperlipidemia, sleep apnea, SVT, SSS and follow up in cardiology clinic with Dr. Mendieta with recent office visit, last echo October 6, 2023 with EF of 53% with normal diastolic dysfunction and moderate AS who presents to the emergency room today with complaints of increased shortness of breath that started last night, got progressively worse.  The patient was started on CPAP by EMS.  Patient's vital signs in the ER showed blood pressure initially as high as 250/130, tachypnea, .  The patient in the ER was in respiratory distress, she was intubated to protect her airway.  Patient's laboratories and x-rays done.  Patient's case was discussed with the ER physician at the time of admission.  Laboratories showed troponin 467.9, COVID 19 screen negative, lactic acid 2.7, magnesium 2.0, calcitonin 0.098, sodium 140, potassium 3.6, chloride 109, CO2 18, BUN 22, creatinine 2.0, glucose 336, AST 25, anion gap normal at 13, PT INR 0.9, WBCs 14.8, hemoglobin 11, platelet count 416.  ABG show pH 7.25/pCO2 45.9/PO2 255/bicarbonate 20.5/oxygen saturation 100% this was done on ventilator.  Chest x-ray showed changes of pulmonary edema with ETT in place.  The patient was recommended to be admitted to the ICU for acute respiratory failure with hypoxia, hypertensive emergency, possible sepsis with lactic acid level 2.7.  Patient was given Lasix 40 mg IV in  the ER, started on nitroglycerin drip.  Patient started on CHF order set, sepsis order set, no fluid bolus given due to concerns of pulmonary edema and risk of fluid overload, IV antibiotics, blood cultures, cardiology consult, and Pulmonary critical Care consult.  Patient is a full code status.    Procedure(s) (LRB):  INSERTION, CARDIAC PACEMAKER, DUAL CHAMBER (N/A)      Hospital Course:   78 year old female with history of HFpEF, was admitted to ICU with fluid overload secondary to decompensated heart failure and HTN emergency. Patient was intubated on admission. On nitro gtt for brief period. Patient was diuresed. Patient was extubated next day and weaned to RA. Had elevated troponins as well consistent with NSTEMI and was on heparin gtt for 48hr. Cardiology was consulted. Echo showed EF 45-50%, moderate to severe aortic stenosis, and PASP 45.  Developed BRIDGETTE and hyperkalemia. Hyperkalemia resolved with lokelma. Nephrology followed. Given gentle IVF. Stress testing showed a reversible area of ischemia and cardiology followed considering angiogram but was limited by renal function. Had run of SVT needing IV metoprolol. 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 Was continued on metoprolol 50 mg bid, but had recurrent psvt. Diltiazem 120 mg daily added to regimen. Patient had no further episodes of svt, and was discharged in stable condition.     Goals of Care Treatment Preferences:  Code Status: Full Code      Consults:   Consults (From admission, onward)          Status Ordering Provider     Inpatient consult to Nephrology  Once        Provider:  Vito Britton MD    Completed LOLITA CARLTON     Inpatient consult to Registered Dietitian/Nutritionist  Once        Provider:  (Not yet assigned)    Completed ROSALVA IGLESIAS     Inpatient consult to Cardiology  Once        Provider:  Cachorro Roberts MD    Completed ROSALVA IGLESIAS     Inpatient consult to Pulmonology  Once         Provider:  Bo Antonio MD    Completed ROSALVA IGLESIAS     Inpatient consult to Cardiology  Once        Provider:  Cachorro Roberts MD    Acknowledged SHANT ALANIS            Pulmonary  * Acute hypoxemic respiratory failure  Resolved. Was from CHF. Patient currently on room air and oxygenating adequately.      Cardiac/Vascular  NSTEMI (non-ST elevated myocardial infarction)  Troponin down trending   Completed heparin drip for 48 hrs   Nuclear imaging stress test portion positive  - cont aspirin, plavix, Statins, beta blocker  - cardiology saw,  conservative care for now with BRIDGETTE, f/u outpatient with cardiologist    Acute on chronic heart failure with preserved ejection fraction (HFpEF)  Echo was repeated this admission, EF reduced to 45%, AS  -resume lasix  - Cont metoprolol      Aortic stenosis  - follow cardiology recommendations     Hyperlipidemia  Continue home meds    SVT (supraventricular tachycardia)  Continue metoprolol and diltiazem  Pacemaker placed 3/10      SSS (sick sinus syndrome)  Pacemaker placed 3/10  Continue metoprolol  Diltiazem added by cardiology due to continued episodes of psvt    Renal/  BRIDGETTE (acute kidney injury)  Improved  Resume lasix as per nephrology recommendations  Discontinue ARB      Oncology  Iron deficiency anemia  Continue daily iron    Endocrine  Uncontrolled type 2 diabetes mellitus with hyperglycemia  Resume home meds  F/U with PCP in 2 weeks    Other  ACP (advance care planning)  Continue full code status      Final Active Diagnoses:    Diagnosis Date Noted POA    PRINCIPAL PROBLEM:  Acute hypoxemic respiratory failure [J96.01] 03/03/2024 Yes    Acute on chronic heart failure with preserved ejection fraction (HFpEF) [I50.33] 03/05/2024 Yes    NSTEMI (non-ST elevated myocardial infarction) [I21.4] 03/05/2024 Yes    ACP (advance care planning) [Z71.89] 03/03/2024 Not Applicable    Aortic stenosis [I35.0] 04/11/2023 Yes    Hyperlipidemia [E78.5]  Yes    BRIDGETTE  "(acute kidney injury) [N17.9] 03/31/2022 Yes    SVT (supraventricular tachycardia) [I47.10]  Yes    Uncontrolled type 2 diabetes mellitus with hyperglycemia [E11.65] 08/02/2021 Yes    SSS (sick sinus syndrome) [I49.5]  Yes    Iron deficiency anemia [D50.9] 10/20/2019 Yes      Problems Resolved During this Admission:    Diagnosis Date Noted Date Resolved POA    Hypertensive emergency [I16.1] 03/04/2024 03/07/2024 Yes       Discharged Condition: stable    Disposition:     Follow Up:   Contact information for follow-up providers       Teri Adams MD Follow up in 2 week(s).    Specialty: Internal Medicine  Contact information:  [address]  [place] [address]  [phone]                       Contact information for after-discharge care       Home Medical Care       [facility] .    Service: Home Nursing  Contact information:  [address]  [place] [address]  [phone]                                 Patient Instructions:      Ambulatory referral/consult to Home Health   Referral Priority: Routine Referral Type: Home Health   Referral Reason: Specialty Services Required   Requested Specialty: Home Health Services   Number of Visits Requested: 1       Significant Diagnostic Studies: Labs: BMP:   Recent Labs   Lab 03/11/24  0742 03/11/24  1140 03/12/24  0504   GLU  --  179*  --    NA  --  138  --    K  --  3.9  --    CL  --  108  --    CO2  --  20*  --    BUN  --  28*  --    CREATININE  --  1.7*  --    CALCIUM  --  8.8  --    MG 2.0  --  1.9   , CMP   Recent Labs   Lab 03/11/24  1140      K 3.9      CO2 20*   *   BUN 28*   CREATININE 1.7*   CALCIUM 8.8   ANIONGAP 10   , and CBC No results for input(s): "WBC", "HGB", "HCT", "PLT" in the last 48 hours.    Pending Diagnostic Studies:       None           Medications:  Reconciled Home Medications:      Medication List        START taking these medications      clopidogreL 75 mg " "tablet  Commonly known as: PLAVIX  Take 1 tablet (75 mg total) by mouth once daily.  Start taking on: March 13, 2024     diltiaZEM 120 MG Cp24  Commonly known as: CARDIZEM CD  Take 1 capsule (120 mg total) by mouth once daily.  Start taking on: March 13, 2024     ferrous sulfate 324 mg (65 mg iron) Tbec  Take 1 tablet (324 mg total) by mouth once daily.     metoprolol tartrate 50 MG tablet  Commonly known as: LOPRESSOR  Take 1 tablet (50 mg total) by mouth 2 (two) times daily.            CHANGE how you take these medications      guanFACINE 2 MG tablet  Commonly known as: TENEX  TAKE 1 TABLET EVERY EVENING  What changed: when to take this     LANTUS SOLOSTAR U-100 INSULIN glargine 100 units/mL SubQ pen  Generic drug: insulin  ADMINISTER 51 UNITS UNDER THE SKIN AT NIGHT  What changed: See the new instructions.            CONTINUE taking these medications      ACCU-CHEK AMADOR PLUS TEST STRP Strp  Generic drug: blood sugar diagnostic  TEST BLOOD SUGAR FOUR TIMES DAILY     aspirin 81 MG Chew  Take 81 mg by mouth once daily.     famotidine 20 MG tablet  Commonly known as: PEPCID  TAKE 1 TABLET TWICE DAILY     furosemide 20 MG tablet  Commonly known as: LASIX  TAKE 1 TABLET(20 MG) BY MOUTH EVERY DAY     hydrALAZINE 50 MG tablet  Commonly known as: APRESOLINE  Take 1 tablet (50 mg total) by mouth 3 (three) times daily.     lancets Misc  Commonly known as: ACCU-CHEK SOFTCLIX LANCETS  TEST BLOOD SUGAR FOUR TIMES DAILY     pen needle, diabetic 32 gauge x 5/32" Ndle  Commonly known as: BD ANNA 2ND GEN PEN NEEDLE  INJECT 1 NEEDLE INTO THE SKIN EVERY EVENING     rosuvastatin 10 MG tablet  Commonly known as: CRESTOR  Take 1 tablet (10 mg total) by mouth every evening.            STOP taking these medications      amLODIPine 10 MG tablet  Commonly known as: NORVASC     labetaloL 100 MG tablet  Commonly known as: NORMODYNE     olmesartan 40 MG tablet  Commonly known as: BENICAR              Indwelling Lines/Drains at time of " discharge:   Lines/Drains/Airways       None                   Time spent on the discharge of patient: 35 minutes         Marj Person MD, MD  Department of Hospital Medicine  [facility]

## 2024-03-12 NOTE — PT/OT/SLP PROGRESS
Physical Therapy      Patient Name:  Cynthia N Cushing   MRN:  [id number]    Patient not seen today secondary to Patient unwilling to participate, Other (Comment) (prepping for discharge home.). Will follow-up 3/13/24.

## 2024-03-12 NOTE — PLAN OF CARE
Problem: Adult Inpatient Plan of Care  Goal: Plan of Care Review  Outcome: Met  Goal: Patient-Specific Goal (Individualized)  Outcome: Met  Goal: Absence of Hospital-Acquired Illness or Injury  Outcome: Met  Goal: Optimal Comfort and Wellbeing  Outcome: Met  Goal: Readiness for Transition of Care  Outcome: Met     Problem: Diabetes Comorbidity  Goal: Blood Glucose Level Within Targeted Range  Outcome: Met     Problem: Fall Injury Risk  Goal: Absence of Fall and Fall-Related Injury  Outcome: Met     Problem: Infection  Goal: Absence of Infection Signs and Symptoms  Outcome: Met     Problem: Skin Injury Risk Increased  Goal: Skin Health and Integrity  Outcome: Met     Problem: Fluid and Electrolyte Imbalance (Acute Kidney Injury/Impairment)  Goal: Fluid and Electrolyte Balance  Outcome: Met     Problem: Oral Intake Inadequate (Acute Kidney Injury/Impairment)  Goal: Optimal Nutrition Intake  Outcome: Met     Problem: Renal Function Impairment (Acute Kidney Injury/Impairment)  Goal: Effective Renal Function  Outcome: Met      S-(situation): patient calling and states was seen yesterday for hives and itching is worse    B-(background): patient states yesterday, last night, and this morning itching is worse    A-(assessment): patient denies any wheezing, chest tightness or difficulty breathing.  States she took Benadryl yesterday took last one at midnight but did not seem to help much.   States took Prednisone this morning and states just feels a little jittery from that.  States took Hydroxyzine this morning at 7 am instead of Benadryl that has not seemed to help.  States she did call cardiologist and it was fine to stop Losartan.    R-(recommendations): will route to Swetha DENNIS for any other suggestions for patient.  Patient wondering if it would be okay to take Hydroxyzine and Benadryl.    Arminda Cosme RN on 2/13/2019 at 10:35 AM

## 2024-03-12 NOTE — ASSESSMENT & PLAN NOTE
Troponin down trending   Completed heparin drip for 48 hrs   Nuclear imaging stress test portion positive  - cont aspirin, plavix, Statins, beta blocker  - cardiology saw,  conservative care for now with BRIDGETTE, f/u outpatient with cardiologist

## 2024-03-12 NOTE — TELEPHONE ENCOUNTER
----- Message from Jessica Carrera, Patient Care Assistant sent at 3/12/2024 12:25 PM CDT -----  Regarding: appointment  Contact: Rachel schwartz's daughter  Type:  Sooner Appointment Request    Caller is requesting a sooner appointment.  Caller declined first available appointment listed below.  Caller will not accept being placed on the waitlist and is requesting a message be sent to doctor.    Name of Caller:  Rachel schwartz's daughter    When is the first available appointment?  4/4/24    Symptoms:  hospital f/u     Would the patient rather a call back or a response via [facility]? callback    Best Call Back Number:       Additional Information: please call Rachel schwartz's daughter to advise. Thanks!

## 2024-03-14 ENCOUNTER — TELEPHONE (OUTPATIENT)
Dept: FAMILY MEDICINE | Facility: CLINIC | Age: 79
End: 2024-03-14
Payer: MEDICARE

## 2024-03-14 ENCOUNTER — PATIENT OUTREACH (OUTPATIENT)
Dept: FAMILY MEDICINE | Facility: CLINIC | Age: 79
End: 2024-03-14
Payer: MEDICARE

## 2024-03-14 NOTE — TELEPHONE ENCOUNTER
Discharge Information     Discharge Date:   3/12/24    Primary Discharge Diagnosis:  Acute Hypoxemic respiratory failure      Discharge Summary:  Reviewed      Medication & Order Review     Were medication changes made or new medications added?   Yes  Diltiazem, plavix - added    amlodipine-stopped     pacemaker was inserted  If so, has the patient filled the prescriptions?  Yes     Was Home Health ordered? Yes- continued    If so, has Home Health contacted patient and/or initiated services?  Yes    Name of Home Health Agency?  [facility]    Durable Medical Equipment ordered?  No     If so, has the DME provider contacted patient and delivered equipment?  N/A    Follow Up               Any problems since discharge? No    How is the patient feeling since returning home?  Pt is doing ok. Just trying to get used to the pacemaker    Have you set up recommended follow up appointments?  (cardiology, surgery, etc.)appt with cardiology 3/15/24    Schedule Hospital Follow-up appointment within 7-14 days (preferably 7).      Notes:  fu appt scheduled with Dr. isiah Sheth

## 2024-03-14 NOTE — TELEPHONE ENCOUNTER
Contacted pt for [facility].   Her number was busy. I called her daughters number - had to leave a voice msg to return my call.

## 2024-03-15 ENCOUNTER — OFFICE VISIT (OUTPATIENT)
Dept: CARDIOLOGY | Facility: CLINIC | Age: 79
End: 2024-03-15
Payer: MEDICARE

## 2024-03-15 ENCOUNTER — LAB VISIT (OUTPATIENT)
Dept: LAB | Facility: HOSPITAL | Age: 79
End: 2024-03-15
Attending: INTERNAL MEDICINE
Payer: MEDICARE

## 2024-03-15 VITALS
HEIGHT: 63 IN | DIASTOLIC BLOOD PRESSURE: 70 MMHG | WEIGHT: 169.19 LBS | SYSTOLIC BLOOD PRESSURE: 130 MMHG | OXYGEN SATURATION: 99 % | HEART RATE: 60 BPM | BODY MASS INDEX: 29.98 KG/M2

## 2024-03-15 DIAGNOSIS — I35.0 NONRHEUMATIC AORTIC VALVE STENOSIS: ICD-10-CM

## 2024-03-15 DIAGNOSIS — I21.4 NSTEMI (NON-ST ELEVATED MYOCARDIAL INFARCTION): ICD-10-CM

## 2024-03-15 DIAGNOSIS — N17.9 AKI (ACUTE KIDNEY INJURY): ICD-10-CM

## 2024-03-15 DIAGNOSIS — I35.0 AORTIC VALVE STENOSIS, ETIOLOGY OF CARDIAC VALVE DISEASE UNSPECIFIED: ICD-10-CM

## 2024-03-15 DIAGNOSIS — I49.5 SSS (SICK SINUS SYNDROME): ICD-10-CM

## 2024-03-15 DIAGNOSIS — I15.1 HYPERTENSION SECONDARY TO OTHER RENAL DISORDERS: ICD-10-CM

## 2024-03-15 DIAGNOSIS — E78.2 MIXED HYPERLIPIDEMIA: ICD-10-CM

## 2024-03-15 DIAGNOSIS — I47.10 SVT (SUPRAVENTRICULAR TACHYCARDIA): ICD-10-CM

## 2024-03-15 DIAGNOSIS — I10 ESSENTIAL HYPERTENSION, BENIGN: ICD-10-CM

## 2024-03-15 DIAGNOSIS — I49.5 SSS (SICK SINUS SYNDROME): Primary | ICD-10-CM

## 2024-03-15 DIAGNOSIS — N18.32 STAGE 3B CHRONIC KIDNEY DISEASE: ICD-10-CM

## 2024-03-15 LAB
ANION GAP SERPL CALC-SCNC: 9 MMOL/L (ref 8–16)
BUN SERPL-MCNC: 29 MG/DL (ref 8–23)
CALCIUM SERPL-MCNC: 9.5 MG/DL (ref 8.7–10.5)
CHLORIDE SERPL-SCNC: 108 MMOL/L (ref 95–110)
CO2 SERPL-SCNC: 23 MMOL/L (ref 23–29)
CREAT SERPL-MCNC: 1.8 MG/DL (ref 0.5–1.4)
EST. GFR  (NO RACE VARIABLE): 28.5 ML/MIN/1.73 M^2
GLUCOSE SERPL-MCNC: 149 MG/DL (ref 70–110)
MAGNESIUM SERPL-MCNC: 1.8 MG/DL (ref 1.6–2.6)
POTASSIUM SERPL-SCNC: 3.8 MMOL/L (ref 3.5–5.1)
SODIUM SERPL-SCNC: 140 MMOL/L (ref 136–145)

## 2024-03-15 PROCEDURE — 80048 BASIC METABOLIC PNL TOTAL CA: CPT | Performed by: INTERNAL MEDICINE

## 2024-03-15 PROCEDURE — 3075F SYST BP GE 130 - 139MM HG: CPT | Mod: HCNC,CPTII,S$GLB, | Performed by: INTERNAL MEDICINE

## 2024-03-15 PROCEDURE — 99999 PR PBB SHADOW E&M-EST. PATIENT-LVL III: CPT | Mod: PBBFAC,HCNC,, | Performed by: INTERNAL MEDICINE

## 2024-03-15 PROCEDURE — 36415 COLL VENOUS BLD VENIPUNCTURE: CPT | Performed by: INTERNAL MEDICINE

## 2024-03-15 PROCEDURE — 1159F MED LIST DOCD IN RCRD: CPT | Mod: HCNC,CPTII,S$GLB, | Performed by: INTERNAL MEDICINE

## 2024-03-15 PROCEDURE — 1101F PT FALLS ASSESS-DOCD LE1/YR: CPT | Mod: HCNC,CPTII,S$GLB, | Performed by: INTERNAL MEDICINE

## 2024-03-15 PROCEDURE — 1126F AMNT PAIN NOTED NONE PRSNT: CPT | Mod: HCNC,CPTII,S$GLB, | Performed by: INTERNAL MEDICINE

## 2024-03-15 PROCEDURE — 1111F DSCHRG MED/CURRENT MED MERGE: CPT | Mod: HCNC,CPTII,S$GLB, | Performed by: INTERNAL MEDICINE

## 2024-03-15 PROCEDURE — 3078F DIAST BP <80 MM HG: CPT | Mod: HCNC,CPTII,S$GLB, | Performed by: INTERNAL MEDICINE

## 2024-03-15 PROCEDURE — 83735 ASSAY OF MAGNESIUM: CPT | Performed by: INTERNAL MEDICINE

## 2024-03-15 PROCEDURE — 1160F RVW MEDS BY RX/DR IN RCRD: CPT | Mod: HCNC,CPTII,S$GLB, | Performed by: INTERNAL MEDICINE

## 2024-03-15 PROCEDURE — 99214 OFFICE O/P EST MOD 30 MIN: CPT | Mod: 24,HCNC,S$GLB, | Performed by: INTERNAL MEDICINE

## 2024-03-15 PROCEDURE — 3288F FALL RISK ASSESSMENT DOCD: CPT | Mod: HCNC,CPTII,S$GLB, | Performed by: INTERNAL MEDICINE

## 2024-03-15 NOTE — PROGRESS NOTES
Patient ID:  Cynthia N Cushing is a 78 y.o. female who presents for follow-up of Hospital Follow Up and s/p pacemaker      Sick sinus syndrome.  Tachy-jimi syndrome  Status post DDD pacemaker placement  Acute hypoxemic respiratory failure- resolved  BRIDGETTE on CKD improving after hydration  Abnormal stress test  Acute heart failure EF 45-50 %  Hypertensive emergency  Moderate aortic stenosis  NSTEMI  HTN   DM2  PSVT    She was recently admitted to [facility] she was in acute pulmonary edema she was extremely hypertensive.  She was intubated.  She was diuresed her pulmonary status improved she developed acute kidney injury.  A myocardial perfusion scan revealed ischemia.  Because of her acute kidney injury no intervention was performed.  She was having tachy-jimi syndrome with episode of PSVT  up to 2.8 seconds pauses and sinus bradycardia rates in the 40s.  A DDD pacemaker was placed.  The doses of metoprolol was increased.  The patient was started on diltiazem with control of her PSVT.  While she was in the hospital the ACE inhibitors was discontinue due to the acute kidney injury.  The hydralazine and guanfacine were discontinued.  Her blood pressure was controlled on metoprolol and diltiazem.  The patient was discharged home on Tenex 2 mg q.h.s. as well as hydralazine.  According to the family member she is very sleepy no energy whatsoever.  The pacemaker site looks good instructions as of care of the site was given to the family.  Keep it clean and dry.  Her blood pressure was 140/62        Past Medical History:   Diagnosis Date    Allergy     Breast mass     Cataract     Diabetes mellitus     GERD (gastroesophageal reflux disease)     Hernia, hiatal     Hyperlipidemia     Hypertension     Kidney stone     Osteoarthritis     Renal insufficiency     Seasonal allergies     Sleep apnea     SSS (sick sinus syndrome)     SVT (supraventricular tachycardia)         Past Surgical History:   Procedure  "Laterality Date    A-V CARDIAC PACEMAKER INSERTION N/A 3/10/2024    Procedure: INSERTION, CARDIAC PACEMAKER, DUAL CHAMBER;  Surgeon: Brijesh Mendieta MD;  Location: [facility] CATH/EP LAB;  Service: Cardiology;  Laterality: N/A;    BREAST BIOPSY      BREAST CYST ASPIRATION      BREAST SURGERY      CERVICAL DISC SURGERY      EYE SURGERY      cataracts bilateral    HYSTERECTOMY            Current Outpatient Medications   Medication Instructions    ACCU-CHEK AMADOR PLUS TEST STRP Strp TEST BLOOD SUGAR FOUR TIMES DAILY    aspirin 81 mg, Oral, Daily    clopidogreL (PLAVIX) 75 mg, Oral, Daily    diltiaZEM (CARDIZEM CD) 120 mg, Oral, Daily    famotidine (PEPCID) 20 MG tablet TAKE 1 TABLET TWICE DAILY    ferrous sulfate 324 mg, Oral, Daily    furosemide (LASIX) 20 mg, Oral    guanFACINE (TENEX) 2 MG tablet TAKE 1 TABLET EVERY EVENING    hydrALAZINE (APRESOLINE) 50 mg, Oral, 3 times daily    lancets (ACCU-CHEK SOFTCLIX LANCETS) Misc TEST BLOOD SUGAR FOUR TIMES DAILY    LANTUS SOLOSTAR U-100 INSULIN glargine 100 units/mL SubQ pen ADMINISTER 51 UNITS UNDER THE SKIN AT NIGHT    metoprolol tartrate (LOPRESSOR) 50 mg, Oral, 2 times daily    pen needle, diabetic (BD ANNA 2ND GEN PEN NEEDLE) 32 gauge x 5/32" Ndle INJECT 1 NEEDLE INTO THE SKIN EVERY EVENING    rosuvastatin (CRESTOR) 10 mg, Oral, Nightly        Review of patient's allergies indicates:   Allergen Reactions    Biaxin [clarithromycin]     Prandin [repaglinide] Nausea And Vomiting    Amlodipine     Chlorphen-phenyltolox-pe-ppa     Ciprofloxacin Nausea And Vomiting    Omnicef [cefdinir]     Propoxyphene     Quinine Nausea And Vomiting        Review of Systems   Cardiovascular:  Negative for chest pain and irregular heartbeat.   Respiratory:  Negative for cough and shortness of breath.         Objective:     Vitals:    03/15/24 1052   BP: 130/70   BP Location: Left arm   Patient Position: Sitting   BP Method: Medium (Manual)   Pulse: 60   SpO2: 99%   Weight: 76.8 kg (169 lb " "3.3 oz)   Height: 5' 3" (1.6 m)       Physical Exam  Vitals and nursing note reviewed.   Constitutional:       Appearance: She is well-developed.   HENT:      Head: Normocephalic and atraumatic.   Eyes:      Conjunctiva/sclera: Conjunctivae normal.   Cardiovascular:      Rate and Rhythm: Normal rate and regular rhythm.      Heart sounds: Murmur heard.   Pulmonary:      Effort: Pulmonary effort is normal.      Breath sounds: Normal breath sounds.   Abdominal:      General: Bowel sounds are normal.      Palpations: Abdomen is soft.   Musculoskeletal:         General: Normal range of motion.   Skin:     General: Skin is warm and dry.   Neurological:      Mental Status: She is alert and oriented to person, place, and time.   Psychiatric:         Behavior: Behavior normal.         Thought Content: Thought content normal.         Judgment: Judgment normal.       CMP  Sodium   Date Value Ref Range Status   03/15/2024 140 136 - 145 mmol/L Final     Potassium   Date Value Ref Range Status   03/15/2024 3.8 3.5 - 5.1 mmol/L Final     Chloride   Date Value Ref Range Status   03/15/2024 108 95 - 110 mmol/L Final     CO2   Date Value Ref Range Status   03/15/2024 23 23 - 29 mmol/L Final     Glucose   Date Value Ref Range Status   03/15/2024 149 (H) 70 - 110 mg/dL Final     BUN   Date Value Ref Range Status   03/15/2024 29 (H) 8 - 23 mg/dL Final     Creatinine   Date Value Ref Range Status   03/15/2024 1.8 (H) 0.5 - 1.4 mg/dL Final     Calcium   Date Value Ref Range Status   03/15/2024 9.5 8.7 - 10.5 mg/dL Final     Total Protein   Date Value Ref Range Status   03/09/2024 6.0 6.0 - 8.4 g/dL Final     Albumin   Date Value Ref Range Status   03/09/2024 3.3 (L) 3.5 - 5.2 g/dL Final     Total Bilirubin   Date Value Ref Range Status   03/09/2024 0.4 0.1 - 1.0 mg/dL Final     Comment:     For infants and newborns, interpretation of results should be based  on gestational age, weight and in agreement with " clinical  observations.    Premature Infant recommended reference ranges:  Up to 24 hours.............<8.0 mg/dL  Up to 48 hours............<12.0 mg/dL  3-5 days..................<15.0 mg/dL  6-29 days.................<15.0 mg/dL       Alkaline Phosphatase   Date Value Ref Range Status   03/09/2024 59 55 - 135 U/L Final     AST   Date Value Ref Range Status   03/09/2024 19 10 - 40 U/L Final     ALT   Date Value Ref Range Status   03/09/2024 22 10 - 44 U/L Final     Anion Gap   Date Value Ref Range Status   03/15/2024 9 8 - 16 mmol/L Final     eGFR if    Date Value Ref Range Status   07/15/2022 38.5 (A) >60 mL/min/1.73 m^2 Final     eGFR if non    Date Value Ref Range Status   07/15/2022 33.4 (A) >60 mL/min/1.73 m^2 Final     Comment:     Calculation used to obtain the estimated glomerular filtration  rate (eGFR) is the CKD-EPI equation.         BMP  Lab Results   Component Value Date     03/15/2024    K 3.8 03/15/2024     03/15/2024    CO2 23 03/15/2024    BUN 29 (H) 03/15/2024    CREATININE 1.8 (H) 03/15/2024    CALCIUM 9.5 03/15/2024    ANIONGAP 9 03/15/2024    ESTGFRAFRICA 38.5 (A) 07/15/2022    EGFRNONAA 33.4 (A) 07/15/2022      BNP  @LABRCNTIP(BNP,BNPTRIAGEBLO)@   Lab Results   Component Value Date    CHOL 110 (L) 08/17/2023    CHOL 114 (L) 02/07/2022    CHOL 152 04/19/2021     Lab Results   Component Value Date    HDL 33 (L) 08/17/2023    HDL 34 (L) 02/07/2022    HDL 36 (L) 04/19/2021     Lab Results   Component Value Date    LDLCALC 9.2 (L) 08/17/2023    LDLCALC 39.2 (L) 02/07/2022    LDLCALC 41.6 (L) 04/19/2021     Lab Results   Component Value Date    TRIG 339 (H) 08/17/2023    TRIG 204 (H) 02/07/2022    TRIG 372 (H) 04/19/2021     Lab Results   Component Value Date    CHOLHDL 30.0 08/17/2023    CHOLHDL 29.8 02/07/2022    CHOLHDL 23.7 04/19/2021      Lab Results   Component Value Date    TSH 2.299 03/10/2024     Lab Results   Component Value Date    HGBA1C 7.7 (H)  03/07/2024     Lab Results   Component Value Date    WBC 8.27 03/10/2024    HGB 8.5 (L) 03/10/2024    HCT 27.1 (L) 03/10/2024    MCV 82 03/10/2024     03/10/2024         Results for orders placed during the hospital encounter of 03/03/24    Echo Saline Bubble? No    Interpretation Summary    Left Ventricle: The left ventricle is normal in size. Mildly increased wall thickness. There is mild concentric hypertrophy. Mild global hypokinesis present. There is mildly reduced systolic function with a visually estimated ejection fraction of 45 - 50%. There is normal diastolic function.    Right Ventricle: Normal right ventricular cavity size. Wall thickness is normal. Right ventricle wall motion  is normal. Systolic function is normal.    Left Atrium: Left atrium is moderately dilated.    Aortic Valve: The aortic valve is a trileaflet valve. There is moderate aortic valve sclerosis. There is mild annular calcification present. Moderately restricted motion. There is moderate to severe stenosis. Aortic valve area by VTI is 0.99 cm². Aortic valve peak velocity is 2.40 m/s. Mean gradient is 13 mmHg. The dimensionless index is 0.31.    Tricuspid Valve: The tricuspid valve is structurally normal. There is normal leaflet mobility. There is mild regurgitation with a centrally directed jet.    Pulmonary Artery: There is mild to moderate pulmonary hypertension. The estimated pulmonary artery systolic pressure is 45 mmHg.    IVC/SVC: Intermediate venous pressure at 8 mmHg.    Pericardium: There is a trivial circumferential effusion. Pericardial effusion is echolucent. No indication of cardiac tamponade.     No results found for this or any previous visit.         Assessment:       Aortic stenosis  Stable her aortic peak velocity is 2.4 m/sec    NSTEMI (non-ST elevated myocardial infarction)  With positive myocardial perfusion scan.  At sometime a coronary arteriogram will be entertained once her renal function improves    SSS  (sick sinus syndrome)  Status post DDD pacemaker placement a [employer] unit    SVT (supraventricular tachycardia)  Controlled on metoprolol and diltiazem    BRIDGETTE (acute kidney injury)  Acute kidney injury in top off chronic kidney disease    Hyperlipidemia  On rosuvastatin 10 mg q.h.s.    Hypertension secondary to other renal disorders  She has significant hypertension.  Will adjust her medications.  Hold hydralazine if the systolic pressure is less than 120 she is to continue the diltiazem and metoprolol.  She is to decrease the doses of guanfacine to 1 mg       Plan:       Decrease the Tenex to 1 mg p.o. q.h.s.  Obtain a BNP today  If systolic blood pressure less than 120 hold the hydralazine  Return to the office in 1 month  Continue metoprolol and diltiazem to control her PSVT.

## 2024-03-16 PROBLEM — I15.1 HYPERTENSION SECONDARY TO OTHER RENAL DISORDERS: Status: ACTIVE | Noted: 2024-03-16

## 2024-03-16 NOTE — ASSESSMENT & PLAN NOTE
With positive myocardial perfusion scan.  At sometime a coronary arteriogram will be entertained once her renal function improves

## 2024-03-16 NOTE — ASSESSMENT & PLAN NOTE
She has significant hypertension.  Will adjust her medications.  Hold hydralazine if the systolic pressure is less than 120 she is to continue the diltiazem and metoprolol.  She is to decrease the doses of guanfacine to 1 mg

## 2024-03-17 NOTE — PROGRESS NOTES
"  SUBJECTIVE:    Patient ID: Cynthia N Cushing is a 78 y.o. female.    Chief Complaint: No chief complaint on file.    HPI        Last 3 sets of Vitals        3/3/2024    11:45 AM 3/3/2024     2:00 PM 3/15/2024    10:52 AM   Vitals - 1 value per visit   SYSTOLIC  144 130   DIASTOLIC  72 70   Pulse   60   Resp 22     SPO2   99 %   Weight (lb)   169.2   Weight (kg)   76.75   Height   5' 3" (1.6 m)   BMI (Calculated)   30   Pain Score   Zero          Lab Visit on 03/15/2024   Component Date Value Ref Range Status    Magnesium 03/15/2024 1.8  1.6 - 2.6 mg/dL Final    Sodium 03/15/2024 140  136 - 145 mmol/L Final    Potassium 03/15/2024 3.8  3.5 - 5.1 mmol/L Final    Chloride 03/15/2024 108  95 - 110 mmol/L Final    CO2 03/15/2024 23  23 - 29 mmol/L Final    Glucose 03/15/2024 149 (H)  70 - 110 mg/dL Final    BUN 03/15/2024 29 (H)  8 - 23 mg/dL Final    Creatinine 03/15/2024 1.8 (H)  0.5 - 1.4 mg/dL Final    Calcium 03/15/2024 9.5  8.7 - 10.5 mg/dL Final    Anion Gap 03/15/2024 9  8 - 16 mmol/L Final    eGFR 03/15/2024 28.5 (A)  >60 mL/min/1.73 m^2 Final   No results displayed because visit has over 200 results.      Hospital Outpatient Visit on 10/06/2023   Component Date Value Ref Range Status    BSA 10/06/2023 1.87  m2 Final    LVOT stroke volume 10/06/2023 98.91  cm3 Final    LVIDd 10/06/2023 5.06  3.5 - 6.0 cm Final    LV Systolic Volume 10/06/2023 68.80  mL Final    LV Systolic Volume Index 10/06/2023 37.8  mL/m2 Final    LVIDs 10/06/2023 3.97  2.1 - 4.0 cm Final    LV Diastolic Volume 10/06/2023 122.00  mL Final    LV Diastolic Volume Index 10/06/2023 67.03  mL/m2 Final    IVS 10/06/2023 1.31 (A)  0.6 - 1.1 cm Final    LVOT diameter 10/06/2023 2.00  cm Final    LVOT area 10/06/2023 3.1  cm2 Final    FS 10/06/2023 22 (A)  28 - 44 % Final    Left Ventricle Relative Wall Thick* 10/06/2023 0.52  cm Final    Posterior Wall 10/06/2023 1.32 (A)  0.6 - 1.1 cm Final    LV mass 10/06/2023 271.17  g Final    LV Mass Index " 10/06/2023 149  g/m2 Final    MV Peak E Garrison 10/06/2023 1.06  m/s Final    TDI LATERAL 10/06/2023 0.13  m/s Final    TDI SEPTAL 10/06/2023 0.10  m/s Final    E/E' ratio 10/06/2023 9.22  m/s Final    MV Peak A Garrison 10/06/2023 0.67  m/s Final    TR Max Garrison 10/06/2023 2.50  m/s Final    E/A ratio 10/06/2023 1.58   Final    IVRT 10/06/2023 84.00  msec Final    E wave deceleration time 10/06/2023 179.00  msec Final    LV SEPTAL E/E' RATIO 10/06/2023 10.60  m/s Final    LV LATERAL E/E' RATIO 10/06/2023 8.15  m/s Final    LVOT peak garrison 10/06/2023 1.14  m/s Final    Left Ventricular Outflow Tract Jodi* 10/06/2023 0.78  cm/s Final    Left Ventricular Outflow Tract Jodi* 10/06/2023 3.00  mmHg Final    LA size 10/06/2023 4.90  cm Final    RVDD 10/06/2023 3.40  cm Final    AV regurgitation pressure 1/2 time 10/06/2023 908  ms Final    AR Max Garrison 10/06/2023 2.82  m/s Final    AV mean gradient 10/06/2023 16  mmHg Final    AV peak gradient 10/06/2023 29  mmHg Final    Ao peak garrison 10/06/2023 2.67  m/s Final    Ao VTI 10/06/2023 71.10  cm Final    LVOT peak VTI 10/06/2023 31.50  cm Final    AV valve area 10/06/2023 1.39  cm² Final    AV Velocity Ratio 10/06/2023 0.43   Final    AV index (prosthetic) 10/06/2023 0.44   Final    ROSALINDA by Velocity Ratio 10/06/2023 1.34  cm² Final    MV stenosis pressure 1/2 time 10/06/2023 71.00  ms Final    MV valve area p 1/2 method 10/06/2023 3.10  cm2 Final    Triscuspid Valve Regurgitation Pea* 10/06/2023 25  mmHg Final    PV PEAK VELOCITY 10/06/2023 1.43  m/s Final    PV peak gradient 10/06/2023 8  mmHg Final    Pulmonary Valve Mean Velocity 10/06/2023 0.96  m/s Final    Ao root annulus 10/06/2023 2.80  cm Final    Mean e' 10/06/2023 0.12  m/s Final    ZLVIDS 10/06/2023 1.92   Final    ZLVIDD 10/06/2023 0.04   Final    AORTIC VALVE CUSP SEPERATION 10/06/2023 1.20  cm Final    TV resting pulmonary artery pressu* 10/06/2023 28  mmHg Final    RV TB RVSP 10/06/2023 6  mmHg Final    Est. RA pres 10/06/2023 3   mmHg Final    EF 10/06/2023 53  % Final   Patient Outreach on 09/21/2023   Component Date Value Ref Range Status    Left Eye DM Retinopathy 09/20/2023 Negative   Final    Right Eye DM Retinopathy 09/20/2023 Negative   Final   Lab Visit on 08/28/2023   Component Date Value Ref Range Status    Hemoglobin A1C 08/28/2023 7.5 (H)  4.5 - 6.2 % Final    Estimated Avg Glucose 08/28/2023 169 (H)  68 - 131 mg/dL Final    Sodium 08/28/2023 143  136 - 145 mmol/L Final    Potassium 08/28/2023 3.6  3.5 - 5.1 mmol/L Final    Chloride 08/28/2023 110  95 - 110 mmol/L Final    CO2 08/28/2023 23  23 - 29 mmol/L Final    Glucose 08/28/2023 108  70 - 110 mg/dL Final    BUN 08/28/2023 19  8 - 23 mg/dL Final    Creatinine 08/28/2023 1.5 (H)  0.5 - 1.4 mg/dL Final    Calcium 08/28/2023 9.5  8.7 - 10.5 mg/dL Final    Anion Gap 08/28/2023 10  8 - 16 mmol/L Final    eGFR 08/28/2023 35.4 (A)  >60 mL/min/1.73 m^2 Final   Lab Visit on 08/17/2023   Component Date Value Ref Range Status    TSH 08/17/2023 3.890  0.340 - 5.600 uIU/mL Final    WBC 08/17/2023 11.56  3.90 - 12.70 K/uL Final    RBC 08/17/2023 3.57 (L)  4.00 - 5.40 M/uL Final    Hemoglobin 08/17/2023 9.5 (L)  12.0 - 16.0 g/dL Final    Hematocrit 08/17/2023 29.7 (L)  37.0 - 48.5 % Final    MCV 08/17/2023 83  82 - 98 fL Final    MCH 08/17/2023 26.6 (L)  27.0 - 31.0 pg Final    MCHC 08/17/2023 32.0  32.0 - 36.0 g/dL Final    RDW 08/17/2023 14.9 (H)  11.5 - 14.5 % Final    Platelets 08/17/2023 238  150 - 450 K/uL Final    MPV 08/17/2023 11.3  9.2 - 12.9 fL Final    Immature Granulocytes 08/17/2023 0.5  0.0 - 0.5 % Final    Gran # (ANC) 08/17/2023 6.3  1.8 - 7.7 K/uL Final    Immature Grans (Abs) 08/17/2023 0.06 (H)  0.00 - 0.04 K/uL Final    Lymph # 08/17/2023 3.2  1.0 - 4.8 K/uL Final    Mono # 08/17/2023 1.4 (H)  0.3 - 1.0 K/uL Final    Eos # 08/17/2023 0.5  0.0 - 0.5 K/uL Final    Baso # 08/17/2023 0.13  0.00 - 0.20 K/uL Final    nRBC 08/17/2023 0  0 /100 WBC Final    Gran % 08/17/2023  54.6  38.0 - 73.0 % Final    Lymph % 08/17/2023 27.6  18.0 - 48.0 % Final    Mono % 08/17/2023 11.7  4.0 - 15.0 % Final    Eosinophil % 08/17/2023 4.5  0.0 - 8.0 % Final    Basophil % 08/17/2023 1.1  0.0 - 1.9 % Final    Differential Method 08/17/2023 Automated   Final    Sodium 08/17/2023 139  136 - 145 mmol/L Final    Potassium 08/17/2023 4.2  3.5 - 5.1 mmol/L Final    Chloride 08/17/2023 107  95 - 110 mmol/L Final    CO2 08/17/2023 21 (L)  23 - 29 mmol/L Final    Glucose 08/17/2023 131 (H)  70 - 110 mg/dL Final    BUN 08/17/2023 26 (H)  8 - 23 mg/dL Final    Creatinine 08/17/2023 2.1 (H)  0.5 - 1.4 mg/dL Final    Calcium 08/17/2023 9.4  8.7 - 10.5 mg/dL Final    Total Protein 08/17/2023 7.1  6.0 - 8.4 g/dL Final    Albumin 08/17/2023 4.1  3.5 - 5.2 g/dL Final    Total Bilirubin 08/17/2023 0.7  0.1 - 1.0 mg/dL Final    Alkaline Phosphatase 08/17/2023 57  55 - 135 U/L Final    AST 08/17/2023 33  10 - 40 U/L Final    ALT 08/17/2023 36  10 - 44 U/L Final    eGFR 08/17/2023 23.7 (A)  >60 mL/min/1.73 m^2 Final    Anion Gap 08/17/2023 11  8 - 16 mmol/L Final    Cholesterol 08/17/2023 110 (L)  120 - 199 mg/dL Final    Triglycerides 08/17/2023 339 (H)  30 - 150 mg/dL Final    HDL 08/17/2023 33 (L)  40 - 75 mg/dL Final    LDL Cholesterol 08/17/2023 9.2 (L)  63.0 - 159.0 mg/dL Final    HDL/Cholesterol Ratio 08/17/2023 30.0  20.0 - 50.0 % Final    Total Cholesterol/HDL Ratio 08/17/2023 3.3  2.0 - 5.0 Final    Non-HDL Cholesterol 08/17/2023 77  mg/dL Final    Hemoglobin A1C 08/17/2023 6.7 (H)  4.5 - 6.2 % Final    Estimated Avg Glucose 08/17/2023 146 (H)  68 - 131 mg/dL Final    Microalbumin, Urine 08/17/2023 1038.1 (H)  <19.9 ug/mL Final    Creatinine, Urine 08/17/2023 160.0  15.0 - 325.0 mg/dL Final    Microalb/Creat Ratio 08/17/2023 648.8 (H)  0.0 - 30.0 ug/mg Final         Past Medical History:   Diagnosis Date    Allergy     Breast mass     Cataract     Diabetes mellitus     GERD (gastroesophageal reflux disease)      Hernia, hiatal     Hyperlipidemia     Hypertension     Kidney stone     Osteoarthritis     Renal insufficiency     Seasonal allergies     Sleep apnea     SSS (sick sinus syndrome)     SVT (supraventricular tachycardia)      Past Surgical History:   Procedure Laterality Date    A-V CARDIAC PACEMAKER INSERTION N/A 3/10/2024    Procedure: INSERTION, CARDIAC PACEMAKER, DUAL CHAMBER;  Surgeon: Brijesh Mendieta MD;  [facility] CATH[facility]EP LAB;  Service: Cardiology;  Laterality: N/A;    BREAST BIOPSY      BREAST CYST ASPIRATION      BREAST SURGERY      CERVICAL DISC SURGERY      EYE SURGERY      cataracts bilateral    HYSTERECTOMY       Family History   Problem Relation Age of Onset    Stroke Mother     Cancer Mother     Breast cancer Mother     Cancer Father     Breast cancer Maternal Aunt        Marital Status:   Alcohol History:  reports no history of alcohol use.  Tobacco History:  reports that she has never smoked. She has never used smokeless tobacco.  Drug History:  reports no history of drug use.    Review of patient's allergies indicates:   Allergen Reactions    Biaxin [clarithromycin]     Prandin [repaglinide] Nausea And Vomiting    Amlodipine     Chlorphen-phenyltolox-pe-ppa     Ciprofloxacin Nausea And Vomiting    Omnicef [cefdinir]     Propoxyphene     Quinine Nausea And Vomiting       Current Outpatient Medications:     ACCU-CHEK AMADOR PLUS TEST STRP Strp, TEST BLOOD SUGAR FOUR TIMES DAILY, Disp: 400 strip, Rfl: 10    aspirin 81 MG Chew, Take 81 mg by mouth once daily., Disp: , Rfl:     clopidogreL (PLAVIX) 75 mg tablet, Take 1 tablet (75 mg total) by mouth once daily., Disp: 30 tablet, Rfl: 0    diltiaZEM (CARDIZEM CD) 120 MG Cp24, Take 1 capsule (120 mg total) by mouth once daily., Disp: 30 capsule, Rfl: 0    famotidine (PEPCID) 20 MG tablet, TAKE 1 TABLET TWICE DAILY (Patient taking differently: Take 20 mg by mouth 2 (two) times daily.), Disp: 180 tablet, Rfl: 2    ferrous sulfate 324 mg (65 mg  "iron) TbEC, Take 1 tablet (324 mg total) by mouth once daily., Disp: 30 tablet, Rfl: 0    furosemide (LASIX) 20 MG tablet, TAKE 1 TABLET(20 MG) BY MOUTH EVERY DAY (Patient taking differently: Take 20 mg by mouth once daily.), Disp: 30 tablet, Rfl: 0    guanFACINE (TENEX) 2 MG tablet, TAKE 1 TABLET EVERY EVENING (Patient taking differently: Take 2 mg by mouth nightly.), Disp: 90 tablet, Rfl: 3    hydrALAZINE (APRESOLINE) 50 MG tablet, Take 1 tablet (50 mg total) by mouth 3 (three) times daily., Disp: 90 tablet, Rfl: 11    lancets (ACCU-CHEK SOFTCLIX LANCETS) Misc, TEST BLOOD SUGAR FOUR TIMES DAILY, Disp: 400 each, Rfl: 3    LANTUS SOLOSTAR U-100 INSULIN glargine 100 units/mL SubQ pen, ADMINISTER 51 UNITS UNDER THE SKIN AT NIGHT (Patient taking differently: Inject 51 Units into the skin every evening. ADMINISTER 51 UNITS UNDER THE SKIN AT NIGHT), Disp: 15 mL, Rfl: 5    metoprolol tartrate (LOPRESSOR) 50 MG tablet, Take 1 tablet (50 mg total) by mouth 2 (two) times daily., Disp: 60 tablet, Rfl: 0    pen needle, diabetic (BD ANNA 2ND GEN PEN NEEDLE) 32 gauge x 5/32" Ndle, INJECT 1 NEEDLE INTO THE SKIN EVERY EVENING, Disp: 100 each, Rfl: 5    rosuvastatin (CRESTOR) 10 MG tablet, Take 1 tablet (10 mg total) by mouth every evening., Disp: 90 tablet, Rfl: 2    Review of Systems       Objective:          3/3/2024     8:20 AM 3/3/2024    11:08 AM 3/3/2024    11:15 AM 3/3/2024    11:45 AM 3/3/2024     2:00 PM 3/15/2024    10:52 AM   Vitals - 1 value per visit   SYSTOLIC 254    144 130   DIASTOLIC 136    72 70   Pulse      60   Temp  97.2 °F (36.2 °C)       Resp    22     SPO2      99 %   Weight (lb)      169.2   Weight (kg)      76.75   Height   5' 3" (1.6 m)   5' 3" (1.6 m)   BMI (Calculated)   29.6   30   Pain Score      Zero   (    Physical Exam      Assessment:       No diagnosis found.     Plan:       There are no diagnoses linked to this encounter.  No follow-ups on file.        3/17/2024 Teri Adams M.D.         "

## 2024-03-17 NOTE — PROGRESS NOTES
SUBJECTIVE:    Patient ID: Cynthia N Cushing is a 78 y.o. female.    Chief Complaint: Hospital Follow Up (Htn and had pacemaker inserted)    HPI    Admit Date: 3-3-24  Discharge Date: 3-12-24    78 year old female with history of HFpEF, was admitted to ICU with fluid overload secondary to decompensated heart failure and HTN emergency. Patient was intubated on admission. On nitro gtt for brief period. Patient was diuresed. Patient was extubated next day and weaned to RA. Had elevated troponins as well consistent with NSTEMI and was on heparin gtt for 48hr.  Echo showed EF 45-50%, moderate to severe aortic stenosis, and PASP 45. Developed BRIDGETTE and hyperkalemia. Hyperkalemia resolved with lokelma. Nephrology followed. Given gentle IVF. Stress testing showed a reversible area of ischemia and cardiology followed considering angiogram but was limited by renal function. Had run of SVT needing IV metoprolol. 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 Was continued on metoprolol 50 mg bid, but had recurrent psvt. Diltiazem 120 mg daily added to regimen.     Discharge Facility: Hospital   New Prescriptions filled after discharge: yes       clopidogreL 75 mg tablet  Commonly known as: PLAVIX  Take 1 tablet (75 mg total) by mouth once daily.  Start taking on: March 13, 2024      diltiaZEM 120 MG Cp24  Commonly known as: CARDIZEM CD  Take 1 capsule (120 mg total) by mouth once daily.  Start taking on: March 13, 2024      ferrous sulfate 324 mg (65 mg iron) Tbec  Take 1 tablet (324 mg total) by mouth once daily.      metoprolol tartrate 50 MG tablet  Commonly known as: LOPRESSOR  Take 1 tablet (50 mg total) by mouth 2 (two) times daily.                CHANGE how you take these medications       guanFACINE 2 MG tablet  Commonly known as: TENEX  TAKE 1 TABLET EVERY EVENING  What changed: when to take this      LANTUS SOLOSTAR U-100 INSULIN glargine 100 units/mL SubQ pen  Generic drug: insulin  ADMINISTER 51 UNITS  "UNDER THE SKIN AT NIGHT  What changed: See the new instructions.                CONTINUE taking these medications       ACCU-CHEK AMADOR PLUS TEST STRP Strp  Generic drug: blood sugar diagnostic  TEST BLOOD SUGAR FOUR TIMES DAILY      aspirin 81 MG Chew  Take 81 mg by mouth once daily.      famotidine 20 MG tablet  Commonly known as: PEPCID  TAKE 1 TABLET TWICE DAILY      furosemide 20 MG tablet  Commonly known as: LASIX  TAKE 1 TABLET(20 MG) BY MOUTH EVERY DAY      hydrALAZINE 50 MG tablet  Commonly known as: APRESOLINE  Take 1 tablet (50 mg total) by mouth 3 (three) times daily.      lancets Misc  Commonly known as: ACCU-CHEK SOFTCLIX LANCETS  TEST BLOOD SUGAR FOUR TIMES DAILY      pen needle, diabetic 32 gauge x 5/32" Ndle  Commonly known as: BD ANNA 2ND GEN PEN NEEDLE  INJECT 1 NEEDLE INTO THE SKIN EVERY EVENING      rosuvastatin 10 MG tablet  Commonly known as: CRESTOR  Take 1 tablet (10 mg total) by mouth every evening.                STOP taking these medications       amLODIPine 10 MG tablet  Commonly known as: NORVASC      labetaloL 100 MG tablet  Commonly known as: NORMODYNE      olmesartan 40 MG tablet  Commonly known as: BENICAR               Discharge summary reviewed:  yes  Diagnostic tests reviewed/disposition: I have reviewed all completed as well as pending diagnostic tests at the time of discharge  Disease/illness education: discussed with patient-SSS  Follow up appointments scheduled:  no              with Cardiology  PCP  Follow up labs/tests ordered:   no  Home Health ordered on discharge: Patient has home health established at [facility]  Home Health company name: [facility]  Establishment or re-establishment of referral orders for community resources: No other necessary community resources  DME ordered at discharge:   yes  How patient is feeling since discharge from the hospital?  She is  feeling better but feels "woozy-glucoses are not ideally controlled    Diabetes-she checks it at night before " bedtime         Lab Visit on 03/15/2024   Component Date Value Ref Range Status    Magnesium 03/15/2024 1.8  1.6 - 2.6 mg/dL Final    Sodium 03/15/2024 140  136 - 145 mmol/L Final    Potassium 03/15/2024 3.8  3.5 - 5.1 mmol/L Final    Chloride 03/15/2024 108  95 - 110 mmol/L Final    CO2 03/15/2024 23  23 - 29 mmol/L Final    Glucose 03/15/2024 149 (H)  70 - 110 mg/dL Final    BUN 03/15/2024 29 (H)  8 - 23 mg/dL Final    Creatinine 03/15/2024 1.8 (H)  0.5 - 1.4 mg/dL Final    Calcium 03/15/2024 9.5  8.7 - 10.5 mg/dL Final    Anion Gap 03/15/2024 9  8 - 16 mmol/L Final    eGFR 03/15/2024 28.5 (A)  >60 mL/min/1.73 m^2 Final   No results displayed because visit has over 200 results.      Hospital Outpatient Visit on 10/06/2023   Component Date Value Ref Range Status    BSA 10/06/2023 1.87  m2 Final    LVOT stroke volume 10/06/2023 98.91  cm3 Final    LVIDd 10/06/2023 5.06  3.5 - 6.0 cm Final    LV Systolic Volume 10/06/2023 68.80  mL Final    LV Systolic Volume Index 10/06/2023 37.8  mL/m2 Final    LVIDs 10/06/2023 3.97  2.1 - 4.0 cm Final    LV Diastolic Volume 10/06/2023 122.00  mL Final    LV Diastolic Volume Index 10/06/2023 67.03  mL/m2 Final    IVS 10/06/2023 1.31 (A)  0.6 - 1.1 cm Final    LVOT diameter 10/06/2023 2.00  cm Final    LVOT area 10/06/2023 3.1  cm2 Final    FS 10/06/2023 22 (A)  28 - 44 % Final    Left Ventricle Relative Wall Thick* 10/06/2023 0.52  cm Final    Posterior Wall 10/06/2023 1.32 (A)  0.6 - 1.1 cm Final    LV mass 10/06/2023 271.17  g Final    LV Mass Index 10/06/2023 149  g/m2 Final    MV Peak E Garrison 10/06/2023 1.06  m/s Final    TDI LATERAL 10/06/2023 0.13  m/s Final    TDI SEPTAL 10/06/2023 0.10  m/s Final    E/E' ratio 10/06/2023 9.22  m/s Final    MV Peak A Garrison 10/06/2023 0.67  m/s Final    TR Max Garrison 10/06/2023 2.50  m/s Final    E/A ratio 10/06/2023 1.58   Final    IVRT 10/06/2023 84.00  msec Final    E wave deceleration time 10/06/2023 179.00  msec Final    LV SEPTAL E/E' RATIO  10/06/2023 10.60  m/s Final    LV LATERAL E/E' RATIO 10/06/2023 8.15  m/s Final    LVOT peak abimael 10/06/2023 1.14  m/s Final    Left Ventricular Outflow Tract Jodi* 10/06/2023 0.78  cm/s Final    Left Ventricular Outflow Tract Jodi* 10/06/2023 3.00  mmHg Final    LA size 10/06/2023 4.90  cm Final    RVDD 10/06/2023 3.40  cm Final    AV regurgitation pressure 1/2 time 10/06/2023 908  ms Final    AR Max Abimael 10/06/2023 2.82  m/s Final    AV mean gradient 10/06/2023 16  mmHg Final    AV peak gradient 10/06/2023 29  mmHg Final    Ao peak abimael 10/06/2023 2.67  m/s Final    Ao VTI 10/06/2023 71.10  cm Final    LVOT peak VTI 10/06/2023 31.50  cm Final    AV valve area 10/06/2023 1.39  cm² Final    AV Velocity Ratio 10/06/2023 0.43   Final    AV index (prosthetic) 10/06/2023 0.44   Final    ROSALINDA by Velocity Ratio 10/06/2023 1.34  cm² Final    MV stenosis pressure 1/2 time 10/06/2023 71.00  ms Final    MV valve area p 1/2 method 10/06/2023 3.10  cm2 Final    Triscuspid Valve Regurgitation Pea* 10/06/2023 25  mmHg Final    PV PEAK VELOCITY 10/06/2023 1.43  m/s Final    PV peak gradient 10/06/2023 8  mmHg Final    Pulmonary Valve Mean Velocity 10/06/2023 0.96  m/s Final    Ao root annulus 10/06/2023 2.80  cm Final    Mean e' 10/06/2023 0.12  m/s Final    ZLVIDS 10/06/2023 1.92   Final    ZLVIDD 10/06/2023 0.04   Final    AORTIC VALVE CUSP SEPERATION 10/06/2023 1.20  cm Final    TV resting pulmonary artery pressu* 10/06/2023 28  mmHg Final    RV TB RVSP 10/06/2023 6  mmHg Final    Est. RA pres 10/06/2023 3  mmHg Final    EF 10/06/2023 53  % Final       Past Medical History:   Diagnosis Date    Allergy     Breast mass     Cataract     Diabetes mellitus     GERD (gastroesophageal reflux disease)     Hernia, hiatal     Hyperlipidemia     Hypertension     Kidney stone     Osteoarthritis     Renal insufficiency     Seasonal allergies     Sleep apnea     SSS (sick sinus syndrome)     SVT (supraventricular tachycardia)      Past Surgical  History:   Procedure Laterality Date    A-V CARDIAC PACEMAKER INSERTION N/A 3/10/2024    Procedure: INSERTION, CARDIAC PACEMAKER, DUAL CHAMBER;  Surgeon: Brijesh Mendieta MD;  Location: [facility] CATH/EP LAB;  Service: Cardiology;  Laterality: N/A;    BREAST BIOPSY      BREAST CYST ASPIRATION      BREAST SURGERY      CERVICAL DISC SURGERY      EYE SURGERY      cataracts bilateral    HYSTERECTOMY       Family History   Problem Relation Age of Onset    Stroke Mother     Cancer Mother     Breast cancer Mother     Cancer Father     Breast cancer Maternal Aunt        Marital Status:   Alcohol History:  reports no history of alcohol use.  Tobacco History:  reports that she has never smoked. She has never used smokeless tobacco.  Drug History:  reports no history of drug use.    Review of patient's allergies indicates:   Allergen Reactions    Biaxin [clarithromycin]     Prandin [repaglinide] Nausea And Vomiting    Amlodipine     Chlorphen-phenyltolox-pe-ppa     Ciprofloxacin Nausea And Vomiting    Omnicef [cefdinir]     Propoxyphene     Quinine Nausea And Vomiting       Current Outpatient Medications:     ACCU-CHEK AMADOR PLUS TEST STRP Strp, TEST BLOOD SUGAR FOUR TIMES DAILY, Disp: 400 strip, Rfl: 10    aspirin 81 MG Chew, Take 81 mg by mouth once daily., Disp: , Rfl:     cetirizine (ZYRTEC) 10 MG tablet, Take 10 mg by mouth once daily., Disp: , Rfl:     clopidogreL (PLAVIX) 75 mg tablet, Take 1 tablet (75 mg total) by mouth once daily., Disp: 30 tablet, Rfl: 0    diltiaZEM (CARDIZEM CD) 120 MG Cp24, Take 1 capsule (120 mg total) by mouth once daily., Disp: 30 capsule, Rfl: 0    famotidine (PEPCID) 20 MG tablet, TAKE 1 TABLET TWICE DAILY (Patient taking differently: Take 20 mg by mouth 2 (two) times daily.), Disp: 180 tablet, Rfl: 2    ferrous sulfate 324 mg (65 mg iron) TbEC, Take 1 tablet (324 mg total) by mouth once daily., Disp: 30 tablet, Rfl: 0    furosemide (LASIX) 20 MG tablet, TAKE 1 TABLET(20 MG) BY MOUTH  "EVERY DAY (Patient taking differently: Take 20 mg by mouth once daily.), Disp: 30 tablet, Rfl: 0    guanFACINE (TENEX) 2 MG tablet, TAKE 1 TABLET EVERY EVENING (Patient taking differently: Take 1 mg by mouth nightly.), Disp: 90 tablet, Rfl: 3    hydrALAZINE (APRESOLINE) 50 MG tablet, Take 1 tablet (50 mg total) by mouth 3 (three) times daily., Disp: 90 tablet, Rfl: 11    lancets (ACCU-CHEK SOFTCLIX LANCETS) Misc, TEST BLOOD SUGAR FOUR TIMES DAILY, Disp: 400 each, Rfl: 3    LANTUS SOLOSTAR U-100 INSULIN glargine 100 units/mL SubQ pen, ADMINISTER 51 UNITS UNDER THE SKIN AT NIGHT (Patient taking differently: Inject 55 Units into the skin every evening.), Disp: 15 mL, Rfl: 5    metoprolol tartrate (LOPRESSOR) 50 MG tablet, Take 1 tablet (50 mg total) by mouth 2 (two) times daily., Disp: 60 tablet, Rfl: 0    pen needle, diabetic (BD ANNA 2ND GEN PEN NEEDLE) 32 gauge x 5/32" Ndle, INJECT 1 NEEDLE INTO THE SKIN EVERY EVENING, Disp: 100 each, Rfl: 5    rosuvastatin (CRESTOR) 10 MG tablet, Take 1 tablet (10 mg total) by mouth every evening., Disp: 90 tablet, Rfl: 2    fluconazole (DIFLUCAN) 50 MG Tab, One a day for three days, Disp: 3 tablet, Rfl: 0    Review of Systems   Constitutional:  Negative for appetite change, chills, diaphoresis, fatigue, fever and unexpected weight change.   HENT:  Negative for congestion, ear pain, hearing loss, nosebleeds, postnasal drip, sinus pressure, sinus pain, sneezing, sore throat, trouble swallowing and voice change.    Eyes:  Negative for photophobia, pain, itching and visual disturbance.   Respiratory:  Negative for apnea, cough, chest tightness, shortness of breath, wheezing and stridor.    Cardiovascular:  Negative for chest pain, palpitations and leg swelling.   Gastrointestinal:  Positive for constipation (miralax). Negative for abdominal distention, abdominal pain, blood in stool, diarrhea, nausea and vomiting.   Endocrine: Negative for cold intolerance, heat intolerance, " "polydipsia and polyuria.   Genitourinary:  Negative for difficulty urinating, dyspareunia, dysuria, flank pain, frequency, hematuria, menstrual problem, pelvic pain, urgency, vaginal discharge and vaginal pain.   Musculoskeletal:  Positive for arthralgias (right knee). Negative for back pain, joint swelling, myalgias, neck pain and neck stiffness.   Skin:  Negative for pallor.   Allergic/Immunologic: Negative for environmental allergies and food allergies.   Neurological:  Positive for dizziness. Negative for tremors, speech difficulty, weakness, light-headedness and numbness.   Hematological:  Does not bruise/bleed easily.   Psychiatric/Behavioral:  Negative for agitation, confusion, decreased concentration, sleep disturbance and suicidal ideas. The patient is not nervous/anxious.         Objective:      Vitals:    03/21/24 1405 03/21/24 1452   BP: (!) 154/68 (!) 148/68   Pulse: 60    Resp: 14    Temp: 98.1 °F (36.7 °C)    SpO2: 98%    Weight: 77.6 kg (171 lb)    Height: 5' 3" (1.6 m)      Physical Exam  Vitals and nursing note reviewed.   Constitutional:       General: She is not in acute distress.     Appearance: She is obese. She is not ill-appearing.   HENT:      Head: Normocephalic and atraumatic.      Nose: Nose normal. No congestion.      Mouth/Throat:      Mouth: Mucous membranes are moist.      Pharynx: Oropharynx is clear.   Eyes:      Extraocular Movements: Extraocular movements intact.      Conjunctiva/sclera: Conjunctivae normal.      Pupils: Pupils are equal, round, and reactive to light.   Neck:      Vascular: No carotid bruit.   Cardiovascular:      Rate and Rhythm: Normal rate and regular rhythm.      Pulses: Normal pulses.      Heart sounds: Normal heart sounds. No murmur heard.  Pulmonary:      Breath sounds: No wheezing or rhonchi.      Comments: Decreased breath sounds  Abdominal:      General: Bowel sounds are normal. There is distension.      Palpations: There is no mass.      Tenderness: " There is no abdominal tenderness.   Musculoskeletal:      Right lower leg: Edema (minimal ankle) present.      Left lower leg: Edema (minimsl ankle) present.   Lymphadenopathy:      Cervical: No cervical adenopathy.   Skin:     General: Skin is warm and dry.      Coloration: Skin is pale. Skin is not jaundiced.   Neurological:      General: No focal deficit present.      Mental Status: She is oriented to person, place, and time.   Psychiatric:         Mood and Affect: Mood normal.         Thought Content: Thought content normal.         Judgment: Judgment normal.         Assessment:       1. Uncontrolled type 2 diabetes mellitus with hyperglycemia    2. Acute hypoxemic respiratory failure    3. Acute on chronic heart failure with preserved ejection fraction (HFpEF)    4. NSTEMI (non-ST elevated myocardial infarction)    5. SSS (sick sinus syndrome)    6. SVT (supraventricular tachycardia)    7. BRIDGETTE (acute kidney injury)    8. Chronic kidney disease (CKD), stage IV (severe)    9. Type 2 diabetes mellitus with diabetic nephropathy, with long-term current use of insulin         Plan:       Uncontrolled type 2 diabetes mellitus with hyperglycemia  -     Hemoglobin A1C; Future; Expected date: 03/21/2024  -     BASIC METABOLIC PANEL; Standing  -     Microalbumin/Creatinine Ratio, Urine; Future; Expected date: 03/21/2024    Acute hypoxemic respiratory failure        -     resolved    Acute on chronic heart failure with preserved ejection fraction (HFpEF)       -     stable atthis time    NSTEMI (non-ST elevated myocardial infarction)        -     recovered-to be followed by Cardiology    SSS (sick sinus syndrome)        -     SPO pacemaker    SVT (supraventricular tachycardia)        -     trslved with medication -contiunue     BRIDGETTE (acute kidney injury)        -     resolved    Chronic kidney disease (CKD), stage IV (severe)    Type 2 diabetes mellitus with diabetic nephropathy, with long-term current use of insulin         -     we will follow her insulin needs as much as she allows us to but she has not to date kept in touch with us to do so    Other orders  -     fluconazole (DIFLUCAN) 50 MG Tab; One a day for three days  Dispense: 3 tablet; Refill: 0      Follow up in about 8 weeks (around 5/16/2024).

## 2024-03-18 ENCOUNTER — PATIENT OUTREACH (OUTPATIENT)
Dept: ADMINISTRATIVE | Facility: CLINIC | Age: 79
End: 2024-03-18
Payer: MEDICARE

## 2024-03-18 NOTE — PROGRESS NOTES
C3 nurse spoke with Cynthia N Cushing for a TCC post hospital discharge follow up call. The patient has a scheduled HOSFU appointment with Teri Adams MD (PCP) on 3/21/24 @ 1:40pm, and she completed a FU with Brijesh Mendieta MD (cardiology) on 3/15/24.

## 2024-03-21 ENCOUNTER — OFFICE VISIT (OUTPATIENT)
Dept: FAMILY MEDICINE | Facility: CLINIC | Age: 79
End: 2024-03-21
Payer: MEDICARE

## 2024-03-21 VITALS
TEMPERATURE: 98 F | SYSTOLIC BLOOD PRESSURE: 148 MMHG | WEIGHT: 171 LBS | HEART RATE: 60 BPM | HEIGHT: 63 IN | BODY MASS INDEX: 30.3 KG/M2 | RESPIRATION RATE: 14 BRPM | OXYGEN SATURATION: 98 % | DIASTOLIC BLOOD PRESSURE: 68 MMHG

## 2024-03-21 DIAGNOSIS — N18.4 CHRONIC KIDNEY DISEASE (CKD), STAGE IV (SEVERE): ICD-10-CM

## 2024-03-21 DIAGNOSIS — I21.4 NSTEMI (NON-ST ELEVATED MYOCARDIAL INFARCTION): ICD-10-CM

## 2024-03-21 DIAGNOSIS — E11.21 TYPE 2 DIABETES MELLITUS WITH DIABETIC NEPHROPATHY, WITH LONG-TERM CURRENT USE OF INSULIN: ICD-10-CM

## 2024-03-21 DIAGNOSIS — I50.33 ACUTE ON CHRONIC HEART FAILURE WITH PRESERVED EJECTION FRACTION (HFPEF): ICD-10-CM

## 2024-03-21 DIAGNOSIS — I47.10 SVT (SUPRAVENTRICULAR TACHYCARDIA): ICD-10-CM

## 2024-03-21 DIAGNOSIS — N17.9 AKI (ACUTE KIDNEY INJURY): ICD-10-CM

## 2024-03-21 DIAGNOSIS — E11.65 UNCONTROLLED TYPE 2 DIABETES MELLITUS WITH HYPERGLYCEMIA: Primary | ICD-10-CM

## 2024-03-21 DIAGNOSIS — Z79.4 TYPE 2 DIABETES MELLITUS WITH DIABETIC NEPHROPATHY, WITH LONG-TERM CURRENT USE OF INSULIN: ICD-10-CM

## 2024-03-21 DIAGNOSIS — J96.01 ACUTE HYPOXEMIC RESPIRATORY FAILURE: ICD-10-CM

## 2024-03-21 DIAGNOSIS — I49.5 SSS (SICK SINUS SYNDROME): ICD-10-CM

## 2024-03-21 PROCEDURE — 99999 PR PBB SHADOW E&M-EST. PATIENT-LVL V: CPT | Mod: PBBFAC,HCNC,, | Performed by: INTERNAL MEDICINE

## 2024-03-21 PROCEDURE — 99495 TRANSJ CARE MGMT MOD F2F 14D: CPT | Mod: HCNC,S$GLB,, | Performed by: INTERNAL MEDICINE

## 2024-03-21 PROCEDURE — 1111F DSCHRG MED/CURRENT MED MERGE: CPT | Mod: HCNC,CPTII,S$GLB, | Performed by: INTERNAL MEDICINE

## 2024-03-21 PROCEDURE — 3077F SYST BP >= 140 MM HG: CPT | Mod: HCNC,CPTII,S$GLB, | Performed by: INTERNAL MEDICINE

## 2024-03-21 PROCEDURE — 3288F FALL RISK ASSESSMENT DOCD: CPT | Mod: HCNC,CPTII,S$GLB, | Performed by: INTERNAL MEDICINE

## 2024-03-21 PROCEDURE — 1101F PT FALLS ASSESS-DOCD LE1/YR: CPT | Mod: HCNC,CPTII,S$GLB, | Performed by: INTERNAL MEDICINE

## 2024-03-21 PROCEDURE — 1159F MED LIST DOCD IN RCRD: CPT | Mod: HCNC,CPTII,S$GLB, | Performed by: INTERNAL MEDICINE

## 2024-03-21 PROCEDURE — 3078F DIAST BP <80 MM HG: CPT | Mod: HCNC,CPTII,S$GLB, | Performed by: INTERNAL MEDICINE

## 2024-03-21 PROCEDURE — 1126F AMNT PAIN NOTED NONE PRSNT: CPT | Mod: HCNC,CPTII,S$GLB, | Performed by: INTERNAL MEDICINE

## 2024-03-21 RX ORDER — CETIRIZINE HYDROCHLORIDE 10 MG/1
10 TABLET ORAL DAILY
COMMUNITY

## 2024-03-21 RX ORDER — FLUCONAZOLE 50 MG/1
TABLET ORAL
Qty: 3 TABLET | Refills: 0 | Status: SHIPPED | OUTPATIENT
Start: 2024-03-21

## 2024-03-21 NOTE — PATIENT INSTRUCTIONS
Mon-fasting and two hours after breakfast    Wed fasting and two hours afterlunch    Fri fasting and two hours after supper

## 2024-03-22 PROBLEM — N18.4 CHRONIC KIDNEY DISEASE (CKD), STAGE IV (SEVERE): Status: ACTIVE | Noted: 2024-03-22

## 2024-03-25 LAB
OHS QRS DURATION: 102 MS
OHS QRS DURATION: 104 MS
OHS QRS DURATION: 106 MS
OHS QRS DURATION: 110 MS
OHS QRS DURATION: 114 MS
OHS QTC CALCULATION: 378 MS
OHS QTC CALCULATION: 440 MS
OHS QTC CALCULATION: 449 MS
OHS QTC CALCULATION: 455 MS
OHS QTC CALCULATION: 476 MS
OHS QTC CALCULATION: 488 MS
OHS QTC CALCULATION: 501 MS
OHS QTC CALCULATION: 505 MS
OHS QTC CALCULATION: 514 MS
OHS QTC CALCULATION: 525 MS

## 2024-04-03 RX ORDER — METOPROLOL TARTRATE 50 MG/1
50 TABLET ORAL 2 TIMES DAILY
Qty: 60 TABLET | Refills: 0 | Status: SHIPPED | OUTPATIENT
Start: 2024-04-03 | End: 2024-04-30 | Stop reason: SDUPTHER

## 2024-04-03 RX ORDER — FERROUS SULFATE 324(65)MG
324 TABLET, DELAYED RELEASE (ENTERIC COATED) ORAL DAILY
Qty: 30 TABLET | Refills: 0 | Status: SHIPPED | OUTPATIENT
Start: 2024-04-03 | End: 2024-04-30 | Stop reason: SDUPTHER

## 2024-04-03 RX ORDER — CLOPIDOGREL BISULFATE 75 MG/1
75 TABLET ORAL DAILY
Qty: 30 TABLET | Refills: 0 | Status: SHIPPED | OUTPATIENT
Start: 2024-04-03 | End: 2024-04-30 | Stop reason: SDUPTHER

## 2024-04-03 RX ORDER — DILTIAZEM HYDROCHLORIDE 120 MG/1
120 CAPSULE, COATED, EXTENDED RELEASE ORAL DAILY
Qty: 30 CAPSULE | Refills: 0 | Status: SHIPPED | OUTPATIENT
Start: 2024-04-03 | End: 2024-04-30 | Stop reason: SDUPTHER

## 2024-04-06 ENCOUNTER — DOCUMENT SCAN (OUTPATIENT)
Dept: HOME HEALTH SERVICES | Facility: HOSPITAL | Age: 79
End: 2024-04-06
Payer: MEDICARE

## 2024-04-09 ENCOUNTER — DOCUMENT SCAN (OUTPATIENT)
Dept: HOME HEALTH SERVICES | Facility: HOSPITAL | Age: 79
End: 2024-04-09
Payer: MEDICARE

## 2024-05-01 ENCOUNTER — OFFICE VISIT (OUTPATIENT)
Dept: CARDIOLOGY | Facility: CLINIC | Age: 79
End: 2024-05-01
Payer: MEDICARE

## 2024-05-01 VITALS
OXYGEN SATURATION: 98 % | HEIGHT: 63 IN | HEART RATE: 60 BPM | BODY MASS INDEX: 30.41 KG/M2 | DIASTOLIC BLOOD PRESSURE: 80 MMHG | WEIGHT: 171.63 LBS | SYSTOLIC BLOOD PRESSURE: 160 MMHG

## 2024-05-01 DIAGNOSIS — I49.5 SSS (SICK SINUS SYNDROME): ICD-10-CM

## 2024-05-01 DIAGNOSIS — I21.4 NSTEMI (NON-ST ELEVATED MYOCARDIAL INFARCTION): ICD-10-CM

## 2024-05-01 DIAGNOSIS — N18.4 CHRONIC KIDNEY DISEASE (CKD), STAGE IV (SEVERE): ICD-10-CM

## 2024-05-01 DIAGNOSIS — I10 ESSENTIAL HYPERTENSION, BENIGN: ICD-10-CM

## 2024-05-01 DIAGNOSIS — R60.0 BILATERAL LEG EDEMA: ICD-10-CM

## 2024-05-01 DIAGNOSIS — E78.2 MIXED HYPERLIPIDEMIA: ICD-10-CM

## 2024-05-01 DIAGNOSIS — N18.32 STAGE 3B CHRONIC KIDNEY DISEASE: Primary | ICD-10-CM

## 2024-05-01 DIAGNOSIS — I35.0 NONRHEUMATIC AORTIC VALVE STENOSIS: ICD-10-CM

## 2024-05-01 PROCEDURE — 3288F FALL RISK ASSESSMENT DOCD: CPT | Mod: HCNC,CPTII,S$GLB, | Performed by: INTERNAL MEDICINE

## 2024-05-01 PROCEDURE — 3077F SYST BP >= 140 MM HG: CPT | Mod: HCNC,CPTII,S$GLB, | Performed by: INTERNAL MEDICINE

## 2024-05-01 PROCEDURE — 1126F AMNT PAIN NOTED NONE PRSNT: CPT | Mod: HCNC,CPTII,S$GLB, | Performed by: INTERNAL MEDICINE

## 2024-05-01 PROCEDURE — 3079F DIAST BP 80-89 MM HG: CPT | Mod: HCNC,CPTII,S$GLB, | Performed by: INTERNAL MEDICINE

## 2024-05-01 PROCEDURE — 1160F RVW MEDS BY RX/DR IN RCRD: CPT | Mod: HCNC,CPTII,S$GLB, | Performed by: INTERNAL MEDICINE

## 2024-05-01 PROCEDURE — 1101F PT FALLS ASSESS-DOCD LE1/YR: CPT | Mod: HCNC,CPTII,S$GLB, | Performed by: INTERNAL MEDICINE

## 2024-05-01 PROCEDURE — 1159F MED LIST DOCD IN RCRD: CPT | Mod: HCNC,CPTII,S$GLB, | Performed by: INTERNAL MEDICINE

## 2024-05-01 PROCEDURE — 99999 PR PBB SHADOW E&M-EST. PATIENT-LVL IV: CPT | Mod: PBBFAC,HCNC,, | Performed by: INTERNAL MEDICINE

## 2024-05-01 PROCEDURE — 99213 OFFICE O/P EST LOW 20 MIN: CPT | Mod: HCNC,25,S$GLB, | Performed by: INTERNAL MEDICINE

## 2024-05-01 RX ORDER — METOPROLOL TARTRATE 50 MG/1
50 TABLET ORAL 2 TIMES DAILY
Qty: 180 TABLET | Refills: 3 | Status: SHIPPED | OUTPATIENT
Start: 2024-05-01 | End: 2025-04-26

## 2024-05-01 RX ORDER — FERROUS SULFATE 324(65)MG
324 TABLET, DELAYED RELEASE (ENTERIC COATED) ORAL DAILY
Qty: 90 TABLET | Refills: 3 | Status: SHIPPED | OUTPATIENT
Start: 2024-05-01 | End: 2025-04-26

## 2024-05-01 RX ORDER — METOPROLOL TARTRATE 50 MG/1
75 TABLET ORAL 2 TIMES DAILY
Qty: 135 TABLET | Refills: 2 | Status: SHIPPED | OUTPATIENT
Start: 2024-05-01

## 2024-05-01 RX ORDER — CLOPIDOGREL BISULFATE 75 MG/1
75 TABLET ORAL DAILY
Qty: 90 TABLET | Refills: 3 | Status: SHIPPED | OUTPATIENT
Start: 2024-05-01 | End: 2025-04-26

## 2024-05-01 RX ORDER — FUROSEMIDE 20 MG/1
20 TABLET ORAL DAILY
Qty: 90 TABLET | Refills: 3 | Status: SHIPPED | OUTPATIENT
Start: 2024-05-01

## 2024-05-01 RX ORDER — DILTIAZEM HYDROCHLORIDE 120 MG/1
120 CAPSULE, COATED, EXTENDED RELEASE ORAL DAILY
Qty: 90 CAPSULE | Refills: 3 | Status: SHIPPED | OUTPATIENT
Start: 2024-05-01 | End: 2025-04-26

## 2024-05-01 NOTE — PROGRESS NOTES
Patient ID:  Cynthia N Cushing is a 79 y.o. female who presents for follow-up of Hypertension, sss, and Congestive Heart Failure      Aortic stenosis  Stable her aortic peak velocity is 2.4 m/sec     NSTEMI (non-ST elevated myocardial infarction)  With positive myocardial perfusion scan.  At sometime a coronary arteriogram will be entertained once her renal function improves     SSS (sick sinus syndrome)  Status post DDD pacemaker placement a [employer] unit     SVT (supraventricular tachycardia)  Controlled on metoprolol and diltiazem     BRIDGETTE (acute kidney injury)  Acute kidney injury in top off chronic kidney disease     Hyperlipidemia  On rosuvastatin 10 mg q.h.s.     Hypertension secondary to other renal disorders  She has significant hypertension.  Will adjust her medications.  Hold hydralazine if the systolic pressure is less than 120 she is to continue the diltiazem and metoprolol.  She is to decrease the doses of guanfacine to 1 mg    She is feeling okay denies any chest pains any shortness of breath.  Her blood pressure remains elevated.  Will increase the metoprolol to 75 mg p.o. b.i.d..        Past Medical History:   Diagnosis Date    Allergy     Breast mass     Cataract     Diabetes mellitus     GERD (gastroesophageal reflux disease)     Hernia, hiatal     Hyperlipidemia     Hypertension     Kidney stone     Osteoarthritis     Renal insufficiency     Seasonal allergies     Sleep apnea     SSS (sick sinus syndrome)     SVT (supraventricular tachycardia)         Past Surgical History:   Procedure Laterality Date    A-V CARDIAC PACEMAKER INSERTION N/A 3/10/2024    Procedure: INSERTION, CARDIAC PACEMAKER, DUAL CHAMBER;  Surgeon: Brijesh Mendieta MD;  Location: [facility] CATH/EP LAB;  Service: Cardiology;  Laterality: N/A;    BREAST BIOPSY      BREAST CYST ASPIRATION      BREAST SURGERY      CERVICAL DISC SURGERY      EYE SURGERY      cataracts bilateral    HYSTERECTOMY            Current Outpatient  "Medications   Medication Instructions    ACCU-CHEK AMADOR PLUS TEST STRP Strp TEST BLOOD SUGAR FOUR TIMES DAILY    aspirin 81 mg, Oral, Daily    cetirizine (ZYRTEC) 10 mg, Oral, Daily    clopidogreL (PLAVIX) 75 mg, Oral, Daily    diltiaZEM (CARDIZEM CD) 120 mg, Oral, Daily    famotidine (PEPCID) 20 MG tablet TAKE 1 TABLET TWICE DAILY    ferrous sulfate 324 mg, Oral, Daily    fluconazole (DIFLUCAN) 50 MG Tab One a day for three days    furosemide (LASIX) 20 mg, Oral, Daily    guanFACINE (TENEX) 2 MG tablet TAKE 1 TABLET EVERY EVENING    hydrALAZINE (APRESOLINE) 50 mg, Oral, 3 times daily    lancets (ACCU-CHEK SOFTCLIX LANCETS) Misc TEST BLOOD SUGAR FOUR TIMES DAILY    LANTUS SOLOSTAR U-100 INSULIN glargine 100 units/mL SubQ pen ADMINISTER 51 UNITS UNDER THE SKIN AT NIGHT    metoprolol tartrate (LOPRESSOR) 50 mg, Oral, 2 times daily    metoprolol tartrate (LOPRESSOR) 75 mg, Oral, 2 times daily    pen needle, diabetic (BD ANNA 2ND GEN PEN NEEDLE) 32 gauge x 5/32" Ndle INJECT 1 NEEDLE INTO THE SKIN EVERY EVENING    rosuvastatin (CRESTOR) 10 mg, Oral, Nightly        Review of patient's allergies indicates:   Allergen Reactions    Biaxin [clarithromycin]     Prandin [repaglinide] Nausea And Vomiting    Amlodipine     Chlorphen-phenyltolox-pe-ppa     Ciprofloxacin Nausea And Vomiting    Omnicef [cefdinir]     Propoxyphene     Quinine Nausea And Vomiting        Review of Systems   Cardiovascular:  Positive for dyspnea on exertion. Negative for chest pain, leg swelling and palpitations.   Respiratory:  Negative for cough and shortness of breath.         Objective:     Vitals:    05/01/24 0911   BP: (!) 160/80   BP Location: Left arm   Patient Position: Sitting   BP Method: Medium (Manual)   Pulse: 60   SpO2: 98%   Weight: 77.9 kg (171 lb 10.1 oz)   Height: 5' 3" (1.6 m)       Physical Exam  Vitals and nursing note reviewed.   Constitutional:       Appearance: She is well-developed.   HENT:      Head: Normocephalic and " atraumatic.   Eyes:      Conjunctiva/sclera: Conjunctivae normal.   Cardiovascular:      Rate and Rhythm: Normal rate and regular rhythm.      Heart sounds: Murmur (Grade 3/6 systolic murmur at the base) heard.   Pulmonary:      Effort: Pulmonary effort is normal.      Breath sounds: Normal breath sounds.   Abdominal:      General: Bowel sounds are normal.      Palpations: Abdomen is soft.   Musculoskeletal:         General: Normal range of motion.   Skin:     General: Skin is warm and dry.   Neurological:      Mental Status: She is alert and oriented to person, place, and time.   Psychiatric:         Behavior: Behavior normal.         Thought Content: Thought content normal.         Judgment: Judgment normal.       CMP  Sodium   Date Value Ref Range Status   03/15/2024 140 136 - 145 mmol/L Final     Potassium   Date Value Ref Range Status   03/15/2024 3.8 3.5 - 5.1 mmol/L Final     Chloride   Date Value Ref Range Status   03/15/2024 108 95 - 110 mmol/L Final     CO2   Date Value Ref Range Status   03/15/2024 23 23 - 29 mmol/L Final     Glucose   Date Value Ref Range Status   03/15/2024 149 (H) 70 - 110 mg/dL Final     BUN   Date Value Ref Range Status   03/15/2024 29 (H) 8 - 23 mg/dL Final     Creatinine   Date Value Ref Range Status   03/15/2024 1.8 (H) 0.5 - 1.4 mg/dL Final     Calcium   Date Value Ref Range Status   03/15/2024 9.5 8.7 - 10.5 mg/dL Final     Total Protein   Date Value Ref Range Status   03/09/2024 6.0 6.0 - 8.4 g/dL Final     Albumin   Date Value Ref Range Status   03/09/2024 3.3 (L) 3.5 - 5.2 g/dL Final     Total Bilirubin   Date Value Ref Range Status   03/09/2024 0.4 0.1 - 1.0 mg/dL Final     Comment:     For infants and newborns, interpretation of results should be based  on gestational age, weight and in agreement with clinical  observations.    Premature Infant recommended reference ranges:  Up to 24 hours.............<8.0 mg/dL  Up to 48 hours............<12.0 mg/dL  3-5  days..................<15.0 mg/dL  6-29 days.................<15.0 mg/dL       Alkaline Phosphatase   Date Value Ref Range Status   03/09/2024 59 55 - 135 U/L Final     AST   Date Value Ref Range Status   03/09/2024 19 10 - 40 U/L Final     ALT   Date Value Ref Range Status   03/09/2024 22 10 - 44 U/L Final     Anion Gap   Date Value Ref Range Status   03/15/2024 9 8 - 16 mmol/L Final     eGFR if    Date Value Ref Range Status   07/15/2022 38.5 (A) >60 mL/min/1.73 m^2 Final     eGFR if non    Date Value Ref Range Status   07/15/2022 33.4 (A) >60 mL/min/1.73 m^2 Final     Comment:     Calculation used to obtain the estimated glomerular filtration  rate (eGFR) is the CKD-EPI equation.         BMP  Lab Results   Component Value Date     03/15/2024    K 3.8 03/15/2024     03/15/2024    CO2 23 03/15/2024    BUN 29 (H) 03/15/2024    CREATININE 1.8 (H) 03/15/2024    CALCIUM 9.5 03/15/2024    ANIONGAP 9 03/15/2024    ESTGFRAFRICA 38.5 (A) 07/15/2022    EGFRNONAA 33.4 (A) 07/15/2022      BNP  @LABRCNTIP(BNP,BNPTRIAGEBLO)@   Lab Results   Component Value Date    CHOL 110 (L) 08/17/2023    CHOL 114 (L) 02/07/2022    CHOL 152 04/19/2021     Lab Results   Component Value Date    HDL 33 (L) 08/17/2023    HDL 34 (L) 02/07/2022    HDL 36 (L) 04/19/2021     Lab Results   Component Value Date    LDLCALC 9.2 (L) 08/17/2023    LDLCALC 39.2 (L) 02/07/2022    LDLCALC 41.6 (L) 04/19/2021     Lab Results   Component Value Date    TRIG 339 (H) 08/17/2023    TRIG 204 (H) 02/07/2022    TRIG 372 (H) 04/19/2021     Lab Results   Component Value Date    CHOLHDL 30.0 08/17/2023    CHOLHDL 29.8 02/07/2022    CHOLHDL 23.7 04/19/2021      Lab Results   Component Value Date    TSH 2.299 03/10/2024     Lab Results   Component Value Date    HGBA1C 7.7 (H) 03/07/2024     Lab Results   Component Value Date    WBC 8.27 03/10/2024    HGB 8.5 (L) 03/10/2024    HCT 27.1 (L) 03/10/2024    MCV 82 03/10/2024    PLT  285 03/10/2024         Results for orders placed during the hospital encounter of 03/03/24    Echo Saline Bubble? No    Interpretation Summary    Left Ventricle: The left ventricle is normal in size. Mildly increased wall thickness. There is mild concentric hypertrophy. Mild global hypokinesis present. There is mildly reduced systolic function with a visually estimated ejection fraction of 45 - 50%. There is normal diastolic function.    Right Ventricle: Normal right ventricular cavity size. Wall thickness is normal. Right ventricle wall motion  is normal. Systolic function is normal.    Left Atrium: Left atrium is moderately dilated.    Aortic Valve: The aortic valve is a trileaflet valve. There is moderate aortic valve sclerosis. There is mild annular calcification present. Moderately restricted motion. There is moderate to severe stenosis. Aortic valve area by VTI is 0.99 cm². Aortic valve peak velocity is 2.40 m/s. Mean gradient is 13 mmHg. The dimensionless index is 0.31.    Tricuspid Valve: The tricuspid valve is structurally normal. There is normal leaflet mobility. There is mild regurgitation with a centrally directed jet.    Pulmonary Artery: There is mild to moderate pulmonary hypertension. The estimated pulmonary artery systolic pressure is 45 mmHg.    IVC/SVC: Intermediate venous pressure at 8 mmHg.    Pericardium: There is a trivial circumferential effusion. Pericardial effusion is echolucent. No indication of cardiac tamponade.     No results found for this or any previous visit.     EKG  Results for orders placed or performed during the hospital encounter of 03/03/24   EKG 12-lead    Collection Time: 03/10/24  2:26 PM   Result Value Ref Range    QRS Duration 106 ms    OHS QTC Calculation 440 ms    Narrative    Test Reason : I49.9,    Vent. Rate : 065 BPM     Atrial Rate : 065 BPM     P-R Int : 194 ms          QRS Dur : 106 ms      QT Int : 424 ms       P-R-T Axes : 062 -29 194 degrees     QTc Int :  440 ms    Atrial-paced rhythm with Premature atrial complexes  LVH with repolarization abnormality ( [place] product )  Abnormal ECG  When compared with ECG of 09-MAR-2024 18:56,  Electronic atrial pacemaker has replaced Atrial fibrillation  Vent. rate has decreased BY  52 BPM  Non-specific change in ST segment in Anterior leads  T wave inversion now evident in Anterior-lateral leads  Confirmed by Dany Valderrama MD [id number]) on 3/25/2024 9:42:50 PM    Referred By: [email or web address]   SELF           Confirmed By:Dany Valderrama MD      Stress  Results for orders placed during the hospital encounter of 03/03/24    Nuclear Stress Test    Interpretation Summary    The ECG portion of the study is negative for ischemia.    The patient reported no chest pain during the stress test.    The nuclear portion of this study will be reported separately.             Assessment:       Aortic stenosis  Stable peak velocity of 2.4 m/sec    Hyperlipidemia  On 10 mg of rosuvastatin q.h.s.    NSTEMI (non-ST elevated myocardial infarction)  She had a non STEMI with a positive myocardial perfusion scan.  A coronary arteriogram should be performed once her renal function improves    SSS (sick sinus syndrome)  Status post pacemaker placement    Chronic kidney disease (CKD), stage IV (severe)  Will monitor her renal function    Iron deficiency anemia  She had marked anemia during the hospitalization will repeat the hemoglobin hematocrit       Plan:       Increase metoprolol to 75 mg p.o. b.i.d. she will be seen in the office in 1 month with a CBC CMP a lipid and a TSH.  At that time based upon her renal function will consider diagnostic coronary arteriogram.

## 2024-05-17 NOTE — ASSESSMENT & PLAN NOTE
She had a non STEMI with a positive myocardial perfusion scan.  A coronary arteriogram should be performed once her renal function improves

## 2024-06-03 PROBLEM — J96.01 ACUTE HYPOXEMIC RESPIRATORY FAILURE: Status: RESOLVED | Noted: 2024-03-03 | Resolved: 2024-06-03

## 2024-06-05 ENCOUNTER — OFFICE VISIT (OUTPATIENT)
Dept: CARDIOLOGY | Facility: CLINIC | Age: 79
End: 2024-06-05
Payer: MEDICARE

## 2024-06-05 VITALS
HEIGHT: 63 IN | OXYGEN SATURATION: 98 % | BODY MASS INDEX: 30.66 KG/M2 | DIASTOLIC BLOOD PRESSURE: 60 MMHG | WEIGHT: 173.06 LBS | HEART RATE: 60 BPM | SYSTOLIC BLOOD PRESSURE: 130 MMHG

## 2024-06-05 DIAGNOSIS — I21.4 NON-STEMI (NON-ST ELEVATED MYOCARDIAL INFARCTION): ICD-10-CM

## 2024-06-05 DIAGNOSIS — E78.2 MIXED HYPERLIPIDEMIA: ICD-10-CM

## 2024-06-05 DIAGNOSIS — I35.0 NONRHEUMATIC AORTIC VALVE STENOSIS: Primary | ICD-10-CM

## 2024-06-05 DIAGNOSIS — N18.4 CHRONIC KIDNEY DISEASE (CKD), STAGE IV (SEVERE): ICD-10-CM

## 2024-06-05 DIAGNOSIS — I49.5 SSS (SICK SINUS SYNDROME): ICD-10-CM

## 2024-06-05 DIAGNOSIS — I47.10 SVT (SUPRAVENTRICULAR TACHYCARDIA): ICD-10-CM

## 2024-06-05 DIAGNOSIS — I15.1 HYPERTENSION SECONDARY TO OTHER RENAL DISORDERS: ICD-10-CM

## 2024-06-05 PROCEDURE — 99999 PR PBB SHADOW E&M-EST. PATIENT-LVL IV: CPT | Mod: PBBFAC,HCNC,, | Performed by: INTERNAL MEDICINE

## 2024-06-05 PROCEDURE — 3075F SYST BP GE 130 - 139MM HG: CPT | Mod: HCNC,CPTII,S$GLB, | Performed by: INTERNAL MEDICINE

## 2024-06-05 PROCEDURE — 3078F DIAST BP <80 MM HG: CPT | Mod: HCNC,CPTII,S$GLB, | Performed by: INTERNAL MEDICINE

## 2024-06-05 PROCEDURE — 3288F FALL RISK ASSESSMENT DOCD: CPT | Mod: HCNC,CPTII,S$GLB, | Performed by: INTERNAL MEDICINE

## 2024-06-05 PROCEDURE — 1159F MED LIST DOCD IN RCRD: CPT | Mod: HCNC,CPTII,S$GLB, | Performed by: INTERNAL MEDICINE

## 2024-06-05 PROCEDURE — 1101F PT FALLS ASSESS-DOCD LE1/YR: CPT | Mod: HCNC,CPTII,S$GLB, | Performed by: INTERNAL MEDICINE

## 2024-06-05 PROCEDURE — 1126F AMNT PAIN NOTED NONE PRSNT: CPT | Mod: HCNC,CPTII,S$GLB, | Performed by: INTERNAL MEDICINE

## 2024-06-05 PROCEDURE — 99213 OFFICE O/P EST LOW 20 MIN: CPT | Mod: HCNC,25,S$GLB, | Performed by: INTERNAL MEDICINE

## 2024-06-05 PROCEDURE — 1160F RVW MEDS BY RX/DR IN RCRD: CPT | Mod: HCNC,CPTII,S$GLB, | Performed by: INTERNAL MEDICINE

## 2024-06-05 RX ORDER — ISOSORBIDE MONONITRATE 30 MG/1
30 TABLET, EXTENDED RELEASE ORAL DAILY
Qty: 30 TABLET | Refills: 11 | Status: SHIPPED | OUTPATIENT
Start: 2024-06-05 | End: 2025-06-05

## 2024-06-05 NOTE — PROGRESS NOTES
Patient ID:  Cynthia N Cushing is a 79 y.o. female who presents for follow-up of Congestive Heart Failure and Hypertension      Aortic stenosis  Stable peak velocity of 2.4 m/sec     Hyperlipidemia  On 10 mg of rosuvastatin q.h.s.     NSTEMI (non-ST elevated myocardial infarction)  She had a non STEMI with a positive myocardial perfusion scan.  A coronary arteriogram should be performed once her renal function improves     SSS (sick sinus syndrome)  Status post pacemaker placement     Chronic kidney disease (CKD), stage IV (severe)  Will monitor her renal function     Iron deficiency anemia  She had marked anemia during the hospitalization will repeat the hemoglobin hematocrit    During the last office visit the family complained that she was very sleepy.  The doses of guanfacine were decreased to 1 mg daily.  She feels better denies any chest pains any shortness of breath her dyspnea on exertion is doing better.  Her blood pressure has been better controlled.             Past Medical History:   Diagnosis Date    Allergy     Breast mass     Cataract     Diabetes mellitus     GERD (gastroesophageal reflux disease)     Hernia, hiatal     Hyperlipidemia     Hypertension     Kidney stone     Osteoarthritis     Renal insufficiency     Seasonal allergies     Sleep apnea     SSS (sick sinus syndrome)     SVT (supraventricular tachycardia)         Past Surgical History:   Procedure Laterality Date    A-V CARDIAC PACEMAKER INSERTION N/A 3/10/2024    Procedure: INSERTION, CARDIAC PACEMAKER, DUAL CHAMBER;  Surgeon: Brijesh Mendieta MD;  Location: [facility] CATH/EP LAB;  Service: Cardiology;  Laterality: N/A;    BREAST BIOPSY      BREAST CYST ASPIRATION      BREAST SURGERY      CERVICAL DISC SURGERY      EYE SURGERY      cataracts bilateral    HYSTERECTOMY            Current Outpatient Medications   Medication Instructions    ACCU-CHEK AMADOR PLUS TEST STRP Strp TEST BLOOD SUGAR FOUR TIMES DAILY    aspirin 81 mg, Oral,  "Daily    cetirizine (ZYRTEC) 10 mg, Oral, Daily    clopidogreL (PLAVIX) 75 mg, Oral, Daily    diltiaZEM (CARDIZEM CD) 120 mg, Oral, Daily    famotidine (PEPCID) 20 MG tablet TAKE 1 TABLET TWICE DAILY    ferrous sulfate 324 mg, Oral, Daily    fluconazole (DIFLUCAN) 50 MG Tab One a day for three days    furosemide (LASIX) 20 mg, Oral, Daily    guanFACINE (TENEX) 2 MG tablet TAKE 1 TABLET EVERY EVENING    hydrALAZINE (APRESOLINE) 50 mg, Oral, 3 times daily    isosorbide mononitrate (IMDUR) 30 mg, Oral, Daily    lancets (ACCU-CHEK SOFTCLIX LANCETS) Misc TEST BLOOD SUGAR FOUR TIMES DAILY    LANTUS SOLOSTAR U-100 INSULIN glargine 100 units/mL SubQ pen ADMINISTER 51 UNITS UNDER THE SKIN AT NIGHT    metoprolol tartrate (LOPRESSOR) 50 mg, Oral, 2 times daily    metoprolol tartrate (LOPRESSOR) 75 mg, Oral, 2 times daily    pen needle, diabetic (BD ANNA 2ND GEN PEN NEEDLE) 32 gauge x 5/32" Ndle INJECT 1 NEEDLE INTO THE SKIN EVERY EVENING    rosuvastatin (CRESTOR) 10 mg, Oral, Nightly        Review of patient's allergies indicates:   Allergen Reactions    Biaxin [clarithromycin]     Prandin [repaglinide] Nausea And Vomiting    Amlodipine     Chlorphen-phenyltolox-pe-ppa     Ciprofloxacin Nausea And Vomiting    Omnicef [cefdinir]     Propoxyphene     Quinine Nausea And Vomiting        Review of Systems   Cardiovascular:  Positive for dyspnea on exertion. Negative for chest pain and palpitations.   Respiratory:  Negative for cough and shortness of breath.         Objective:     Vitals:    06/05/24 0846   BP: 130/60   BP Location: Left arm   Patient Position: Sitting   BP Method: Medium (Manual)   Pulse: 60   SpO2: 98%   Weight: 78.5 kg (173 lb 1 oz)   Height: 5' 3" (1.6 m)       Physical Exam  Vitals and nursing note reviewed.   Constitutional:       Appearance: She is well-developed.   HENT:      Head: Normocephalic and atraumatic.   Eyes:      Conjunctiva/sclera: Conjunctivae normal.   Cardiovascular:      Rate and Rhythm: " Normal rate and regular rhythm.      Heart sounds: Murmur heard.   Pulmonary:      Effort: Pulmonary effort is normal.      Breath sounds: Normal breath sounds.   Abdominal:      General: Bowel sounds are normal.      Palpations: Abdomen is soft.   Musculoskeletal:         General: Normal range of motion.   Skin:     General: Skin is warm and dry.   Neurological:      Mental Status: She is alert and oriented to person, place, and time.   Psychiatric:         Behavior: Behavior normal.         Thought Content: Thought content normal.         Judgment: Judgment normal.       CMP  Sodium   Date Value Ref Range Status   03/15/2024 140 136 - 145 mmol/L Final     Potassium   Date Value Ref Range Status   03/15/2024 3.8 3.5 - 5.1 mmol/L Final     Chloride   Date Value Ref Range Status   03/15/2024 108 95 - 110 mmol/L Final     CO2   Date Value Ref Range Status   03/15/2024 23 23 - 29 mmol/L Final     Glucose   Date Value Ref Range Status   03/15/2024 149 (H) 70 - 110 mg/dL Final     BUN   Date Value Ref Range Status   03/15/2024 29 (H) 8 - 23 mg/dL Final     Creatinine   Date Value Ref Range Status   03/15/2024 1.8 (H) 0.5 - 1.4 mg/dL Final     Calcium   Date Value Ref Range Status   03/15/2024 9.5 8.7 - 10.5 mg/dL Final     Total Protein   Date Value Ref Range Status   03/09/2024 6.0 6.0 - 8.4 g/dL Final     Albumin   Date Value Ref Range Status   03/09/2024 3.3 (L) 3.5 - 5.2 g/dL Final     Total Bilirubin   Date Value Ref Range Status   03/09/2024 0.4 0.1 - 1.0 mg/dL Final     Comment:     For infants and newborns, interpretation of results should be based  on gestational age, weight and in agreement with clinical  observations.    Premature Infant recommended reference ranges:  Up to 24 hours.............<8.0 mg/dL  Up to 48 hours............<12.0 mg/dL  3-5 days..................<15.0 mg/dL  6-29 days.................<15.0 mg/dL       Alkaline Phosphatase   Date Value Ref Range Status   03/09/2024 59 55 - 135 U/L Final      AST   Date Value Ref Range Status   03/09/2024 19 10 - 40 U/L Final     ALT   Date Value Ref Range Status   03/09/2024 22 10 - 44 U/L Final     Anion Gap   Date Value Ref Range Status   03/15/2024 9 8 - 16 mmol/L Final     eGFR if    Date Value Ref Range Status   07/15/2022 38.5 (A) >60 mL/min/1.73 m^2 Final     eGFR if non    Date Value Ref Range Status   07/15/2022 33.4 (A) >60 mL/min/1.73 m^2 Final     Comment:     Calculation used to obtain the estimated glomerular filtration  rate (eGFR) is the CKD-EPI equation.         BMP  Lab Results   Component Value Date     03/15/2024    K 3.8 03/15/2024     03/15/2024    CO2 23 03/15/2024    BUN 29 (H) 03/15/2024    CREATININE 1.8 (H) 03/15/2024    CALCIUM 9.5 03/15/2024    ANIONGAP 9 03/15/2024    ESTGFRAFRICA 38.5 (A) 07/15/2022    EGFRNONAA 33.4 (A) 07/15/2022      BNP  @LABRCNTIP(BNP,BNPTRIAGEBLO)@   Lab Results   Component Value Date    CHOL 110 (L) 08/17/2023    CHOL 114 (L) 02/07/2022    CHOL 152 04/19/2021     Lab Results   Component Value Date    HDL 33 (L) 08/17/2023    HDL 34 (L) 02/07/2022    HDL 36 (L) 04/19/2021     Lab Results   Component Value Date    LDLCALC 9.2 (L) 08/17/2023    LDLCALC 39.2 (L) 02/07/2022    LDLCALC 41.6 (L) 04/19/2021     Lab Results   Component Value Date    TRIG 339 (H) 08/17/2023    TRIG 204 (H) 02/07/2022    TRIG 372 (H) 04/19/2021     Lab Results   Component Value Date    CHOLHDL 30.0 08/17/2023    CHOLHDL 29.8 02/07/2022    CHOLHDL 23.7 04/19/2021      Lab Results   Component Value Date    TSH 2.299 03/10/2024     Lab Results   Component Value Date    HGBA1C 8.1 (H) 05/22/2024     Lab Results   Component Value Date    WBC 8.27 03/10/2024    HGB 8.5 (L) 03/10/2024    HCT 27.1 (L) 03/10/2024    MCV 82 03/10/2024     03/10/2024         Results for orders placed during the hospital encounter of 03/03/24    Echo Saline Bubble? No    Interpretation Summary    Left Ventricle: The  left ventricle is normal in size. Mildly increased wall thickness. There is mild concentric hypertrophy. Mild global hypokinesis present. There is mildly reduced systolic function with a visually estimated ejection fraction of 45 - 50%. There is normal diastolic function.    Right Ventricle: Normal right ventricular cavity size. Wall thickness is normal. Right ventricle wall motion  is normal. Systolic function is normal.    Left Atrium: Left atrium is moderately dilated.    Aortic Valve: The aortic valve is a trileaflet valve. There is moderate aortic valve sclerosis. There is mild annular calcification present. Moderately restricted motion. There is moderate to severe stenosis. Aortic valve area by VTI is 0.99 cm². Aortic valve peak velocity is 2.40 m/s. Mean gradient is 13 mmHg. The dimensionless index is 0.31.    Tricuspid Valve: The tricuspid valve is structurally normal. There is normal leaflet mobility. There is mild regurgitation with a centrally directed jet.    Pulmonary Artery: There is mild to moderate pulmonary hypertension. The estimated pulmonary artery systolic pressure is 45 mmHg.    IVC/SVC: Intermediate venous pressure at 8 mmHg.    Pericardium: There is a trivial circumferential effusion. Pericardial effusion is echolucent. No indication of cardiac tamponade.     No results found for this or any previous visit.     EKG  Results for orders placed or performed during the hospital encounter of 03/03/24   EKG 12-lead    Collection Time: 03/10/24  2:26 PM   Result Value Ref Range    QRS Duration 106 ms    OHS QTC Calculation 440 ms    Narrative    Test Reason : I49.9,    Vent. Rate : 065 BPM     Atrial Rate : 065 BPM     P-R Int : 194 ms          QRS Dur : 106 ms      QT Int : 424 ms       P-R-T Axes : 062 -29 194 degrees     QTc Int : 440 ms    Atrial-paced rhythm with Premature atrial complexes  LVH with repolarization abnormality ( [place] product )  Abnormal ECG  When compared with ECG of  09-MAR-2024 18:56,  Electronic atrial pacemaker has replaced Atrial fibrillation  Vent. rate has decreased BY  52 BPM  Non-specific change in ST segment in Anterior leads  T wave inversion now evident in Anterior-lateral leads  Confirmed by Jannie RAMIREZ, Dany CHEN ([id number]) on 3/25/2024 9:42:50 PM    Referred By: [email or web address]   SELF           Confirmed By:Dany Valderrama MD      Stress  Results for orders placed during the hospital encounter of 03/03/24    Nuclear Stress Test    Interpretation Summary    The ECG portion of the study is negative for ischemia.    The patient reported no chest pain during the stress test.    The nuclear portion of this study will be reported separately.             Assessment:       Aortic stenosis  Peak velocity of 2.5 m/sec    Hyperlipidemia  On 10 mg of rosuvastatin    SVT (supraventricular tachycardia)  Controlled on metoprolol and diltiazem    SSS (sick sinus syndrome)  Status post pacemaker placement    Hypertension secondary to other renal disorders  Controlled on diltiazem and metoprolol    Chronic kidney disease (CKD), stage IV (severe)  Stable will continue to monitor    Non-STEMI (non-ST elevated myocardial infarction)  No symptoms of angina.  She had a non STEMI with a positive myocardial perfusion scan her renal function remains poor even though a coronary arteriogram should be performed       Plan:       Isosorbide mononitrate 30 mg p.o. daily.  She will be seen in the office in a proximally 3 months.

## 2024-06-10 PROBLEM — N17.9 AKI (ACUTE KIDNEY INJURY): Status: RESOLVED | Noted: 2022-03-31 | Resolved: 2024-06-10

## 2024-06-10 PROBLEM — I21.4 NSTEMI (NON-ST ELEVATED MYOCARDIAL INFARCTION): Status: RESOLVED | Noted: 2024-03-05 | Resolved: 2024-06-10

## 2024-06-23 NOTE — ASSESSMENT & PLAN NOTE
Peak velocity of 2.5 m/sec   contact for conjunctivitis, cardiac/fall precautions/oxygen therapy device and L/min

## 2024-06-23 NOTE — ASSESSMENT & PLAN NOTE
No symptoms of angina.  She had a non STEMI with a positive myocardial perfusion scan her renal function remains poor even though a coronary arteriogram should be performed

## 2024-07-28 ENCOUNTER — PATIENT MESSAGE (OUTPATIENT)
Dept: CARDIOLOGY | Facility: CLINIC | Age: 79
End: 2024-07-28
Payer: MEDICARE

## 2024-09-04 ENCOUNTER — OFFICE VISIT (OUTPATIENT)
Dept: CARDIOLOGY | Facility: CLINIC | Age: 79
End: 2024-09-04
Payer: MEDICARE

## 2024-09-04 VITALS
OXYGEN SATURATION: 97 % | SYSTOLIC BLOOD PRESSURE: 140 MMHG | HEIGHT: 63 IN | HEART RATE: 60 BPM | BODY MASS INDEX: 30.21 KG/M2 | WEIGHT: 170.5 LBS | DIASTOLIC BLOOD PRESSURE: 80 MMHG

## 2024-09-04 DIAGNOSIS — N18.4 CHRONIC KIDNEY DISEASE (CKD), STAGE IV (SEVERE): ICD-10-CM

## 2024-09-04 DIAGNOSIS — I15.1 HYPERTENSION SECONDARY TO OTHER RENAL DISORDERS: ICD-10-CM

## 2024-09-04 DIAGNOSIS — E78.2 MIXED HYPERLIPIDEMIA: ICD-10-CM

## 2024-09-04 DIAGNOSIS — I35.0 NONRHEUMATIC AORTIC VALVE STENOSIS: Primary | ICD-10-CM

## 2024-09-04 DIAGNOSIS — I49.5 SSS (SICK SINUS SYNDROME): ICD-10-CM

## 2024-09-04 PROCEDURE — 1126F AMNT PAIN NOTED NONE PRSNT: CPT | Mod: HCNC,CPTII,S$GLB, | Performed by: INTERNAL MEDICINE

## 2024-09-04 PROCEDURE — 99214 OFFICE O/P EST MOD 30 MIN: CPT | Mod: HCNC,S$GLB,, | Performed by: INTERNAL MEDICINE

## 2024-09-04 PROCEDURE — 3079F DIAST BP 80-89 MM HG: CPT | Mod: HCNC,CPTII,S$GLB, | Performed by: INTERNAL MEDICINE

## 2024-09-04 PROCEDURE — 1101F PT FALLS ASSESS-DOCD LE1/YR: CPT | Mod: HCNC,CPTII,S$GLB, | Performed by: INTERNAL MEDICINE

## 2024-09-04 PROCEDURE — 1160F RVW MEDS BY RX/DR IN RCRD: CPT | Mod: HCNC,CPTII,S$GLB, | Performed by: INTERNAL MEDICINE

## 2024-09-04 PROCEDURE — 3077F SYST BP >= 140 MM HG: CPT | Mod: HCNC,CPTII,S$GLB, | Performed by: INTERNAL MEDICINE

## 2024-09-04 PROCEDURE — 3288F FALL RISK ASSESSMENT DOCD: CPT | Mod: HCNC,CPTII,S$GLB, | Performed by: INTERNAL MEDICINE

## 2024-09-04 PROCEDURE — 1159F MED LIST DOCD IN RCRD: CPT | Mod: HCNC,CPTII,S$GLB, | Performed by: INTERNAL MEDICINE

## 2024-09-04 PROCEDURE — 99999 PR PBB SHADOW E&M-EST. PATIENT-LVL IV: CPT | Mod: PBBFAC,HCNC,, | Performed by: INTERNAL MEDICINE

## 2024-09-04 NOTE — PROGRESS NOTES
Patient ID:  Cynthia N Cushing is a 79 y.o. female who presents for follow-up of Aortic Stenosis, Congestive Heart Failure, Hypertension, and Hyperlipidemia      Aortic stenosis  Peak velocity of 2.5 m/sec     Hyperlipidemia  On 10 mg of rosuvastatin     SVT (supraventricular tachycardia)  Controlled on metoprolol and diltiazem     SSS (sick sinus syndrome)  Status post pacemaker placement     Hypertension secondary to other renal disorders  Controlled on diltiazem and metoprolol     Chronic kidney disease (CKD), stage IV (severe)  Stable will continue to monitor     Non-STEMI (non-ST elevated myocardial infarction)  No symptoms of angina.  She had a non STEMI with a positive myocardial perfusion scan her renal function remains poor even though a coronary arteriogram should be performed    6/5/24  During the last office visit the family complained that she was very sleepy.  The doses of guanfacine were decreased to 1 mg daily.  She feels better denies any chest pains any shortness of breath her dyspnea on exertion is doing better.  Her blood pressure has been better controlled.  On Imdur 30 mg p.o. daily.  09/04/2024  She has been on isosorbide mononitrate 30 mg every day with no symptoms of angina.  She saw her primary care physician Dr. Adams.  Blood work was done her creatinine had increased to 2.2.  She has been taking Lasix twice a week, the Crestor was discontinue as well as a Pepcid.  She denies any chest pains.  She has an appointment with Dr. Adams in the near future.        Past Medical History:   Diagnosis Date    Allergy     Breast mass     Cataract     Diabetes mellitus     GERD (gastroesophageal reflux disease)     Hernia, hiatal     Hyperlipidemia     Hypertension     Kidney stone     Osteoarthritis     Renal insufficiency     Seasonal allergies     Sleep apnea     SSS (sick sinus syndrome)     SVT (supraventricular tachycardia)         Past Surgical History:   Procedure Laterality Date    A-V  "CARDIAC PACEMAKER INSERTION N/A 3/10/2024    Procedure: INSERTION, CARDIAC PACEMAKER, DUAL CHAMBER;  Surgeon: Brijesh Mendieta MD;  Location: [facility] CATH/EP LAB;  Service: Cardiology;  Laterality: N/A;    BREAST BIOPSY      BREAST CYST ASPIRATION      BREAST SURGERY      CERVICAL DISC SURGERY      EYE SURGERY      cataracts bilateral    HYSTERECTOMY            Current Outpatient Medications   Medication Instructions    ACCU-CHEK AMADOR PLUS TEST STRP Strp TEST BLOOD SUGAR FOUR TIMES DAILY    aspirin 81 mg, Oral, Daily    cetirizine (ZYRTEC) 10 mg, Oral, Daily    clopidogreL (PLAVIX) 75 mg, Oral, Daily    diltiaZEM (CARDIZEM CD) 120 mg, Oral, Daily    famotidine (PEPCID) 20 MG tablet TAKE 1 TABLET TWICE DAILY    ferrous sulfate 324 mg, Oral, Daily    fluconazole (DIFLUCAN) 50 MG Tab One a day for three days    furosemide (LASIX) 20 mg, Oral, Daily    guanFACINE (TENEX) 2 MG tablet TAKE 1 TABLET EVERY EVENING    hydrALAZINE (APRESOLINE) 50 mg, Oral, 3 times daily    isosorbide mononitrate (IMDUR) 30 mg, Oral, Daily    lancets (ACCU-CHEK SOFTCLIX LANCETS) Misc TEST BLOOD SUGAR FOUR TIMES DAILY    LANTUS SOLOSTAR U-100 INSULIN glargine 100 units/mL SubQ pen ADMINISTER 51 UNITS UNDER THE SKIN AT NIGHT    metoprolol tartrate (LOPRESSOR) 50 mg, Oral, 2 times daily    metoprolol tartrate (LOPRESSOR) 75 mg, Oral, 2 times daily    pen needle, diabetic (BD ANNA 2ND GEN PEN NEEDLE) 32 gauge x 5/32" Ndle INJECT 1 NEEDLE INTO THE SKIN EVERY EVENING    rosuvastatin (CRESTOR) 10 mg, Oral, Nightly        Review of patient's allergies indicates:   Allergen Reactions    Biaxin [clarithromycin]     Prandin [repaglinide] Nausea And Vomiting    Amlodipine     Chlorphen-phenyltolox-pe-ppa     Ciprofloxacin Nausea And Vomiting    Omnicef [cefdinir]     Propoxyphene     Quinine Nausea And Vomiting        Review of Systems   Cardiovascular:  Positive for dyspnea on exertion. Negative for chest pain, irregular heartbeat and palpitations. " "  Respiratory:  Negative for cough and shortness of breath.         Objective:     Vitals:    09/04/24 0816   BP: (!) 140/80   BP Location: Left arm   Patient Position: Sitting   BP Method: Medium (Manual)   Pulse: 60   SpO2: 97%   Weight: 77.3 kg (170 lb 8.4 oz)   Height: 5' 3" (1.6 m)       Physical Exam  Vitals and nursing note reviewed.   Constitutional:       Appearance: She is well-developed.   HENT:      Head: Normocephalic and atraumatic.   Eyes:      Conjunctiva/sclera: Conjunctivae normal.   Cardiovascular:      Rate and Rhythm: Normal rate and regular rhythm.      Heart sounds: Murmur (Grade 3/6 systolic murmur at the base) heard.   Pulmonary:      Effort: Pulmonary effort is normal.      Breath sounds: Normal breath sounds.   Abdominal:      General: Bowel sounds are normal.      Palpations: Abdomen is soft.   Musculoskeletal:         General: Normal range of motion.   Skin:     General: Skin is warm and dry.   Neurological:      Mental Status: She is alert and oriented to person, place, and time.   Psychiatric:         Behavior: Behavior normal.         Thought Content: Thought content normal.         Judgment: Judgment normal.       CMP  Sodium   Date Value Ref Range Status   03/15/2024 140 136 - 145 mmol/L Final     Potassium   Date Value Ref Range Status   03/15/2024 3.8 3.5 - 5.1 mmol/L Final     Chloride   Date Value Ref Range Status   03/15/2024 108 95 - 110 mmol/L Final     CO2   Date Value Ref Range Status   03/15/2024 23 23 - 29 mmol/L Final     Glucose   Date Value Ref Range Status   03/15/2024 149 (H) 70 - 110 mg/dL Final     BUN   Date Value Ref Range Status   03/15/2024 29 (H) 8 - 23 mg/dL Final     Creatinine   Date Value Ref Range Status   03/15/2024 1.8 (H) 0.5 - 1.4 mg/dL Final     Calcium   Date Value Ref Range Status   03/15/2024 9.5 8.7 - 10.5 mg/dL Final     Total Protein   Date Value Ref Range Status   03/09/2024 6.0 6.0 - 8.4 g/dL Final     Albumin   Date Value Ref Range Status "   03/09/2024 3.3 (L) 3.5 - 5.2 g/dL Final     Total Bilirubin   Date Value Ref Range Status   03/09/2024 0.4 0.1 - 1.0 mg/dL Final     Comment:     For infants and newborns, interpretation of results should be based  on gestational age, weight and in agreement with clinical  observations.    Premature Infant recommended reference ranges:  Up to 24 hours.............<8.0 mg/dL  Up to 48 hours............<12.0 mg/dL  3-5 days..................<15.0 mg/dL  6-29 days.................<15.0 mg/dL       Alkaline Phosphatase   Date Value Ref Range Status   03/09/2024 59 55 - 135 U/L Final     AST   Date Value Ref Range Status   03/09/2024 19 10 - 40 U/L Final     ALT   Date Value Ref Range Status   03/09/2024 22 10 - 44 U/L Final     Anion Gap   Date Value Ref Range Status   03/15/2024 9 8 - 16 mmol/L Final     eGFR if    Date Value Ref Range Status   07/15/2022 38.5 (A) >60 mL/min/1.73 m^2 Final     eGFR if non    Date Value Ref Range Status   07/15/2022 33.4 (A) >60 mL/min/1.73 m^2 Final     Comment:     Calculation used to obtain the estimated glomerular filtration  rate (eGFR) is the CKD-EPI equation.         BMP  Lab Results   Component Value Date     03/15/2024    K 3.8 03/15/2024     03/15/2024    CO2 23 03/15/2024    BUN 29 (H) 03/15/2024    CREATININE 1.8 (H) 03/15/2024    CALCIUM 9.5 03/15/2024    ANIONGAP 9 03/15/2024    ESTGFRAFRICA 38.5 (A) 07/15/2022    EGFRNONAA 33.4 (A) 07/15/2022      BNP  @LABRCNTIP(BNP,BNPTRIAGEBLO)@   Lab Results   Component Value Date    CHOL 110 (L) 08/17/2023    CHOL 114 (L) 02/07/2022    CHOL 152 04/19/2021     Lab Results   Component Value Date    HDL 33 (L) 08/17/2023    HDL 34 (L) 02/07/2022    HDL 36 (L) 04/19/2021     Lab Results   Component Value Date    LDLCALC 9.2 (L) 08/17/2023    LDLCALC 39.2 (L) 02/07/2022    LDLCALC 41.6 (L) 04/19/2021     Lab Results   Component Value Date    TRIG 339 (H) 08/17/2023    TRIG 204 (H) 02/07/2022     TRIG 372 (H) 04/19/2021     Lab Results   Component Value Date    CHOLHDL 30.0 08/17/2023    CHOLHDL 29.8 02/07/2022    CHOLHDL 23.7 04/19/2021      Lab Results   Component Value Date    TSH 2.299 03/10/2024     Lab Results   Component Value Date    HGBA1C 8.1 (H) 05/22/2024     Lab Results   Component Value Date    WBC 8.27 03/10/2024    HGB 8.5 (L) 03/10/2024    HCT 27.1 (L) 03/10/2024    MCV 82 03/10/2024     03/10/2024         Results for orders placed during the hospital encounter of 03/03/24    Echo Saline Bubble? No    Interpretation Summary    Left Ventricle: The left ventricle is normal in size. Mildly increased wall thickness. There is mild concentric hypertrophy. Mild global hypokinesis present. There is mildly reduced systolic function with a visually estimated ejection fraction of 45 - 50%. There is normal diastolic function.    Right Ventricle: Normal right ventricular cavity size. Wall thickness is normal. Right ventricle wall motion  is normal. Systolic function is normal.    Left Atrium: Left atrium is moderately dilated.    Aortic Valve: The aortic valve is a trileaflet valve. There is moderate aortic valve sclerosis. There is mild annular calcification present. Moderately restricted motion. There is moderate to severe stenosis. Aortic valve area by VTI is 0.99 cm². Aortic valve peak velocity is 2.40 m/s. Mean gradient is 13 mmHg. The dimensionless index is 0.31.    Tricuspid Valve: The tricuspid valve is structurally normal. There is normal leaflet mobility. There is mild regurgitation with a centrally directed jet.    Pulmonary Artery: There is mild to moderate pulmonary hypertension. The estimated pulmonary artery systolic pressure is 45 mmHg.    IVC/SVC: Intermediate venous pressure at 8 mmHg.    Pericardium: There is a trivial circumferential effusion. Pericardial effusion is echolucent. No indication of cardiac tamponade.     No results found for this or any previous visit.      EKG  Results for orders placed or performed during the hospital encounter of 03/03/24   EKG 12-lead    Collection Time: 03/10/24  2:26 PM   Result Value Ref Range    QRS Duration 106 ms    [facility] QTC Calculation 440 ms    Narrative    Test Reason : I49.9,    Vent. Rate : 065 BPM     Atrial Rate : 065 BPM     P-R Int : 194 ms          QRS Dur : 106 ms      QT Int : 424 ms       P-R-T Axes : 062 -29 194 degrees     QTc Int : 440 ms    Atrial-paced rhythm with Premature atrial complexes  LVH with repolarization abnormality ( Art product )  Abnormal ECG  When compared with ECG of 09-MAR-2024 18:56,  Electronic atrial pacemaker has replaced Atrial fibrillation  Vent. rate has decreased BY  52 BPM  Non-specific change in ST segment in Anterior leads  T wave inversion now evident in Anterior-lateral leads  Confirmed by Jannie RAMIREZ, Dany CHEN ([id number]) on 3/25/2024 9:42:50 PM    Referred By: [email or web address]   SELF           Confirmed By:Dany Valderrama MD      Stress  Results for orders placed during the hospital encounter of 03/03/24    Nuclear Stress Test    Interpretation Summary    The ECG portion of the study is negative for ischemia.    The patient reported no chest pain during the stress test.    The nuclear portion of this study will be reported separately.     MPRESSION: Reversible defect in the inferior apical region suggestive of stress-induced ischemia. Correlation with EKG changes is recommended     Ejection fraction 49%     Electronically signed by:  Norah Thomas MD  03/07/2024         Assessment:       Aortic stenosis  Stable peak velocity of 2.5 m/sec.    Hyperlipidemia  She was on 10 mg of rosuvastatin although due to the deterioration of her creatinine it has been on hold until she is reassess    Hypertension secondary to other renal disorders  Controlled on diltiazem, metoprolol, guanfacine 1 mg q.h.s. if her blood pressure remains elevated will consider adding amlodipine 2.5 mg daily.  At this point will  monitor her blood pressure    SSS (sick sinus syndrome)  Status post pacemaker placement    Chronic kidney disease (CKD), stage IV (severe)  Deterioration of her renal function her diuretics have been decreased as well as the statins.  This is followed closely by her primary care physician       Plan:       Continue the diltiazem and metoprolol as well as hydralazine for blood pressure control.  Continue dual antiplatelets.  Follow-up her renal function closely with her primary care physician.  She will be seen in the office in a proximally 3 months

## 2024-09-16 NOTE — ASSESSMENT & PLAN NOTE
Deterioration of her renal function her diuretics have been decreased as well as the statins.  This is followed closely by her primary care physician

## 2024-09-16 NOTE — ASSESSMENT & PLAN NOTE
She was on 10 mg of rosuvastatin although due to the deterioration of her creatinine it has been on hold until she is reassess

## 2024-09-16 NOTE — ASSESSMENT & PLAN NOTE
Controlled on diltiazem, metoprolol, guanfacine 1 mg q.h.s. if her blood pressure remains elevated will consider adding amlodipine 2.5 mg daily.  At this point will monitor her blood pressure

## 2024-09-23 PROBLEM — I21.4 NON-STEMI (NON-ST ELEVATED MYOCARDIAL INFARCTION): Status: RESOLVED | Noted: 2024-03-05 | Resolved: 2024-09-23

## 2024-11-04 RX ORDER — ROSUVASTATIN CALCIUM 10 MG/1
10 TABLET, COATED ORAL NIGHTLY
Qty: 90 TABLET | Refills: 2 | Status: SHIPPED | OUTPATIENT
Start: 2024-11-04

## 2024-11-12 ENCOUNTER — TELEPHONE (OUTPATIENT)
Dept: CARDIOLOGY | Facility: CLINIC | Age: 79
End: 2024-11-12
Payer: MEDICARE

## 2024-11-12 DIAGNOSIS — R06.02 SOB (SHORTNESS OF BREATH): Primary | ICD-10-CM

## 2024-11-13 ENCOUNTER — LAB VISIT (OUTPATIENT)
Dept: LAB | Facility: HOSPITAL | Age: 79
End: 2024-11-13
Attending: INTERNAL MEDICINE
Payer: MEDICARE

## 2024-11-13 DIAGNOSIS — R60.0 BILATERAL LEG EDEMA: ICD-10-CM

## 2024-11-13 DIAGNOSIS — R06.02 SOB (SHORTNESS OF BREATH): ICD-10-CM

## 2024-11-13 DIAGNOSIS — N18.32 STAGE 3B CHRONIC KIDNEY DISEASE: ICD-10-CM

## 2024-11-13 DIAGNOSIS — E78.2 MIXED HYPERLIPIDEMIA: ICD-10-CM

## 2024-11-13 DIAGNOSIS — I10 ESSENTIAL HYPERTENSION, BENIGN: ICD-10-CM

## 2024-11-13 LAB
ALBUMIN SERPL BCP-MCNC: 3.9 G/DL (ref 3.5–5.2)
ALP SERPL-CCNC: 61 U/L (ref 55–135)
ALT SERPL W/O P-5'-P-CCNC: 18 U/L (ref 10–44)
ANION GAP SERPL CALC-SCNC: 8 MMOL/L (ref 8–16)
AST SERPL-CCNC: 20 U/L (ref 10–40)
BILIRUB SERPL-MCNC: 0.4 MG/DL (ref 0.1–1)
BNP SERPL-MCNC: 1206 PG/ML (ref 0–99)
BUN SERPL-MCNC: 25 MG/DL (ref 8–23)
CALCIUM SERPL-MCNC: 9.6 MG/DL (ref 8.7–10.5)
CHLORIDE SERPL-SCNC: 107 MMOL/L (ref 95–110)
CO2 SERPL-SCNC: 27 MMOL/L (ref 23–29)
CREAT SERPL-MCNC: 1.8 MG/DL (ref 0.5–1.4)
ERYTHROCYTE [DISTWIDTH] IN BLOOD BY AUTOMATED COUNT: 15.9 % (ref 11.5–14.5)
EST. GFR  (NO RACE VARIABLE): 28.3 ML/MIN/1.73 M^2
GLUCOSE SERPL-MCNC: 155 MG/DL (ref 70–110)
HCT VFR BLD AUTO: 31.2 % (ref 37–48.5)
HGB BLD-MCNC: 9.4 G/DL (ref 12–16)
MCH RBC QN AUTO: 25.6 PG (ref 27–31)
MCHC RBC AUTO-ENTMCNC: 30.1 G/DL (ref 32–36)
MCV RBC AUTO: 85 FL (ref 82–98)
PLATELET # BLD AUTO: 280 K/UL (ref 150–450)
PMV BLD AUTO: 11.4 FL (ref 9.2–12.9)
POTASSIUM SERPL-SCNC: 3.8 MMOL/L (ref 3.5–5.1)
PROT SERPL-MCNC: 6.4 G/DL (ref 6–8.4)
RBC # BLD AUTO: 3.67 M/UL (ref 4–5.4)
SODIUM SERPL-SCNC: 142 MMOL/L (ref 136–145)
TSH SERPL DL<=0.005 MIU/L-ACNC: 4.14 UIU/ML (ref 0.34–5.6)
WBC # BLD AUTO: 9.65 K/UL (ref 3.9–12.7)

## 2024-11-13 PROCEDURE — 85027 COMPLETE CBC AUTOMATED: CPT | Performed by: INTERNAL MEDICINE

## 2024-11-13 PROCEDURE — 80053 COMPREHEN METABOLIC PANEL: CPT | Performed by: INTERNAL MEDICINE

## 2024-11-13 PROCEDURE — 83880 ASSAY OF NATRIURETIC PEPTIDE: CPT | Performed by: INTERNAL MEDICINE

## 2024-11-13 PROCEDURE — 84443 ASSAY THYROID STIM HORMONE: CPT | Performed by: INTERNAL MEDICINE

## 2024-11-13 PROCEDURE — 36415 COLL VENOUS BLD VENIPUNCTURE: CPT | Performed by: INTERNAL MEDICINE

## 2024-11-15 ENCOUNTER — OFFICE VISIT (OUTPATIENT)
Dept: CARDIOLOGY | Facility: CLINIC | Age: 79
End: 2024-11-15
Payer: MEDICARE

## 2024-11-15 VITALS
OXYGEN SATURATION: 98 % | DIASTOLIC BLOOD PRESSURE: 62 MMHG | WEIGHT: 175.69 LBS | HEIGHT: 63 IN | BODY MASS INDEX: 31.13 KG/M2 | HEART RATE: 73 BPM | SYSTOLIC BLOOD PRESSURE: 122 MMHG

## 2024-11-15 DIAGNOSIS — I15.1 HYPERTENSION SECONDARY TO OTHER RENAL DISORDERS: ICD-10-CM

## 2024-11-15 DIAGNOSIS — I21.4 NON-STEMI (NON-ST ELEVATED MYOCARDIAL INFARCTION): ICD-10-CM

## 2024-11-15 DIAGNOSIS — N18.4 CHRONIC KIDNEY DISEASE (CKD), STAGE IV (SEVERE): ICD-10-CM

## 2024-11-15 DIAGNOSIS — I35.0 NONRHEUMATIC AORTIC VALVE STENOSIS: ICD-10-CM

## 2024-11-15 DIAGNOSIS — I49.5 SSS (SICK SINUS SYNDROME): ICD-10-CM

## 2024-11-15 DIAGNOSIS — E78.2 MIXED HYPERLIPIDEMIA: ICD-10-CM

## 2024-11-15 DIAGNOSIS — N18.32 STAGE 3B CHRONIC KIDNEY DISEASE: ICD-10-CM

## 2024-11-15 DIAGNOSIS — R06.02 SOB (SHORTNESS OF BREATH): Primary | ICD-10-CM

## 2024-11-15 PROCEDURE — 1159F MED LIST DOCD IN RCRD: CPT | Mod: CPTII,S$GLB,, | Performed by: INTERNAL MEDICINE

## 2024-11-15 PROCEDURE — 3074F SYST BP LT 130 MM HG: CPT | Mod: CPTII,S$GLB,, | Performed by: INTERNAL MEDICINE

## 2024-11-15 PROCEDURE — 99999 PR PBB SHADOW E&M-EST. PATIENT-LVL IV: CPT | Mod: PBBFAC,,, | Performed by: INTERNAL MEDICINE

## 2024-11-15 PROCEDURE — 1126F AMNT PAIN NOTED NONE PRSNT: CPT | Mod: CPTII,S$GLB,, | Performed by: INTERNAL MEDICINE

## 2024-11-15 PROCEDURE — 1160F RVW MEDS BY RX/DR IN RCRD: CPT | Mod: CPTII,S$GLB,, | Performed by: INTERNAL MEDICINE

## 2024-11-15 PROCEDURE — 3078F DIAST BP <80 MM HG: CPT | Mod: CPTII,S$GLB,, | Performed by: INTERNAL MEDICINE

## 2024-11-15 PROCEDURE — 1101F PT FALLS ASSESS-DOCD LE1/YR: CPT | Mod: CPTII,S$GLB,, | Performed by: INTERNAL MEDICINE

## 2024-11-15 PROCEDURE — 99213 OFFICE O/P EST LOW 20 MIN: CPT | Mod: S$GLB,,, | Performed by: INTERNAL MEDICINE

## 2024-11-15 PROCEDURE — 3288F FALL RISK ASSESSMENT DOCD: CPT | Mod: CPTII,S$GLB,, | Performed by: INTERNAL MEDICINE

## 2024-11-15 RX ORDER — ISOSORBIDE DINITRATE 10 MG/1
10 TABLET ORAL 3 TIMES DAILY
Qty: 90 TABLET | Refills: 11 | Status: SHIPPED | OUTPATIENT
Start: 2024-11-15 | End: 2025-11-15

## 2024-11-15 NOTE — PROGRESS NOTES
Patient ID:  Cynthia N Cushing is a 79 y.o. female who presents for follow-up of Congestive Heart Failure, Shortness of Breath, Results (labs), and Edema (Right ankle)      Aortic stenosis  Peak velocity of 2.5 m/sec     Hyperlipidemia  On 10 mg of rosuvastatin     SVT (supraventricular tachycardia)  Controlled on metoprolol and diltiazem     SSS (sick sinus syndrome)  Status post pacemaker placement     Hypertension secondary to other renal disorders  Controlled on diltiazem and metoprolol     Chronic kidney disease (CKD), stage IV (severe)  Stable will continue to monitor     Non-STEMI (non-ST elevated myocardial infarction)  No symptoms of angina.  She had a non STEMI with a positive myocardial perfusion scan her renal function remains poor even though a coronary arteriogram should be performed     6/5/24  During the last office visit the family complained that she was very sleepy.  The doses of guanfacine were decreased to 1 mg daily.  She feels better denies any chest pains any shortness of breath her dyspnea on exertion is doing better.  Her blood pressure has been better controlled.  On Imdur 30 mg p.o. daily.  09/04/2024  She has been on isosorbide mononitrate 30 mg every day with no symptoms of angina.  She saw her primary care physician Dr. Adams.  Blood work was done her creatinine had increased to 2.2.  She has been taking Lasix twice a week, the Crestor was discontinue as well as a Pepcid.  She denies any chest pains.  She has an appointment with Dr. Adams in the near future.   11/15/2024  She has been having a lot of shortness of breath usually occurs in the morning she is having a lot of dyspnea on exertion.        Past Medical History:   Diagnosis Date    Allergy     Breast mass     Cataract     Diabetes mellitus     GERD (gastroesophageal reflux disease)     Hernia, hiatal     Hyperlipidemia     Hypertension     Kidney stone     Osteoarthritis     Renal insufficiency     Seasonal allergies   "   Sleep apnea     SSS (sick sinus syndrome)     SVT (supraventricular tachycardia)         Past Surgical History:   Procedure Laterality Date    A-V CARDIAC PACEMAKER INSERTION N/A 3/10/2024    Procedure: INSERTION, CARDIAC PACEMAKER, DUAL CHAMBER;  Surgeon: Brijesh Mendieta MD;  Location: [facility] CATH/EP LAB;  Service: Cardiology;  Laterality: N/A;    BREAST BIOPSY      BREAST CYST ASPIRATION      BREAST SURGERY      CERVICAL DISC SURGERY      EYE SURGERY      cataracts bilateral    HYSTERECTOMY            Current Outpatient Medications   Medication Instructions    ACCU-CHEK AMADOR PLUS TEST STRP Strp TEST BLOOD SUGAR FOUR TIMES DAILY    aspirin 81 mg, Daily    cetirizine (ZYRTEC) 10 mg, Daily    clopidogreL (PLAVIX) 75 mg, Oral, Daily    diltiaZEM (CARDIZEM CD) 120 mg, Oral, Daily    famotidine (PEPCID) 20 MG tablet TAKE 1 TABLET TWICE DAILY    ferrous sulfate 324 mg, Oral, Daily    fluconazole (DIFLUCAN) 50 MG Tab One a day for three days    furosemide (LASIX) 20 mg, Oral, Daily    guanFACINE (TENEX) 2 MG tablet TAKE 1 TABLET EVERY EVENING    hydrALAZINE (APRESOLINE) 50 mg, Oral, 3 times daily    isosorbide dinitrate (ISORDIL) 10 mg, Oral, 3 times daily    lancets (ACCU-CHEK SOFTCLIX LANCETS) Misc TEST BLOOD SUGAR FOUR TIMES DAILY    LANTUS SOLOSTAR U-100 INSULIN glargine 100 units/mL SubQ pen ADMINISTER 51 UNITS UNDER THE SKIN AT NIGHT    metoprolol tartrate (LOPRESSOR) 50 mg, Oral, 2 times daily    metoprolol tartrate (LOPRESSOR) 75 mg, Oral, 2 times daily    pen needle, diabetic (BD ANNA 2ND GEN PEN NEEDLE) 32 gauge x 5/32" Ndle INJECT 1 NEEDLE INTO THE SKIN EVERY EVENING    rosuvastatin (CRESTOR) 10 mg, Oral, Nightly        Review of patient's allergies indicates:   Allergen Reactions    Biaxin [clarithromycin]     Prandin [repaglinide] Nausea And Vomiting    Amlodipine     Chlorphen-phenyltolox-pe-ppa     Ciprofloxacin Nausea And Vomiting    Omnicef [cefdinir]     Propoxyphene     Quinine Nausea And " "Vomiting        Review of Systems   Cardiovascular:  Positive for chest pain, dyspnea on exertion and palpitations.   Respiratory:  Positive for shortness of breath. Negative for cough.         Objective:     Vitals:    11/15/24 1127   BP: 122/62   BP Location: Left arm   Patient Position: Sitting   Pulse: 73   SpO2: 98%   Weight: 79.7 kg (175 lb 11.3 oz)   Height: 5' 3" (1.6 m)       Physical Exam  Vitals and nursing note reviewed.   Constitutional:       Appearance: She is well-developed.   HENT:      Head: Normocephalic and atraumatic.   Eyes:      Conjunctiva/sclera: Conjunctivae normal.   Cardiovascular:      Rate and Rhythm: Normal rate and regular rhythm.      Heart sounds: Murmur (Grade 4/6 systolic murmur at the base) heard.   Pulmonary:      Effort: Pulmonary effort is normal.      Breath sounds: Normal breath sounds.   Abdominal:      General: Bowel sounds are normal.      Palpations: Abdomen is soft.   Musculoskeletal:         General: Normal range of motion.   Skin:     General: Skin is warm and dry.   Neurological:      Mental Status: She is alert and oriented to person, place, and time.   Psychiatric:         Behavior: Behavior normal.         Thought Content: Thought content normal.         Judgment: Judgment normal.       CMP  Sodium   Date Value Ref Range Status   11/13/2024 142 136 - 145 mmol/L Final     Potassium   Date Value Ref Range Status   11/13/2024 3.8 3.5 - 5.1 mmol/L Final     Chloride   Date Value Ref Range Status   11/13/2024 107 95 - 110 mmol/L Final     CO2   Date Value Ref Range Status   11/13/2024 27 23 - 29 mmol/L Final     Glucose   Date Value Ref Range Status   11/13/2024 155 (H) 70 - 110 mg/dL Final     BUN   Date Value Ref Range Status   11/13/2024 25 (H) 8 - 23 mg/dL Final     Creatinine   Date Value Ref Range Status   11/13/2024 1.8 (H) 0.5 - 1.4 mg/dL Final     Calcium   Date Value Ref Range Status   11/13/2024 9.6 8.7 - 10.5 mg/dL Final     Total Protein   Date Value Ref " Range Status   11/13/2024 6.4 6.0 - 8.4 g/dL Final     Albumin   Date Value Ref Range Status   11/13/2024 3.9 3.5 - 5.2 g/dL Final     Total Bilirubin   Date Value Ref Range Status   11/13/2024 0.4 0.1 - 1.0 mg/dL Final     Comment:     For infants and newborns, interpretation of results should be based  on gestational age, weight and in agreement with clinical  observations.    Premature Infant recommended reference ranges:  Up to 24 hours.............<8.0 mg/dL  Up to 48 hours............<12.0 mg/dL  3-5 days..................<15.0 mg/dL  6-29 days.................<15.0 mg/dL       Alkaline Phosphatase   Date Value Ref Range Status   11/13/2024 61 55 - 135 U/L Final     AST   Date Value Ref Range Status   11/13/2024 20 10 - 40 U/L Final     ALT   Date Value Ref Range Status   11/13/2024 18 10 - 44 U/L Final     Anion Gap   Date Value Ref Range Status   11/13/2024 8 8 - 16 mmol/L Final     eGFR if    Date Value Ref Range Status   07/15/2022 38.5 (A) >60 mL/min/1.73 m^2 Final     eGFR if non    Date Value Ref Range Status   07/15/2022 33.4 (A) >60 mL/min/1.73 m^2 Final     Comment:     Calculation used to obtain the estimated glomerular filtration  rate (eGFR) is the CKD-EPI equation.         BMP  Lab Results   Component Value Date     11/13/2024    K 3.8 11/13/2024     11/13/2024    CO2 27 11/13/2024    BUN 25 (H) 11/13/2024    CREATININE 1.8 (H) 11/13/2024    CALCIUM 9.6 11/13/2024    ANIONGAP 8 11/13/2024    ESTGFRAFRICA 38.5 (A) 07/15/2022    EGFRNONAA 33.4 (A) 07/15/2022      BNP  @LABRCNTIP(BNP,BNPTRIAGEBLO)@   Lab Results   Component Value Date    CHOL 110 (L) 08/17/2023    CHOL 114 (L) 02/07/2022    CHOL 152 04/19/2021     Lab Results   Component Value Date    HDL 33 (L) 08/17/2023    HDL 34 (L) 02/07/2022    HDL 36 (L) 04/19/2021     Lab Results   Component Value Date    LDLCALC 9.2 (L) 08/17/2023    LDLCALC 39.2 (L) 02/07/2022    LDLCALC 41.6 (L) 04/19/2021      Lab Results   Component Value Date    TRIG 339 (H) 08/17/2023    TRIG 204 (H) 02/07/2022    TRIG 372 (H) 04/19/2021     Lab Results   Component Value Date    CHOLHDL 30.0 08/17/2023    CHOLHDL 29.8 02/07/2022    CHOLHDL 23.7 04/19/2021      Lab Results   Component Value Date    TSH 4.143 11/13/2024     Lab Results   Component Value Date    HGBA1C 8.1 (H) 05/22/2024     Lab Results   Component Value Date    WBC 9.65 11/13/2024    HGB 9.4 (L) 11/13/2024    HCT 31.2 (L) 11/13/2024    MCV 85 11/13/2024     11/13/2024         Results for orders placed during the hospital encounter of 03/03/24    Echo Saline Bubble? No    Interpretation Summary    Left Ventricle: The left ventricle is normal in size. Mildly increased wall thickness. There is mild concentric hypertrophy. Mild global hypokinesis present. There is mildly reduced systolic function with a visually estimated ejection fraction of 45 - 50%. There is normal diastolic function.    Right Ventricle: Normal right ventricular cavity size. Wall thickness is normal. Right ventricle wall motion  is normal. Systolic function is normal.    Left Atrium: Left atrium is moderately dilated.    Aortic Valve: The aortic valve is a trileaflet valve. There is moderate aortic valve sclerosis. There is mild annular calcification present. Moderately restricted motion. There is moderate to severe stenosis. Aortic valve area by VTI is 0.99 cm². Aortic valve peak velocity is 2.40 m/s. Mean gradient is 13 mmHg. The dimensionless index is 0.31.    Tricuspid Valve: The tricuspid valve is structurally normal. There is normal leaflet mobility. There is mild regurgitation with a centrally directed jet.    Pulmonary Artery: There is mild to moderate pulmonary hypertension. The estimated pulmonary artery systolic pressure is 45 mmHg.    IVC/SVC: Intermediate venous pressure at 8 mmHg.    Pericardium: There is a trivial circumferential effusion. Pericardial effusion is echolucent. No  indication of cardiac tamponade.     No results found for this or any previous visit.     EKG  Results for orders placed or performed during the hospital encounter of 03/03/24   EKG 12-lead    Collection Time: 03/10/24  2:26 PM   Result Value Ref Range    QRS Duration 106 ms    [facility] QTC Calculation 440 ms    Narrative    Test Reason : I49.9,    Vent. Rate : 065 BPM     Atrial Rate : 065 BPM     P-R Int : 194 ms          QRS Dur : 106 ms      QT Int : 424 ms       P-R-T Axes : 062 -29 194 degrees     QTc Int : 440 ms    Atrial-paced rhythm with Premature atrial complexes  LVH with repolarization abnormality ( [place] product )  Abnormal ECG  When compared with ECG of 09-MAR-2024 18:56,  Electronic atrial pacemaker has replaced Atrial fibrillation  Vent. rate has decreased BY  52 BPM  Non-specific change in ST segment in Anterior leads  T wave inversion now evident in Anterior-lateral leads  Confirmed by Jannie RAMIREZ, Dany CHEN ([id number]) on 3/25/2024 9:42:50 PM    Referred By: [email or web address]   SELF           Confirmed By:Dany Valderrama MD      Stress  Results for orders placed during the hospital encounter of 03/03/24    Nuclear Stress Test    Interpretation Summary    The ECG portion of the study is negative for ischemia.    The patient reported no chest pain during the stress test.    The nuclear portion of this study will be reported separately.             Assessment:       Aortic stenosis  Peak velocity 2.5 m/sec    Hyperlipidemia  She was on 10 mg of rosuvastatin although due to the deterioration of her creatinine it has been on hold until she is reassess     Hypertension secondary to other renal disorders  Controlled on diltiazem, metoprolol, guanfacine 1 mg q.h.s. if her blood pressure remains elevated will consider adding amlodipine 2.5 mg daily.  At this point will monitor her blood pressure     SSS (sick sinus syndrome)  Status post pacemaker placement    Chronic kidney disease (CKD), stage IV (severe)  Deterioration  of her renal function her diuretics have been decreased as well as the statins. This is followed closely by her primary care physician     Non-STEMI (non-ST elevated myocardial infarction)  No symptoms of angina. She had a non STEMI with a positive myocardial perfusion scan her renal function remains poor even though a coronary arteriogram should be performed        Plan:       Start Lasix 20 mg p.o. daily.  Decrease the hydralazine to 25 mg 3 times a day.  Start isosorbide dinitrate 10 mg 3 times a day.  She is to get a BMP BNP and a CBC on December 2nd obtain an echocardiogram on December 2nd.  Return to the office on December 3rd.  If her symptoms of angina continues will consider a coronary arteriogram.

## 2024-12-02 ENCOUNTER — HOSPITAL ENCOUNTER (OUTPATIENT)
Dept: CARDIOLOGY | Facility: HOSPITAL | Age: 79
Discharge: HOME | End: 2024-12-02
Attending: INTERNAL MEDICINE
Payer: MEDICARE

## 2024-12-02 VITALS — WEIGHT: 175 LBS | HEIGHT: 63 IN | BODY MASS INDEX: 31.01 KG/M2

## 2024-12-02 DIAGNOSIS — E78.2 MIXED HYPERLIPIDEMIA: ICD-10-CM

## 2024-12-02 DIAGNOSIS — N18.32 STAGE 3B CHRONIC KIDNEY DISEASE: ICD-10-CM

## 2024-12-02 DIAGNOSIS — R06.02 SOB (SHORTNESS OF BREATH): ICD-10-CM

## 2024-12-02 DIAGNOSIS — N18.4 CHRONIC KIDNEY DISEASE (CKD), STAGE IV (SEVERE): ICD-10-CM

## 2024-12-02 PROCEDURE — 93306 TTE W/DOPPLER COMPLETE: CPT

## 2024-12-02 PROCEDURE — 93306 TTE W/DOPPLER COMPLETE: CPT | Mod: 26,,, | Performed by: INTERNAL MEDICINE

## 2024-12-03 ENCOUNTER — OFFICE VISIT (OUTPATIENT)
Dept: CARDIOLOGY | Facility: CLINIC | Age: 79
End: 2024-12-03
Payer: MEDICARE

## 2024-12-03 VITALS
DIASTOLIC BLOOD PRESSURE: 78 MMHG | HEART RATE: 60 BPM | OXYGEN SATURATION: 98 % | HEIGHT: 63 IN | BODY MASS INDEX: 31.23 KG/M2 | SYSTOLIC BLOOD PRESSURE: 122 MMHG | WEIGHT: 176.25 LBS

## 2024-12-03 DIAGNOSIS — R93.1 ABNORMAL ECHOCARDIOGRAM: ICD-10-CM

## 2024-12-03 DIAGNOSIS — N18.4 CHRONIC KIDNEY DISEASE (CKD), STAGE IV (SEVERE): ICD-10-CM

## 2024-12-03 DIAGNOSIS — Z95.0 CARDIAC PACEMAKER IN SITU: ICD-10-CM

## 2024-12-03 DIAGNOSIS — I21.4 NON-STEMI (NON-ST ELEVATED MYOCARDIAL INFARCTION): ICD-10-CM

## 2024-12-03 DIAGNOSIS — E78.2 MIXED HYPERLIPIDEMIA: ICD-10-CM

## 2024-12-03 DIAGNOSIS — I35.0 AORTIC VALVE STENOSIS, ETIOLOGY OF CARDIAC VALVE DISEASE UNSPECIFIED: Primary | ICD-10-CM

## 2024-12-03 DIAGNOSIS — R79.1 ABNORMAL COAGULATION PROFILE: ICD-10-CM

## 2024-12-03 LAB
AORTIC ROOT ANNULUS: 3.1 CM
AORTIC VALVE CUSP SEPERATION: 0.9 CM
APICAL FOUR CHAMBER EJECTION FRACTION: 59 %
AV INDEX (PROSTH): 0.35
AV MEAN GRADIENT: 20 MMHG
AV PEAK GRADIENT: 36 MMHG
AV VALVE AREA BY VELOCITY RATIO: 0.9 CM²
AV VALVE AREA: 1.1 CM²
AV VELOCITY RATIO: 0.3
BSA FOR ECHO PROCEDURE: 1.88 M2
CV ECHO LV RWT: 0.35 CM
DOP CALC AO PEAK VEL: 3 M/S
DOP CALC AO VTI: 67.7 CM
DOP CALC LVOT AREA: 3.1 CM2
DOP CALC LVOT DIAMETER: 2 CM
DOP CALC LVOT PEAK VEL: 0.9 M/S
DOP CALC LVOT STROKE VOLUME: 74.1 CM3
DOP CALCLVOT PEAK VEL VTI: 23.6 CM
E WAVE DECELERATION TIME: 216 MSEC
E/A RATIO: 1.1
E/E' RATIO: 10.59 M/S
ECHO LV POSTERIOR WALL: 1 CM (ref 0.6–1.1)
EJECTION FRACTION: 55 %
FRACTIONAL SHORTENING: 24.6 % (ref 28–44)
INTERVENTRICULAR SEPTUM: 1.1 CM (ref 0.6–1.1)
IVRT: 95 MSEC
LEFT ATRIUM SIZE: 5 CM
LEFT INTERNAL DIMENSION IN SYSTOLE: 4.3 CM (ref 2.1–4)
LEFT VENTRICLE DIASTOLIC VOLUME INDEX: 89.07 ML/M2
LEFT VENTRICLE DIASTOLIC VOLUME: 163 ML
LEFT VENTRICLE END DIASTOLIC VOLUME APICAL 4 CHAMBER: 109 ML
LEFT VENTRICLE MASS INDEX: 131.9 G/M2
LEFT VENTRICLE SYSTOLIC VOLUME INDEX: 44.4 ML/M2
LEFT VENTRICLE SYSTOLIC VOLUME: 81.3 ML
LEFT VENTRICULAR INTERNAL DIMENSION IN DIASTOLE: 5.7 CM (ref 3.5–6)
LEFT VENTRICULAR MASS: 241.3 G
LV LATERAL E/E' RATIO: 8.18 M/S
LV SEPTAL E/E' RATIO: 15 M/S
LVED V (TEICH): 163 ML
LVES V (TEICH): 81.3 ML
LVOT MG: 2 MMHG
LVOT MV: 0.63 CM/S
MV PEAK A VEL: 0.82 M/S
MV PEAK E VEL: 0.9 M/S
MV STENOSIS PRESSURE HALF TIME: 47 MS
MV VALVE AREA P 1/2 METHOD: 4.68 CM2
PISA TR MAX VEL: 2.07 M/S
PV MV: 1.44 M/S
PV PEAK GRADIENT: 20 MMHG
PV PEAK VELOCITY: 2.23 M/S
RA PRESSURE ESTIMATED: 3 MMHG
RIGHT VENTRICULAR END-DIASTOLIC DIMENSION: 2.28 CM
RV TB RVSP: 5 MMHG
TDI LATERAL: 0.11 M/S
TDI SEPTAL: 0.06 M/S
TDI: 0.09 M/S
TR MAX PG: 17 MMHG
TV REST PULMONARY ARTERY PRESSURE: 20 MMHG
Z-SCORE OF LEFT VENTRICULAR DIMENSION IN END DIASTOLE: 1.17
Z-SCORE OF LEFT VENTRICULAR DIMENSION IN END SYSTOLE: 2.51

## 2024-12-03 PROCEDURE — 3288F FALL RISK ASSESSMENT DOCD: CPT | Mod: HCNC,CPTII,S$GLB, | Performed by: INTERNAL MEDICINE

## 2024-12-03 PROCEDURE — 99215 OFFICE O/P EST HI 40 MIN: CPT | Mod: HCNC,S$GLB,, | Performed by: INTERNAL MEDICINE

## 2024-12-03 PROCEDURE — 1159F MED LIST DOCD IN RCRD: CPT | Mod: HCNC,CPTII,S$GLB, | Performed by: INTERNAL MEDICINE

## 2024-12-03 PROCEDURE — 1101F PT FALLS ASSESS-DOCD LE1/YR: CPT | Mod: HCNC,CPTII,S$GLB, | Performed by: INTERNAL MEDICINE

## 2024-12-03 PROCEDURE — 3078F DIAST BP <80 MM HG: CPT | Mod: HCNC,CPTII,S$GLB, | Performed by: INTERNAL MEDICINE

## 2024-12-03 PROCEDURE — 99999 PR PBB SHADOW E&M-EST. PATIENT-LVL IV: CPT | Mod: PBBFAC,HCNC,, | Performed by: INTERNAL MEDICINE

## 2024-12-03 PROCEDURE — 1126F AMNT PAIN NOTED NONE PRSNT: CPT | Mod: HCNC,CPTII,S$GLB, | Performed by: INTERNAL MEDICINE

## 2024-12-03 PROCEDURE — 1160F RVW MEDS BY RX/DR IN RCRD: CPT | Mod: HCNC,CPTII,S$GLB, | Performed by: INTERNAL MEDICINE

## 2024-12-03 PROCEDURE — 3074F SYST BP LT 130 MM HG: CPT | Mod: HCNC,CPTII,S$GLB, | Performed by: INTERNAL MEDICINE

## 2024-12-03 RX ORDER — SODIUM CHLORIDE 450 MG/100ML
INJECTION, SOLUTION INTRAVENOUS CONTINUOUS
OUTPATIENT
Start: 2024-12-03

## 2024-12-03 RX ORDER — DIPHENHYDRAMINE HCL 50 MG
50 CAPSULE ORAL
OUTPATIENT
Start: 2024-12-03

## 2024-12-03 RX ORDER — ACETYLCYSTEINE 100 MG/ML
1200 SOLUTION ORAL; RESPIRATORY (INHALATION) ONCE
Status: SHIPPED | OUTPATIENT
Start: 2024-12-03

## 2024-12-03 NOTE — PROGRESS NOTES
Patient ID:  Cynthia N Cushing is a 79 y.o. female who presents for follow-up of Results (Echo & labs), Congestive Heart Failure, and Aortic Stenosis      Aortic stenosis  Peak velocity of 2.5 m/sec     Hyperlipidemia  On 10 mg of rosuvastatin     SVT (supraventricular tachycardia)  Controlled on metoprolol and diltiazem     SSS (sick sinus syndrome)  Status post pacemaker placement     Hypertension secondary to other renal disorders  Controlled on diltiazem and metoprolol     Chronic kidney disease (CKD), stage IV (severe)  Stable will continue to monitor     Non-STEMI (non-ST elevated myocardial infarction)  No symptoms of angina.  She had a non STEMI with a positive myocardial perfusion scan her renal function remains poor even though a coronary arteriogram should be performed     6/5/24  During the last office visit the family complained that she was very sleepy.  The doses of guanfacine were decreased to 1 mg daily.  She feels better denies any chest pains any shortness of breath her dyspnea on exertion is doing better.  Her blood pressure has been better controlled.  On Imdur 30 mg p.o. daily.  09/04/2024  She has been on isosorbide mononitrate 30 mg every day with no symptoms of angina.  She saw her primary care physician Dr. Adams.  Blood work was done her creatinine had increased to 2.2.  She has been taking Lasix twice a week, the Crestor was discontinue as well as a Pepcid.  She denies any chest pains.  She has an appointment with Dr. Adams in the near future.   11/15/2024  She has been having a lot of shortness of breath usually occurs in the morning she is having a lot of dyspnea on exertion.   12/3/24   She was started on Lasix, isosorbide dinitrate 10 mg t.i.d..  She continues to have episodes of heaviness and dyspnea on exertion.  No significant improvement on medical therapy.  Options have been discussed.  Her aortic valve V max is 3.0.  Will proceed with a coronary arteriogram to rule out  "significant CAD.          Past Medical History:   Diagnosis Date    Allergy     Breast mass     Cataract     Diabetes mellitus     GERD (gastroesophageal reflux disease)     Hernia, hiatal     Hyperlipidemia     Hypertension     Kidney stone     Osteoarthritis     Renal insufficiency     Seasonal allergies     Sleep apnea     SSS (sick sinus syndrome)     SVT (supraventricular tachycardia)         Past Surgical History:   Procedure Laterality Date    A-V CARDIAC PACEMAKER INSERTION N/A 3/10/2024    Procedure: INSERTION, CARDIAC PACEMAKER, DUAL CHAMBER;  Surgeon: Brijesh Mendieta MD;  Location: [facility] CATH/EP LAB;  Service: Cardiology;  Laterality: N/A;    BREAST BIOPSY      BREAST CYST ASPIRATION      BREAST SURGERY      CERVICAL DISC SURGERY      EYE SURGERY      cataracts bilateral    HYSTERECTOMY            Current Outpatient Medications   Medication Instructions    ACCU-CHEK AMADOR PLUS TEST STRP Strp TEST BLOOD SUGAR FOUR TIMES DAILY    aspirin 81 mg, Daily    cetirizine (ZYRTEC) 10 mg, Daily    clopidogreL (PLAVIX) 75 mg, Oral, Daily    diltiaZEM (CARDIZEM CD) 120 mg, Oral, Daily    famotidine (PEPCID) 20 MG tablet TAKE 1 TABLET TWICE DAILY    ferrous sulfate 324 mg, Oral, Daily    fluconazole (DIFLUCAN) 50 MG Tab One a day for three days    furosemide (LASIX) 20 mg, Oral, Daily    guanFACINE (TENEX) 2 MG tablet TAKE 1 TABLET EVERY EVENING    hydrALAZINE (APRESOLINE) 50 mg, Oral, 3 times daily    isosorbide dinitrate (ISORDIL) 10 mg, Oral, 3 times daily    lancets (ACCU-CHEK SOFTCLIX LANCETS) Misc TEST BLOOD SUGAR FOUR TIMES DAILY    LANTUS SOLOSTAR U-100 INSULIN glargine 100 units/mL SubQ pen ADMINISTER 51 UNITS UNDER THE SKIN AT NIGHT    metoprolol tartrate (LOPRESSOR) 50 mg, Oral, 2 times daily    metoprolol tartrate (LOPRESSOR) 75 mg, Oral, 2 times daily    pen needle, diabetic (BD ANNA 2ND GEN PEN NEEDLE) 32 gauge x 5/32" Ndle INJECT 1 NEEDLE INTO THE SKIN EVERY EVENING    rosuvastatin (CRESTOR) 10 mg, " "Oral, Nightly        Review of patient's allergies indicates:   Allergen Reactions    Biaxin [clarithromycin]     Prandin [repaglinide] Nausea And Vomiting    Amlodipine     Chlorphen-phenyltolox-pe-ppa     Ciprofloxacin Nausea And Vomiting    Omnicef [cefdinir]     Propoxyphene     Quinine Nausea And Vomiting        Review of Systems   Cardiovascular:  Negative for chest pain and dyspnea on exertion.   Respiratory:  Negative for cough and shortness of breath.         Objective:     Vitals:    12/03/24 1404   BP: 122/78   BP Location: Left arm   Patient Position: Sitting   Pulse: 60   SpO2: 98%   Weight: 79.9 kg (176 lb 4.1 oz)   Height: 5' 3" (1.6 m)       Physical Exam  Vitals and nursing note reviewed.   Constitutional:       Appearance: She is well-developed.   HENT:      Head: Normocephalic and atraumatic.   Eyes:      Conjunctiva/sclera: Conjunctivae normal.   Cardiovascular:      Rate and Rhythm: Normal rate and regular rhythm.      Heart sounds: Murmur (Grade 4/6 systolic murmur at the base) heard.   Pulmonary:      Effort: Pulmonary effort is normal.      Breath sounds: Normal breath sounds.   Abdominal:      General: Bowel sounds are normal.      Palpations: Abdomen is soft.   Musculoskeletal:         General: Normal range of motion.   Skin:     General: Skin is warm and dry.   Neurological:      Mental Status: She is alert and oriented to person, place, and time.   Psychiatric:         Behavior: Behavior normal.         Thought Content: Thought content normal.         Judgment: Judgment normal.       CMP  Sodium   Date Value Ref Range Status   12/02/2024 140 136 - 145 mmol/L Final     Potassium   Date Value Ref Range Status   12/02/2024 3.6 3.5 - 5.1 mmol/L Final     Chloride   Date Value Ref Range Status   12/02/2024 107 95 - 110 mmol/L Final     CO2   Date Value Ref Range Status   12/02/2024 24 23 - 29 mmol/L Final     Glucose   Date Value Ref Range Status   12/02/2024 103 70 - 110 mg/dL Final     BUN "   Date Value Ref Range Status   12/02/2024 33 (H) 8 - 23 mg/dL Final     Creatinine   Date Value Ref Range Status   12/02/2024 1.9 (H) 0.5 - 1.4 mg/dL Final     Calcium   Date Value Ref Range Status   12/02/2024 9.1 8.7 - 10.5 mg/dL Final     Total Protein   Date Value Ref Range Status   11/13/2024 6.4 6.0 - 8.4 g/dL Final     Albumin   Date Value Ref Range Status   11/13/2024 3.9 3.5 - 5.2 g/dL Final     Total Bilirubin   Date Value Ref Range Status   11/13/2024 0.4 0.1 - 1.0 mg/dL Final     Comment:     For infants and newborns, interpretation of results should be based  on gestational age, weight and in agreement with clinical  observations.    Premature Infant recommended reference ranges:  Up to 24 hours.............<8.0 mg/dL  Up to 48 hours............<12.0 mg/dL  3-5 days..................<15.0 mg/dL  6-29 days.................<15.0 mg/dL       Alkaline Phosphatase   Date Value Ref Range Status   11/13/2024 61 55 - 135 U/L Final     AST   Date Value Ref Range Status   11/13/2024 20 10 - 40 U/L Final     ALT   Date Value Ref Range Status   11/13/2024 18 10 - 44 U/L Final     Anion Gap   Date Value Ref Range Status   12/02/2024 9 8 - 16 mmol/L Final     eGFR if    Date Value Ref Range Status   07/15/2022 38.5 (A) >60 mL/min/1.73 m^2 Final     eGFR if non    Date Value Ref Range Status   07/15/2022 33.4 (A) >60 mL/min/1.73 m^2 Final     Comment:     Calculation used to obtain the estimated glomerular filtration  rate (eGFR) is the CKD-EPI equation.         BMP  Lab Results   Component Value Date     12/02/2024    K 3.6 12/02/2024     12/02/2024    CO2 24 12/02/2024    BUN 33 (H) 12/02/2024    CREATININE 1.9 (H) 12/02/2024    CALCIUM 9.1 12/02/2024    ANIONGAP 9 12/02/2024    ESTGFRAFRICA 38.5 (A) 07/15/2022    EGFRNONAA 33.4 (A) 07/15/2022      BNP  @LABRCNTIP(BNP,BNPTRIAGEBLO)@   Lab Results   Component Value Date    CHOL 110 (L) 08/17/2023    CHOL 114 (L) 02/07/2022     CHOL 152 04/19/2021     Lab Results   Component Value Date    HDL 33 (L) 08/17/2023    HDL 34 (L) 02/07/2022    HDL 36 (L) 04/19/2021     Lab Results   Component Value Date    LDLCALC 9.2 (L) 08/17/2023    LDLCALC 39.2 (L) 02/07/2022    LDLCALC 41.6 (L) 04/19/2021     Lab Results   Component Value Date    TRIG 339 (H) 08/17/2023    TRIG 204 (H) 02/07/2022    TRIG 372 (H) 04/19/2021     Lab Results   Component Value Date    CHOLHDL 30.0 08/17/2023    CHOLHDL 29.8 02/07/2022    CHOLHDL 23.7 04/19/2021      Lab Results   Component Value Date    TSH 4.143 11/13/2024     Lab Results   Component Value Date    HGBA1C 8.1 (H) 05/22/2024     Lab Results   Component Value Date    WBC 8.60 12/02/2024    HGB 9.9 (L) 12/02/2024    HCT 32.9 (L) 12/02/2024    MCV 84 12/02/2024     12/02/2024         Results for orders placed during the hospital encounter of 12/02/24    Echo Saline Bubble? No    Interpretation Summary    Left Ventricle: The left ventricle is normal in size. Mildly increased wall thickness. There is mild concentric hypertrophy. There is normal systolic function. Ejection fraction is approximately 55%.    Right Ventricle: Normal right ventricular cavity size. Wall thickness is normal. Systolic function is normal. Pacemaker lead present in the ventricle.    Left Atrium: Left atrium is mildly dilated.    Aortic Valve: There is moderate aortic valve sclerosis. Severely restricted motion. There is moderate to severe stenosis. Aortic valve area by VTI is 1.1 cm². Aortic valve peak velocity is 3.0 m/s. Mean gradient is 20.0 mmHg. The dimensionless index is 0.35.    Tricuspid Valve: There is mild regurgitation.    IVC/SVC: Normal venous pressure at 3 mmHg.    The estimated pulmonary artery systolic pressure is 20 mmHg.     No results found for this or any previous visit.     EKG  Results for orders placed or performed during the hospital encounter of 03/03/24   EKG 12-lead    Collection Time: 03/10/24  2:26 PM    Result Value Ref Range    QRS Duration 106 ms    OHS QTC Calculation 440 ms    Narrative    Test Reason : I49.9,    Vent. Rate : 065 BPM     Atrial Rate : 065 BPM     P-R Int : 194 ms          QRS Dur : 106 ms      QT Int : 424 ms       P-R-T Axes : 062 -29 194 degrees     QTc Int : 440 ms    Atrial-paced rhythm with Premature atrial complexes  LVH with repolarization abnormality ( [place] product )  Abnormal ECG  When compared with ECG of 09-MAR-2024 18:56,  Electronic atrial pacemaker has replaced Atrial fibrillation  Vent. rate has decreased BY  52 BPM  Non-specific change in ST segment in Anterior leads  T wave inversion now evident in Anterior-lateral leads  Confirmed by Jannie RAMIREZ, Dany CHEN ([id number]) on 3/25/2024 9:42:50 PM    Referred By: [email or web address]   SELF           Confirmed By:Dany Valderrama MD      Stress  Results for orders placed during the hospital encounter of 03/03/24    Nuclear Stress Test    Interpretation Summary    The ECG portion of the study is negative for ischemia.    The patient reported no chest pain during the stress test.    The nuclear portion of this study will be reported separately.             Assessment:       Non-STEMI (non-ST elevated myocardial infarction)  With persistent symptoms of dyspnea on exertion.  No significant improvement on medical therapy will proceed with a coronary arteriogram    Aortic stenosis  Peak velocity of 3.0 meters per sec    Hyperlipidemia  The rosuvastatin has been on hold due to the deterioration of her renal function    Cardiac pacemaker in situ  Functioning adequately    Chronic kidney disease (CKD), stage IV (severe)  Aware coronary arteriogram will be performed the patient understands that she is at high risk for dialysis    Uncontrolled type 2 diabetes mellitus with diabetic nephropathy, with long-term current use of insulin  Aware on medical therapy       Plan:     Perform a diagnostic coronary arteriogram on 12/12/2024 the procedure risks benefits  and indications have been explained to the patient she is at higher risk for acute kidney injury.  Fluids Mucomyst will be given the amount of contrast will be minimize.

## 2024-12-04 ENCOUNTER — TELEPHONE (OUTPATIENT)
Dept: CARDIOLOGY | Facility: CLINIC | Age: 79
End: 2024-12-04
Payer: MEDICARE

## 2024-12-05 PROBLEM — Z95.0 CARDIAC PACEMAKER IN SITU: Status: ACTIVE | Noted: 2024-12-05

## 2024-12-05 NOTE — ASSESSMENT & PLAN NOTE
With persistent symptoms of dyspnea on exertion.  No significant improvement on medical therapy will proceed with a coronary arteriogram

## 2024-12-05 NOTE — ASSESSMENT & PLAN NOTE
Aware coronary arteriogram will be performed the patient understands that she is at high risk for dialysis

## 2024-12-09 RX ORDER — METOPROLOL TARTRATE 50 MG/1
75 TABLET ORAL 2 TIMES DAILY
Qty: 270 TABLET | Refills: 3 | Status: SHIPPED | OUTPATIENT
Start: 2024-12-09 | End: 2025-12-04

## 2024-12-10 ENCOUNTER — HOSPITAL ENCOUNTER (OUTPATIENT)
Dept: RADIOLOGY | Facility: HOSPITAL | Age: 79
Discharge: HOME | End: 2024-12-10
Attending: INTERNAL MEDICINE
Payer: MEDICARE

## 2024-12-10 ENCOUNTER — HOSPITAL ENCOUNTER (OUTPATIENT)
Dept: PREADMISSION TESTING | Facility: HOSPITAL | Age: 79
Discharge: HOME | End: 2024-12-10
Attending: INTERNAL MEDICINE
Payer: MEDICARE

## 2024-12-10 VITALS
RESPIRATION RATE: 20 BRPM | OXYGEN SATURATION: 96 % | HEIGHT: 63 IN | TEMPERATURE: 98 F | DIASTOLIC BLOOD PRESSURE: 78 MMHG | BODY MASS INDEX: 31.36 KG/M2 | SYSTOLIC BLOOD PRESSURE: 164 MMHG | HEART RATE: 66 BPM | WEIGHT: 177 LBS

## 2024-12-10 DIAGNOSIS — I35.0 AORTIC VALVE STENOSIS, ETIOLOGY OF CARDIAC VALVE DISEASE UNSPECIFIED: ICD-10-CM

## 2024-12-10 DIAGNOSIS — R93.1 ABNORMAL ECHOCARDIOGRAM: ICD-10-CM

## 2024-12-10 LAB
OHS QRS DURATION: 114 MS
OHS QTC CALCULATION: 434 MS

## 2024-12-10 PROCEDURE — 71046 X-RAY EXAM CHEST 2 VIEWS: CPT | Mod: TC

## 2024-12-10 PROCEDURE — 71046 X-RAY EXAM CHEST 2 VIEWS: CPT | Mod: 26,,, | Performed by: RADIOLOGY

## 2024-12-10 PROCEDURE — 93005 ELECTROCARDIOGRAM TRACING: CPT | Performed by: GENERAL PRACTICE

## 2024-12-10 PROCEDURE — 93010 ELECTROCARDIOGRAM REPORT: CPT | Mod: ,,, | Performed by: GENERAL PRACTICE

## 2024-12-10 NOTE — DISCHARGE INSTRUCTIONS
Your procedure is scheduled for: Thursday 12/12/24          [facility] thru [facility] entrance at:0700    Nothing to eat  after midnight the night before your procedure. You may have clear liquids up to 2 hours before coming in for procedure. This includes water, Gatorade, clear juice, black coffee and tea.  Do not take any medications the morning of your procedure  Bring all your medications with you in the original pill bottles from pharmacy.  If you take blood thinners, ask your doctor if you should stop taking them.  Do not take metformin 24 hours prior to your procedure.  Adjust your insulin or other diabetes medications if needed.   Do your chlorhexidine wash the night before and morning of your procedure.  If you use a CPAP or BiPAP at home, please bring it with you the day of your procedure.  Make arrangements for someone you know to drive you home after your procedure. Taxi and Uber are not acceptable.         Any questions call the [facility] at [phone]

## 2024-12-12 ENCOUNTER — HOSPITAL ENCOUNTER (INPATIENT)
Facility: HOSPITAL | Age: 79
LOS: 2 days | Discharge: HOME | DRG: 321 | End: 2024-12-14
Attending: INTERNAL MEDICINE | Admitting: INTERNAL MEDICINE
Payer: MEDICARE

## 2024-12-12 ENCOUNTER — TELEPHONE (OUTPATIENT)
Dept: CARDIOLOGY | Facility: CLINIC | Age: 79
End: 2024-12-12
Payer: MEDICARE

## 2024-12-12 DIAGNOSIS — J81.0 ACUTE PULMONARY EDEMA: Primary | ICD-10-CM

## 2024-12-12 DIAGNOSIS — R06.02 SOB (SHORTNESS OF BREATH): ICD-10-CM

## 2024-12-12 DIAGNOSIS — I25.10 CAD (CORONARY ARTERY DISEASE): ICD-10-CM

## 2024-12-12 DIAGNOSIS — I35.0 AORTIC VALVE STENOSIS, ETIOLOGY OF CARDIAC VALVE DISEASE UNSPECIFIED: ICD-10-CM

## 2024-12-12 DIAGNOSIS — Z95.5 PRESENCE OF STENT IN LAD CORONARY ARTERY: ICD-10-CM

## 2024-12-12 DIAGNOSIS — R07.9 CHEST PAIN: ICD-10-CM

## 2024-12-12 DIAGNOSIS — R93.1 ABNORMAL ECHOCARDIOGRAM: ICD-10-CM

## 2024-12-12 PROBLEM — I25.110 CORONARY ARTERY DISEASE INVOLVING NATIVE CORONARY ARTERY OF NATIVE HEART WITH UNSTABLE ANGINA PECTORIS: Status: ACTIVE | Noted: 2024-12-12

## 2024-12-12 LAB
ALBUMIN SERPL BCP-MCNC: 4 G/DL (ref 3.5–5.2)
ALP SERPL-CCNC: 73 U/L (ref 55–135)
ALT SERPL W/O P-5'-P-CCNC: 20 U/L (ref 10–44)
ANION GAP SERPL CALC-SCNC: 9 MMOL/L (ref 8–16)
AST SERPL-CCNC: 17 U/L (ref 10–40)
BASOPHILS # BLD AUTO: 0.09 K/UL (ref 0–0.2)
BASOPHILS NFR BLD: 0.7 % (ref 0–1.9)
BILIRUB SERPL-MCNC: 0.5 MG/DL (ref 0.1–1)
BUN SERPL-MCNC: 42 MG/DL (ref 8–23)
CALCIUM SERPL-MCNC: 8.7 MG/DL (ref 8.7–10.5)
CHLORIDE SERPL-SCNC: 105 MMOL/L (ref 95–110)
CO2 SERPL-SCNC: 23 MMOL/L (ref 23–29)
CREAT SERPL-MCNC: 1.9 MG/DL (ref 0.5–1.4)
DIFFERENTIAL METHOD BLD: ABNORMAL
EOSINOPHIL # BLD AUTO: 0.2 K/UL (ref 0–0.5)
EOSINOPHIL NFR BLD: 1.2 % (ref 0–8)
ERYTHROCYTE [DISTWIDTH] IN BLOOD BY AUTOMATED COUNT: 15.5 % (ref 11.5–14.5)
EST. GFR  (NO RACE VARIABLE): 26.5 ML/MIN/1.73 M^2
GLUCOSE SERPL-MCNC: 207 MG/DL (ref 70–110)
HCT VFR BLD AUTO: 32.7 % (ref 37–48.5)
HGB BLD-MCNC: 10.5 G/DL (ref 12–16)
IMM GRANULOCYTES # BLD AUTO: 0.07 K/UL (ref 0–0.04)
IMM GRANULOCYTES NFR BLD AUTO: 0.6 % (ref 0–0.5)
LYMPHOCYTES # BLD AUTO: 0.9 K/UL (ref 1–4.8)
LYMPHOCYTES NFR BLD: 7.2 % (ref 18–48)
MCH RBC QN AUTO: 25.9 PG (ref 27–31)
MCHC RBC AUTO-ENTMCNC: 32.1 G/DL (ref 32–36)
MCV RBC AUTO: 81 FL (ref 82–98)
MONOCYTES # BLD AUTO: 0.9 K/UL (ref 0.3–1)
MONOCYTES NFR BLD: 7.7 % (ref 4–15)
NEUTROPHILS # BLD AUTO: 9.9 K/UL (ref 1.8–7.7)
NEUTROPHILS NFR BLD: 82.6 % (ref 38–73)
NRBC BLD-RTO: 0 /100 WBC
OHS QRS DURATION: 110 MS
OHS QRS DURATION: 118 MS
OHS QTC CALCULATION: 426 MS
OHS QTC CALCULATION: 460 MS
PLATELET # BLD AUTO: 237 K/UL (ref 150–450)
PMV BLD AUTO: 10.7 FL (ref 9.2–12.9)
POC ACTIVATED CLOTTING TIME K: 199 SEC (ref 74–137)
POCT GLUCOSE: 186 MG/DL (ref 70–110)
POCT GLUCOSE: 200 MG/DL (ref 70–110)
POTASSIUM SERPL-SCNC: 3.8 MMOL/L (ref 3.5–5.1)
PROT SERPL-MCNC: 6.8 G/DL (ref 6–8.4)
RBC # BLD AUTO: 4.06 M/UL (ref 4–5.4)
SAMPLE: ABNORMAL
SODIUM SERPL-SCNC: 137 MMOL/L (ref 136–145)
TROPONIN I SERPL HS-MCNC: 130.9 PG/ML (ref 0–14.9)
WBC # BLD AUTO: 12.01 K/UL (ref 3.9–12.7)

## 2024-12-12 PROCEDURE — 93454 CORONARY ARTERY ANGIO S&I: CPT | Mod: XU | Performed by: INTERNAL MEDICINE

## 2024-12-12 PROCEDURE — 63600175 PHARM REV CODE 636 W HCPCS: Performed by: INTERNAL MEDICINE

## 2024-12-12 PROCEDURE — 99900035 HC TECH TIME PER 15 MIN (STAT)

## 2024-12-12 PROCEDURE — 25000003 PHARM REV CODE 250: Performed by: INTERNAL MEDICINE

## 2024-12-12 PROCEDURE — 99153 MOD SED SAME PHYS/QHP EA: CPT | Performed by: INTERNAL MEDICINE

## 2024-12-12 PROCEDURE — 027034Z DILATION OF CORONARY ARTERY, ONE ARTERY WITH DRUG-ELUTING INTRALUMINAL DEVICE, PERCUTANEOUS APPROACH: ICD-10-PCS | Performed by: INTERNAL MEDICINE

## 2024-12-12 PROCEDURE — 25000242 PHARM REV CODE 250 ALT 637 W/ HCPCS

## 2024-12-12 PROCEDURE — C1760 CLOSURE DEV, VASC: HCPCS | Performed by: INTERNAL MEDICINE

## 2024-12-12 PROCEDURE — 94761 N-INVAS EAR/PLS OXIMETRY MLT: CPT

## 2024-12-12 PROCEDURE — 94799 UNLISTED PULMONARY SVC/PX: CPT

## 2024-12-12 PROCEDURE — 25500020 PHARM REV CODE 255: Performed by: INTERNAL MEDICINE

## 2024-12-12 PROCEDURE — 51702 INSERT TEMP BLADDER CATH: CPT

## 2024-12-12 PROCEDURE — 94660 CPAP INITIATION&MGMT: CPT

## 2024-12-12 PROCEDURE — 63600175 PHARM REV CODE 636 W HCPCS

## 2024-12-12 PROCEDURE — C1874 STENT, COATED/COV W/DEL SYS: HCPCS | Performed by: INTERNAL MEDICINE

## 2024-12-12 PROCEDURE — 27201423 OPTIME MED/SURG SUP & DEVICES STERILE SUPPLY: Performed by: INTERNAL MEDICINE

## 2024-12-12 PROCEDURE — 99152 MOD SED SAME PHYS/QHP 5/>YRS: CPT | Mod: ,,, | Performed by: INTERNAL MEDICINE

## 2024-12-12 PROCEDURE — C1725 CATH, TRANSLUMIN NON-LASER: HCPCS | Performed by: INTERNAL MEDICINE

## 2024-12-12 PROCEDURE — 93454 CORONARY ARTERY ANGIO S&I: CPT | Mod: 26,51,XU, | Performed by: INTERNAL MEDICINE

## 2024-12-12 PROCEDURE — 63600175 PHARM REV CODE 636 W HCPCS: Mod: JZ | Performed by: INTERNAL MEDICINE

## 2024-12-12 PROCEDURE — 85025 COMPLETE CBC W/AUTO DIFF WBC: CPT | Performed by: INTERNAL MEDICINE

## 2024-12-12 PROCEDURE — C9600 PERC DRUG-EL COR STENT SING: HCPCS | Mod: LD | Performed by: INTERNAL MEDICINE

## 2024-12-12 PROCEDURE — 36415 COLL VENOUS BLD VENIPUNCTURE: CPT | Performed by: INTERNAL MEDICINE

## 2024-12-12 PROCEDURE — 20000000 HC ICU ROOM

## 2024-12-12 PROCEDURE — B211YZZ FLUOROSCOPY OF MULTIPLE CORONARY ARTERIES USING OTHER CONTRAST: ICD-10-PCS | Performed by: INTERNAL MEDICINE

## 2024-12-12 PROCEDURE — 92928 PRQ TCAT PLMT NTRAC ST 1 LES: CPT | Mod: LD,,, | Performed by: INTERNAL MEDICINE

## 2024-12-12 PROCEDURE — 84484 ASSAY OF TROPONIN QUANT: CPT | Performed by: INTERNAL MEDICINE

## 2024-12-12 PROCEDURE — 25000003 PHARM REV CODE 250

## 2024-12-12 PROCEDURE — 5A09357 ASSISTANCE WITH RESPIRATORY VENTILATION, LESS THAN 24 CONSECUTIVE HOURS, CONTINUOUS POSITIVE AIRWAY PRESSURE: ICD-10-PCS | Performed by: INTERNAL MEDICINE

## 2024-12-12 PROCEDURE — C1894 INTRO/SHEATH, NON-LASER: HCPCS | Performed by: INTERNAL MEDICINE

## 2024-12-12 PROCEDURE — 99152 MOD SED SAME PHYS/QHP 5/>YRS: CPT | Performed by: INTERNAL MEDICINE

## 2024-12-12 PROCEDURE — C1769 GUIDE WIRE: HCPCS | Performed by: INTERNAL MEDICINE

## 2024-12-12 PROCEDURE — 99900031 HC PATIENT EDUCATION (STAT)

## 2024-12-12 PROCEDURE — 25000242 PHARM REV CODE 250 ALT 637 W/ HCPCS: Performed by: INTERNAL MEDICINE

## 2024-12-12 PROCEDURE — 27100171 HC OXYGEN HIGH FLOW UP TO 24 HOURS

## 2024-12-12 PROCEDURE — 80053 COMPREHEN METABOLIC PANEL: CPT | Performed by: INTERNAL MEDICINE

## 2024-12-12 DEVICE — EVEROLIMUS-ELUTING PLATINUM CHROMIUM CORONARY STENT SYSTEM
Type: IMPLANTABLE DEVICE | Site: CHEST | Status: FUNCTIONAL
Brand: SYNERGY™ XD

## 2024-12-12 DEVICE — ANGIO-SEAL VIP VASCULAR CLOSURE DEVICE
Type: IMPLANTABLE DEVICE | Site: GROIN | Status: FUNCTIONAL
Brand: ANGIO-SEAL

## 2024-12-12 RX ORDER — MORPHINE SULFATE 2 MG/ML
2 INJECTION, SOLUTION INTRAMUSCULAR; INTRAVENOUS ONCE
Status: DISCONTINUED | OUTPATIENT
Start: 2024-12-12 | End: 2024-12-12

## 2024-12-12 RX ORDER — MORPHINE SULFATE 2 MG/ML
INJECTION, SOLUTION INTRAMUSCULAR; INTRAVENOUS
Status: COMPLETED
Start: 2024-12-12 | End: 2024-12-12

## 2024-12-12 RX ORDER — NITROGLYCERIN 0.4 MG/1
0.4 TABLET SUBLINGUAL EVERY 5 MIN PRN
Status: DISCONTINUED | OUTPATIENT
Start: 2024-12-12 | End: 2024-12-14 | Stop reason: HOSPADM

## 2024-12-12 RX ORDER — LIDOCAINE HYDROCHLORIDE 10 MG/ML
INJECTION, SOLUTION INFILTRATION; PERINEURAL
Status: DISCONTINUED | OUTPATIENT
Start: 2024-12-12 | End: 2024-12-12 | Stop reason: HOSPADM

## 2024-12-12 RX ORDER — NAPROXEN SODIUM 220 MG/1
81 TABLET, FILM COATED ORAL DAILY
Status: DISCONTINUED | OUTPATIENT
Start: 2024-12-12 | End: 2024-12-14 | Stop reason: HOSPADM

## 2024-12-12 RX ORDER — NITROGLYCERIN 20 MG/100ML
0-400 INJECTION INTRAVENOUS CONTINUOUS
Status: DISCONTINUED | OUTPATIENT
Start: 2024-12-12 | End: 2024-12-13

## 2024-12-12 RX ORDER — CLOPIDOGREL BISULFATE 75 MG/1
300 TABLET ORAL ONCE
Status: COMPLETED | OUTPATIENT
Start: 2024-12-12 | End: 2024-12-12

## 2024-12-12 RX ORDER — MUPIROCIN 20 MG/G
OINTMENT TOPICAL 2 TIMES DAILY
Status: DISCONTINUED | OUTPATIENT
Start: 2024-12-12 | End: 2024-12-14 | Stop reason: HOSPADM

## 2024-12-12 RX ORDER — MORPHINE SULFATE 2 MG/ML
2 INJECTION, SOLUTION INTRAMUSCULAR; INTRAVENOUS ONCE AS NEEDED
Status: DISCONTINUED | OUTPATIENT
Start: 2024-12-12 | End: 2024-12-14 | Stop reason: HOSPADM

## 2024-12-12 RX ORDER — CALCIUM GLUCONATE 20 MG/ML
2 INJECTION, SOLUTION INTRAVENOUS
Status: DISCONTINUED | OUTPATIENT
Start: 2024-12-12 | End: 2024-12-14 | Stop reason: HOSPADM

## 2024-12-12 RX ORDER — POTASSIUM CHLORIDE 7.45 MG/ML
60 INJECTION INTRAVENOUS
Status: DISCONTINUED | OUTPATIENT
Start: 2024-12-12 | End: 2024-12-14 | Stop reason: HOSPADM

## 2024-12-12 RX ORDER — ENOXAPARIN SODIUM 100 MG/ML
30 INJECTION SUBCUTANEOUS EVERY 24 HOURS
Status: DISCONTINUED | OUTPATIENT
Start: 2024-12-12 | End: 2024-12-14 | Stop reason: HOSPADM

## 2024-12-12 RX ORDER — INSULIN ASPART 100 [IU]/ML
0-10 INJECTION, SOLUTION INTRAVENOUS; SUBCUTANEOUS
Status: DISCONTINUED | OUTPATIENT
Start: 2024-12-12 | End: 2024-12-14 | Stop reason: HOSPADM

## 2024-12-12 RX ORDER — ACETAMINOPHEN 325 MG/1
650 TABLET ORAL EVERY 4 HOURS PRN
Status: DISCONTINUED | OUTPATIENT
Start: 2024-12-12 | End: 2024-12-14 | Stop reason: HOSPADM

## 2024-12-12 RX ORDER — CALCIUM GLUCONATE 20 MG/ML
1 INJECTION, SOLUTION INTRAVENOUS
Status: DISCONTINUED | OUTPATIENT
Start: 2024-12-12 | End: 2024-12-14 | Stop reason: HOSPADM

## 2024-12-12 RX ORDER — SODIUM CHLORIDE 450 MG/100ML
INJECTION, SOLUTION INTRAVENOUS CONTINUOUS
Status: DISCONTINUED | OUTPATIENT
Start: 2024-12-12 | End: 2024-12-12

## 2024-12-12 RX ORDER — FAMOTIDINE 20 MG/1
20 TABLET, FILM COATED ORAL DAILY
Status: DISCONTINUED | OUTPATIENT
Start: 2024-12-12 | End: 2024-12-14 | Stop reason: HOSPADM

## 2024-12-12 RX ORDER — DILTIAZEM HYDROCHLORIDE 120 MG/1
120 CAPSULE, COATED, EXTENDED RELEASE ORAL DAILY
Status: DISCONTINUED | OUTPATIENT
Start: 2024-12-12 | End: 2024-12-14 | Stop reason: HOSPADM

## 2024-12-12 RX ORDER — MIDAZOLAM HYDROCHLORIDE 1 MG/ML
INJECTION INTRAMUSCULAR; INTRAVENOUS
Status: DISCONTINUED | OUTPATIENT
Start: 2024-12-12 | End: 2024-12-12 | Stop reason: HOSPADM

## 2024-12-12 RX ORDER — MORPHINE SULFATE 2 MG/ML
2 INJECTION, SOLUTION INTRAMUSCULAR; INTRAVENOUS ONCE
Status: COMPLETED | OUTPATIENT
Start: 2024-12-12 | End: 2024-12-12

## 2024-12-12 RX ORDER — IODIXANOL 320 MG/ML
INJECTION, SOLUTION INTRAVASCULAR
Status: DISCONTINUED | OUTPATIENT
Start: 2024-12-12 | End: 2024-12-12 | Stop reason: HOSPADM

## 2024-12-12 RX ORDER — ONDANSETRON HYDROCHLORIDE 2 MG/ML
4 INJECTION, SOLUTION INTRAVENOUS EVERY 6 HOURS PRN
Status: DISCONTINUED | OUTPATIENT
Start: 2024-12-12 | End: 2024-12-14 | Stop reason: HOSPADM

## 2024-12-12 RX ORDER — FUROSEMIDE 10 MG/ML
20 INJECTION INTRAMUSCULAR; INTRAVENOUS ONCE
Status: DISCONTINUED | OUTPATIENT
Start: 2024-12-12 | End: 2024-12-14 | Stop reason: HOSPADM

## 2024-12-12 RX ORDER — HYDRALAZINE HYDROCHLORIDE 25 MG/1
25 TABLET, FILM COATED ORAL 3 TIMES DAILY
Status: DISCONTINUED | OUTPATIENT
Start: 2024-12-12 | End: 2024-12-14 | Stop reason: HOSPADM

## 2024-12-12 RX ORDER — ACETYLCYSTEINE 200 MG/ML
1200 SOLUTION ORAL; RESPIRATORY (INHALATION) ONCE
Status: COMPLETED | OUTPATIENT
Start: 2024-12-12 | End: 2024-12-12

## 2024-12-12 RX ORDER — NITROGLYCERIN 5 MG/ML
INJECTION, SOLUTION INTRAVENOUS
Status: DISCONTINUED | OUTPATIENT
Start: 2024-12-12 | End: 2024-12-12 | Stop reason: HOSPADM

## 2024-12-12 RX ORDER — CLOPIDOGREL BISULFATE 75 MG/1
75 TABLET ORAL DAILY
Status: DISCONTINUED | OUTPATIENT
Start: 2024-12-13 | End: 2024-12-14 | Stop reason: HOSPADM

## 2024-12-12 RX ORDER — NALOXONE HCL 0.4 MG/ML
0.02 VIAL (ML) INJECTION
Status: DISCONTINUED | OUTPATIENT
Start: 2024-12-12 | End: 2024-12-14 | Stop reason: HOSPADM

## 2024-12-12 RX ORDER — CLOPIDOGREL BISULFATE 75 MG/1
TABLET ORAL
Status: COMPLETED
Start: 2024-12-12 | End: 2024-12-12

## 2024-12-12 RX ORDER — FUROSEMIDE 10 MG/ML
INJECTION INTRAMUSCULAR; INTRAVENOUS
Status: COMPLETED
Start: 2024-12-12 | End: 2024-12-12

## 2024-12-12 RX ORDER — POTASSIUM CHLORIDE 7.45 MG/ML
80 INJECTION INTRAVENOUS
Status: DISCONTINUED | OUTPATIENT
Start: 2024-12-12 | End: 2024-12-14 | Stop reason: HOSPADM

## 2024-12-12 RX ORDER — FENTANYL CITRATE 50 UG/ML
INJECTION, SOLUTION INTRAMUSCULAR; INTRAVENOUS
Status: DISCONTINUED | OUTPATIENT
Start: 2024-12-12 | End: 2024-12-12 | Stop reason: HOSPADM

## 2024-12-12 RX ORDER — DIPHENHYDRAMINE HCL 25 MG
CAPSULE ORAL
Status: COMPLETED
Start: 2024-12-12 | End: 2024-12-12

## 2024-12-12 RX ORDER — DIPHENHYDRAMINE HCL 25 MG
50 CAPSULE ORAL
Status: DISCONTINUED | OUTPATIENT
Start: 2024-12-12 | End: 2024-12-12 | Stop reason: HOSPADM

## 2024-12-12 RX ORDER — FUROSEMIDE 10 MG/ML
20 INJECTION INTRAMUSCULAR; INTRAVENOUS ONCE
Status: COMPLETED | OUTPATIENT
Start: 2024-12-12 | End: 2024-12-12

## 2024-12-12 RX ORDER — HEPARIN SODIUM 10000 [USP'U]/ML
INJECTION, SOLUTION INTRAVENOUS; SUBCUTANEOUS
Status: DISCONTINUED | OUTPATIENT
Start: 2024-12-12 | End: 2024-12-12 | Stop reason: HOSPADM

## 2024-12-12 RX ORDER — ONDANSETRON 4 MG/1
8 TABLET, ORALLY DISINTEGRATING ORAL EVERY 8 HOURS PRN
Status: DISCONTINUED | OUTPATIENT
Start: 2024-12-12 | End: 2024-12-12

## 2024-12-12 RX ORDER — ISOSORBIDE DINITRATE 10 MG/1
10 TABLET ORAL 3 TIMES DAILY
Status: DISCONTINUED | OUTPATIENT
Start: 2024-12-12 | End: 2024-12-14 | Stop reason: HOSPADM

## 2024-12-12 RX ORDER — FUROSEMIDE 20 MG/1
20 TABLET ORAL DAILY
Status: DISCONTINUED | OUTPATIENT
Start: 2024-12-13 | End: 2024-12-14 | Stop reason: HOSPADM

## 2024-12-12 RX ORDER — ACETYLCYSTEINE 200 MG/ML
SOLUTION ORAL; RESPIRATORY (INHALATION)
Status: COMPLETED
Start: 2024-12-12 | End: 2024-12-12

## 2024-12-12 RX ORDER — INSULIN GLARGINE 100 [IU]/ML
45 INJECTION, SOLUTION SUBCUTANEOUS DAILY
Status: DISCONTINUED | OUTPATIENT
Start: 2024-12-12 | End: 2024-12-13

## 2024-12-12 RX ORDER — GLUCAGON 1 MG
1 KIT INJECTION
Status: DISCONTINUED | OUTPATIENT
Start: 2024-12-12 | End: 2024-12-14 | Stop reason: HOSPADM

## 2024-12-12 RX ORDER — POTASSIUM CHLORIDE 7.45 MG/ML
40 INJECTION INTRAVENOUS
Status: DISCONTINUED | OUTPATIENT
Start: 2024-12-12 | End: 2024-12-14 | Stop reason: HOSPADM

## 2024-12-12 RX ORDER — CALCIUM GLUCONATE 20 MG/ML
3 INJECTION, SOLUTION INTRAVENOUS
Status: DISCONTINUED | OUTPATIENT
Start: 2024-12-12 | End: 2024-12-14 | Stop reason: HOSPADM

## 2024-12-12 RX ORDER — SODIUM CHLORIDE 0.9 % (FLUSH) 0.9 %
10 SYRINGE (ML) INJECTION EVERY 12 HOURS PRN
Status: DISCONTINUED | OUTPATIENT
Start: 2024-12-12 | End: 2024-12-14 | Stop reason: HOSPADM

## 2024-12-12 RX ORDER — IBUPROFEN 200 MG
16 TABLET ORAL
Status: DISCONTINUED | OUTPATIENT
Start: 2024-12-12 | End: 2024-12-14 | Stop reason: HOSPADM

## 2024-12-12 RX ORDER — IBUPROFEN 200 MG
24 TABLET ORAL
Status: DISCONTINUED | OUTPATIENT
Start: 2024-12-12 | End: 2024-12-14 | Stop reason: HOSPADM

## 2024-12-12 RX ORDER — GUANFACINE 1 MG/1
1 TABLET ORAL NIGHTLY
Status: DISCONTINUED | OUTPATIENT
Start: 2024-12-12 | End: 2024-12-14 | Stop reason: HOSPADM

## 2024-12-12 RX ADMIN — FUROSEMIDE 40 MG: 10 INJECTION, SOLUTION INTRAMUSCULAR; INTRAVENOUS at 02:12

## 2024-12-12 RX ADMIN — FUROSEMIDE 40 MG: 10 INJECTION INTRAMUSCULAR; INTRAVENOUS at 02:12

## 2024-12-12 RX ADMIN — DILTIAZEM HYDROCHLORIDE 120 MG: 120 CAPSULE, COATED, EXTENDED RELEASE ORAL at 05:12

## 2024-12-12 RX ADMIN — DIPHENHYDRAMINE HYDROCHLORIDE 50 MG: 25 CAPSULE ORAL at 06:12

## 2024-12-12 RX ADMIN — ASPIRIN 81 MG 81 MG: 81 TABLET ORAL at 05:12

## 2024-12-12 RX ADMIN — ONDANSETRON 4 MG: 2 INJECTION INTRAMUSCULAR; INTRAVENOUS at 04:12

## 2024-12-12 RX ADMIN — SODIUM CHLORIDE: 0.45 INJECTION, SOLUTION INTRAVENOUS at 06:12

## 2024-12-12 RX ADMIN — ACETYLCYSTEINE 1200 MG: 200 SOLUTION ORAL; RESPIRATORY (INHALATION) at 06:12

## 2024-12-12 RX ADMIN — ACETYLCYSTEINE 1200 MG: 200 INHALANT RESPIRATORY (INHALATION) at 05:12

## 2024-12-12 RX ADMIN — ISOSORBIDE DINITRATE 10 MG: 10 TABLET ORAL at 05:12

## 2024-12-12 RX ADMIN — Medication 50 MG: at 06:12

## 2024-12-12 RX ADMIN — METOPROLOL TARTRATE 75 MG: 50 TABLET, FILM COATED ORAL at 09:12

## 2024-12-12 RX ADMIN — MORPHINE SULFATE 2 MG: 2 INJECTION, SOLUTION INTRAMUSCULAR; INTRAVENOUS at 02:12

## 2024-12-12 RX ADMIN — ENOXAPARIN SODIUM 30 MG: 30 INJECTION SUBCUTANEOUS at 05:12

## 2024-12-12 RX ADMIN — CLOPIDOGREL BISULFATE 300 MG: 75 TABLET, FILM COATED ORAL at 12:12

## 2024-12-12 RX ADMIN — FAMOTIDINE 20 MG: 20 TABLET ORAL at 05:12

## 2024-12-12 RX ADMIN — NITROGLYCERIN 20 MCG/MIN: 20 INJECTION INTRAVENOUS at 03:12

## 2024-12-12 RX ADMIN — MUPIROCIN 1 G: 20 OINTMENT TOPICAL at 09:12

## 2024-12-12 RX ADMIN — CLOPIDOGREL BISULFATE 300 MG: 75 TABLET ORAL at 12:12

## 2024-12-12 RX ADMIN — ISOSORBIDE DINITRATE 10 MG: 10 TABLET ORAL at 09:12

## 2024-12-12 RX ADMIN — HYDRALAZINE HYDROCHLORIDE 25 MG: 25 TABLET ORAL at 09:12

## 2024-12-12 RX ADMIN — HYDRALAZINE HYDROCHLORIDE 25 MG: 25 TABLET ORAL at 05:12

## 2024-12-12 RX ADMIN — GUANFACINE 1 MG: 1 TABLET ORAL at 09:12

## 2024-12-12 NOTE — ASSESSMENT & PLAN NOTE
Anemia is likely due to Iron deficiency. Most recent hemoglobin and hematocrit are listed below.  Recent Labs     12/10/24  1635   HGB 10.4*   HCT 32.7*     Plan  - Monitor serial CBC: Daily  - Transfuse PRBC if patient becomes hemodynamically unstable, symptomatic or H/H drops below 7/21.  - Patient has not received any PRBC transfusions to date  - Patient's anemia is currently stable

## 2024-12-12 NOTE — NURSING
"At 1423 patient became distressed stating "I cannot lay flat, I can't breathe". This nurse went to her bedside to check vitals and patient was on room air at the time and sat was 93%. When checking lung sounds coarse crackles was noted. Message sent to Dr. Mendieta asking for recommendations, all while asking Dr. Coleman for advice as well. This nurse called the [facility] and SUSAN Harley answered the phone. She gave me Dr. Mendieta's number and he gave me recommendation to give lasix 20mg IVP, decrease fluids to 50 ml/hr, and allow patient to sit up in the bed. After going into the room to give lasix patient's sats decrease to 90% and a rapid response was called. Rapid response team at bedside, see new orders, patient transferred to ICU on BiPap. See MD note.  " ADL retraining/IADL retraining/balance training/bed mobility training/motor coordination training/neuromuscular re-education/ROM/strengthening/stretching/transfer training

## 2024-12-12 NOTE — Clinical Note
The catheter was inserted into the and was inserted over the wire into the. An angiography was performed of the left coronary arteries. Multiple views were taken. The angiography was performed via hand injection with .  The catheter was unable to engage the area..

## 2024-12-12 NOTE — Clinical Note
The catheter was inserted into the and was inserted over the wire into the aorta. An angiography was performed of the left coronary arteries. Multiple views were taken. The angiography was performed via hand injection with .  The catheter was unable to engage the area..

## 2024-12-12 NOTE — PLAN OF CARE
Ambulated onto unit without difficulty. No complaints of pain or distress noted. Undressed of personal clothing and gown on. Pre-op. Resting in bed with call light in reach. Family at bedside.

## 2024-12-12 NOTE — ASSESSMENT & PLAN NOTE
Creatine stable for now. BMP reviewed- noted Estimated Creatinine Clearance: 22.9 mL/min (A) (based on SCr of 2 mg/dL (H)). according to latest data. Based on current GFR, CKD stage is stage 4 - GFR 15-29.  Monitor UOP and serial BMP and adjust therapy as needed. Renally dose meds. Avoid nephrotoxic medications and procedures.  Follow BMP.  Patient is at risk for contrast induced nephropathy.

## 2024-12-12 NOTE — ASSESSMENT & PLAN NOTE
Continue management in intensive care unit.  Continue tele monitoring.  Obtain arterial blood gas analysis.  Continue BiPAP therapy for now.  Start nitroglycerin infusion to reduce preload.  Patient already received intravenous Lasix 40 mg earlier.  Obtain CBC, CMP, magnesium and BNP.  Trend serial cardiac enzymes.  Avoid nephrotoxic medications.  Consult patient's cardiologist.

## 2024-12-12 NOTE — PROGRESS NOTES
Pharmacist Renal Dose Adjustment Note    Cynthia N Cushing is a 79 y.o. female being treated with the medication enoxaparin.    Patient Data:    Vital Signs (Most Recent):  Temp: 98.5 °F (36.9 °C) (12/12/24 1330)  Pulse: 75 (12/12/24 1502)  Resp: (!) 30 (12/12/24 1502)  BP: (!) 160/78 (12/12/24 1502)  SpO2: (!) 94 % (12/12/24 1502) Vital Signs (72h Range):  Temp:  [97.9 °F (36.6 °C)-98.5 °F (36.9 °C)]   Pulse:  [60-75]   Resp:  [11-30]   BP: (149-167)/(66-83)   SpO2:  [93 %-100 %]      Recent Labs   Lab 12/10/24  1635   CREATININE 2.0*  2.0*     Serum creatinine: 2 mg/dL (H) 12/10/24 1635  Estimated creatinine clearance: 22.9 mL/min (A)    Enoxaparin 40 mg Q24H will be changed to Enoxaparin 30 mg Q24H.    Pharmacist's Name: Adán Berger  Pharmacist's Extension: [phone]

## 2024-12-12 NOTE — H&P
[facility]  History & Physical    Patient Name: Cynthia N Cushing  MRN: [id number]  Patient Class: IP- Inpatient  Admission Date: 12/12/2024  Attending Physician: Brijesh Mendieta MD   Primary Care Provider: Teri Adams MD         Patient information was obtained from patient and past medical records.     Subjective:     Principal Problem:Acute pulmonary edema    Chief Complaint:  Acute pulmonary edema medical management       HPI: Patient is a 79-year-old  female with past medical history significant for multiple medical problems including hypertension, hyperlipidemia, chronic kidney disease stage 4, aortic stenosis status post permanent pacemaker placement, uncontrolled diabetes mellitus type 2 and severe obesity who underwent left heart catheterization performed by Dr. Mendieta this afternoon.  Postprocedure patient was transferred to observation unit where patient was receiving IV fluid hydration 100 cc/hour normal saline.  Patient developed complaint of shortness of breath with dropping pulse ox down to 90%.  Patient received 2 doses of 20 mg IV Lasix and has been placed on BiPAP therapy.  Patient transferred to ICU for further management and care.  Patient denies any chest pain or palpitations.  Patient reports orthopnea and sense of shortness of breath.  Right groin femoral artery puncture site without any hematoma or bulging.  Patient's cardiologist is closely following patient.    Past Medical History:   Diagnosis Date    Allergy     Breast mass     Cataract     CHF (congestive heart failure)     Diabetes mellitus     GERD (gastroesophageal reflux disease)     Hernia, hiatal     Hyperlipidemia     Hypertension     Kidney stone     NSTEMI (non-ST elevated myocardial infarction)     Osteoarthritis     Pacemaker     Renal insufficiency     Seasonal allergies     Sleep apnea     does not use CPAP    SSS (sick sinus syndrome)     SVT (supraventricular  tachycardia)     Umbilical hernia        Past Surgical History:   Procedure Laterality Date    A-V CARDIAC PACEMAKER INSERTION N/A 03/10/2024    Procedure: INSERTION, CARDIAC PACEMAKER, DUAL CHAMBER;  Surgeon: Brijesh Mendieta MD;  Location: [facility] CATH/EP LAB;  Service: Cardiology;  Laterality: N/A;    BREAST BIOPSY Bilateral     BREAST CYST ASPIRATION Bilateral     BREAST SURGERY      CERVICAL DISC SURGERY      fusion    EYE SURGERY      cataracts bilateral    HYSTERECTOMY         Review of patient's allergies indicates:   Allergen Reactions    Biaxin [clarithromycin]     Prandin [repaglinide] Nausea And Vomiting    Amlodipine     Chlorphen-phenyltolox-pe-ppa     Ciprofloxacin Nausea And Vomiting    Omnicef [cefdinir]     Propoxyphene     Quinine Nausea And Vomiting     Pancreatitis         No current facility-administered medications on file prior to encounter.     Current Outpatient Medications on File Prior to Encounter   Medication Sig    aspirin 81 MG Chew Take 81 mg by mouth once daily.    cetirizine (ZYRTEC) 10 MG tablet Take 10 mg by mouth 2 (two) times a day.    clopidogreL (PLAVIX) 75 mg tablet Take 1 tablet (75 mg total) by mouth once daily.    diltiaZEM (CARDIZEM CD) 120 MG Cp24 Take 1 capsule (120 mg total) by mouth once daily.    famotidine (PEPCID) 20 MG tablet TAKE 1 TABLET TWICE DAILY    ferrous sulfate 324 mg (65 mg iron) TbEC Take 1 tablet (324 mg total) by mouth once daily.    furosemide (LASIX) 20 MG tablet Take 1 tablet (20 mg total) by mouth once daily.    guanFACINE (TENEX) 2 MG tablet TAKE 1 TABLET EVERY EVENING (Patient taking differently: 1 mg.)    hydrALAZINE (APRESOLINE) 50 MG tablet Take 1 tablet (50 mg total) by mouth 3 (three) times daily. (Patient taking differently: Take 25 mg by mouth 3 (three) times daily.)    isosorbide dinitrate (ISORDIL) 10 MG tablet Take 1 tablet (10 mg total) by mouth 3 (three) times daily.    LANTUS SOLOSTAR U-100 INSULIN glargine 100 units/mL SubQ pen  "ADMINISTER 51 UNITS UNDER THE SKIN AT NIGHT (Patient taking differently: No sig reported)    ACCU-CHEK AMADOR PLUS TEST STRP Strp TEST BLOOD SUGAR FOUR TIMES DAILY    lancets (ACCU-CHEK SOFTCLIX LANCETS) Misc TEST BLOOD SUGAR FOUR TIMES DAILY    pen needle, diabetic (BD ANNA 2ND GEN PEN NEEDLE) 32 gauge x 5/32" Ndle INJECT 1 NEEDLE INTO THE SKIN EVERY EVENING    [DISCONTINUED] fluconazole (DIFLUCAN) 50 MG Tab One a day for three days    [DISCONTINUED] metoprolol tartrate (LOPRESSOR) 50 MG tablet Take 1.5 tablets (75 mg total) by mouth 2 (two) times daily.    [DISCONTINUED] rosuvastatin (CRESTOR) 10 MG tablet TAKE 1 TABLET(10 MG) BY MOUTH EVERY EVENING (Patient taking differently: Take 10 mg by mouth every evening. 12/10 pt unsure if med stopped)     Family History       Problem Relation (Age of Onset)    Breast cancer Mother, Maternal Aunt    Cancer Mother, Father    Stroke Mother          Tobacco Use    Smoking status: Never    Smokeless tobacco: Never   Substance and Sexual Activity    Alcohol use: No    Drug use: No    Sexual activity: Not on file     Review of Systems   Respiratory:  Positive for shortness of breath.    All other systems reviewed and are negative.    Objective:     Vital Signs (Most Recent):  Temp: 98.5 °F (36.9 °C) (12/12/24 1330)  Pulse: 75 (12/12/24 1502)  Resp: (!) 30 (12/12/24 1502)  BP: (!) 160/78 (12/12/24 1502)  SpO2: (!) 94 % (12/12/24 1502) Vital Signs (24h Range):  Temp:  [98.1 °F (36.7 °C)-98.5 °F (36.9 °C)] 98.5 °F (36.9 °C)  Pulse:  [60-75] 75  Resp:  [11-30] 30  SpO2:  [93 %-100 %] 94 %  BP: (149-167)/(66-83) 160/78     Weight: 80.3 kg (177 lb 0.5 oz)  Body mass index is 31.36 kg/m².     Physical Exam  Constitutional:       Appearance: She is well-developed.   HENT:      Head: Normocephalic and atraumatic.   Eyes:      Conjunctiva/sclera: Conjunctivae normal.      Pupils: Pupils are equal, round, and reactive to light.   Neck:      Thyroid: No thyromegaly.      Vascular: No JVD. "   Cardiovascular:      Rate and Rhythm: Normal rate and regular rhythm.      Heart sounds: No murmur heard.     No friction rub. No gallop.   Pulmonary:      Effort: Pulmonary effort is normal.      Breath sounds: Normal breath sounds.   Abdominal:      General: Bowel sounds are normal. There is no distension.      Palpations: Abdomen is soft. There is no mass.      Tenderness: There is no abdominal tenderness.   Musculoskeletal:         General: Normal range of motion.      Cervical back: Neck supple.      Comments: Right groin without any hematoma or bulging   Skin:     General: Skin is warm and dry.   Neurological:      Mental Status: She is oriented to person, place, and time.      Cranial Nerves: No cranial nerve deficit.   Psychiatric:         Behavior: Behavior normal.              CRANIAL NERVES     CN III, IV, VI   Pupils are equal, round, and reactive to light.       Significant Labs:  Laboratory results pending.    Significant Imaging:   Chest x-ray:   Cardiomegaly with findings suggestive of mild pulmonary edema/volume overload.   Assessment/Plan:     * Acute pulmonary edema  Continue management in intensive care unit.  Continue tele monitoring.  Obtain arterial blood gas analysis.  Continue BiPAP therapy for now.  Start nitroglycerin infusion to reduce preload.  Patient already received intravenous Lasix 40 mg earlier.  Obtain CBC, CMP, magnesium and BNP.  Trend serial cardiac enzymes.  Avoid nephrotoxic medications.  Consult patient's cardiologist.      Presence of stent in LAD coronary artery  Noted.  Follow post cardiac stent orders and continue Plavix as directed by cardiologist.      Coronary artery disease involving native coronary artery of native heart with unstable angina pectoris  Patient with known CAD s/p stent placement, which is controlled Will continue ASA, Plavix, and Statin and monitor for S/Sx of angina/ACS. Continue to monitor on telemetry.     Cardiac pacemaker in situ  Noted.   Continue tele monitoring.      Chronic kidney disease (CKD), stage IV (severe)  Creatine stable for now. BMP reviewed- noted Estimated Creatinine Clearance: 22.9 mL/min (A) (based on SCr of 2 mg/dL (H)). according to latest data. Based on current GFR, CKD stage is stage 4 - GFR 15-29.  Monitor UOP and serial BMP and adjust therapy as needed. Renally dose meds. Avoid nephrotoxic medications and procedures.  Follow BMP.  Patient is at risk for contrast induced nephropathy.    Hypertension secondary to other renal disorders  Chronic, uncontrolled.  Latest blood pressure and vitals reviewed-     Temp:  [98.1 °F (36.7 °C)-98.5 °F (36.9 °C)]   Pulse:  [60-75]   Resp:  [11-30]   BP: (149-167)/(66-83)   SpO2:  [93 %-100 %] .   Home meds for hypertension were reviewed and noted below-  Hypertension Medications               diltiaZEM (CARDIZEM CD) 120 MG Cp24 Take 1 capsule (120 mg total) by mouth once daily.    furosemide (LASIX) 20 MG tablet Take 1 tablet (20 mg total) by mouth once daily.    guanFACINE (TENEX) 2 MG tablet TAKE 1 TABLET EVERY EVENING    hydrALAZINE (APRESOLINE) 50 MG tablet Take 1 tablet (50 mg total) by mouth 3 (three) times daily.    isosorbide dinitrate (ISORDIL) 10 MG tablet Take 1 tablet (10 mg total) by mouth 3 (three) times daily.    metoprolol tartrate (LOPRESSOR) 50 MG tablet Take 1.5 tablets (75 mg total) by mouth 2 (two) times daily.            While in the hospital, will manage blood pressure as follows; Continue home antihypertensive regimen    Will utilize p.r.n. blood pressure medication only if patient's blood pressure greater than 160/100 and she develops symptoms such as worsening chest pain or shortness of breath.       Aortic stenosis  Echocardiogram with evidence of aortic stenosis that is moderate . The patient's most recent echocardiogram result is listed below.  Avoid over-diuresis.  Follow Cardiology recommendations.    Echo Saline Bubble? No    Result Date: 12/3/2024    Left  Ventricle: The left ventricle is normal in size. Mildly increased   wall thickness. There is mild concentric hypertrophy. There is normal   systolic function. Ejection fraction is approximately 55%.    Right Ventricle: Normal right ventricular cavity size. Wall thickness   is normal. Systolic function is normal. Pacemaker lead present in the   ventricle.    Left Atrium: Left atrium is mildly dilated.    Aortic Valve: There is moderate aortic valve sclerosis. Severely   restricted motion. There is moderate to severe stenosis. Aortic valve area   by VTI is 1.1 cm². Aortic valve peak velocity is 3.0 m/s. Mean gradient is   20.0 mmHg. The dimensionless index is 0.35.    Tricuspid Valve: There is mild regurgitation.    IVC/SVC: Normal venous pressure at 3 mmHg.    The estimated pulmonary artery systolic pressure is 20 mmHg.        Echo Saline Bubble? No    Result Date: 3/4/2024    Left Ventricle: The left ventricle is normal in size. Mildly increased   wall thickness. There is mild concentric hypertrophy. Mild global   hypokinesis present. There is mildly reduced systolic function with a   visually estimated ejection fraction of 45 - 50%. There is normal   diastolic function.    Right Ventricle: Normal right ventricular cavity size. Wall thickness   is normal. Right ventricle wall motion  is normal. Systolic function is   normal.    Left Atrium: Left atrium is moderately dilated.    Aortic Valve: The aortic valve is a trileaflet valve. There is moderate   aortic valve sclerosis. There is mild annular calcification present.   Moderately restricted motion. There is moderate to severe stenosis. Aortic   valve area by VTI is 0.99 cm². Aortic valve peak velocity is 2.40 m/s.   Mean gradient is 13 mmHg. The dimensionless index is 0.31.    Tricuspid Valve: The tricuspid valve is structurally normal. There is   normal leaflet mobility. There is mild regurgitation with a centrally   directed jet.    Pulmonary Artery: There is  mild to moderate pulmonary hypertension. The   estimated pulmonary artery systolic pressure is 45 mmHg.    IVC/SVC: Intermediate venous pressure at 8 mmHg.    Pericardium: There is a trivial circumferential effusion. Pericardial   effusion is echolucent. No indication of cardiac tamponade.          Hyperlipidemia  Chronic problem. Will continue chronic medications and monitor for any changes, adjusting as needed.        Uncontrolled type 2 diabetes mellitus with hyperglycemia  Check blood glucose level q AC/HS.  Use Novolog Insulin Sliding Scale as needed.   Continue American Diabetic Association 1800 Kcal diet.        Iron deficiency anemia  Anemia is likely due to Iron deficiency. Most recent hemoglobin and hematocrit are listed below.  Recent Labs     12/10/24  1635   HGB 10.4*   HCT 32.7*     Plan  - Monitor serial CBC: Daily  - Transfuse PRBC if patient becomes hemodynamically unstable, symptomatic or H/H drops below 7/21.  - Patient has not received any PRBC transfusions to date  - Patient's anemia is currently stable      VTE Risk Mitigation (From admission, onward)           Ordered     enoxaparin injection 30 mg  Daily         12/12/24 1525     IP VTE HIGH RISK PATIENT  Once         12/12/24 1525     Place sequential compression device  Until discontinued         12/12/24 1525                               Pharmacist Renal Dose Adjustment Note    Cynthia N Cushing is a 79 y.o. female being treated with the medication enoxaparin.    Patient Data:    Vital Signs (Most Recent):  Temp: 98.5 °F (36.9 °C) (12/12/24 1330)  Pulse: 75 (12/12/24 1502)  Resp: (!) 30 (12/12/24 1502)  BP: (!) 160/78 (12/12/24 1502)  SpO2: (!) 94 % (12/12/24 1502) Vital Signs (72h Range):  Temp:  [97.9 °F (36.6 °C)-98.5 °F (36.9 °C)]   Pulse:  [60-75]   Resp:  [11-30]   BP: (149-167)/(66-83)   SpO2:  [93 %-100 %]      Recent Labs   Lab 12/10/24  1635   CREATININE 2.0*  2.0*     Serum creatinine: 2 mg/dL (H) 12/10/24 1635  Estimated  creatinine clearance: 22.9 mL/min (A)    Enoxaparin 40 mg Q24H will be changed to Enoxaparin 30 mg Q24H.    Pharmacist's Name: Adán Berger  Pharmacist's Extension: [phone]      Jaciel Rangel MD  Department of Hospital Medicine  [facility]

## 2024-12-12 NOTE — ASSESSMENT & PLAN NOTE
Chronic, uncontrolled.  Latest blood pressure and vitals reviewed-     Temp:  [98.1 °F (36.7 °C)-98.5 °F (36.9 °C)]   Pulse:  [60-75]   Resp:  [11-30]   BP: (149-167)/(66-83)   SpO2:  [93 %-100 %] .   Home meds for hypertension were reviewed and noted below-  Hypertension Medications               diltiaZEM (CARDIZEM CD) 120 MG Cp24 Take 1 capsule (120 mg total) by mouth once daily.    furosemide (LASIX) 20 MG tablet Take 1 tablet (20 mg total) by mouth once daily.    guanFACINE (TENEX) 2 MG tablet TAKE 1 TABLET EVERY EVENING    hydrALAZINE (APRESOLINE) 50 MG tablet Take 1 tablet (50 mg total) by mouth 3 (three) times daily.    isosorbide dinitrate (ISORDIL) 10 MG tablet Take 1 tablet (10 mg total) by mouth 3 (three) times daily.    metoprolol tartrate (LOPRESSOR) 50 MG tablet Take 1.5 tablets (75 mg total) by mouth 2 (two) times daily.            While in the hospital, will manage blood pressure as follows; Continue home antihypertensive regimen    Will utilize p.r.n. blood pressure medication only if patient's blood pressure greater than 160/100 and she develops symptoms such as worsening chest pain or shortness of breath.

## 2024-12-12 NOTE — PLAN OF CARE
Pt now progressing.  Able to wean off BiPAP and to NC.  Tolerating.  Breathing easier.  Urinary output improving.  Attempted to call daughter multiple times to update but unable to reach.  Call goes straight to .  C/o slight nausea alleviated with zofran.  Pt appears calmer now and breathing easier.  Still reports not ready to lay back at this time.

## 2024-12-12 NOTE — Clinical Note
The catheter was inserted into the and was inserted over the wire into the ostium   right coronary artery. An angiography was performed of the right coronary arteries. The angiography was performed via hand injection with .

## 2024-12-12 NOTE — Clinical Note
The catheter was inserted into the and was inserted over the wire into the ostium   left main. An angiography was performed of the left coronary arteries. The angiography was performed via hand injection with .

## 2024-12-12 NOTE — SUBJECTIVE & OBJECTIVE
Past Medical History:   Diagnosis Date    Allergy     Breast mass     Cataract     CHF (congestive heart failure)     Diabetes mellitus     GERD (gastroesophageal reflux disease)     Hernia, hiatal     Hyperlipidemia     Hypertension     Kidney stone     NSTEMI (non-ST elevated myocardial infarction)     Osteoarthritis     Pacemaker     Renal insufficiency     Seasonal allergies     Sleep apnea     does not use CPAP    SSS (sick sinus syndrome)     SVT (supraventricular tachycardia)     Umbilical hernia        Past Surgical History:   Procedure Laterality Date    A-V CARDIAC PACEMAKER INSERTION N/A 03/10/2024    Procedure: INSERTION, CARDIAC PACEMAKER, DUAL CHAMBER;  Surgeon: Brijesh Mendieta MD;  Location: [facility] CATH/EP LAB;  Service: Cardiology;  Laterality: N/A;    BREAST BIOPSY Bilateral     BREAST CYST ASPIRATION Bilateral     BREAST SURGERY      CERVICAL DISC SURGERY      fusion    EYE SURGERY      cataracts bilateral    HYSTERECTOMY         Review of patient's allergies indicates:   Allergen Reactions    Biaxin [clarithromycin]     Prandin [repaglinide] Nausea And Vomiting    Amlodipine     Chlorphen-phenyltolox-pe-ppa     Ciprofloxacin Nausea And Vomiting    Omnicef [cefdinir]     Propoxyphene     Quinine Nausea And Vomiting     Pancreatitis         No current facility-administered medications on file prior to encounter.     Current Outpatient Medications on File Prior to Encounter   Medication Sig    aspirin 81 MG Chew Take 81 mg by mouth once daily.    cetirizine (ZYRTEC) 10 MG tablet Take 10 mg by mouth 2 (two) times a day.    clopidogreL (PLAVIX) 75 mg tablet Take 1 tablet (75 mg total) by mouth once daily.    diltiaZEM (CARDIZEM CD) 120 MG Cp24 Take 1 capsule (120 mg total) by mouth once daily.    famotidine (PEPCID) 20 MG tablet TAKE 1 TABLET TWICE DAILY    ferrous sulfate 324 mg (65 mg iron) TbEC Take 1 tablet (324 mg total) by mouth once daily.    furosemide (LASIX) 20 MG tablet Take 1 tablet (20  "mg total) by mouth once daily.    guanFACINE (TENEX) 2 MG tablet TAKE 1 TABLET EVERY EVENING (Patient taking differently: 1 mg.)    hydrALAZINE (APRESOLINE) 50 MG tablet Take 1 tablet (50 mg total) by mouth 3 (three) times daily. (Patient taking differently: Take 25 mg by mouth 3 (three) times daily.)    isosorbide dinitrate (ISORDIL) 10 MG tablet Take 1 tablet (10 mg total) by mouth 3 (three) times daily.    LANTUS SOLOSTAR U-100 INSULIN glargine 100 units/mL SubQ pen ADMINISTER 51 UNITS UNDER THE SKIN AT NIGHT (Patient taking differently: No sig reported)    ACCU-CHEK AMADOR PLUS TEST STRP Strp TEST BLOOD SUGAR FOUR TIMES DAILY    lancets (ACCU-CHEK SOFTCLIX LANCETS) Misc TEST BLOOD SUGAR FOUR TIMES DAILY    pen needle, diabetic (BD ANNA 2ND GEN PEN NEEDLE) 32 gauge x 5/32" Ndle INJECT 1 NEEDLE INTO THE SKIN EVERY EVENING    [DISCONTINUED] fluconazole (DIFLUCAN) 50 MG Tab One a day for three days    [DISCONTINUED] metoprolol tartrate (LOPRESSOR) 50 MG tablet Take 1.5 tablets (75 mg total) by mouth 2 (two) times daily.    [DISCONTINUED] rosuvastatin (CRESTOR) 10 MG tablet TAKE 1 TABLET(10 MG) BY MOUTH EVERY EVENING (Patient taking differently: Take 10 mg by mouth every evening. 12/10 pt unsure if med stopped)     Family History       Problem Relation (Age of Onset)    Breast cancer Mother, Maternal Aunt    Cancer Mother, Father    Stroke Mother          Tobacco Use    Smoking status: Never    Smokeless tobacco: Never   Substance and Sexual Activity    Alcohol use: No    Drug use: No    Sexual activity: Not on file     Review of Systems   Respiratory:  Positive for shortness of breath.    All other systems reviewed and are negative.    Objective:     Vital Signs (Most Recent):  Temp: 98.5 °F (36.9 °C) (12/12/24 1330)  Pulse: 75 (12/12/24 1502)  Resp: (!) 30 (12/12/24 1502)  BP: (!) 160/78 (12/12/24 1502)  SpO2: (!) 94 % (12/12/24 1502) Vital Signs (24h Range):  Temp:  [98.1 °F (36.7 °C)-98.5 °F (36.9 °C)] 98.5 °F " (36.9 °C)  Pulse:  [60-75] 75  Resp:  [11-30] 30  SpO2:  [93 %-100 %] 94 %  BP: (149-167)/(66-83) 160/78     Weight: 80.3 kg (177 lb 0.5 oz)  Body mass index is 31.36 kg/m².     Physical Exam  Constitutional:       Appearance: She is well-developed.   HENT:      Head: Normocephalic and atraumatic.   Eyes:      Conjunctiva/sclera: Conjunctivae normal.      Pupils: Pupils are equal, round, and reactive to light.   Neck:      Thyroid: No thyromegaly.      Vascular: No JVD.   Cardiovascular:      Rate and Rhythm: Normal rate and regular rhythm.      Heart sounds: No murmur heard.     No friction rub. No gallop.   Pulmonary:      Effort: Pulmonary effort is normal.      Breath sounds: Normal breath sounds.   Abdominal:      General: Bowel sounds are normal. There is no distension.      Palpations: Abdomen is soft. There is no mass.      Tenderness: There is no abdominal tenderness.   Musculoskeletal:         General: Normal range of motion.      Cervical back: Neck supple.      Comments: Right groin without any hematoma or bulging   Skin:     General: Skin is warm and dry.   Neurological:      Mental Status: She is oriented to person, place, and time.      Cranial Nerves: No cranial nerve deficit.   Psychiatric:         Behavior: Behavior normal.              CRANIAL NERVES     CN III, IV, VI   Pupils are equal, round, and reactive to light.       Significant Labs:  Laboratory results pending.    Significant Imaging:   Chest x-ray:   Cardiomegaly with findings suggestive of mild pulmonary edema/volume overload.

## 2024-12-12 NOTE — HPI
Patient is a 79-year-old  female with past medical history significant for multiple medical problems including hypertension, hyperlipidemia, chronic kidney disease stage 4, aortic stenosis status post permanent pacemaker placement, uncontrolled diabetes mellitus type 2 and severe obesity who underwent left heart catheterization performed by Dr. Mendieta this afternoon.  Postprocedure patient was transferred to observation unit where patient was receiving IV fluid hydration 100 cc/hour normal saline.  Patient developed complaint of shortness of breath with dropping pulse ox down to 90%.  Patient received 2 doses of 20 mg IV Lasix and has been placed on BiPAP therapy.  Patient transferred to ICU for further management and care.  Patient denies any chest pain or palpitations.  Patient reports orthopnea and sense of shortness of breath.  Right groin femoral artery puncture site without any hematoma or bulging.  Patient's cardiologist is closely following patient.

## 2024-12-12 NOTE — Clinical Note
The catheter was removed from the mid   left anterior descending. The angiography was performed via hand injection with .

## 2024-12-12 NOTE — PLAN OF CARE
Report received from SUSAN Rasheed. Arrived back from cath lab via stretcher. No complaints of pain or distress noted. Dressing to right groin CDI. Post angio instructions given. Resting in bed with call light in reach.

## 2024-12-12 NOTE — Clinical Note
The catheter was inserted into the and was inserted over the wire into the ostium   left main. An angiography was performed of the left coronary arteries. Multiple views were taken. The angiography was performed via hand injection with .

## 2024-12-12 NOTE — Clinical Note
The catheter was removed from the mid   left anterior descending. An angiography was performed of the left coronary arteries. The angiography was performed via hand injection with .

## 2024-12-12 NOTE — NURSING
1500:  received pt post RRT from obs unit.  Sent for nurse to give report.  Pt settled in room and connected to ICU monitors while awaiting transferring nurse.  Dr Mendieta at beside along with Dr Jaquez.    Heart center nurse also accompanied patient on transfer.  Pt transferred with BiPAP in use.  Sitting up in bed.  Becomes extremely anxious if attempt to lay back.  States unable to breathe.  Lungs coarse and crackles.  Bp elevated but improves once settled.  EKG performed.  CXR.  Hospitalist consulted and now aware.  IVF off.  Linares cath placed with return of clear yellow urine.    Nitro gtt initiated as ordered.

## 2024-12-12 NOTE — PLAN OF CARE
Bedside hand off report given to SUSAN Lugo and SUSAN Rasheed.  Chart given. Left to cath lab via stretcher for scheduled procedure. Family returned to waiting area.

## 2024-12-12 NOTE — ASSESSMENT & PLAN NOTE
Echocardiogram with evidence of aortic stenosis that is moderate . The patient's most recent echocardiogram result is listed below.  Avoid over-diuresis.  Follow Cardiology recommendations.    Echo Saline Bubble? No    Result Date: 12/3/2024    Left Ventricle: The left ventricle is normal in size. Mildly increased   wall thickness. There is mild concentric hypertrophy. There is normal   systolic function. Ejection fraction is approximately 55%.    Right Ventricle: Normal right ventricular cavity size. Wall thickness   is normal. Systolic function is normal. Pacemaker lead present in the   ventricle.    Left Atrium: Left atrium is mildly dilated.    Aortic Valve: There is moderate aortic valve sclerosis. Severely   restricted motion. There is moderate to severe stenosis. Aortic valve area   by VTI is 1.1 cm². Aortic valve peak velocity is 3.0 m/s. Mean gradient is   20.0 mmHg. The dimensionless index is 0.35.    Tricuspid Valve: There is mild regurgitation.    IVC/SVC: Normal venous pressure at 3 mmHg.    The estimated pulmonary artery systolic pressure is 20 mmHg.        Echo Saline Bubble? No    Result Date: 3/4/2024    Left Ventricle: The left ventricle is normal in size. Mildly increased   wall thickness. There is mild concentric hypertrophy. Mild global   hypokinesis present. There is mildly reduced systolic function with a   visually estimated ejection fraction of 45 - 50%. There is normal   diastolic function.    Right Ventricle: Normal right ventricular cavity size. Wall thickness   is normal. Right ventricle wall motion  is normal. Systolic function is   normal.    Left Atrium: Left atrium is moderately dilated.    Aortic Valve: The aortic valve is a trileaflet valve. There is moderate   aortic valve sclerosis. There is mild annular calcification present.   Moderately restricted motion. There is moderate to severe stenosis. Aortic   valve area by VTI is 0.99 cm². Aortic valve peak velocity is 2.40 m/s.    Mean gradient is 13 mmHg. The dimensionless index is 0.31.    Tricuspid Valve: The tricuspid valve is structurally normal. There is   normal leaflet mobility. There is mild regurgitation with a centrally   directed jet.    Pulmonary Artery: There is mild to moderate pulmonary hypertension. The   estimated pulmonary artery systolic pressure is 45 mmHg.    IVC/SVC: Intermediate venous pressure at 8 mmHg.    Pericardium: There is a trivial circumferential effusion. Pericardial   effusion is echolucent. No indication of cardiac tamponade.

## 2024-12-12 NOTE — NURSING TRANSFER
Nursing Transfer Note      12/12/2024   1:45 PM    Nurse giving handoff:Rachel  Nurse receiving handoff:Nova    Reason patient is being transferred: admit    Transfer To: [id number]    Transfer via stretcher    Transfer with cardiac monitoring    Transported by Rachel     Transfer Vital Signs:  Blood Pressure:  Heart Rate:  O2:  Temperature:  Respirations:    Telemetry: Box Number [id number]  Order for Tele Monitor? Yes    Additional Lines:     Medicines sent: none    Any special needs or follow-up needed: assess right groin for any swelling or bleeding    Patient belongings transferred with patient: Yes    Chart send with patient: Yes    Notified: family at bedside    Patient reassessed at: 12/12/24 1240    Upon arrival to floor: cardiac monitor applied, patient oriented to room, call bell in reach, and bed in lowest position

## 2024-12-12 NOTE — NURSING
Pts blood sugar 200.  Would require 2units per sliding scale.  Pt does not wish to receive insulin at this time.  States before she goes to bed it will drop and would like to wait for next blood sugar check before receiving insulin.

## 2024-12-13 LAB
ALBUMIN SERPL BCP-MCNC: 3.6 G/DL (ref 3.5–5.2)
ALP SERPL-CCNC: 62 U/L (ref 55–135)
ALT SERPL W/O P-5'-P-CCNC: 18 U/L (ref 10–44)
ANION GAP SERPL CALC-SCNC: 10 MMOL/L (ref 8–16)
ANION GAP SERPL CALC-SCNC: 10 MMOL/L (ref 8–16)
AST SERPL-CCNC: 16 U/L (ref 10–40)
BILIRUB SERPL-MCNC: 0.5 MG/DL (ref 0.1–1)
BUN SERPL-MCNC: 42 MG/DL (ref 8–23)
BUN SERPL-MCNC: 42 MG/DL (ref 8–23)
CALCIUM SERPL-MCNC: 8.5 MG/DL (ref 8.7–10.5)
CALCIUM SERPL-MCNC: 8.5 MG/DL (ref 8.7–10.5)
CHLORIDE SERPL-SCNC: 106 MMOL/L (ref 95–110)
CHLORIDE SERPL-SCNC: 106 MMOL/L (ref 95–110)
CHOLEST SERPL-MCNC: 292 MG/DL (ref 120–199)
CHOLEST/HDLC SERPL: 9.7 {RATIO} (ref 2–5)
CO2 SERPL-SCNC: 23 MMOL/L (ref 23–29)
CO2 SERPL-SCNC: 23 MMOL/L (ref 23–29)
CREAT SERPL-MCNC: 2 MG/DL (ref 0.5–1.4)
CREAT SERPL-MCNC: 2 MG/DL (ref 0.5–1.4)
EST. GFR  (NO RACE VARIABLE): 24.9 ML/MIN/1.73 M^2
EST. GFR  (NO RACE VARIABLE): 24.9 ML/MIN/1.73 M^2
GLUCOSE SERPL-MCNC: 146 MG/DL (ref 70–110)
GLUCOSE SERPL-MCNC: 146 MG/DL (ref 70–110)
HDLC SERPL-MCNC: 30 MG/DL (ref 40–75)
HDLC SERPL: 10.3 % (ref 20–50)
LDLC SERPL CALC-MCNC: ABNORMAL MG/DL (ref 63–159)
NONHDLC SERPL-MCNC: 262 MG/DL
POCT GLUCOSE: 147 MG/DL (ref 70–110)
POCT GLUCOSE: 178 MG/DL (ref 70–110)
POCT GLUCOSE: 178 MG/DL (ref 70–110)
POTASSIUM SERPL-SCNC: 3.6 MMOL/L (ref 3.5–5.1)
POTASSIUM SERPL-SCNC: 3.6 MMOL/L (ref 3.5–5.1)
PROT SERPL-MCNC: 6.1 G/DL (ref 6–8.4)
SODIUM SERPL-SCNC: 139 MMOL/L (ref 136–145)
SODIUM SERPL-SCNC: 139 MMOL/L (ref 136–145)
TRIGL SERPL-MCNC: 431 MG/DL (ref 30–150)
TROPONIN I SERPL HS-MCNC: 486.3 PG/ML (ref 0–14.9)
TROPONIN I SERPL HS-MCNC: 599.5 PG/ML (ref 0–14.9)

## 2024-12-13 PROCEDURE — 82962 GLUCOSE BLOOD TEST: CPT

## 2024-12-13 PROCEDURE — 94660 CPAP INITIATION&MGMT: CPT

## 2024-12-13 PROCEDURE — 99900031 HC PATIENT EDUCATION (STAT)

## 2024-12-13 PROCEDURE — 97161 PT EVAL LOW COMPLEX 20 MIN: CPT

## 2024-12-13 PROCEDURE — 27000221 HC OXYGEN, UP TO 24 HOURS

## 2024-12-13 PROCEDURE — 36415 COLL VENOUS BLD VENIPUNCTURE: CPT | Performed by: INTERNAL MEDICINE

## 2024-12-13 PROCEDURE — 63600175 PHARM REV CODE 636 W HCPCS: Performed by: INTERNAL MEDICINE

## 2024-12-13 PROCEDURE — 84484 ASSAY OF TROPONIN QUANT: CPT | Performed by: INTERNAL MEDICINE

## 2024-12-13 PROCEDURE — 25000003 PHARM REV CODE 250: Performed by: INTERNAL MEDICINE

## 2024-12-13 PROCEDURE — 99900035 HC TECH TIME PER 15 MIN (STAT)

## 2024-12-13 PROCEDURE — 80061 LIPID PANEL: CPT | Performed by: INTERNAL MEDICINE

## 2024-12-13 PROCEDURE — 12000002 HC ACUTE/MED SURGE SEMI-PRIVATE ROOM

## 2024-12-13 PROCEDURE — 94761 N-INVAS EAR/PLS OXIMETRY MLT: CPT

## 2024-12-13 PROCEDURE — 80053 COMPREHEN METABOLIC PANEL: CPT | Performed by: INTERNAL MEDICINE

## 2024-12-13 RX ORDER — INSULIN GLARGINE 100 [IU]/ML
30 INJECTION, SOLUTION SUBCUTANEOUS DAILY
Status: DISCONTINUED | OUTPATIENT
Start: 2024-12-14 | End: 2024-12-14 | Stop reason: HOSPADM

## 2024-12-13 RX ADMIN — ISOSORBIDE DINITRATE 10 MG: 10 TABLET ORAL at 09:12

## 2024-12-13 RX ADMIN — HYDRALAZINE HYDROCHLORIDE 25 MG: 25 TABLET ORAL at 09:12

## 2024-12-13 RX ADMIN — MUPIROCIN 1 G: 20 OINTMENT TOPICAL at 08:12

## 2024-12-13 RX ADMIN — FAMOTIDINE 20 MG: 20 TABLET ORAL at 08:12

## 2024-12-13 RX ADMIN — GUANFACINE 1 MG: 1 TABLET ORAL at 09:12

## 2024-12-13 RX ADMIN — HYDRALAZINE HYDROCHLORIDE 25 MG: 25 TABLET ORAL at 08:12

## 2024-12-13 RX ADMIN — MUPIROCIN 1 G: 20 OINTMENT TOPICAL at 09:12

## 2024-12-13 RX ADMIN — ISOSORBIDE DINITRATE 10 MG: 10 TABLET ORAL at 08:12

## 2024-12-13 RX ADMIN — METOPROLOL TARTRATE 75 MG: 50 TABLET, FILM COATED ORAL at 09:12

## 2024-12-13 RX ADMIN — INSULIN GLARGINE 30 UNITS: 100 INJECTION, SOLUTION SUBCUTANEOUS at 12:12

## 2024-12-13 RX ADMIN — CLOPIDOGREL BISULFATE 75 MG: 75 TABLET, FILM COATED ORAL at 08:12

## 2024-12-13 RX ADMIN — INSULIN ASPART 1 UNITS: 100 INJECTION, SOLUTION INTRAVENOUS; SUBCUTANEOUS at 10:12

## 2024-12-13 RX ADMIN — ISOSORBIDE DINITRATE 10 MG: 10 TABLET ORAL at 03:12

## 2024-12-13 RX ADMIN — METOPROLOL TARTRATE 75 MG: 50 TABLET, FILM COATED ORAL at 08:12

## 2024-12-13 RX ADMIN — DILTIAZEM HYDROCHLORIDE 120 MG: 120 CAPSULE, COATED, EXTENDED RELEASE ORAL at 08:12

## 2024-12-13 RX ADMIN — HYDRALAZINE HYDROCHLORIDE 25 MG: 25 TABLET ORAL at 03:12

## 2024-12-13 RX ADMIN — ENOXAPARIN SODIUM 30 MG: 30 INJECTION SUBCUTANEOUS at 04:12

## 2024-12-13 RX ADMIN — ASPIRIN 81 MG 81 MG: 81 TABLET ORAL at 08:12

## 2024-12-13 RX ADMIN — FUROSEMIDE 20 MG: 20 TABLET ORAL at 08:12

## 2024-12-13 NOTE — PROGRESS NOTES
[facility] Medicine  Progress Note    Patient Name: Cynthia N Cushing  MRN: [id number]  Patient Class: IP- Inpatient   Admission Date: 12/12/2024  Length of Stay: 1 days  Attending Physician: Brijesh Mendieta MD  Primary Care Provider: Teri Adams MD        Subjective     Principal Problem:Acute pulmonary edema        HPI:  Patient is a 79-year-old  female with past medical history significant for multiple medical problems including hypertension, hyperlipidemia, chronic kidney disease stage 4, aortic stenosis status post permanent pacemaker placement, uncontrolled diabetes mellitus type 2 and severe obesity who underwent left heart catheterization performed by Dr. Mendieta this afternoon.  Postprocedure patient was transferred to observation unit where patient was receiving IV fluid hydration 100 cc/hour normal saline.  Patient developed complaint of shortness of breath with dropping pulse ox down to 90%.  Patient received 2 doses of 20 mg IV Lasix and has been placed on BiPAP therapy.  Patient transferred to ICU for further management and care.  Patient denies any chest pain or palpitations.  Patient reports orthopnea and sense of shortness of breath.  Right groin femoral artery puncture site without any hematoma or bulging.  Patient's cardiologist is closely following patient.    Overview/Hospital Course:  No notes on file    Interval History:  Patient seen and examined during multidisciplinary rounds.  Patient looking and feeling much better.  Patient is off supplemental oxygen.  Patient denies any chest pain, palpitations or any diaphoresis.  Discussed with Dr. Mendieta.  Serum troponin this morning 599.  Cardiology team recommended to transfer the patient to telemetry floor.    Review of Systems   Respiratory:  Positive for shortness of breath.    All other systems reviewed and are negative.    Objective:     Vital Signs (Most Recent):  Temp: 98 °F (36.7 °C) (12/13/24  0701)  Pulse: 60 (12/13/24 0744)  Resp: (!) 21 (12/13/24 0744)  BP: 127/62 (12/13/24 0848)  SpO2: 98 % (12/13/24 0744) Vital Signs (24h Range):  Temp:  [97.5 °F (36.4 °C)-98.5 °F (36.9 °C)] 98 °F (36.7 °C)  Pulse:  [60-75] 60  Resp:  [17-34] 21  SpO2:  [93 %-99 %] 98 %  BP: ()/(50-83) 127/62     Weight: 80.3 kg (177 lb 0.5 oz)  Body mass index is 31.36 kg/m².    Intake/Output Summary (Last 24 hours) at 12/13/2024 0850  Last data filed at 12/13/2024 0701  Gross per 24 hour   Intake 1169.55 ml   Output 1185 ml   Net -15.45 ml         Physical Exam  Constitutional:       Appearance: She is well-developed.   HENT:      Head: Normocephalic and atraumatic.   Eyes:      Conjunctiva/sclera: Conjunctivae normal.      Pupils: Pupils are equal, round, and reactive to light.   Neck:      Thyroid: No thyromegaly.      Vascular: No JVD.   Cardiovascular:      Rate and Rhythm: Normal rate and regular rhythm.      Heart sounds: Murmur heard.      No friction rub. No gallop.      Comments: Systolic ejection murmur at aortic area.  Pulmonary:      Effort: Pulmonary effort is normal.      Breath sounds: Normal breath sounds.   Abdominal:      General: Bowel sounds are normal. There is no distension.      Palpations: Abdomen is soft. There is no mass.      Tenderness: There is no abdominal tenderness.   Musculoskeletal:         General: Normal range of motion.      Cervical back: Neck supple.      Comments: Right groin without any hematoma or bulging   Skin:     General: Skin is warm and dry.   Neurological:      Mental Status: She is oriented to person, place, and time.      Cranial Nerves: No cranial nerve deficit.   Psychiatric:         Behavior: Behavior normal.             Significant Labs: All pertinent labs within the past 24 hours have been reviewed.  CBC:   Recent Labs   Lab 12/12/24  1654   WBC 12.01   HGB 10.5*   HCT 32.7*        CMP:   Recent Labs   Lab 12/12/24  1654 12/13/24  0414    139  139   K 3.8  "3.6  3.6    106  106   CO2 23 23  23   * 146*  146*   BUN 42* 42*  42*   CREATININE 1.9* 2.0*  2.0*   CALCIUM 8.7 8.5*  8.5*   PROT 6.8 6.1   ALBUMIN 4.0 3.6   BILITOT 0.5 0.5   ALKPHOS 73 62   AST 17 16   ALT 20 18   ANIONGAP 9 10  10     Coagulation: No results for input(s): "PT", "INR", "APTT" in the last 48 hours.  Troponin:   Recent Labs   Lab 12/12/24  1654 12/12/24  2308 12/13/24  0414   TROPONINIHS 130.9* 486.3* 599.5*     TSH:   Recent Labs   Lab 11/13/24  0700   TSH 4.143     Microbiology Results (last 7 days)       ** No results found for the last 168 hours. **          Significant Imaging:   Chest x-ray:   Cardiomegaly with findings suggestive of mild pulmonary edema/volume overload.     LHC:    The Mid LAD lesion was 99% stenosed with 0% stenosis post-intervention.    Mid LAD lesion: A .    Mid LAD lesion: A STENT SYNERGY XD [id number] stent was successfully placed at 11 ALAN for 30 sec.    The estimated blood loss was none.    There was single vessel coronary artery disease.    Assessment and Plan     * Acute pulmonary edema  Continue management in intensive care unit.  Continue tele monitoring.  Obtain arterial blood gas analysis.  Continue BiPAP therapy for now.  Start nitroglycerin infusion to reduce preload.  Patient already received intravenous Lasix 40 mg earlier.  Obtain CBC, CMP, magnesium and BNP.  Trend serial cardiac enzymes.  Avoid nephrotoxic medications.  Consult patient's cardiologist.      Acute on chronic heart failure with preserved ejection fraction (HFpEF)  See management of pulmonary edema.    Presence of stent in LAD coronary artery  Noted.  Follow post cardiac stent orders and continue Plavix as directed by cardiologist.      Coronary artery disease involving native coronary artery of native heart with unstable angina pectoris  Patient with known CAD s/p stent placement, which is controlled Will continue ASA, Plavix, and Statin and monitor for S/Sx of " angina/ACS. Continue to monitor on telemetry.     Cardiac pacemaker in situ  Noted.  Continue tele monitoring.      Chronic kidney disease (CKD), stage IV (severe)  Creatine stable for now. BMP reviewed- noted Estimated Creatinine Clearance: 22.9 mL/min (A) (based on SCr of 2 mg/dL (H)). according to latest data. Based on current GFR, CKD stage is stage 4 - GFR 15-29.  Monitor UOP and serial BMP and adjust therapy as needed. Renally dose meds. Avoid nephrotoxic medications and procedures.  Follow BMP.  Patient is at risk for contrast induced nephropathy.    Hypertension secondary to other renal disorders  Chronic, uncontrolled.  Latest blood pressure and vitals reviewed-     Temp:  [98.1 °F (36.7 °C)-98.5 °F (36.9 °C)]   Pulse:  [60-75]   Resp:  [11-30]   BP: (149-167)/(66-83)   SpO2:  [93 %-100 %] .   Home meds for hypertension were reviewed and noted below-  Hypertension Medications               diltiaZEM (CARDIZEM CD) 120 MG Cp24 Take 1 capsule (120 mg total) by mouth once daily.    furosemide (LASIX) 20 MG tablet Take 1 tablet (20 mg total) by mouth once daily.    guanFACINE (TENEX) 2 MG tablet TAKE 1 TABLET EVERY EVENING    hydrALAZINE (APRESOLINE) 50 MG tablet Take 1 tablet (50 mg total) by mouth 3 (three) times daily.    isosorbide dinitrate (ISORDIL) 10 MG tablet Take 1 tablet (10 mg total) by mouth 3 (three) times daily.    metoprolol tartrate (LOPRESSOR) 50 MG tablet Take 1.5 tablets (75 mg total) by mouth 2 (two) times daily.            While in the hospital, will manage blood pressure as follows; Continue home antihypertensive regimen    Will utilize p.r.n. blood pressure medication only if patient's blood pressure greater than 160/100 and she develops symptoms such as worsening chest pain or shortness of breath.       Aortic stenosis  Echocardiogram with evidence of aortic stenosis that is moderate . The patient's most recent echocardiogram result is listed below.  Avoid over-diuresis.  Follow  Cardiology recommendations.    Echo Saline Bubble? No    Result Date: 12/3/2024    Left Ventricle: The left ventricle is normal in size. Mildly increased   wall thickness. There is mild concentric hypertrophy. There is normal   systolic function. Ejection fraction is approximately 55%.    Right Ventricle: Normal right ventricular cavity size. Wall thickness   is normal. Systolic function is normal. Pacemaker lead present in the   ventricle.    Left Atrium: Left atrium is mildly dilated.    Aortic Valve: There is moderate aortic valve sclerosis. Severely   restricted motion. There is moderate to severe stenosis. Aortic valve area   by VTI is 1.1 cm². Aortic valve peak velocity is 3.0 m/s. Mean gradient is   20.0 mmHg. The dimensionless index is 0.35.    Tricuspid Valve: There is mild regurgitation.    IVC/SVC: Normal venous pressure at 3 mmHg.    The estimated pulmonary artery systolic pressure is 20 mmHg.        Echo Saline Bubble? No    Result Date: 3/4/2024    Left Ventricle: The left ventricle is normal in size. Mildly increased   wall thickness. There is mild concentric hypertrophy. Mild global   hypokinesis present. There is mildly reduced systolic function with a   visually estimated ejection fraction of 45 - 50%. There is normal   diastolic function.    Right Ventricle: Normal right ventricular cavity size. Wall thickness   is normal. Right ventricle wall motion  is normal. Systolic function is   normal.    Left Atrium: Left atrium is moderately dilated.    Aortic Valve: The aortic valve is a trileaflet valve. There is moderate   aortic valve sclerosis. There is mild annular calcification present.   Moderately restricted motion. There is moderate to severe stenosis. Aortic   valve area by VTI is 0.99 cm². Aortic valve peak velocity is 2.40 m/s.   Mean gradient is 13 mmHg. The dimensionless index is 0.31.    Tricuspid Valve: The tricuspid valve is structurally normal. There is   normal leaflet mobility. There  is mild regurgitation with a centrally   directed jet.    Pulmonary Artery: There is mild to moderate pulmonary hypertension. The   estimated pulmonary artery systolic pressure is 45 mmHg.    IVC/SVC: Intermediate venous pressure at 8 mmHg.    Pericardium: There is a trivial circumferential effusion. Pericardial   effusion is echolucent. No indication of cardiac tamponade.          Hyperlipidemia  Chronic problem. Will continue chronic medications and monitor for any changes, adjusting as needed.        Uncontrolled type 2 diabetes mellitus with hyperglycemia  Check blood glucose level q AC/HS.  Use Novolog Insulin Sliding Scale as needed.   Continue American Diabetic Association 1800 Kcal diet.        Iron deficiency anemia  Anemia is likely due to Iron deficiency. Most recent hemoglobin and hematocrit are listed below.  Recent Labs     12/10/24  1635   HGB 10.4*   HCT 32.7*     Plan  - Monitor serial CBC: Daily  - Transfuse PRBC if patient becomes hemodynamically unstable, symptomatic or H/H drops below 7/21.  - Patient has not received any PRBC transfusions to date  - Patient's anemia is currently stable    Discussed with Dr. Irena Ravi, bedside nurse and  regarding discharge planning.  Transfer the patient to the telemetry floor.  Start physical therapy.  VTE Risk Mitigation (From admission, onward)           Ordered     enoxaparin injection 30 mg  Daily         12/12/24 1525     IP VTE HIGH RISK PATIENT  Once         12/12/24 1525     Place sequential compression device  Until discontinued         12/12/24 1525                    Discharge Planning   ASHELY:   December 14, 2024.    Code Status: Full Code   Medical Readiness for Discharge Date:                            Jaciel Rangel MD  Department of Hospital Medicine   Formerly [facility], [facility]

## 2024-12-13 NOTE — PT/OT/SLP EVAL
Physical Therapy Evaluation and Discharge Note    Patient Name:  Cynthia N Cushing   MRN:  [id number]    Recommendations:     Discharge Recommendations: No Therapy Indicated  Discharge Equipment Recommendations: none   Barriers to discharge: None    Assessment:     Cynthia N Cushing is a 79 y.o. female admitted with a medical diagnosis of Acute pulmonary edema. .  At this time, patient is functioning at their prior level of function and does not require further acute PT services.   Pt presented walking from the  toilet w/o AD her daughter at her side. Pt ambulated in the estevez 150 ft  RW and SBA with sa02 remaining     Recent Surgery: Procedure(s) (LRB):  Angiogram, Coronary, with Left Heart Cath (N/A)  PTCA, Single Vessel (N/A)  Stent, Coronary, Multi Vessel (N/A) 1 Day Post-Op    Plan:     During this hospitalization, patient does not require further acute PT services.  Please re-consult if situation changes.      Subjective     Chief Complaint: None   Patient/Family Comments/goals: return home with her daughter  Pain/Comfort:  Pain Rating 1: 0/10    Patients cultural, spiritual, [employer] conflicts given the current situation:      Living Environment:  Pt lives at home with her daughter  Prior to admission, patients level of function was Independent/Modified independent . Pt holds to furniture during  gait eventhough she has a  RW that belonged to her now   . Pt  Equipment used at home: CPAP.  DME owned (not currently used): rolling walker.  Upon discharge, patient will have assistance from her daughter.    Objective:     Communicated with nurse prior to session.  Patient found ambulatory in room/estevez with telemetry upon PT entry to room.    General Precautions: Standard, fall    Orthopedic Precautions:    Braces:    Respiratory Status: Room air    Exams:  Cognitive Exam:  Patient is oriented to Person, Place, Situation, and  month and year but not day  RLE ROM: WFL  RLE Strength: WFL  LLE ROM:  WFL  LLE Strength: WFL    Functional Mobility:  Bed Mobility:     Sit to Supine: modified independence  Transfers:     Sit to Stand:  stand by assistance with no AD  Gait: ambulated in the estevez 150 ft RW and SBA after taking a few steps w/o AD but used walls for support     AM-PAC 6 CLICK MOBILITY  Total Score:        Treatment and Education:  PT eval and  DC at baseline level of function without  02 desaturation    AM-PAC 6 CLICK MOBILITY  Total Score:      Patient left supine with all lines intact, call button in reach, bed alarm on, and her daughter  present.    GOALS:   Multidisciplinary Problems       Physical Therapy Goals       Not on file                    History:     Past Medical History:   Diagnosis Date    Allergy     Breast mass     Cataract     CHF (congestive heart failure)     Diabetes mellitus     GERD (gastroesophageal reflux disease)     Hernia, hiatal     Hyperlipidemia     Hypertension     Kidney stone     NSTEMI (non-ST elevated myocardial infarction)     Osteoarthritis     Pacemaker     Renal insufficiency     Seasonal allergies     Sleep apnea     does not use CPAP    SSS (sick sinus syndrome)     SVT (supraventricular tachycardia)     Umbilical hernia        Past Surgical History:   Procedure Laterality Date    A-V CARDIAC PACEMAKER INSERTION N/A 03/10/2024    Procedure: INSERTION, CARDIAC PACEMAKER, DUAL CHAMBER;  Surgeon: Brijesh Mendieta MD;  Location: [facility] CATH/EP LAB;  Service: Cardiology;  Laterality: N/A;    ANGIOGRAM, CORONARY, WITH LEFT HEART CATHETERIZATION N/A 12/12/2024    Procedure: Angiogram, Coronary, with Left Heart Cath;  Surgeon: Brijesh Mendieta MD;  Location: [facility] CATH/EP LAB;  Service: Cardiology;  Laterality: N/A;    BREAST BIOPSY Bilateral     BREAST CYST ASPIRATION Bilateral     BREAST SURGERY      CERVICAL DISC SURGERY      fusion    EYE SURGERY      cataracts bilateral    HYSTERECTOMY      PTCA, SINGLE VESSEL N/A 12/12/2024    Procedure: PTCA, Single  Vessel;  Surgeon: Brijesh Mendieta MD;  Location: [facility] CATH/EP LAB;  Service: Cardiology;  Laterality: N/A;    STENT, CORONARY, MULTI VESSEL N/A 12/12/2024    Procedure: Stent, Coronary, Multi Vessel;  Surgeon: Brijesh Mendieta MD;  Location: [facility] CATH/EP LAB;  Service: Cardiology;  Laterality: N/A;       Time Tracking:     PT Received On: 12/13/24  PT Start Time: 1440     PT Stop Time: 1454  PT Total Time (min): 14 min     Billable Minutes: Evaluation 14 minutes      12/13/2024

## 2024-12-13 NOTE — HOSPITAL COURSE
Patient admitted to cardiac catheterization lab where patient initially required noninvasive ventilatory therapy with BiPAP.  She is status post LAD D stent.  She is ambulating in halls.  She has had no groin complications.  Creatinine has come down to 2.1 today.  Blood pressure is stable.With diuresis patient's symptoms improved.  Serum troponin levels were trended.  Patient was weaned off on supplemental oxygen.  Physical therapy ordered.  Patient is medically stable for discharge from Cardiology standpoint and recommends continuation of aspirin statin Plavix.

## 2024-12-13 NOTE — ASSESSMENT & PLAN NOTE
See management of pulmonary edema.    Symptoms I will START or STAY ON the medications listed below when I get home from the hospital:    Prenatal Multivitamins oral tablet  -- 1 tab(s) by mouth once a day  -- Indication: For nutrition

## 2024-12-13 NOTE — PLAN OF CARE
Problem: Adult Inpatient Plan of Care  Goal: Plan of Care Review  Outcome: Progressing  Goal: Optimal Comfort and Wellbeing  Outcome: Progressing     Problem: Diabetes Comorbidity  Goal: Blood Glucose Level Within Targeted Range  Outcome: Progressing     Problem: Skin Injury Risk Increased  Goal: Skin Health and Integrity  Outcome: Progressing     Problem: Wound  Goal: Optimal Coping  Outcome: Progressing  Goal: Optimal Functional Ability  Outcome: Progressing  Goal: Optimal Pain Control and Function  Outcome: Progressing     Problem: Fall Injury Risk  Goal: Absence of Fall and Fall-Related Injury  Outcome: Progressing

## 2024-12-13 NOTE — CARE UPDATE
12/13/24 0744   Patient Assessment/Suction   Level of Consciousness (AVPU) alert   Respiratory Effort Normal   Expansion/Accessory Muscles/Retractions no use of accessory muscles   All Lung Fields Breath Sounds clear   LLL Breath Sounds Lateral:;crackles   RLL Breath Sounds diminished   Rhythm/Pattern, Respiratory unlabored   Cough Frequency no cough   Skin Integrity   $ Wound Care Tech Time 15 min   Area Observed Bridge of nose   Skin Appearance without discoloration   PRE-TX-O2   Device (Oxygen Therapy) nasal cannula   $ Is the patient on Low Flow Oxygen? Yes   Flow (L/min) (Oxygen Therapy) 1  (WEANED FROM 3l)   SpO2 98 %   Pulse Oximetry Type Continuous   $ Pulse Oximetry - Multiple Charge Pulse Oximetry - Multiple   Pulse 60   Resp (!) 21   Preset CPAP/BiPAP Settings   Mode Of Delivery BiPAP S/T;standby   $ CPAP/BiPAP Daily Charge 1   CPAP/BIPAP charged w/in last 24 h YES   Education   $ Education Oxygen;15 min

## 2024-12-13 NOTE — PLAN OF CARE
SW provided pt and family with the Advance Directive paperwork from Epic and a 5 Wishes packet.  Palliative Consult to be placed. RN CM notified

## 2024-12-13 NOTE — PLAN OF CARE
[facility]  Initial Discharge Assessment       Primary Care Provider: Teri Adams MD    Admission Diagnosis: CAD (coronary artery disease) [I25.10]  Acute pulmonary edema [J81.0]    Admission Date: 12/12/2024  Expected Discharge Date:     Transition of Care Barriers: (P) None     met with Pt and daughter, Rachel, at bedside to complete an initial discharge assessment. Pt AAOx4s.  Demographics, PCP, and insurance verified. Pt has No dialysis. Pt reports ability to complete ADLs without assistance. Pt verbalized plan to discharge home via family transport. Pt has no other needs to be addressed at this time.     Payor: [employer] MEDICARE / Plan: [employer] MEDICARE HMO / Product Type: Capitation /     Extended Emergency Contact Information  Primary Emergency Contact: Cushing,Brenda  Mobile Phone: [phone]  Relation: Daughter  Secondary Emergency Contact: Cushing,Mary   [facility] Phone: [phone]  Relation: Daughter    Discharge Plan A: (P) Home with family  Discharge Plan B: (P) Home with family      [facility]  [address]  [facility] [address]  Phone: [phone] Fax: [phone]    [facility] - [facility] [address] [facility]  [address] [facility] [address]  Phone: [phone] Fax: [phone]    [id number] [place], [place] - [address]  AT [facility]  [address] [facility] [address]  Phone: [phone] Fax: [phone]    [id number] - [place], [place] - [address] AT [facility]  [address]  [facility] [address]  Phone: [phone] Fax: [phone]      Initial Assessment (most recent)       Adult Discharge Assessment - 12/13/24 1018          Discharge Assessment    Assessment Type Discharge Planning Assessment (P)      Confirmed/corrected address, phone number and insurance Yes (P)      Confirmed  Patient confirmed testing and appointment with Henrietta on 3/25/24.    Demographics Correct on Facesheet (P)      Source of Information patient (P)      When was your last doctors appointment? -- (P)    last Friday    Reason For Admission Acute pulmonary edema (P)      People in Home child(jony), adult (P)      Facility Arrived From: Home (P)      Do you expect to return to your current living situation? Yes (P)      Do you have help at home or someone to help you manage your care at home? Yes (P)      Who are your caregiver(s) and their phone number(s)? Cushing,Brenda (Daughter)  [phone] (Mobile) (P)      Prior to hospitilization cognitive status: Unable to Assess (P)      Current cognitive status: Alert/Oriented (P)      Walking or Climbing Stairs Difficulty no (P)      Dressing/Bathing Difficulty no (P)      Home Accessibility not wheelchair accessible (P)      Home Layout Able to live on 1st floor (P)      Equipment Currently Used at Home CPAP (P)    pt does not use. Pt also has 's DME (walker and wheelchair)    Readmission within 30 days? No (P)      Patient currently being followed by outpatient case management? No (P)      Do you currently have service(s) that help you manage your care at home? No (P)      Do you take prescription medications? Yes (P)      Do you have prescription coverage? Yes (P)      Coverage [employer] MANAGED MEDICARE - [employer] MEDICARE HMO - CAPITATED (P)      Do you have any problems affording any of your prescribed medications? No (P)      Is the patient taking medications as prescribed? yes (P)      Who is going to help you get home at discharge? Cushing,Brenda (Daughter)  [phone] (Mobile) (P)      How do you get to doctors appointments? family or friend will provide (P)      Are you on dialysis? No (P)      Do you take coumadin? No (P)      Discharge Plan A Home with family (P)      Discharge Plan B Home with family (P)      DME Needed Upon Discharge  none (P)      Discharge Plan discussed with: Patient;Adult children (P)      Transition of  Care Barriers None (P)

## 2024-12-13 NOTE — NURSING
Informed Dr Mendieta of troponin 130.9.  continue to monitor and recheck trop in AM per Dr Mendieta

## 2024-12-13 NOTE — SUBJECTIVE & OBJECTIVE
Interval History:  Patient seen and examined during multidisciplinary rounds.  Patient looking and feeling much better.  Patient is off supplemental oxygen.  Patient denies any chest pain, palpitations or any diaphoresis.  Discussed with Dr. Mendieta.  Serum troponin this morning 599.  Cardiology team recommended to transfer the patient to telemetry floor.    Review of Systems   Respiratory:  Positive for shortness of breath.    All other systems reviewed and are negative.    Objective:     Vital Signs (Most Recent):  Temp: 98 °F (36.7 °C) (12/13/24 0701)  Pulse: 60 (12/13/24 0744)  Resp: (!) 21 (12/13/24 0744)  BP: 127/62 (12/13/24 0848)  SpO2: 98 % (12/13/24 0744) Vital Signs (24h Range):  Temp:  [97.5 °F (36.4 °C)-98.5 °F (36.9 °C)] 98 °F (36.7 °C)  Pulse:  [60-75] 60  Resp:  [17-34] 21  SpO2:  [93 %-99 %] 98 %  BP: ()/(50-83) 127/62     Weight: 80.3 kg (177 lb 0.5 oz)  Body mass index is 31.36 kg/m².    Intake/Output Summary (Last 24 hours) at 12/13/2024 0850  Last data filed at 12/13/2024 0701  Gross per 24 hour   Intake 1169.55 ml   Output 1185 ml   Net -15.45 ml         Physical Exam  Constitutional:       Appearance: She is well-developed.   HENT:      Head: Normocephalic and atraumatic.   Eyes:      Conjunctiva/sclera: Conjunctivae normal.      Pupils: Pupils are equal, round, and reactive to light.   Neck:      Thyroid: No thyromegaly.      Vascular: No JVD.   Cardiovascular:      Rate and Rhythm: Normal rate and regular rhythm.      Heart sounds: Murmur heard.      No friction rub. No gallop.      Comments: Systolic ejection murmur at aortic area.  Pulmonary:      Effort: Pulmonary effort is normal.      Breath sounds: Normal breath sounds.   Abdominal:      General: Bowel sounds are normal. There is no distension.      Palpations: Abdomen is soft. There is no mass.      Tenderness: There is no abdominal tenderness.   Musculoskeletal:         General: Normal range of motion.      Cervical back:  "Neck supple.      Comments: Right groin without any hematoma or bulging   Skin:     General: Skin is warm and dry.   Neurological:      Mental Status: She is oriented to person, place, and time.      Cranial Nerves: No cranial nerve deficit.   Psychiatric:         Behavior: Behavior normal.             Significant Labs: All pertinent labs within the past 24 hours have been reviewed.  CBC:   Recent Labs   Lab 12/12/24  1654   WBC 12.01   HGB 10.5*   HCT 32.7*        CMP:   Recent Labs   Lab 12/12/24  1654 12/13/24  0414    139  139   K 3.8 3.6  3.6    106  106   CO2 23 23  23   * 146*  146*   BUN 42* 42*  42*   CREATININE 1.9* 2.0*  2.0*   CALCIUM 8.7 8.5*  8.5*   PROT 6.8 6.1   ALBUMIN 4.0 3.6   BILITOT 0.5 0.5   ALKPHOS 73 62   AST 17 16   ALT 20 18   ANIONGAP 9 10  10     Coagulation: No results for input(s): "PT", "INR", "APTT" in the last 48 hours.  Troponin:   Recent Labs   Lab 12/12/24  1654 12/12/24  2308 12/13/24  0414   TROPONINIHS 130.9* 486.3* 599.5*     TSH:   Recent Labs   Lab 11/13/24  0700   TSH 4.143     Microbiology Results (last 7 days)       ** No results found for the last 168 hours. **          Significant Imaging:   Chest x-ray:   Cardiomegaly with findings suggestive of mild pulmonary edema/volume overload.     LHC:    The Mid LAD lesion was 99% stenosed with 0% stenosis post-intervention.    Mid LAD lesion: A .    Mid LAD lesion: A STENT SYNERGY XD [id number] stent was successfully placed at 11 ALAN for 30 sec.    The estimated blood loss was none.    There was single vessel coronary artery disease.  "

## 2024-12-14 VITALS
DIASTOLIC BLOOD PRESSURE: 59 MMHG | TEMPERATURE: 99 F | SYSTOLIC BLOOD PRESSURE: 134 MMHG | WEIGHT: 177 LBS | HEIGHT: 63 IN | OXYGEN SATURATION: 97 % | RESPIRATION RATE: 18 BRPM | HEART RATE: 59 BPM | BODY MASS INDEX: 31.36 KG/M2

## 2024-12-14 LAB
ALBUMIN SERPL BCP-MCNC: 3.6 G/DL (ref 3.5–5.2)
ALP SERPL-CCNC: 56 U/L (ref 55–135)
ALT SERPL W/O P-5'-P-CCNC: 15 U/L (ref 10–44)
ANION GAP SERPL CALC-SCNC: 8 MMOL/L (ref 8–16)
AST SERPL-CCNC: 15 U/L (ref 10–40)
BILIRUB SERPL-MCNC: 0.5 MG/DL (ref 0.1–1)
BUN SERPL-MCNC: 44 MG/DL (ref 8–23)
CALCIUM SERPL-MCNC: 8.8 MG/DL (ref 8.7–10.5)
CHLORIDE SERPL-SCNC: 105 MMOL/L (ref 95–110)
CO2 SERPL-SCNC: 25 MMOL/L (ref 23–29)
CREAT SERPL-MCNC: 2.1 MG/DL (ref 0.5–1.4)
EST. GFR  (NO RACE VARIABLE): 23.5 ML/MIN/1.73 M^2
GLUCOSE SERPL-MCNC: 156 MG/DL (ref 70–110)
POCT GLUCOSE: 146 MG/DL (ref 70–110)
POCT GLUCOSE: 154 MG/DL (ref 70–110)
POTASSIUM SERPL-SCNC: 3.6 MMOL/L (ref 3.5–5.1)
PROT SERPL-MCNC: 6.1 G/DL (ref 6–8.4)
SODIUM SERPL-SCNC: 138 MMOL/L (ref 136–145)

## 2024-12-14 PROCEDURE — 80053 COMPREHEN METABOLIC PANEL: CPT | Performed by: INTERNAL MEDICINE

## 2024-12-14 PROCEDURE — 63600175 PHARM REV CODE 636 W HCPCS: Performed by: INTERNAL MEDICINE

## 2024-12-14 PROCEDURE — 99900031 HC PATIENT EDUCATION (STAT)

## 2024-12-14 PROCEDURE — 25000242 PHARM REV CODE 250 ALT 637 W/ HCPCS: Performed by: NURSE PRACTITIONER

## 2024-12-14 PROCEDURE — 94761 N-INVAS EAR/PLS OXIMETRY MLT: CPT

## 2024-12-14 PROCEDURE — 25000003 PHARM REV CODE 250: Performed by: INTERNAL MEDICINE

## 2024-12-14 PROCEDURE — 36415 COLL VENOUS BLD VENIPUNCTURE: CPT | Performed by: INTERNAL MEDICINE

## 2024-12-14 RX ORDER — FLUTICASONE PROPIONATE 50 MCG
2 SPRAY, SUSPENSION (ML) NASAL DAILY
Status: DISCONTINUED | OUTPATIENT
Start: 2024-12-14 | End: 2024-12-14 | Stop reason: HOSPADM

## 2024-12-14 RX ADMIN — CLOPIDOGREL BISULFATE 75 MG: 75 TABLET, FILM COATED ORAL at 09:12

## 2024-12-14 RX ADMIN — INSULIN GLARGINE 30 UNITS: 100 INJECTION, SOLUTION SUBCUTANEOUS at 09:12

## 2024-12-14 RX ADMIN — FLUTICASONE PROPIONATE 100 MCG: 50 SPRAY, METERED NASAL at 10:12

## 2024-12-14 RX ADMIN — ASPIRIN 81 MG 81 MG: 81 TABLET ORAL at 09:12

## 2024-12-14 RX ADMIN — ISOSORBIDE DINITRATE 10 MG: 10 TABLET ORAL at 09:12

## 2024-12-14 RX ADMIN — FUROSEMIDE 20 MG: 20 TABLET ORAL at 09:12

## 2024-12-14 RX ADMIN — FAMOTIDINE 20 MG: 20 TABLET ORAL at 09:12

## 2024-12-14 RX ADMIN — HYDRALAZINE HYDROCHLORIDE 25 MG: 25 TABLET ORAL at 09:12

## 2024-12-14 RX ADMIN — MUPIROCIN 1 G: 20 OINTMENT TOPICAL at 09:12

## 2024-12-14 RX ADMIN — METOPROLOL TARTRATE 75 MG: 50 TABLET, FILM COATED ORAL at 09:12

## 2024-12-14 RX ADMIN — DILTIAZEM HYDROCHLORIDE 120 MG: 120 CAPSULE, COATED, EXTENDED RELEASE ORAL at 09:12

## 2024-12-14 NOTE — PLAN OF CARE
Problem: Adult Inpatient Plan of Care  Goal: Optimal Comfort and Wellbeing  Outcome: Progressing     Problem: Wound  Goal: Optimal Functional Ability  Outcome: Progressing  Goal: Absence of Infection Signs and Symptoms  Outcome: Progressing  Goal: Optimal Pain Control and Function  Outcome: Progressing

## 2024-12-14 NOTE — CARE UPDATE
12/13/24 1600   Patient Assessment/Suction   Level of Consciousness (AVPU) alert   Respiratory Effort Unlabored;Normal   Expansion/Accessory Muscles/Retractions no use of accessory muscles   Rhythm/Pattern, Respiratory unlabored;pattern regular;depth regular   PRE-TX-O2   Device (Oxygen Therapy) nasal cannula   $ Is the patient on Low Flow Oxygen? Yes   Flow (L/min) (Oxygen Therapy) 1   Oxygen Concentration (%) 26   SpO2 (!) 94 %   Pulse Oximetry Type Continuous   $ Pulse Oximetry - Multiple Charge Pulse Oximetry - Multiple   Oximetry Probe Status Assessed;Changed   Pulse 66   Resp (!) 24   Positioning HOB elevated 45 degrees   Ready to Wean/Extubation Screen   FIO2<=50 (chart decimal) 0.26

## 2024-12-14 NOTE — PROGRESS NOTES
[facility]  Department of Cardiology  Progress Note    PATIENT NAME: Cynthia N Cushing  MRN: [id number]  TODAY'S DATE: 12/14/2024  ADMIT DATE: 12/12/2024    SUBJECTIVE     PRINCIPLE PROBLEM: Acute pulmonary edema    INTERVAL HISTORY:    12/14/2024  Status post LAD stent.  She is doing well today.  She is ambulating in halls.  She has had no groin complications.  Creatinine has come down to 2.1 today.  Blood pressure is stable.    Review of patient's allergies indicates:   Allergen Reactions    Biaxin [clarithromycin]     Prandin [repaglinide] Nausea And Vomiting    Amlodipine     Chlorphen-phenyltolox-pe-ppa     Ciprofloxacin Nausea And Vomiting    Omnicef [cefdinir]     Propoxyphene     Quinine Nausea And Vomiting     Pancreatitis         REVIEW OF SYSTEMS  No complaints    OBJECTIVE     VITAL SIGNS (Most Recent)  Temp: 98.8 °F (37.1 °C) (12/14/24 1150)  Pulse: (!) 59 (12/14/24 1150)  Resp: 18 (12/14/24 0748)  BP: (!) 134/59 (12/14/24 1150)  SpO2: 97 % (12/14/24 1150)    VENTILATION STATUS  Resp: 18 (12/14/24 0748)  SpO2: 97 % (12/14/24 1150)           I & O (Last 24H):  Intake/Output Summary (Last 24 hours) at 12/14/2024 1749  Last data filed at 12/14/2024 1300  Gross per 24 hour   Intake 480 ml   Output --   Net 480 ml       WEIGHTS  Wt Readings from Last 3 Encounters:   12/12/24 1245 80.3 kg (177 lb 0.5 oz)   12/12/24 0550 80.3 kg (177 lb 0.5 oz)   12/10/24 1526 80.3 kg (177 lb)   12/03/24 1404 79.9 kg (176 lb 4.1 oz)       PHYSICAL EXAM  CONSTITUTIONAL: Well built, well nourished elderly female breathing comfortably in no apparent distress  NECK: no carotid bruit, no JVD  LUNGS: CTA  CHEST WALL: no tenderness  HEART: regular rate and rhythm, S1, S2 normal,+ systolic murmur present  ABDOMEN: soft, non-tender; bowel sounds normal; no masses,  no organomegaly  EXTREMITIES:  Right groin cath site is without swelling or bleeding, very faint bruising present  NEURO: AAO X 3    SCHEDULED  "MEDS:    CONTINUOUS INFUSIONS:    PRN MEDS:    LABS AND DIAGNOSTICS     CBC LAST 3 DAYS  Recent Labs   Lab 12/10/24  1635 12/12/24  1654   WBC 10.16 12.01   RBC 4.07 4.06   HGB 10.4* 10.5*   HCT 32.7* 32.7*   MCV 80* 81*   MCH 25.6* 25.9*   MCHC 31.8* 32.1   RDW 15.5* 15.5*    237   MPV 11.1 10.7   GRAN 65.5  6.7 82.6*  9.9*   LYMPH 17.5*  1.8 7.2*  0.9*   MONO 10.3  1.1* 7.7  0.9   BASO 0.10 0.09   NRBC 0 0       COAGULATION LAST 3 DAYS  Recent Labs   Lab 12/10/24  1635   INR 0.9   APTT 26.5       CHEMISTRY LAST 3 DAYS  Recent Labs   Lab 12/12/24  1654 12/13/24  0414 12/14/24  0340    139  139 138   K 3.8 3.6  3.6 3.6    106  106 105   CO2 23 23  23 25   ANIONGAP 9 10  10 8   BUN 42* 42*  42* 44*   CREATININE 1.9* 2.0*  2.0* 2.1*   * 146*  146* 156*   CALCIUM 8.7 8.5*  8.5* 8.8   ALBUMIN 4.0 3.6 3.6   PROT 6.8 6.1 6.1   ALKPHOS 73 62 56   ALT 20 18 15   AST 17 16 15   BILITOT 0.5 0.5 0.5       CARDIAC PROFILE LAST 3 DAYS  No results for input(s): "BNP", "CPK", "CPKMB", "LDH", "TROPONINI" in the last 168 hours.    ENDOCRINE LAST 3 DAYS  No results for input(s): "TSH", "PROCAL" in the last 168 hours.    LAST ARTERIAL BLOOD GAS  ABG  No results for input(s): "PH", "PO2", "PCO2", "HCO3", "BE" in the last 168 hours.    LAST 7 DAYS MICROBIOLOGY   Microbiology Results (last 7 days)       ** No results found for the last 168 hours. **            MOST RECENT IMAGING  Cardiac catheterization    The Mid LAD lesion was 99% stenosed with 0% stenosis post-intervention.    Mid LAD lesion: A .    Mid LAD lesion: A STENT SYNERGY XD 2.51T13ST stent was successfully   placed at 11 ALAN for 30 sec.    The estimated blood loss was none.    There was single vessel coronary artery disease.    The procedure log was documented by Documenter: Lena Whitt RT and   verified by Brijesh Mendieta MD.    Date: 12/12/2024  Time: 11:21 AM      [facility]  Results for orders placed during the hospital " encounter of 12/02/24    Echo Saline Bubble? No    Interpretation Summary    Left Ventricle: The left ventricle is normal in size. Mildly increased wall thickness. There is mild concentric hypertrophy. There is normal systolic function. Ejection fraction is approximately 55%.    Right Ventricle: Normal right ventricular cavity size. Wall thickness is normal. Systolic function is normal. Pacemaker lead present in the ventricle.    Left Atrium: Left atrium is mildly dilated.    Aortic Valve: There is moderate aortic valve sclerosis. Severely restricted motion. There is moderate to severe stenosis. Aortic valve area by VTI is 1.1 cm². Aortic valve peak velocity is 3.0 m/s. Mean gradient is 20.0 mmHg. The dimensionless index is 0.35.    Tricuspid Valve: There is mild regurgitation.    IVC/SVC: Normal venous pressure at 3 mmHg.    The estimated pulmonary artery systolic pressure is 20 mmHg.      CURRENT/PREVIOUS VISIT EKG  Results for orders placed or performed during the hospital encounter of 12/12/24   EKG 12-lead    Collection Time: 12/12/24  3:19 PM   Result Value Ref Range    QRS Duration 118 ms    OHS QTC Calculation 426 ms    Narrative    Test Reason : R06.02,    Vent. Rate :  60 BPM     Atrial Rate :  60 BPM     P-R Int : 220 ms          QRS Dur : 118 ms      QT Int : 426 ms       P-R-T Axes :     -6 207 degrees    QTcB Int : 426 ms    Atrial-paced rhythm with prolonged AV conduction  Incomplete left bundle branch block  Minimal voltage criteria for LVH, may be normal variant ( Art product )  ST and T wave abnormality, consider inferior ischemia  Abnormal ECG  No previous ECGs available    Referred By: TIFFANY SAUCEDO           Confirmed By:        ASSESSMENT/PLAN:     Active Hospital Problems    Diagnosis    *Acute pulmonary edema    Coronary artery disease involving native coronary artery of native heart with unstable angina pectoris    Presence of stent in LAD coronary artery    Cardiac pacemaker in situ     Chronic kidney disease (CKD), stage IV (severe)    Hypertension secondary to other renal disorders    Acute on chronic heart failure with preserved ejection fraction (HFpEF)    Aortic stenosis    Hyperlipidemia    Uncontrolled type 2 diabetes mellitus with hyperglycemia    Iron deficiency anemia       ASSESSMENT & PLAN:   CAD  Status post PCI to LAD 12/12/2024  Acute pulmonary edema  Pacemaker in-situ  Hypertension  Aortic stenosis  DM 2  Hyperlipidemia      RECOMMENDATIONS:  Patient is doing well post stent to LAD on 12/12/2024.  She is ambulating in halls without dyspnea.  Right groin cath site without complications    Okay for discharge home with outpatient follow up in 2-4 weeks.    Continue on aspirin, statin, Plavix at home    Vannessa Soriano NP  [facility]  Department of Cardiology  Date of Service: 12/14/2024      I have personally interviewed and examined the patient.  I have reviewed all the Nurse Practitioner's documentation, and agree with the plan.       Dr. Brijesh Mendieta M.D.  [facility]  Department of Cardiology  Date of Service: 12/14/2024  5:49 PM

## 2024-12-14 NOTE — CARE UPDATE
12/14/24 0740   Patient Assessment/Suction   Level of Consciousness (AVPU) alert   Respiratory Effort Normal;Unlabored   Expansion/Accessory Muscles/Retractions no use of accessory muscles   All Lung Fields Breath Sounds clear;diminished   Rhythm/Pattern, Respiratory unlabored   Cough Frequency no cough   PRE-TX-O2   Device (Oxygen Therapy) room air  (taken off 1l nc)   SpO2 96 %   Pulse Oximetry Type Intermittent   $ Pulse Oximetry - Multiple Charge Pulse Oximetry - Multiple   Pulse 64   Resp 17   Education   $ Education 15 min;Other (see comment)  (sats)

## 2024-12-14 NOTE — PLAN OF CARE
Patient cleared for discharge from case management standpoint.    Chart and discharge orders reviewed.  Patient discharged home with no further case management needs.       12/14/24 1301   Final Note   Assessment Type Final Discharge Note   Anticipated Discharge Disposition Home   What phone number can be called within the next 1-3 days to see how you are doing after discharge? [phone]   Post-Acute Status   Discharge Delays None known at this time

## 2024-12-14 NOTE — NURSING
Pt received d/c instructions. IV removed. Assisted to car via w/c by tech and family. Alert and oriented upon departure.

## 2024-12-16 ENCOUNTER — TELEPHONE (OUTPATIENT)
Dept: CARDIOLOGY | Facility: CLINIC | Age: 79
End: 2024-12-16
Payer: MEDICARE

## 2024-12-16 NOTE — TELEPHONE ENCOUNTER
S/w rachel, and advised per dr weir to take 2 extra furosemide today ( total of 3 tabs today) then go to 2 tabs a day until breathing is better. Rachel verbalized understanding

## 2024-12-16 NOTE — TELEPHONE ENCOUNTER
----- Message from Ned sent at 12/16/2024 12:52 PM CST -----  Regarding: advice  Contact: Daughter: aRchel  Type:  Needs Medical Advice    Who Called: Daughter: Rachel  Symptoms (please be specific): cough really bad/ Wet Cough  How long has patient had these symptoms:    Pharmacy name and phone #:      [id number] - [place] - [address] AT [place] [address]  [address] [address]  Phone: [phone] Fax: [phone]  Would the patient rather a call back or a response via [facility]? Please call to advise/ ask MD to call in something  Best Call Back Number: [phone]  Additional Information: If patient needs appointment please call

## 2024-12-17 NOTE — DISCHARGE SUMMARY
[facility] Medicine  Discharge Summary      Patient Name: Cynthia N Cushing  MRN: [id number]  PRATIK: [id number]  Patient Class: IP- Inpatient  Admission Date: 12/12/2024  Hospital Length of Stay: 2 days  Discharge Date and Time: 12/14/2024  2:23 PM  Attending Physician: No att. providers found   Discharging Provider: Janny Ayers NP  Primary Care Provider: Teri Adams MD    Primary Care Team: Networked reference to record PCT     HPI:   Patient is a 79-year-old  female with past medical history significant for multiple medical problems including hypertension, hyperlipidemia, chronic kidney disease stage 4, aortic stenosis status post permanent pacemaker placement, uncontrolled diabetes mellitus type 2 and severe obesity who underwent left heart catheterization performed by Dr. Mendieta this afternoon.  Postprocedure patient was transferred to observation unit where patient was receiving IV fluid hydration 100 cc/hour normal saline.  Patient developed complaint of shortness of breath with dropping pulse ox down to 90%.  Patient received 2 doses of 20 mg IV Lasix and has been placed on BiPAP therapy.  Patient transferred to ICU for further management and care.  Patient denies any chest pain or palpitations.  Patient reports orthopnea and sense of shortness of breath.  Right groin femoral artery puncture site without any hematoma or bulging.  Patient's cardiologist is closely following patient.    Procedure(s) (LRB):  Angiogram, Coronary, with Left Heart Cath (N/A)  PTCA, Single Vessel (N/A)  Stent, Coronary, Multi Vessel (N/A)      Hospital Course:   Patient admitted to cardiac catheterization lab where patient initially required noninvasive ventilatory therapy with BiPAP.  She is status post LAD D stent.  She is ambulating in halls.  She has had no groin complications.  Creatinine has come down to 2.1 today.  Blood pressure is stable.With diuresis patient's symptoms improved.   Serum troponin levels were trended.  Patient was weaned off on supplemental oxygen.  Physical therapy ordered.  Patient is medically stable for discharge from Cardiology standpoint and recommends continuation of aspirin statin Plavix.     Goals of Care Treatment Preferences:  Code Status: Full Code      SDOH Screening:  The patient was screened for utility difficulties, food insecurity, transport difficulties, housing insecurity, and interpersonal safety and there were no concerns identified this admission.     Consults:     * Acute pulmonary edema  Continue management in intensive care unit.  Continue tele monitoring.  Obtain arterial blood gas analysis.  Continue BiPAP therapy for now.  Start nitroglycerin infusion to reduce preload.  Patient already received intravenous Lasix 40 mg earlier.  Obtain CBC, CMP, magnesium and BNP.  Trend serial cardiac enzymes.  Avoid nephrotoxic medications.  Consult patient's cardiologist.      Presence of stent in LAD coronary artery  Noted.  Follow post cardiac stent orders and continue Plavix as directed by cardiologist.      Coronary artery disease involving native coronary artery of native heart with unstable angina pectoris  Patient with known CAD s/p stent placement, which is controlled Will continue ASA, Plavix, and Statin and monitor for S/Sx of angina/ACS. Continue to monitor on telemetry.     Cardiac pacemaker in situ  Noted.  Continue tele monitoring.      Chronic kidney disease (CKD), stage IV (severe)  Creatine stable for now. BMP reviewed- noted Estimated Creatinine Clearance: 22.9 mL/min (A) (based on SCr of 2 mg/dL (H)). according to latest data. Based on current GFR, CKD stage is stage 4 - GFR 15-29.  Monitor UOP and serial BMP and adjust therapy as needed. Renally dose meds. Avoid nephrotoxic medications and procedures.  Follow BMP.  Patient is at risk for contrast induced nephropathy.    Hypertension secondary to other renal disorders  Chronic, uncontrolled.   Latest blood pressure and vitals reviewed-     Temp:  [98.1 °F (36.7 °C)-98.5 °F (36.9 °C)]   Pulse:  [60-75]   Resp:  [11-30]   BP: (149-167)/(66-83)   SpO2:  [93 %-100 %] .   Home meds for hypertension were reviewed and noted below-  Hypertension Medications               diltiaZEM (CARDIZEM CD) 120 MG Cp24 Take 1 capsule (120 mg total) by mouth once daily.    furosemide (LASIX) 20 MG tablet Take 1 tablet (20 mg total) by mouth once daily.    guanFACINE (TENEX) 2 MG tablet TAKE 1 TABLET EVERY EVENING    hydrALAZINE (APRESOLINE) 50 MG tablet Take 1 tablet (50 mg total) by mouth 3 (three) times daily.    isosorbide dinitrate (ISORDIL) 10 MG tablet Take 1 tablet (10 mg total) by mouth 3 (three) times daily.    metoprolol tartrate (LOPRESSOR) 50 MG tablet Take 1.5 tablets (75 mg total) by mouth 2 (two) times daily.            While in the hospital, will manage blood pressure as follows; Continue home antihypertensive regimen    Will utilize p.r.n. blood pressure medication only if patient's blood pressure greater than 160/100 and she develops symptoms such as worsening chest pain or shortness of breath.       Acute on chronic heart failure with preserved ejection fraction (HFpEF)  See management of pulmonary edema.    Aortic stenosis  Echocardiogram with evidence of aortic stenosis that is moderate . The patient's most recent echocardiogram result is listed below.  Avoid over-diuresis.  Follow Cardiology recommendations.    Echo Saline Bubble? No    Result Date: 12/3/2024    Left Ventricle: The left ventricle is normal in size. Mildly increased   wall thickness. There is mild concentric hypertrophy. There is normal   systolic function. Ejection fraction is approximately 55%.    Right Ventricle: Normal right ventricular cavity size. Wall thickness   is normal. Systolic function is normal. Pacemaker lead present in the   ventricle.    Left Atrium: Left atrium is mildly dilated.    Aortic Valve: There is moderate  aortic valve sclerosis. Severely   restricted motion. There is moderate to severe stenosis. Aortic valve area   by VTI is 1.1 cm². Aortic valve peak velocity is 3.0 m/s. Mean gradient is   20.0 mmHg. The dimensionless index is 0.35.    Tricuspid Valve: There is mild regurgitation.    IVC/SVC: Normal venous pressure at 3 mmHg.    The estimated pulmonary artery systolic pressure is 20 mmHg.        Echo Saline Bubble? No    Result Date: 3/4/2024    Left Ventricle: The left ventricle is normal in size. Mildly increased   wall thickness. There is mild concentric hypertrophy. Mild global   hypokinesis present. There is mildly reduced systolic function with a   visually estimated ejection fraction of 45 - 50%. There is normal   diastolic function.    Right Ventricle: Normal right ventricular cavity size. Wall thickness   is normal. Right ventricle wall motion  is normal. Systolic function is   normal.    Left Atrium: Left atrium is moderately dilated.    Aortic Valve: The aortic valve is a trileaflet valve. There is moderate   aortic valve sclerosis. There is mild annular calcification present.   Moderately restricted motion. There is moderate to severe stenosis. Aortic   valve area by VTI is 0.99 cm². Aortic valve peak velocity is 2.40 m/s.   Mean gradient is 13 mmHg. The dimensionless index is 0.31.    Tricuspid Valve: The tricuspid valve is structurally normal. There is   normal leaflet mobility. There is mild regurgitation with a centrally   directed jet.    Pulmonary Artery: There is mild to moderate pulmonary hypertension. The   estimated pulmonary artery systolic pressure is 45 mmHg.    IVC/SVC: Intermediate venous pressure at 8 mmHg.    Pericardium: There is a trivial circumferential effusion. Pericardial   effusion is echolucent. No indication of cardiac tamponade.          Hyperlipidemia  Chronic problem. Will continue chronic medications and monitor for any changes, adjusting as needed.        Uncontrolled type  2 diabetes mellitus with hyperglycemia  Check blood glucose level q AC/HS.  Use Novolog Insulin Sliding Scale as needed.   Continue American Diabetic Association 1800 Kcal diet.        Iron deficiency anemia  Anemia is likely due to Iron deficiency. Most recent hemoglobin and hematocrit are listed below.  Recent Labs     12/10/24  1635   HGB 10.4*   HCT 32.7*     Plan  - Monitor serial CBC: Daily  - Transfuse PRBC if patient becomes hemodynamically unstable, symptomatic or H/H drops below 7/21.  - Patient has not received any PRBC transfusions to date  - Patient's anemia is currently stable      Final Active Diagnoses:    Diagnosis Date Noted POA    PRINCIPAL PROBLEM:  Acute pulmonary edema [J81.0] 12/12/2024 Yes    Coronary artery disease involving native coronary artery of native heart with unstable angina pectoris [I25.110] 12/12/2024 Yes    Presence of stent in LAD coronary artery [Z95.5] 12/12/2024 Not Applicable    Cardiac pacemaker in situ [Z95.0] 12/05/2024 Yes    Chronic kidney disease (CKD), stage IV (severe) [N18.4] 03/22/2024 Yes    Hypertension secondary to other renal disorders [I15.1] 03/16/2024 Yes    Acute on chronic heart failure with preserved ejection fraction (HFpEF) [I50.33] 03/05/2024 Yes    Aortic stenosis [I35.0] 04/11/2023 Yes    Hyperlipidemia [E78.5]  Yes    Uncontrolled type 2 diabetes mellitus with hyperglycemia [E11.65] 08/02/2021 Yes    Iron deficiency anemia [D50.9] 10/20/2019 Yes      Problems Resolved During this Admission:       Discharged Condition: stable    Disposition: Home or Self Care    Follow Up:   Follow-up Information       Teri Adams MD Follow up in 1 week(s).    Specialty: Internal Medicine  Contact information:  [address]  [address]  [place] [facility] [address]  [phone]               Brijesh Mendieta MD Follow up in 1 week(s).    Specialties: Interventional Cardiology, Cardiovascular Disease, Cardiology  Contact information:  [address]  [facility]  [place] [address]  [phone]                           Patient Instructions:      Ambulatory referral/consult to Nephrology   Standing Status: Future   Referral Priority: Routine Referral Type: Consultation   Referral Reason: Specialty Services Required   Requested Specialty: Nephrology   Number of Visits Requested: 1     Diet Cardiac     Notify your health care provider if you experience any of the following:  difficulty breathing or increased cough     Notify your health care provider if you experience any of the following:  severe uncontrolled pain     Activity as tolerated       Significant Diagnostic Studies: Labs: All labs within the past 24 hours have been reviewed    Pending Diagnostic Studies:       None           Medications:  Reconciled Home Medications:      Medication List        CHANGE how you take these medications      guanFACINE 2 MG tablet  Commonly known as: TENEX  TAKE 1 TABLET EVERY EVENING  What changed:   how much to take  how to take this  when to take this     hydrALAZINE 50 MG tablet  Commonly known as: APRESOLINE  Take 1 tablet (50 mg total) by mouth 3 (three) times daily.  What changed: how much to take     LANTUS SOLOSTAR U-100 INSULIN 100 unit/mL (3 mL) Inpn pen  Generic drug: insulin glargine U-100 (Lantus)  ADMINISTER 51 UNITS UNDER THE SKIN AT NIGHT  What changed: See the new instructions.            CONTINUE taking these medications      ACCU-CHEK AMADOR PLUS TEST STRP Strp  Generic drug: blood sugar diagnostic  TEST BLOOD SUGAR FOUR TIMES DAILY     aspirin 81 MG Chew  Take 81 mg by mouth once daily.     cetirizine 10 MG tablet  Commonly known as: ZYRTEC  Take 10 mg by mouth 2 (two) times a day.     clopidogreL 75 mg tablet  Commonly known as: PLAVIX  Take 1 tablet (75 mg total) by mouth once daily.     diltiaZEM 120 MG Cp24  Commonly known as: CARDIZEM CD  Take 1 capsule (120 mg total) by mouth once daily.     famotidine 20 MG tablet  Commonly known as:  "PEPCID  TAKE 1 TABLET TWICE DAILY     ferrous sulfate 324 mg (65 mg iron) Tbec  Take 1 tablet (324 mg total) by mouth once daily.     furosemide 20 MG tablet  Commonly known as: LASIX  Take 1 tablet (20 mg total) by mouth once daily.     isosorbide dinitrate 10 MG tablet  Commonly known as: ISORDIL  Take 1 tablet (10 mg total) by mouth 3 (three) times daily.     lancets Misc  Commonly known as: ACCU-CHEK SOFTCLIX LANCETS  TEST BLOOD SUGAR FOUR TIMES DAILY     pen needle, diabetic 32 gauge x 5/32" Ndle  Commonly known as: BD ANNA 2ND GEN PEN NEEDLE  INJECT 1 NEEDLE INTO THE SKIN EVERY EVENING            ASK your doctor about these medications      metoprolol tartrate 50 MG tablet  Commonly known as: LOPRESSOR  Take 1.5 tablets (75 mg total) by mouth 2 (two) times daily.  Ask about: Which instructions should I use?              Indwelling Lines/Drains at time of discharge:   Lines/Drains/Airways       None                   Time spent on the discharge of patient: 45 minutes         Janny Ayers NP  Department of Hospital Medicine  [facility]  "

## 2024-12-18 ENCOUNTER — PATIENT OUTREACH (OUTPATIENT)
Dept: ADMINISTRATIVE | Facility: CLINIC | Age: 79
End: 2024-12-18
Payer: MEDICARE

## 2024-12-18 ENCOUNTER — TELEPHONE (OUTPATIENT)
Dept: CARDIOLOGY | Facility: CLINIC | Age: 79
End: 2024-12-18
Payer: MEDICARE

## 2024-12-18 ENCOUNTER — PATIENT MESSAGE (OUTPATIENT)
Dept: CARDIOLOGY | Facility: CLINIC | Age: 79
End: 2024-12-18
Payer: MEDICARE

## 2024-12-18 NOTE — TELEPHONE ENCOUNTER
S/w dr michele & he said since she isnt feeling any better she needs to go to ER.   S/w daughter & advised. She agreed and will take her

## 2024-12-18 NOTE — PROGRESS NOTES
C3 nurse attempted to contact Cynthia N Cushing  for a TCC post hospital discharge follow up call. The patient is unable to conduct the call @ this time. The patient requested a callback.    The patient does not have a scheduled HOSFU appointment within 5-7 days post hospital discharge date 12/14/24. Message sent to Physician staff to assist with HOSFU appointment scheduling.

## 2024-12-18 NOTE — TELEPHONE ENCOUNTER
S/w pt & pts daughter. She is still congested. She is very SOB. She has been crying because its making her anxious. Daughter gave her saline nasal spray.  She did take the 3 furosemide on the 16th, then 2 yesterday and 2 today. Advised dr weir is not here today, so I will have to give him a call and call them back

## 2024-12-19 NOTE — PROGRESS NOTES
2nd Attempt made to reach patient for TCC call. Left voicemail please call [phone] leave first name, last name, and  Margaux will return your call.

## 2025-01-09 ENCOUNTER — OFFICE VISIT (OUTPATIENT)
Dept: CARDIOLOGY | Facility: CLINIC | Age: 80
End: 2025-01-09
Payer: MEDICARE

## 2025-01-09 VITALS
BODY MASS INDEX: 30.28 KG/M2 | OXYGEN SATURATION: 97 % | WEIGHT: 170.88 LBS | HEIGHT: 63 IN | HEART RATE: 60 BPM | SYSTOLIC BLOOD PRESSURE: 142 MMHG | DIASTOLIC BLOOD PRESSURE: 70 MMHG

## 2025-01-09 DIAGNOSIS — I49.5 SICK SINUS SYNDROME: ICD-10-CM

## 2025-01-09 DIAGNOSIS — Z95.0 CARDIAC PACEMAKER IN SITU: ICD-10-CM

## 2025-01-09 DIAGNOSIS — N18.4 CHRONIC KIDNEY DISEASE (CKD), STAGE IV (SEVERE): ICD-10-CM

## 2025-01-09 DIAGNOSIS — I50.9 ACUTE ON CHRONIC CONGESTIVE HEART FAILURE, UNSPECIFIED HEART FAILURE TYPE: ICD-10-CM

## 2025-01-09 DIAGNOSIS — I35.0 AORTIC VALVE STENOSIS, ETIOLOGY OF CARDIAC VALVE DISEASE UNSPECIFIED: Primary | ICD-10-CM

## 2025-01-09 DIAGNOSIS — I25.10 CORONARY ARTERY DISEASE INVOLVING NATIVE CORONARY ARTERY OF NATIVE HEART WITHOUT ANGINA PECTORIS: ICD-10-CM

## 2025-01-09 DIAGNOSIS — I15.1 HYPERTENSION SECONDARY TO OTHER RENAL DISORDERS: ICD-10-CM

## 2025-01-09 DIAGNOSIS — I47.10 SVT (SUPRAVENTRICULAR TACHYCARDIA): ICD-10-CM

## 2025-01-09 DIAGNOSIS — I50.32 CHRONIC HEART FAILURE WITH PRESERVED EJECTION FRACTION: ICD-10-CM

## 2025-01-09 PROCEDURE — 3288F FALL RISK ASSESSMENT DOCD: CPT | Mod: CPTII,S$GLB,, | Performed by: INTERNAL MEDICINE

## 2025-01-09 PROCEDURE — 1160F RVW MEDS BY RX/DR IN RCRD: CPT | Mod: CPTII,S$GLB,, | Performed by: INTERNAL MEDICINE

## 2025-01-09 PROCEDURE — 3077F SYST BP >= 140 MM HG: CPT | Mod: CPTII,S$GLB,, | Performed by: INTERNAL MEDICINE

## 2025-01-09 PROCEDURE — 99214 OFFICE O/P EST MOD 30 MIN: CPT | Mod: S$GLB,,, | Performed by: INTERNAL MEDICINE

## 2025-01-09 PROCEDURE — 3078F DIAST BP <80 MM HG: CPT | Mod: CPTII,S$GLB,, | Performed by: INTERNAL MEDICINE

## 2025-01-09 PROCEDURE — 1111F DSCHRG MED/CURRENT MED MERGE: CPT | Mod: CPTII,S$GLB,, | Performed by: INTERNAL MEDICINE

## 2025-01-09 PROCEDURE — 1101F PT FALLS ASSESS-DOCD LE1/YR: CPT | Mod: CPTII,S$GLB,, | Performed by: INTERNAL MEDICINE

## 2025-01-09 PROCEDURE — 1159F MED LIST DOCD IN RCRD: CPT | Mod: CPTII,S$GLB,, | Performed by: INTERNAL MEDICINE

## 2025-01-09 PROCEDURE — 99999 PR PBB SHADOW E&M-EST. PATIENT-LVL IV: CPT | Mod: PBBFAC,,, | Performed by: INTERNAL MEDICINE

## 2025-01-09 PROCEDURE — 1126F AMNT PAIN NOTED NONE PRSNT: CPT | Mod: CPTII,S$GLB,, | Performed by: INTERNAL MEDICINE

## 2025-01-09 NOTE — PROGRESS NOTES
Patient ID:  Cynthia N Cushing is a 79 y.o. female who presents for follow-up of Hospital Follow Up, Coronary Artery Disease, Congestive Heart Failure, Shortness of Breath (With exertion-- when walking), and Aortic Stenosis      Aortic stenosis  Peak velocity of 3.0 m/sec    Hyperlipidemia  On 10 mg of rosuvastatin     SVT (supraventricular tachycardia)  Controlled on metoprolol and diltiazem     SSS (sick sinus syndrome)  Status post pacemaker placement     Hypertension secondary to other renal disorders  Controlled on diltiazem and metoprolol     Chronic kidney disease (CKD), stage IV (severe)  Stable will continue to monitor     Non-STEMI (non-ST elevated myocardial infarction)  No symptoms of angina.  She had a non STEMI with a positive myocardial perfusion scan her renal function remains poor even though a coronary arteriogram should be performed    A coronary arteriogram was performed on 12/12/2024 she had a high-grade stenosis of the left anterior descending artery.  A stent was deployed.  The patient was hydrated postprocedure she developed acute pulmonary edema that require IV diuretics and CPAP.  She is doing better although she continues to have dyspnea on exertion.  She has chronic nasal congestion and takes David-Synephrine.  She was having cough and shortness of breath she saw her primary care physician Dr. Adams and put her on antibiotics.  She is doing better.  The Crestor was discontinue by Dr. Adams due to her chronic kidney disease.  The Pepcid was changed to Protonix.        Past Medical History:   Diagnosis Date    Allergy     Breast mass     Cataract     CHF (congestive heart failure)     Diabetes mellitus     GERD (gastroesophageal reflux disease)     Hernia, hiatal     Hyperlipidemia     Hypertension     Kidney stone     NSTEMI (non-ST elevated myocardial infarction)     Osteoarthritis     Pacemaker     Renal insufficiency     Seasonal allergies     Sleep apnea     does not use CPAP     SSS (sick sinus syndrome)     SVT (supraventricular tachycardia)     Umbilical hernia         Past Surgical History:   Procedure Laterality Date    A-V CARDIAC PACEMAKER INSERTION N/A 03/10/2024    Procedure: INSERTION, CARDIAC PACEMAKER, DUAL CHAMBER;  Surgeon: Brijesh Mendieta MD;  Location: [facility] CATH/EP LAB;  Service: Cardiology;  Laterality: N/A;    ANGIOGRAM, CORONARY, WITH LEFT HEART CATHETERIZATION N/A 12/12/2024    Procedure: Angiogram, Coronary, with Left Heart Cath;  Surgeon: Brijesh Mendieta MD;  Location: [facility] CATH/EP LAB;  Service: Cardiology;  Laterality: N/A;    BREAST BIOPSY Bilateral     BREAST CYST ASPIRATION Bilateral     BREAST SURGERY      CERVICAL DISC SURGERY      fusion    EYE SURGERY      cataracts bilateral    HYSTERECTOMY      PTCA, SINGLE VESSEL N/A 12/12/2024    Procedure: PTCA, Single Vessel;  Surgeon: Brijesh Mendieta MD;  Location: [facility] CATH/EP LAB;  Service: Cardiology;  Laterality: N/A;    STENT, CORONARY, MULTI VESSEL N/A 12/12/2024    Procedure: Stent, Coronary, Multi Vessel;  Surgeon: Brijesh Mendieta MD;  Location: [facility] CATH/EP LAB;  Service: Cardiology;  Laterality: N/A;          Current Outpatient Medications   Medication Instructions    ACCU-CHEK AMADOR PLUS TEST STRP Strp TEST BLOOD SUGAR FOUR TIMES DAILY    aspirin 81 mg, Daily    cetirizine (ZYRTEC) 10 mg, 2 times daily    clopidogreL (PLAVIX) 75 mg, Oral, Daily    diltiaZEM (CARDIZEM CD) 120 mg, Oral, Daily    famotidine (PEPCID) 20 MG tablet TAKE 1 TABLET TWICE DAILY    ferrous sulfate 324 mg, Oral, Daily    furosemide (LASIX) 20 mg, Oral, Daily    guanFACINE (TENEX) 2 MG tablet TAKE 1 TABLET EVERY EVENING    hydrALAZINE (APRESOLINE) 50 mg, Oral, 3 times daily    isosorbide dinitrate (ISORDIL) 10 mg, Oral, 3 times daily    lancets (ACCU-CHEK SOFTCLIX LANCETS) Misc TEST BLOOD SUGAR FOUR TIMES DAILY    LANTAOUS SOLOSTAR U-100 INSULIN glargine 100 units/mL SubQ pen ADMINISTER 51 UNITS UNDER THE SKIN AT  "NIGHT    metoprolol tartrate (LOPRESSOR) 75 mg, Oral, 2 times daily    pen needle, diabetic (BD ANNA 2ND GEN PEN NEEDLE) 32 gauge x 5/32" Ndle INJECT 1 NEEDLE INTO THE SKIN EVERY EVENING        Review of patient's allergies indicates:   Allergen Reactions    Biaxin [clarithromycin]     Prandin [repaglinide] Nausea And Vomiting    Amlodipine     Chlorphen-phenyltolox-pe-ppa     Ciprofloxacin Nausea And Vomiting    Omnicef [cefdinir]     Propoxyphene     Quinine Nausea And Vomiting     Pancreatitis          Review of Systems   HENT:  Positive for congestion.    Cardiovascular:  Positive for dyspnea on exertion. Negative for chest pain and palpitations.   Respiratory:  Negative for cough and shortness of breath.         Objective:     Vitals:    01/09/25 1502   BP: (!) 142/70   BP Location: Left arm   Patient Position: Sitting   Pulse: 60   SpO2: 97%   Weight: 77.5 kg (170 lb 13.7 oz)   Height: 5' 3" (1.6 m)       Physical Exam  Vitals and nursing note reviewed.   Constitutional:       Appearance: She is well-developed.   HENT:      Head: Normocephalic and atraumatic.   Eyes:      Conjunctiva/sclera: Conjunctivae normal.   Cardiovascular:      Rate and Rhythm: Normal rate and regular rhythm.      Heart sounds: Murmur (Grade 4/6 systolic murmur at the base) heard.   Pulmonary:      Effort: Pulmonary effort is normal.      Breath sounds: Normal breath sounds.   Abdominal:      General: Bowel sounds are normal.      Palpations: Abdomen is soft.   Musculoskeletal:         General: Normal range of motion.   Skin:     General: Skin is warm and dry.   Neurological:      Mental Status: She is alert and oriented to person, place, and time.   Psychiatric:         Behavior: Behavior normal.         Thought Content: Thought content normal.         Judgment: Judgment normal.       CMP  Sodium   Date Value Ref Range Status   12/14/2024 138 136 - 145 mmol/L Final     Potassium   Date Value Ref Range Status   12/14/2024 3.6 3.5 - 5.1 " mmol/L Final     Chloride   Date Value Ref Range Status   12/14/2024 105 95 - 110 mmol/L Final     CO2   Date Value Ref Range Status   12/14/2024 25 23 - 29 mmol/L Final     Glucose   Date Value Ref Range Status   12/14/2024 156 (H) 70 - 110 mg/dL Final     BUN   Date Value Ref Range Status   12/14/2024 44 (H) 8 - 23 mg/dL Final     Creatinine   Date Value Ref Range Status   12/14/2024 2.1 (H) 0.5 - 1.4 mg/dL Final     Calcium   Date Value Ref Range Status   12/14/2024 8.8 8.7 - 10.5 mg/dL Final     Total Protein   Date Value Ref Range Status   12/14/2024 6.1 6.0 - 8.4 g/dL Final     Albumin   Date Value Ref Range Status   12/14/2024 3.6 3.5 - 5.2 g/dL Final     Total Bilirubin   Date Value Ref Range Status   12/14/2024 0.5 0.1 - 1.0 mg/dL Final     Comment:     For infants and newborns, interpretation of results should be based  on gestational age, weight and in agreement with clinical  observations.    Premature Infant recommended reference ranges:  Up to 24 hours.............<8.0 mg/dL  Up to 48 hours............<12.0 mg/dL  3-5 days..................<15.0 mg/dL  6-29 days.................<15.0 mg/dL       Alkaline Phosphatase   Date Value Ref Range Status   12/14/2024 56 55 - 135 U/L Final     AST   Date Value Ref Range Status   12/14/2024 15 10 - 40 U/L Final     ALT   Date Value Ref Range Status   12/14/2024 15 10 - 44 U/L Final     Anion Gap   Date Value Ref Range Status   12/14/2024 8 8 - 16 mmol/L Final     eGFR if    Date Value Ref Range Status   07/15/2022 38.5 (A) >60 mL/min/1.73 m^2 Final     eGFR if non    Date Value Ref Range Status   07/15/2022 33.4 (A) >60 mL/min/1.73 m^2 Final     Comment:     Calculation used to obtain the estimated glomerular filtration  rate (eGFR) is the CKD-EPI equation.         BMP  Lab Results   Component Value Date     12/14/2024    K 3.6 12/14/2024     12/14/2024    CO2 25 12/14/2024    BUN 44 (H) 12/14/2024    CREATININE 2.1 (H)  12/14/2024    CALCIUM 8.8 12/14/2024    ANIONGAP 8 12/14/2024    ESTGFRAFRICA 38.5 (A) 07/15/2022    EGFRNONAA 33.4 (A) 07/15/2022      BNP  @LABRCNTIP(BNP,BNPTRIAGEBLO)@   Lab Results   Component Value Date    CHOL 292 (H) 12/13/2024    CHOL 110 (L) 08/17/2023    CHOL 114 (L) 02/07/2022     Lab Results   Component Value Date    HDL 30 (L) 12/13/2024    HDL 33 (L) 08/17/2023    HDL 34 (L) 02/07/2022     Lab Results   Component Value Date    LDLCALC Invalid, Trig>400.0 12/13/2024    LDLCALC 9.2 (L) 08/17/2023    LDLCALC 39.2 (L) 02/07/2022     Lab Results   Component Value Date    TRIG 431 (H) 12/13/2024    TRIG 339 (H) 08/17/2023    TRIG 204 (H) 02/07/2022     Lab Results   Component Value Date    CHOLHDL 10.3 (L) 12/13/2024    CHOLHDL 30.0 08/17/2023    CHOLHDL 29.8 02/07/2022      Lab Results   Component Value Date    TSH 4.143 11/13/2024     Lab Results   Component Value Date    HGBA1C 8.1 (H) 05/22/2024     Lab Results   Component Value Date    WBC 12.01 12/12/2024    HGB 10.5 (L) 12/12/2024    HCT 32.7 (L) 12/12/2024    MCV 81 (L) 12/12/2024     12/12/2024         Results for orders placed during the hospital encounter of 12/02/24    Echo Saline Bubble? No    Interpretation Summary    Left Ventricle: The left ventricle is normal in size. Mildly increased wall thickness. There is mild concentric hypertrophy. There is normal systolic function. Ejection fraction is approximately 55%.    Right Ventricle: Normal right ventricular cavity size. Wall thickness is normal. Systolic function is normal. Pacemaker lead present in the ventricle.    Left Atrium: Left atrium is mildly dilated.    Aortic Valve: There is moderate aortic valve sclerosis. Severely restricted motion. There is moderate to severe stenosis. Aortic valve area by VTI is 1.1 cm². Aortic valve peak velocity is 3.0 m/s. Mean gradient is 20.0 mmHg. The dimensionless index is 0.35.    Tricuspid Valve: There is mild regurgitation.    IVC/SVC: Normal  venous pressure at 3 mmHg.    The estimated pulmonary artery systolic pressure is 20 mmHg.     Results for orders placed during the hospital encounter of 12/12/24    Cardiac catheterization    Conclusion    The Mid LAD lesion was 99% stenosed with 0% stenosis post-intervention.    Mid LAD lesion: A .    Mid LAD lesion: A STENT SYNERGY XD [id number] stent was successfully placed at 11 ALAN for 30 sec.    The estimated blood loss was none.    There was single vessel coronary artery disease.    The procedure log was documented by Documenter: Lean Whitt RT and verified by Brijesh Mendieta MD.    Date: 12/12/2024  Time: 11:21 AM     EKG  Results for orders placed or performed during the hospital encounter of 12/12/24   EKG 12-lead    Collection Time: 12/12/24  3:19 PM   Result Value Ref Range    QRS Duration 118 ms    OHS QTC Calculation 426 ms    Narrative    Test Reason : R06.02,    Vent. Rate :  60 BPM     Atrial Rate :  60 BPM     P-R Int : 220 ms          QRS Dur : 118 ms      QT Int : 426 ms       P-R-T Axes :     -6 207 degrees    QTcB Int : 426 ms    Atrial-paced rhythm with prolonged AV conduction  Incomplete left bundle branch block  Minimal voltage criteria for LVH, may be normal variant ( [place] product )  ST and T wave abnormality, consider inferior ischemia  Abnormal ECG  No previous ECGs available  Confirmed by Evan Bishop ([id number]) on 1/3/2025 11:30:52 AM    Referred By: BRIJESH MENDIETA           Confirmed By: Evan Bishop      Stress  Results for orders placed during the hospital encounter of 03/03/24    Nuclear Stress Test    Interpretation Summary    The ECG portion of the study is negative for ischemia.    The patient reported no chest pain during the stress test.    The nuclear portion of this study will be reported separately.             Assessment:       Coronary artery disease involving native coronary artery of native heart without angina pectoris  Status post percutaneous  revascularization of the left anterior descending artery with a CHRIS.    Hypertension secondary to other renal disorders  Controlled on guanfacine 1 mg, diltiazem 120 mg daily, furosemide 20 mg daily, hydralazine 25 mg b.i.d., metoprolol 75 mg b.i.d.    Cardiac pacemaker in situ  Status post pacemaker placement for sick sinus syndrome    SVT (supraventricular tachycardia)  Controlled on metoprolol and diltiazem    Chronic kidney disease (CKD), stage IV (severe)  Stable post percutaneous revascularization of the left anterior descending artery.    (HFpEF) heart failure with preserved ejection fraction  She has heart failure with preserved EF multifactorial secondary to hypertension, aortic stenosis, left ventricular hypertrophy and coronary artery disease that has been taking care of       Plan:       Continue the diuretics antihypertensive dual antiplatelets she will be seen in the office in a proximally 2 months with a BMP BNP and a CBC.

## 2025-01-10 PROBLEM — I50.30 (HFPEF) HEART FAILURE WITH PRESERVED EJECTION FRACTION: Status: ACTIVE | Noted: 2025-01-10

## 2025-01-10 PROBLEM — I25.10 CORONARY ARTERY DISEASE INVOLVING NATIVE CORONARY ARTERY OF NATIVE HEART WITHOUT ANGINA PECTORIS: Status: ACTIVE | Noted: 2024-12-12

## 2025-01-10 NOTE — ASSESSMENT & PLAN NOTE
She has heart failure with preserved EF multifactorial secondary to hypertension, aortic stenosis, left ventricular hypertrophy and coronary artery disease that has been taking care of

## 2025-01-10 NOTE — ASSESSMENT & PLAN NOTE
Controlled on guanfacine 1 mg, diltiazem 120 mg daily, furosemide 20 mg daily, hydralazine 25 mg b.i.d., metoprolol 75 mg b.i.d.

## 2025-02-20 DIAGNOSIS — R60.0 BILATERAL LEG EDEMA: ICD-10-CM

## 2025-02-20 DIAGNOSIS — N18.32 STAGE 3B CHRONIC KIDNEY DISEASE: ICD-10-CM

## 2025-02-20 DIAGNOSIS — I10 ESSENTIAL HYPERTENSION, BENIGN: ICD-10-CM

## 2025-02-20 DIAGNOSIS — E78.2 MIXED HYPERLIPIDEMIA: ICD-10-CM

## 2025-02-20 RX ORDER — FUROSEMIDE 20 MG/1
20 TABLET ORAL DAILY
Qty: 90 TABLET | Refills: 3 | Status: SHIPPED | OUTPATIENT
Start: 2025-02-20

## 2025-02-20 NOTE — TELEPHONE ENCOUNTER
----- Message from Danae sent at 2/20/2025  9:02 AM CST -----  Regarding: new rx  Contact: patient  Type:  RX Refill RequestWho Called:  patientRefill or New Rx:  new rxRX Name and Strength:  furosemide (LASIX) 20 MG tabletHow is the patient currently taking it? (ex. 1XDay):  as directedIs this a 30 day or 90 day RX:  90Preferred Pharmacy with phone number:  [id number] - [place], [place] - [address] AT [facility] [id number] [place] [facility] [address]: [phone] Fax: [phone] or Mail Order:  localOrdering Provider:  Dr. Gayatri De La Rosa Call Back Number:  [phone] (home) Additional Information:  Please call to new pharmacy.  Please call patient to advise.  Thanks!

## 2025-03-05 DIAGNOSIS — I10 ESSENTIAL HYPERTENSION, BENIGN: ICD-10-CM

## 2025-03-05 DIAGNOSIS — R60.0 BILATERAL LEG EDEMA: ICD-10-CM

## 2025-03-05 DIAGNOSIS — E78.2 MIXED HYPERLIPIDEMIA: ICD-10-CM

## 2025-03-05 DIAGNOSIS — N18.32 STAGE 3B CHRONIC KIDNEY DISEASE: ICD-10-CM

## 2025-03-06 RX ORDER — FUROSEMIDE 20 MG/1
20 TABLET ORAL DAILY
Qty: 90 TABLET | Refills: 3 | Status: SHIPPED | OUTPATIENT
Start: 2025-03-06

## 2025-03-07 ENCOUNTER — OFFICE VISIT (OUTPATIENT)
Dept: CARDIOLOGY | Facility: CLINIC | Age: 80
End: 2025-03-07
Payer: MEDICARE

## 2025-03-07 VITALS
WEIGHT: 172.06 LBS | HEART RATE: 60 BPM | DIASTOLIC BLOOD PRESSURE: 62 MMHG | SYSTOLIC BLOOD PRESSURE: 122 MMHG | BODY MASS INDEX: 30.49 KG/M2 | HEIGHT: 63 IN | OXYGEN SATURATION: 97 %

## 2025-03-07 DIAGNOSIS — I35.0 NONRHEUMATIC AORTIC VALVE STENOSIS: ICD-10-CM

## 2025-03-07 DIAGNOSIS — I50.32 CHRONIC HEART FAILURE WITH PRESERVED EJECTION FRACTION: ICD-10-CM

## 2025-03-07 DIAGNOSIS — N18.4 CHRONIC KIDNEY DISEASE (CKD), STAGE IV (SEVERE): ICD-10-CM

## 2025-03-07 DIAGNOSIS — I25.10 CORONARY ARTERY DISEASE INVOLVING NATIVE CORONARY ARTERY OF NATIVE HEART WITHOUT ANGINA PECTORIS: ICD-10-CM

## 2025-03-07 DIAGNOSIS — N18.32 STAGE 3B CHRONIC KIDNEY DISEASE: Primary | ICD-10-CM

## 2025-03-07 DIAGNOSIS — I15.1 HYPERTENSION SECONDARY TO OTHER RENAL DISORDERS: ICD-10-CM

## 2025-03-07 DIAGNOSIS — I47.10 SVT (SUPRAVENTRICULAR TACHYCARDIA): ICD-10-CM

## 2025-03-07 PROCEDURE — 99999 PR PBB SHADOW E&M-EST. PATIENT-LVL III: CPT | Mod: PBBFAC,,, | Performed by: INTERNAL MEDICINE

## 2025-03-07 PROCEDURE — 1101F PT FALLS ASSESS-DOCD LE1/YR: CPT | Mod: CPTII,S$GLB,, | Performed by: INTERNAL MEDICINE

## 2025-03-07 PROCEDURE — 1159F MED LIST DOCD IN RCRD: CPT | Mod: CPTII,S$GLB,, | Performed by: INTERNAL MEDICINE

## 2025-03-07 PROCEDURE — 1160F RVW MEDS BY RX/DR IN RCRD: CPT | Mod: CPTII,S$GLB,, | Performed by: INTERNAL MEDICINE

## 2025-03-07 PROCEDURE — 3288F FALL RISK ASSESSMENT DOCD: CPT | Mod: CPTII,S$GLB,, | Performed by: INTERNAL MEDICINE

## 2025-03-07 PROCEDURE — 3078F DIAST BP <80 MM HG: CPT | Mod: CPTII,S$GLB,, | Performed by: INTERNAL MEDICINE

## 2025-03-07 PROCEDURE — 99214 OFFICE O/P EST MOD 30 MIN: CPT | Mod: S$GLB,,, | Performed by: INTERNAL MEDICINE

## 2025-03-07 PROCEDURE — 1126F AMNT PAIN NOTED NONE PRSNT: CPT | Mod: CPTII,S$GLB,, | Performed by: INTERNAL MEDICINE

## 2025-03-07 PROCEDURE — 3074F SYST BP LT 130 MM HG: CPT | Mod: CPTII,S$GLB,, | Performed by: INTERNAL MEDICINE

## 2025-03-07 NOTE — PROGRESS NOTES
Patient ID:  Cynthia N Cushing is a 79 y.o. female who presents for follow-up of Coronary Artery Disease, Congestive Heart Failure, Aortic Stenosis, and Shortness of Breath      Aortic stenosis  Peak velocity of 3.0 m/sec     Hyperlipidemia  On 10 mg of rosuvastatin     SVT (supraventricular tachycardia)  Controlled on metoprolol and diltiazem     SSS (sick sinus syndrome)  Status post pacemaker placement     Hypertension secondary to other renal disorders  Controlled on diltiazem and metoprolol     Chronic kidney disease (CKD), stage IV (severe)  Stable will continue to monitor     Non-STEMI (non-ST elevated myocardial infarction)  No symptoms of angina.  She had a non STEMI with a positive myocardial perfusion scan her renal function remains poor even though a coronary arteriogram should be performed  1/9/25   A coronary arteriogram was performed on 12/12/2024 she had a high-grade stenosis of the left anterior descending artery.  A stent was deployed.  The patient was hydrated postprocedure she developed acute pulmonary edema that require IV diuretics and CPAP.  She is doing better although she continues to have dyspnea on exertion.  She has chronic nasal congestion and takes David-Synephrine.  She was having cough and shortness of breath she saw her primary care physician Dr. Adams and put her on antibiotics.  She is doing better.  The Crestor was discontinue by Dr. Adams due to her chronic kidney disease.  The Pepcid was changed to Protonix.  03/07/2025   She continues to have dyspnea on exertion.  She also complained of tenderness and redness of the right thumb.  It appears to be infected with an area of pause I have recommended her to go to the urgent care and have it open.  She denies any chest pains.             Past Medical History:   Diagnosis Date    Allergy     Breast mass     Cataract     CHF (congestive heart failure)     Diabetes mellitus     GERD (gastroesophageal reflux disease)     Hernia,  hiatal     Hyperlipidemia     Hypertension     Kidney stone     NSTEMI (non-ST elevated myocardial infarction)     Osteoarthritis     Pacemaker     Renal insufficiency     Seasonal allergies     Sleep apnea     does not use CPAP    SSS (sick sinus syndrome)     SVT (supraventricular tachycardia)     Umbilical hernia         Past Surgical History:   Procedure Laterality Date    A-V CARDIAC PACEMAKER INSERTION N/A 03/10/2024    Procedure: INSERTION, CARDIAC PACEMAKER, DUAL CHAMBER;  Surgeon: Brijesh Mendieta MD;  Location: [facility] CATH/EP LAB;  Service: Cardiology;  Laterality: N/A;    ANGIOGRAM, CORONARY, WITH LEFT HEART CATHETERIZATION N/A 12/12/2024    Procedure: Angiogram, Coronary, with Left Heart Cath;  Surgeon: Brijesh Mendieta MD;  Location: [facility] CATH/EP LAB;  Service: Cardiology;  Laterality: N/A;    BREAST BIOPSY Bilateral     BREAST CYST ASPIRATION Bilateral     BREAST SURGERY      CERVICAL DISC SURGERY      fusion    EYE SURGERY      cataracts bilateral    HYSTERECTOMY      PTCA, SINGLE VESSEL N/A 12/12/2024    Procedure: PTCA, Single Vessel;  Surgeon: Brijesh Mendeita MD;  Location: [facility] CATH/EP LAB;  Service: Cardiology;  Laterality: N/A;    STENT, CORONARY, MULTI VESSEL N/A 12/12/2024    Procedure: Stent, Coronary, Multi Vessel;  Surgeon: Brijesh Mendieta MD;  Location: [facility] CATH/EP LAB;  Service: Cardiology;  Laterality: N/A;          Current Outpatient Medications   Medication Instructions    ACCU-CHEK AMADOR PLUS TEST STRP Strp TEST BLOOD SUGAR FOUR TIMES DAILY    aspirin 81 mg, Daily    cetirizine (ZYRTEC) 10 mg, 2 times daily    clopidogreL (PLAVIX) 75 mg, Oral, Daily    diltiaZEM (CARDIZEM CD) 120 mg, Oral, Daily    famotidine (PEPCID) 20 MG tablet TAKE 1 TABLET TWICE DAILY    ferrous sulfate 324 mg, Oral, Daily    furosemide (LASIX) 20 mg, Oral, Daily    guanFACINE (TENEX) 2 MG tablet TAKE 1 TABLET EVERY EVENING    hydrALAZINE (APRESOLINE) 50 mg, Oral, 3 times daily    isosorbide  "dinitrate (ISORDIL) 10 mg, Oral, 3 times daily    lancets (ACCU-CHEK SOFTCLIX LANCETS) Misc TEST BLOOD SUGAR FOUR TIMES DAILY    LANTUS SOLOSTAR U-100 INSULIN glargine 100 units/mL SubQ pen ADMINISTER 51 UNITS UNDER THE SKIN AT NIGHT    metoprolol tartrate (LOPRESSOR) 75 mg, Oral, 2 times daily    pen needle, diabetic (BD ANNA 2ND GEN PEN NEEDLE) 32 gauge x 5/32" Ndle INJECT 1 NEEDLE INTO THE SKIN EVERY EVENING        Review of patient's allergies indicates:   Allergen Reactions    Biaxin [clarithromycin]     Prandin [repaglinide] Nausea And Vomiting    Amlodipine     Chlorphen-phenyltolox-pe-ppa     Ciprofloxacin Nausea And Vomiting    Omnicef [cefdinir]     Propoxyphene     Quinine Nausea And Vomiting     Pancreatitis          Review of Systems   Cardiovascular:  Positive for dyspnea on exertion. Negative for chest pain and palpitations.        Objective:     Vitals:    03/07/25 0950   BP: 122/62   BP Location: Left arm   Patient Position: Sitting   Pulse: 60   SpO2: 97%   Weight: 78 kg (172 lb 1.1 oz)   Height: 5' 3" (1.6 m)       Physical Exam  Vitals and nursing note reviewed.   Constitutional:       Appearance: She is well-developed.   HENT:      Head: Normocephalic and atraumatic.   Eyes:      Conjunctiva/sclera: Conjunctivae normal.   Cardiovascular:      Rate and Rhythm: Normal rate and regular rhythm.      Heart sounds: Normal heart sounds.   Pulmonary:      Effort: Pulmonary effort is normal.      Breath sounds: Normal breath sounds.   Abdominal:      General: Bowel sounds are normal.      Palpations: Abdomen is soft.   Musculoskeletal:         General: Normal range of motion.   Skin:     General: Skin is warm and dry.   Neurological:      Mental Status: She is alert and oriented to person, place, and time.   Psychiatric:         Behavior: Behavior normal.         Thought Content: Thought content normal.         Judgment: Judgment normal.       CMP  Sodium   Date Value Ref Range Status   12/14/2024 138 " 136 - 145 mmol/L Final     Potassium   Date Value Ref Range Status   12/14/2024 3.6 3.5 - 5.1 mmol/L Final     Chloride   Date Value Ref Range Status   12/14/2024 105 95 - 110 mmol/L Final     CO2   Date Value Ref Range Status   12/14/2024 25 23 - 29 mmol/L Final     Glucose   Date Value Ref Range Status   12/14/2024 156 (H) 70 - 110 mg/dL Final     BUN   Date Value Ref Range Status   12/14/2024 44 (H) 8 - 23 mg/dL Final     Creatinine   Date Value Ref Range Status   12/14/2024 2.1 (H) 0.5 - 1.4 mg/dL Final     Calcium   Date Value Ref Range Status   12/14/2024 8.8 8.7 - 10.5 mg/dL Final     Total Protein   Date Value Ref Range Status   12/14/2024 6.1 6.0 - 8.4 g/dL Final     Albumin   Date Value Ref Range Status   12/14/2024 3.6 3.5 - 5.2 g/dL Final     Total Bilirubin   Date Value Ref Range Status   12/14/2024 0.5 0.1 - 1.0 mg/dL Final     Comment:     For infants and newborns, interpretation of results should be based  on gestational age, weight and in agreement with clinical  observations.    Premature Infant recommended reference ranges:  Up to 24 hours.............<8.0 mg/dL  Up to 48 hours............<12.0 mg/dL  3-5 days..................<15.0 mg/dL  6-29 days.................<15.0 mg/dL       Alkaline Phosphatase   Date Value Ref Range Status   12/14/2024 56 55 - 135 U/L Final     AST   Date Value Ref Range Status   12/14/2024 15 10 - 40 U/L Final     ALT   Date Value Ref Range Status   12/14/2024 15 10 - 44 U/L Final     Anion Gap   Date Value Ref Range Status   12/14/2024 8 8 - 16 mmol/L Final     eGFR if    Date Value Ref Range Status   07/15/2022 38.5 (A) >60 mL/min/1.73 m^2 Final     eGFR if non    Date Value Ref Range Status   07/15/2022 33.4 (A) >60 mL/min/1.73 m^2 Final     Comment:     Calculation used to obtain the estimated glomerular filtration  rate (eGFR) is the CKD-EPI equation.         BMP  Lab Results   Component Value Date     12/14/2024    K 3.6  12/14/2024     12/14/2024    CO2 25 12/14/2024    BUN 44 (H) 12/14/2024    CREATININE 2.1 (H) 12/14/2024    CALCIUM 8.8 12/14/2024    ANIONGAP 8 12/14/2024    ESTGFRAFRICA 38.5 (A) 07/15/2022    EGFRNONAA 33.4 (A) 07/15/2022      BNP  @LABRCNTIP(BNP,BNPTRIAGEBLO)@   Lab Results   Component Value Date    CHOL 292 (H) 12/13/2024    CHOL 110 (L) 08/17/2023    CHOL 114 (L) 02/07/2022     Lab Results   Component Value Date    HDL 30 (L) 12/13/2024    HDL 33 (L) 08/17/2023    HDL 34 (L) 02/07/2022     Lab Results   Component Value Date    LDLCALC Invalid, Trig>400.0 12/13/2024    LDLCALC 9.2 (L) 08/17/2023    LDLCALC 39.2 (L) 02/07/2022     Lab Results   Component Value Date    TRIG 431 (H) 12/13/2024    TRIG 339 (H) 08/17/2023    TRIG 204 (H) 02/07/2022     Lab Results   Component Value Date    CHOLHDL 10.3 (L) 12/13/2024    CHOLHDL 30.0 08/17/2023    CHOLHDL 29.8 02/07/2022      Lab Results   Component Value Date    TSH 4.143 11/13/2024     Lab Results   Component Value Date    HGBA1C 8.1 (H) 05/22/2024     Lab Results   Component Value Date    WBC 12.01 12/12/2024    HGB 10.5 (L) 12/12/2024    HCT 32.7 (L) 12/12/2024    MCV 81 (L) 12/12/2024     12/12/2024         Results for orders placed during the hospital encounter of 12/02/24    Echo Saline Bubble? No    Interpretation Summary    Left Ventricle: The left ventricle is normal in size. Mildly increased wall thickness. There is mild concentric hypertrophy. There is normal systolic function. Ejection fraction is approximately 55%.    Right Ventricle: Normal right ventricular cavity size. Wall thickness is normal. Systolic function is normal. Pacemaker lead present in the ventricle.    Left Atrium: Left atrium is mildly dilated.    Aortic Valve: There is moderate aortic valve sclerosis. Severely restricted motion. There is moderate to severe stenosis. Aortic valve area by VTI is 1.1 cm². Aortic valve peak velocity is 3.0 m/s. Mean gradient is 20.0 mmHg. The  dimensionless index is 0.35.    Tricuspid Valve: There is mild regurgitation.    IVC/SVC: Normal venous pressure at 3 mmHg.    The estimated pulmonary artery systolic pressure is 20 mmHg.     Results for orders placed during the hospital encounter of 12/12/24    Cardiac catheterization    Conclusion    The Mid LAD lesion was 99% stenosed with 0% stenosis post-intervention.    Mid LAD lesion: A .    Mid LAD lesion: A STENT SYNERGY XD [id number] stent was successfully placed at 11 ALAN for 30 sec.    The estimated blood loss was none.    There was single vessel coronary artery disease.    The procedure log was documented by Documenter: Lena Whitt RT and verified by Brijesh Mendieta MD.    Date: 12/12/2024  Time: 11:21 AM     EKG  Results for orders placed or performed during the hospital encounter of 12/12/24   EKG 12-lead    Collection Time: 12/12/24  3:19 PM   Result Value Ref Range    QRS Duration 118 ms    [facility] QTC Calculation 426 ms    Narrative    Test Reason : R06.02,    Vent. Rate :  60 BPM     Atrial Rate :  60 BPM     P-R Int : 220 ms          QRS Dur : 118 ms      QT Int : 426 ms       P-R-T Axes :     -6 207 degrees    QTcB Int : 426 ms    Atrial-paced rhythm with prolonged AV conduction  Incomplete left bundle branch block  Minimal voltage criteria for LVH, may be normal variant ( [place] product )  ST and T wave abnormality, consider inferior ischemia  Abnormal ECG  No previous ECGs available  Confirmed by Evan Bishop ([id number]) on 1/3/2025 11:30:52 AM    Referred By: BRIJESH MENDIETA           Confirmed By: Evan Bishop      Stress  Results for orders placed during the hospital encounter of 03/03/24    Nuclear Stress Test    Interpretation Summary    The ECG portion of the study is negative for ischemia.    The patient reported no chest pain during the stress test.    The nuclear portion of this study will be reported separately.             Assessment:       Aortic stenosis  Peak velocity of  3.0 m/sec will continue to monitor    (HFpEF) heart failure with preserved ejection fraction  She has heart failure with preserved EF multifactorial secondary to hypertension, aortic stenosis, left ventricular hypertrophy and coronary artery disease that has been taking care of    Coronary artery disease involving native coronary artery of native heart without angina pectoris  Status post percutaneous revascularization of the left anterior descending artery with a CHRIS.     Hypertension secondary to other renal disorders  Controlled on guanfacine 1 mg, diltiazem 120 mg daily, furosemide 20 mg daily, hydralazine 25 mg b.i.d., metoprolol 75 mg b.i.d.     SVT (supraventricular tachycardia)  Controlled on metoprolol and diltiazem    Chronic kidney disease (CKD), stage IV (severe)  Stable post percutaneous revascularization of the left anterior descending artery.        Plan:           She is to return to the office in 3 months.  Lasts will be obtain a CBC CMP TSH as well as a BNP.  She is to continue with the guanfacine diltiazem furosemide hydralazine and metoprolol for her blood pressure.  Continue the metoprolol in diltiazem for her PSVT.

## 2025-05-07 DIAGNOSIS — Z78.0 MENOPAUSE: Primary | ICD-10-CM

## 2025-05-07 DIAGNOSIS — Z13.820 ENCOUNTER FOR OSTEOPOROSIS SCREENING IN ASYMPTOMATIC POSTMENOPAUSAL PATIENT: ICD-10-CM

## 2025-05-07 DIAGNOSIS — Z78.0 ENCOUNTER FOR OSTEOPOROSIS SCREENING IN ASYMPTOMATIC POSTMENOPAUSAL PATIENT: ICD-10-CM

## 2025-06-04 ENCOUNTER — OFFICE VISIT (OUTPATIENT)
Dept: CARDIOLOGY | Facility: CLINIC | Age: 80
End: 2025-06-04
Payer: MEDICARE

## 2025-06-04 VITALS
DIASTOLIC BLOOD PRESSURE: 62 MMHG | BODY MASS INDEX: 32.54 KG/M2 | HEIGHT: 63 IN | SYSTOLIC BLOOD PRESSURE: 122 MMHG | OXYGEN SATURATION: 98 % | WEIGHT: 183.63 LBS | HEART RATE: 60 BPM

## 2025-06-04 DIAGNOSIS — D50.0 IRON DEFICIENCY ANEMIA DUE TO CHRONIC BLOOD LOSS: ICD-10-CM

## 2025-06-04 DIAGNOSIS — Z95.0 CARDIAC PACEMAKER IN SITU: ICD-10-CM

## 2025-06-04 DIAGNOSIS — I50.32 CHRONIC HEART FAILURE WITH PRESERVED EJECTION FRACTION: Primary | ICD-10-CM

## 2025-06-04 DIAGNOSIS — I35.0 NONRHEUMATIC AORTIC VALVE STENOSIS: ICD-10-CM

## 2025-06-04 DIAGNOSIS — E78.2 MIXED HYPERLIPIDEMIA: ICD-10-CM

## 2025-06-04 DIAGNOSIS — E11.65 UNCONTROLLED TYPE 2 DIABETES MELLITUS WITH HYPERGLYCEMIA: ICD-10-CM

## 2025-06-04 DIAGNOSIS — I15.1 HYPERTENSION SECONDARY TO OTHER RENAL DISORDERS: ICD-10-CM

## 2025-06-04 DIAGNOSIS — I25.10 CORONARY ARTERY DISEASE INVOLVING NATIVE CORONARY ARTERY OF NATIVE HEART WITHOUT ANGINA PECTORIS: ICD-10-CM

## 2025-06-04 DIAGNOSIS — N18.4 CHRONIC KIDNEY DISEASE (CKD), STAGE IV (SEVERE): ICD-10-CM

## 2025-06-04 PROCEDURE — 3288F FALL RISK ASSESSMENT DOCD: CPT | Mod: CPTII,S$GLB,, | Performed by: INTERNAL MEDICINE

## 2025-06-04 PROCEDURE — 3074F SYST BP LT 130 MM HG: CPT | Mod: CPTII,S$GLB,, | Performed by: INTERNAL MEDICINE

## 2025-06-04 PROCEDURE — 1160F RVW MEDS BY RX/DR IN RCRD: CPT | Mod: CPTII,S$GLB,, | Performed by: INTERNAL MEDICINE

## 2025-06-04 PROCEDURE — 1101F PT FALLS ASSESS-DOCD LE1/YR: CPT | Mod: CPTII,S$GLB,, | Performed by: INTERNAL MEDICINE

## 2025-06-04 PROCEDURE — 3078F DIAST BP <80 MM HG: CPT | Mod: CPTII,S$GLB,, | Performed by: INTERNAL MEDICINE

## 2025-06-04 PROCEDURE — 1159F MED LIST DOCD IN RCRD: CPT | Mod: CPTII,S$GLB,, | Performed by: INTERNAL MEDICINE

## 2025-06-04 PROCEDURE — 99213 OFFICE O/P EST LOW 20 MIN: CPT | Mod: S$GLB,,, | Performed by: INTERNAL MEDICINE

## 2025-06-04 PROCEDURE — 99999 PR PBB SHADOW E&M-EST. PATIENT-LVL IV: CPT | Mod: PBBFAC,,, | Performed by: INTERNAL MEDICINE

## 2025-06-04 PROCEDURE — 1126F AMNT PAIN NOTED NONE PRSNT: CPT | Mod: CPTII,S$GLB,, | Performed by: INTERNAL MEDICINE

## 2025-06-04 RX ORDER — DICLOFENAC SODIUM 10 MG/G
2 GEL TOPICAL
COMMUNITY
Start: 2025-05-07

## 2025-06-04 RX ORDER — PANTOPRAZOLE SODIUM 20 MG/1
20 TABLET, DELAYED RELEASE ORAL EVERY MORNING
COMMUNITY
Start: 2025-05-07 | End: 2025-06-04

## 2025-06-09 RX ORDER — CLOPIDOGREL BISULFATE 75 MG/1
75 TABLET ORAL DAILY
Qty: 90 TABLET | Refills: 3 | Status: SHIPPED | OUTPATIENT
Start: 2025-06-09 | End: 2026-06-04

## 2025-06-09 RX ORDER — FERROUS SULFATE 324(65)MG
324 TABLET, DELAYED RELEASE (ENTERIC COATED) ORAL DAILY
Qty: 90 TABLET | Refills: 3 | Status: SHIPPED | OUTPATIENT
Start: 2025-06-09 | End: 2026-06-04

## 2025-06-09 RX ORDER — DILTIAZEM HYDROCHLORIDE 120 MG/1
120 CAPSULE, COATED, EXTENDED RELEASE ORAL DAILY
Qty: 90 CAPSULE | Refills: 3 | Status: SHIPPED | OUTPATIENT
Start: 2025-06-09 | End: 2026-06-04

## 2025-07-14 RX ORDER — HYDRALAZINE HYDROCHLORIDE 50 MG/1
50 TABLET, FILM COATED ORAL 3 TIMES DAILY
Qty: 90 TABLET | Refills: 11 | Status: SHIPPED | OUTPATIENT
Start: 2025-07-14

## 2025-08-18 ENCOUNTER — HOSPITAL ENCOUNTER (OUTPATIENT)
Dept: RADIOLOGY | Facility: HOSPITAL | Age: 80
Discharge: HOME | End: 2025-08-18
Attending: INTERNAL MEDICINE
Payer: MEDICARE

## 2025-08-18 VITALS — BODY MASS INDEX: 32.43 KG/M2 | HEIGHT: 63 IN | WEIGHT: 183 LBS

## 2025-08-18 DIAGNOSIS — Z78.0 ENCOUNTER FOR OSTEOPOROSIS SCREENING IN ASYMPTOMATIC POSTMENOPAUSAL PATIENT: ICD-10-CM

## 2025-08-18 DIAGNOSIS — Z78.0 MENOPAUSE: ICD-10-CM

## 2025-08-18 DIAGNOSIS — Z12.31 ENCOUNTER FOR SCREENING MAMMOGRAM FOR BREAST CANCER: ICD-10-CM

## 2025-08-18 DIAGNOSIS — Z13.820 ENCOUNTER FOR OSTEOPOROSIS SCREENING IN ASYMPTOMATIC POSTMENOPAUSAL PATIENT: ICD-10-CM

## 2025-08-18 PROCEDURE — 77063 BREAST TOMOSYNTHESIS BI: CPT | Mod: 26,,, | Performed by: RADIOLOGY

## 2025-08-18 PROCEDURE — 77063 BREAST TOMOSYNTHESIS BI: CPT | Mod: TC,PO

## 2025-08-18 PROCEDURE — 77080 DXA BONE DENSITY AXIAL: CPT | Mod: TC,PO

## 2025-08-18 PROCEDURE — 77080 DXA BONE DENSITY AXIAL: CPT | Mod: 26,,, | Performed by: RADIOLOGY

## 2025-08-18 PROCEDURE — 77067 SCR MAMMO BI INCL CAD: CPT | Mod: 26,,, | Performed by: RADIOLOGY

## 2025-08-20 RX ORDER — ISOSORBIDE DINITRATE 10 MG/1
10 TABLET ORAL 3 TIMES DAILY
Qty: 270 TABLET | Refills: 3 | Status: SHIPPED | OUTPATIENT
Start: 2025-08-20

## 2025-09-05 ENCOUNTER — OFFICE VISIT (OUTPATIENT)
Dept: CARDIOLOGY | Facility: CLINIC | Age: 80
End: 2025-09-05
Payer: MEDICARE

## 2025-09-05 VITALS
HEIGHT: 63 IN | HEART RATE: 60 BPM | SYSTOLIC BLOOD PRESSURE: 140 MMHG | WEIGHT: 177.69 LBS | OXYGEN SATURATION: 97 % | BODY MASS INDEX: 31.48 KG/M2 | DIASTOLIC BLOOD PRESSURE: 78 MMHG

## 2025-09-05 DIAGNOSIS — D50.0 IRON DEFICIENCY ANEMIA DUE TO CHRONIC BLOOD LOSS: ICD-10-CM

## 2025-09-05 DIAGNOSIS — N18.4 CHRONIC KIDNEY DISEASE (CKD), STAGE IV (SEVERE): ICD-10-CM

## 2025-09-05 DIAGNOSIS — Z95.0 CARDIAC PACEMAKER IN SITU: ICD-10-CM

## 2025-09-05 DIAGNOSIS — E78.2 MIXED HYPERLIPIDEMIA: Primary | ICD-10-CM

## 2025-09-05 DIAGNOSIS — I35.0 NONRHEUMATIC AORTIC VALVE STENOSIS: ICD-10-CM

## 2025-09-05 DIAGNOSIS — I35.0 NONRHEUMATIC AORTIC (VALVE) STENOSIS: ICD-10-CM

## 2025-09-05 PROCEDURE — 99999 PR PBB SHADOW E&M-EST. PATIENT-LVL IV: CPT | Mod: PBBFAC,,, | Performed by: INTERNAL MEDICINE

## 2025-09-05 RX ORDER — MOLNUPIRAVIR 200 MG/1
CAPSULE ORAL
COMMUNITY
Start: 2025-08-28

## (undated) DEVICE — VISIPAQUE CONTRAST 320MG/50ML

## (undated) DEVICE — Device

## (undated) DEVICE — WIRE GUIDE HI TRQ BAL

## (undated) DEVICE — CATH DXTERITY JL35 100CM 6FR

## (undated) DEVICE — CATH DXTERITY JL40 100CM 6FR

## (undated) DEVICE — INFLATOR ADVANTAGE ENCORE 26

## (undated) DEVICE — CATH DXTERITY JL50 100CM 6FR

## (undated) DEVICE — CATH DXTERITY JR40 100CM 6FR

## (undated) DEVICE — SHEATH INTRODUCER 6FR 11CM

## (undated) DEVICE — INTRODUCER OPTISEAL 7F 13CM

## (undated) DEVICE — CATH EMERGE MR 20 X 2.00

## (undated) DEVICE — GUIDEWIRE J 3MM .035IN 150